# Patient Record
Sex: MALE | Race: WHITE | Employment: OTHER | URBAN - NONMETROPOLITAN AREA
[De-identification: names, ages, dates, MRNs, and addresses within clinical notes are randomized per-mention and may not be internally consistent; named-entity substitution may affect disease eponyms.]

---

## 2022-08-25 ENCOUNTER — APPOINTMENT (OUTPATIENT)
Dept: GENERAL RADIOLOGY | Age: 67
DRG: 521 | End: 2022-08-25
Payer: MEDICARE

## 2022-08-25 ENCOUNTER — HOSPITAL ENCOUNTER (INPATIENT)
Age: 67
LOS: 13 days | Discharge: HOME HEALTH | DRG: 521 | End: 2022-09-07
Attending: HOSPITALIST | Admitting: HOSPITALIST
Payer: MEDICARE

## 2022-08-25 ENCOUNTER — APPOINTMENT (OUTPATIENT)
Dept: ULTRASOUND IMAGING | Age: 67
DRG: 521 | End: 2022-08-25
Payer: MEDICARE

## 2022-08-25 DIAGNOSIS — I48.91 NEW ONSET A-FIB (HCC): ICD-10-CM

## 2022-08-25 DIAGNOSIS — S72.002B: ICD-10-CM

## 2022-08-25 DIAGNOSIS — K74.60 HEPATIC CIRRHOSIS, UNSPECIFIED HEPATIC CIRRHOSIS TYPE, UNSPECIFIED WHETHER ASCITES PRESENT (HCC): ICD-10-CM

## 2022-08-25 DIAGNOSIS — S72.002A CLOSED FRACTURE OF LEFT HIP, INITIAL ENCOUNTER (HCC): Primary | ICD-10-CM

## 2022-08-25 PROBLEM — E80.6 HYPERBILIRUBINEMIA: Status: ACTIVE | Noted: 2022-08-25

## 2022-08-25 PROBLEM — R79.89 ABNORMAL LFTS: Status: ACTIVE | Noted: 2022-08-25

## 2022-08-25 PROBLEM — D72.819 LEUKOPENIA: Status: ACTIVE | Noted: 2022-08-25

## 2022-08-25 PROBLEM — D69.6 THROMBOCYTOPENIA (HCC): Status: ACTIVE | Noted: 2022-08-25

## 2022-08-25 PROBLEM — K76.9 LIVER DISEASE: Status: ACTIVE | Noted: 2022-08-25

## 2022-08-25 LAB
ALBUMIN SERPL-MCNC: 4.3 G/DL (ref 3.5–5.2)
ALP BLD-CCNC: 117 U/L (ref 40–130)
ALT SERPL-CCNC: 34 U/L (ref 5–41)
ANION GAP SERPL CALCULATED.3IONS-SCNC: 12 MMOL/L (ref 7–19)
ANISOCYTOSIS: ABNORMAL
AST SERPL-CCNC: 75 U/L (ref 5–40)
BASOPHILS ABSOLUTE: 0 K/UL (ref 0–0.2)
BASOPHILS RELATIVE PERCENT: 0.5 % (ref 0–1)
BILIRUB SERPL-MCNC: 6.7 MG/DL (ref 0.2–1.2)
BILIRUB SERPL-MCNC: 8 MG/DL (ref 0.2–1.2)
BILIRUBIN DIRECT: 2.5 MG/DL (ref 0–0.3)
BILIRUBIN, INDIRECT: 4.2 MG/DL (ref 0.1–1)
BUN BLDV-MCNC: 10 MG/DL (ref 8–23)
CALCIUM SERPL-MCNC: 9.3 MG/DL (ref 8.8–10.2)
CHLORIDE BLD-SCNC: 106 MMOL/L (ref 98–111)
CO2: 24 MMOL/L (ref 22–29)
CREAT SERPL-MCNC: 0.4 MG/DL (ref 0.5–1.2)
EOSINOPHILS ABSOLUTE: 0.1 K/UL (ref 0–0.6)
EOSINOPHILS RELATIVE PERCENT: 3.4 % (ref 0–5)
GFR AFRICAN AMERICAN: >59
GFR NON-AFRICAN AMERICAN: >60
GLUCOSE BLD-MCNC: 139 MG/DL (ref 74–109)
HAPTOGLOBIN: <10 MG/DL (ref 30–200)
HAV IGM SER IA-ACNC: NORMAL
HCT VFR BLD CALC: 45 % (ref 42–52)
HEMOGLOBIN: 15.3 G/DL (ref 14–18)
HEPATITIS B CORE IGM ANTIBODY: NORMAL
HEPATITIS B SURFACE ANTIGEN INTERPRETATION: NORMAL
HEPATITIS C ANTIBODY INTERPRETATION: NORMAL
IMMATURE GRANULOCYTES #: 0 K/UL
INR BLD: 1.61 (ref 0.88–1.18)
IRON SATURATION: 97 % (ref 14–50)
IRON: 298 UG/DL (ref 59–158)
LIPASE: 41 U/L (ref 13–60)
LV EF: 74 %
LVEF MODALITY: NORMAL
LYMPHOCYTES ABSOLUTE: 0.6 K/UL (ref 1.1–4.5)
LYMPHOCYTES RELATIVE PERCENT: 16.3 % (ref 20–40)
MACROCYTES: ABNORMAL
MAGNESIUM: 1.7 MG/DL (ref 1.6–2.4)
MCH RBC QN AUTO: 39.8 PG (ref 27–31)
MCHC RBC AUTO-ENTMCNC: 34 G/DL (ref 33–37)
MCV RBC AUTO: 117.2 FL (ref 80–94)
MONOCYTES ABSOLUTE: 0.5 K/UL (ref 0–0.9)
MONOCYTES RELATIVE PERCENT: 14.2 % (ref 0–10)
NEUTROPHILS ABSOLUTE: 2.5 K/UL (ref 1.5–7.5)
NEUTROPHILS RELATIVE PERCENT: 65.3 % (ref 50–65)
PDW BLD-RTO: 16 % (ref 11.5–14.5)
PLATELET # BLD: 30 K/UL (ref 130–400)
PLATELET SLIDE REVIEW: ABNORMAL
PMV BLD AUTO: 11.7 FL (ref 9.4–12.4)
POLYCHROMASIA: ABNORMAL
POTASSIUM REFLEX MAGNESIUM: 3.9 MMOL/L (ref 3.5–5)
PROTHROMBIN TIME: 19.4 SEC (ref 12–14.6)
RBC # BLD: 3.84 M/UL (ref 4.7–6.1)
SARS-COV-2, NAAT: NOT DETECTED
SODIUM BLD-SCNC: 142 MMOL/L (ref 136–145)
TOTAL IRON BINDING CAPACITY: 307 UG/DL (ref 250–400)
TOTAL PROTEIN: 7.4 G/DL (ref 6.6–8.7)
TROPONIN: <0.01 NG/ML (ref 0–0.03)
TROPONIN: <0.01 NG/ML (ref 0–0.03)
TSH REFLEX FT4: 2.63 UIU/ML (ref 0.35–5.5)
TSH SERPL DL<=0.05 MIU/L-ACNC: 2.74 UIU/ML (ref 0.27–4.2)
VITAMIN D 25-HYDROXY: 30 NG/ML
VITAMIN D 25-HYDROXY: 32 NG/ML
WBC # BLD: 3.8 K/UL (ref 4.8–10.8)

## 2022-08-25 PROCEDURE — 80074 ACUTE HEPATITIS PANEL: CPT

## 2022-08-25 PROCEDURE — 87635 SARS-COV-2 COVID-19 AMP PRB: CPT

## 2022-08-25 PROCEDURE — 82306 VITAMIN D 25 HYDROXY: CPT

## 2022-08-25 PROCEDURE — 86256 FLUORESCENT ANTIBODY TITER: CPT

## 2022-08-25 PROCEDURE — 73502 X-RAY EXAM HIP UNI 2-3 VIEWS: CPT | Performed by: RADIOLOGY

## 2022-08-25 PROCEDURE — 71045 X-RAY EXAM CHEST 1 VIEW: CPT | Performed by: RADIOLOGY

## 2022-08-25 PROCEDURE — 83540 ASSAY OF IRON: CPT

## 2022-08-25 PROCEDURE — 99221 1ST HOSP IP/OBS SF/LOW 40: CPT | Performed by: INTERNAL MEDICINE

## 2022-08-25 PROCEDURE — 73502 X-RAY EXAM HIP UNI 2-3 VIEWS: CPT

## 2022-08-25 PROCEDURE — 1210000000 HC MED SURG R&B

## 2022-08-25 PROCEDURE — 80053 COMPREHEN METABOLIC PANEL: CPT

## 2022-08-25 PROCEDURE — 93005 ELECTROCARDIOGRAM TRACING: CPT | Performed by: INTERNAL MEDICINE

## 2022-08-25 PROCEDURE — 82390 ASSAY OF CERULOPLASMIN: CPT

## 2022-08-25 PROCEDURE — 82784 ASSAY IGA/IGD/IGG/IGM EACH: CPT

## 2022-08-25 PROCEDURE — 99222 1ST HOSP IP/OBS MODERATE 55: CPT | Performed by: SPECIALIST

## 2022-08-25 PROCEDURE — 84484 ASSAY OF TROPONIN QUANT: CPT

## 2022-08-25 PROCEDURE — 83010 ASSAY OF HAPTOGLOBIN QUANT: CPT

## 2022-08-25 PROCEDURE — 2580000003 HC RX 258: Performed by: NURSE PRACTITIONER

## 2022-08-25 PROCEDURE — 84443 ASSAY THYROID STIM HORMONE: CPT

## 2022-08-25 PROCEDURE — 82248 BILIRUBIN DIRECT: CPT

## 2022-08-25 PROCEDURE — 71045 X-RAY EXAM CHEST 1 VIEW: CPT

## 2022-08-25 PROCEDURE — 76705 ECHO EXAM OF ABDOMEN: CPT

## 2022-08-25 PROCEDURE — 83550 IRON BINDING TEST: CPT

## 2022-08-25 PROCEDURE — 6360000002 HC RX W HCPCS: Performed by: NURSE PRACTITIONER

## 2022-08-25 PROCEDURE — 99223 1ST HOSP IP/OBS HIGH 75: CPT | Performed by: INTERNAL MEDICINE

## 2022-08-25 PROCEDURE — 83735 ASSAY OF MAGNESIUM: CPT

## 2022-08-25 PROCEDURE — 99285 EMERGENCY DEPT VISIT HI MDM: CPT

## 2022-08-25 PROCEDURE — 76705 ECHO EXAM OF ABDOMEN: CPT | Performed by: RADIOLOGY

## 2022-08-25 PROCEDURE — 6370000000 HC RX 637 (ALT 250 FOR IP): Performed by: NURSE PRACTITIONER

## 2022-08-25 PROCEDURE — 36415 COLL VENOUS BLD VENIPUNCTURE: CPT

## 2022-08-25 PROCEDURE — 86038 ANTINUCLEAR ANTIBODIES: CPT

## 2022-08-25 PROCEDURE — 85610 PROTHROMBIN TIME: CPT

## 2022-08-25 PROCEDURE — 93306 TTE W/DOPPLER COMPLETE: CPT

## 2022-08-25 PROCEDURE — 83516 IMMUNOASSAY NONANTIBODY: CPT

## 2022-08-25 PROCEDURE — 82103 ALPHA-1-ANTITRYPSIN TOTAL: CPT

## 2022-08-25 PROCEDURE — 82247 BILIRUBIN TOTAL: CPT

## 2022-08-25 PROCEDURE — 83690 ASSAY OF LIPASE: CPT

## 2022-08-25 PROCEDURE — 6370000000 HC RX 637 (ALT 250 FOR IP): Performed by: INTERNAL MEDICINE

## 2022-08-25 PROCEDURE — 85025 COMPLETE CBC W/AUTO DIFF WBC: CPT

## 2022-08-25 RX ORDER — MORPHINE SULFATE 2 MG/ML
2 INJECTION, SOLUTION INTRAMUSCULAR; INTRAVENOUS EVERY 4 HOURS PRN
Status: DISCONTINUED | OUTPATIENT
Start: 2022-08-25 | End: 2022-08-26

## 2022-08-25 RX ORDER — ACETAMINOPHEN 650 MG/1
650 SUPPOSITORY RECTAL EVERY 6 HOURS PRN
Status: DISCONTINUED | OUTPATIENT
Start: 2022-08-25 | End: 2022-08-25

## 2022-08-25 RX ORDER — METOPROLOL SUCCINATE 50 MG/1
50 TABLET, EXTENDED RELEASE ORAL DAILY
Status: DISCONTINUED | OUTPATIENT
Start: 2022-08-25 | End: 2022-08-26

## 2022-08-25 RX ORDER — SODIUM CHLORIDE 0.9 % (FLUSH) 0.9 %
5-40 SYRINGE (ML) INJECTION EVERY 12 HOURS SCHEDULED
Status: DISCONTINUED | OUTPATIENT
Start: 2022-08-25 | End: 2022-09-02 | Stop reason: SDUPTHER

## 2022-08-25 RX ORDER — OXYCODONE HYDROCHLORIDE 5 MG/1
5 TABLET ORAL EVERY 6 HOURS PRN
Status: DISCONTINUED | OUTPATIENT
Start: 2022-08-25 | End: 2022-08-26

## 2022-08-25 RX ORDER — ONDANSETRON 4 MG/1
4 TABLET, ORALLY DISINTEGRATING ORAL EVERY 8 HOURS PRN
Status: DISCONTINUED | OUTPATIENT
Start: 2022-08-25 | End: 2022-08-26 | Stop reason: SDUPTHER

## 2022-08-25 RX ORDER — PHYTONADIONE 5 MG/1
10 TABLET ORAL DAILY
Status: COMPLETED | OUTPATIENT
Start: 2022-08-25 | End: 2022-08-27

## 2022-08-25 RX ORDER — SODIUM CHLORIDE 9 MG/ML
INJECTION, SOLUTION INTRAVENOUS PRN
Status: DISCONTINUED | OUTPATIENT
Start: 2022-08-25 | End: 2022-09-02 | Stop reason: SDUPTHER

## 2022-08-25 RX ORDER — HYDROMORPHONE HYDROCHLORIDE 1 MG/ML
1 INJECTION, SOLUTION INTRAMUSCULAR; INTRAVENOUS; SUBCUTANEOUS
Status: DISCONTINUED | OUTPATIENT
Start: 2022-08-25 | End: 2022-08-26

## 2022-08-25 RX ORDER — ENOXAPARIN SODIUM 100 MG/ML
40 INJECTION SUBCUTANEOUS DAILY
Status: DISCONTINUED | OUTPATIENT
Start: 2022-08-25 | End: 2022-08-25

## 2022-08-25 RX ORDER — POLYETHYLENE GLYCOL 3350 17 G/17G
17 POWDER, FOR SOLUTION ORAL DAILY PRN
Status: DISCONTINUED | OUTPATIENT
Start: 2022-08-25 | End: 2022-09-07 | Stop reason: HOSPADM

## 2022-08-25 RX ORDER — ONDANSETRON 2 MG/ML
4 INJECTION INTRAMUSCULAR; INTRAVENOUS EVERY 6 HOURS PRN
Status: DISCONTINUED | OUTPATIENT
Start: 2022-08-25 | End: 2022-08-26 | Stop reason: SDUPTHER

## 2022-08-25 RX ORDER — SODIUM CHLORIDE 0.9 % (FLUSH) 0.9 %
5-40 SYRINGE (ML) INJECTION PRN
Status: DISCONTINUED | OUTPATIENT
Start: 2022-08-25 | End: 2022-09-02 | Stop reason: SDUPTHER

## 2022-08-25 RX ORDER — ACETAMINOPHEN 325 MG/1
650 TABLET ORAL EVERY 6 HOURS PRN
Status: DISCONTINUED | OUTPATIENT
Start: 2022-08-25 | End: 2022-08-25

## 2022-08-25 RX ADMIN — MORPHINE SULFATE 2 MG: 2 INJECTION, SOLUTION INTRAMUSCULAR; INTRAVENOUS at 19:19

## 2022-08-25 RX ADMIN — MORPHINE SULFATE 2 MG: 2 INJECTION, SOLUTION INTRAMUSCULAR; INTRAVENOUS at 14:55

## 2022-08-25 RX ADMIN — PHYTONADIONE 10 MG: 5 TABLET ORAL at 23:39

## 2022-08-25 RX ADMIN — METOPROLOL SUCCINATE 50 MG: 50 TABLET, EXTENDED RELEASE ORAL at 18:45

## 2022-08-25 RX ADMIN — SODIUM CHLORIDE, PRESERVATIVE FREE 10 ML: 5 INJECTION INTRAVENOUS at 19:20

## 2022-08-25 RX ADMIN — OXYCODONE 5 MG: 5 TABLET ORAL at 18:45

## 2022-08-25 ASSESSMENT — ENCOUNTER SYMPTOMS
BLOOD IN STOOL: 0
SHORTNESS OF BREATH: 0
WHEEZING: 0
VOMITING: 0
ABDOMINAL DISTENTION: 0
COLOR CHANGE: 0
CONSTIPATION: 0
NAUSEA: 0
DIARRHEA: 0
COUGH: 0
ABDOMINAL PAIN: 0

## 2022-08-25 ASSESSMENT — PAIN SCALES - GENERAL
PAINLEVEL_OUTOF10: 8
PAINLEVEL_OUTOF10: 9
PAINLEVEL_OUTOF10: 6
PAINLEVEL_OUTOF10: 8
PAINLEVEL_OUTOF10: 7

## 2022-08-25 ASSESSMENT — PAIN DESCRIPTION - DESCRIPTORS
DESCRIPTORS: ACHING

## 2022-08-25 ASSESSMENT — PAIN DESCRIPTION - LOCATION
LOCATION: HIP

## 2022-08-25 ASSESSMENT — PAIN - FUNCTIONAL ASSESSMENT: PAIN_FUNCTIONAL_ASSESSMENT: ACTIVITIES ARE NOT PREVENTED

## 2022-08-25 ASSESSMENT — PAIN DESCRIPTION - ORIENTATION
ORIENTATION: LEFT

## 2022-08-25 NOTE — PROGRESS NOTES
4 Eyes Skin Assessment    Raymond Castillo is being assessed upon: Admission    I agree that I, Kathleen Browning RN, along with Steven Quiros RN have performed a thorough Head to Toe Skin Assessment on the patient. ALL assessment sites listed below have been assessed. Areas assessed by both nurses:     [x]   Head, Face, and Ears   [x]   Shoulders, Back, and Chest  [x]   Arms, Elbows, and Hands   [x]   Coccyx, Sacrum, and Ischium  [x]   Legs, Feet, and Heels    Does the Patient have Skin Breakdown?  No    Noah Prevention initiated: Yes  Wound Care Orders initiated: No    [facility] nurse consulted for Pressure Injury (Stage 3,4, Unstageable, DTI, NWPT, and Complex wounds) and New or Established Ostomies: No  Was unable to do a complete skin assessment due to pain with movement      Primary Nurse eSignature: Kathleen Browning RN on 8/25/2022 at 3:25 PM      Co-Signer eSignature: {Esignature:[id number]}

## 2022-08-25 NOTE — CONSULTS
[facility] ([facility]) Cardiology   Consult Note       Requesting MD:  Snow Cotton, *   Admit Status:         Patient:    Antonio Tavera  [id number]  1955         Chief Complaint: Preop cardiac clearance for frequent PACs  HPI: Patient is a 75 y.o. male, COPD, moderate use of alcohol, was run over by a big dog yesterday and fractured his left hip. EKG showed sinus rhythm with frequent PACs. Patient denies ever having a problem with dizziness, palpitation, CVA, chest pain or myocardial infarct. He smokes 1 pack of cigarette a day. He was a heavy drinker now cut down to moderate drinker. In a good day, patient can do anything he wants to do with no difficulty. He used to have labile hypertension not taking any medication now. Review of Systems:  HENNT: normal  PULMONARY: COPD  CVS:see history of present illness  ABD: denies any abdominal pain  PERIPHERL: normal  MSK: See history of present illness  CNS: Denies any dizziness, syncopal episode or history of stroke  Renal: Denies any history of dysuria or frequency    Cardiac Specific Data:  Specialty Problems          Cardiology Problems    New onset a-fib [facility])           Past Medical History:  Past Medical History:   Diagnosis Date    Liver disease         Past Surgical History:  History reviewed. No pertinent surgical history.     Past Family History:  Family History   Problem Relation Age of Onset    Diabetes Mother        Past Social History:  Social History     Socioeconomic History    Marital status:      Spouse name: Not on file    Number of children: 1    Years of education: Not on file    Highest education level: Not on file   Occupational History    Not on file   Tobacco Use    Smoking status: Every Day     Packs/day: 1.00     Years: 30.00     Pack years: 30.00     Types: Cigarettes    Smokeless tobacco: Never   Vaping Use    Vaping Use: Never used   Substance and Sexual Activity    Alcohol use: Yes    Drug use: Never    Sexual activity: Not on Patient here to follow up from a CT scan for kidney stones.     file   Other Topics Concern    Not on file   Social History Narrative    Not on file     Social Determinants of Health     Financial Resource Strain: Not on file   Food Insecurity: Not on file   Transportation Needs: Not on file   Physical Activity: Not on file   Stress: Not on file   Social Connections: Not on file   Intimate Partner Violence: Not on file   Housing Stability: Not on file       Allergies:  No Known Allergies    Prior to Admission medications    Not on File        Current Facility-Administered Medications   Medication Dose Route Frequency Provider Last Rate Last Admin    sodium chloride flush 0.9 % injection 5-40 mL  5-40 mL IntraVENous 2 times per day Arlana Nathan, APRN - CNP        sodium chloride flush 0.9 % injection 5-40 mL  5-40 mL IntraVENous PRN Arlana Vail, APRN - CNP        0.9 % sodium chloride infusion   IntraVENous PRN Arlana Vail, APRN - CNP        ondansetron (ZOFRAN-ODT) disintegrating tablet 4 mg  4 mg Oral Q8H PRN Arlana Vail, APRN - CNP        Or    ondansetron (ZOFRAN) injection 4 mg  4 mg IntraVENous Q6H PRN Arlana Nathan, APRN - CNP        polyethylene glycol (GLYCOLAX) packet 17 g  17 g Oral Daily PRN Arlana Vail, APRN - CNP        acetaminophen (TYLENOL) tablet 650 mg  650 mg Oral Q6H PRN Arlana Vail, APRN - CNP        Or    acetaminophen (TYLENOL) suppository 650 mg  650 mg Rectal Q6H PRN Arlana Nathan, APRN - CNP        oxyCODONE (ROXICODONE) immediate release tablet 5 mg  5 mg Oral Q6H PRN Arlana Nathan, APRN - CNP        morphine (PF) injection 2 mg  2 mg IntraVENous Q4H PRN Arlana Vail, APRN - CNP   2 mg at 08/25/22 1455        Current Infused Meds:   sodium chloride         Physical Exam:  Vitals:    08/25/22 1455   BP:    Pulse:    Resp: 18   Temp:    SpO2:      No intake or output data in the 24 hours ending 08/25/22 1751  Estimated body mass index is 27.85 kg/m² as calculated from the following:    Height as of this encounter: 5' 9\" (1.753 m). Weight as of this encounter: 188 lb 9 oz (85.5 kg). PHYSICAL EXAM:  HENNT: normal  PULMONARY: normal to inspection, palpation, percussion and ascultation  CVS:Normal neck vein, S1 and S2 normal, no murmur  ABD: liver and spleen not palpable, nontender, bowel sound normal  PERIPHERL: all pulses are normal with no bruit  MSK: Fracture left hip  CNS: Normal CN 2,3,4,6,5,7,9,10,11,and 12. Oriented to time, place and person    Labs:  Recent Labs     08/25/22  1209   WBC 3.8*   HGB 15.3   PLT 30*       Recent Labs     08/25/22  1209      K 3.9      CO2 24   BUN 10   CREATININE 0.4*   CALCIUM 9.3       CK, CKMB, Troponin:   Recent Labs     08/25/22  1434   TROPONINI <0.01       Last 3 BNP:  No results for input(s): PROBNP in the last 72 hours. US LIVER    Result Date: 8/25/2022  Finding suggestive for hydropic cholecystitis without a obvious stone as described. CBD mildly dilated. MRCP is recommended. Possible chronic liver parenchymal disease, cirrhosis? .  Splenomegaly. Recommendation: Follow up as clinically indicated. Electronically Signed by Bushra Patel MD at 25-Aug-2022 06:37:10 PM             XR CHEST PORTABLE    Result Date: 8/25/2022  Normal chest Recommendation: Follow up as clinically indicated. Electronically Signed by Cheryl Ocasio MD at 25-Aug-2022 01:18:21 PM             XR HIP 2-3 VW W PELVIS LEFT    Result Date: 8/25/2022  Acute, impacted transverse fracture through base of the femoral  head , continued good alignment and normal joint space. Recommendation: Followup as clinically indicated.  Electronically Signed by Cheryl Ocasio MD at 25-Aug-2022 01:04:57 PM                Assessment and Plan:  Frequent PACs, asymptomatic  Hypertension  Fracture left hip  COPD  Moderate use of alcohol    Plan: Patient is completely asymptomatic before the incident, unless the echocardiogram showed severe cardiomyopathy, he should be able to undergo general anesthetic for the hip surgery. Low-dose beta-blocker to suppress the PAC will be given tonight    I have spent 60 minutes reviewing the chart, taking history, examining the patient, discussion with the patient and the family concerning the finding, diagnoses and treatment plan. This dictation was performed using [address] [place]. Mistake and misspelling may have been created without  realizing them. Please notify me for clarification.   Thank you    Lydia Lai MD   8/25/2022, 5:51 PM

## 2022-08-25 NOTE — ED PROVIDER NOTES
[facility] EMERGENCY DEPT  eMERGENCY dEPARTMENT eNCOUnter      Pt Name: Sumaya Guillen  MRN: [id number]  [place] 1955  Date of evaluation: 8/25/2022  Provider: Bakari Rico, [address]       Chief Complaint   Patient presents with    Hip Pain     Left hip pain ground level fall. HISTORY OF PRESENT ILLNESS   (Location/Symptom, Timing/Onset,Context/Setting, Quality, Duration, Modifying Factors, Severity)  Note limiting factors. Sumaya Guillen is a 75 y.o. male who presents to the emergency department in a wheelchair from home. Patient was knocked down by a big dog last night and landed on his left hip. Since that time he has not been ambulatory. He states it hurts to move his leg and any direction. He has not had any surgeries to this leg previously. Someone gave him a Lortab and he was able to tolerate a ride in the car to get here. Pt tells me he has a little problem with his liver. Drinks \"a little bit\"  doesn't go to doctor per wife,  not on blood thinners    The history is provided by the patient. Hip Pain  This is a new problem. The current episode started yesterday. The problem occurs constantly. The problem has not changed since onset. The symptoms are relieved by narcotics. The treatment provided moderate relief. NursingNotes were reviewed. REVIEW OF SYSTEMS    (2-9 systems for level 4, 10 or more for level 5)     Review of Systems   Musculoskeletal:  Positive for arthralgias, gait problem and myalgias. Except as noted above the remainder of the review of systems was reviewed and negative. PAST MEDICAL HISTORY   No past medical history on file. SURGICALHISTORY     No past surgical history on file. CURRENT MEDICATIONS       Previous Medications    No medications on file       ALLERGIES     Patient has no known allergies. FAMILY HISTORY     No family history on file.        SOCIAL HISTORY       Social History     Socioeconomic History    Marital status:        SCREENINGS    [place] Coma Scale  Eye Opening: Spontaneous  Best Verbal Response: Oriented  Best Motor Response: Obeys commands  [place] Coma Scale Score: 15 @FLOW([id number])@      PHYSICAL EXAM    (up to 7 for level 4, 8 or more for level 5)     ED Triage Vitals [08/25/22 1101]   BP Temp Temp Source Heart Rate Resp SpO2 Height Weight   (!) 144/81 98.2 °F (36.8 °C) Oral 74 16 96 % -- --       Physical Exam  Vitals and nursing note reviewed. Constitutional:       Appearance: Normal appearance. He is well-developed. HENT:      Head: Normocephalic and atraumatic. Eyes:      General: No scleral icterus. Right eye: No discharge. Left eye: No discharge. Cardiovascular:      Rate and Rhythm: Normal rate. Pulmonary:      Effort: No respiratory distress. Musculoskeletal:      Cervical back: Normal range of motion and neck supple. Legs:       Comments: Pain with any movement  Neurovascularly intact   Neurological:      Mental Status: He is alert and oriented to person, place, and time. Psychiatric:         Behavior: Behavior normal.       DIAGNOSTIC RESULTS     EKG: All EKG's are interpreted by the Emergency Department Physician who either signs or Co-signsthis chart in the absence of a cardiologist.  105 A. fib with a right bundle branch block ST depression in the anterolateral leads read at [phone] by Dr. Nela Grover:   Tacey Bolus such as CT, Ultrasound and MRI are read by the radiologist. [place] radiographic images are visualized and preliminarily interpreted by the emergency physician with the below findings:      Interpretation per the Radiologist below, if available at the time of this note:    XR CHEST PORTABLE   Final Result   Normal chest   Recommendation: Follow up as clinically indicated.     Electronically Signed by Bob Silverman MD at 25-Aug-2022 01:18:21 PM               XR HIP 2-3 VW W PELVIS LEFT   Final Result   Acute, impacted transverse fracture through base of the femoral  head , continued good alignment and normal joint space. Recommendation: Followup as clinically indicated. Electronically Signed by Arcelia Abdul MD at 25-Aug-2022 01:04:57 PM                     ED BEDSIDEULTRASOUND:   Performed by ED Physician -none    LABS:  Labs Reviewed   COMPREHENSIVE METABOLIC PANEL W/ REFLEX TO MG FOR LOW K - Abnormal; Notable for the following components:       Result Value    Glucose 139 (*)     Creatinine 0.4 (*)     Total Bilirubin 8.0 (*)     AST 75 (*)     All other components within normal limits   PROTIME-INR - Abnormal; Notable for the following components:    Protime 19.4 (*)     INR 1.61 (*)     All other components within normal limits   CBC WITH AUTO DIFFERENTIAL       All other labs were within normal range or not returned as of this dictation. EMERGENCY DEPARTMENT COURSE and DIFFERENTIALDIAGNOSIS/MDM:   Vitals:    Vitals:    08/25/22 1101   BP: (!) 144/81   Pulse: 74   Resp: 16   Temp: 98.2 °F (36.8 °C)   TempSrc: Oral   SpO2: 96%           MDM  Dr. Carmel Geiger would like a cardiology consult prior to surgery. He states he would probably be tomorrow afternoon before he could fix the hip. CONSULTS:  IP CONSULT TO CARDIOLOGY  IP CONSULT TO ORTHOPEDIC SURGERY    PROCEDURES:  Unless otherwise noted below, none     Procedures    FINAL IMPRESSION      1. Closed fracture of left hip, initial encounter ([facility] [address]    2. New onset a-fib ([facility] [address]    3. Hepatic cirrhosis, unspecified hepatic cirrhosis type, unspecified whether ascites present [facility])        DISPOSITION/PLAN   DISPOSITION Decision To Admit 08/25/2022 12:37:49 PM      PATIENT REFERRED TO:  No follow-up provider specified.     DISCHARGE MEDICATIONS:  New Prescriptions    No medications on file          (Please note that portions of this note were completed with a voice recognitionprogram.  Efforts were made to edit the dictations but occasionally words are mis-transcribed.)    Keke MCMAHON Alvaro Galindo (electronically signed)          Steph Rubio, APRN  08/25/22 8686

## 2022-08-25 NOTE — CONSULTS
MEDICAL ONCOLOGY CONSULTATION    Pt Name: Kala Serna  MRN: [id number]  YOB: 1955  Date of evaluation: 8/25/2022    REASON FOR CONSULTATION: Thrombocytopenia. REQUESTING PHYSICIAN: Hospitalist    History Obtained From: patient, electronic medical record. HISTORY OF PRESENT ILLNESS:  Kala Serna was first seen by me on 8/25/2022. I was consulted for findings of thrombocytopenia. The patient was hospitalized at [facility] with complaints of left hip pain after a fall yesterday. The patient has not been able to ambulate. He is a chronic smoker. He was found to be on A. fib in the ER. CBC during admission here showed WBC count 3.8 with ANC of 2.5/, hemoglobin 15.3/, platelet count 03,874. Iron profile showed iron of 298, iron saturation 97%, TIBC 307, haptoglobin<10. PT 19.4/INR 1.61. The patient tells me that he has a diagnosis of alcoholic cirrhosis. He is a chronic drinker for many decades. Unfortunately, he does not have a GI doctor. He is known to be thrombocytopenic. He denies any history of hepatitis C. Ultrasound performed here showed findings suggestive of chronic liver parenchymal disease, cirrhosis and splenomegaly with the spleen measuring 2.5 x 6.6 x 15.7 cm consistent with splenomegaly. Past Medical History:    Past Medical History:   Diagnosis Date    Liver disease    Thrombocytopenia    Past Surgical History:    History reviewed. No pertinent surgical history. Social History:    The patient currently lives home  Tobacco: Currently smoker. Alcohol:   has significant history of alcohol intake.   Illicit Drugs: denies    Family History:   Family History   Problem Relation Age of Onset    Diabetes Mother        Current Hospital Medications:    Current Facility-Administered Medications   Medication Dose Route Frequency Provider Last Rate Last Admin    sodium chloride flush 0.9 % injection 5-40 mL  5-40 mL IntraVENous 2 times per day SHEILA Olvera - CNP        sodium chloride flush 0.9 % injection 5-40 mL  5-40 mL IntraVENous PRN Zaira Footman, APRN - CNP        0.9 % sodium chloride infusion   IntraVENous PRN Zaira Footman, APRN - CNP        ondansetron (ZOFRAN-ODT) disintegrating tablet 4 mg  4 mg Oral Q8H PRN Zaira Footman, APRN - CNP        Or    ondansetron (ZOFRAN) injection 4 mg  4 mg IntraVENous Q6H PRN Zaira Footman, APRN - CNP        polyethylene glycol (GLYCOLAX) packet 17 g  17 g Oral Daily PRN Zaira Footman, APRN - CNP        acetaminophen (TYLENOL) tablet 650 mg  650 mg Oral Q6H PRN Zaira Footman, APRN - CNP        Or    acetaminophen (TYLENOL) suppository 650 mg  650 mg Rectal Q6H PRN Zaira Footman, APRN - CNP        oxyCODONE (ROXICODONE) immediate release tablet 5 mg  5 mg Oral Q6H PRN Zaira Footman, APRN - CNP        morphine (PF) injection 2 mg  2 mg IntraVENous Q4H PRN Zaira Footman, APRN - CNP   2 mg at 08/25/22 1455       Allergies: No Known Allergies      Subjective   REVIEW OF SYSTEMS:   Constitutional:  Negative for chills, diaphoresis, fatigue and fever. HENT:  Negative for congestion and ear pain. Eyes:  Negative for visual disturbance. Respiratory:  Negative for cough, shortness of breath and wheezing. Cardiovascular:  Negative for chest pain, palpitations and leg swelling. Gastrointestinal:  Negative for abdominal distention, abdominal pain, blood in stool, constipation, diarrhea, nausea and vomiting. Endocrine: Negative for cold intolerance and heat intolerance. Genitourinary:  Negative for difficulty urinating, flank pain, frequency and urgency. Musculoskeletal:  Positive for arthralgias and gait problem. Negative for myalgias. Reports left hip pain   Skin:  Negative for color change and wound. Neurological:  Negative for dizziness, syncope, weakness, light-headedness, numbness and headaches. Hematological: Easy bruising /bleed easily.    Psychiatric/Behavioral:  Negative for agitation, confusion and dysphoric mood. Objective   BP (!) 144/81   Pulse 65   Temp 98.2 °F (36.8 °C) (Oral)   Resp 18   Ht 5' 9\" (1.753 m)   Wt 188 lb 9 oz (85.5 kg)   SpO2 96%   BMI 27.85 kg/m²     PHYSICAL EXAM:  CONSTITUTIONAL: Alert, appropriate, no acute distress  EYES: Non icteric, EOM intact, pupils equal round   ENT: Mucus membranes moist,external inspection of ears and nose are normal  NECK: Supple, no masses. No palpable thyroid mass  CHEST/LUNGS: CTA bilaterally, normal respiratory effort   CARDIOVASCULAR: RRR, no murmurs. No lower extremity edema  ABDOMEN: soft non-tender, active bowel sounds, no HSM. No palpable masses  EXTREMITIES: warm, decreased range of motion left hip  SKIN: warm, dry with no rashes or lesions  LYMPH: No cervical, clavicular, axillary, or inguinal lymphadenopathy  NEUROLOGIC: follows commands, non focal       LABORATORY RESULTS REVIEWED/ANALYZED BY ME:  Recent Labs     08/25/22  1209   WBC 3.8*   HGB 15.3   HCT 45.0   .2*   PLT 30*       Lab Results   Component Value Date     08/25/2022    K 3.9 08/25/2022     08/25/2022    CO2 24 08/25/2022    BUN 10 08/25/2022    CREATININE 0.4 (L) 08/25/2022    GLUCOSE 139 (H) 08/25/2022    CALCIUM 9.3 08/25/2022    PROT 7.4 08/25/2022    LABALBU 4.3 08/25/2022    BILITOT 8.0 (H) 08/25/2022    ALKPHOS 117 08/25/2022    AST 75 (H) 08/25/2022    ALT 34 08/25/2022    LABGLOM >60 08/25/2022    GFRAA >59 08/25/2022       Lab Results   Component Value Date    INR 1.61 (H) 08/25/2022    PROTIME 19.4 (H) 08/25/2022       RADIOLOGY STUDIES REPORT/REVIEWED AND INTERPRETED BY ME:  XR CHEST PORTABLE    Result Date: 8/25/2022  Normal chest Recommendation: Follow up as clinically indicated.  Electronically Signed by Vasyl Walton MD at 25-Aug-2022 01:18:21 PM             XR HIP 2-3 VW W PELVIS LEFT    Result Date: 8/25/2022  Acute, impacted transverse fracture through base of the femoral  head , continued good alignment and normal joint space. Recommendation: Followup as clinically indicated. Electronically Signed by Carlos Alberto Austin MD at 25-Aug-2022 01:04:57 PM              ASSESSMENT:  #Thrombocytopenia-likely multifactorial to include direct effect of alcohol bone marrow and also secondary to splenomegaly. Likely secondary to end-stage liver disease/alcohol abuse  -US showed signs of end-stage liver disease/splenomegaly (portal hypertension)    Left hip fracture  -Awaiting surgical evaluation  -Could transfuse 2 units of platelets, 1 before/1 during procedure  -For urgent or emergency procedures requiring an increase in platelet count, platelet transfusions are used. When platelets are administered, they should be given as closely as possible to the start of the procedure    End-stage liver disease/splenomegaly  -Evidence of portal hypertension  -Hepatitis profile  -We will check hemochromatosis panel  -Interesting, direct bilirubin<indirect bilirubin  -We will check reticulocyte count/LDH to rule out hemolysis    Coagulopathy of liver disease/thrombocytopenia  -INR of 1.61/prolonged PT  -Vitamin K 10 mg p.o. x3 doses started on 8/25/2022  -Recheck PT/INR in a.m. after first dose  -TPO-RA are FDA approved but usually not readily available and may take 2 weeks to increase platelet counts. GI is checking this with pharmacy to check on availability. Macrocytosis  -Likely secondary to end-stage liver disease  -We will check folate/B12  -Low haptoglobin, increased indirect bilirubin? ?  End-stage liver disease vs hemolytic process? ?  Plan:  -Reticulocyte count, LDH, Jannet test    Elevated iron saturation/iron levels  -We will check ferritin  -We will check hemochromatosis panel    PLAN:  If needed could transfuse 2 units platelets before/during surgical procedure.   Ferritin, LDH, d dimer, fibrinogen  B12, folate  Jannet test  Absolute reticulocyte count  Hemochromatosis panel  Discussed with GI  Avoid DVT prophylaxis due to platelet counts below 50,000  Vitamin K 10 mg p.o. x3 days. I have seen, examined and reviewed this patient medication list, appropriate labs and imaging studies. I reviewed relevant medical records and others physicians notes. I discussed the plans of care with the patient. I answered all the questions to the patients satisfaction. I have also reviewed the chief complaint (CC) and part of the history (History of Present Illness (HPI), Past Family Social History [facility]), or Review of Systems (ROS) and made changes when appropriated.        (Please note that portions of this note were completed with a voice recognition program. Efforts were made to edit the dictations but occasionally words are mis-transcribed.)        Naomi Sousa MD    08/25/22  5:21 PM

## 2022-08-25 NOTE — CONSULTS
Pt Name: Tabitha Mcdonald  MRN: [id number]  [id number]  YOB: 1955  Admit Date: 8/25/2022 11:03 AM  Date of evaluation: 8/25/2022  Primary Care Physician: No primary care provider on file. [id number]       Requesting Provider: Hospitalist service. GI Consult. Patient's wife is at bedside. Assessment: Thrombocytopenia. Platelet 74,149. WBC count 3.8 and low. Hemoglobin 15.3 and normal.  Also has leukopenia. 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 Other causes need to be ruled out. History of alcohol abuse. Still drinks alcohol. Was told long time ago that he has cirrhosis. The liver is firm and enlarged. Most likely cause of alcohol abuse. Flanks are slightly full. Ascites. Umbilical hernia, uncomplicated. Macrocytosis. No anemia. Plan:    CT scan of abdomen and pelvis. INR. Work-up abnormal LFTs. Labs ordered to exclude other causes of cirrhosis other than alcohol abuse. CT scan of abdomen and pelvis to rule out hepatoma, portal vein thrombosis. At some point in time he would need EGD to screen for varices and screening colonoscopy for colon cancer screening. Advised him to stop alcohol. Advised him to limit his Tylenol if needed to 500 mg maximum a day. Advised him not to take NSAIDs and they can cause bleeding. GI has nothing to improve his platelet count. Only definitive treatment is liver transplant. Follow hematology recommendations    Discussed plan with patient, his wife, nurse and Dr. Kristal Martinez, hematologist.                  Reason : Thrombocytopenia. History:    The patient is a 75 y.o. male. I was asked to see evaluate the patient for thrombocytopenia. Platelet count is 30,482. He has a history of heavy alcohol abuse. He says he is cut down on alcohol but has not quit. Last alcohol few days ago. He was admitted with left hip fracture.   He was found to have severe thrombocytopenia. His white cell count is also low but hemoglobin is normal.    Patient never had EGD or colonoscopy. He denies history of varices. He denies family history of cirrhosis or hemochromatosis. He is not known to have hepatitis C. He denies nausea, vomiting, hematemesis, melena or hematochezia. Denies taking NSAIDs. Denies history of ascites. He does have bilateral lower leg edema. Not known to have varices. And had EGD. Family history: He denies personal or family history of ulcerative colitis, Crohn's disease. He denies family history of cancers of esophagus, stomach, small intestine, colon, liver disease. Review of systems: Denies chest pain, cough, shortness of breath. Denies any history of coronary artery disease. Denies lung disease. Denies hematuria or pyuria. Denies history of ascites. Last EGD: Never had EGD. Last Colonoscopy: Never had screening colonoscopy. 8/25/2022: WBC 3.8, hemoglobin 13.3, platelet count hemoglobin 15.3. Electrolytes normal.  BUN 10. Creatinine 0.4. AST 75, ALT 34, total bili 8, alkaline phos is 117. Review of systems positive for arthralgia, gait problem and left hip pain. 8/25/2022: Admission history and physical: Reviewed: Chief complaint left hip pain. 68-year-old male with past medical history of liver disease presented to ER complaining of left hip pain. With knocked by a big dog last night. Landed on his left hip. He could not ambulate. He denied any medical conditions. No other symptoms. He has a history of liver disease. Does not take any prescription medications at home. Smokes 1 pack of cigarettes a day. Work-up in ER revealed new onset A. fib. Glucose 139. AST 75. Bili 8. WBC 3.8, platelet count 04,168, . Chest x-ray unremarkable. X-ray of the hip indicated acute impacted transverse fracture through the base of the femoral head.   Patient was admitted to the [facility] medicine for left hip fracture, new onset A. fib with consultation with orthopedic surgery and cardiology consultation. Initial assessment and plan was close left hip fracture. New onset A. fib. Liver disease. Plan was orthopedic surgery consultation, cardiology consultation. Echocardiogram.  Liver ultrasound. Past medical history: Liver disease. Surgical history: Nothing recorded under surgical history. Family history: Mother . Had diabetes. Father . No other family history recorded. Prior to admission medication: None recorded. Current medications: Tylenol, morphine, Zofran, oxycodone, polyethylene glycol, sodium chloride flush. Social history: Currently lives at home. Hospital problem list: Closed left hip fracture, A. fib, liver disease, hyperbilirubinemia. Diagnosis Date    Liver disease        Past Surgical History:        History reviewed. No pertinent surgical history. Allergies:  Patient has no known allergies.       Prior to Admission medications    Not on File          Current Meds:           sodium chloride flush  5-40 mL IntraVENous 2 times per day          sodium chloride           PRN Meds:      sodium chloride flush, sodium chloride, ondansetron **OR** ondansetron, polyethylene glycol, acetaminophen **OR** acetaminophen, oxyCODONE, morphine      Social History     Socioeconomic History    Marital status:      Spouse name: Not on file    Number of children: 1    Years of education: Not on file    Highest education level: Not on file   Occupational History    Not on file   Tobacco Use    Smoking status: Every Day     Packs/day: 1.00     Years: 30.00     Pack years: 30.00     Types: Cigarettes    Smokeless tobacco: Never   Vaping Use    Vaping Use: Never used   Substance and Sexual Activity    Alcohol use: Yes    Drug use: Never    Sexual activity: Not on file   Other Topics Concern    Not on file   Social History Narrative    Not on file     Social Determinants of Health     Financial Resource Strain: Not on file   Food Insecurity: Not on file   Transportation Needs: Not on file   Physical Activity: Not on file   Stress: Not on file   Social Connections: Not on file   Intimate Partner Violence: Not on file   Housing Stability: Not on file         Family History   Problem Relation Age of Onset    Diabetes Mother        ROS:    A pertinent review of all body system was done. Review of systems is as per HPI. Rest of the review of systems either negative, not pertinent or patient was unable to provide review of systems. Physical Exam:      Vitals:    08/25/22 1101 08/25/22 1429 08/25/22 1455 08/25/22 1502   BP: (!) 144/81      Pulse: 74 65     Resp: 16  18    Temp: 98.2 °F (36.8 °C)      TempSrc: Oral      SpO2: 96%      Weight:   188 lb 7.9 oz (85.5 kg) 188 lb 9 oz (85.5 kg)   Height:    5' 9\" (1.753 m)         General appearance: Moderately built and nourished. In no acute physical distress. Head: normocephalic. Atraumatic. Neck: Supple. No JVD. Abdomen: Obese. Liver is enlarged 6 fingerbreadths below the right subcostal margin in the right midclavicular line. Spleen tip not palpable. Flanks are slightly full. He has 3-4 cm reducible umbilical hernia. Extremities: Hyperpigmentation over both lower legs. Trace bilateral lower leg edema. Skin: No cyanosis. Neurologic: Alert and oriented. Labs:       Recent Labs     08/25/22  1209   WBC 3.8*   RBC 3.84*   HGB 15.3   HCT 45.0   .2*   MCH 39.8*   MCHC 34.0   PLT 30*       @LABRCNT(NA:3,K:3,ANIONGAP:3,CL:3,CO2:3,BUN:3,CREATININE:3,GLUCOSE:3,    GFR:3,CALCIUM:3)@    No results for input(s): MG, PHOS in the last 72 hours.     Recent Labs     08/25/22  1209   AST 75*   ALT 34   BILITOT 8.0*   ALKPHOS 117       HgBA1c:  No components found for: HGBA1C    FLP:  No results found for: TRIG, HDL, LDLCALC, LDLDIRECT, LABVLDL    TSH:  No results found for: TSH    Troponin T: Recent Labs     08/25/22  1434   TROPONINI <0.01       INR:   Recent Labs     08/25/22  1209   INR 1.61*       Recent Labs     08/25/22  1434   LIPASE 41         Radiology:    Reviewed available results of the pertinent imaging studies. Disclaimer speech recognition software       (Please note that portions of this note were completed with a voice recognition program. Efforts were made to edit the dictations but occasionally words are mis-transcribed.)      Thank you for the consultation and allowing me to participate in this patient's care alongside with you!     Zackary Pitt MD    8/25/2022

## 2022-08-25 NOTE — H&P
Prakash Pineda - History & Physical      PCP: No primary care provider on file. Date of Admission: 8/25/2022    Date of Service: 8/25/2022    Chief Complaint:  Left hip pain     History Of Present Illness: The patient is a 75 y.o. male with past medical history of liver disease who presented to [facility] ED complaining of left hip pain. Mr. Faviola Srinivasan reported he was knocked by a big dog last night and landed on his left hip. Since then he has not been able to ambulate. Denies any other symptoms at this time. Denies any medical conditions, other than liver disease that has not bothered him. Stated he does not take any prescription medications at home. Admits to smoking 1 ppd. Workup in ED revealed new onset rate controlled Afib, glucose 139 otherwise unremarkable chemistry, AST 75, bilirubin 8.0, WBC 3.8, platelet 30, INR 0.82, Cxray unremarkable. Xray of hip indicated acute, impacted transverse fracture through base of the femoral head. Patient was admitted to hospital medicine for left hip fracture and new onset Afib with orthopedic surgery and cardiology consultations. Past Medical History:    No past medical history on file. Past Surgical History:    No past surgical history on file. Home Medications:  Prior to Admission medications    Not on File       Allergies:    Patient has no known allergies. Social History:    The patient currently lives home  Tobacco:   has no history on file for tobacco use. Alcohol:   has no history on file for alcohol use. Illicit Drugs: denies    Family History:  No family history on file. Review of Systems   Constitutional:  Negative for chills, diaphoresis, fatigue and fever. HENT:  Negative for congestion and ear pain. Eyes:  Negative for visual disturbance. Respiratory:  Negative for cough, shortness of breath and wheezing. Cardiovascular:  Negative for chest pain, palpitations and leg swelling.    Gastrointestinal: Negative for abdominal distention, abdominal pain, blood in stool, constipation, diarrhea, nausea and vomiting. Endocrine: Negative for cold intolerance and heat intolerance. Genitourinary:  Negative for difficulty urinating, flank pain, frequency and urgency. Musculoskeletal:  Positive for arthralgias and gait problem. Negative for myalgias. Reports left hip pain   Skin:  Negative for color change and wound. Neurological:  Negative for dizziness, syncope, weakness, light-headedness, numbness and headaches. Hematological:  Does not bruise/bleed easily. Psychiatric/Behavioral:  Negative for agitation, confusion and dysphoric mood. Physical Examination:  BP (!) 144/81   Pulse 74   Temp 98.2 °F (36.8 °C) (Oral)   Resp 16   SpO2 96%     Physical Exam  Constitutional:       General: He is not in acute distress. Appearance: Normal appearance. He is not ill-appearing. HENT:      Head: Normocephalic and atraumatic. Right Ear: External ear normal.      Left Ear: External ear normal.      Nose: Nose normal.      Mouth/Throat:      Mouth: Mucous membranes are moist.   Eyes:      Extraocular Movements: Extraocular movements intact. Conjunctiva/sclera: Conjunctivae normal.      Pupils: Pupils are equal, round, and reactive to light. Cardiovascular:      Rate and Rhythm: Normal rate. Rhythm irregular. Pulses: Normal pulses. Heart sounds: Normal heart sounds. Pulmonary:      Effort: Pulmonary effort is normal. No respiratory distress. Breath sounds: Normal breath sounds. No wheezing, rhonchi or rales. Abdominal:      General: Bowel sounds are normal. There is distension. Palpations: Abdomen is soft. Tenderness: There is no abdominal tenderness. Musculoskeletal:         General: Tenderness present. No swelling or deformity. Normal range of motion. Cervical back: Normal range of motion and neck supple. No muscular tenderness.       Right lower leg: No edema. Left lower leg: No edema. Comments: Left hip tenderness    Skin:     General: Skin is warm and dry. Findings: No bruising or lesion. Neurological:      Mental Status: He is alert and oriented to person, place, and time. Psychiatric:         Mood and Affect: Mood normal.         Behavior: Behavior normal.         Thought Content: Thought content normal.        Diagnostic Data:  CBC:  Recent Labs     08/25/22  1209   WBC 3.8*   HGB 15.3   HCT 45.0   PLT 30*     BMP:  Recent Labs     08/25/22  1209      K 3.9      CO2 24   BUN 10   CREATININE 0.4*   CALCIUM 9.3     Recent Labs     08/25/22  1209   AST 75*   ALT 34   BILITOT 8.0*   ALKPHOS 117     Coag Panel:   Recent Labs     08/25/22  1209   INR 1.61*   PROTIME 19.4*         RAD:    XR CHEST PORTABLE  Result Date: 8/25/2022    Normal chest Recommendation: Follow up as clinically indicated. Electronically Signed by Elsy Nicole MD at 25-Aug-2022 01:18:21 PM               XR HIP 2-3 VW W PELVIS LEFT  Result Date: 8/25/2022    Acute, impacted transverse fracture through base of the femoral  head , continued good alignment and normal joint space. Recommendation: Followup as clinically indicated. Electronically Signed by Elsy Nicole MD at 25-Aug-2022 01:04:57 PM                 Assessment/Plan:  Principal Problem:    Closed left hip fracture, initial encounter [facility])  Active Problems:    New onset a-fib ([address]    Liver disease    Hyperbilirubinemia  Resolved Problems:    * No resolved hospital problems.  *       Principal Problem:    Closed left hip fracture, initial encounter [facility]   - Orthopedic surgery consultation and recommendations appreciated    - Bed rest until ortho evaluation    - Pain control    - PT/OT per ortho        Active Problems:    New onset a-fib [facility])-  - Cardiology consultation and recommendations appreciated  - Currently rate controlled   - Defer anticoagulation to cardiology    - ECHO   - Trend troponin   - Serial and PRN EKGs  - Monitor on telemetry       Liver disease/ Hyperbilirubinemia-   - Liver US   - Lipase    - Monitor CMP closely            DVT Prophylaxis:  Lovenox     Further Orders per Clinical course/attending. Signed:  Electronically signed by SHEILA Brannon CNP on 8/25/22 at 1:52 PM CDT       EMR Dragon/Transcription disclaimer:   Much of this encounter note is an electronic transcription/translation of spoken language to printed text. The electronic translation of spoken language may permit erroneous, or at times, nonsensical words or phrases to be inadvertently transcribed; although attempts have made to review the note for such errors, some may still exist.      Attestation Statement     I have independently seen and examined this patient                                                              Objective:   Vitals: BP (!) 144/81   Pulse 65   Temp 98.2 °F (36.8 °C) (Oral)   Resp 18   Ht 5' 9\" (1.753 m)   Wt 188 lb 9 oz (85.5 kg)   SpO2 96%   BMI 27.85 kg/m²   General appearance: alert, appears stated age and cooperative  Skin: Skin color, texture, turgor normal.   HEENT: Head: Normocephalic, no lesions, without obvious abnormality.   Neck: no adenopathy, no carotid bruit, no JVD, and supple, symmetrical, trachea midline  Lungs: clear to auscultation bilaterally  Heart: regular rate and rhythm, S1, S2 normal, no murmur, click, rub or gallop  Abdomen: soft, non-tender; bowel sounds normal; no masses,  no organomegaly  Extremities: extremities normal, atraumatic, no cyanosis or edema  Lymphatic: No significant lymph node enlargement papable  Neurologic: Mental status: Alert, oriented, thought content appropriate      Assessment & Plan:    Hip fx  Hyperbilirubenemia - liver us - GI consult  Thrombocytopenia - hematology consult- no lovenox   Right bundle branch block- ortho requested cardiology consult     Kirby Vann MD

## 2022-08-25 NOTE — CARE COORDINATION
08/25/22 1327   Service Assessment   Patient Orientation Alert and Oriented   Cognition Alert   History Provided By Patient   Primary Caregiver Self   Accompanied By/Relationship Spouse   Support Systems Spouse/Significant Other   Patient's Healthcare Decision Maker is: Legal Next of Kin   PCP Verified by CM Yes  (Does not have one - states he would like to be set up with one)   Prior Functional Level Independent in ADLs/IADLs; Bathing;Dressing; Toileting;Feeding;Cooking;Housework; Shopping;Mobility   Current Functional Level Assistance with the following:;Bathing;Dressing; Mobility; Shopping;Housework   Can patient return to prior living arrangement Unknown at present   Ability to make needs known: Good   Family able to assist with home care needs: Yes   Would you like for me to discuss the discharge plan with any other family members/significant others, and if so, who? Yes  (Spouse)   Financial Resources [employer]   (denied needs)   CM/SW Referral No PCP   Social/Functional History   Lives With Spouse   Type of Home   (currently living in [address] [facility] in [place] - have a house in [place], however, pt states they are retired and travel all the time)   Bathroom Toilet Standard   Bathroom Equipment   (hasnt been needed)   [address]   (hasn't been needed)   [address] assistance   Transfer Assistance Needs assistance   Active  Yes   Occupation Retired   Discharge Planning   Type of Residence   (unsure at this time)   Living Arrangements Spouse/Significant Other   Current Services Prior To Admission None   Potential Assistance Needed Skilled Nursing Facility;Home Care;Durable Medical Equipment   DME Elverna Corti   DME Ordered?  No   Potential Assistance Purchasing Medications No   Type of Home Care Services None   Patient expects to be discharged to: [facility]

## 2022-08-25 NOTE — PROGRESS NOTES
Spoke with Dr. Geno Dash regarding consult. He said he will see the patient regarding Cirrhosis and bili, but Hemo/Onco will have to be consulted or hospitalist will have to care for patient regarding low platelets.

## 2022-08-25 NOTE — CARE COORDINATION
Patient Contact Information:    [address]  [phone] (home)   Telephone Information:   Mobile [phone]     Above information verified? [x]   Yes  []   No      Emergency Contacts:    Extended Emergency Contact Information  Primary Emergency Contact: [facility] [facility] Phone: [phone]  Relation: Spouse  Preferred language: English   needed? No      Have you been vaccinated for COVID-19 (SARS-CoV-2)? [x]   Yes  []   No                   If so, when? Which :         []   Pfizer-BioNTech  []   Moderna  [x]   Davin Geddes  []   Other:         Pharmacy:  No Pharmacies Listed        Patient Deficits:    []   Yes   [x]   No    If yes:    []   Confusion/Memory  []   Visual  []   Motor/Sensory         []   Right arm         []   Right leg         []   Left arm         []   Left leg  []   Language/Speech         []   Aphasia         []   Dysarthria         []   Swallow         Kurt Coma Scale  Eye Opening: Spontaneous  Best Verbal Response: Oriented  Best Motor Response: Obeys commands  Decker Coma Scale Score: 15    Patient Deficit Notes:   SW met with pt and spouse at bedside. Pt states he was knocked down by his dog. Pt using wheelchair now. Plan to have surgery tomorrow. Pt is currently living in [facility] with his spouse. Pt states they live in [place], however, they are always traveling in their camper as they are retired. Would like to be set up with PCP - always traveling though. Denied any needs at this time. Will await surgery and evals.

## 2022-08-26 ENCOUNTER — APPOINTMENT (OUTPATIENT)
Dept: MRI IMAGING | Age: 67
DRG: 521 | End: 2022-08-26
Payer: MEDICARE

## 2022-08-26 ENCOUNTER — APPOINTMENT (OUTPATIENT)
Dept: CT IMAGING | Age: 67
DRG: 521 | End: 2022-08-26
Payer: MEDICARE

## 2022-08-26 ENCOUNTER — APPOINTMENT (OUTPATIENT)
Dept: GENERAL RADIOLOGY | Age: 67
DRG: 521 | End: 2022-08-26
Payer: MEDICARE

## 2022-08-26 ENCOUNTER — ANESTHESIA EVENT (OUTPATIENT)
Dept: OPERATING ROOM | Age: 67
DRG: 521 | End: 2022-08-26
Payer: MEDICARE

## 2022-08-26 ENCOUNTER — ANESTHESIA (OUTPATIENT)
Dept: OPERATING ROOM | Age: 67
DRG: 521 | End: 2022-08-26
Payer: MEDICARE

## 2022-08-26 PROBLEM — K74.60 HEPATIC CIRRHOSIS (HCC): Status: ACTIVE | Noted: 2022-08-26

## 2022-08-26 LAB
ABO/RH: NORMAL
ALBUMIN SERPL-MCNC: 3.4 G/DL (ref 3.5–5.2)
ALP BLD-CCNC: 97 U/L (ref 40–130)
ALT SERPL-CCNC: 25 U/L (ref 5–41)
ANION GAP SERPL CALCULATED.3IONS-SCNC: 9 MMOL/L (ref 7–19)
ANTIBODY SCREEN: NORMAL
AST SERPL-CCNC: 57 U/L (ref 5–40)
BASOPHILS ABSOLUTE: 0 K/UL (ref 0–0.2)
BASOPHILS RELATIVE PERCENT: 0.6 % (ref 0–1)
BILIRUB SERPL-MCNC: 5.8 MG/DL (ref 0.2–1.2)
BLOOD BANK DISPENSE STATUS: NORMAL
BLOOD BANK DISPENSE STATUS: NORMAL
BLOOD BANK PRODUCT CODE: NORMAL
BLOOD BANK PRODUCT CODE: NORMAL
BPU ID: NORMAL
BPU ID: NORMAL
BUN BLDV-MCNC: 13 MG/DL (ref 8–23)
CALCIUM SERPL-MCNC: 8.5 MG/DL (ref 8.8–10.2)
CHLORIDE BLD-SCNC: 104 MMOL/L (ref 98–111)
CO2: 25 MMOL/L (ref 22–29)
CREAT SERPL-MCNC: 0.6 MG/DL (ref 0.5–1.2)
D DIMER: 4.3 UG/ML FEU (ref 0–0.48)
DAT POLYSPECIFIC: NORMAL
DESCRIPTION BLOOD BANK: NORMAL
DESCRIPTION BLOOD BANK: NORMAL
EOSINOPHILS ABSOLUTE: 0.2 K/UL (ref 0–0.6)
EOSINOPHILS RELATIVE PERCENT: 4.4 % (ref 0–5)
FERRITIN: 220 NG/ML (ref 30–400)
FIBRINOGEN: 177 MG/DL (ref 238–505)
FOLATE: 13 NG/ML (ref 4.5–32.2)
GFR AFRICAN AMERICAN: >59
GFR NON-AFRICAN AMERICAN: >60
GLUCOSE BLD-MCNC: 128 MG/DL (ref 74–109)
HCT VFR BLD CALC: 38.4 % (ref 42–52)
HEMOGLOBIN: 12.6 G/DL (ref 14–18)
IMMATURE GRANULOCYTES #: 0 K/UL
INR BLD: 1.73 (ref 0.88–1.18)
LACTATE DEHYDROGENASE: 255 U/L (ref 91–215)
LYMPHOCYTES ABSOLUTE: 0.8 K/UL (ref 1.1–4.5)
LYMPHOCYTES RELATIVE PERCENT: 21 % (ref 20–40)
MACROCYTES: ABNORMAL
MCH RBC QN AUTO: 39.9 PG (ref 27–31)
MCHC RBC AUTO-ENTMCNC: 32.8 G/DL (ref 33–37)
MCV RBC AUTO: 121.5 FL (ref 80–94)
MONOCYTES ABSOLUTE: 0.5 K/UL (ref 0–0.9)
MONOCYTES RELATIVE PERCENT: 14.4 % (ref 0–10)
NEUTROPHILS ABSOLUTE: 2.1 K/UL (ref 1.5–7.5)
NEUTROPHILS RELATIVE PERCENT: 59 % (ref 50–65)
PDW BLD-RTO: 16 % (ref 11.5–14.5)
PLATELET # BLD: 31 K/UL (ref 130–400)
PLATELET SLIDE REVIEW: ABNORMAL
PMV BLD AUTO: 11.4 FL (ref 9.4–12.4)
POTASSIUM REFLEX MAGNESIUM: 3.9 MMOL/L (ref 3.5–5)
PROTHROMBIN TIME: 20.5 SEC (ref 12–14.6)
RBC # BLD: 3.16 M/UL (ref 4.7–6.1)
RETICULOCYTE ABSOLUTE COUNT: 0.13 M/UL (ref 0.03–0.12)
RETICULOCYTE COUNT PCT: 4.33 % (ref 0.5–1.5)
SODIUM BLD-SCNC: 138 MMOL/L (ref 136–145)
TOTAL PROTEIN: 5.9 G/DL (ref 6.6–8.7)
VITAMIN B-12: 1446 PG/ML (ref 211–946)
WBC # BLD: 3.6 K/UL (ref 4.8–10.8)

## 2022-08-26 PROCEDURE — 2709999900 HC NON-CHARGEABLE SUPPLY: Performed by: ORTHOPAEDIC SURGERY

## 2022-08-26 PROCEDURE — 3700000001 HC ADD 15 MINUTES (ANESTHESIA): Performed by: ORTHOPAEDIC SURGERY

## 2022-08-26 PROCEDURE — 36415 COLL VENOUS BLD VENIPUNCTURE: CPT

## 2022-08-26 PROCEDURE — 7100000000 HC PACU RECOVERY - FIRST 15 MIN: Performed by: ORTHOPAEDIC SURGERY

## 2022-08-26 PROCEDURE — 73502 X-RAY EXAM HIP UNI 2-3 VIEWS: CPT | Performed by: RADIOLOGY

## 2022-08-26 PROCEDURE — 0SRB04A REPLACEMENT OF LEFT HIP JOINT WITH CERAMIC ON POLYETHYLENE SYNTHETIC SUBSTITUTE, UNCEMENTED, OPEN APPROACH: ICD-10-PCS | Performed by: ORTHOPAEDIC SURGERY

## 2022-08-26 PROCEDURE — 82746 ASSAY OF FOLIC ACID SERUM: CPT

## 2022-08-26 PROCEDURE — 2000000000 HC ICU R&B

## 2022-08-26 PROCEDURE — 86900 BLOOD TYPING SEROLOGIC ABO: CPT

## 2022-08-26 PROCEDURE — 2700000000 HC OXYGEN THERAPY PER DAY

## 2022-08-26 PROCEDURE — 2580000003 HC RX 258: Performed by: ORTHOPAEDIC SURGERY

## 2022-08-26 PROCEDURE — 6360000002 HC RX W HCPCS: Performed by: NURSE ANESTHETIST, CERTIFIED REGISTERED

## 2022-08-26 PROCEDURE — 2580000003 HC RX 258: Performed by: NURSE PRACTITIONER

## 2022-08-26 PROCEDURE — 86880 COOMBS TEST DIRECT: CPT

## 2022-08-26 PROCEDURE — 2500000003 HC RX 250 WO HCPCS: Performed by: NURSE ANESTHETIST, CERTIFIED REGISTERED

## 2022-08-26 PROCEDURE — 3600000015 HC SURGERY LEVEL 5 ADDTL 15MIN: Performed by: ORTHOPAEDIC SURGERY

## 2022-08-26 PROCEDURE — 82607 VITAMIN B-12: CPT

## 2022-08-26 PROCEDURE — 2580000003 HC RX 258: Performed by: HOSPITALIST

## 2022-08-26 PROCEDURE — 3209999900 FLUORO FOR SURGICAL PROCEDURES

## 2022-08-26 PROCEDURE — 83615 LACTATE (LD) (LDH) ENZYME: CPT

## 2022-08-26 PROCEDURE — 3700000000 HC ANESTHESIA ATTENDED CARE: Performed by: ORTHOPAEDIC SURGERY

## 2022-08-26 PROCEDURE — 88305 TISSUE EXAM BY PATHOLOGIST: CPT

## 2022-08-26 PROCEDURE — P9017 PLASMA 1 DONOR FRZ W/IN 8 HR: HCPCS

## 2022-08-26 PROCEDURE — 3600000005 HC SURGERY LEVEL 5 BASE: Performed by: ORTHOPAEDIC SURGERY

## 2022-08-26 PROCEDURE — 74178 CT ABD&PLV WO CNTR FLWD CNTR: CPT

## 2022-08-26 PROCEDURE — 2500000003 HC RX 250 WO HCPCS: Performed by: ORTHOPAEDIC SURGERY

## 2022-08-26 PROCEDURE — P9073 PLATELETS PHERESIS PATH REDU: HCPCS

## 2022-08-26 PROCEDURE — 81256 HFE GENE: CPT

## 2022-08-26 PROCEDURE — 6360000002 HC RX W HCPCS: Performed by: ORTHOPAEDIC SURGERY

## 2022-08-26 PROCEDURE — 71275 CT ANGIOGRAPHY CHEST: CPT | Performed by: RADIOLOGY

## 2022-08-26 PROCEDURE — 86850 RBC ANTIBODY SCREEN: CPT

## 2022-08-26 PROCEDURE — C1713 ANCHOR/SCREW BN/BN,TIS/BN: HCPCS | Performed by: ORTHOPAEDIC SURGERY

## 2022-08-26 PROCEDURE — 85025 COMPLETE CBC W/AUTO DIFF WBC: CPT

## 2022-08-26 PROCEDURE — 88311 DECALCIFY TISSUE: CPT

## 2022-08-26 PROCEDURE — 73502 X-RAY EXAM HIP UNI 2-3 VIEWS: CPT

## 2022-08-26 PROCEDURE — C1776 JOINT DEVICE (IMPLANTABLE): HCPCS | Performed by: ORTHOPAEDIC SURGERY

## 2022-08-26 PROCEDURE — 7100000001 HC PACU RECOVERY - ADDTL 15 MIN: Performed by: ORTHOPAEDIC SURGERY

## 2022-08-26 PROCEDURE — 85045 AUTOMATED RETICULOCYTE COUNT: CPT

## 2022-08-26 PROCEDURE — 85379 FIBRIN DEGRADATION QUANT: CPT

## 2022-08-26 PROCEDURE — 74183 MRI ABD W/O CNTR FLWD CNTR: CPT | Performed by: RADIOLOGY

## 2022-08-26 PROCEDURE — 6360000004 HC RX CONTRAST MEDICATION: Performed by: SPECIALIST

## 2022-08-26 PROCEDURE — 2720000010 HC SURG SUPPLY STERILE: Performed by: ORTHOPAEDIC SURGERY

## 2022-08-26 PROCEDURE — 99232 SBSQ HOSP IP/OBS MODERATE 35: CPT | Performed by: INTERNAL MEDICINE

## 2022-08-26 PROCEDURE — 99232 SBSQ HOSP IP/OBS MODERATE 35: CPT | Performed by: SPECIALIST

## 2022-08-26 PROCEDURE — 6360000002 HC RX W HCPCS: Performed by: NURSE PRACTITIONER

## 2022-08-26 PROCEDURE — 6360000002 HC RX W HCPCS: Performed by: HOSPITALIST

## 2022-08-26 PROCEDURE — 71275 CT ANGIOGRAPHY CHEST: CPT

## 2022-08-26 PROCEDURE — 6370000000 HC RX 637 (ALT 250 FOR IP): Performed by: ORTHOPAEDIC SURGERY

## 2022-08-26 PROCEDURE — 80053 COMPREHEN METABOLIC PANEL: CPT

## 2022-08-26 PROCEDURE — 86901 BLOOD TYPING SEROLOGIC RH(D): CPT

## 2022-08-26 PROCEDURE — 74183 MRI ABD W/O CNTR FLWD CNTR: CPT

## 2022-08-26 PROCEDURE — 6360000002 HC RX W HCPCS

## 2022-08-26 PROCEDURE — 2500000003 HC RX 250 WO HCPCS

## 2022-08-26 PROCEDURE — C9290 INJ, BUPIVACAINE LIPOSOME: HCPCS | Performed by: ORTHOPAEDIC SURGERY

## 2022-08-26 PROCEDURE — 99221 1ST HOSP IP/OBS SF/LOW 40: CPT | Performed by: SURGERY

## 2022-08-26 PROCEDURE — 6370000000 HC RX 637 (ALT 250 FOR IP): Performed by: INTERNAL MEDICINE

## 2022-08-26 PROCEDURE — 2580000003 HC RX 258: Performed by: NURSE ANESTHETIST, CERTIFIED REGISTERED

## 2022-08-26 PROCEDURE — 85384 FIBRINOGEN ACTIVITY: CPT

## 2022-08-26 PROCEDURE — 74178 CT ABD&PLV WO CNTR FLWD CNTR: CPT | Performed by: RADIOLOGY

## 2022-08-26 PROCEDURE — 99231 SBSQ HOSP IP/OBS SF/LOW 25: CPT | Performed by: INTERNAL MEDICINE

## 2022-08-26 PROCEDURE — 82728 ASSAY OF FERRITIN: CPT

## 2022-08-26 PROCEDURE — 85610 PROTHROMBIN TIME: CPT

## 2022-08-26 PROCEDURE — A9577 INJ MULTIHANCE: HCPCS | Performed by: SPECIALIST

## 2022-08-26 DEVICE — HEAD FEM DIA36MM +1.5MM OFFSET 12/14 TAPR HIP CERAMIC: Type: IMPLANTABLE DEVICE | Site: HIP | Status: FUNCTIONAL

## 2022-08-26 DEVICE — CUP ACET DIA54MM 12 H HIP TI GRIPTION VIP TAPR DOME REV: Type: IMPLANTABLE DEVICE | Site: HIP | Status: FUNCTIONAL

## 2022-08-26 DEVICE — STEM FEM SZ 12 L130MM NK L40.3MM 48MM HI OFFSET 125DEG HIP: Type: IMPLANTABLE DEVICE | Site: HIP | Status: FUNCTIONAL

## 2022-08-26 DEVICE — LINER ACET OD54MM ID36MM HIP ALTRX PINN: Type: IMPLANTABLE DEVICE | Site: HIP | Status: FUNCTIONAL

## 2022-08-26 DEVICE — SCREW BNE L30MM DIA6.5MM CANC HIP S STL GRIPTION FULL THRD: Type: IMPLANTABLE DEVICE | Site: HIP | Status: FUNCTIONAL

## 2022-08-26 RX ORDER — DEXAMETHASONE SODIUM PHOSPHATE 10 MG/ML
INJECTION, SOLUTION INTRAMUSCULAR; INTRAVENOUS PRN
Status: DISCONTINUED | OUTPATIENT
Start: 2022-08-26 | End: 2022-08-26 | Stop reason: SDUPTHER

## 2022-08-26 RX ORDER — ONDANSETRON 2 MG/ML
4 INJECTION INTRAMUSCULAR; INTRAVENOUS EVERY 6 HOURS PRN
Status: DISCONTINUED | OUTPATIENT
Start: 2022-08-26 | End: 2022-09-07 | Stop reason: HOSPADM

## 2022-08-26 RX ORDER — ONDANSETRON 2 MG/ML
INJECTION INTRAMUSCULAR; INTRAVENOUS PRN
Status: DISCONTINUED | OUTPATIENT
Start: 2022-08-26 | End: 2022-08-26 | Stop reason: SDUPTHER

## 2022-08-26 RX ORDER — SODIUM CHLORIDE, SODIUM LACTATE, POTASSIUM CHLORIDE, CALCIUM CHLORIDE 600; 310; 30; 20 MG/100ML; MG/100ML; MG/100ML; MG/100ML
INJECTION, SOLUTION INTRAVENOUS CONTINUOUS PRN
Status: DISCONTINUED | OUTPATIENT
Start: 2022-08-26 | End: 2022-08-26 | Stop reason: SDUPTHER

## 2022-08-26 RX ORDER — CLINDAMYCIN PHOSPHATE 900 MG/50ML
900 INJECTION INTRAVENOUS EVERY 8 HOURS
Status: COMPLETED | OUTPATIENT
Start: 2022-08-26 | End: 2022-08-28

## 2022-08-26 RX ORDER — HYDROMORPHONE HYDROCHLORIDE 1 MG/ML
0.25 INJECTION, SOLUTION INTRAMUSCULAR; INTRAVENOUS; SUBCUTANEOUS
Status: DISCONTINUED | OUTPATIENT
Start: 2022-08-26 | End: 2022-09-01

## 2022-08-26 RX ORDER — SODIUM CHLORIDE 9 MG/ML
INJECTION, SOLUTION INTRAVENOUS PRN
Status: DISCONTINUED | OUTPATIENT
Start: 2022-08-26 | End: 2022-09-07 | Stop reason: HOSPADM

## 2022-08-26 RX ORDER — LIDOCAINE HYDROCHLORIDE 10 MG/ML
INJECTION, SOLUTION EPIDURAL; INFILTRATION; INTRACAUDAL; PERINEURAL PRN
Status: DISCONTINUED | OUTPATIENT
Start: 2022-08-26 | End: 2022-08-26 | Stop reason: SDUPTHER

## 2022-08-26 RX ORDER — LIDOCAINE HYDROCHLORIDE 10 MG/ML
1 INJECTION, SOLUTION EPIDURAL; INFILTRATION; INTRACAUDAL; PERINEURAL
Status: CANCELLED | OUTPATIENT
Start: 2022-08-26 | End: 2022-08-26

## 2022-08-26 RX ORDER — ACETAMINOPHEN 325 MG/1
650 TABLET ORAL EVERY 6 HOURS
Status: DISCONTINUED | OUTPATIENT
Start: 2022-08-26 | End: 2022-08-27

## 2022-08-26 RX ORDER — OXYCODONE HYDROCHLORIDE 5 MG/1
15 TABLET ORAL EVERY 4 HOURS PRN
Status: DISCONTINUED | OUTPATIENT
Start: 2022-08-26 | End: 2022-09-07 | Stop reason: HOSPADM

## 2022-08-26 RX ORDER — LORAZEPAM 1 MG/1
1 TABLET ORAL EVERY 4 HOURS PRN
Status: DISCONTINUED | OUTPATIENT
Start: 2022-08-26 | End: 2022-09-03

## 2022-08-26 RX ORDER — FENTANYL CITRATE 50 UG/ML
INJECTION, SOLUTION INTRAMUSCULAR; INTRAVENOUS PRN
Status: DISCONTINUED | OUTPATIENT
Start: 2022-08-26 | End: 2022-08-26 | Stop reason: SDUPTHER

## 2022-08-26 RX ORDER — HYDROMORPHONE HYDROCHLORIDE 1 MG/ML
0.5 INJECTION, SOLUTION INTRAMUSCULAR; INTRAVENOUS; SUBCUTANEOUS
Status: DISCONTINUED | OUTPATIENT
Start: 2022-08-26 | End: 2022-09-01

## 2022-08-26 RX ORDER — FAMOTIDINE 20 MG/1
20 TABLET, FILM COATED ORAL ONCE
Status: CANCELLED | OUTPATIENT
Start: 2022-08-26 | End: 2022-08-26

## 2022-08-26 RX ORDER — SODIUM CHLORIDE 0.9 % (FLUSH) 0.9 %
5-40 SYRINGE (ML) INJECTION PRN
Status: DISCONTINUED | OUTPATIENT
Start: 2022-08-26 | End: 2022-09-07 | Stop reason: HOSPADM

## 2022-08-26 RX ORDER — MIDAZOLAM HYDROCHLORIDE 2 MG/2ML
2 INJECTION, SOLUTION INTRAMUSCULAR; INTRAVENOUS
Status: CANCELLED | OUTPATIENT
Start: 2022-08-26 | End: 2022-08-26

## 2022-08-26 RX ORDER — MORPHINE SULFATE 4 MG/ML
4 INJECTION, SOLUTION INTRAMUSCULAR; INTRAVENOUS EVERY 5 MIN PRN
Status: DISCONTINUED | OUTPATIENT
Start: 2022-08-26 | End: 2022-08-26 | Stop reason: HOSPADM

## 2022-08-26 RX ORDER — DIPHENHYDRAMINE HYDROCHLORIDE 50 MG/ML
12.5 INJECTION INTRAMUSCULAR; INTRAVENOUS
Status: DISCONTINUED | OUTPATIENT
Start: 2022-08-26 | End: 2022-08-26 | Stop reason: HOSPADM

## 2022-08-26 RX ORDER — 0.9 % SODIUM CHLORIDE 0.9 %
500 INTRAVENOUS SOLUTION INTRAVENOUS PRN
Status: DISCONTINUED | OUTPATIENT
Start: 2022-08-26 | End: 2022-09-03

## 2022-08-26 RX ORDER — METOCLOPRAMIDE HYDROCHLORIDE 5 MG/ML
10 INJECTION INTRAMUSCULAR; INTRAVENOUS
Status: DISCONTINUED | OUTPATIENT
Start: 2022-08-26 | End: 2022-08-26 | Stop reason: HOSPADM

## 2022-08-26 RX ORDER — SODIUM CHLORIDE 9 MG/ML
INJECTION, SOLUTION INTRAVENOUS CONTINUOUS PRN
Status: DISCONTINUED | OUTPATIENT
Start: 2022-08-26 | End: 2022-08-26 | Stop reason: SDUPTHER

## 2022-08-26 RX ORDER — ROCURONIUM BROMIDE 10 MG/ML
INJECTION, SOLUTION INTRAVENOUS PRN
Status: DISCONTINUED | OUTPATIENT
Start: 2022-08-26 | End: 2022-08-26 | Stop reason: SDUPTHER

## 2022-08-26 RX ORDER — DIPHENHYDRAMINE HCL 25 MG
25 TABLET ORAL EVERY 6 HOURS PRN
Status: DISCONTINUED | OUTPATIENT
Start: 2022-08-26 | End: 2022-09-03

## 2022-08-26 RX ORDER — SCOLOPAMINE TRANSDERMAL SYSTEM 1 MG/1
1 PATCH, EXTENDED RELEASE TRANSDERMAL
Status: CANCELLED | OUTPATIENT
Start: 2022-08-26 | End: 2022-08-29

## 2022-08-26 RX ORDER — TRANEXAMIC ACID 100 MG/ML
INJECTION, SOLUTION INTRAVENOUS PRN
Status: DISCONTINUED | OUTPATIENT
Start: 2022-08-26 | End: 2022-08-26 | Stop reason: SDUPTHER

## 2022-08-26 RX ORDER — SODIUM CHLORIDE 9 MG/ML
INJECTION, SOLUTION INTRAVENOUS CONTINUOUS
Status: DISCONTINUED | OUTPATIENT
Start: 2022-08-26 | End: 2022-08-27 | Stop reason: SDUPTHER

## 2022-08-26 RX ORDER — METOPROLOL SUCCINATE 25 MG/1
25 TABLET, EXTENDED RELEASE ORAL DAILY
Status: DISCONTINUED | OUTPATIENT
Start: 2022-08-27 | End: 2022-09-07 | Stop reason: HOSPADM

## 2022-08-26 RX ORDER — MEPERIDINE HYDROCHLORIDE 25 MG/ML
12.5 INJECTION INTRAMUSCULAR; INTRAVENOUS; SUBCUTANEOUS EVERY 5 MIN PRN
Status: DISCONTINUED | OUTPATIENT
Start: 2022-08-26 | End: 2022-08-26 | Stop reason: HOSPADM

## 2022-08-26 RX ORDER — PROPOFOL 10 MG/ML
INJECTION, EMULSION INTRAVENOUS PRN
Status: DISCONTINUED | OUTPATIENT
Start: 2022-08-26 | End: 2022-08-26 | Stop reason: SDUPTHER

## 2022-08-26 RX ORDER — SODIUM CHLORIDE 9 MG/ML
INJECTION, SOLUTION INTRAVENOUS PRN
Status: DISCONTINUED | OUTPATIENT
Start: 2022-08-26 | End: 2022-08-31

## 2022-08-26 RX ORDER — OXYCODONE HYDROCHLORIDE 5 MG/1
5 TABLET ORAL EVERY 4 HOURS PRN
Status: DISCONTINUED | OUTPATIENT
Start: 2022-08-26 | End: 2022-09-07 | Stop reason: HOSPADM

## 2022-08-26 RX ORDER — MORPHINE SULFATE 4 MG/ML
4 INJECTION, SOLUTION INTRAMUSCULAR; INTRAVENOUS EVERY 5 MIN PRN
Status: DISCONTINUED | OUTPATIENT
Start: 2022-08-26 | End: 2022-08-26

## 2022-08-26 RX ORDER — ONDANSETRON 4 MG/1
4 TABLET, ORALLY DISINTEGRATING ORAL EVERY 8 HOURS PRN
Status: DISCONTINUED | OUTPATIENT
Start: 2022-08-26 | End: 2022-09-07 | Stop reason: HOSPADM

## 2022-08-26 RX ORDER — HYDROMORPHONE HYDROCHLORIDE 1 MG/ML
1 INJECTION, SOLUTION INTRAMUSCULAR; INTRAVENOUS; SUBCUTANEOUS
Status: DISCONTINUED | OUTPATIENT
Start: 2022-08-26 | End: 2022-09-01

## 2022-08-26 RX ORDER — SODIUM CHLORIDE 0.9 % (FLUSH) 0.9 %
5-40 SYRINGE (ML) INJECTION EVERY 12 HOURS SCHEDULED
Status: DISCONTINUED | OUTPATIENT
Start: 2022-08-26 | End: 2022-09-07 | Stop reason: HOSPADM

## 2022-08-26 RX ORDER — EPHEDRINE SULFATE 50 MG/ML
INJECTION, SOLUTION INTRAVENOUS PRN
Status: DISCONTINUED | OUTPATIENT
Start: 2022-08-26 | End: 2022-08-26 | Stop reason: SDUPTHER

## 2022-08-26 RX ORDER — MORPHINE SULFATE 2 MG/ML
2 INJECTION, SOLUTION INTRAMUSCULAR; INTRAVENOUS EVERY 5 MIN PRN
Status: DISCONTINUED | OUTPATIENT
Start: 2022-08-26 | End: 2022-08-26 | Stop reason: HOSPADM

## 2022-08-26 RX ORDER — MORPHINE SULFATE 4 MG/ML
INJECTION, SOLUTION INTRAMUSCULAR; INTRAVENOUS
Status: COMPLETED
Start: 2022-08-26 | End: 2022-08-26

## 2022-08-26 RX ORDER — MORPHINE SULFATE 2 MG/ML
2 INJECTION, SOLUTION INTRAMUSCULAR; INTRAVENOUS EVERY 5 MIN PRN
Status: DISCONTINUED | OUTPATIENT
Start: 2022-08-26 | End: 2022-08-26

## 2022-08-26 RX ORDER — OXYCODONE HYDROCHLORIDE 5 MG/1
10 TABLET ORAL EVERY 4 HOURS PRN
Status: DISCONTINUED | OUTPATIENT
Start: 2022-08-26 | End: 2022-09-07 | Stop reason: HOSPADM

## 2022-08-26 RX ADMIN — SODIUM CHLORIDE: 9 INJECTION, SOLUTION INTRAVENOUS at 17:23

## 2022-08-26 RX ADMIN — ROCURONIUM BROMIDE 10 MG: 10 INJECTION, SOLUTION INTRAVENOUS at 18:08

## 2022-08-26 RX ADMIN — DEXAMETHASONE SODIUM PHOSPHATE 10 MG: 10 INJECTION, SOLUTION INTRAMUSCULAR; INTRAVENOUS at 17:31

## 2022-08-26 RX ADMIN — TRANEXAMIC ACID 1000 MG: 1 INJECTION, SOLUTION INTRAVENOUS at 18:59

## 2022-08-26 RX ADMIN — MORPHINE SULFATE 2 MG: 2 INJECTION, SOLUTION INTRAMUSCULAR; INTRAVENOUS at 14:18

## 2022-08-26 RX ADMIN — SODIUM CHLORIDE, PRESERVATIVE FREE 10 ML: 5 INJECTION INTRAVENOUS at 21:11

## 2022-08-26 RX ADMIN — CEFAZOLIN 2000 MG: 2 INJECTION, POWDER, FOR SOLUTION INTRAMUSCULAR; INTRAVENOUS at 17:24

## 2022-08-26 RX ADMIN — MORPHINE SULFATE 4 MG: 4 INJECTION, SOLUTION INTRAMUSCULAR; INTRAVENOUS at 19:31

## 2022-08-26 RX ADMIN — FENTANYL CITRATE 50 MCG: 50 INJECTION, SOLUTION INTRAMUSCULAR; INTRAVENOUS at 18:08

## 2022-08-26 RX ADMIN — FENTANYL CITRATE 50 MCG: 50 INJECTION, SOLUTION INTRAMUSCULAR; INTRAVENOUS at 18:29

## 2022-08-26 RX ADMIN — CLINDAMYCIN PHOSPHATE 900 MG: 900 INJECTION, SOLUTION INTRAVENOUS at 21:20

## 2022-08-26 RX ADMIN — BISACODYL 5 MG: 5 TABLET, COATED ORAL at 21:04

## 2022-08-26 RX ADMIN — SODIUM CHLORIDE, PRESERVATIVE FREE 10 ML: 5 INJECTION INTRAVENOUS at 09:54

## 2022-08-26 RX ADMIN — FENTANYL CITRATE 50 MCG: 50 INJECTION, SOLUTION INTRAMUSCULAR; INTRAVENOUS at 18:13

## 2022-08-26 RX ADMIN — LIDOCAINE HYDROCHLORIDE 50 MG: 10 INJECTION, SOLUTION EPIDURAL; INFILTRATION; INTRACAUDAL; PERINEURAL at 17:31

## 2022-08-26 RX ADMIN — FENTANYL CITRATE 25 MCG: 50 INJECTION, SOLUTION INTRAMUSCULAR; INTRAVENOUS at 17:31

## 2022-08-26 RX ADMIN — FENTANYL CITRATE 25 MCG: 50 INJECTION, SOLUTION INTRAMUSCULAR; INTRAVENOUS at 17:23

## 2022-08-26 RX ADMIN — SODIUM CHLORIDE, SODIUM LACTATE, POTASSIUM CHLORIDE, AND CALCIUM CHLORIDE: 600; 310; 30; 20 INJECTION, SOLUTION INTRAVENOUS at 17:23

## 2022-08-26 RX ADMIN — MORPHINE SULFATE 2 MG: 2 INJECTION, SOLUTION INTRAMUSCULAR; INTRAVENOUS at 09:53

## 2022-08-26 RX ADMIN — FENTANYL CITRATE 50 MCG: 50 INJECTION, SOLUTION INTRAMUSCULAR; INTRAVENOUS at 17:40

## 2022-08-26 RX ADMIN — PIPERACILLIN AND TAZOBACTAM 4500 MG: 4; .5 INJECTION, POWDER, FOR SOLUTION INTRAVENOUS at 21:18

## 2022-08-26 RX ADMIN — GADOBENATE DIMEGLUMINE 19 ML: 529 INJECTION, SOLUTION INTRAVENOUS at 12:50

## 2022-08-26 RX ADMIN — PHYTONADIONE 10 MG: 5 TABLET ORAL at 09:54

## 2022-08-26 RX ADMIN — ACETAMINOPHEN 650 MG: 325 TABLET, FILM COATED ORAL at 21:03

## 2022-08-26 RX ADMIN — EPHEDRINE SULFATE 10 MG: 50 INJECTION INTRAMUSCULAR; INTRAVENOUS; SUBCUTANEOUS at 18:42

## 2022-08-26 RX ADMIN — PROPOFOL 150 MG: 10 INJECTION, EMULSION INTRAVENOUS at 17:31

## 2022-08-26 RX ADMIN — HYDROMORPHONE HYDROCHLORIDE 0.5 MG: 1 INJECTION, SOLUTION INTRAMUSCULAR; INTRAVENOUS; SUBCUTANEOUS at 21:04

## 2022-08-26 RX ADMIN — ROCURONIUM BROMIDE 50 MG: 10 INJECTION, SOLUTION INTRAVENOUS at 17:31

## 2022-08-26 RX ADMIN — IOPAMIDOL 70 ML: 755 INJECTION, SOLUTION INTRAVENOUS at 07:08

## 2022-08-26 RX ADMIN — TRANEXAMIC ACID 1000 MG: 1 INJECTION, SOLUTION INTRAVENOUS at 18:05

## 2022-08-26 RX ADMIN — SODIUM CHLORIDE, SODIUM LACTATE, POTASSIUM CHLORIDE, AND CALCIUM CHLORIDE: 600; 310; 30; 20 INJECTION, SOLUTION INTRAVENOUS at 18:25

## 2022-08-26 RX ADMIN — SUGAMMADEX 250 MG: 100 INJECTION, SOLUTION INTRAVENOUS at 18:59

## 2022-08-26 RX ADMIN — ONDANSETRON 4 MG: 2 INJECTION INTRAMUSCULAR; INTRAVENOUS at 17:31

## 2022-08-26 RX ADMIN — SODIUM CHLORIDE: 9 INJECTION, SOLUTION INTRAVENOUS at 18:19

## 2022-08-26 RX ADMIN — MORPHINE SULFATE 2 MG: 2 INJECTION, SOLUTION INTRAMUSCULAR; INTRAVENOUS at 04:12

## 2022-08-26 RX ADMIN — SODIUM CHLORIDE: 9 INJECTION, SOLUTION INTRAVENOUS at 21:10

## 2022-08-26 ASSESSMENT — ENCOUNTER SYMPTOMS
SORE THROAT: 0
EYE REDNESS: 0
BLOOD IN STOOL: 0
COLOR CHANGE: 0
CONSTIPATION: 0
COUGH: 0
DIARRHEA: 0
NAUSEA: 0
ABDOMINAL PAIN: 0
ABDOMINAL DISTENTION: 0
EYE PAIN: 0
SHORTNESS OF BREATH: 0
VOMITING: 0
SHORTNESS OF BREATH: 0
WHEEZING: 0

## 2022-08-26 ASSESSMENT — PAIN SCALES - GENERAL
PAINLEVEL_OUTOF10: 7
PAINLEVEL_OUTOF10: 6
PAINLEVEL_OUTOF10: 10
PAINLEVEL_OUTOF10: 6
PAINLEVEL_OUTOF10: 8
PAINLEVEL_OUTOF10: 8
PAINLEVEL_OUTOF10: 0
PAINLEVEL_OUTOF10: 10
PAINLEVEL_OUTOF10: 7

## 2022-08-26 ASSESSMENT — PAIN DESCRIPTION - ORIENTATION
ORIENTATION: LEFT

## 2022-08-26 ASSESSMENT — PAIN - FUNCTIONAL ASSESSMENT
PAIN_FUNCTIONAL_ASSESSMENT: PREVENTS OR INTERFERES SOME ACTIVE ACTIVITIES AND ADLS
PAIN_FUNCTIONAL_ASSESSMENT: PREVENTS OR INTERFERES SOME ACTIVE ACTIVITIES AND ADLS

## 2022-08-26 ASSESSMENT — PAIN DESCRIPTION - LOCATION
LOCATION: HIP
LOCATION: HIP;LEG
LOCATION: HIP
LOCATION: HIP;LEG

## 2022-08-26 ASSESSMENT — PAIN DESCRIPTION - FREQUENCY
FREQUENCY: CONTINUOUS

## 2022-08-26 ASSESSMENT — LIFESTYLE VARIABLES: SMOKING_STATUS: 0

## 2022-08-26 ASSESSMENT — PAIN DESCRIPTION - DESCRIPTORS
DESCRIPTORS: ACHING;THROBBING
DESCRIPTORS: ACHING;SHARP;THROBBING
DESCRIPTORS: ACHING
DESCRIPTORS: ACHING;DISCOMFORT;GNAWING
DESCRIPTORS: ACHING

## 2022-08-26 ASSESSMENT — PAIN DESCRIPTION - PAIN TYPE
TYPE: SURGICAL PAIN

## 2022-08-26 ASSESSMENT — PAIN DESCRIPTION - ONSET
ONSET: ON-GOING

## 2022-08-26 NOTE — PROGRESS NOTES
Cardiology Progress Note   Montez Lamb MD      Patient:    Tara Lopez  [id number]  1955    Patient Active Problem List    Diagnosis Date Noted    Closed fracture of left hip ([address] 08/25/2022     Priority: Medium    New onset a-fib ([address] 08/25/2022     Priority: Medium    Liver disease 08/25/2022     Priority: Medium    Hyperbilirubinemia 08/25/2022     Priority: Medium    Abnormal LFTs 08/25/2022     Priority: Medium    Thrombocytopenia ([address] 08/25/2022     Priority: Medium    Leukopenia 08/25/2022     Priority: Medium       Admit Date:  8/25/2022    Admission Problem List: Present on Admission:   Closed fracture of left hip ([address]   New onset a-fib ([address]   Liver disease   Hyperbilirubinemia   Abnormal LFTs   Thrombocytopenia (HCC)   Leukopenia       Cardiac Specific Data:  Specialty Problems          Cardiology Problems    New onset a-fib [facility])           Subjective:  Patient is most likely going for left hip surgery today. Echocardiogram was not impressive.     Objective:   /63   Pulse 61   Temp 97.7 °F (36.5 °C) (Temporal)   Resp 16   Ht 5' 9\" (1.753 m)   Wt 188 lb 9 oz (85.5 kg)   SpO2 92%   BMI 27.85 kg/m²       Intake/Output Summary (Last 24 hours) at 8/26/2022 1257  Last data filed at 8/25/2022 1927  Gross per 24 hour   Intake 240 ml   Output 200 ml   Net 40 ml       Current Facility-Administered Medications   Medication Dose Route Frequency Provider Last Rate Last Admin    [START ON 8/27/2022] metoprolol succinate (TOPROL XL) extended release tablet 25 mg  25 mg Oral Daily Bebo Howard MD        sodium chloride flush 0.9 % injection 5-40 mL  5-40 mL IntraVENous 2 times per day SHEILA Sam CNP   10 mL at 08/26/22 0954    sodium chloride flush 0.9 % injection 5-40 mL  5-40 mL IntraVENous PRN SHEILA Sam CNP        0.9 % sodium chloride infusion   IntraVENous PRN SHEILA Sam CNP        ondansetron (ZOFRAN-ODT) disintegrating tablet 4 mg  4 mg Oral Q8H PRN Princess Honobia, APRN - CNP        Or    ondansetron (ZOFRAN) injection 4 mg  4 mg IntraVENous Q6H PRN Princess Honobia, APRN - CNP        polyethylene glycol (GLYCOLAX) packet 17 g  17 g Oral Daily PRN Princess Honobia, APRN - CNP        oxyCODONE (ROXICODONE) immediate release tablet 5 mg  5 mg Oral Q6H PRN Princess Honobia, APRN - CNP   5 mg at 08/25/22 1845    morphine (PF) injection 2 mg  2 mg IntraVENous Q4H PRN Princess Honobia, APRN - CNP   2 mg at 08/26/22 8455    HYDROmorphone HCl PF (DILAUDID) injection 1 mg  1 mg IntraVENous Q3H PRN Snow Cotton MD        phytonadione (VITAMIN K) tablet 10 mg  10 mg Oral Daily Quintin Little MD   10 mg at 08/26/22 0954         [START ON 8/27/2022] metoprolol succinate  25 mg Oral Daily    sodium chloride flush  5-40 mL IntraVENous 2 times per day    phytonadione  10 mg Oral Daily         Physical Exam:  General: NAD, alert  HEENT: normal  CHEST: clear to ascultation  CVS: S1 and S2 normal, no murmur, no JVD  ABD; nontender, bowel sounds normal, liver and spleen not palpable  MSK: normal  CNS: oriented X3, normal affect, normal CN  PVD:  all peripheral pulses normal, no swelling of the ankles      RECENT LABS:    Lab Data:  CBC:   Recent Labs     08/25/22  1209 08/26/22 0317   WBC 3.8* 3.6*   HGB 15.3 12.6*   HCT 45.0 38.4*   .2* 121.5*   PLT 30* 31*     BMP:   Recent Labs     08/25/22  1209 08/26/22  0317    138   K 3.9 3.9    104   CO2 24 25   BUN 10 13   CREATININE 0.4* 0.6     LIVER PROFILE:   Recent Labs     08/25/22  1209 08/25/22  1434 08/25/22  1927 08/26/22  0317   AST 75*  --   --  57*   ALT 34  --   --  25   LIPASE  --  41  --   --    BILIDIR  --   --  2.5*  --    BILITOT 8.0*  --  6.7* 5.8*   ALKPHOS 117  --   --  97     PT/INR:   Recent Labs     08/25/22  1209 08/26/22  0317   PROTIME 19.4* 20.5*   INR 1.61* 1.73*     APTT: No results for input(s): APTT in the last 72 hours.     CK, CKMB, Troponin:   Recent Labs 08/25/22  1713 08/25/22  1927   TROPONINI <0.01 <0.01       Last 3 BNP:  No results for input(s): PROBNP in the last 72 hours. IMAGING:  US LIVER    Result Date: 8/25/2022  Finding suggestive for hydropic cholecystitis without a obvious stone as described. CBD mildly dilated. MRCP is recommended. Possible chronic liver parenchymal disease, cirrhosis? .  Splenomegaly. Recommendation: Follow up as clinically indicated. Electronically Signed by Chris Guerra MD at 25-Aug-2022 06:37:10 PM             XR CHEST PORTABLE    Result Date: 8/25/2022  Normal chest Recommendation: Follow up as clinically indicated. Electronically Signed by Jennifer Olsen MD at 25-Aug-2022 01:18:21 PM             XR HIP 2-3 VW W PELVIS LEFT    Result Date: 8/25/2022  Acute, impacted transverse fracture through base of the femoral  head , continued good alignment and normal joint space. Recommendation: Followup as clinically indicated. Electronically Signed by Jennifer Olsen MD at 25-Aug-2022 01:04:57 PM                  Assessment and Plan:    Frequent PACs, asymptomatic  Hypertension  Fracture left hip  COPD  Moderate use of alcohol    Plan: From cardiac standpoint there is no contraindication to undergo general anesthetic    I have spent 30 minutes reviewing the chart, taking history, examining the patient, discussion with the patient and the family concerning the finding, diagnoses and treatment plan. This dictation was performed using [address]. Mistake and misspelling may have been created without  realizing them. Please notify me for clarification.   Thank you    Mukesh Beltran MD  8/26/2022 12:57 PM

## 2022-08-26 NOTE — PLAN OF CARE
Problem: Discharge Planning  Goal: Discharge to home or other facility with appropriate resources  8/26/2022 1620 by Matt Bhatt RN  Outcome: Progressing  8/26/2022 1619 by Matt Bhatt RN  Outcome: Progressing  Flowsheets (Taken 8/26/2022 3350)  Discharge to home or other facility with appropriate resources: Identify barriers to discharge with patient and caregiver     Problem: ABCDS Injury Assessment  Goal: Absence of physical injury  8/26/2022 1620 by Matt Bhatt RN  Outcome: Progressing  8/26/2022 1619 by Matt Bhatt RN  Outcome: Progressing  Flowsheets (Taken 8/26/2022 0830)  Absence of Physical Injury: Implement safety measures based on patient assessment     Problem: Safety - Adult  Goal: Free from fall injury  8/26/2022 1620 by Matt Bhatt RN  Outcome: Progressing  8/26/2022 1619 by Matt Bhatt RN  Outcome: Progressing  Flowsheets (Taken 8/26/2022 0830)  Free From Fall Injury: Instruct family/caregiver on patient safety

## 2022-08-26 NOTE — PLAN OF CARE
Dr. Jada Chung, hematology [facility] name of this drug-Doptelet or avatrombopag. These are tablets to treat low blood platelet count in adults with chronic liver disease was scheduled to undergo a medical or dental procedures. I have no experience with this drug. I talked to the pharmacist here in the hospital.  They do not have the drug. The pharmacy is going to check with their supplier and will let us know tomorrow.

## 2022-08-26 NOTE — PROGRESS NOTES
Progress Note  Date:2022       Room:0531/531-01  Patient Eve Bobo     Date of Birth:[birth year]     Age:67 y.o. Subjective    Subjective seen yesterday cirrhosis, splenomegaly and thrombocytopenia. I had talked with the pharmacist about a drug Doptelet or  avatrombopag. This drug is not available in the pharmacy. We are still waiting a response from pharmacy as to when it can be obtained. However, drug may take several days raise her platelet count which may delay patient's surgery. I have no experience with a drug and have never used this drug. No drug can have complications such as portal vein thrombosis. I have talked with Dr. Liza Morse who also has nodules this drug yet. Patient's wife is at bedside. On ultrasound report of right upper quadrant patient is calling hydropic cholecystitis. Patient denies nausea, vomiting, epigastric pain, chest pain, right upper quadrant pain. No tenderness in right upper quadrant. Review of Systems  Objective         Vitals Last 24 Hours:  TEMPERATURE:  Temp  Av.6 °F (37 °C)  Min: 97.5 °F (36.4 °C)  Max: 99.5 °F (37.5 °C)  RESPIRATIONS RANGE: Resp  Av.5  Min: 16  Max: 20  PULSE OXIMETRY RANGE: SpO2  Av %  Min: 90 %  Max: 96 %  PULSE RANGE: Pulse  Av.6  Min: 58  Max: 74  BLOOD PRESSURE RANGE: Systolic (30XVO), ODZ:395 , Min:107 , XTU:965   ; Diastolic (02ZDU), MHI:40, Min:58, Max:82    I/O (24Hr): Intake/Output Summary (Last 24 hours) at 2022 0710  Last data filed at 2022 1927  Gross per 24 hour   Intake 240 ml   Output 200 ml   Net 40 ml     Objective alert and oriented. In no acute physical distress. HEENT normocephalic. Atraumatic. Neck supple no JVD. Abdomen obese. Liver enlarged. No tenderness in the epigastric area, right upper quadrant. Alveria Rodriguez sign is negative. Trace bilateral lower leg edema and hyperpigmentation of skin over both lower legs. Neuro:  No encephalopathy. Labs/Imaging/Diagnostics    Labs:  CBC:  Recent Labs     08/25/22  1209 08/26/22 0317   WBC 3.8* 3.6*   RBC 3.84* 3.16*   HGB 15.3 12.6*   HCT 45.0 38.4*   .2* 121.5*   RDW 16.0* 16.0*   PLT 30* 31*     CHEMISTRIES:  Recent Labs     08/25/22  1209 08/25/22  1713 08/26/22 0317     --  138   K 3.9  --  3.9     --  104   CO2 24  --  25   BUN 10  --  13   CREATININE 0.4*  --  0.6   GLUCOSE 139*  --  128*   MG  --  1.7  --      PT/INR:  Recent Labs     08/25/22 1209 08/26/22 0317   PROTIME 19.4* 20.5*   INR 1.61* 1.73*     APTT:No results for input(s): APTT in the last 72 hours. LIVER PROFILE:  Recent Labs     08/25/22  1209 08/25/22 1927 08/26/22 0317   AST 75*  --  57*   ALT 34  --  25   BILIDIR  --  2.5*  --    BILITOT 8.0* 6.7* 5.8*   ALKPHOS 117  --  97       Imaging Last 24 Hours:  ECHO Complete 2D W Doppler W Color    Result Date: 8/25/2022  Transthoracic Echocardiography Report (TTE)  Demographics   Patient Name   Bhumika [facility]   Date of Study            08/25/2022   MRN            [id number]        Gender                   Male   Date of Birth  1955    Room Number              [facility]-0531   Age            75 year(s)   Height:        69 inches     Referring Physician      Syed León   Weight:        188 pounds    Sonographer              Deedee Gilmore [facility]   BSA:           2.01 m^2      Interpreting Physician   María Salamanca MD   BMI:           27.76 kg/m^2  Procedure Type of Study   TTE procedure:ECHO NO CONTRAST WITH DOP/COLR. Study Location: Echo Lab Technical Quality: Adequate visualization Patient Status: Inpatient HR: 60 bpm Indications:Atrial fibrillation.   Conclusions   Summary  Left ventricle had normal chamber size and thickness  Preserved systolic function with estimated ejection fraction of 74%  No regional wall motion abnormality  Normal diastolic function  No significant valvular lesion seen  Mild mitral regurgitation  Trivial tricuspid insufficiency with estimated right ventricular systolic  pressure of 38 mm which is mildly elevated   Signature   ----------------------------------------------------------------  Electronically signed by Luis Whitmore MD(Interpreting physician)  on 08/25/2022 07:01 PM  ----------------------------------------------------------------   Findings   Mitral Valve  Mild mitral regurgitation   Aortic Valve  Aortic sclerosis   Tricuspid Valve  Trivial tricuspid insufficiency with estimated right ventricular systolic  pressure of 38 mm which is mildly elevated   Pulmonic Valve  The pulmonic valve was not well visualized . Left Atrium  Mildly dilated left atrium   Left Ventricle  See conclusion   Right Atrium  Normal right atrial dimension with no evidence of thrombus or mass noted. Right Ventricle  Normal right ventricular size with preserved RV function.    Miscellaneous  Normal IVC  2D Measurements and Calculations(cm)   LVIDd: 4.6 cm                        LVIDs: 3.43 cm  IVSd: 1.01 cm  LVPWd: 1.03 cm                       AO Root Dimension: 3.3 cm  % Ejection Fraction: 74 %            LA Dimension: 4.3 cm  LA Volume: 67.1 ml                   LA Area: 22.9 cm^2  LA Volume Index: 33 ml/m^2           LV Systolic dimension: 2.05HP  LV dimension: 4.6 cm                 LV PW diastolic: 6.53DN                                       LVOT diameter: 2.1 cm  LVEDV: 116 ml                        RA Systolic pressure: 3mmHg  LVESV:25 ml                          LVEDVI: 58 ml/m^2  Cardic Output (CO): 4.65l/min        LVESVI: 12 ml/m^2  Doppler Measurements and Calculations   AV Peak Velocity:172 cm/s              MV Peak E-Wave: 94.7 cm/s  AV Mean Velocity:121 cm/s              MV Peak A-Wave: 67.3 cm/s  AV Peak Gradient: 11.83 mmHg           MV E/A Ratio: 1.41 %  AV Mean Gradient: 6 mmHg               MV Mean velocity: NaNcm/s  AV Area (Continuity):1.99 cm^2         MV Peak Gradient: 3.59 mmHg  AV VTI:39 cm/s                         MV P1/2t: 84 msec TR Velocity:288 cm/s                   MVA by PHT2.62 cm^2  TR Gradient:33.18 mmHg  Estimated RAP:3 mmHg  RVSP:36 mmHg   MV E' septal velocity: 9.25cm/s  MV E' lateral velocity:13.8 cm/s      US LIVER    Result Date: 8/25/2022  NO PRIOR REPORT AVAILABLE Exam: ULTRASOUND OF THE ABDOMEN, LIMITED Clinical data:Hyperbilirubinemia. Technique: Real-time grayscale imaging of the abdomen was performed. Images supplemented with color Doppler technique. Prior studies: No prior studies submitted. Findings: The liver appears echogenic and heterogeneous, coarse with scalloped edges. A cyst is visualized measuring 0.9 x 1.1 x 1.1 cm. No intrahepatic biliary distention. The gallbladder appears distended measuring 13.3 cm with a wall thickness of0.45 cm. Mild sludge is noted. No gallstones. The common bile duct measures 0.73cm. Pericholecystic fluid visualized. The right kidney [id number] x 5.5 x 4.6 cm. Normal echogenicity and cortical thickness are preserved. No evidence of renal masses. No evidence of renal calculi. There is no evidence of hydronephrosis. The pancreas is unremarkable to the extent visualized. Unremarkable spleen [id number] x 6.6 x 15.7 cm. Imaged portion of the aorta demonstrates no acute pathology. Visualized portion of the inferior vena cava is unremarkable. No ascites is evident. Patient unable to lie in decubitus position due to fractured hip. Exam done in supine view only. Finding suggestive for hydropic cholecystitis without a obvious stone as described. CBD mildly dilated. MRCP is recommended. Possible chronic liver parenchymal disease, cirrhosis? .  Splenomegaly. Recommendation: Follow up as clinically indicated. Electronically Signed by Brina Meza MD at 25-Aug-2022 06:37:10 PM             XR CHEST PORTABLE    Result Date: 8/25/2022  NO PRIOR REPORT AVAILABLE Exam: X-RAY OF THECHEST Clinical data:Hip fracture. Technique:Single view of the chest. Prior studies: No prior studies submitted. Findings: The lungs are grossly clear; noevidence of acute infiltrate or pleural effusion. Cardiac silhouette is within normal limits. No acute osseous abnormality is detected. Normal chest Recommendation: Follow up as clinically indicated. Electronically Signed by Jocelyn Kwan MD at 25-Aug-2022 01:18:21 PM             XR HIP 2-3 VW W PELVIS LEFT    Result Date: 8/25/2022  NO PRIOR REPORT AVAILABLE Exam: X-RAYS OF THE LEFT HIP Clinical Indication: Fall, pain. Technique: Two views of the left hip and single view of the pelvis. Prior studies: No prior studies are submitted. Findings: Multiple views of the left hip demonstrateno evidence offracture. Bone mineral density is preserved. No evidence of superior pubic ramus fracture. Acute, impacted transverse fracture at the base of the femoral head with continued good alignment and joint spaces intact. Small lateral acetabular spur noted. Acute, impacted transverse fracture through base of the femoral  head , continued good alignment and normal joint space. Recommendation: Followup as clinically indicated. Electronically Signed by Jocelyn Kwan MD at 25-Aug-2022 01:04:57 PM             Assessment//Plan           Hospital Problems             Last Modified POA    * (Principal) Closed fracture of left hip ([address] 8/25/2022 Yes    New onset a-fib ([address] 8/25/2022 Yes    Liver disease 8/25/2022 Yes    Hyperbilirubinemia 8/25/2022 Yes    Abnormal LFTs 8/25/2022 Yes    Thrombocytopenia ([address] 8/25/2022 Yes    Leukopenia 8/25/2022 Yes     Assessment & Plan    Probable cirrhosis as indicated by ultrasound, hepatomegaly, pancytopenia, abnormal LFTs. Acute hepatitis A, B, C serology negative. Other work-up for chronic liver disease is pending. CT scan of the abdomen and pelvis pending. Ultrasound of right upper quadrant shows liver is echogenic and heterogeneous, coarse with scalloped edges. Hepatic cysts 0.9 x 1.1 x 1.1 cm ultrasound.     Distended gallbladder wall thickness millimeter. Sludge. No gallstones. CBD measures 7.3 mm. Pericholecystic fluid visualized. Radiologist calling hydropic cholecystitis. CBD mildly dilated. Radiologist complaining MRCP. No ascites on ultrasound. 8/26/2022: Meld sodium score 20. Hyperbilirubinemia. Bilirubin today 5.8. It is most likely due to it underlying cirrhosis. Cannot rule out alcoholic hepatitis as per patient has been drinking alcohol until recently. AST slightly elevated to 57. ALT 25. I do not think patient should be treated with steroids at this time for presumed alcoholic hepatitis on the top of underlying cirrhosis because infection has not been ruled out. Also steroids may interfere with bone healing.    8/26/2022: Yves's discriminant function 27.9. Hydropic cholecystitis as per radiologist.  No clinical signs of acute cholecystitis. HIDA scan will confuse the picture. The radiologist suggested MRCP. Will order. 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 Awaiting response from pharmacy as to when drug Doptelet can be arranged. However it would take too long for this drug to reach platelet count. There can be side effects such as autoimmune thrombosis. I have no experience with this drug. Dr. Gus Sanders also does not have any experience with this drug. It may be best to give platelets to the patient I would anticipate his hip surgery rather than wait for several days on the platelet count to go up. Plan:    MRCP. Await patient of work-up of chronic liver disease    CT scan abdomen and pelvis. Will request surgical consultation to address the issue of cholecystitis. I do not think patient has acutely inflamed gallbladder. I do not think surgery on gallbladder is going to help the patient. Thickening of gallbladder wall could be due to surrounding alcoholic hepatitis or patient may have ascites missed by ultrasound.     Electronically signed by Guera Davis MD on 8/26/22 at 7:10 AM CDT

## 2022-08-26 NOTE — ANESTHESIA PRE PROCEDURE
Department of Anesthesiology  Preprocedure Note       Name:  Aurora Da Silva   Age:  75 y.o.  :  1955                                          MRN:  [id number]         Date:  2022      Surgeon: Lonna Frankel):  Jaden Cadet MD    Procedure: Procedure(s):  LEFT HIP TOTAL VS. BIPOLAR ARTHROPLASTY    Medications prior to admission:   Prior to Admission medications    Not on File       Current medications:    Current Facility-Administered Medications   Medication Dose Route Frequency Provider Last Rate Last Admin    [START ON 2022] metoprolol succinate (TOPROL XL) extended release tablet 25 mg  25 mg Oral Daily Inocencia Hansen MD        LORazepam (ATIVAN) tablet 1 mg  1 mg Oral Q4H PRN Inocencia Hansen MD        sodium chloride flush 0.9 % injection 5-40 mL  5-40 mL IntraVENous 2 times per day Castro Bolus, APRN - CNP   10 mL at 22 0954    sodium chloride flush 0.9 % injection 5-40 mL  5-40 mL IntraVENous PRN Castro Bolus, APRN - CNP        0.9 % sodium chloride infusion   IntraVENous PRN Castro Bolus, APRN - CNP        ondansetron (ZOFRAN-ODT) disintegrating tablet 4 mg  4 mg Oral Q8H PRN Castro Bolus, APRN - CNP        Or    ondansetron (ZOFRAN) injection 4 mg  4 mg IntraVENous Q6H PRN Castro Bolus, APRN - CNP        polyethylene glycol (GLYCOLAX) packet 17 g  17 g Oral Daily PRN Castro Bolus, APRN - CNP        oxyCODONE (ROXICODONE) immediate release tablet 5 mg  5 mg Oral Q6H PRN Castro Bolus, APRN - CNP   5 mg at 22 1845    morphine (PF) injection 2 mg  2 mg IntraVENous Q4H PRN Castro Bolus, APRN - CNP   2 mg at 22 1418    HYDROmorphone HCl PF (DILAUDID) injection 1 mg  1 mg IntraVENous Q3H PRN Inocencia Hansen MD        phytonadione (VITAMIN K) tablet 10 mg  10 mg Oral Daily Annetta Martines MD   10 mg at 22 5753       Allergies:  No Known Allergies    Problem List:    Patient Active Problem List   Diagnosis Code  [facility] S72.002A    New onset a-fib (HCC) I48.91    Liver disease K76.9    Hyperbilirubinemia E80.6    Abnormal LFTs R94.5    Thrombocytopenia (HCC) D69.6    Leukopenia D72.819       Past Medical History:        Diagnosis Date    Liver disease        Past Surgical History:  History reviewed. No pertinent surgical history. Social History:    Social History     Tobacco Use    Smoking status: Every Day     Packs/day: 1.00     Years: 30.00     Pack years: 30.00     Types: Cigarettes    Smokeless tobacco: Never   Substance Use Topics    Alcohol use: Yes                                Ready to quit: Not Answered  Counseling given: Not Answered      Vital Signs (Current):   Vitals:    08/26/22 0548 08/26/22 0736 08/26/22 0953 08/26/22 1418   BP: (!) 107/58 121/63     Pulse: 58 61     Resp: 16 18 16 16   Temp: 97.5 °F (36.4 °C) 97.7 °F (36.5 °C)     TempSrc:  Temporal     SpO2: 91% 92%     Weight:       Height:                                                  BP Readings from Last 3 Encounters:   08/26/22 121/63       NPO Status:                                                                                 BMI:   Wt Readings from Last 3 Encounters:   08/25/22 188 lb 9 oz (85.5 kg)     Body mass index is 27.85 kg/m².     CBC:   Lab Results   Component Value Date/Time    WBC 3.6 08/26/2022 03:17 AM    RBC 3.16 08/26/2022 03:17 AM    HGB 12.6 08/26/2022 03:17 AM    HCT 38.4 08/26/2022 03:17 AM    .5 08/26/2022 03:17 AM    RDW 16.0 08/26/2022 03:17 AM    PLT 31 08/26/2022 03:17 AM       CMP:   Lab Results   Component Value Date/Time     08/26/2022 03:17 AM    K 3.9 08/26/2022 03:17 AM     08/26/2022 03:17 AM    CO2 25 08/26/2022 03:17 AM    BUN 13 08/26/2022 03:17 AM    CREATININE 0.6 08/26/2022 03:17 AM    GFRAA >59 08/26/2022 03:17 AM    LABGLOM >60 08/26/2022 03:17 AM    GLUCOSE 128 08/26/2022 03:17 AM    PROT 5.9 08/26/2022 03:17 AM    CALCIUM 8.5 08/26/2022 03:17 AM    BILITOT 5.8 08/26/2022 03:17 AM    ALKPHOS 97 08/26/2022 03:17 AM    AST 57 08/26/2022 03:17 AM    ALT 25 08/26/2022 03:17 AM       POC Tests: No results for input(s): POCGLU, POCNA, POCK, POCCL, POCBUN, POCHEMO, POCHCT in the last 72 hours. Coags:   Lab Results   Component Value Date/Time    PROTIME 20.5 08/26/2022 03:17 AM    INR 1.73 08/26/2022 03:17 AM       HCG (If Applicable): No results found for: PREGTESTUR, PREGSERUM, HCG, HCGQUANT     ABGs: No results found for: PHART, PO2ART, XJB4MSH, YCO5GDR, BEART, T2GMVKVD     Type & Screen (If Applicable):  No results found for: LABABO, LABRH    Drug/Infectious Status (If Applicable):  No results found for: HIV, HEPCAB    COVID-19 Screening (If Applicable):   Lab Results   Component Value Date/Time    COVID19 Not Detected 08/25/2022 01:33 PM           Anesthesia Evaluation  Patient summary reviewed and Nursing notes reviewed no history of anesthetic complications:   Airway: Mallampati: II  TM distance: >3 FB   Neck ROM: full  Mouth opening: > = 3 FB   Dental: normal exam         Pulmonary:Negative Pulmonary ROS and normal exam  breath sounds clear to auscultation      (-) shortness of breath and not a current smoker          Patient did not smoke on day of surgery.                  Cardiovascular:        (-) hypertension, CAD,  angina and  CHF    NYHA Classification: I  ECG reviewed  Rhythm: regular  Rate: normal           Beta Blocker:  Not on Beta Blocker      ROS comment:  Left ventricle had normal chamber size and thickness   Preserved systolic function with estimated ejection fraction of 74%   No regional wall motion abnormality   Normal diastolic function   No significant valvular lesion seen   Mild mitral regurgitation   Trivial tricuspid insufficiency with estimated right ventricular systolic   pressure of 38 mm which is mildly elevated        Neuro/Psych:   Negative Neuro/Psych ROS     (-) seizures, CVA and depression/anxiety GI/Hepatic/Renal: Neg GI/Hepatic/Renal ROS  (+) liver disease: coagulopathy from hepatic dysfunction,      (-) hiatal hernia and GERD      ROS comment: Hepatic cirhosis, spleenomegall . Endo/Other: Negative Endo/Other ROS   (+) blood dyscrasia: anemia and thrombocytopenia:., electrolyte abnormalities, . Pt had PAT visit. Abdominal:       Abdomen: soft. Vascular: Other Findings:           Anesthesia Plan      general     ASA 4 - emergent     (Iv zofran within 30 min of closing \  Will likely need plt transfusion during surgery given spleenomegaly and sequestration )  Induction: intravenous. BIS  MIPS: Postoperative opioids intended and Prophylactic antiemetics administered. Anesthetic plan and risks discussed with patient. Use of blood products discussed with patient whom. Plan discussed with CRNA.     Attending anesthesiologist reviewed and agrees with Pre Eval content                Rufus Sahu MD   8/26/2022

## 2022-08-26 NOTE — PLAN OF CARE
Problem: Discharge Planning  Goal: Discharge to home or other facility with appropriate resources  Outcome: Progressing  Flowsheets (Taken 8/26/2022 0736)  Discharge to home or other facility with appropriate resources: Identify barriers to discharge with patient and caregiver     Problem: Skin/Tissue Integrity  Goal: Absence of new skin breakdown  Description: 1. Monitor for areas of redness and/or skin breakdown  2. Assess vascular access sites hourly  3. Every 4-6 hours minimum:  Change oxygen saturation probe site  4. Every 4-6 hours:  If on nasal continuous positive airway pressure, respiratory therapy assess nares and determine need for appliance change or resting period.   Outcome: Progressing     Problem: ABCDS Injury Assessment  Goal: Absence of physical injury  Outcome: Progressing  Flowsheets (Taken 8/26/2022 0830)  Absence of Physical Injury: Implement safety measures based on patient assessment     Problem: Safety - Adult  Goal: Free from fall injury  Outcome: Progressing  Flowsheets (Taken 8/26/2022 0830)  Free From Fall Injury: Instruct family/caregiver on patient safety

## 2022-08-26 NOTE — CONSULTS
Orthopaedic Inpatient Consultation    NAME:  Mayda Lawler   : 1955  MRN: [id number]    2022 11:03 AM    Requesting Physician: Dr. Aidee Cerrato:  left hip pain      HISTORY OF PRESENT ILLNESS:   The patient is a 75 y.o. male who presented to [facility] ED with a complaint of left hip pain. He states that he was playing with a 75 lb Giovanna Redhead bulldog of his sister. It ran over and hit him, he fell to the ground onto his left hip and back. There was no LOC. But immediate pain to left hip and difficulty ambulating. X-rays completed in the ED showed impacted femoral neck fractures. Pain is located in the left hip and rated a 4/5, constant, dull, worse with movement, better with rest and medication. There are no associated symptoms. Past Medical History:        Diagnosis Date    Liver disease        Past Surgical History:    History reviewed. No pertinent surgical history. Current Medications:   Prior to Admission medications    Not on File       Allergies:  Patient has no known allergies.     Social History:   Social History     Socioeconomic History    Marital status:      Spouse name: Not on file    Number of children: 1    Years of education: Not on file    Highest education level: Not on file   Occupational History    Not on file   Tobacco Use    Smoking status: Every Day     Packs/day: 1.00     Years: 30.00     Pack years: 30.00     Types: Cigarettes    Smokeless tobacco: Never   Vaping Use    Vaping Use: Never used   Substance and Sexual Activity    Alcohol use: Yes    Drug use: Never    Sexual activity: Not on file   Other Topics Concern    Not on file   Social History Narrative    Not on file     Social Determinants of Health     Financial Resource Strain: Not on file   Food Insecurity: Not on file   Transportation Needs: Not on file   Physical Activity: Not on file   Stress: Not on file   Social Connections: Not on file   Intimate Partner Violence: Not on file Housing Stability: Not on file       Family History:   Family History   Problem Relation Age of Onset    Diabetes Mother        REVIEW OF SYSTEMS:  14 point review of systems has been reviewed from the patient's emergency room visit, reviewed with the patient on today's date with no new changes. PHYSICAL EXAM:      Physical Examination:  Vitals:   Vitals:    08/26/22 0442 08/26/22 0548 08/26/22 0736 08/26/22 0953   BP:  (!) 107/58 121/63    Pulse:  58 61    Resp: 18 16 18 16   Temp:  97.5 °F (36.4 °C) 97.7 °F (36.5 °C)    TempSrc:   Temporal    SpO2:  91% 92%    Weight:       Height:         General:  Appears stated age, no distress. Orientation:  Alert and oriented to time, place, and person. Mood and Affect:  Cooperative and pleasant. Gait:  Resting comfortably in bed. Cardiovascular:  Symmetric 1-2 plus pulses in upper and lower extremities. Lymph:  No cervical or inguinal lymphadenopathy noted. Sensation:  Grossly intact to light touch. DTR:  Normal, no pathologic reflexes. Coordination/balance:  Normal    Musculoskeletal:    Right lower extremity exam:  There is no tenderness to palpation about the hip, knee, ankle or foot. Unrestricted full motion is present. Stability is normal with provocative tests, 5/5 strength, and skin is normal.     Left lower extremity exam:  There is no tenderness to palpation about the knee, ankle or foot. He has pain with palpation about the left hip. There is ecchymosis to the left buttock. He has restricted motion to the left hip. Otherwise motion is intact. Stability is normal with provocative tests, 4/5 strength, and skin is normal.  Pedal pulses are palpable.     DATA:    CBC with Differential:    Lab Results   Component Value Date/Time    WBC 3.6 08/26/2022 03:17 AM    RBC 3.16 08/26/2022 03:17 AM    HGB 12.6 08/26/2022 03:17 AM    HCT 38.4 08/26/2022 03:17 AM    PLT 31 08/26/2022 03:17 AM    .5 08/26/2022 03:17 AM    MCH 39.9 08/26/2022 03:17 AM LVESV:25 ml                          LVEDVI: 58 ml/m^2  Cardic Output (CO): 4.65l/min        LVESVI: 12 ml/m^2  Doppler Measurements and Calculations   AV Peak Velocity:172 cm/s              MV Peak E-Wave: 94.7 cm/s  AV Mean Velocity:121 cm/s              MV Peak A-Wave: 67.3 cm/s  AV Peak Gradient: 11.83 mmHg           MV E/A Ratio: 1.41 %  AV Mean Gradient: 6 mmHg               MV Mean velocity: NaNcm/s  AV Area (Continuity):1.99 cm^2         MV Peak Gradient: 3.59 mmHg  AV VTI:39 cm/s                         MV P1/2t: 84 msec  TR Velocity:288 cm/s                   MVA by PHT2.62 cm^2  TR Gradient:33.18 mmHg  Estimated RAP:3 mmHg  RVSP:36 mmHg   MV E' septal velocity: 9.25cm/s  MV E' lateral velocity:13.8 cm/s      US LIVER    Result Date: 8/25/2022  NO PRIOR REPORT AVAILABLE Exam: ULTRASOUND OF THE ABDOMEN, LIMITED Clinical data:Hyperbilirubinemia. Technique: Real-time grayscale imaging of the abdomen was performed. Images supplemented with color Doppler technique. Prior studies: No prior studies submitted. Findings: The liver appears echogenic and heterogeneous, coarse with scalloped edges. A cyst is visualized measuring 0.9 x 1.1 x 1.1 cm. No intrahepatic biliary distention. The gallbladder appears distended measuring 13.3 cm with a wall thickness of0.45 cm. Mild sludge is noted. No gallstones. The common bile duct measures 0.73cm. Pericholecystic fluid visualized. The right kidney pdffxdvd94.7 x 5.5 x 4.6 cm. Normal echogenicity and cortical thickness are preserved. No evidence of renal masses. No evidence of renal calculi. There is no evidence of hydronephrosis. The pancreas is unremarkable to the extent visualized. Unremarkable spleen gubctwojs91.5 x 6.6 x 15.7 cm. Imaged portion of the aorta demonstrates no acute pathology. Visualized portion of the inferior vena cava is unremarkable. No ascites is evident. Patient unable to lie in decubitus position due to fractured hip.  Exam done in supine view only.    Finding suggestive for hydropic cholecystitis without a obvious stone as described. CBD mildly dilated. MRCP is recommended. Possible chronic liver parenchymal disease, cirrhosis? .  Splenomegaly. Recommendation: Follow up as clinically indicated. Electronically Signed by Mago Peres MD at 25-Aug-2022 06:37:10 PM             XR CHEST PORTABLE    Result Date: 8/25/2022  NO PRIOR REPORT AVAILABLE Exam: X-RAY OF [facility] Clinical data:Hip fracture. Technique:Single view of the chest. Prior studies: No prior studies submitted. Findings: The lungs are grossly clear; noevidence of acute infiltrate or pleural effusion. Cardiac silhouette is within normal limits. No acute osseous abnormality is detected. Normal chest Recommendation: Follow up as clinically indicated. Electronically Signed by Jennyfer Zendejas MD at 25-Aug-2022 01:18:21 PM             XR HIP 2-3 VW W PELVIS LEFT    Result Date: 8/25/2022  NO PRIOR REPORT AVAILABLE Exam: X-RAYS OF THE LEFT HIP Clinical Indication: Fall, pain. Technique: Two views of the left hip and single view of the pelvis. Prior studies: No prior studies are submitted. Findings: Multiple views of the left hip demonstrateno evidence offracture. Bone mineral density is preserved. No evidence of superior pubic ramus fracture. Acute, impacted transverse fracture at the base of the femoral head with continued good alignment and joint spaces intact. Small lateral acetabular spur noted. Acute, impacted transverse fracture through base of the femoral  head , continued good alignment and normal joint space. Recommendation: Followup as clinically indicated.  Electronically Signed by Jennyfer Zendejas MD at 25-Aug-2022 01:04:57 PM             --------------------------------------------------------------------------------------------------------------------    Assessment: Acute traumatic impacted femoral neck fracture of the left hip, initial encounter for closed fracture    Plan:  1) to OR for Total vs Bipolar hip - we discussed risks, benefits, and alternatives and patient wishes to proceed  2) Admit postop for pain control, PT/OT  3) Keep NPO  4)Pt's platelets are 38,264; he will need 1 unit of platelets prior to surgery and 1 unit during the procedure. 5) Permit for surgery:  Total left hip replacement vs. Bipolar left hip replacement     Electronically signed by Fatoumata Peres PA-C on 8/26/2022 at 11:28 AM

## 2022-08-26 NOTE — PROGRESS NOTES
PROGRESS NOTE    Patient name: Kala Serna  Patient : 1955  Room: 531      SUBJECTIVE: Resting in bed this morning. No bleeding. INTERVAL HISTORY  Kala Serna was first seen by me on 2022. I was consulted for findings of thrombocytopenia. The patient was hospitalized at [facility] with complaints of left hip pain after a fall yesterday. The patient has not been able to ambulate. He is a chronic smoker. He was found to be on A. fib in the ER. CBC during admission here showed WBC count 3.8 with ANC of 2.5/, hemoglobin 15.3/, platelet count 47,482. Iron profile showed iron of 298, iron saturation 97%, TIBC 307, haptoglobin<10. PT 19.4/INR 1.61. The patient tells me that he has a diagnosis of alcoholic cirrhosis. He is a chronic drinker for many decades. Unfortunately, he does not have a GI doctor. He is known to be thrombocytopenic. He denies any history of hepatitis C. Ultrasound performed here showed findings suggestive of chronic liver parenchymal disease, cirrhosis and splenomegaly with the spleen measuring 2.5 x 6.6 x 15.7 cm consistent with splenomegaly. Objective   BP (!) 107/58   Pulse 58   Temp 97.5 °F (36.4 °C)   Resp 16   Ht 5' 9\" (1.753 m)   Wt 188 lb 9 oz (85.5 kg)   SpO2 91%   BMI 27.85 kg/m²     PHYSICAL EXAM:  CONSTITUTIONAL: Alert, appropriate, no acute distress  EYES: Non icteric, EOM intact, pupils equal round   ENT: Mucus membranes moist,external inspection of ears and nose are normal  NECK: Supple, no masses. No palpable thyroid mass  CHEST/LUNGS: CTA bilaterally, normal respiratory effort   CARDIOVASCULAR: RRR, no murmurs. No lower extremity edema  ABDOMEN: soft non-tender, active bowel sounds, no HSM.   No palpable masses  EXTREMITIES: warm, decreased range of motion left hip  SKIN: warm, dry with no rashes or lesions  LYMPH: No cervical, clavicular, axillary, or inguinal lymphadenopathy  NEUROLOGIC: follows commands, non focal Recent Labs     08/26/22  0317 08/25/22  1209   WBC 3.6* 3.8*   HGB 12.6* 15.3   HCT 38.4* 45.0   .5* 117.2*   PLT 31* 30*       Lab Results   Component Value Date     08/26/2022    K 3.9 08/26/2022     08/26/2022    CO2 25 08/26/2022    BUN 13 08/26/2022    CREATININE 0.6 08/26/2022    GLUCOSE 128 (H) 08/26/2022    CALCIUM 8.5 (L) 08/26/2022    PROT 5.9 (L) 08/26/2022    LABALBU 3.4 (L) 08/26/2022    BILITOT 5.8 (H) 08/26/2022    ALKPHOS 97 08/26/2022    AST 57 (H) 08/26/2022    ALT 25 08/26/2022    LABGLOM >60 08/26/2022    GFRAA >59 08/26/2022       Lab Results   Component Value Date    INR 1.73 (H) 08/26/2022    INR 1.61 (H) 08/25/2022    PROTIME 20.5 (H) 08/26/2022    PROTIME 19.4 (H) 08/25/2022       30 Day lookback of cultures:    Blood Culture Recent: No results for input(s): BC in the last 720 hours. Gram Stain Recent: No results for input(s): LABGRAM in the last 720 hours. Resp Culture Recent: No results for input(s): CULTRESP in the last 720 hours. Body Fluid Recent : No results for input(s): BFCX in the last 720 hours. MRSA Recent : No results for input(s): [facility] in the last 720 hours. Urine Culture Recent : No results for input(s): LABURIN in the last 720 hours. Organism Recent : No results for input(s): ORG in the last 720 hours. Exam: ULTRASOUND OF THE ABDOMEN, LIMITED    Clinical data:Hyperbilirubinemia. Technique: Real-time grayscale imaging of the abdomen was performed. Images supplemented with color Doppler technique. Prior studies: No prior studies submitted. Findings: The liver appears echogenic and heterogeneous, coarse with scalloped edges. A cyst is visualized measuring 0.9 x 1.1 x 1.1 cm. No intrahepatic biliary distention. The gallbladder appears distended measuring 13.3 cm with a wall thickness of0.45    cm. Mild sludge is noted. No gallstones. The common bile duct measures 0.73cm. Pericholecystic fluid visualized.    The right kidney kuiikzhg62.7 x 5.5 x 4.6 cm. Normal echogenicity and cortical thickness are preserved. No evidence of renal masses. No evidence of renal calculi. There is no evidence of hydronephrosis. The pancreas is unremarkable to the extent visualized. Unremarkable spleen fonfjzmbq01.5 x 6.6 x 15.7 cm. Imaged portion of the aorta demonstrates no acute pathology. Visualized portion of the inferior vena cava is unremarkable. No ascites is evident. Patient unable to lie in decubitus position due to fractured hip. Exam done in supine view only. Impression   Finding suggestive for hydropic cholecystitis without a obvious stone as described. CBD mildly dilated. MRCP is recommended. Possible chronic liver parenchymal disease, cirrhosis? .  Splenomegaly. Recommendation:    Follow up as clinically indicated. Electronically Signed by Scarlett Lu MD at 25-Aug-2022 06:37:10 PM                  ASSESSMENT/PLAN:  Thrombocytopenia-likely multifactorial to include direct effect of alcohol bone marrow and also secondary to splenomegaly. Likely secondary to end-stage liver disease/alcohol abuse    -US showed signs of end-stage liver disease/splenomegaly (portal hypertension)        PLT 31,000 today 8/26/2022       Left hip fracture          -Awaiting surgical evaluation    -Could transfuse 2 units of platelets, 1 before/1 during procedure    -For urgent or emergency procedures requiring an increase in platelet count, platelet transfusions are used.  When platelets are administered, they should be given as closely as possible to the start of the procedure     End-stage liver disease/splenomegaly  -Evidence of portal hypertension    -Interesting, direct bilirubin<indirect bilirubin    Serology 8/26/2022  Retic - 4.33% with absolute 0.1334  LDH - 255 ()    Minimal elevation LDH     Coagulopathy of liver disease/thrombocytopenia     Latest Reference Range & Units 8/25/22 12:09 8/26/22 03:17   Prothrombin Time 12.0 - 14.6 sec 19.4 (H) 20.5 (H)   INR 0.88 - 1.18  1.61 (H) 1.73 (H)       -Vitamin K 10 mg p.o. x3 doses started on 8/25/2022    -TPO-RA are FDA approved but usually not readily available and may take 2 weeks to increase platelet counts. GI is checking this with pharmacy to check on availability. Serology 8/26/2022  Fibrinogen 177  D-dimer 4.3       Macrocytosis    Recent Labs     08/26/22  0317 08/25/22  1209   WBC 3.6* 3.8*   HGB 12.6* 15.3   HCT 38.4* 45.0   .5* 117.2*   PLT 31* 30*      -Likely secondary to end-stage liver disease    Hapto < 10    B12 = 1446  Folate - 13       Elevated iron saturation/iron levels  Serum Fe - 298  TIBC - 307  Fe sat - 97%  Ferritin - 220    Hereditary hemochromatosis gene in process      PLAN:  If needed could transfuse 2 units platelets before/during surgical procedure. Avoid DVT prophylaxis due to platelet counts below 50,000  Vitamin K 10 mg p.o. x3 days. Ana Strickland PA-C    08/26/22  7:03 AM  Physician's attestation/substantial contribution:  I, Dr Vern Sadler, independently performed an evaluation on [facility]. I have reviewed relevant medical information/data to include but not limited to medication list, relevant appropriate labs and imaging when applicable. I reviewed other physician's notes, ancillary services and nurses assessment. I have reviewed the above documentation completed by the Nurse Practitioner or Physician Assistant. Please see my additional contributions to the history of present illness, physical examination, and assessment/medical decision-making that reflect my findings and impressions. I have seen and examined the patient and the key elements of all parts of the encounter have been performed by me. I agree with the assessment and plan as outlined by the ARNP/PA. Subjective-no new complaints this morning. Denies any bleeding. Objective-as above  Assessment/plan:  Awaiting surgical consultation.   May transfuse platelets before/during procedure. Patient received vitamin K. PT/INR about the same. Continue to monitor counts. End-stage liver disease-GI is following.     Vilma Carbajal MD

## 2022-08-26 NOTE — PROGRESS NOTES
[facility]      Progress Note    Patient:  Magdalena Meyer  YOB: 1955  Date of Service: 8/26/2022  MRN: [id number]   Acct: [de-identified]   Primary Care Physician: No primary care provider on file. Advance Directive: Full Code  Admit Date: 8/25/2022       Hospital Day: 1    Portions of this note have been copied forward, however, updated to reflect the most current clinical status of this patient. CHIEF COMPLAINT Left hip pain     SUBJECTIVE:  Mr. Maximiliano Kuhn was resting in bed this morning. Reported Left hip pain. Denies SOB, chest pain, nausea or vomiting. CUMULATIVE HOSPITAL COURSE:   The patient is a 75 y.o. male with past medical history of liver disease who presented to [facility] ED complaining of left hip pain. Mr. Maximiliano Kuhn reported he was knocked down by a big dog and landed on his left hip. Since then he has not been able to ambulate. Denied any other symptoms. Denied any medical conditions, other than liver disease that has not bothered him. Stated he does not take any prescription medications at home. Admitted to smoking 1 ppd. Workup in ED revealed new onset rate controlled Afib, glucose 139 otherwise unremarkable chemistry, AST 75, bilirubin 8.0, WBC 3.8, platelet 30, INR 0.91, Cxray unremarkable. Xray of hip indicated acute, impacted transverse fracture through base of the femoral head. Patient was admitted to hospital medicine for left hip fracture and new onset Afib with orthopedic surgery and cardiology consultations. Cardiology cleared patient for surgery as long as ECHO was not abnormal, recommended low dose beta blocker to suppress the PACs. GI recommended CT of abdomen to rule out hepatoma, or portal vein thrombosis. Hematology recommended vitamin K 10 mg daily x3 days, avoid DVT prophylaxis, transfuse 2 units platelets before/during surgical procedure. Orthopedic surgery recommended surgical intervention.   Ultrasound of liver indicated hydropic cholecystitis without any obvious stone, CBD mildly dilated, MRCP is recommended, possible chronic liver parenchymal disease, cirrhosis, splenomegaly. D-dimer 4.3, CTA pulmonary pending. Echo indicated normal LV size with preserved LV systolic function with EF of 04%, normal diastolic function, mild mitral regurgitation, trivial tricuspid insufficiency with estimated RVSP of 38 mm which is mildly elevated. Review of Systems   Constitutional:  Negative for chills, diaphoresis, fatigue and fever. HENT:  Negative for congestion and ear pain. Eyes:  Negative for visual disturbance. Respiratory:  Negative for cough, shortness of breath and wheezing. Denies SOB    Cardiovascular:  Negative for chest pain, palpitations and leg swelling. Denies chest pain    Gastrointestinal:  Negative for abdominal distention, abdominal pain, blood in stool, constipation, diarrhea, nausea and vomiting. Denies nausea or vomiting    Endocrine: Negative for cold intolerance and heat intolerance. Genitourinary:  Negative for difficulty urinating, flank pain, frequency and urgency. Musculoskeletal:  Positive for arthralgias. Negative for myalgias. Left hip pain    Skin:  Negative for color change and wound. Neurological:  Negative for dizziness, syncope, weakness, light-headedness, numbness and headaches. Hematological:  Does not bruise/bleed easily. Psychiatric/Behavioral:  Negative for agitation, confusion and dysphoric mood. Objective:   VITALS:  /63   Pulse 61   Temp 97.7 °F (36.5 °C) (Temporal)   Resp 16   Ht 5' 9\" (1.753 m)   Wt 188 lb 9 oz (85.5 kg)   SpO2 92%   BMI 27.85 kg/m²   24HR INTAKE/OUTPUT:    Intake/Output Summary (Last 24 hours) at 8/26/2022 1058  Last data filed at 8/25/2022 1927  Gross per 24 hour   Intake 240 ml   Output 200 ml   Net 40 ml         Physical Exam  Constitutional:       General: He is not in acute distress. Appearance: Normal appearance. He is not ill-appearing. HENT:      Head: Normocephalic and atraumatic. Right Ear: External ear normal.      Left Ear: External ear normal.      Nose: Nose normal.      Mouth/Throat:      Mouth: Mucous membranes are moist.   Eyes:      Extraocular Movements: Extraocular movements intact. Conjunctiva/sclera: Conjunctivae normal.      Pupils: Pupils are equal, round, and reactive to light. Cardiovascular:      Rate and Rhythm: Normal rate and regular rhythm. Pulses: Normal pulses. Heart sounds: Normal heart sounds. Pulmonary:      Effort: Pulmonary effort is normal. No respiratory distress. Breath sounds: Normal breath sounds. No wheezing, rhonchi or rales. Abdominal:      General: Bowel sounds are normal. There is no distension. Palpations: Abdomen is soft. Tenderness: There is no abdominal tenderness. Musculoskeletal:         General: Tenderness present. No swelling or deformity. Normal range of motion. Cervical back: Normal range of motion and neck supple. No muscular tenderness. Right lower leg: No edema. Left lower leg: No edema. Comments: Left hip tenderness    Skin:     General: Skin is warm and dry. Findings: No bruising or lesion. Neurological:      Mental Status: He is alert and oriented to person, place, and time. Psychiatric:         Mood and Affect: Mood normal.         Behavior: Behavior normal.         Thought Content:  Thought content normal.          Medications:      sodium chloride        [START ON 8/27/2022] metoprolol succinate  25 mg Oral Daily    sodium chloride flush  5-40 mL IntraVENous 2 times per day    phytonadione  10 mg Oral Daily     sodium chloride flush, sodium chloride, ondansetron **OR** ondansetron, polyethylene glycol, oxyCODONE, morphine, HYDROmorphone  Diet NPO     Lab and other Data:     Recent Labs     08/25/22  1209 08/26/22  0317   WBC 3.8* 3.6*   HGB 15.3 12.6*   PLT 30* 31*     Recent Labs     08/25/22  1209 08/26/22  2701  138   K 3.9 3.9    104   CO2 24 25   BUN 10 13   CREATININE 0.4* 0.6   GLUCOSE 139* 128*     Recent Labs     08/25/22  1209 08/25/22 1927 08/26/22 0317   AST 75*  --  57*   ALT 34  --  25   BILITOT 8.0* 6.7* 5.8*   ALKPHOS 117  --  97     Troponin T:   Recent Labs     08/25/22  1713 08/25/22 1927   TROPONINI <0.01 <0.01       INR:   Recent Labs     08/25/22  1209 08/26/22 0317   INR 1.61* 1.73*         RAD:     ECHO Complete 2D W Doppler W Color  Result Date: 8/25/2022    Summary  Left ventricle had normal chamber size and thickness  Preserved systolic function with estimated ejection fraction of 74%  No regional wall motion abnormality  Normal diastolic function  No significant valvular lesion seen  Mild mitral regurgitation  Trivial tricuspid insufficiency with estimated right ventricular systolic  pressure of 38 mm which is mildly elevated   Signature   ----------------------------------------------------------------  Electronically signed by Canelo Shaikh MD(Interpreting physician)  on 08/25/2022 07:01 PM      US LIVER  Result Date: 8/25/2022    Finding suggestive for hydropic cholecystitis without a obvious stone as described. CBD mildly dilated. MRCP is recommended. Possible chronic liver parenchymal disease, cirrhosis? .  Splenomegaly. Recommendation: Follow up as clinically indicated. Electronically Signed by Beatriz Casanova MD at 25-Aug-2022 06:37:10 PM               XR CHEST PORTABLE  Result Date: 8/25/2022    Normal chest Recommendation: Follow up as clinically indicated. Electronically Signed by Elsy Nicole MD at 25-Aug-2022 01:18:21 PM               XR HIP 2-3 VW W PELVIS LEFT  Result Date: 8/25/2022    Acute, impacted transverse fracture through base of the femoral  head , continued good alignment and normal joint space. Recommendation: Followup as clinically indicated.  Electronically Signed by Elsy Nicole MD at 25-Aug-2022 01:04:57 PM                 Micro:    COVID-19, Rapid [[id number]] Collected: 08/25/22 1333   Order Status: Completed Specimen: Nasopharyngeal Swab Updated: 08/25/22 1406    SARS-CoV-2, NAAT Not Detected       Assessment/Plan   Principal Problem:    Closed fracture of left hip (HCC)  Active Problems:    New onset a-fib (HCC)    Liver disease    Hyperbilirubinemia    Abnormal LFTs    Thrombocytopenia (HCC)    Leukopenia  Resolved Problems:    * No resolved hospital problems. *           Principal Problem:    Closed left hip fracture, initial encounter [facility])-              - Orthopedic surgery consulted    - plans for OR today               - Bed rest until ortho evaluation               - Pain control               - PT/OT per ortho          Active Problems:   Irregular heart beat-  - Cardiology consulted    - Suggested PACs    - Recommended low dose beta blocker to suppress the PACs    - cleared patient for surgery   - Currently rate controlled   - Defer anticoagulation to cardiology    - ECHO- indicated normal LV size with preserved LV systolic function with EF of 80%, normal diastolic function, mild mitral regurgitation, trivial tricuspid insufficiency with estimated RVSP of 38 mm which is mildly elevated   - Trend troponin   - Serial and PRN EKGs  - Monitor on telemetry        Liver disease/ Hyperbilirubinemia-              - Liver US- indicated hydropic cholecystitis without any obvious stone, CBD mildly dilated, MRCP is recommended, possible chronic liver parenchymal disease, cirrhosis, splenomegaly   - GI recommended MRCP, CT abdomen, surgery consultation    - Surgery recommended no indications for surgery at this time, if surgery needed then he would have to be at a tertiary center due to severity of liver disease               - Lipase 41              - Monitor CMP closely              DVT Prophylaxis: SCDs      Discharge planning: TBD        Further Orders per Clinical course/attending.      Electronically signed by SHEILA Bryant - [place] on 8/26/2022 at 10:58 AM       EMR Dragon/Transcription disclaimer:   Much of this encounter note is an electronic transcription/translation of spoken language to printed text. The electronic translation of spoken language may permit erroneous, or at times, nonsensical words or phrases to be inadvertently transcribed; although attempts have made to review the note for such errors, some may still exist.       Attestation Statement     I have independently seen and examined this patient and agree with the asesment and plan by [facility] level [facility] MEDICINE  - PROGRESS NOTE         Objective:   Vitals: BP (!) 153/77   Pulse 84   Temp 99.2 °F (37.3 °C)   Resp 16   Ht 5' 9\" (1.753 m)   Wt 188 lb 9 oz (85.5 kg)   SpO2 99%   BMI 27.85 kg/m²   General appearance: alert, appears stated age and cooperative  Skin: Skin color, texture, turgor normal.   HEENT: Head: Normocephalic, no lesions, without obvious abnormality.   Neck: no adenopathy, no carotid bruit, no JVD, and supple, symmetrical, trachea midline  Lungs: clear to auscultation bilaterally  Heart: regular rate and rhythm, S1, S2 normal, no murmur, click, rub or gallop  Abdomen: soft, non-tender; bowel sounds normal; no masses,  no organomegaly  Extremities: extremities normal, atraumatic, no cyanosis or edema  Lymphatic: No significant lymph node enlargement papable  Neurologic: Mental status: Alert, oriented, thought content appropriate      Assessment & Plan:    Hip fx- OR today  Hyperbilirubenemia - liver us - GI consult  Thrombocytopenia - hematology consult- no lovenox - most likely due to alcohol abuse- platelets to be transfused prior to OR  End stage liver disease with splenomegaly   Elevated INR- vit K given   Frequent PACs- asymptomatic  HTN  COPD    cholecystitis - no need for surgery at present - shell Ventura MD

## 2022-08-26 NOTE — CONSULTS
Mr. Bety Caal is a 75year old male with a history of cirrhosis who was admitted through the ER with a hip fracture after getting knocked over by his dog. It appears that his bilirubin was elevated at the time of admission, so a RUQ US was done. GI was consulted for the h/o cirrhosis. It was noted that the gallbladder was distended and US read as possible hydrops, so GI consulted general surgery and ordered CT and MRCP. The patient denies any RUQ pain, nausea or vomiting. The patient reports that he has known he has cirrhosis for at least 4 years. He initially saw a hepatologist where he used to live, but he has not seen one for years. He has has a long history of alcohol abuse. The patient reports that he drinks less than he used to, but his wife has been to the nurses' station with concerns that he actually drinks quite a bit and has not had any for a couple days. At the time of admission the patient's bilirubin was 8.0, down to 5.8 this morning. Based on today's labs, MELD score 19. Past Medical History:   Diagnosis Date    Liver disease      History reviewed. No pertinent surgical history.   Current Facility-Administered Medications   Medication Dose Route Frequency Provider Last Rate Last Admin    [START ON 8/27/2022] metoprolol succinate (TOPROL XL) extended release tablet 25 mg  25 mg Oral Daily Peyton Pruett MD        LORazepam (ATIVAN) tablet 1 mg  1 mg Oral Q4H PRN Peyton Pruett MD        sodium chloride flush 0.9 % injection 5-40 mL  5-40 mL IntraVENous 2 times per day Jhony Adán, APRN - CNP   10 mL at 08/26/22 0954    sodium chloride flush 0.9 % injection 5-40 mL  5-40 mL IntraVENous PRN Jhony Adán, APRN - CNP        0.9 % sodium chloride infusion   IntraVENous PRN Jhony Adán, APRN - CNP        ondansetron (ZOFRAN-ODT) disintegrating tablet 4 mg  4 mg Oral Q8H PRN Jhony Adán, APRN - CNP        Or    ondansetron (ZOFRAN) injection 4 mg  4 mg IntraVENous Q6H PRN Gwendolynn Back, APRN - CNP        polyethylene glycol (GLYCOLAX) packet 17 g  17 g Oral Daily PRN Gwendolynn Back, APRN - CNP        oxyCODONE (ROXICODONE) immediate release tablet 5 mg  5 mg Oral Q6H PRN Gwendolynn Back, APRN - CNP   5 mg at 08/25/22 1845    morphine (PF) injection 2 mg  2 mg IntraVENous Q4H PRN Gwendolynn Back, APRN - CNP   2 mg at 08/26/22 1418    HYDROmorphone HCl PF (DILAUDID) injection 1 mg  1 mg IntraVENous Q3H PRN Andrew Alonso MD        phytonadione (VITAMIN K) tablet 10 mg  10 mg Oral Daily Admaa Bowers MD   10 mg at 08/26/22 0228     Allergies: Patient has no known allergies. Family History   Problem Relation Age of Onset    Diabetes Mother        Social History     Tobacco Use    Smoking status: Every Day     Packs/day: 1.00     Years: 30.00     Pack years: 30.00     Types: Cigarettes    Smokeless tobacco: Never   Substance Use Topics    Alcohol use: Yes       Review of Systems   Constitutional:  Negative for chills and fever. HENT:  Negative for congestion and sore throat. Eyes:  Negative for pain and redness. Respiratory:  Negative for cough and shortness of breath. Cardiovascular:  Negative for chest pain and palpitations. Gastrointestinal:  Negative for abdominal distention and abdominal pain. Genitourinary:  Negative for dysuria and hematuria. Musculoskeletal:  Positive for arthralgias and gait problem. Neurological:  Negative for dizziness and headaches. Hematological:  Bruises/bleeds easily. Psychiatric/Behavioral:  Negative for confusion and dysphoric mood. Physical Exam  Vitals reviewed. Constitutional:       General: He is not in acute distress. Appearance: He is ill-appearing (appears chroniccaly ill). HENT:      Head: Normocephalic and atraumatic. Nose: Nose normal.   Eyes:      General: Scleral icterus present. Pupils: Pupils are equal, round, and reactive to light.    Cardiovascular:      Rate and Rhythm: Normal rate and regular rhythm. Pulmonary:      Effort: Pulmonary effort is normal. No respiratory distress. Abdominal:      General: There is no distension. Palpations: Abdomen is soft. Tenderness: There is no abdominal tenderness. Comments: hepatomegaly   Musculoskeletal:         General: No swelling. Cervical back: Neck supple. No rigidity. Comments: Ecchymosis along area of left flank/hip   Skin:     General: Skin is warm and dry. Neurological:      General: No focal deficit present. Mental Status: He is alert. Mental status is at baseline. Psychiatric:         Mood and Affect: Mood normal.         Behavior: Behavior normal.     RUQ US  Impression   Finding suggestive for hydropic cholecystitis without a obvious stone as described. CBD mildly dilated. MRCP is recommended. Possible chronic liver parenchymal disease, cirrhosis? .  Splenomegaly. Recommendation:    Follow up as clinically indicated.    Electronically Signed by Beatriz Casanova MD at 25-Aug-2022 06:37:10 PM            CT and MRCP done but reads pending  CT images reviewed by me personally and consistent with hepatosplenomegally, dilated vessels consistent with portal hypertension, liver grossly abnormal parenchyma consistent with cirrhosis  CBC:   Lab Results   Component Value Date/Time    WBC 3.6 08/26/2022 03:17 AM    RBC 3.16 08/26/2022 03:17 AM    HGB 12.6 08/26/2022 03:17 AM    HCT 38.4 08/26/2022 03:17 AM    .5 08/26/2022 03:17 AM    MCH 39.9 08/26/2022 03:17 AM    MCHC 32.8 08/26/2022 03:17 AM    RDW 16.0 08/26/2022 03:17 AM    PLT 31 08/26/2022 03:17 AM    MPV 11.4 08/26/2022 03:17 AM     CMP:    Lab Results   Component Value Date/Time     08/26/2022 03:17 AM    K 3.9 08/26/2022 03:17 AM     08/26/2022 03:17 AM    CO2 25 08/26/2022 03:17 AM    BUN 13 08/26/2022 03:17 AM    CREATININE 0.6 08/26/2022 03:17 AM    GFRAA >59 08/26/2022 03:17 AM    LABGLOM >60 08/26/2022 03:17 AM    GLUCOSE 128 08/26/2022 03:17 AM    PROT 5.9 08/26/2022 03:17 AM    LABALBU 3.4 08/26/2022 03:17 AM    CALCIUM 8.5 08/26/2022 03:17 AM    BILITOT 5.8 08/26/2022 03:17 AM    ALKPHOS 97 08/26/2022 03:17 AM    AST 57 08/26/2022 03:17 AM    ALT 25 08/26/2022 03:17 AM       Assessment and plan:  75year old male with cirrhosis, thrombocytopenia, elevated INR, hip fracture  I discussed with the patient that the appearance of his gallbladder is not unexpected due to chronic changes from his cirrhosis. With no pain or inability to eat, no indication for any surgery. If the patient did need gallbladder surgery, he would have to be at a tertiary center due to severity of liver disease. Continue with treatment and supportive care for cirrhosis, thrombocytopenia, elevated INR per the medicine team, hematology, GI; and treatment of the hip fracture per ortho. Will follow peripherally.     Marguerite Ladd MD  8/26/2022  3:27 PM

## 2022-08-27 PROBLEM — S72.002A CLOSED FRACTURE OF LEFT HIP (HCC): Status: ACTIVE | Noted: 2022-08-27

## 2022-08-27 PROBLEM — K42.9 UMBILICAL HERNIA WITHOUT OBSTRUCTION AND WITHOUT GANGRENE: Status: ACTIVE | Noted: 2022-08-27

## 2022-08-27 PROBLEM — D61.818 PANCYTOPENIA (HCC): Status: ACTIVE | Noted: 2022-08-27

## 2022-08-27 LAB
ALBUMIN SERPL-MCNC: 3 G/DL (ref 3.5–5.2)
ALP BLD-CCNC: 72 U/L (ref 40–130)
ALPHA-1 ANTITRYPSIN: 129 MG/DL (ref 90–200)
ALT SERPL-CCNC: 23 U/L (ref 5–41)
ANION GAP SERPL CALCULATED.3IONS-SCNC: 11 MMOL/L (ref 7–19)
AST SERPL-CCNC: 56 U/L (ref 5–40)
BACTERIA: NEGATIVE /HPF
BASOPHILS ABSOLUTE: 0 K/UL (ref 0–0.2)
BASOPHILS RELATIVE PERCENT: 0.4 % (ref 0–1)
BILIRUB SERPL-MCNC: 5.1 MG/DL (ref 0.2–1.2)
BILIRUBIN URINE: ABNORMAL
BLOOD, URINE: NEGATIVE
BUN BLDV-MCNC: 12 MG/DL (ref 8–23)
CALCIUM SERPL-MCNC: 7.2 MG/DL (ref 8.8–10.2)
CHLORIDE BLD-SCNC: 108 MMOL/L (ref 98–111)
CLARITY: CLEAR
CO2: 20 MMOL/L (ref 22–29)
COLOR: ABNORMAL
CREAT SERPL-MCNC: 0.7 MG/DL (ref 0.5–1.2)
CRYSTALS, UA: ABNORMAL /HPF
EOSINOPHILS ABSOLUTE: 0 K/UL (ref 0–0.6)
EOSINOPHILS RELATIVE PERCENT: 0.4 % (ref 0–5)
EPITHELIAL CELLS, UA: 2 /HPF (ref 0–5)
GFR AFRICAN AMERICAN: >59
GFR NON-AFRICAN AMERICAN: >60
GLUCOSE BLD-MCNC: 278 MG/DL (ref 74–109)
GLUCOSE URINE: NEGATIVE MG/DL
HBA1C MFR BLD: 4.7 % (ref 4–6)
HCT VFR BLD CALC: 29 % (ref 42–52)
HEMOGLOBIN: 9.6 G/DL (ref 14–18)
HYALINE CASTS: 3 /HPF (ref 0–8)
IMMATURE GRANULOCYTES #: 0 K/UL
INR BLD: 1.97 (ref 0.88–1.18)
KETONES, URINE: ABNORMAL MG/DL
LACTIC ACID: 3 MMOL/L (ref 0.5–1.9)
LEUKOCYTE ESTERASE, URINE: ABNORMAL
LYMPHOCYTES ABSOLUTE: 0.3 K/UL (ref 1.1–4.5)
LYMPHOCYTES RELATIVE PERCENT: 11.4 % (ref 20–40)
MCH RBC QN AUTO: 40.5 PG (ref 27–31)
MCHC RBC AUTO-ENTMCNC: 33.1 G/DL (ref 33–37)
MCV RBC AUTO: 122.4 FL (ref 80–94)
MITOCHONDRIAL M2 AB, IGG: 3.2 UNITS (ref 0–24.9)
MONOCYTES ABSOLUTE: 0.2 K/UL (ref 0–0.9)
MONOCYTES RELATIVE PERCENT: 9.6 % (ref 0–10)
NEUTROPHILS ABSOLUTE: 1.8 K/UL (ref 1.5–7.5)
NEUTROPHILS RELATIVE PERCENT: 76.9 % (ref 50–65)
NITRITE, URINE: NEGATIVE
PDW BLD-RTO: 15.9 % (ref 11.5–14.5)
PH UA: 6 (ref 5–8)
PLATELET # BLD: 28 K/UL (ref 130–400)
PMV BLD AUTO: 11 FL (ref 9.4–12.4)
POTASSIUM REFLEX MAGNESIUM: 4 MMOL/L (ref 3.5–5)
PRO-BNP: 370 PG/ML (ref 0–900)
PROTEIN UA: ABNORMAL MG/DL
PROTHROMBIN TIME: 22.8 SEC (ref 12–14.6)
RBC # BLD: 2.37 M/UL (ref 4.7–6.1)
RBC UA: 9 /HPF (ref 0–4)
SODIUM BLD-SCNC: 139 MMOL/L (ref 136–145)
SPECIFIC GRAVITY UA: 1.04 (ref 1–1.03)
TOTAL PROTEIN: 4.8 G/DL (ref 6.6–8.7)
UROBILINOGEN, URINE: 2 E.U./DL
WBC # BLD: 2.3 K/UL (ref 4.8–10.8)
WBC UA: 3 /HPF (ref 0–5)

## 2022-08-27 PROCEDURE — 99232 SBSQ HOSP IP/OBS MODERATE 35: CPT | Performed by: INTERNAL MEDICINE

## 2022-08-27 PROCEDURE — 1210000000 HC MED SURG R&B

## 2022-08-27 PROCEDURE — 94640 AIRWAY INHALATION TREATMENT: CPT

## 2022-08-27 PROCEDURE — 2580000003 HC RX 258: Performed by: ORTHOPAEDIC SURGERY

## 2022-08-27 PROCEDURE — 85025 COMPLETE CBC W/AUTO DIFF WBC: CPT

## 2022-08-27 PROCEDURE — 80053 COMPREHEN METABOLIC PANEL: CPT

## 2022-08-27 PROCEDURE — 83036 HEMOGLOBIN GLYCOSYLATED A1C: CPT

## 2022-08-27 PROCEDURE — 6370000000 HC RX 637 (ALT 250 FOR IP): Performed by: HOSPITALIST

## 2022-08-27 PROCEDURE — 85610 PROTHROMBIN TIME: CPT

## 2022-08-27 PROCEDURE — 6370000000 HC RX 637 (ALT 250 FOR IP): Performed by: ORTHOPAEDIC SURGERY

## 2022-08-27 PROCEDURE — 2700000000 HC OXYGEN THERAPY PER DAY

## 2022-08-27 PROCEDURE — 97116 GAIT TRAINING THERAPY: CPT

## 2022-08-27 PROCEDURE — 2580000003 HC RX 258: Performed by: NURSE PRACTITIONER

## 2022-08-27 PROCEDURE — 83880 ASSAY OF NATRIURETIC PEPTIDE: CPT

## 2022-08-27 PROCEDURE — 99232 SBSQ HOSP IP/OBS MODERATE 35: CPT | Performed by: SPECIALIST

## 2022-08-27 PROCEDURE — 97162 PT EVAL MOD COMPLEX 30 MIN: CPT

## 2022-08-27 PROCEDURE — 2500000003 HC RX 250 WO HCPCS: Performed by: ORTHOPAEDIC SURGERY

## 2022-08-27 PROCEDURE — 2580000003 HC RX 258: Performed by: HOSPITALIST

## 2022-08-27 PROCEDURE — 6360000002 HC RX W HCPCS: Performed by: HOSPITALIST

## 2022-08-27 PROCEDURE — 81001 URINALYSIS AUTO W/SCOPE: CPT

## 2022-08-27 PROCEDURE — 83605 ASSAY OF LACTIC ACID: CPT

## 2022-08-27 PROCEDURE — 36415 COLL VENOUS BLD VENIPUNCTURE: CPT

## 2022-08-27 PROCEDURE — 6360000002 HC RX W HCPCS: Performed by: ORTHOPAEDIC SURGERY

## 2022-08-27 RX ORDER — SODIUM CHLORIDE 9 MG/ML
INJECTION, SOLUTION INTRAVENOUS CONTINUOUS
Status: DISCONTINUED | OUTPATIENT
Start: 2022-08-27 | End: 2022-08-30

## 2022-08-27 RX ORDER — IPRATROPIUM BROMIDE AND ALBUTEROL SULFATE 2.5; .5 MG/3ML; MG/3ML
1 SOLUTION RESPIRATORY (INHALATION)
Status: DISCONTINUED | OUTPATIENT
Start: 2022-08-27 | End: 2022-08-30

## 2022-08-27 RX ORDER — SODIUM BICARBONATE 650 MG/1
650 TABLET ORAL 4 TIMES DAILY
Status: DISCONTINUED | OUTPATIENT
Start: 2022-08-27 | End: 2022-09-05

## 2022-08-27 RX ADMIN — SODIUM BICARBONATE 650 MG: 650 TABLET ORAL at 12:50

## 2022-08-27 RX ADMIN — SODIUM BICARBONATE 650 MG: 650 TABLET ORAL at 08:11

## 2022-08-27 RX ADMIN — PIPERACILLIN AND TAZOBACTAM 3375 MG: 3; .375 INJECTION, POWDER, FOR SOLUTION INTRAVENOUS at 02:04

## 2022-08-27 RX ADMIN — BISACODYL 5 MG: 5 TABLET, COATED ORAL at 08:02

## 2022-08-27 RX ADMIN — IPRATROPIUM BROMIDE AND ALBUTEROL SULFATE 1 AMPULE: 2.5; .5 SOLUTION RESPIRATORY (INHALATION) at 15:49

## 2022-08-27 RX ADMIN — SODIUM CHLORIDE: 9 INJECTION, SOLUTION INTRAVENOUS at 13:32

## 2022-08-27 RX ADMIN — SODIUM CHLORIDE, PRESERVATIVE FREE 10 ML: 5 INJECTION INTRAVENOUS at 08:04

## 2022-08-27 RX ADMIN — PHYTONADIONE 10 MG: 5 TABLET ORAL at 08:02

## 2022-08-27 RX ADMIN — CLINDAMYCIN PHOSPHATE 900 MG: 900 INJECTION, SOLUTION INTRAVENOUS at 12:52

## 2022-08-27 RX ADMIN — SODIUM BICARBONATE 650 MG: 650 TABLET ORAL at 20:51

## 2022-08-27 RX ADMIN — IPRATROPIUM BROMIDE AND ALBUTEROL SULFATE 1 AMPULE: 2.5; .5 SOLUTION RESPIRATORY (INHALATION) at 19:04

## 2022-08-27 RX ADMIN — OXYCODONE 10 MG: 5 TABLET ORAL at 18:20

## 2022-08-27 RX ADMIN — ACETAMINOPHEN 650 MG: 325 TABLET, FILM COATED ORAL at 03:46

## 2022-08-27 RX ADMIN — OXYCODONE 10 MG: 5 TABLET ORAL at 13:43

## 2022-08-27 RX ADMIN — WATER 2000 MG: 1 INJECTION INTRAMUSCULAR; INTRAVENOUS; SUBCUTANEOUS at 01:08

## 2022-08-27 RX ADMIN — SODIUM BICARBONATE 650 MG: 650 TABLET ORAL at 18:20

## 2022-08-27 RX ADMIN — CLINDAMYCIN PHOSPHATE 900 MG: 900 INJECTION, SOLUTION INTRAVENOUS at 05:44

## 2022-08-27 RX ADMIN — IPRATROPIUM BROMIDE AND ALBUTEROL SULFATE 1 AMPULE: 2.5; .5 SOLUTION RESPIRATORY (INHALATION) at 10:21

## 2022-08-27 RX ADMIN — WATER 2000 MG: 1 INJECTION INTRAMUSCULAR; INTRAVENOUS; SUBCUTANEOUS at 09:00

## 2022-08-27 RX ADMIN — CLINDAMYCIN PHOSPHATE 900 MG: 900 INJECTION, SOLUTION INTRAVENOUS at 20:54

## 2022-08-27 RX ADMIN — METOPROLOL SUCCINATE 25 MG: 25 TABLET, EXTENDED RELEASE ORAL at 08:02

## 2022-08-27 ASSESSMENT — ENCOUNTER SYMPTOMS
DIARRHEA: 0
BLOOD IN STOOL: 0
SHORTNESS OF BREATH: 0
WHEEZING: 0
NAUSEA: 0
COUGH: 0
ABDOMINAL DISTENTION: 0
VOMITING: 0
ABDOMINAL PAIN: 0
COLOR CHANGE: 0
CONSTIPATION: 0

## 2022-08-27 ASSESSMENT — PAIN SCALES - GENERAL
PAINLEVEL_OUTOF10: 6
PAINLEVEL_OUTOF10: 0
PAINLEVEL_OUTOF10: 6
PAINLEVEL_OUTOF10: 3
PAINLEVEL_OUTOF10: 0

## 2022-08-27 ASSESSMENT — PAIN DESCRIPTION - LOCATION
LOCATION: HIP

## 2022-08-27 ASSESSMENT — PAIN DESCRIPTION - DESCRIPTORS
DESCRIPTORS: BURNING
DESCRIPTORS: ACHING;DULL;DISCOMFORT

## 2022-08-27 ASSESSMENT — PAIN DESCRIPTION - ORIENTATION
ORIENTATION: LEFT
ORIENTATION: LEFT

## 2022-08-27 ASSESSMENT — PAIN - FUNCTIONAL ASSESSMENT: PAIN_FUNCTIONAL_ASSESSMENT: PREVENTS OR INTERFERES SOME ACTIVE ACTIVITIES AND ADLS

## 2022-08-27 NOTE — OP NOTE
[facility] THANIA [address], [address] [facility]                                OPERATIVE REPORT    PATIENT NAME: Davis Owens                        :        1955  MED REC NO:   [id number]                              ROOM:       [facility]  ACCOUNT NO:   [de-identified]                           ADMIT DATE: 2022  PROVIDER:     Brent Vyas MD    DATE OF PROCEDURE:  2022    PREOPERATIVE DIAGNOSIS:  Left transcervical femoral neck fracture. POSTOPERATIVE DIAGNOSIS:  Left transcervical femoral neck fracture. PROCEDURE:  Left total hip arthroplasty. SURGEON:  Brent Vyas MD    FIRST ASSISTANT:  Cassie Merritt    ANESTHESIA:  General.    EBL:  1360 mL. FLUIDS:  2500 mL of crystalloid, a six pack of platelets and one unit of  FFP. URINE OUTPUT:  175 mL. COMPONENTS USED:  DePuy hip system, acetabular shell size 54 GRIPTION  PINNACLE shell, 30  mm screw, 36 mm liner, stem is a size 12 KLA stem  with 135 degree neck shaft angle, head is a 36 mm +1.5 ceramic head. SPECIMENS:  Left femoral head placed in formalin for Pathology. INDICATIONS:  This is a 40-year-old gentleman who suffered the  above-stated fracture and was admitted to the hospital yesterday. The  patient has end-stage liver disease with an elevated bilirubin, jaundice  and platelets of only 46,895. He was optimized from a medical  standpoint and arrives for the above procedure. OPERATIVE PROCEDURE:  After informed consent, he was given 2 gm of  Ancef, 1 gm of tranexamic acid, underwent general anesthesia. Juárez  catheter was placed. He was placed on the [place] table. He was also  given a six pack of platelets as his platelet count was only 30,000. Left hip was prepped and draped in the usual sterile fashion. A 10 cm  incision was made lateral to the ASIS extending distally after incising  through the skin. TFL fascia was incised.   TFL was taken laterally,  sartorius and rectus medially. Ascending branch of the lateral femoral  circumflex vessels were identified and cauterized. Anterior capsulotomy  was performed. Neck resection was made at the saddle of the neck and  equator of the femoral head. We also noted that once we entered the  capsule, a fracture hematoma was encountered and we then made the neck  resection and removed the femoral head and neck in formalin for  Pathology. Following this, we externally rotated and extended the  femur. Soft tissue in the trochanteric fossa was released. We started  with our broaching system with a Corail broach, broached up to a size 12  broach. We used our calcar planer. This was removed. Acetabulum was  exposed. Labrum was excised. We started reaming with a 49 mm reamer  and reamed up to a 53 mm reamer. Trial 53 shell fit nicely. We  irrigated with 1 liter of irrigation. The final size 54 GRIPTION  PINNACLE shell was press-fit in about 40 degrees of abduction, 20  degrees of anteversion, and had excellent purchase. We drilled,  measured, and placed a 30 mm screw, which also had excellent purchase. The final 36 mm liner was locked in the acetabular shell. Several trial  reductions were done. Fluoroscopic views revealed we had nice  positioning of the components. We selected the final size 12 KLA Corail  stem with 135 degree neck shaft angle, press-fit this down the canal.   The final 36 mm +1.5 ceramic head was selected and this was placed on  the Case taper and the hip was reduced. We had excellent stability to  anterior maneuvers including hyperextension with external rotation and  adduction, and no evidence of any anterior instability at all. Fluoroscopic views revealed excellent alignment of the femoral and  acetabular components. We irrigated with a total of 2 liters of  irrigation. We mixed 20 mL of Exparel with 30 mL of saline and 50 mL of  0.25% Marcaine.   We injected the TFL and rectus muscles with this  solution. We then placed a double batch of Rohit hemostatic agent into  the wound. TFL fascia was closed with 0 Vicryl suture, 2-0 Vicryl  suture for the subcutaneous tissues, and 3-0 Vicryl for subcuticular  stitch. Prineo Dermabond and a soft dressing were applied. The patient  was taken to the recovery room and then to the ICU in stable condition. POSTOPERATIVE PLANS:  1. He will be on typical total hip arthroplasty protocol. 2.  He will be 2 doses of Ancef and 6 doses of clindamycin. 3.  We will not use any anticoagulation with him as his platelets are  only 56,382 and his INR preop was 1.7. We will have the Medical Team  follow him very closely as he has multiple medical issues and I did tell  the family to be aware of the fact that his complication rate is high as  he is an alcoholic and also still using nicotine. We will follow him in  the ICU as a precaution overnight.         Mary Kamara MD    D: 08/26/2022 19:57:59      T: 08/27/2022 2:38:44     [email or web address]  Job#: [id number]     Doc#: [id number]    CC:

## 2022-08-27 NOTE — ANESTHESIA POSTPROCEDURE EVALUATION
Department of Anesthesiology  Postprocedure Note    Patient: Tabitha Mcdonald  MRN: [id number]  [place]: 1955  Date of evaluation: 8/26/2022      Procedure Summary     Date: 08/26/22 Room / Location: [address]    Anesthesia Start: 1723 Anesthesia Stop: 1918    Procedure: LEFT HIP TOTAL ARTHROPLASTY (Left: Hip) Diagnosis:       Type I or II open fracture of left hip, initial encounter ([facility] [address]      (Type I or II open fracture of left hip, initial encounter ([facility] [address] [S72.002B])    Surgeons: Dorothea Batista MD Responsible Provider: Elyse Nissen, APRN - CRNA    Anesthesia Type: general ASA Status: 4 - Emergent          Anesthesia Type: No value filed.     Rod Phase I: Rod Score: 9    Rod Phase II:        Anesthesia Post Evaluation    Patient location during evaluation: bedside  Patient participation: complete - patient participated  Level of consciousness: awake and alert  Pain score: 0  Airway patency: patent  Nausea & Vomiting: no nausea  Complications: no  Cardiovascular status: hemodynamically stable  Respiratory status: acceptable  Hydration status: euvolemic

## 2022-08-27 NOTE — PLAN OF CARE
Problem: Discharge Planning  Goal: Discharge to home or other facility with appropriate resources  8/27/2022 0126 by Elissa Vegas RN  Outcome: Progressing  Flowsheets (Taken 8/26/2022 2017)  Discharge to home or other facility with appropriate resources:   Identify barriers to discharge with patient and caregiver   Arrange for needed discharge resources and transportation as appropriate   Identify discharge learning needs (meds, wound care, etc)  8/26/2022 1620 by Ana Flynn RN  Outcome: Progressing  8/26/2022 1619 by Ana Flynn RN  Outcome: Progressing  Flowsheets (Taken 8/26/2022 1025)  Discharge to home or other facility with appropriate resources: Identify barriers to discharge with patient and caregiver     Problem: Skin/Tissue Integrity  Goal: Absence of new skin breakdown  Description: 1. Monitor for areas of redness and/or skin breakdown  2. Assess vascular access sites hourly  3. Every 4-6 hours minimum:  Change oxygen saturation probe site  4. Every 4-6 hours:  If on nasal continuous positive airway pressure, respiratory therapy assess nares and determine need for appliance change or resting period.   8/27/2022 0126 by Elissa Vegas RN  Outcome: Progressing  8/26/2022 1620 by Ana Flynn RN  Outcome: Progressing  8/26/2022 1619 by Ana Flynn RN  Outcome: Progressing     Problem: ABCDS Injury Assessment  Goal: Absence of physical injury  8/27/2022 0126 by Elissa Vegas RN  Outcome: Progressing  8/26/2022 1620 by Ana Flynn RN  Outcome: Progressing  8/26/2022 1619 by Ana Flynn RN  Outcome: Progressing  Flowsheets (Taken 8/26/2022 0830)  Absence of Physical Injury: Implement safety measures based on patient assessment     Problem: Safety - Adult  Goal: Free from fall injury  8/27/2022 0126 by Elissa Vegas RN  Outcome: Progressing  8/26/2022 1620 by Ana Flynn RN  Outcome: Progressing  8/26/2022 1619 by Ana Flynn RN  Outcome: Progressing  Flowsheets (Taken 8/26/2022 0830)  Free From Fall Injury: Instruct family/caregiver on patient safety     Problem: Pain  Goal: Verbalizes/displays adequate comfort level or baseline comfort level  Outcome: Progressing     Problem: Discharge Planning  Goal: Discharge to home or other facility with appropriate resources  8/27/2022 0126 by Essence Hammer RN  Outcome: Progressing  Flowsheets (Taken 8/26/2022 2017)  Discharge to home or other facility with appropriate resources:   Identify barriers to discharge with patient and caregiver   Arrange for needed discharge resources and transportation as appropriate   Identify discharge learning needs (meds, wound care, etc)  8/26/2022 1620 by Arnold Garza RN  Outcome: Progressing  8/26/2022 1619 by Arnold Garza RN  Outcome: Progressing  Flowsheets (Taken 8/26/2022 4743)  Discharge to home or other facility with appropriate resources: Identify barriers to discharge with patient and caregiver     Problem: Skin/Tissue Integrity  Goal: Absence of new skin breakdown  Description: 1. Monitor for areas of redness and/or skin breakdown  2. Assess vascular access sites hourly  3. Every 4-6 hours minimum:  Change oxygen saturation probe site  4. Every 4-6 hours:  If on nasal continuous positive airway pressure, respiratory therapy assess nares and determine need for appliance change or resting period.   8/27/2022 0126 by Essence Hammer RN  Outcome: Progressing  8/26/2022 1620 by Arnold Garza RN  Outcome: Progressing  8/26/2022 1619 by Arnold Garza RN  Outcome: Progressing     Problem: ABCDS Injury Assessment  Goal: Absence of physical injury  8/27/2022 0126 by Essence Hammer RN  Outcome: Progressing  8/26/2022 1620 by Arnold Garza RN  Outcome: Progressing  8/26/2022 1619 by Arnold Garza RN  Outcome: Progressing  Flowsheets (Taken 8/26/2022 0830)  Absence of Physical Injury: Implement safety measures based on patient assessment     Problem: Safety - Adult  Goal: Free from fall injury  8/27/2022 0126 by Tima Solares RN  Outcome: Progressing  8/26/2022 1620 by Capo Tripp RN  Outcome: Progressing  8/26/2022 1619 by Capo Tripp RN  Outcome: Progressing  Flowsheets (Taken 8/26/2022 0830)  Free From Fall Injury: Instruct family/caregiver on patient safety     Problem: Pain  Goal: Verbalizes/displays adequate comfort level or baseline comfort level  Outcome: Progressing

## 2022-08-27 NOTE — ADDENDUM NOTE
Addendum  created 08/26/22 1924 by Arturo Hanson, [address] [facility] Blood edited

## 2022-08-27 NOTE — PROGRESS NOTES
Progress Note  Date:2022       Room:0148/148-01  Patient Lan Fields     Date of Birth:[birth year]     Age:67 y.o. Subjective    Subjective had left hip surgery last night. Denies pain in left hip area. Denies right upper quadrant pain. Was able to walk. He is not NPO. Review of Systems no right upper quadrant pain. No bleeding at the operative site. Objective         Vitals Last 24 Hours:  TEMPERATURE:  Temp  Av.5 °F (36.9 °C)  Min: 97.7 °F (36.5 °C)  Max: 99.2 °F (37.3 °C)  RESPIRATIONS RANGE: Resp  Av.3  Min: 8  Max: 25  PULSE OXIMETRY RANGE: SpO2  Av.8 %  Min: 84 %  Max: 100 %  PULSE RANGE: Pulse  Av.2  Min: 61  Max: 84  BLOOD PRESSURE RANGE: Systolic (36ASA), RLU:262 , Min:90 , FV   ; Diastolic (14HPI), XZR:06, Min:50, Max:77    I/O (24Hr): Intake/Output Summary (Last 24 hours) at 2022 0648  Last data filed at 2022 0600  Gross per 24 hour   Intake 7303.95 ml   Output 3700 ml   Net 3603.95 ml       Objective:  Vital signs: (most recent): Blood pressure 116/62, pulse 75, temperature 98.1 °F (36.7 °C), temperature source Temporal, resp. rate 21, height 5' 9\" (1.753 m), weight 195 lb 3.2 oz (88.5 kg), SpO2 91 %. alert and oriented. In no acute physical distress. HEENT normocephalic. Atraumatic. Neck supple no JVD. Abdomen obese. Liver enlarged. No tenderness in the epigastric area, right upper quadrant. Constance Dwight sign is negative. Small reducible, nontender umbilical hernia. Examination of the scrotum indicates that testicle is palpable but left does not. Trace bilateral lower leg edema and hyperpigmentation of skin over both lower legs. Neuro: No encephalopathy.   Labs/Imaging/Diagnostics    Labs:  CBC:  Recent Labs     22  1209 22  0317 22  0108   WBC 3.8* 3.6* 2.3*   RBC 3.84* 3.16* 2.37*   HGB 15.3 12.6* 9.6*   HCT 45.0 38.4* 29.0*   .2* 121.5* 122.4*   RDW 16.0* 16.0* 15.9*   PLT 30* 31* 28* CHEMISTRIES:  Recent Labs     08/25/22  1209 08/25/22  1713 08/26/22 0317 08/27/22  0108     --  138 139   K 3.9  --  3.9 4.0     --  104 108   CO2 24  --  25 20*   BUN 10  --  13 12   CREATININE 0.4*  --  0.6 0.7   GLUCOSE 139*  --  128* 278*   MG  --  1.7  --   --        PT/INR:  Recent Labs     08/25/22  1209 08/26/22 0317 08/27/22  0108   PROTIME 19.4* 20.5* 22.8*   INR 1.61* 1.73* 1.97*       APTT:No results for input(s): APTT in the last 72 hours. LIVER PROFILE:  Recent Labs     08/25/22  1209 08/25/22  1927 08/26/22 0317 08/27/22  0108   AST 75*  --  57* 56*   ALT 34  --  25 23   BILIDIR  --  2.5*  --   --    BILITOT 8.0* 6.7* 5.8* 5.1*   ALKPHOS 117  --  97 72         Imaging Last 24 Hours:  ECHO Complete 2D W Doppler W Color    Result Date: 8/25/2022  Transthoracic Echocardiography Report (TTE)  Demographics   Patient Name   Rosario Gerard   Date of Study            08/25/2022   MRN            [id number]        Gender                   Male   Date of Birth  1955    Room Number              [facility]-0531   Age            75 year(s)   Height:        69 inches     Referring Physician      Jonnathan Soriano   Weight:        188 pounds    Sonographer              Deedee [facility]   BSA:           2.01 m^2      Interpreting Physician   Sourav Guzman MD   BMI:           27.76 kg/m^2  Procedure Type of Study   TTE procedure:ECHO NO CONTRAST WITH DOP/COLR. Study Location: Echo Lab Technical Quality: Adequate visualization Patient Status: Inpatient HR: 60 bpm Indications:Atrial fibrillation.   Conclusions   Summary  Left ventricle had normal chamber size and thickness  Preserved systolic function with estimated ejection fraction of 74%  No regional wall motion abnormality  Normal diastolic function  No significant valvular lesion seen  Mild mitral regurgitation  Trivial tricuspid insufficiency with estimated right ventricular systolic  pressure of 38 mm which is mildly elevated   Signature ----------------------------------------------------------------  Electronically signed by Juany Sanabria MD(Interpreting physician)  on 08/25/2022 07:01 PM  ----------------------------------------------------------------   Findings   Mitral Valve  Mild mitral regurgitation   Aortic Valve  Aortic sclerosis   Tricuspid Valve  Trivial tricuspid insufficiency with estimated right ventricular systolic  pressure of 38 mm which is mildly elevated   Pulmonic Valve  The pulmonic valve was not well visualized . Left Atrium  Mildly dilated left atrium   Left Ventricle  See conclusion   Right Atrium  Normal right atrial dimension with no evidence of thrombus or mass noted. Right Ventricle  Normal right ventricular size with preserved RV function.    Miscellaneous  Normal IVC  2D Measurements and Calculations(cm)   LVIDd: 4.6 cm                        LVIDs: 3.43 cm  IVSd: 1.01 cm  LVPWd: 1.03 cm                       AO Root Dimension: 3.3 cm  % Ejection Fraction: 74 %            LA Dimension: 4.3 cm  LA Volume: 67.1 ml                   LA Area: 22.9 cm^2  LA Volume Index: 33 ml/m^2           LV Systolic dimension: 4.43RN  LV dimension: 4.6 cm                 LV PW diastolic: 9.39JN                                       LVOT diameter: 2.1 cm  LVEDV: 116 ml                        RA Systolic pressure: 3mmHg  LVESV:25 ml                          LVEDVI: 58 ml/m^2  Cardic Output (CO): 4.65l/min        LVESVI: 12 ml/m^2  Doppler Measurements and Calculations   AV Peak Velocity:172 cm/s              MV Peak E-Wave: 94.7 cm/s  AV Mean Velocity:121 cm/s              MV Peak A-Wave: 67.3 cm/s  AV Peak Gradient: 11.83 mmHg           MV E/A Ratio: 1.41 %  AV Mean Gradient: 6 mmHg               MV Mean velocity: NaNcm/s  AV Area (Continuity):1.99 cm^2         MV Peak Gradient: 3.59 mmHg  AV VTI:39 cm/s                         MV P1/2t: 84 msec  TR Velocity:288 cm/s                   MVA by PHT2.62 cm^2  TR Gradient:33.18 mmHg  Estimated RAP:3 mmHg  RVSP:36 mmHg   MV E' septal velocity: 9.25cm/s  MV E' lateral velocity:13.8 cm/s      US LIVER    Result Date: 8/25/2022  NO PRIOR REPORT AVAILABLE Exam: ULTRASOUND OF THE ABDOMEN, LIMITED Clinical data:Hyperbilirubinemia. Technique: Real-time grayscale imaging of the abdomen was performed. Images supplemented with color Doppler technique. Prior studies: No prior studies submitted. Findings: The liver appears echogenic and heterogeneous, coarse with scalloped edges. A cyst is visualized measuring 0.9 x 1.1 x 1.1 cm. No intrahepatic biliary distention. The gallbladder appears distended measuring 13.3 cm with a wall thickness of0.45 cm. Mild sludge is noted. No gallstones. The common bile duct measures 0.73cm. Pericholecystic fluid visualized. The right kidney ladmjaex64.7 x 5.5 x 4.6 cm. Normal echogenicity and cortical thickness are preserved. No evidence of renal masses. No evidence of renal calculi. There is no evidence of hydronephrosis. The pancreas is unremarkable to the extent visualized. Unremarkable spleen vbrpxgffx42.5 x 6.6 x 15.7 cm. Imaged portion of the aorta demonstrates no acute pathology. Visualized portion of the inferior vena cava is unremarkable. No ascites is evident. Patient unable to lie in decubitus position due to fractured hip. Exam done in supine view only. Finding suggestive for hydropic cholecystitis without a obvious stone as described. CBD mildly dilated. MRCP is recommended. Possible chronic liver parenchymal disease, cirrhosis? .  Splenomegaly. Recommendation: Follow up as clinically indicated. Electronically Signed by Gray Watson MD at 25-Aug-2022 06:37:10 PM             XR CHEST PORTABLE    Result Date: 8/25/2022  NO PRIOR REPORT AVAILABLE Exam: X-RAY OF [facility] Clinical data:Hip fracture. Technique:Single view of the chest. Prior studies: No prior studies submitted. Findings: The lungs are grossly clear; noevidence of acute infiltrate or pleural effusion.  Cardiac silhouette is within normal limits. No acute osseous abnormality is detected. Normal chest Recommendation: Follow up as clinically indicated. Electronically Signed by Debra Barillas MD at 25-Aug-2022 01:18:21 PM             XR HIP 2-3 VW W PELVIS LEFT    Result Date: 8/25/2022  NO PRIOR REPORT AVAILABLE Exam: X-RAYS OF THE LEFT HIP Clinical Indication: Fall, pain. Technique: Two views of the left hip and single view of the pelvis. Prior studies: No prior studies are submitted. Findings: Multiple views of the left hip demonstrateno evidence offracture. Bone mineral density is preserved. No evidence of superior pubic ramus fracture. Acute, impacted transverse fracture at the base of the femoral head with continued good alignment and joint spaces intact. Small lateral acetabular spur noted. Acute, impacted transverse fracture through base of the femoral  head , continued good alignment and normal joint space. Recommendation: Followup as clinically indicated. Electronically Signed by Debra Barillas MD at 25-Aug-2022 01:04:57 PM             8/26/2022: MRI abdomen:    Cirrhotic liver. No liver mass. Splenomegaly with varices. Distended gallbladder. Tiny simple cyst right hepatic lobe. Tiny right renal simple cyst.    8/26/2022: CT abdomen and pelvis with and without contrast:    Cirrhotic liver. Diffuse low-attenuation pattern which may be in association with chronic liver disease versus diffuse metastatic disease. Splenomegaly. Mild splenic varices. Mild sigmoid diverticulosis. Gallbladder distention. Left renal calculus. Soft tissue right inguinal hernia suggestive of undescended testicle.       Assessment//Plan           Hospital Problems             Last Modified POA    * (Principal) Closed left hip fracture, initial encounter ([address] 8/26/2022 Yes    New onset a-fib ([address] 8/25/2022 Yes    Liver disease 8/25/2022 Yes    Hyperbilirubinemia 8/25/2022 Yes    Abnormal LFTs 8/25/2022 Yes

## 2022-08-27 NOTE — PROGRESS NOTES
Yanick Traylor received from OR to room # 148 . Mental Status: Patient is oriented and alert. Vitals:    08/27/22 0000   BP: (!) 90/51   Pulse: 65   Resp: 13   Temp: 97.8 °F (36.6 °C)   SpO2: 92%     Placed on cardiac monitor: Yes. Bedside monitor. Belongings:  parital plate is at bedside all others  sent home with wife. Family at bedside Yes. Oriented Patient to room. Call light within reach. Yes. Transfer was: Well tolerated by patient. .    Electronically signed by Km Bradley RN on 8/27/2022 at 12:11 AM

## 2022-08-27 NOTE — PROGRESS NOTES
without any obvious stone, CBD mildly dilated, MRCP is recommended, possible chronic liver parenchymal disease, cirrhosis, splenomegaly. D-dimer 4.3, CTA pulmonary pending. Echo indicated normal LV size with preserved LV systolic function with EF of 89%, normal diastolic function, mild mitral regurgitation, trivial tricuspid insufficiency with estimated RVSP of 38 mm which is mildly elevated. Underwent Left total hip arthroplasty on 8/26/2022. MRCP indicated mild distended gallbladder, no significant interpretability dilatation, borderline CBD. CT abdomen indicated cirrhotic liver with abnormal diffuse low attenuation pattern, splenomegaly, mild splenic varices, gallbladder distention, and soft tissue in right inguinal canal is suspicious for undescended testicle. GI suggested EGD at a later time to screen for esophageal varices. Review of Systems   Constitutional:  Negative for chills, diaphoresis, fatigue and fever. HENT:  Negative for congestion and ear pain. Eyes:  Negative for visual disturbance. Respiratory:  Negative for cough, shortness of breath and wheezing. Denies SOB    Cardiovascular:  Negative for chest pain, palpitations and leg swelling. Denies chest pain    Gastrointestinal:  Negative for abdominal distention, abdominal pain, blood in stool, constipation, diarrhea, nausea and vomiting. Denies nausea or vomiting    Endocrine: Negative for cold intolerance and heat intolerance. Genitourinary:  Negative for difficulty urinating, flank pain, frequency and urgency. Musculoskeletal:  Positive for arthralgias. Negative for myalgias. Left hip pain improving    Skin:  Negative for color change and wound. Neurological:  Negative for dizziness, syncope, weakness, light-headedness, numbness and headaches. Hematological:  Does not bruise/bleed easily. Psychiatric/Behavioral:  Negative for agitation, confusion and dysphoric mood.          Objective:   VITALS: BP (!) 107/56   Pulse 74   Temp 98.2 °F (36.8 °C) (Temporal)   Resp 20   Ht 5' 9\" (1.753 m)   Wt 195 lb 3.2 oz (88.5 kg)   SpO2 93%   BMI 28.83 kg/m²   24HR INTAKE/OUTPUT:    Intake/Output Summary (Last 24 hours) at 8/27/2022 1108  Last data filed at 8/27/2022 0900  Gross per 24 hour   Intake 7977.16 ml   Output 3950 ml   Net 4027.16 ml           Physical Exam  Constitutional:       General: He is not in acute distress. Appearance: Normal appearance. He is not ill-appearing. HENT:      Head: Normocephalic and atraumatic. Right Ear: External ear normal.      Left Ear: External ear normal.      Nose: Nose normal.      Mouth/Throat:      Mouth: Mucous membranes are moist.   Eyes:      Extraocular Movements: Extraocular movements intact. Conjunctiva/sclera: Conjunctivae normal.      Pupils: Pupils are equal, round, and reactive to light. Cardiovascular:      Rate and Rhythm: Normal rate and regular rhythm. Pulses: Normal pulses. Heart sounds: Normal heart sounds. Pulmonary:      Effort: Pulmonary effort is normal. No respiratory distress. Breath sounds: Normal breath sounds. No wheezing, rhonchi or rales. Abdominal:      General: Bowel sounds are normal. There is no distension. Palpations: Abdomen is soft. Tenderness: There is no abdominal tenderness. Musculoskeletal:         General: Tenderness present. No swelling or deformity. Normal range of motion. Cervical back: Normal range of motion and neck supple. No muscular tenderness. Right lower leg: No edema. Left lower leg: No edema. Comments: S/p Left hip total arthroplasty, dressing in place CDI    Skin:     General: Skin is warm and dry. Findings: Bruising present. No lesion. Comments: Bruising to left flank area and hip area   Neurological:      Mental Status: He is alert and oriented to person, place, and time.    Psychiatric:         Mood and Affect: Mood normal.         Behavior: Behavior normal.         Thought Content: Thought content normal.          Medications:      sodium chloride Stopped (08/27/22 0824)    sodium chloride      sodium chloride      sodium chloride      sodium chloride        sodium bicarbonate  650 mg Oral 4x Daily    ipratropium-albuterol  1 ampule Inhalation Q4H WA    [Held by provider] metoprolol succinate  25 mg Oral Daily    sodium chloride flush  5-40 mL IntraVENous 2 times per day    bisacodyl  5 mg Oral Daily    clindamycin (CLEOCIN) IV  900 mg IntraVENous Q8H    sodium chloride flush  5-40 mL IntraVENous 2 times per day     LORazepam, sodium chloride, sodium chloride flush, sodium chloride, ondansetron **OR** ondansetron, oxyCODONE **OR** oxyCODONE **OR** oxyCODONE, HYDROmorphone **OR** HYDROmorphone **OR** HYDROmorphone, diphenhydrAMINE, sodium chloride, sodium chloride, sodium chloride flush, sodium chloride, polyethylene glycol  ADULT DIET;  Regular     Lab and other Data:     Recent Labs     08/25/22  1209 08/26/22 0317 08/27/22  0108   WBC 3.8* 3.6* 2.3*   HGB 15.3 12.6* 9.6*   PLT 30* 31* 28*       Recent Labs     08/25/22  1209 08/26/22 0317 08/27/22  0108    138 139   K 3.9 3.9 4.0    104 108   CO2 24 25 20*   BUN 10 13 12   CREATININE 0.4* 0.6 0.7   GLUCOSE 139* 128* 278*       Recent Labs     08/25/22  1209 08/25/22 1927 08/26/22 0317 08/27/22  0108   AST 75*  --  57* 56*   ALT 34  --  25 23   BILITOT 8.0* 6.7* 5.8* 5.1*   ALKPHOS 117  --  97 72       Troponin T:   Recent Labs     08/25/22  1713 08/25/22  1927   Lamont Overall <0.01 <0.01         INR:   Recent Labs     08/25/22  1209 08/26/22 0317 08/27/22  0108   INR 1.61* 1.73* 1.97*           RAD:     ECHO Complete 2D W Doppler W Color  Result Date: 8/25/2022    Summary  Left ventricle had normal chamber size and thickness  Preserved systolic function with estimated ejection fraction of 74%  No regional wall motion abnormality  Normal diastolic function  No significant valvular lesion seen  Mild mitral regurgitation  Trivial tricuspid insufficiency with estimated right ventricular systolic  pressure of 38 mm which is mildly elevated   Signature   ----------------------------------------------------------------  Electronically signed by Fay Cortez MD(Interpreting physician)  on 08/25/2022 07:01 PM      US LIVER  Result Date: 8/25/2022    Finding suggestive for hydropic cholecystitis without a obvious stone as described. CBD mildly dilated. MRCP is recommended. Possible chronic liver parenchymal disease, cirrhosis? .  Splenomegaly. Recommendation: Follow up as clinically indicated. Electronically Signed by Jena Damon MD at 25-Aug-2022 06:37:10 PM               XR CHEST PORTABLE  Result Date: 8/25/2022    Normal chest Recommendation: Follow up as clinically indicated. Electronically Signed by Corbin Diez MD at 25-Aug-2022 01:18:21 PM               XR HIP 2-3 VW W PELVIS LEFT  Result Date: 8/25/2022    Acute, impacted transverse fracture through base of the femoral  head , continued good alignment and normal joint space. Recommendation: Followup as clinically indicated. Electronically Signed by Corbin Diez MD at 25-Aug-2022 01:04:57 PM                 Micro:    COVID-19, Rapid [[id number]] Collected: 08/25/22 1333   Order Status: Completed Specimen: Nasopharyngeal Swab Updated: 08/25/22 1406    SARS-CoV-2, NAAT Not Detected       Assessment/Plan   Principal Problem:    Closed left hip fracture, initial encounter ([facility] [address]  Active Problems:    New onset a-fib ([address]    Liver disease    Hyperbilirubinemia    Abnormal LFTs    Thrombocytopenia (HCC)    Leukopenia    Hepatic cirrhosis ([address]    Umbilical hernia without obstruction and without gangrene    Pancytopenia (HCC)    Closed fracture of left hip ([facility] [address]  Resolved Problems:    * No resolved hospital problems.  *           Principal Problem:    Closed left hip fracture, initial encounter [facility])-              - Orthopedic surgery following - underwent Left total hip arthroplasty on 8/26/2022               - Pain control               - PT/OT per ortho            Active Problems:    Irregular heart beat-  - Cardiology consulted    - Suggested PACs    - Recommended low dose beta blocker to suppress the PACs    - cleared patient for surgery   - Currently NSR 67   - Defer anticoagulation to cardiology    - ECHO- indicated normal LV size with preserved LV systolic function with EF of 50%, normal diastolic function, mild mitral regurgitation, trivial tricuspid insufficiency with estimated RVSP of 38 mm which is mildly elevated   - Monitor on telemetry        Liver disease/ Hyperbilirubinemia/ thrombocytopenia-             - s/p 2 units of platelets and 1 unit of fresh frozen plasma    - Liver US- indicated hydropic cholecystitis without any obvious stone, CBD mildly dilated, MRCP is recommended, possible chronic liver parenchymal disease, cirrhosis, splenomegaly   - GI recommended MRCP, CT abdomen, surgery consultation     - MRCP indicated mild distended gallbladder, no significant interpretability dilatation, borderline CBD    - CT abdomen indicated cirrhotic liver with abnormal diffuse low attenuation pattern, splenomegaly, mild splenic varices, gallbladder distention, and soft tissue in right inguinal canal is suspicious for undescended testicle. - Surgery recommended no indications for surgery at this time, if surgery needed then he would have to be at a tertiary center due to severity of liver disease               - Lipase 41              - Monitor CMP closely              DVT Prophylaxis: SCDs      Discharge planning: TBD        Further Orders per Clinical course/attending. Electronically signed by SHEILA Yin CNP on 8/27/2022 at 11:08 AM       EMR Dragon/Transcription disclaimer:   Much of this encounter note is an electronic transcription/translation of spoken language to printed text.  The electronic translation of spoken language may permit erroneous, or at times, nonsensical words or phrases to be inadvertently transcribed; although attempts have made to review the note for such errors, some may still exist.       Attestation Statement     I have independently seen and examined this patient and agree with the asesment and plan by mid level provider                                                           \[facility] [place] [facility]\"" MEDICINE  - PROGRESS NOTE         Objective:   Vitals: BP (!) 107/59   Pulse 78   Temp 99 °F (37.2 °C) (Temporal)   Resp 16   Ht 5' 9\" (1.753 m)   Wt 195 lb 3.2 oz (88.5 kg)   SpO2 92%   BMI 28.83 kg/m²         Assessment & Plan:    Hip fx- s/p Left total hip arthroplasty 8/26  Hyperbilirubenemia - improving   Thrombocytopenia - hematology consulted- no lovenox - most likely due to alcohol abuse  End stage liver disease/cirrhosis with splenomegaly and varices    Elevated INR- vit K given   Frequent PACs- asymptomatic  HTN  COPD    cholecystitis - no need for surgery at present - on clindamycin   Lactic acidosis- cont IVF at 200  UTI?       Amando Miller MD

## 2022-08-27 NOTE — RESULT ENCOUNTER NOTE
Jannet test is negative. Alpha-1 antitrypsin is normal.  Indirect bilirubin is higher than rectal bili is being. Serum haptoglobin was low.

## 2022-08-27 NOTE — PROGRESS NOTES
PROGRESS NOTE    Patient name: Aye Ashford  Patient : 1955  Room: [facility]    SUBJECTIVE: In ICU postop hip surgery. Sitting up in chair this morning. No active bleeding. INTERVAL HISTORY  Aye Ashford was first seen by me on 2022. I was consulted for findings of thrombocytopenia. The patient was hospitalized at [facility] with complaints of left hip pain after a fall yesterday. The patient has not been able to ambulate. He is a chronic smoker. He was found to be on A. fib in the ER. CBC during admission here showed WBC count 3.8 with ANC of 2.5/, hemoglobin 15.3/, platelet count 30,246. Iron profile showed iron of 298, iron saturation 97%, TIBC 307, haptoglobin<10. PT 19.4/INR 1.61. The patient tells me that he has a diagnosis of alcoholic cirrhosis. He is a chronic drinker for many decades. Unfortunately, he does not have a GI doctor. He is known to be thrombocytopenic. He denies any history of hepatitis C. Ultrasound performed here showed findings suggestive of chronic liver parenchymal disease, cirrhosis and splenomegaly with the spleen measuring 2.5 x 6.6 x 15.7 cm consistent with splenomegaly. Objective   BP (!) 105/58   Pulse 60   Temp 98.2 °F (36.8 °C) (Temporal)   Resp 15   Ht 5' 9\" (1.753 m)   Wt 195 lb 3.2 oz (88.5 kg)   SpO2 96%   BMI 28.83 kg/m²     PHYSICAL EXAM:  CONSTITUTIONAL: Alert, appropriate, no acute distress. Sitting up in chair  EYES: Non icteric, EOM intact, pupils equal round   ENT: Mucus membranes moist,external inspection of ears and nose are normal  NECK: Supple, no masses. No palpable thyroid mass  CHEST/LUNGS: CTA bilaterally, normal respiratory effort   CARDIOVASCULAR: RRR, no murmurs. No lower extremity edema  ABDOMEN: soft non-tender, active bowel sounds, no HSM.   No palpable masses  EXTREMITIES: Postop left hip surgery-ecchymosis  SKIN: warm, dry with no rashes or lesions  LYMPH: No cervical, clavicular, axillary, or inguinal lymphadenopathy  NEUROLOGIC: follows commands, non focal       Recent Labs     08/27/22  0108 08/26/22  0317 08/25/22  1209   WBC 2.3* 3.6* 3.8*   HGB 9.6* 12.6* 15.3   HCT 29.0* 38.4* 45.0   .4* 121.5* 117.2*   PLT 28* 31* 30*       Lab Results   Component Value Date     08/27/2022    K 4.0 08/27/2022     08/27/2022    CO2 20 (L) 08/27/2022    BUN 12 08/27/2022    CREATININE 0.7 08/27/2022    GLUCOSE 278 (H) 08/27/2022    CALCIUM 7.2 (L) 08/27/2022    PROT 4.8 (L) 08/27/2022    LABALBU 3.0 (L) 08/27/2022    BILITOT 5.1 (H) 08/27/2022    ALKPHOS 72 08/27/2022    AST 56 (H) 08/27/2022    ALT 23 08/27/2022    LABGLOM >60 08/27/2022    GFRAA >59 08/27/2022       Lab Results   Component Value Date    INR 1.97 (H) 08/27/2022    INR 1.73 (H) 08/26/2022    INR 1.61 (H) 08/25/2022    PROTIME 22.8 (H) 08/27/2022    PROTIME 20.5 (H) 08/26/2022    PROTIME 19.4 (H) 08/25/2022       30 Day lookback of cultures:    Blood Culture Recent: No results for input(s): BC in the last 720 hours. Gram Stain Recent: No results for input(s): LABGRAM in the last 720 hours. Resp Culture Recent: No results for input(s): CULTRESP in the last 720 hours. Body Fluid Recent : No results for input(s): BFCX in the last 720 hours. MRSA Recent : No results for input(s): [facility] in the last 720 hours. Urine Culture Recent : No results for input(s): LABURIN in the last 720 hours. Organism Recent : No results for input(s): ORG in the last 720 hours. Exam: ULTRASOUND OF THE ABDOMEN, LIMITED    Clinical data:Hyperbilirubinemia. Technique: Real-time grayscale imaging of the abdomen was performed. Images supplemented with color Doppler technique. Prior studies: No prior studies submitted. Findings: The liver appears echogenic and heterogeneous, coarse with scalloped edges. A cyst is visualized measuring 0.9 x 1.1 x 1.1 cm. No intrahepatic biliary distention. The gallbladder appears distended measuring 13.3 cm with a wall thickness of0.45    cm. Mild sludge is noted. No gallstones. The common bile duct measures 0.73cm. Pericholecystic fluid visualized. The right kidney imsqoixc49.7 x 5.5 x 4.6 cm. Normal echogenicity and cortical thickness are preserved. No evidence of renal masses. No evidence of renal calculi. There is no evidence of hydronephrosis. The pancreas is unremarkable to the extent visualized. Unremarkable spleen grlipeidi13.5 x 6.6 x 15.7 cm. Imaged portion of the aorta demonstrates no acute pathology. Visualized portion of the inferior vena cava is unremarkable. No ascites is evident. Patient unable to lie in decubitus position due to fractured hip. Exam done in supine view only. Impression   Finding suggestive for hydropic cholecystitis without a obvious stone as described. CBD mildly dilated. MRCP is recommended. Possible chronic liver parenchymal disease, cirrhosis? .  Splenomegaly. Recommendation:    Follow up as clinically indicated. Electronically Signed by Monique Burton MD at 25-Aug-2022 06:37:10 PM                  ASSESSMENT/PLAN:  Thrombocytopenia-likely multifactorial to include direct effect of alcohol bone marrow and also secondary to splenomegaly. Likely secondary to end-stage liver disease/alcohol abuse    -US showed signs of end-stage liver disease/splenomegaly (portal hypertension)        Status post 2 pheresis platelets given perioperatively    PLT 28,000 today-we will continue to monitor. No obvious bleeding at present.        Left hip fracture    POD# 1      End-stage liver disease/splenomegaly  -Evidence of portal hypertension    -Interesting, direct bilirubin<indirect bilirubin       Coagulopathy of liver disease/thrombocytopenia     Latest Reference Range & Units 8/25/22 12:09 8/26/22 03:17 8/27/22 01:08   Prothrombin Time 12.0 - 14.6 sec 19.4 (H) 20.5 (H) 22.8 (H)   INR 0.88 - 1.18  1.61 (H) 1.73 (H) 1.97 (H)       -Vitamin K 10 mg p.o. x3 doses started on 8/25 - 8/27/2022      Serology 8/26/2022  Fibrinogen 177  D-dimer 4.3       Macrocytic anemia    Recent Labs     08/27/22  0108 08/26/22  0317 08/25/22  1209   WBC 2.3* 3.6* 3.8*   HGB 9.6* 12.6* 15.3   HCT 29.0* 38.4* 45.0   .4* 121.5* 117.2*   PLT 28* 31* 30*      -Likely secondary to end-stage liver disease    Hapto < 10    Serology 8/26/2022  Retic - 4.33% with absolute 0.1334  LDH - 255 ()    Minimal elevation LDH    B12 = 1446  Folate - 13    MARICARMEN - NEGATIVE    Hgb 9.6-postoperatively. We will monitor CBC    Rechecked indirect bili, retic, LDH in a.m. 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 x3 days - last day  Monitor CBC  Transfuse for Hgb < 7  Transfuse for plt < 10,000 or bleeding      Adrian Ross PA-C    08/27/22  9:30 AM     Physician's attestation and contribution:  I, Dr Marine Gu, personally and independently performed an evaluation on Cuate Tolbert        I have reviewed relevant medical information/data to include but not limited to the medication list, relevant appropriate lab work and imaging when applicable. I reviewed other physician's notes, ancillary services and nurses assessments. I have reviewed the above documentation completed by Mckayla Esposito PA-C   Please see my additional addended and/or modified contributions to the history of present illness, physical examination, and assessment/medical decision-making and plan that reflects my findings and impressions. I discussed essential elements of the care plan with Mckayla Esposito PA-C and the patient. I have encouraged and answered all the questions raised to the patient's understanding and satisfaction. I concur with the above stated. Subjective-sitting up at bedside in ICU with no complaints.   Very complementary of the service he is receiving. Very happy with outcome of surgery without bleeding. Objective-examination is stable with protuberant abdomen, no change. Assessment/plan:  Discussed with the patient and family  Discussed with nursing staff  Discussed with Dr. Young Star  Platelets stable 33,785. .  No bleeding.   Okay with me to transfer to floor    Electronically signed by Nicole Barrios MD on 8/27/22 at 9:46 AM CDT

## 2022-08-27 NOTE — PROGRESS NOTES
Subjective:     Post-Operative Day: 1 Status Post left jacinta  Systemic or Specific Complaints:No Complaints  no nausea Pain 5    Objective:     Patient Vitals for the past 24 hrs:   BP Temp Temp src Pulse Resp SpO2 Height Weight   08/27/22 1401 (!) 119/56 97.5 °F (36.4 °C) Temporal 77 18 91 % -- --   08/27/22 1300 (!) 104/54 -- -- 68 (!) 32 94 % -- --   08/27/22 1200 124/66 98 °F (36.7 °C) -- 73 (!) 42 (!) 88 % -- --   08/27/22 1100 (!) 107/56 -- -- 74 20 93 % -- --   08/27/22 1000 (!) 94/56 -- -- 64 16 95 % -- --   08/27/22 0900 106/70 -- -- 73 (!) 38 92 % -- --   08/27/22 0800 (!) 105/58 98.2 °F (36.8 °C) Temporal 60 15 96 % -- --   08/27/22 0700 (!) 90/52 -- -- 63 14 93 % -- --   08/27/22 0630 116/62 -- -- 75 21 91 % -- --   08/27/22 0600 97/60 -- -- 65 12 96 % -- --   08/27/22 0530 (!) 101/55 -- -- 69 14 96 % -- --   08/27/22 0500 (!) 93/53 -- -- 65 17 92 % -- --   08/27/22 0430 (!) 97/52 -- -- 69 14 93 % -- --   08/27/22 0400 (!) 105/56 98.1 °F (36.7 °C) Temporal 68 22 92 % -- 195 lb 3.2 oz (88.5 kg)   08/27/22 0330 (!) 99/57 -- -- 70 14 92 % -- --   08/27/22 0300 (!) 94/55 -- -- 63 14 93 % -- --   08/27/22 0230 (!) 99/56 -- -- 63 16 94 % -- --   08/27/22 0200 107/62 -- -- 65 17 95 % -- --   08/27/22 0130 (!) 106/56 -- -- 64 16 92 % -- --   08/27/22 0100 107/60 -- -- 62 13 90 % -- --   08/27/22 0030 (!) 100/53 -- -- 63 14 90 % -- --   08/27/22 0000 (!) 90/51 97.8 °F (36.6 °C) Temporal 65 13 92 % -- --   08/26/22 2345 (!) 104/57 -- -- 67 15 90 % -- --   08/26/22 2330 (!) 98/50 -- -- 70 15 91 % -- --   08/26/22 2315 (!) 107/50 -- -- 68 14 90 % -- --   08/26/22 2300 (!) 100/55 -- -- 71 14 91 % -- --   08/26/22 2245 115/63 -- -- 71 15 94 % -- --   08/26/22 2230 (!) 101/55 -- -- 73 20 90 % -- --   08/26/22 2215 (!) 105/52 -- -- 75 15 91 % -- --   08/26/22 2200 118/60 -- -- 70 14 100 % -- --   08/26/22 2145 119/66 -- -- 76 16 98 % -- --   08/26/22 2142 -- -- -- 75 14 95 % -- --   08/26/22 2141 -- -- -- 74 (!) 8 (!) 87 % -- --   08/26/22 2134 -- -- -- -- 22 -- -- --   08/26/22 2130 117/70 -- -- 74 13 (!) 84 % -- --   08/26/22 2115 (!) 104/58 -- -- 77 25 (!) 87 % -- --   08/26/22 2103 -- -- -- -- 13 -- -- --   08/26/22 2100 (!) 114/59 -- -- 71 16 97 % -- --   08/26/22 2045 (!) 111/56 -- -- 74 19 96 % -- --   08/26/22 2030 121/65 -- -- 73 14 99 % -- --   08/26/22 2017 118/62 98.7 °F (37.1 °C) Temporal 77 19 95 % 5' 9\" (1.753 m) 195 lb 3.2 oz (88.5 kg)   08/26/22 1955 128/62 -- -- 74 19 98 % -- --   08/26/22 1952 -- -- -- 75 -- -- -- --   08/26/22 1950 (!) 121/56 99 °F (37.2 °C) -- 71 17 99 % -- --   08/26/22 1945 -- -- -- 76 18 99 % -- --   08/26/22 1940 127/64 -- -- 78 21 99 % -- --   08/26/22 1930 134/70 -- -- 77 21 98 % -- --   08/26/22 1925 134/66 -- -- 77 21 98 % -- --   08/26/22 1920 (!) 152/70 -- -- 84 18 98 % -- --   08/26/22 1918 -- 99.2 °F (37.3 °C) -- -- -- -- -- --   08/26/22 1916 (!) 153/77 99.2 °F (37.3 °C) Temporal 84 16 99 % -- --   08/26/22 1720 -- -- -- 79 -- 94 % -- --   08/26/22 1715 -- -- -- 67 -- 94 % -- --   08/26/22 1710 -- -- -- 66 -- 94 % -- --   08/26/22 1418 -- -- -- -- 16 -- -- --       General: alert, appears stated age, and cooperative   Exam: Incision clean, dry, and intact, no evidence of infection. Neurovascular: Exam normal       Data Review:  Recent Labs     08/26/22  0317 08/27/22  0108   HGB 12.6* 9.6*     Recent Labs     08/27/22 0108      K 4.0   CREATININE 0.7     Recent Labs     08/27/22 0108   LABGLOM >60           Assessment:     Status Post left jacinta for FNF. Plan:     Continues current post-op course. POSTOPERATIVE PLANS:  1. He will be on typical total hip arthroplasty protocol. 2.  He will be 2 doses of Ancef and 6 doses of clindamycin. 3.  We will not use any anticoagulation with him as his platelets are  only 96,838 and his INR preop was 1.7.   We will have the Medical Team  follow him very closely as he has multiple medical issues and I did tell  the family to be aware of the fact that his complication rate is high as  he is an alcoholic and also still using nicotine.        Electronically signed by Jules Yañez MD on 8/27/2022 at 2:17 PM

## 2022-08-27 NOTE — PROGRESS NOTES
Physical Therapy  Facility/Department: [facility] SURG SERVICES  Physical Therapy Initial Assessment    Name: Mari Pierre  : 1955  MRN: [id number]  Date of Service: 2022    Discharge Recommendations:  Continue to assess pending progress, 24 hour supervision or assist, Patient would benefit from continued therapy after discharge   PT Equipment Recommendations  Equipment Needed: Yes  Mobility Devices: Eleonora Salines: Rolling      Patient Diagnosis(es): The primary encounter diagnosis was Closed fracture of left hip, [facility] Diagnoses of New onset a-fib ([facility] Hepatic cirrhosis, unspecified hepatic cirrhosis type, unspecified whether ascites present ([facility] and Type I or II open fracture of left hip, initial encounter [facility]) were also pertinent to this visit. Past Medical History:  has a past medical history of Liver disease. Past Surgical History:  has no past surgical history on file. Assessment   Body Structures, Functions, Activity Limitations Requiring Skilled Therapeutic Intervention: Decreased functional mobility ; Decreased ROM; Decreased endurance;Decreased cognition;Decreased safe awareness;Decreased strength;Decreased balance; Increased pain  Assessment: Pt. will benefit from cont. PT to decrease impairments. Pt. a fall risk due to recent surgery and should not attempt mobility on his own at this time. Anticipate pt will benefit from 24 hr care and additional therapy once d/c from [facility]. Pt. needs to use RW, gait belt and have staff A for mobility. Will encourage progressive mobility and out of bed activity,  Treatment Diagnosis: impaired gait and mobility  Therapy Prognosis: Good  Decision Making: Medium Complexity  Barriers to Learning: safety awareness?   Requires PT Follow-Up: Yes  Activity Tolerance  Activity Tolerance: Patient tolerated treatment well     Plan   Plan  Plan: 5-7 times per week  Plan weeks: 2 wks  Current Treatment Recommendations: Strengthening, ROM, Balance training, Functional mobility training, Transfer training, Stair training, Gait training, Safety education & training, Patient/Caregiver education & training, Equipment evaluation, education, & procurement, Positioning, Therapeutic activities  Plan Comment: progressive mobility  Safety Devices  Type of Devices: Gait belt, Call light within reach, Left in bed, Patient at risk for falls, Nurse notified (wife present, already up in chair in ICU this morning and wanting to return to supine now)     Restrictions  Restrictions/Precautions  Restrictions/Precautions: Fall Risk, Weight Bearing  Lower Extremity Weight Bearing Restrictions  Left Lower Extremity Weight Bearing: Weight Bearing As Tolerated  Position Activity Restriction  Other position/activity restrictions: ant hip prec.,  No SLR     Subjective   Pain: 8/10- L hip-recently had pain meds  General  Chart Reviewed: Yes  Patient assessed for rehabilitation services?: Yes  Response To Previous Treatment: Not applicable  Family / Caregiver Present: Yes (spouse)  Referring Practitioner: Jaden Cadet MD  Referral Date : 08/26/22  Diagnosis: L transcervical neck fx, hepatic cirrhosis  Follows Commands: Within Functional Limits  General Comment  Comments: RN, Brock Umana PT. 8/26/22 L ALICIA  Subjective  Subjective: Pt. willing to work with therapy. Wants to go to the bathroom.          Social/Functional History  Social/Functional History  Lives With: Spouse  Type of Home:  (currently living in [facility] in [place] - have a house in [place], however, pt states they are retired and travel all the time)  Home Access: Stairs to enter with rails  Bathroom Toilet: Standard  Bathroom Equipment:  (hasnt been needed)  Bathroom Accessibility: Accessible  Home Equipment:  (none)  Receives Help From: Family  ADL Assistance: Independent  Ambulation Assistance: Independent  Transfer Assistance: Independent  Active : Yes  Occupation: Retired  Leisure & Hobbies: traveling year round  Vision/Hearing  Hearing  Hearing: Within functional limits    Cognition   Orientation  Overall Orientation Status: Within Functional Limits  Cognition  Overall Cognitive Status: Exceptions  Arousal/Alertness: Appropriate responses to stimuli  Following Commands: Follows one step commands with repetition  Attention Span: Attends with cues to redirect  Memory: Appears intact  Safety Judgement: Good awareness of safety precautions  Problem Solving: Assistance required to generate solutions;Assistance required to implement solutions;Decreased awareness of errors  Insights: Decreased awareness of deficits  Initiation: Requires cues for some  Sequencing: Requires cues for some  Cognition Comment: requires additional cues for safety     Objective   Heart Rate: 77  Heart Rate Source: Monitor  BP: (!) 119/56  BP Location: Right upper arm  BP Method: Automatic  MAP (Calculated): 77  Resp: 18  SpO2: 91 %  O2 Device: None (Room air)     Observation/Palpation  Posture: Good  Observation: IV        AROM RLE (degrees)  RLE AROM: WFL  AROM LLE (degrees)  LLE AROM : Exceptions  LLE General AROM: AAROM WFLs  Strength RLE  Strength RLE: WFL  Comment: 5/5  Strength LLE  Strength LLE: Exception  Comment: grossly 3-/5 hip and knee, ankle 3+/5  Strength RUE  Strength RUE: WFL  Strength LUE  Strength LUE: WFL           Bed mobility  Supine to Sit: Minimal assistance  Sit to Supine: Minimal assistance  Bed Mobility Comments: pt sat on EOB x 1 min prior to standing  Transfers  Sit to Stand: Minimal Assistance  Stand to sit: Minimal Assistance  Comment: v. cues for direction and safety (grab bar use) Stood at sink x 1 min to wash and dry hands CGA.   Ambulation  WB Status: FWBAT LLE  Ambulation  Surface: level tile  Device: Rolling Walker  Assistance: Minimal assistance  Quality of Gait: a little unsteady  Gait Deviations: Slow Traci;Decreased step height  Distance: 15' x 2  Comments: sat on toilet for BM     Balance  Posture: Good  Sitting - Static: +;Fair  Sitting - Dynamic: Fair;-  Standing - Static: Poor;+  Standing - Dynamic: Poor;+  Exercise Treatment: AP x 10 on EOB        OutComes Score                                                  AM-PAC Score             Tinneti Score       Goals  Short Term Goals  Time Frame for Short term goals: 2 wks  Short term goal 1: supine to sit indep  Short term goal 2: sit to stand indep  Short term goal 3: amb. 100' with RW SBA  Short term goal 4: bed to chair SBA  Short term goal 5: up/down 2-3 stairs SBA  Patient Goals   Patient goals : get stronger, go home       Education  Patient Education  Education Given To: Patient  Education Provided: Role of Therapy;Plan of Care;Transfer Training  Education Provided Comments: use of call light for staff A  Education Method: Demonstration;Verbal  Barriers to Learning: Cognition  Education Outcome: Verbalized understanding;Continued education needed      Therapy Time   Individual Concurrent Group Co-treatment   Time In           Time Out           Minutes                   Berenice Parsons PT   Electronically signed by Berenice Parsons PT on 8/27/2022 at 3:32 PM

## 2022-08-27 NOTE — PROGRESS NOTES
Pharmacy Adjustment per [facility] protocol    Deann Umaña is a 75 y.o. male. Pharmacy has adjusted medications per [facility] protocol. Recent Labs     08/25/22  1209 08/26/22  0317   BUN 10 13       Recent Labs     08/25/22  1209 08/26/22  0317   CREATININE 0.4* 0.6       Estimated Creatinine Clearance: 128 mL/min (based on SCr of 0.6 mg/dL). Height:   Ht Readings from Last 1 Encounters:   08/25/22 5' 9\" (1.753 m)     Weight:  Wt Readings from Last 1 Encounters:   08/25/22 188 lb 9 oz (85.5 kg)         Plan: Adjust the following medications based on [facility] protocol:           Zosyn to 4500mg IV X1 over 30 minutes loading dose, followed 6 hr later by 3375mg IV q8hr over 4 hr extended infusion.     Electronically signed by Christine Reis [facility] on 8/26/2022 at 8:29 PM

## 2022-08-28 ENCOUNTER — APPOINTMENT (OUTPATIENT)
Dept: GENERAL RADIOLOGY | Age: 67
DRG: 521 | End: 2022-08-28
Payer: MEDICARE

## 2022-08-28 LAB
ALBUMIN SERPL-MCNC: 3 G/DL (ref 3.5–5.2)
ALP BLD-CCNC: 78 U/L (ref 40–130)
ALT SERPL-CCNC: 22 U/L (ref 5–41)
ANA IGG, ELISA: NORMAL
ANION GAP SERPL CALCULATED.3IONS-SCNC: 11 MMOL/L (ref 7–19)
ANISOCYTOSIS: ABNORMAL
AST SERPL-CCNC: 64 U/L (ref 5–40)
BASOPHILS ABSOLUTE: 0 K/UL (ref 0–0.2)
BASOPHILS RELATIVE PERCENT: 0.2 % (ref 0–1)
BILIRUB SERPL-MCNC: 3.2 MG/DL (ref 0.2–1.2)
BILIRUBIN DIRECT: 1.7 MG/DL (ref 0–0.3)
BILIRUBIN, INDIRECT: 1.5 MG/DL (ref 0.1–1)
BUN BLDV-MCNC: 13 MG/DL (ref 8–23)
CALCIUM SERPL-MCNC: 7.6 MG/DL (ref 8.8–10.2)
CHLORIDE BLD-SCNC: 105 MMOL/L (ref 98–111)
CO2: 22 MMOL/L (ref 22–29)
CREAT SERPL-MCNC: 0.6 MG/DL (ref 0.5–1.2)
EKG P AXIS: -24 DEGREES
EKG P-R INTERVAL: 122 MS
EKG Q-T INTERVAL: 432 MS
EKG QRS DURATION: 142 MS
EKG QTC CALCULATION (BAZETT): 468 MS
EKG T AXIS: -7 DEGREES
EOSINOPHILS ABSOLUTE: 0.1 K/UL (ref 0–0.6)
EOSINOPHILS RELATIVE PERCENT: 1.4 % (ref 0–5)
GFR AFRICAN AMERICAN: >59
GFR NON-AFRICAN AMERICAN: >60
GLUCOSE BLD-MCNC: 138 MG/DL (ref 74–109)
HCT VFR BLD CALC: 26.6 % (ref 42–52)
HEMOGLOBIN: 8.9 G/DL (ref 14–18)
IMMATURE GRANULOCYTES #: 0 K/UL
INR BLD: 1.76 (ref 0.88–1.18)
LACTATE DEHYDROGENASE: 240 U/L (ref 91–215)
LACTIC ACID: 1.9 MMOL/L (ref 0.5–1.9)
LYMPHOCYTES ABSOLUTE: 0.7 K/UL (ref 1.1–4.5)
LYMPHOCYTES RELATIVE PERCENT: 14.2 % (ref 20–40)
MACROCYTES: ABNORMAL
MCH RBC QN AUTO: 40.1 PG (ref 27–31)
MCHC RBC AUTO-ENTMCNC: 33.5 G/DL (ref 33–37)
MCV RBC AUTO: 119.8 FL (ref 80–94)
MONOCYTES ABSOLUTE: 1.1 K/UL (ref 0–0.9)
MONOCYTES RELATIVE PERCENT: 20.6 % (ref 0–10)
NEUTROPHILS ABSOLUTE: 3.3 K/UL (ref 1.5–7.5)
NEUTROPHILS RELATIVE PERCENT: 63.2 % (ref 50–65)
PDW BLD-RTO: 16.2 % (ref 11.5–14.5)
PLATELET # BLD: 32 K/UL (ref 130–400)
PLATELET SLIDE REVIEW: ABNORMAL
PMV BLD AUTO: 11.1 FL (ref 9.4–12.4)
POTASSIUM REFLEX MAGNESIUM: 3.8 MMOL/L (ref 3.5–5)
PROCALCITONIN: 0.11 NG/ML (ref 0–0.09)
PROTHROMBIN TIME: 20.8 SEC (ref 12–14.6)
RBC # BLD: 2.22 M/UL (ref 4.7–6.1)
RETICULOCYTE ABSOLUTE COUNT: 0.12 M/UL (ref 0.03–0.12)
RETICULOCYTE COUNT PCT: 5.35 % (ref 0.5–1.5)
SODIUM BLD-SCNC: 138 MMOL/L (ref 136–145)
TEAR DROP CELLS: ABNORMAL
TOTAL PROTEIN: 5 G/DL (ref 6.6–8.7)
WBC # BLD: 5.2 K/UL (ref 4.8–10.8)

## 2022-08-28 PROCEDURE — 71045 X-RAY EXAM CHEST 1 VIEW: CPT

## 2022-08-28 PROCEDURE — 6370000000 HC RX 637 (ALT 250 FOR IP): Performed by: NURSE PRACTITIONER

## 2022-08-28 PROCEDURE — 93010 ELECTROCARDIOGRAM REPORT: CPT | Performed by: INTERNAL MEDICINE

## 2022-08-28 PROCEDURE — 2580000003 HC RX 258: Performed by: HOSPITALIST

## 2022-08-28 PROCEDURE — 83615 LACTATE (LD) (LDH) ENZYME: CPT

## 2022-08-28 PROCEDURE — 1210000000 HC MED SURG R&B

## 2022-08-28 PROCEDURE — 99232 SBSQ HOSP IP/OBS MODERATE 35: CPT | Performed by: SPECIALIST

## 2022-08-28 PROCEDURE — 82248 BILIRUBIN DIRECT: CPT

## 2022-08-28 PROCEDURE — 2500000003 HC RX 250 WO HCPCS: Performed by: ORTHOPAEDIC SURGERY

## 2022-08-28 PROCEDURE — 84145 PROCALCITONIN (PCT): CPT

## 2022-08-28 PROCEDURE — 83605 ASSAY OF LACTIC ACID: CPT

## 2022-08-28 PROCEDURE — 94640 AIRWAY INHALATION TREATMENT: CPT

## 2022-08-28 PROCEDURE — 87040 BLOOD CULTURE FOR BACTERIA: CPT

## 2022-08-28 PROCEDURE — 6370000000 HC RX 637 (ALT 250 FOR IP): Performed by: HOSPITALIST

## 2022-08-28 PROCEDURE — 85610 PROTHROMBIN TIME: CPT

## 2022-08-28 PROCEDURE — 85045 AUTOMATED RETICULOCYTE COUNT: CPT

## 2022-08-28 PROCEDURE — 80053 COMPREHEN METABOLIC PANEL: CPT

## 2022-08-28 PROCEDURE — 6370000000 HC RX 637 (ALT 250 FOR IP): Performed by: ORTHOPAEDIC SURGERY

## 2022-08-28 PROCEDURE — 93971 EXTREMITY STUDY: CPT

## 2022-08-28 PROCEDURE — 71045 X-RAY EXAM CHEST 1 VIEW: CPT | Performed by: RADIOLOGY

## 2022-08-28 PROCEDURE — 2700000000 HC OXYGEN THERAPY PER DAY

## 2022-08-28 PROCEDURE — 85025 COMPLETE CBC W/AUTO DIFF WBC: CPT

## 2022-08-28 PROCEDURE — 2580000003 HC RX 258: Performed by: ORTHOPAEDIC SURGERY

## 2022-08-28 PROCEDURE — 36415 COLL VENOUS BLD VENIPUNCTURE: CPT

## 2022-08-28 RX ORDER — ACETAMINOPHEN 325 MG/1
650 TABLET ORAL ONCE
Status: COMPLETED | OUTPATIENT
Start: 2022-08-28 | End: 2022-08-28

## 2022-08-28 RX ADMIN — SODIUM BICARBONATE 650 MG: 650 TABLET ORAL at 17:25

## 2022-08-28 RX ADMIN — OXYCODONE 10 MG: 5 TABLET ORAL at 05:35

## 2022-08-28 RX ADMIN — IPRATROPIUM BROMIDE AND ALBUTEROL SULFATE 1 AMPULE: 2.5; .5 SOLUTION RESPIRATORY (INHALATION) at 14:24

## 2022-08-28 RX ADMIN — OXYCODONE 10 MG: 5 TABLET ORAL at 00:24

## 2022-08-28 RX ADMIN — SODIUM BICARBONATE 650 MG: 650 TABLET ORAL at 12:41

## 2022-08-28 RX ADMIN — IPRATROPIUM BROMIDE AND ALBUTEROL SULFATE 1 AMPULE: 2.5; .5 SOLUTION RESPIRATORY (INHALATION) at 10:04

## 2022-08-28 RX ADMIN — ACETAMINOPHEN 650 MG: 325 TABLET, FILM COATED ORAL at 12:41

## 2022-08-28 RX ADMIN — IPRATROPIUM BROMIDE AND ALBUTEROL SULFATE 1 AMPULE: 2.5; .5 SOLUTION RESPIRATORY (INHALATION) at 06:16

## 2022-08-28 RX ADMIN — SODIUM CHLORIDE: 9 INJECTION, SOLUTION INTRAVENOUS at 20:30

## 2022-08-28 RX ADMIN — SODIUM BICARBONATE 650 MG: 650 TABLET ORAL at 20:30

## 2022-08-28 RX ADMIN — CLINDAMYCIN PHOSPHATE 900 MG: 900 INJECTION, SOLUTION INTRAVENOUS at 05:35

## 2022-08-28 RX ADMIN — SODIUM CHLORIDE, PRESERVATIVE FREE 10 ML: 5 INJECTION INTRAVENOUS at 08:42

## 2022-08-28 RX ADMIN — METOPROLOL SUCCINATE 25 MG: 25 TABLET, EXTENDED RELEASE ORAL at 08:41

## 2022-08-28 RX ADMIN — CLINDAMYCIN PHOSPHATE 900 MG: 900 INJECTION, SOLUTION INTRAVENOUS at 14:39

## 2022-08-28 RX ADMIN — IPRATROPIUM BROMIDE AND ALBUTEROL SULFATE 1 AMPULE: 2.5; .5 SOLUTION RESPIRATORY (INHALATION) at 18:14

## 2022-08-28 RX ADMIN — OXYCODONE 10 MG: 5 TABLET ORAL at 19:26

## 2022-08-28 RX ADMIN — OXYCODONE 10 MG: 5 TABLET ORAL at 12:43

## 2022-08-28 RX ADMIN — SODIUM BICARBONATE 650 MG: 650 TABLET ORAL at 08:41

## 2022-08-28 ASSESSMENT — PAIN SCALES - GENERAL
PAINLEVEL_OUTOF10: 6
PAINLEVEL_OUTOF10: 8
PAINLEVEL_OUTOF10: 6
PAINLEVEL_OUTOF10: 6
PAINLEVEL_OUTOF10: 0
PAINLEVEL_OUTOF10: 2

## 2022-08-28 ASSESSMENT — ENCOUNTER SYMPTOMS
BLOOD IN STOOL: 0
ABDOMINAL DISTENTION: 0
WHEEZING: 0
SHORTNESS OF BREATH: 1
VOMITING: 0
COUGH: 0
NAUSEA: 0
DIARRHEA: 0
CONSTIPATION: 0
ABDOMINAL PAIN: 0
COLOR CHANGE: 0

## 2022-08-28 ASSESSMENT — PAIN DESCRIPTION - LOCATION: LOCATION: HIP

## 2022-08-28 ASSESSMENT — PAIN DESCRIPTION - ORIENTATION: ORIENTATION: LEFT

## 2022-08-28 ASSESSMENT — PAIN - FUNCTIONAL ASSESSMENT: PAIN_FUNCTIONAL_ASSESSMENT: PREVENTS OR INTERFERES SOME ACTIVE ACTIVITIES AND ADLS

## 2022-08-28 ASSESSMENT — PAIN DESCRIPTION - ONSET: ONSET: ON-GOING

## 2022-08-28 ASSESSMENT — PAIN DESCRIPTION - FREQUENCY: FREQUENCY: INTERMITTENT

## 2022-08-28 ASSESSMENT — PAIN DESCRIPTION - DESCRIPTORS: DESCRIPTORS: BURNING

## 2022-08-28 ASSESSMENT — PAIN DESCRIPTION - DIRECTION: RADIATING_TOWARDS: NO

## 2022-08-28 ASSESSMENT — PAIN DESCRIPTION - PAIN TYPE: TYPE: SURGICAL PAIN

## 2022-08-28 NOTE — PROGRESS NOTES
PROGRESS NOTE    Patient name: Kathi Shaikh  Patient : 1955  Room: 536    SUBJECTIVE: Continues to recover from hip surgery. Postop day 2. Out of ICU now on fifth floor. INTERVAL HISTORY  Kathi Shaikh was first seen by me on 2022. I was consulted for findings of thrombocytopenia. The patient was hospitalized at [facility] with complaints of left hip pain after a fall yesterday. The patient has not been able to ambulate. He is a chronic smoker. He was found to be on A. fib in the ER. CBC during admission here showed WBC count 3.8 with ANC of 2.5/, hemoglobin 15.3/, platelet count 61,351. Iron profile showed iron of 298, iron saturation 97%, TIBC 307, haptoglobin<10. PT 19.4/INR 1.61. The patient tells me that he has a diagnosis of alcoholic cirrhosis. He is a chronic drinker for many decades. Unfortunately, he does not have a GI doctor. He is known to be thrombocytopenic. He denies any history of hepatitis C. Ultrasound performed here showed findings suggestive of chronic liver parenchymal disease, cirrhosis and splenomegaly with the spleen measuring 2.5 x 6.6 x 15.7 cm consistent with splenomegaly. Objective   /63   Pulse 95   Temp 99 °F (37.2 °C) (Temporal)   Resp 17   Ht 5' 9\" (1.753 m)   Wt 195 lb 3.2 oz (88.5 kg)   SpO2 92%   BMI 28.83 kg/m²     PHYSICAL EXAM:  CONSTITUTIONAL: Alert, appropriate, no acute distress. EYES: Icteric  NECK: Supple, no masses. No palpable thyroid mass  CHEST/LUNGS: CTA bilaterally, normal respiratory effort   CARDIOVASCULAR: RRR, no murmurs. No lower extremity edema  ABDOMEN: soft non-tender, active bowel sounds, no HSM.   No palpable masses  EXTREMITIES: Postop left hip surgery-ecchymosis, BLE SCDs  SKIN: warm, dry with no rashes or lesions  LYMPH: No cervical, clavicular, axillary, or inguinal lymphadenopathy  NEUROLOGIC: follows commands, non focal       Recent Labs     22  0203 22  0108 08/26/22  0317   WBC 5.2 2.3* 3.6*   HGB 8.9* 9.6* 12.6*   HCT 26.6* 29.0* 38.4*   .8* 122.4* 121.5*   PLT 32* 28* 31*       Lab Results   Component Value Date     08/28/2022    K 3.8 08/28/2022     08/28/2022    CO2 22 08/28/2022    BUN 13 08/28/2022    CREATININE 0.6 08/28/2022    GLUCOSE 138 (H) 08/28/2022    CALCIUM 7.6 (L) 08/28/2022    PROT 5.0 (L) 08/28/2022    LABALBU 3.0 (L) 08/28/2022    BILITOT 3.2 (H) 08/28/2022    ALKPHOS 78 08/28/2022    AST 64 (H) 08/28/2022    ALT 22 08/28/2022    LABGLOM >60 08/28/2022    GFRAA >59 08/28/2022       Lab Results   Component Value Date    INR 1.76 (H) 08/28/2022    INR 1.97 (H) 08/27/2022    INR 1.73 (H) 08/26/2022    PROTIME 20.8 (H) 08/28/2022    PROTIME 22.8 (H) 08/27/2022    PROTIME 20.5 (H) 08/26/2022       30 Day lookback of cultures:    Blood Culture Recent: No results for input(s): BC in the last 720 hours. Gram Stain Recent: No results for input(s): LABGRAM in the last 720 hours. Resp Culture Recent: No results for input(s): CULTRESP in the last 720 hours. Body Fluid Recent : No results for input(s): BFCX in the last 720 hours. MRSA Recent : No results for input(s): [facility] SYSTEM in the last 720 hours. Urine Culture Recent : No results for input(s): LABURIN in the last 720 hours. Organism Recent : No results for input(s): ORG in the last 720 hours. Exam: ULTRASOUND OF THE ABDOMEN, LIMITED    Clinical data:Hyperbilirubinemia. Technique: Real-time grayscale imaging of the abdomen was performed. Images supplemented with color Doppler technique. Prior studies: No prior studies submitted. Findings: The liver appears echogenic and heterogeneous, coarse with scalloped edges. A cyst is visualized measuring 0.9 x 1.1 x 1.1 cm. No intrahepatic biliary distention. The gallbladder appears distended measuring 13.3 cm with a wall thickness of0.45    cm. Mild sludge is noted. No gallstones. The common bile duct measures 0.73cm.  Pericholecystic fluid visualized. The right kidney stqfytpn52.7 x 5.5 x 4.6 cm. Normal echogenicity and cortical thickness are preserved. No evidence of renal masses. No evidence of renal calculi. There is no evidence of hydronephrosis. The pancreas is unremarkable to the extent visualized. Unremarkable spleen xnenbheuc54.5 x 6.6 x 15.7 cm. Imaged portion of the aorta demonstrates no acute pathology. Visualized portion of the inferior vena cava is unremarkable. No ascites is evident. Patient unable to lie in decubitus position due to fractured hip. Exam done in supine view only. Impression   Finding suggestive for hydropic cholecystitis without a obvious stone as described. CBD mildly dilated. MRCP is recommended. Possible chronic liver parenchymal disease, cirrhosis? .  Splenomegaly. Recommendation:    Follow up as clinically indicated. Electronically Signed by Faustina Kauffman MD at 25-Aug-2022 06:37:10 PM                  ASSESSMENT/PLAN:  Thrombocytopenia-likely multifactorial to include direct effect of alcohol bone marrow and also secondary to splenomegaly.   Likely secondary to end-stage liver disease/alcohol abuse    -US showed signs of end-stage liver disease/splenomegaly (portal hypertension)        Status post 2 pheresis platelets given perioperatively    PLT 32,000 today 8/28/2022       Left hip fracture    POD# 2      End-stage liver disease/splenomegaly  -Evidence of portal hypertension           Coagulopathy of liver disease/thrombocytopenia        -S/p Vitamin K 10 mg p.o. x3 doses  8/25 - 8/27/2022      Serology 8/26/2022  Fibrinogen 177  D-dimer 4.3       Macrocytic anemia    Recent Labs     08/28/22  0203 08/27/22  0108 08/26/22  0317   WBC 5.2 2.3* 3.6*   HGB 8.9* 9.6* 12.6*   HCT 26.6* 29.0* 38.4*   .8* 122.4* 121.5*   PLT 32* 28* 31*      -Likely secondary to end-stage liver disease    Hapto < 10    Serology 8/26/2022  Retic - 4.33% with absolute 0.1334  LDH - 255 ()    Minimal elevation LDH    B12 = 1446  Folate - 13    MARICARMEN - NEGATIVE    Serology 8/28/2022  Retic 5.35% with absolute 0.1188  LDH - 240 () - improved  Total bili 3.2-direct 1.7, indirect 1.5 -now more direct than indirect    Hgb 8.9 today 8/28/2022     Elevated iron saturation/iron levels  Serum Fe - 298  TIBC - 307  Fe sat - 97%  Ferritin - 220    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 I reviewed other physician's notes, ancillary services and nurses assessments. I have reviewed the above documentation completed by Demarco Walden PA-C   Please see my additional addended and/or modified contributions to the history of present illness, physical examination, and assessment/medical decision-making and plan that reflects my findings and impressions. I discussed essential elements of the care plan with Demarco Walden PA-C and the patient. I have encouraged and answered all the questions raised to the patient's understanding and satisfaction. I concur with the above stated. Subjective-complaining of hip pain today, \"I think I did too much yesterday the first day \"    Objective-no real clinical change otherwise other than the discomfort and pain    Assessment/plan:  Stable hematologically with a WBC of 5.2, hemoglobin of 8.9 and a platelet count of 38,639.   Continue to monitor conservatively    Electronically signed by Lyle Crow MD on 8/28/22 at 10:43 AM CDT

## 2022-08-28 NOTE — ACP (ADVANCE CARE PLANNING)
Advance Care Planning   Healthcare Decision Maker:    Primary Decision Maker: Nataliia Curry - [phone]      Patient asked that his sister, John Posadas, [phone] be secondary DM. At that point interrupted; will add when confirmed that she is NOK or LW signed. Click here to complete Healthcare Decision Makers including selection of the Healthcare Decision Maker Relationship (ie \"Primary\").

## 2022-08-28 NOTE — PROGRESS NOTES
[id number] [facility]      Progress Note    Patient:  Clarke Lanes  YOB: 1955  Date of Service: 8/28/2022  MRN: [id number]   Acct: [de-identified]   Primary Care Physician: No primary care provider on file. Advance Directive: Full Code  Admit Date: 8/25/2022       Hospital Day: 3    Portions of this note have been copied forward, however, updated to reflect the most current clinical status of this patient. CHIEF COMPLAINT Left hip pain     SUBJECTIVE:  Mr. Valorie Osullivan was resting in bed this morning. Reported increased LLE pain and swelling. Reported acute episode of SOB last night. Denies SOB at this time. CUMULATIVE HOSPITAL COURSE:   The patient is a 75 y.o. male with past medical history of liver disease who presented to [address] [place] [address] ED complaining of left hip pain. Mr. Valorie Osullivan reported he was knocked down by a big dog and landed on his left hip. Since then he has not been able to ambulate. Denied any other symptoms. Denied any medical conditions, other than liver disease that has not bothered him. Stated he does not take any prescription medications at home. Admitted to smoking 1 ppd. Workup in ED revealed new onset rate controlled Afib, glucose 139 otherwise unremarkable chemistry, AST 75, bilirubin 8.0, WBC 3.8, platelet 30, INR 2.56, Cxray unremarkable. Xray of hip indicated acute, impacted transverse fracture through base of the femoral head. Patient was admitted to hospital medicine for left hip fracture and new onset Afib with orthopedic surgery and cardiology consultations. Cardiology cleared patient for surgery as long as ECHO was not abnormal, recommended low dose beta blocker to suppress the PACs. GI recommended CT of abdomen to rule out hepatoma, or portal vein thrombosis. Hematology recommended vitamin K 10 mg daily x3 days, avoid DVT prophylaxis, transfuse 2 units platelets before/during surgical procedure. Orthopedic surgery recommended surgical intervention.  Ultrasound of liver indicated hydropic cholecystitis without any obvious stone, CBD mildly dilated, MRCP is recommended, possible chronic liver parenchymal disease, cirrhosis, splenomegaly. D-dimer 4.3, CTA pulmonary pending. Echo indicated normal LV size with preserved LV systolic function with EF of 57%, normal diastolic function, mild mitral regurgitation, trivial tricuspid insufficiency with estimated RVSP of 38 mm which is mildly elevated. Underwent Left total hip arthroplasty on 8/26/2022. S/p 2 units of Platelets and 1 unit of FFP. MRCP indicated mild distended gallbladder, no significant interpretability dilatation, borderline CBD. CT abdomen indicated cirrhotic liver with abnormal diffuse low attenuation pattern, splenomegaly, mild splenic varices, gallbladder distention, and soft tissue in right inguinal canal is suspicious for undescended testicle. GI suggested EGD at a later time to screen for esophageal varices, and US of liver every 6 months to screen for hepatoma. LLE venous study indicated no evidence of DVT, SVT or Reflux. Review of Systems   Constitutional:  Negative for chills, diaphoresis, fatigue and fever. HENT:  Negative for congestion and ear pain. Eyes:  Negative for visual disturbance. Respiratory:  Positive for shortness of breath. Negative for cough and wheezing. Reported acute episode of SOB last night   Denies SOB at this time    Cardiovascular:  Negative for chest pain, palpitations and leg swelling. Denies chest pain    Gastrointestinal:  Negative for abdominal distention, abdominal pain, blood in stool, constipation, diarrhea, nausea and vomiting. Denies nausea or vomiting    Endocrine: Negative for cold intolerance and heat intolerance. Genitourinary:  Negative for difficulty urinating, flank pain, frequency and urgency. Musculoskeletal:  Positive for arthralgias. Negative for myalgias.         Left hip pain worsening   LLE pain and swelling    Skin:  Negative for color change and wound. Neurological:  Negative for dizziness, syncope, weakness, light-headedness, numbness and headaches. Hematological:  Does not bruise/bleed easily. Psychiatric/Behavioral:  Negative for agitation, confusion and dysphoric mood. Objective:   VITALS:  /62   Pulse 73   Temp (!) 100.8 °F (38.2 °C) (Oral)   Resp 16   Ht 5' 9\" (1.753 m)   Wt 195 lb 3.2 oz (88.5 kg)   SpO2 92%   BMI 28.83 kg/m²   24HR INTAKE/OUTPUT:    Intake/Output Summary (Last 24 hours) at 8/28/2022 1418  Last data filed at 8/28/2022 1208  Gross per 24 hour   Intake 960 ml   Output 400 ml   Net 560 ml           Physical Exam  Constitutional:       General: He is not in acute distress. Appearance: Normal appearance. He is not ill-appearing. HENT:      Head: Normocephalic and atraumatic. Right Ear: External ear normal.      Left Ear: External ear normal.      Nose: Nose normal.      Mouth/Throat:      Mouth: Mucous membranes are moist.   Eyes:      Extraocular Movements: Extraocular movements intact. Conjunctiva/sclera: Conjunctivae normal.      Pupils: Pupils are equal, round, and reactive to light. Cardiovascular:      Rate and Rhythm: Normal rate and regular rhythm. Pulses: Normal pulses. Heart sounds: Normal heart sounds. Pulmonary:      Effort: Pulmonary effort is normal. No respiratory distress. Breath sounds: Normal breath sounds. No wheezing, rhonchi or rales. Abdominal:      General: Bowel sounds are normal. There is no distension. Palpations: Abdomen is soft. Tenderness: There is no abdominal tenderness. Musculoskeletal:         General: Swelling and tenderness present. No deformity. Normal range of motion. Cervical back: Normal range of motion and neck supple. No muscular tenderness. Right lower leg: No edema. Left lower leg: No edema.       Comments: LLE swelling, warmth, and tenderness noted   S/p Left hip total arthroplasty, surgical incision CDI    Skin:     General: Skin is warm and dry. Findings: Bruising present. No lesion. Comments: Bruising to left flank area and hip area   Neurological:      Mental Status: He is alert and oriented to person, place, and time. Psychiatric:         Mood and Affect: Mood normal.         Behavior: Behavior normal.         Thought Content: Thought content normal.          Medications:      sodium chloride 200 mL/hr at 08/27/22 1820    sodium chloride      sodium chloride      sodium chloride      sodium chloride        sodium bicarbonate  650 mg Oral 4x Daily    ipratropium-albuterol  1 ampule Inhalation Q4H WA    metoprolol succinate  25 mg Oral Daily    sodium chloride flush  5-40 mL IntraVENous 2 times per day    bisacodyl  5 mg Oral Daily    clindamycin (CLEOCIN) IV  900 mg IntraVENous Q8H    sodium chloride flush  5-40 mL IntraVENous 2 times per day     LORazepam, sodium chloride, sodium chloride flush, sodium chloride, ondansetron **OR** ondansetron, oxyCODONE **OR** oxyCODONE **OR** oxyCODONE, HYDROmorphone **OR** HYDROmorphone **OR** HYDROmorphone, diphenhydrAMINE, sodium chloride, sodium chloride, sodium chloride flush, sodium chloride, polyethylene glycol  ADULT DIET;  Regular     Lab and other Data:     Recent Labs     08/26/22 0317 08/27/22 0108 08/28/22  0203   WBC 3.6* 2.3* 5.2   HGB 12.6* 9.6* 8.9*   PLT 31* 28* 32*       Recent Labs     08/26/22 0317 08/27/22  0108 08/28/22  0203    139 138   K 3.9 4.0 3.8    108 105   CO2 25 20* 22   BUN 13 12 13   CREATININE 0.6 0.7 0.6   GLUCOSE 128* 278* 138*       Recent Labs     08/26/22 0317 08/27/22  0108 08/28/22  0203   AST 57* 56* 64*   ALT 25 23 22   BILITOT 5.8* 5.1* 3.2*   ALKPHOS 97 72 78       Troponin T:   Recent Labs     08/25/22  1713 08/25/22  1927   TROPONINI <0.01 <0.01         INR:   Recent Labs     08/26/22 0317 08/27/22 0108 08/28/22  0203   INR 1.73* 1.97* 1.76*           RAD:     ECHO Complete 2D W Doppler W Color  Result Date: 8/25/2022    Summary  Left ventricle had normal chamber size and thickness  Preserved systolic function with estimated ejection fraction of 74%  No regional wall motion abnormality  Normal diastolic function  No significant valvular lesion seen  Mild mitral regurgitation  Trivial tricuspid insufficiency with estimated right ventricular systolic  pressure of 38 mm which is mildly elevated   Signature   ----------------------------------------------------------------  Electronically signed by Carlene Crane MD(Interpreting physician)  on 08/25/2022 07:01 PM      US LIVER  Result Date: 8/25/2022    Finding suggestive for hydropic cholecystitis without a obvious stone as described. CBD mildly dilated. MRCP is recommended. Possible chronic liver parenchymal disease, cirrhosis? .  Splenomegaly. Recommendation: Follow up as clinically indicated. Electronically Signed by Medhat Madison MD at 25-Aug-2022 06:37:10 PM               XR CHEST PORTABLE  Result Date: 8/25/2022    Normal chest Recommendation: Follow up as clinically indicated. Electronically Signed by Jammie Meyer MD at 25-Aug-2022 01:18:21 PM               XR HIP 2-3 VW W PELVIS LEFT  Result Date: 8/25/2022    Acute, impacted transverse fracture through base of the femoral  head , continued good alignment and normal joint space. Recommendation: Followup as clinically indicated.  Electronically Signed by Jammie Meyer MD at 25-Aug-2022 01:04:57 PM                 Micro:    COVID-19, Rapid [[id number]] Collected: 08/25/22 1333   Order Status: Completed Specimen: Nasopharyngeal Swab Updated: 08/25/22 1406    SARS-CoV-2, NAAT Not Detected       Assessment/Plan   Principal Problem:    Closed left hip fracture, initial encounter [facility])  Active Problems:    New onset a-fib ([facility]    Liver disease    Hyperbilirubinemia    Abnormal LFTs    Thrombocytopenia (HCC)    Leukopenia    Hepatic cirrhosis ([facility]    Umbilical hernia without obstruction and without gangrene    Pancytopenia ([facility]    Closed fracture of left hip ([facility]  Resolved Problems:    * No resolved hospital problems.  *           Principal Problem:    Closed left hip fracture, initial encounter [facility])-              - Orthopedic surgery following     - underwent Left total hip arthroplasty on 8/26/2022     - No anticoagulation given thrombocytopenia and elevated INR               - Pain control               - PT/OT per ortho      - Case management consulted for discharge planning          Active Problems:   Febrile/ SOB-    - Tylenol once    - Follow Blood cultures    - Follow procalcitonin   - Lactic 1.9    - Follow Cxray    - Antibiotics as warranted   - Supplemental oxygen as needed, currently 1 L O2 per NC, wean as able to    - Monitor fever curve    - Monitor labs closely        Irregular heart beat-  - Cardiology consulted    - Suggested PACs    - Recommended low dose beta blocker to suppress the PACs    - cleared patient for surgery   - Currently NSR 83   - ECHO- indicated normal LV size with preserved LV systolic function with EF of 71%, normal diastolic function, mild mitral regurgitation, trivial tricuspid insufficiency with estimated RVSP of 38 mm which is mildly elevated   - Monitor on telemetry        Liver disease/ Hyperbilirubinemia/ thrombocytopenia-             - s/p 2 units of platelets and 1 unit of fresh frozen plasma    - Liver US- indicated hydropic cholecystitis without any obvious stone, CBD mildly dilated, MRCP is recommended, possible chronic liver parenchymal disease, cirrhosis, splenomegaly   - GI following     - MRCP indicated mild distended gallbladder, no significant interpretability dilatation, borderline CBD    - CT abdomen indicated cirrhotic liver with abnormal diffuse low attenuation pattern, splenomegaly, mild splenic varices, gallbladder distention, and soft tissue in right inguinal canal is suspicious for undescended testicle    - GI consulted Surgery whom recommended no indications for surgery at this time, if surgery needed then he would have to be at a tertiary center due to severity of liver disease               - Lipase 41              - Monitor CMP closely              DVT Prophylaxis: SCDs      Discharge planning: TBD        Further Orders per Clinical course/attending. Electronically signed by SHEILA Lees CNP on 8/28/2022 at 2:18 PM       EMR Dragon/Transcription disclaimer:   Much of this encounter note is an electronic transcription/translation of spoken language to printed text.  The electronic translation of spoken language may permit erroneous, or at times, nonsensical words or phrases to be inadvertently transcribed; although attempts have made to review the note for such errors, some may still exist.

## 2022-08-28 NOTE — PROGRESS NOTES
Progress Note  Date:2022       Room:36/536-01  Patient Milagros Kelly     Date of Birth:[birth year]     Age:67 y.o. Subjective      Patient sleeping. I did not wake him up. Talked with his nurse. Patient not having any GI problems per nurse. Objective         Vitals Last 24 Hours:  TEMPERATURE:  Temp  Av.5 °F (36.9 °C)  Min: 97.5 °F (36.4 °C)  Max: 99.5 °F (37.5 °C)  RESPIRATIONS RANGE: Resp  Av.6  Min: 16  Max: 42  PULSE OXIMETRY RANGE: SpO2  Av.5 %  Min: 88 %  Max: 94 %  PULSE RANGE: Pulse  Av.8  Min: 68  Max: 95  BLOOD PRESSURE RANGE: Systolic (84POQ), SCL:253 , Min:104 , MARCK:987   ; Diastolic (93GTQ), QXZ:90, Min:54, Max:71    I/O (24 Hr): Intake/Output Summary (Last 24 hours) at 2022 1011  Last data filed at 2022 0916  Gross per 24 hour   Intake 1200 ml   Output 425 ml   Net 775 ml       Objective:  Vital signs: (most recent): Blood pressure 123/63, pulse 95, temperature 99 °F (37.2 °C), temperature source Temporal, resp. rate 17, height 5' 9\" (1.753 m), weight 195 lb 3.2 oz (88.5 kg), SpO2 92 %. alert and oriented. In no acute physical distress. HEENT normocephalic. Atraumatic. Neck  no JVD. Abdomen obese. Soft. Trace bilateral lower leg edema and hyperpigmentation of skin over both lower legs. Skin no cyanosis.   Labs/Imaging/Diagnostics    Labs:  CBC:  Recent Labs     22  0317 22  0108 22  0203   WBC 3.6* 2.3* 5.2   RBC 3.16* 2.37* 2.22*   HGB 12.6* 9.6* 8.9*   HCT 38.4* 29.0* 26.6*   .5* 122.4* 119.8*   RDW 16.0* 15.9* 16.2*   PLT 31* 28* 32*       CHEMISTRIES:  Recent Labs     22  1713 227 22  0108 22  0203   NA  --  138 139 138   K  --  3.9 4.0 3.8   CL  --  104 108 105   CO2  --  25 20* 22   BUN  --  13 12 13   CREATININE  --  0.6 0.7 0.6   GLUCOSE  --  128* 278* 138*   MG 1.7  --   --   --        PT/INR:  Recent Labs     22  0108 22  0203   PROTIME 20.5* 22.8* 20.8*   INR 1.73* 1.97* 1.76*       APTT:No results for input(s): APTT in the last 72 hours. LIVER PROFILE:  Recent Labs     08/25/22 1927 08/26/22  0317 08/27/22  0108 08/28/22  0203   AST  --  57* 56* 64*   ALT  --  25 23 22   BILIDIR 2.5*  --   --  1.7*   BILITOT 6.7* 5.8* 5.1* 3.2*   ALKPHOS  --  97 72 78         Imaging Last 24 Hours:  ECHO Complete 2D W Doppler W Color    Result Date: 8/25/2022  Transthoracic Echocardiography Report (TTE)  Demographics   Patient Name   Rosario Gerard   Date of Study            08/25/2022   MRN            [id number]        Gender                   Male   Date of Birth  1955    Room Number              MHL-0531   Age            75 year(s)   Height:        69 inches     Referring Physician      Jonnathan Soriano   Weight:        188 pounds    Sonographer              Deedee Gilmore [facility]   BSA:           2.01 m^2      Interpreting Physician   Sourav Guzman MD   BMI:           27.76 kg/m^2  Procedure Type of Study   TTE procedure:ECHO NO CONTRAST WITH DOP/COLR. Study Location: Echo Lab Technical Quality: Adequate visualization Patient Status: Inpatient HR: 60 bpm Indications:Atrial fibrillation.   Conclusions   Summary  Left ventricle had normal chamber size and thickness  Preserved systolic function with estimated ejection fraction of 74%  No regional wall motion abnormality  Normal diastolic function  No significant valvular lesion seen  Mild mitral regurgitation  Trivial tricuspid insufficiency with estimated right ventricular systolic  pressure of 38 mm which is mildly elevated   Signature   ----------------------------------------------------------------  Electronically signed by Sourav Guzman MD(Interpreting physician)  on 08/25/2022 07:01 PM  ----------------------------------------------------------------   Findings   Mitral Valve  Mild mitral regurgitation   Aortic Valve  Aortic sclerosis   Tricuspid Valve  Trivial tricuspid insufficiency with estimated right ventricular systolic  pressure of 38 mm which is mildly elevated   Pulmonic Valve  The pulmonic valve was not well visualized . Left Atrium  Mildly dilated left atrium   Left Ventricle  See conclusion   Right Atrium  Normal right atrial dimension with no evidence of thrombus or mass noted. Right Ventricle  Normal right ventricular size with preserved RV function. Miscellaneous  Normal IVC  2D Measurements and Calculations(cm)   LVIDd: 4.6 cm                        LVIDs: 3.43 cm  IVSd: 1.01 cm  LVPWd: 1.03 cm                       AO Root Dimension: 3.3 cm  % Ejection Fraction: 74 %            LA Dimension: 4.3 cm  LA Volume: 67.1 ml                   LA Area: 22.9 cm^2  LA Volume Index: 33 ml/m^2           LV Systolic dimension: 9.32ZB  LV dimension: 4.6 cm                 LV PW diastolic: 5.74LF                                       LVOT diameter: 2.1 cm  LVEDV: 116 ml                        RA Systolic pressure: 3mmHg  LVESV:25 ml                          LVEDVI: 58 ml/m^2  Cardic Output (CO): 4.65l/min        LVESVI: 12 ml/m^2  Doppler Measurements and Calculations   AV Peak Velocity:172 cm/s              MV Peak E-Wave: 94.7 cm/s  AV Mean Velocity:121 cm/s              MV Peak A-Wave: 67.3 cm/s  AV Peak Gradient: 11.83 mmHg           MV E/A Ratio: 1.41 %  AV Mean Gradient: 6 mmHg               MV Mean velocity: NaNcm/s  AV Area (Continuity):1.99 cm^2         MV Peak Gradient: 3.59 mmHg  AV VTI:39 cm/s                         MV P1/2t: 84 msec  TR Velocity:288 cm/s                   MVA by PHT2.62 cm^2  TR Gradient:33.18 mmHg  Estimated RAP:3 mmHg  RVSP:36 mmHg   MV E' septal velocity: 9.25cm/s  MV E' lateral velocity:13.8 cm/s      US LIVER    Result Date: 8/25/2022  NO PRIOR REPORT AVAILABLE Exam: ULTRASOUND OF THE ABDOMEN, LIMITED Clinical data:Hyperbilirubinemia. Technique: Real-time grayscale imaging of the abdomen was performed. Images supplemented with color Doppler technique.  Prior studies: No prior studies submitted. Findings: The liver appears echogenic and heterogeneous, coarse with scalloped edges. A cyst is visualized measuring 0.9 x 1.1 x 1.1 cm. No intrahepatic biliary distention. The gallbladder appears distended measuring 13.3 cm with a wall thickness of0.45 cm. Mild sludge is noted. No gallstones. The common bile duct measures 0.73cm. Pericholecystic fluid visualized. The right kidney [id number] x 5.5 x 4.6 cm. Normal echogenicity and cortical thickness are preserved. No evidence of renal masses. No evidence of renal calculi. There is no evidence of hydronephrosis. The pancreas is unremarkable to the extent visualized. Unremarkable spleen [id number] x 6.6 x 15.7 cm. Imaged portion of the aorta demonstrates no acute pathology. Visualized portion of the inferior vena cava is unremarkable. No ascites is evident. Patient unable to lie in decubitus position due to fractured hip. Exam done in supine view only. Finding suggestive for hydropic cholecystitis without a obvious stone as described. CBD mildly dilated. MRCP is recommended. Possible chronic liver parenchymal disease, cirrhosis? .  Splenomegaly. Recommendation: Follow up as clinically indicated. Electronically Signed by Mago Peres MD at 25-Aug-2022 06:37:10 PM             XR CHEST PORTABLE    Result Date: 8/25/2022  NO PRIOR REPORT AVAILABLE Exam: X-RAY OF [facility] Clinical data:Hip fracture. Technique:Single view of the chest. Prior studies: No prior studies submitted. Findings: The lungs are grossly clear; noevidence of acute infiltrate or pleural effusion. Cardiac silhouette is within normal limits. No acute osseous abnormality is detected. Normal chest Recommendation: Follow up as clinically indicated. Electronically Signed by Jennyfer Zendejas MD at 25-Aug-2022 01:18:21 PM             XR HIP 2-3 VW W PELVIS LEFT    Result Date: 8/25/2022  NO PRIOR REPORT AVAILABLE Exam: X-RAYS OF THE LEFT HIP Clinical Indication: Fall, pain.  Technique: Two views of the left hip and single view of the pelvis. Prior studies: No prior studies are submitted. Findings: Multiple views of the left hip demonstrateno evidence offracture. Bone mineral density is preserved. No evidence of superior pubic ramus fracture. Acute, impacted transverse fracture at the base of the femoral head with continued good alignment and joint spaces intact. Small lateral acetabular spur noted. Acute, impacted transverse fracture through base of the femoral  head , continued good alignment and normal joint space. Recommendation: Followup as clinically indicated. Electronically Signed by Turner Galicia MD at 25-Aug-2022 01:04:57 PM             8/26/2022: MRI abdomen:    Cirrhotic liver. No liver mass. Splenomegaly with varices. Distended gallbladder. Tiny simple cyst right hepatic lobe. Tiny right renal simple cyst.    8/26/2022: CT abdomen and pelvis with and without contrast:    Cirrhotic liver. Diffuse low-attenuation pattern which may be in association with chronic liver disease versus diffuse metastatic disease. Splenomegaly. Mild splenic varices. Mild sigmoid diverticulosis. Gallbladder distention. Left renal calculus. Soft tissue right inguinal hernia suggestive of undescended testicle. Assessment//Plan           Hospital Problems             Last Modified POA    * (Principal) Closed left hip fracture, initial encounter ([address] 8/26/2022 Yes    New onset a-fib ([address] 8/25/2022 Yes    Liver disease 8/25/2022 Yes    Hyperbilirubinemia 8/25/2022 Yes    Abnormal LFTs 8/25/2022 Yes    Thrombocytopenia ([address] 8/25/2022 Yes    Leukopenia 8/25/2022 Yes    Hepatic cirrhosis ([address] 2/18/4074 Yes    Umbilical hernia without obstruction and without gangrene 8/27/2022 Yes    Pancytopenia ([address] 8/27/2022 Yes    Closed fracture of left hip ([address] 8/27/2022 Yes   Assessment & Plan      Pancytopenia. White blood cell count normal now. Still has anemia and thrombocytopenia.   Hemoglobin 8.9.  No overt GI bleeding. Ascitic anemia could be due to alcohol abuse. Need to rule out folic acid deficiency and vitamin B 12 deficiency. 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 Serum total iron is elevated. Iron saturation 97%. Cannot rule out hemochromatosis. No hepatoma by ultrasound, CT scan or MRI. No ascites by imaging. Bilirubin is coming down. SUSAN is negative. F-actin IgG is elevated to 58. Mitochondrial antibody is 3.2 and not elevated. Ectopic varices near the spleen. No ascites on ultrasound. 8/26/2022: Meld sodium score 20.    8/27/2022: Meld sodium 21.    8/26/2022: Maddrey's discriminant function 27.9. Hemolysis haptoglobin less than 10. Indirect bilirubin more than direct bilirubin. Hepatic cysts 0.9 x 1.1 x 1.1 cm ultrasound. Distended gallbladder wall thickness millimeter. Sludge. No gallstones. CBD measures 7.3 mm. Pericholecystic fluid visualized. Radiologist calling hydropic cholecystitis. CBD mildly dilated. Seen by surgery. No plan for surgery. Plan:    Await patient of work-up of chronic liver disease. Genetic testing for hemochromatosis pending. Serum iron 298 . Elevated iron saturation 97%. Ferritin is 220. EGD at a later time to screen for esophageal varices.     Ultrasound the liver every 6 months to screen for hepatoma    Electronically signed by Gio Davis MD on 8/26/22 at 7:10 AM CDT

## 2022-08-29 LAB
ALBUMIN SERPL-MCNC: 3 G/DL (ref 3.5–5.2)
ALP BLD-CCNC: 70 U/L (ref 40–130)
ALT SERPL-CCNC: 24 U/L (ref 5–41)
ANION GAP SERPL CALCULATED.3IONS-SCNC: 10 MMOL/L (ref 7–19)
AST SERPL-CCNC: 72 U/L (ref 5–40)
BASOPHILS ABSOLUTE: 0 K/UL (ref 0–0.2)
BASOPHILS RELATIVE PERCENT: 0.6 % (ref 0–1)
BILIRUB SERPL-MCNC: 3.5 MG/DL (ref 0.2–1.2)
BUN BLDV-MCNC: 13 MG/DL (ref 8–23)
CALCIUM SERPL-MCNC: 7.4 MG/DL (ref 8.8–10.2)
CHLORIDE BLD-SCNC: 104 MMOL/L (ref 98–111)
CO2: 20 MMOL/L (ref 22–29)
CREAT SERPL-MCNC: 0.5 MG/DL (ref 0.5–1.2)
EKG P AXIS: -3 DEGREES
EKG P-R INTERVAL: 182 MS
EKG Q-T INTERVAL: 394 MS
EKG QRS DURATION: 132 MS
EKG QTC CALCULATION (BAZETT): 457 MS
EKG T AXIS: -2 DEGREES
EOSINOPHILS ABSOLUTE: 0.1 K/UL (ref 0–0.6)
EOSINOPHILS RELATIVE PERCENT: 2 % (ref 0–5)
GFR AFRICAN AMERICAN: >59
GFR NON-AFRICAN AMERICAN: >60
GLUCOSE BLD-MCNC: 121 MG/DL (ref 74–109)
HCT VFR BLD CALC: 27.8 % (ref 42–52)
HEMOGLOBIN: 9 G/DL (ref 14–18)
IMMATURE GRANULOCYTES #: 0 K/UL
INR BLD: 1.74 (ref 0.88–1.18)
LYMPHOCYTES ABSOLUTE: 0.9 K/UL (ref 1.1–4.5)
LYMPHOCYTES RELATIVE PERCENT: 24.9 % (ref 20–40)
MCH RBC QN AUTO: 39.3 PG (ref 27–31)
MCHC RBC AUTO-ENTMCNC: 32.4 G/DL (ref 33–37)
MCV RBC AUTO: 121.4 FL (ref 80–94)
MONOCYTES ABSOLUTE: 0.9 K/UL (ref 0–0.9)
MONOCYTES RELATIVE PERCENT: 25.1 % (ref 0–10)
NEUTROPHILS ABSOLUTE: 1.7 K/UL (ref 1.5–7.5)
NEUTROPHILS RELATIVE PERCENT: 46.8 % (ref 50–65)
PDW BLD-RTO: 16.8 % (ref 11.5–14.5)
PLATELET # BLD: 37 K/UL (ref 130–400)
PMV BLD AUTO: 11.4 FL (ref 9.4–12.4)
POTASSIUM REFLEX MAGNESIUM: 3.9 MMOL/L (ref 3.5–5)
PROTHROMBIN TIME: 20.6 SEC (ref 12–14.6)
RBC # BLD: 2.29 M/UL (ref 4.7–6.1)
SARS-COV-2, NAAT: DETECTED
SODIUM BLD-SCNC: 134 MMOL/L (ref 136–145)
TOTAL PROTEIN: 4.8 G/DL (ref 6.6–8.7)
WBC # BLD: 3.5 K/UL (ref 4.8–10.8)

## 2022-08-29 PROCEDURE — 2580000003 HC RX 258: Performed by: ORTHOPAEDIC SURGERY

## 2022-08-29 PROCEDURE — 1210000000 HC MED SURG R&B

## 2022-08-29 PROCEDURE — 6370000000 HC RX 637 (ALT 250 FOR IP): Performed by: HOSPITALIST

## 2022-08-29 PROCEDURE — 85610 PROTHROMBIN TIME: CPT

## 2022-08-29 PROCEDURE — 94640 AIRWAY INHALATION TREATMENT: CPT

## 2022-08-29 PROCEDURE — 97535 SELF CARE MNGMENT TRAINING: CPT

## 2022-08-29 PROCEDURE — 87635 SARS-COV-2 COVID-19 AMP PRB: CPT

## 2022-08-29 PROCEDURE — 6370000000 HC RX 637 (ALT 250 FOR IP): Performed by: ORTHOPAEDIC SURGERY

## 2022-08-29 PROCEDURE — 85025 COMPLETE CBC W/AUTO DIFF WBC: CPT

## 2022-08-29 PROCEDURE — 97116 GAIT TRAINING THERAPY: CPT

## 2022-08-29 PROCEDURE — 2580000003 HC RX 258: Performed by: HOSPITALIST

## 2022-08-29 PROCEDURE — 36415 COLL VENOUS BLD VENIPUNCTURE: CPT

## 2022-08-29 PROCEDURE — 99231 SBSQ HOSP IP/OBS SF/LOW 25: CPT | Performed by: INTERNAL MEDICINE

## 2022-08-29 PROCEDURE — 97165 OT EVAL LOW COMPLEX 30 MIN: CPT

## 2022-08-29 PROCEDURE — 80053 COMPREHEN METABOLIC PANEL: CPT

## 2022-08-29 RX ADMIN — SODIUM CHLORIDE, PRESERVATIVE FREE 10 ML: 5 INJECTION INTRAVENOUS at 20:27

## 2022-08-29 RX ADMIN — SODIUM BICARBONATE 650 MG: 650 TABLET ORAL at 17:09

## 2022-08-29 RX ADMIN — OXYCODONE 10 MG: 5 TABLET ORAL at 09:13

## 2022-08-29 RX ADMIN — SODIUM CHLORIDE: 9 INJECTION, SOLUTION INTRAVENOUS at 20:33

## 2022-08-29 RX ADMIN — SODIUM BICARBONATE 650 MG: 650 TABLET ORAL at 09:05

## 2022-08-29 RX ADMIN — OXYCODONE 10 MG: 5 TABLET ORAL at 14:48

## 2022-08-29 RX ADMIN — BISACODYL 5 MG: 5 TABLET, COATED ORAL at 09:05

## 2022-08-29 RX ADMIN — SODIUM BICARBONATE 650 MG: 650 TABLET ORAL at 14:05

## 2022-08-29 RX ADMIN — OXYCODONE 10 MG: 5 TABLET ORAL at 00:26

## 2022-08-29 RX ADMIN — IPRATROPIUM BROMIDE AND ALBUTEROL SULFATE 1 AMPULE: 2.5; .5 SOLUTION RESPIRATORY (INHALATION) at 06:20

## 2022-08-29 RX ADMIN — SODIUM CHLORIDE, PRESERVATIVE FREE 10 ML: 5 INJECTION INTRAVENOUS at 20:28

## 2022-08-29 RX ADMIN — IPRATROPIUM BROMIDE AND ALBUTEROL SULFATE 1 AMPULE: 2.5; .5 SOLUTION RESPIRATORY (INHALATION) at 10:18

## 2022-08-29 RX ADMIN — SODIUM BICARBONATE 650 MG: 650 TABLET ORAL at 20:28

## 2022-08-29 RX ADMIN — METOPROLOL SUCCINATE 25 MG: 25 TABLET, EXTENDED RELEASE ORAL at 09:05

## 2022-08-29 RX ADMIN — OXYCODONE 10 MG: 5 TABLET ORAL at 20:28

## 2022-08-29 ASSESSMENT — ENCOUNTER SYMPTOMS
SHORTNESS OF BREATH: 0
BLOOD IN STOOL: 0
ABDOMINAL PAIN: 0
COLOR CHANGE: 0
ABDOMINAL DISTENTION: 0
WHEEZING: 0
DIARRHEA: 0
COUGH: 1
VOMITING: 0
NAUSEA: 0
CONSTIPATION: 0

## 2022-08-29 ASSESSMENT — PAIN DESCRIPTION - ORIENTATION
ORIENTATION: LEFT

## 2022-08-29 ASSESSMENT — PAIN DESCRIPTION - DESCRIPTORS: DESCRIPTORS: ACHING

## 2022-08-29 ASSESSMENT — PAIN SCALES - GENERAL
PAINLEVEL_OUTOF10: 3
PAINLEVEL_OUTOF10: 10
PAINLEVEL_OUTOF10: 7
PAINLEVEL_OUTOF10: 8
PAINLEVEL_OUTOF10: 9

## 2022-08-29 ASSESSMENT — PAIN DESCRIPTION - LOCATION
LOCATION: HIP

## 2022-08-29 ASSESSMENT — PAIN - FUNCTIONAL ASSESSMENT: PAIN_FUNCTIONAL_ASSESSMENT: ACTIVITIES ARE NOT PREVENTED

## 2022-08-29 NOTE — PROGRESS NOTES
Occupational Therapy Initial Assessment  Date: 2022   Patient Name: Sujey Saxena  MRN: [id number]     : 1955    Date of Service: 2022    Discharge Recommendations:  Patient would benefit from continued therapy after discharge       Assessment   Assessment: Evaluation complete. treatment initiated. Pt ADL and functional mobility impaired and pt would benefit from continued skilled OT. Treatment Diagnosis: L anterior hip replacement  REQUIRES OT FOLLOW-UP: Yes  Activity Tolerance  Activity Tolerance: Patient Tolerated treatment well           Patient Diagnosis(es): The primary encounter diagnosis was Closed fracture of left hip, initial encounter ([facility] [address] Diagnoses of New onset a-fib ([facility] [address] Hepatic cirrhosis, unspecified hepatic cirrhosis type, unspecified whether ascites present ([facility] [address] and Type I or II open fracture of left hip, initial encounter [facility]) were also pertinent to this visit. has a past medical history of Liver disease. has a past surgical history that includes Total hip arthroplasty (Left, 2022). Treatment Diagnosis: L anterior hip replacement      Restrictions  Restrictions/Precautions  Restrictions/Precautions: Fall Risk, Weight Bearing  Lower Extremity Weight Bearing Restrictions  Left Lower Extremity Weight Bearing: Weight Bearing As Tolerated  Position Activity Restriction  Other position/activity restrictions: ant hip prec.,  No SLR. COVID droplet plus    Subjective   General  Chart Reviewed: Yes  Patient assessed for rehabilitation services?: Yes  Family / Caregiver Present: No  Pre Treatment Pain Screening  Pain at present: 7  Scale Used: Numeric Score  Intervention List: Patient able to continue with treatment  Comments / Details: 8/10 L LE with activity.  pt aware of pain med schedule, pts LE elevated, ice applied    Social/Functional History  Social/Functional History  Lives With: Spouse  Type of Home:  (currently living in [facility] in [place] - have a house in [place], however, pt states they are retired and travel all the time)  Home Access: Stairs to enter with rails  Bathroom Toilet: Standard  Bathroom Equipment:  (hasnt been needed)  Bathroom Accessibility: Accessible  Home Equipment:  (none)  Receives Help From: Family  ADL Assistance: Independent  Ambulation Assistance: Independent  Transfer Assistance: Independent  Active : Yes  Occupation: Retired  Leisure & Hobbies: traveling year round       Objective   Hearing: Within functional limits          Toilet Transfers  Toilet - Technique: Stand step  Toilet Transfer: Minimal assistance  ADL  Feeding: Independent  Grooming: Independent;Setup  UE Bathing: Independent;Setup  LE Bathing: Minimal assistance  UE Dressing: Independent;Setup  LE Dressing: Minimal assistance (needs AE training)  Toileting: Contact guard assistance           Transfers  Stand Step Transfers: Contact guard assistance (using rolling walker)     Cognition  Overall Cognitive Status: WFL                 LUE AROM (degrees)  LUE AROM : WNL  RUE AROM (degrees)  RUE AROM : WNL                    Plan   Plan  Times per Week: 3-5    Goals  Short Term Goals  Time Frame for Short term goals: 1-2 weeks  Short Term Goal 1: mod I with toileting  Short Term Goal 2: Mod I with dressing with AE  Short Term Goal 3: mod I with functional transfers  Short Term Goal 4: Ind to consistently apply anterior hip precautions, verbalize/demo AE and DME options, and demo Ind with therapeutic activity recommendations. Treatment initiated.  Initial movement strategies, anterior hip precautions training. (15 min)          Kennedy Pinto OT/SALOME  Electronically signed by Kennedy Pinto OT/SALOME on 8/29/2022 at 12:30 PM.

## 2022-08-29 NOTE — CARE COORDINATION
Pt requested referrals to [facility].  SW has notified the facilities of the referral. Awaiting approval/denial.  Electronically signed by Jaxon Hermosillo on 8/29/2022 at 8:20 AM

## 2022-08-29 NOTE — PROGRESS NOTES
PROGRESS NOTE    Patient name: Elba Mcdermott  Patient : 1955  Room: 536    SUBJECTIVE: POD #3 left hip replacement, expected discomfort. Unchanged LLE edema    INTERVAL HISTORY  Elba Mcdermott was first seen by Dr. Ajay Soria on 2022. Hematology consultation was requested for findings of thrombocytopenia. The patient was hospitalized at [facility] with complaints of left hip pain after a fall yesterday. The patient has not been able to ambulate. He is a chronic smoker. He was found to be on A. fib in the ER. CBC during admission here showed WBC count 3.8 with ANC of 2.5/, hemoglobin 15.3/, platelet count 88,914. Iron profile showed iron of 298, iron saturation 97%, TIBC 307, haptoglobin<10. PT 19.4/INR 1.61. The patient tells me that he has a diagnosis of alcoholic cirrhosis. He is a chronic drinker for many decades. Unfortunately, he does not have a GI doctor. He is known to be thrombocytopenic. He denies any history of hepatitis C. Ultrasound performed here showed findings suggestive of chronic liver parenchymal disease, cirrhosis and splenomegaly with the spleen measuring 2.5 x 6.6 x 15.7 cm consistent with splenomegaly. Objective   BP (!) 112/57   Pulse 80   Temp 99.1 °F (37.3 °C) (Temporal)   Resp 20   Ht 5' 9\" (1.753 m)   Wt 195 lb 3.2 oz (88.5 kg)   SpO2 93%   BMI 28.83 kg/m²     PHYSICAL EXAM:  CONSTITUTIONAL: Alert, appropriate, no acute distress. EYES: Icteric  NECK: Supple, no masses. No palpable thyroid mass  CHEST/LUNGS: CTA bilaterally, normal respiratory effort   CARDIOVASCULAR: RRR, no murmurs. No lower extremity edema  ABDOMEN: distended, soft non-tender, active bowel sounds, No palpable masses  EXTREMITIES: Postop left hip surgery- incision well approximated, no s/s of infection.  LLE ecchymosis, LLE 2+ edema/ good pedal pulse  SKIN: warm, dry with no rashes or lesions  NEUROLOGIC: follows commands, non focal     Recent Labs 08/29/22  0311 08/28/22  0203 08/27/22  0108   WBC 3.5* 5.2 2.3*   HGB 9.0* 8.9* 9.6*   HCT 27.8* 26.6* 29.0*   .4* 119.8* 122.4*   PLT 37* 32* 28*     Lab Results   Component Value Date     (L) 08/29/2022    K 3.9 08/29/2022     08/29/2022    CO2 20 (L) 08/29/2022    BUN 13 08/29/2022    CREATININE 0.5 08/29/2022    GLUCOSE 121 (H) 08/29/2022    CALCIUM 7.4 (L) 08/29/2022    PROT 4.8 (L) 08/29/2022    LABALBU 3.0 (L) 08/29/2022    BILITOT 3.5 (H) 08/29/2022    ALKPHOS 70 08/29/2022    AST 72 (H) 08/29/2022    ALT 24 08/29/2022    LABGLOM >60 08/29/2022    GFRAA >59 08/29/2022     Lab Results   Component Value Date    INR 1.74 (H) 08/29/2022    INR 1.76 (H) 08/28/2022    INR 1.97 (H) 08/27/2022    PROTIME 20.6 (H) 08/29/2022    PROTIME 20.8 (H) 08/28/2022    PROTIME 22.8 (H) 08/27/2022     30 Day lookback of cultures:    Blood Culture Recent: No results for input(s): BC in the last 720 hours. Gram Stain Recent: No results for input(s): LABGRAM in the last 720 hours. Resp Culture Recent: No results for input(s): CULTRESP in the last 720 hours. Body Fluid Recent : No results for input(s): BFCX in the last 720 hours. MRSA Recent : No results for input(s): [address] in the last 720 hours. Urine Culture Recent : No results for input(s): LABURIN in the last 720 hours. Organism Recent : No results for input(s): ORG in the last 720 hours. Exam: ULTRASOUND OF THE ABDOMEN, LIMITED    Clinical data:Hyperbilirubinemia. Technique: Real-time grayscale imaging of the abdomen was performed. Images supplemented with color Doppler technique. Prior studies: No prior studies submitted. Findings: The liver appears echogenic and heterogeneous, coarse with scalloped edges. A cyst is visualized measuring 0.9 x 1.1 x 1.1 cm. No intrahepatic biliary distention. The gallbladder appears distended measuring 13.3 cm with a wall thickness of0.45    cm. Mild sludge is noted. No gallstones.  The common bile duct measures 0.73cm. Pericholecystic fluid visualized. The right kidney [id number] x 5.5 x 4.6 cm. Normal echogenicity and cortical thickness are preserved. No evidence of renal masses. No evidence of renal calculi. There is no evidence of hydronephrosis. The pancreas is unremarkable to the extent visualized. Unremarkable spleen [id number] x 6.6 x 15.7 cm. Imaged portion of the aorta demonstrates no acute pathology. Visualized portion of the inferior vena cava is unremarkable. No ascites is evident. Patient unable to lie in decubitus position due to fractured hip. Exam done in supine view only. Impression   Finding suggestive for hydropic cholecystitis without a obvious stone as described. CBD mildly dilated. MRCP is recommended. Possible chronic liver parenchymal disease, cirrhosis? .  Splenomegaly. Recommendation:    Follow up as clinically indicated. Electronically Signed by Sd Ramsey MD at 25-Aug-2022 06:37:10 PM                  ASSESSMENT/PLAN:  Thrombocytopenia-likely multifactorial to include direct effect of alcohol bone marrow and also secondary to splenomegaly.   Likely secondary to end-stage liver disease/alcohol abuse    -US showed signs of end-stage liver disease/splenomegaly (portal hypertension)        Status post 2 pheresis platelets given perioperatively    PLT 37,000 today 8/29/2022       Left hip fracture  POD# 3  Post-op care, as directed by ortho     End-stage liver disease/splenomegaly  -Evidence of portal hypertension           Coagulopathy of liver disease/thrombocytopenia      -S/p Vitamin K 10 mg p.o. x3 doses  8/25/2022 - 8/27/2022  -no bleeding       Serology 8/26/2022:  Fibrinogen 177  D-dimer 4.3       Macrocytic anemia    Recent Labs     08/29/22  0311 08/28/22  0203 08/27/22  0108   WBC 3.5* 5.2 2.3*   HGB 9.0* 8.9* 9.6*   HCT 27.8* 26.6* 29.0*   .4* 119.8* 122.4*   PLT 37* 32* 28*      -Likely secondary to end-stage liver disease    Hapto < 10    Serology 8/26/2022  Retic - 4.33% with absolute 0.1334  LDH - 255 ()    Minimal elevation LDH    B12 = 1446  Folate - 13    MARICARMEN - NEGATIVE    Serology 8/28/2022  Retic 5.35% with absolute 0.1188  LDH - 240 () - improved  Total bili 3.2-direct 1.7, indirect 1.5 -now more direct than indirect    Hgb 9.0 today, 8/29/2022     Elevated iron saturation/iron levels  Serum Fe - 298  TIBC - 307  Fe sat - 97%  Ferritin - 220    Hereditary hemochromatosis gene in process      PLAN:  Avoid DVT chemical prophylaxis due to platelet counts below 50,000  Monitor CBC  Transfuse for Hgb < 7  Transfuse for plt < 10,000 or bleeding      SHEILA Rivero    08/29/22  7:02 AM      Physician's attestation and contribution:  I, Dr Emerita Gray, personally and independently performed an evaluation on  Magdalena Meyer        I have reviewed relevant medical information/data to include but not limited to the medication list, relevant appropriate lab work and imaging when applicable. I reviewed other physician's notes, ancillary services and nurses assessments. I have reviewed the above documentation completed by Alonso SOSA   Please see my additional addended and/or modified contributions to the history of present illness, physical examination, and assessment/medical decision-making and plan that reflects my findings and impressions. I discussed essential elements of the care plan with Alonso SOSA and the patient. I have encouraged and answered all the questions raised to the patient's understanding and satisfaction. I concur with the above stated. Subjective-awake, alert, feeling a little bit better today than yesterday. Anxious to get back into therapy. Objective-examination is stable without acute exacerbations. Assessment/plan:  CBC with a WBC of 3.5, hemoglobin 9.0 and a platelet count of 84,711. In stable.   Outpatient follow-up to be arranged with Dr. Rola Mar when discharged.       Electronically signed by Bhavna Noble MD on 8/29/22 at 1:55 PM CDT

## 2022-08-29 NOTE — PROGRESS NOTES
Progress Note  Date:2022       Room:0536/536-01  Patient Nimco Huynh     Date of Birth:[birth year]     Age:67 y.o. Subjective      Chaperone patient's nurse, Rigo Roldan. Patient denies nausea, vomiting, hematemesis, melena hematochezia. He is COVID tested positive. He is coughing. Had low-grade fever. After discharge, he told me that he is going to rehab. He never had screening colonoscopy. He needs EGD to screen for varices and needs colonoscopy or colon cancer screening. He can have these done as an outpatient basis after he recovers from [place] and after he is able to ambulate better for him to go to bathroom again and again during colonoscopy preparation. Objective         Vitals Last 24 Hours:  TEMPERATURE:  Temp  Av.6 °F (37.6 °C)  Min: 98.8 °F (37.1 °C)  Max: 100.6 °F (38.1 °C)  RESPIRATIONS RANGE: Resp  Av  Min: 16  Max: 20  PULSE OXIMETRY RANGE: SpO2  Av.3 %  Min: 90 %  Max: 93 %  PULSE RANGE: Pulse  Av.5  Min: 77  Max: 87  BLOOD PRESSURE RANGE: Systolic (12GUQ), FYT:069 , Min:112 , QPP:247   ; Diastolic (76OZF), UYU:80, Min:50, Max:66    I/O (24 Hr): Intake/Output Summary (Last 24 hours) at 2022 1355  Last data filed at 2022 1320  Gross per 24 hour   Intake 1354 ml   Output 400 ml   Net 954 ml       Objective:  Vital signs: (most recent): Blood pressure 123/63, pulse 95, temperature 99 °F (37.2 °C), temperature source Temporal, resp. rate 17, height 5' 9\" (1.753 m), weight 195 lb 3.2 oz (88.5 kg), SpO2 92 %. alert and oriented. In no acute physical distress. HEENT normocephalic. Atraumatic. Neck supple. Skin no jaundice. Neuro alert and oriented.       Labs/Imaging/Diagnostics    Labs:  CBC:  Recent Labs     22  0108 22  0203 08/29/22  0311   WBC 2.3* 5.2 3.5*   RBC 2.37* 2.22* 2.29*   HGB 9.6* 8.9* 9.0*   HCT 29.0* 26.6* 27.8*   .4* 119.8* 121.4*   RDW 15.9* 16.2* 16.8*   PLT 28* 32* 37*       CHEMISTRIES:  Recent Labs     08/27/22 0108 08/28/22 0203 08/29/22 0311    138 134*   K 4.0 3.8 3.9    105 104   CO2 20* 22 20*   BUN 12 13 13   CREATININE 0.7 0.6 0.5   GLUCOSE 278* 138* 121*       PT/INR:  Recent Labs     08/27/22 0108 08/28/22 0203 08/29/22 0311   PROTIME 22.8* 20.8* 20.6*   INR 1.97* 1.76* 1.74*       APTT:No results for input(s): APTT in the last 72 hours. LIVER PROFILE:  Recent Labs     08/27/22 0108 08/28/22 0203 08/29/22 0311   AST 56* 64* 72*   ALT 23 22 24   BILIDIR  --  1.7*  --    BILITOT 5.1* 3.2* 3.5*   ALKPHOS 72 78 70         Imaging Last 24 Hours:  ECHO Complete 2D W Doppler W Color    Result Date: 8/25/2022  Transthoracic Echocardiography Report (TTE)  Demographics   Patient Name   Wali Sher   Date of Study            08/25/2022   MRN            [id number]        Gender                   Male   Date of Birth  1955    Room Number              [facility]-0531   Age            75 year(s)   Height:        69 inches     Referring Physician      Oscar Barbour   Weight:        188 pounds    Sonographer              Deedee Gilmore [facility]   BSA:           2.01 m^2      Interpreting Physician   Anaya Fernández MD   BMI:           27.76 kg/m^2  Procedure Type of Study   TTE procedure:ECHO NO CONTRAST WITH DOP/COLR. Study Location: Echo Lab Technical Quality: Adequate visualization Patient Status: Inpatient HR: 60 bpm Indications:Atrial fibrillation.   Conclusions   Summary  Left ventricle had normal chamber size and thickness  Preserved systolic function with estimated ejection fraction of 74%  No regional wall motion abnormality  Normal diastolic function  No significant valvular lesion seen  Mild mitral regurgitation  Trivial tricuspid insufficiency with estimated right ventricular systolic  pressure of 38 mm which is mildly elevated   Signature   ----------------------------------------------------------------  Electronically signed by Anaya Fernández MD(Interpreting physician)  on 08/25/2022 07:01 PM ----------------------------------------------------------------   Findings   Mitral Valve  Mild mitral regurgitation   Aortic Valve  Aortic sclerosis   Tricuspid Valve  Trivial tricuspid insufficiency with estimated right ventricular systolic  pressure of 38 mm which is mildly elevated   Pulmonic Valve  The pulmonic valve was not well visualized . Left Atrium  Mildly dilated left atrium   Left Ventricle  See conclusion   Right Atrium  Normal right atrial dimension with no evidence of thrombus or mass noted. Right Ventricle  Normal right ventricular size with preserved RV function.    Miscellaneous  Normal IVC  2D Measurements and Calculations(cm)   LVIDd: 4.6 cm                        LVIDs: 3.43 cm  IVSd: 1.01 cm  LVPWd: 1.03 cm                       AO Root Dimension: 3.3 cm  % Ejection Fraction: 74 %            LA Dimension: 4.3 cm  LA Volume: 67.1 ml                   LA Area: 22.9 cm^2  LA Volume Index: 33 ml/m^2           LV Systolic dimension: 6.77XV  LV dimension: 4.6 cm                 LV PW diastolic: 4.90VG                                       LVOT diameter: 2.1 cm  LVEDV: 116 ml                        RA Systolic pressure: 3mmHg  LVESV:25 ml                          LVEDVI: 58 ml/m^2  Cardic Output (CO): 4.65l/min        LVESVI: 12 ml/m^2  Doppler Measurements and Calculations   AV Peak Velocity:172 cm/s              MV Peak E-Wave: 94.7 cm/s  AV Mean Velocity:121 cm/s              MV Peak A-Wave: 67.3 cm/s  AV Peak Gradient: 11.83 mmHg           MV E/A Ratio: 1.41 %  AV Mean Gradient: 6 mmHg               MV Mean velocity: NaNcm/s  AV Area (Continuity):1.99 cm^2         MV Peak Gradient: 3.59 mmHg  AV VTI:39 cm/s                         MV P1/2t: 84 msec  TR Velocity:288 cm/s                   MVA by PHT2.62 cm^2  TR Gradient:33.18 mmHg  Estimated RAP:3 mmHg  RVSP:36 mmHg   MV E' septal velocity: 9.25cm/s  MV E' lateral velocity:13.8 cm/s       LIVER    Result Date: 8/25/2022  NO PRIOR REPORT AVAILABLE Exam: ULTRASOUND OF THE ABDOMEN, LIMITED Clinical data:Hyperbilirubinemia. Technique: Real-time grayscale imaging of the abdomen was performed. Images supplemented with color Doppler technique. Prior studies: No prior studies submitted. Findings: The liver appears echogenic and heterogeneous, coarse with scalloped edges. A cyst is visualized measuring 0.9 x 1.1 x 1.1 cm. No intrahepatic biliary distention. The gallbladder appears distended measuring 13.3 cm with a wall thickness of0.45 cm. Mild sludge is noted. No gallstones. The common bile duct measures 0.73cm. Pericholecystic fluid visualized. The right kidney najuqucz17.7 x 5.5 x 4.6 cm. Normal echogenicity and cortical thickness are preserved. No evidence of renal masses. No evidence of renal calculi. There is no evidence of hydronephrosis. The pancreas is unremarkable to the extent visualized. Unremarkable spleen udtyipjdq20.5 x 6.6 x 15.7 cm. Imaged portion of the aorta demonstrates no acute pathology. Visualized portion of the inferior vena cava is unremarkable. No ascites is evident. Patient unable to lie in decubitus position due to fractured hip. Exam done in supine view only. Finding suggestive for hydropic cholecystitis without a obvious stone as described. CBD mildly dilated. MRCP is recommended. Possible chronic liver parenchymal disease, cirrhosis? .  Splenomegaly. Recommendation: Follow up as clinically indicated. Electronically Signed by Monique Burton MD at 25-Aug-2022 06:37:10 PM             XR CHEST PORTABLE    Result Date: 8/25/2022  NO PRIOR REPORT AVAILABLE Exam: X-RAY OF [facility] Clinical data:Hip fracture. Technique:Single view of the chest. Prior studies: No prior studies submitted. Findings: The lungs are grossly clear; noevidence of acute infiltrate or pleural effusion. Cardiac silhouette is within normal limits. No acute osseous abnormality is detected. Normal chest Recommendation: Follow up as clinically indicated. Electronically Signed by Arsen Ly MD at 25-Aug-2022 01:18:21 PM             XR HIP 2-3 VW W PELVIS LEFT    Result Date: 8/25/2022  NO PRIOR REPORT AVAILABLE Exam: X-RAYS OF THE LEFT HIP Clinical Indication: Fall, pain. Technique: Two views of the left hip and single view of the pelvis. Prior studies: No prior studies are submitted. Findings: Multiple views of the left hip demonstrateno evidence offracture. Bone mineral density is preserved. No evidence of superior pubic ramus fracture. Acute, impacted transverse fracture at the base of the femoral head with continued good alignment and joint spaces intact. Small lateral acetabular spur noted. Acute, impacted transverse fracture through base of the femoral  head , continued good alignment and normal joint space. Recommendation: Followup as clinically indicated. Electronically Signed by Arsen Ly MD at 25-Aug-2022 01:04:57 PM             8/26/2022: MRI abdomen:    Cirrhotic liver. No liver mass. Splenomegaly with varices. Distended gallbladder. Tiny simple cyst right hepatic lobe. Tiny right renal simple cyst.    8/26/2022: CT abdomen and pelvis with and without contrast:    Cirrhotic liver. Diffuse low-attenuation pattern which may be in association with chronic liver disease versus diffuse metastatic disease. Splenomegaly. Mild splenic varices. Mild sigmoid diverticulosis. Gallbladder distention. Left renal calculus. Soft tissue right inguinal hernia suggestive of undescended testicle.       Assessment//Plan           Hospital Problems             Last Modified POA    * (Principal) Closed left hip fracture, initial encounter ([address] 8/26/2022 Yes    New onset a-fib ([address] 8/25/2022 Yes    Liver disease 8/25/2022 Yes    Hyperbilirubinemia 8/25/2022 Yes    Abnormal LFTs 8/25/2022 Yes    Thrombocytopenia ([address] 8/25/2022 Yes    Leukopenia 8/25/2022 Yes    Hepatic cirrhosis ([address] 2/20/9961 Yes    Umbilical hernia without obstruction and without gangrene 8/27/2022 Yes    Pancytopenia ([facility] 8/27/2022 Yes    Closed fracture of left hip ([facility] 8/27/2022 Yes   Assessment & Plan      Pancytopenia. Platelet count improved compared with yesterday. Macrocytic anemia could be due to alcohol abuse. Need to rule out folic acid deficiency and vitamin B 12 deficiency. 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 Serum total iron is elevated. Iron saturation 97%. Cannot rule out hemochromatosis. No hepatoma by ultrasound, CT scan or MRI. No ascites by imaging. Bilirubin is coming down. Hepatitis A, B, C serology negative. SUSAN is negative. F-actin IgG is elevated to 58. Mitochondrial antibody is 3.2 and not elevated. Mild splenic varices near the spleen. No ascites on ultrasound. 8/26/2022: Meld sodium score 20.    8/27/2022: Meld sodium 21.    8/26/2022: Maddrey's discriminant function 27.9. Hemolysis haptoglobin less than 10. Indirect bilirubin more than direct bilirubin. Hepatic cysts 0.9 x 1.1 x 1.1 cm ultrasound. Distended gallbladder wall thickness millimeter. Sludge. No gallstones. CBD measures 7.3 mm. Pericholecystic fluid visualized. Radiologist calling hydropic cholecystitis. CBD mildly dilated. Seen by surgery. No plan for surgery. 8/29/2022: COVID test is positive. It was negative on 8/25/2022. Plan:    Advised him to get EGD for variceal screening and colonoscopy for colon cancer screening in outpatient basis. He does not need these tests as an inpatient basis because he is not having upper GI or lower GI symptoms. Advised him to schedule follow-up with PCP and GI doctor on health plan after discharge. I have signed off. I will not be able to follow-up on this patient as an outpatient basis since I am locum tenens physician and I do not have an office practice. Thank you. Please call me if I can be of any further assistance in the management of this patient.     Ultrasound the liver every 6 months to screen for hepatoma    Electronically signed by Colonel Sindhu MD on 8/26/22 at 7:10 AM CDT    I sent the following message to patient's nurse electronically: At time of discharge, please schedule follow-up with the patient's PCP and GI doctor on his health plan to schedule outpatient EGD and colonoscopy. [employer] possibilities    To whom it may concern. 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 Therefore,  I do not have an office practice. I will not be able to follow-up on any patient as an outpatient basis. I will not be able to follow-up on the results of pending labs, pathology reports,  imaging studies. I will not be able to or do any outpatient procedures. I will not be able to provide continuity of care of any kind to any patient . I  will not be able to keep in touch with any patient by any means such as phone, text or by e-mail because of the nature of my responsibilities as a locum tenens physician. When the patient is discharged, my patient physician relationship is over. The patient will need to follow-up with other physicians in the hospital when I am not on-call. After discharge, patient will need to follow-up with their primary care doctor, gastroenterologist or liver doctor for continuity of care, check on pending labs, check on pending pathology and check on results of the pending imaging studies-such as x-rays, ultrasound, CAT scan, MRI, HIDA scan, gastric emptying scan or any other studies. For ER physicians, hospitalists and other physicians involved in patient care: If a patient needs follow-up GI care, please schedule follow-up with patient's PCP, gastroenterologist or hepatologist on their health plan. Thank you.     Colonel Sindhu MD    @[facility]@

## 2022-08-29 NOTE — PROGRESS NOTES
Name: Shakira Lee  MRN:  [id number]  Date of service:  8/29/2022 08/29/22 1622   Restrictions/Precautions   Restrictions/Precautions Fall Risk;Weight Bearing   Lower Extremity Weight Bearing Restrictions   Left Lower Extremity Weight Bearing Weight Bearing As Tolerated   Position Activity Restriction   Other position/activity restrictions ant hip prec.,  No SLR. COVID droplet plus   General   Chart Reviewed Yes   Subjective   Subjective I will walk with you but this is my 4th time today   Transfers   Sit to Stand Minimal Assistance   Stand to sit Minimal Assistance   Ambulation   WB Status FWBAT LLE   Ambulation   Surface level tile   Device Rolling Walker   Assistance Minimal assistance   Quality of Gait a little unsteady   Gait Deviations Slow Traci;Decreased step height   Distance 20' x 2   Comments went into bathroom to urinate   Short Term Goals   Time Frame for Short term goals 2 wks   Short term goal 1 supine to sit indep   Short term goal 2 sit to stand indep   Short term goal 3 amb. 100' with RW SBA   Short term goal 4 bed to chair SBA   Short term goal 5 up/down 2-3 stairs SBA   Conditions Requiring Skilled Therapeutic Intervention   Body Structures, Functions, Activity Limitations Requiring Skilled Therapeutic Intervention Decreased functional mobility ; Decreased ROM; Decreased endurance;Decreased cognition;Decreased safe awareness;Decreased strength;Decreased balance; Increased pain   Assessment Patient agreeable to walk despite having walked 4 x today. Could only find a SW for him to use. Plan   Plan 5-7 times per week   Plan weeks 2 wks   Current Treatment Recommendations Strengthening;ROM;Balance training;Functional mobility training;Transfer training;Stair training;Gait training; Safety education & training;Patient/Caregiver education & training;Equipment evaluation, education, & procurement;Positioning; Therapeutic activities   Plan Comment progressive mobility   PT Plan of Care   Monday X Safety Devices   Type of Devices Left in bed;Call light within reach; Bed alarm in place   PT Whiteboard Notes   Therapy Whiteboard RE 9/10 L CASEY VARGAS Patel       Electronically signed by Neena Milton PTA on 8/29/2022 at 4:30 PM

## 2022-08-29 NOTE — PROGRESS NOTES
Subjective:     Post-Operative Day: 3 Status Post left ALICIA. Pt is awake and alert. Ox3. Doing okay this am. Having some pain to left lower extremity. He did ambulate 30 ft with PT. Systemic or Specific Complaints: No Complaints  no nausea Pain 6    Objective:     Patient Vitals for the past 24 hrs:   BP Temp Temp src Pulse Resp SpO2   08/29/22 0724 (!) 128/50 (!) 100.6 °F (38.1 °C) Oral 80 18 91 %   08/29/22 0620 -- -- -- 80 20 93 %   08/29/22 0445 (!) 112/57 99.1 °F (37.3 °C) Temporal 77 19 92 %   08/29/22 0056 -- -- -- -- 16 --   08/29/22 0026 123/64 99.5 °F (37.5 °C) Temporal 87 16 91 %   08/28/22 1956 -- -- -- -- 16 --   08/28/22 1940 128/66 98.8 °F (37.1 °C) Temporal 81 16 90 %   08/28/22 1926 -- -- -- -- 16 --   08/28/22 1633 116/66 99.9 °F (37.7 °C) Oral 78 16 91 %   08/28/22 1500 -- 99.4 °F (37.4 °C) Oral -- -- --   08/28/22 1208 134/62 (!) 100.8 °F (38.2 °C) Oral 73 16 92 %   08/28/22 0841 -- -- -- 95 -- --       General: alert, appears stated age, and cooperative   Exam: Incision clean, dry, and intact, no evidence of infection. Swelling to left lower extremity; some bruising as well. Good motion to left lower extremity   Neurovascular: Exam normal       Data Review:  Recent Labs     08/28/22  0203 08/29/22 0311   HGB 8.9* 9.0*     Recent Labs     08/29/22 0311   *   K 3.9   CREATININE 0.5     Recent Labs     08/29/22 0311   LABGLOM >60           Assessment:     Status Post left alicia for FNF. Plan:     Continues current post-op course. POSTOPERATIVE PLANS:  1. He will be on typical total hip arthroplasty protocol. 2.  He will be 2 doses of Ancef and 6 doses of clindamycin. 3.  We will not use any anticoagulation with him as his platelets are  only 68,814 and his INR preop was 1.7.   We will have the Medical Team  follow him very closely as he has multiple medical issues and I did tell  the family to be aware of the fact that his complication rate is high as  he is an alcoholic and also still using nicotine.        Electronically signed by Lorrie Mortensen PA-C on 8/29/2022 at 7:45 AM

## 2022-08-29 NOTE — CARE COORDINATION
HH referral received. Per chart, patient will dc to skilled facility.   No further HH interventions identified    Electronically signed by Maninder Kay on 8/29/22 at 9:12 AM CDT

## 2022-08-29 NOTE — ACP (ADVANCE CARE PLANNING)
Advance Care Planning     Advance Care Planning Activator (Inpatient)  Conversation Note      Date of ACP Conversation: 8/29/2022     Conversation Conducted with: Patient with Decision Making Capacity    ACP Activator: [address] Decision Maker:     Current Designated Health Care Decision Maker:     Primary Decision Maker: Brigido Moore - [phone]    Secondary Decision Maker: Inocencia Brown - Brother/Sister - [phone]    Care Preferences:      Patient stated he wanted his wife to make his care decisions and completed a LW stating that. [x] Yes   [] No   Educated Patient / Decision Maker regarding differences between Advance Directives and DNR orders.     Length of ACP Conversation in minutes:  60    Conversation Outcomes:  [x] ACP discussion completed    [x] New Advance Directive completed

## 2022-08-30 LAB
ALBUMIN SERPL-MCNC: 2.4 G/DL (ref 3.5–5.2)
ALP BLD-CCNC: 66 U/L (ref 40–130)
ALT SERPL-CCNC: 23 U/L (ref 5–41)
ANION GAP SERPL CALCULATED.3IONS-SCNC: 8 MMOL/L (ref 7–19)
AST SERPL-CCNC: 77 U/L (ref 5–40)
BASOPHILS ABSOLUTE: 0 K/UL (ref 0–0.2)
BASOPHILS RELATIVE PERCENT: 0.4 % (ref 0–1)
BILIRUB SERPL-MCNC: 3 MG/DL (ref 0.2–1.2)
BLOOD BANK DISPENSE STATUS: NORMAL
BLOOD BANK DISPENSE STATUS: NORMAL
BLOOD BANK PRODUCT CODE: NORMAL
BLOOD BANK PRODUCT CODE: NORMAL
BPU ID: NORMAL
BPU ID: NORMAL
BUN BLDV-MCNC: 12 MG/DL (ref 8–23)
CALCIUM SERPL-MCNC: 7.4 MG/DL (ref 8.8–10.2)
CHLORIDE BLD-SCNC: 104 MMOL/L (ref 98–111)
CO2: 21 MMOL/L (ref 22–29)
CREAT SERPL-MCNC: 0.5 MG/DL (ref 0.5–1.2)
DESCRIPTION BLOOD BANK: NORMAL
DESCRIPTION BLOOD BANK: NORMAL
EOSINOPHILS ABSOLUTE: 0.1 K/UL (ref 0–0.6)
EOSINOPHILS RELATIVE PERCENT: 5.4 % (ref 0–5)
F-ACTIN AB IGG: 58 UNITS (ref 0–19)
GFR AFRICAN AMERICAN: >59
GFR NON-AFRICAN AMERICAN: >60
GLUCOSE BLD-MCNC: 115 MG/DL (ref 74–109)
HCT VFR BLD CALC: 25 % (ref 42–52)
HEMOGLOBIN: 8.3 G/DL (ref 14–18)
IMMATURE GRANULOCYTES #: 0 K/UL
LYMPHOCYTES ABSOLUTE: 0.7 K/UL (ref 1.1–4.5)
LYMPHOCYTES RELATIVE PERCENT: 29.6 % (ref 20–40)
MCH RBC QN AUTO: 40.5 PG (ref 27–31)
MCHC RBC AUTO-ENTMCNC: 33.2 G/DL (ref 33–37)
MCV RBC AUTO: 122 FL (ref 80–94)
MONOCYTES ABSOLUTE: 0.6 K/UL (ref 0–0.9)
MONOCYTES RELATIVE PERCENT: 23.3 % (ref 0–10)
NEUTROPHILS ABSOLUTE: 1 K/UL (ref 1.5–7.5)
NEUTROPHILS RELATIVE PERCENT: 40.9 % (ref 50–65)
PDW BLD-RTO: 16 % (ref 11.5–14.5)
PLATELET # BLD: 30 K/UL (ref 130–400)
PLATELET SLIDE REVIEW: ABNORMAL
PMV BLD AUTO: 10.9 FL (ref 9.4–12.4)
POTASSIUM REFLEX MAGNESIUM: 3.7 MMOL/L (ref 3.5–5)
RBC # BLD: 2.05 M/UL (ref 4.7–6.1)
SMOOTH MUSCLE AB IGG TITER: ABNORMAL
SODIUM BLD-SCNC: 133 MMOL/L (ref 136–145)
TOTAL PROTEIN: 4.6 G/DL (ref 6.6–8.7)
WBC # BLD: 2.4 K/UL (ref 4.8–10.8)

## 2022-08-30 PROCEDURE — 36415 COLL VENOUS BLD VENIPUNCTURE: CPT

## 2022-08-30 PROCEDURE — 6370000000 HC RX 637 (ALT 250 FOR IP): Performed by: NURSE PRACTITIONER

## 2022-08-30 PROCEDURE — 1210000000 HC MED SURG R&B

## 2022-08-30 PROCEDURE — 2580000003 HC RX 258: Performed by: HOSPITALIST

## 2022-08-30 PROCEDURE — 2580000003 HC RX 258: Performed by: ORTHOPAEDIC SURGERY

## 2022-08-30 PROCEDURE — 80053 COMPREHEN METABOLIC PANEL: CPT

## 2022-08-30 PROCEDURE — 97535 SELF CARE MNGMENT TRAINING: CPT

## 2022-08-30 PROCEDURE — 85025 COMPLETE CBC W/AUTO DIFF WBC: CPT

## 2022-08-30 PROCEDURE — 6370000000 HC RX 637 (ALT 250 FOR IP): Performed by: ORTHOPAEDIC SURGERY

## 2022-08-30 PROCEDURE — 6360000002 HC RX W HCPCS: Performed by: INTERNAL MEDICINE

## 2022-08-30 PROCEDURE — 97116 GAIT TRAINING THERAPY: CPT

## 2022-08-30 PROCEDURE — 6370000000 HC RX 637 (ALT 250 FOR IP): Performed by: HOSPITALIST

## 2022-08-30 PROCEDURE — 99231 SBSQ HOSP IP/OBS SF/LOW 25: CPT | Performed by: INTERNAL MEDICINE

## 2022-08-30 RX ORDER — ASCORBIC ACID 500 MG
500 TABLET ORAL 2 TIMES DAILY
Status: DISCONTINUED | OUTPATIENT
Start: 2022-08-30 | End: 2022-09-07 | Stop reason: HOSPADM

## 2022-08-30 RX ORDER — ALBUTEROL SULFATE 90 UG/1
2 AEROSOL, METERED RESPIRATORY (INHALATION) 4 TIMES DAILY
Status: DISCONTINUED | OUTPATIENT
Start: 2022-08-30 | End: 2022-09-07 | Stop reason: HOSPADM

## 2022-08-30 RX ORDER — FUROSEMIDE 10 MG/ML
40 INJECTION INTRAMUSCULAR; INTRAVENOUS ONCE
Status: COMPLETED | OUTPATIENT
Start: 2022-08-30 | End: 2022-08-30

## 2022-08-30 RX ORDER — BUDESONIDE AND FORMOTEROL FUMARATE DIHYDRATE 160; 4.5 UG/1; UG/1
2 AEROSOL RESPIRATORY (INHALATION) 2 TIMES DAILY
Status: DISCONTINUED | OUTPATIENT
Start: 2022-08-30 | End: 2022-09-07 | Stop reason: HOSPADM

## 2022-08-30 RX ORDER — VITAMIN B COMPLEX
1000 TABLET ORAL DAILY
Status: DISCONTINUED | OUTPATIENT
Start: 2022-08-30 | End: 2022-09-07 | Stop reason: HOSPADM

## 2022-08-30 RX ORDER — ZINC SULFATE 50(220)MG
50 CAPSULE ORAL DAILY
Status: DISCONTINUED | OUTPATIENT
Start: 2022-08-30 | End: 2022-09-07 | Stop reason: HOSPADM

## 2022-08-30 RX ORDER — FAMOTIDINE 20 MG/1
20 TABLET, FILM COATED ORAL 2 TIMES DAILY
Status: DISCONTINUED | OUTPATIENT
Start: 2022-08-30 | End: 2022-09-07 | Stop reason: HOSPADM

## 2022-08-30 RX ADMIN — OXYCODONE HYDROCHLORIDE AND ACETAMINOPHEN 500 MG: 500 TABLET ORAL at 11:15

## 2022-08-30 RX ADMIN — IPRATROPIUM BROMIDE 2 PUFF: 17 AEROSOL, METERED RESPIRATORY (INHALATION) at 13:27

## 2022-08-30 RX ADMIN — IPRATROPIUM BROMIDE 2 PUFF: 17 AEROSOL, METERED RESPIRATORY (INHALATION) at 20:27

## 2022-08-30 RX ADMIN — ZINC SULFATE 220 MG (50 MG) CAPSULE 50 MG: CAPSULE at 11:15

## 2022-08-30 RX ADMIN — FUROSEMIDE 40 MG: 10 INJECTION, SOLUTION INTRAMUSCULAR; INTRAVENOUS at 13:27

## 2022-08-30 RX ADMIN — SODIUM CHLORIDE, PRESERVATIVE FREE 10 ML: 5 INJECTION INTRAVENOUS at 20:27

## 2022-08-30 RX ADMIN — SODIUM BICARBONATE 650 MG: 650 TABLET ORAL at 20:24

## 2022-08-30 RX ADMIN — Medication 1000 UNITS: at 11:15

## 2022-08-30 RX ADMIN — BUDESONIDE AND FORMOTEROL FUMARATE DIHYDRATE 2 PUFF: 160; 4.5 AEROSOL RESPIRATORY (INHALATION) at 13:28

## 2022-08-30 RX ADMIN — BUDESONIDE AND FORMOTEROL FUMARATE DIHYDRATE 2 PUFF: 160; 4.5 AEROSOL RESPIRATORY (INHALATION) at 20:27

## 2022-08-30 RX ADMIN — SODIUM BICARBONATE 650 MG: 650 TABLET ORAL at 13:28

## 2022-08-30 RX ADMIN — SODIUM BICARBONATE 650 MG: 650 TABLET ORAL at 08:14

## 2022-08-30 RX ADMIN — ALBUTEROL SULFATE 2 PUFF: 90 AEROSOL, METERED RESPIRATORY (INHALATION) at 16:19

## 2022-08-30 RX ADMIN — OXYCODONE 10 MG: 5 TABLET ORAL at 16:15

## 2022-08-30 RX ADMIN — SODIUM CHLORIDE: 9 INJECTION, SOLUTION INTRAVENOUS at 06:39

## 2022-08-30 RX ADMIN — ALBUTEROL SULFATE 2 PUFF: 90 AEROSOL, METERED RESPIRATORY (INHALATION) at 13:27

## 2022-08-30 RX ADMIN — FAMOTIDINE 20 MG: 20 TABLET ORAL at 20:24

## 2022-08-30 RX ADMIN — IPRATROPIUM BROMIDE 2 PUFF: 17 AEROSOL, METERED RESPIRATORY (INHALATION) at 16:19

## 2022-08-30 RX ADMIN — SODIUM BICARBONATE 650 MG: 650 TABLET ORAL at 17:30

## 2022-08-30 RX ADMIN — FAMOTIDINE 20 MG: 20 TABLET ORAL at 11:15

## 2022-08-30 RX ADMIN — ALBUTEROL SULFATE 2 PUFF: 90 AEROSOL, METERED RESPIRATORY (INHALATION) at 20:27

## 2022-08-30 RX ADMIN — OXYCODONE HYDROCHLORIDE AND ACETAMINOPHEN 500 MG: 500 TABLET ORAL at 20:24

## 2022-08-30 RX ADMIN — METOPROLOL SUCCINATE 25 MG: 25 TABLET, EXTENDED RELEASE ORAL at 08:14

## 2022-08-30 RX ADMIN — OXYCODONE 10 MG: 5 TABLET ORAL at 08:14

## 2022-08-30 ASSESSMENT — ENCOUNTER SYMPTOMS
VOMITING: 0
COLOR CHANGE: 0
ROS SKIN COMMENTS: BRUISING
ABDOMINAL PAIN: 0
ABDOMINAL DISTENTION: 0
BLOOD IN STOOL: 0
CONSTIPATION: 0
SHORTNESS OF BREATH: 0
WHEEZING: 0
NAUSEA: 0
COUGH: 0
DIARRHEA: 1

## 2022-08-30 ASSESSMENT — PAIN DESCRIPTION - FREQUENCY
FREQUENCY: INTERMITTENT
FREQUENCY: INTERMITTENT
FREQUENCY: CONTINUOUS

## 2022-08-30 ASSESSMENT — PAIN DESCRIPTION - DESCRIPTORS
DESCRIPTORS: ACHING
DESCRIPTORS: ACHING;DISCOMFORT;SORE
DESCRIPTORS: ACHING;SORE
DESCRIPTORS: ACHING

## 2022-08-30 ASSESSMENT — PAIN DESCRIPTION - ORIENTATION
ORIENTATION: LEFT

## 2022-08-30 ASSESSMENT — PAIN DESCRIPTION - LOCATION
LOCATION: HIP
LOCATION: HIP
LOCATION: HIP;SCROTUM
LOCATION: SCROTUM

## 2022-08-30 ASSESSMENT — PAIN DESCRIPTION - PAIN TYPE
TYPE: ACUTE PAIN
TYPE: SURGICAL PAIN
TYPE: SURGICAL PAIN;ACUTE PAIN

## 2022-08-30 ASSESSMENT — PAIN SCALES - WONG BAKER
WONGBAKER_NUMERICALRESPONSE: 8
WONGBAKER_NUMERICALRESPONSE: 8

## 2022-08-30 ASSESSMENT — PAIN - FUNCTIONAL ASSESSMENT
PAIN_FUNCTIONAL_ASSESSMENT: PREVENTS OR INTERFERES WITH MANY ACTIVE NOT PASSIVE ACTIVITIES
PAIN_FUNCTIONAL_ASSESSMENT: PREVENTS OR INTERFERES SOME ACTIVE ACTIVITIES AND ADLS
PAIN_FUNCTIONAL_ASSESSMENT: PREVENTS OR INTERFERES SOME ACTIVE ACTIVITIES AND ADLS

## 2022-08-30 ASSESSMENT — PAIN SCALES - GENERAL
PAINLEVEL_OUTOF10: 8
PAINLEVEL_OUTOF10: 8

## 2022-08-30 NOTE — PROGRESS NOTES
Occupational Therapy     08/30/22 1500   Subjective   Subjective Pt in bathroom with nursing assist. Pt agreeable to participate with therapy. Pain Assessment   Pain Assessment Kapadia-Baker FACES   Kapadia-Baker Pain Rating 8   Pain Location Scrotum   Pain Descriptors Aching;Discomfort; Sore   Functional Pain Assessment Prevents or interferes some active activities and ADLs   Pain Type Acute pain   Pain Frequency Intermittent   Non-Pharmaceutical Pain Intervention(s) Ambulation/Increased Activity;Repositioned;Rest;Nurse notified (comment)   Response to Pain Intervention Patient satisfied   Orientation   Overall Orientation Status WFL   Bed Mobility Training   Bed Mobility Training Yes   Overall Level of Assistance Minimum assistance   Interventions Verbal cues; Tactile cues;Manual cues   Sit to Supine Minimum assistance   Scooting Minimum assistance   Transfer Training   Transfer Training Yes   Overall Level of Assistance Contact-guard assistance  (RW)   Interventions Verbal cues; Tactile cues;Manual cues   Sit to Stand Contact-guard assistance   Stand to Sit Contact-guard assistance   Toilet Transfer Contact-guard assistance   Balance   Sitting Intact   Standing With support   ADL   Feeding Independent   Grooming Independent   UE Bathing Supervision   LE Bathing Minimal assistance   UE Dressing Supervision   LE Dressing Minimal assistance; Adaptive equipment   Toileting Contact guard assistance   Assessment   Assessment Tx focused on completion of toileting task and hand hygiene at sink; patient able to walk to the door way at RW level and back to bed. BSC given beside bed due to urination frequency. Nursing notified. Pt having increased discomfort in scrotum area decreasing activity tolerance.    Activity Tolerance Patient limited by pain   Discharge Recommendations Continue to assess pending progress   Plan   Times per Week 3-5   Times per Day Daily   OT Plan of Care   Tuesday X   Electronically signed by Terri Mccoy Lowell Zepeda on 8/30/2022 at 3:20 PM

## 2022-08-30 NOTE — PROGRESS NOTES
PROGRESS NOTE    Patient name: Elizabeth Acosta  Patient : 1955  Room: 536    SUBJECTIVE: POD #4 left hip replacement. Moved to room 435 r/t COVID-19 positive. States he felt he was feverish. Now feeling a bit congested with cough. INTERVAL HISTORY  Elizabeth Acosta was first seen by Dr. Dai De Paz on 2022. Hematology consultation was requested for findings of thrombocytopenia. The patient was hospitalized at [facility] with complaints of left hip pain after a fall yesterday. The patient has not been able to ambulate. He is a chronic smoker. He was found to be on A. fib in the ER. CBC during admission here showed WBC count 3.8 with ANC of 2.5/, hemoglobin 15.3/, platelet count 22,593. Iron profile showed iron of 298, iron saturation 97%, TIBC 307, haptoglobin<10. PT 19.4/INR 1.61. The patient tells me that he has a diagnosis of alcoholic cirrhosis. He is a chronic drinker for many decades. Unfortunately, he does not have a GI doctor. He is known to be thrombocytopenic. He denies any history of hepatitis C. Ultrasound performed here showed findings suggestive of chronic liver parenchymal disease, cirrhosis and splenomegaly with the spleen measuring 2.5 x 6.6 x 15.7 cm consistent with splenomegaly. Objective   BP (!) 113/54   Pulse 62   Temp 98 °F (36.7 °C)   Resp 18   Ht 5' 9\" (1.753 m)   Wt 195 lb 3.2 oz (88.5 kg)   SpO2 93%   BMI 28.83 kg/m²     PHYSICAL EXAM:  CONSTITUTIONAL: Alert, appropriate, no acute distress. EYES: Icteric  NECK: Supple, no masses. No palpable thyroid mass  CHEST/LUNGS: CTA bilaterally, normal respiratory effort, dry cough. CARDIOVASCULAR: RRR, no murmurs. ABDOMEN: distended, soft non-tender, active bowel sounds  EXTREMITIES: Postop left hip surgery- incision well approximated, no s/s of infection. LLE ecchymosis, LLE 2+ edema/ good pedal pulse.   Bruising noted left lower abdomen and left buttock, no palpable hematomas  SKIN: warm, dry with no rashes or lesions. Dressing noted to coccyx, patient states uncertain why he has dressing in place  NEUROLOGIC: follows commands, non focal     Recent Labs     08/30/22  0538 08/29/22  0311 08/28/22  0203   WBC 2.4* 3.5* 5.2   HGB 8.3* 9.0* 8.9*   HCT 25.0* 27.8* 26.6*   .0* 121.4* 119.8*   PLT 30* 37* 32*     Lab Results   Component Value Date     (L) 08/30/2022    K 3.7 08/30/2022     08/30/2022    CO2 21 (L) 08/30/2022    BUN 12 08/30/2022    CREATININE 0.5 08/30/2022    GLUCOSE 115 (H) 08/30/2022    CALCIUM 7.4 (L) 08/30/2022    PROT 4.6 (L) 08/30/2022    LABALBU 2.4 (L) 08/30/2022    BILITOT 3.0 (H) 08/30/2022    ALKPHOS 66 08/30/2022    AST 77 (H) 08/30/2022    ALT 23 08/30/2022    LABGLOM >60 08/30/2022    GFRAA >59 08/30/2022     Lab Results   Component Value Date    INR 1.74 (H) 08/29/2022    INR 1.76 (H) 08/28/2022    INR 1.97 (H) 08/27/2022    PROTIME 20.6 (H) 08/29/2022    PROTIME 20.8 (H) 08/28/2022    PROTIME 22.8 (H) 08/27/2022       Exam: ULTRASOUND OF THE ABDOMEN, LIMITED    Clinical data:Hyperbilirubinemia. Technique: Real-time grayscale imaging of the abdomen was performed. Images supplemented with color Doppler technique. Prior studies: No prior studies submitted. Findings: The liver appears echogenic and heterogeneous, coarse with scalloped edges. A cyst is visualized measuring 0.9 x 1.1 x 1.1 cm. No intrahepatic biliary distention. The gallbladder appears distended measuring 13.3 cm with a wall thickness of0.45    cm. Mild sludge is noted. No gallstones. The common bile duct measures 0.73cm. Pericholecystic fluid visualized. The right kidney msbrmgsw45.7 x 5.5 x 4.6 cm. Normal echogenicity and cortical thickness are preserved. No evidence of renal masses. No evidence of renal calculi. There is no evidence of hydronephrosis. The pancreas is unremarkable to the extent visualized. Unremarkable spleen welpfomdn29.5 x 6.6 x 15.7 cm. Imaged portion of the aorta demonstrates no acute pathology. Visualized portion of the inferior vena cava is unremarkable. No ascites is evident. Patient unable to lie in decubitus position due to fractured hip. Exam done in supine view only. Impression   Finding suggestive for hydropic cholecystitis without a obvious stone as described. CBD mildly dilated. MRCP is recommended. Possible chronic liver parenchymal disease, cirrhosis? .  Splenomegaly. Recommendation:    Follow up as clinically indicated.    Electronically Signed by Scarlett Lu MD at 25-Aug-2022 06:37:10 PM                  ASSESSMENT/PLAN:  Thrombocytopenia-likely multifactorial to include direct effect of alcohol bone marrow and also secondary to splenomegaly  Likely secondary to end-stage liver disease/alcohol abuse  US showed signs of end-stage liver disease/splenomegaly (portal hypertension)        Status post 2 pheresis platelets given perioperatively    PLT 30,000 today 8/30/2022       Left hip fracture  POD #4  Post-op care, as directed by ortho     End-stage liver disease/splenomegaly  Evidence of portal hypertension        Coagulopathy of liver disease/thrombocytopenia      -S/p Vitamin K 10 mg p.o. x3 doses  8/25/2022 - 8/27/2022  -Postop bruising, no overt bleeding     Serology 8/26/2022:  Fibrinogen 177  D-dimer 4.3    Macrocytic anemia    Recent Labs     08/30/22  0538 08/29/22  0311 08/28/22  0203   WBC 2.4* 3.5* 5.2   HGB 8.3* 9.0* 8.9*   HCT 25.0* 27.8* 26.6*   .0* 121.4* 119.8*   PLT 30* 37* 32*      Likely secondary to end-stage liver disease  Hapto < 10    Serology 8/26/2022  Retic - 4.33% with absolute 0.1334  LDH - 255 ()    Minimal elevation LDH    B12 = 1446  Folate - 13    MARICARMEN - NEGATIVE    Serology 8/28/2022  Retic 5.35% with absolute 0.1188  LDH - 240 () - improved  Total bili 3.2-direct 1.7, indirect 1.5 -now more direct than indirect    Hgb 8.3 today, 8/30/2022     Elevated iron saturation/iron levels  Serum Fe - 298  TIBC - 307  Fe sat - 97%  Ferritin - 220    Hereditary hemochromatosis gene 8/26/2022 in process      PLAN:  POD #4 left ALICIA  Avoid DVT chemical prophylaxis due to platelet counts below 50,000 (currently 30,000)  Monitor CBC  Transfuse for Hgb < 7  Transfuse for plt < 10,000 or bleeding  Isolation r/t Josse 48, APRN    08/30/22  7:09 AM  Physician's attestation/substantial contribution:  I, Dr Luan Ibarra, independently performed an evaluation on Raymond Castillo. I have reviewed relevant medical information/data to include but not limited to medication list, relevant appropriate labs and imaging when applicable. I reviewed other physician's notes, ancillary services and nurses assessment. I have reviewed the above documentation completed by the Nurse Practitioner or Physician Assistant. Please see my additional contributions to the history of present illness, physical examination, and assessment/medical decision-making that reflect my findings and impressions. I have seen and examined the patient and the key elements of all parts of the encounter have been performed by me. I agree with the assessment and plan as outlined by the ARNP/PA. Subjective-no new complaints. Objective- chronically ill appearing  Assessment/plan:  No need for transfusion today. Continue to monitor.

## 2022-08-30 NOTE — PROGRESS NOTES
Physical Therapy  Name: Sujey Saxena  MRN:  [id number]  Date of service:  8/30/2022 08/30/22 1434   Restrictions/Precautions   Restrictions/Precautions Fall Risk;Weight Bearing   Lower Extremity Weight Bearing Restrictions   Left Lower Extremity Weight Bearing Weight Bearing As Tolerated   Position Activity Restriction   Other position/activity restrictions ant hip prec.,  No SLR. COVID droplet plus   Subjective   Subjective Pt coming out of bathroom with nursing, agreed to attempt further amb. Pain Assessment   Pain Assessment Kapadia-Baker FACES   Kapadia-Baker Pain Rating 8   Pain Location Hip;Scrotum   Pain Orientation Left   Pain Descriptors Aching; Sore   Functional Pain Assessment Prevents or interferes some active activities and ADLs   Pain Type Surgical pain;Acute pain   Pain Frequency Intermittent   Non-Pharmaceutical Pain Intervention(s) Ambulation/Increased Activity;Repositioned; Rest   Response to Pain Intervention Patient satisfied   Bed Mobility   Sit to Supine Contact guard assistance   Transfers   Stand to sit Contact guard assistance   Ambulation   WB Status FWBAT LLE   Ambulation   Surface level tile   Device Rolling Walker   Assistance Contact guard assistance   Quality of Gait no LOB   Gait Deviations Slow Traci;Decreased step height   Distance 30'   Patient Goals    Patient goals  get stronger, go home   Short Term Goals   Time Frame for Short term goals 2 wks   Short term goal 1 supine to sit indep   Short term goal 2 sit to stand indep   Short term goal 3 amb. 100' with RW SBA   Short term goal 4 bed to chair SBA   Short term goal 5 up/down 2-3 stairs SBA   Conditions Requiring Skilled Therapeutic Intervention   Body Structures, Functions, Activity Limitations Requiring Skilled Therapeutic Intervention Decreased functional mobility ; Decreased ROM; Decreased endurance;Decreased cognition;Decreased safe awareness;Decreased strength;Decreased balance; Increased pain   Assessment Pt able to amb short distance in room with no LOB. Frequent short stops throughout gait but fairly steady overall. Returned to bed and positioned for comfort. Activity Tolerance   Activity Tolerance Patient tolerated treatment well;Patient limited by pain   PT Plan of Care   Tuesday X   Safety Devices   Type of Devices Call light within reach; Left in bed  (BSC at bedside, MALIHA okayed pt to transfer to [facility] on his own, bed alarm left off)         Electronically signed by Lashanda Sierra PTA on 8/30/2022 at 2:42 PM

## 2022-08-30 NOTE — PROGRESS NOTES
[facility] Hospitalists      Patient:  Aurora Da Silva  YOB: 1955  Date of Service: 8/29/2022  MRN: [id number]   Acct: [de-identified]   Primary Care Physician: No primary care provider on file. Advance Directive: Full Code  Admit Date: 8/25/2022       Hospital Day: 4  Portions of this note have been copied forward, however, changed to reflect the most current clinical status of this patient. CHIEF COMPLAINT left hip pain    SUBJECTIVE:  Patient reports fever this morning. Reports fatigue today. Patient reports his wife has Covid. Continue to have LLE pain and swelling. CUMULATIVE HOSPITAL COURSE:  The patient is a 75 y.o. male with past medical history of liver disease who presented to [facility] ED complaining of left hip pain. Mr. Paulino Stein reported he was knocked down by a big dog and landed on his left hip. Since then he has not been able to ambulate. Denied any other symptoms. Denied any medical conditions, other than liver disease that has not bothered him. Stated he does not take any prescription medications at home. Admitted to smoking 1 ppd. Workup in ED revealed new onset rate controlled Afib, glucose 139 otherwise unremarkable chemistry, AST 75, bilirubin 8.0, WBC 3.8, platelet 30, INR 2.93, Cxray unremarkable. Xray of hip indicated acute, impacted transverse fracture through base of the femoral head. Patient was admitted to hospital medicine for left hip fracture and new onset Afib with orthopedic surgery and cardiology consultations. Cardiology cleared patient for surgery as long as ECHO was not abnormal, recommended low dose beta blocker to suppress the PACs. GI recommended CT of abdomen to rule out hepatoma, or portal vein thrombosis. Hematology recommended vitamin K 10 mg daily x3 days, avoid DVT prophylaxis, transfuse 2 units platelets before/during surgical procedure. Orthopedic surgery recommended surgical intervention.  Ultrasound of liver indicated hydropic cholecystitis without any obvious stone, for dizziness, syncope, weakness, light-headedness, numbness and headaches. Hematological:  Does not bruise/bleed easily. Psychiatric/Behavioral:  Negative for agitation, confusion and dysphoric mood. 14 point review of systems is negative except as specifically addressed above. Objective:   VITALS:  BP (!) 120/57   Pulse 80   Temp 98.3 °F (36.8 °C) (Oral)   Resp 20   Ht 5' 9\" (1.753 m)   Wt 195 lb 3.2 oz (88.5 kg)   SpO2 91%   BMI 28.83 kg/m²   24HR INTAKE/OUTPUT:    Intake/Output Summary (Last 24 hours) at 8/29/2022 2001  Last data filed at 8/29/2022 1810  Gross per 24 hour   Intake 1594 ml   Output 300 ml   Net 1294 ml       Physical Exam  Constitutional:       General: He is not in acute distress. Appearance: Normal appearance. He is not ill-appearing. HENT:      Head: Normocephalic and atraumatic. Right Ear: External ear normal.      Left Ear: External ear normal.      Nose: Nose normal.      Mouth/Throat:      Mouth: Mucous membranes are moist.   Eyes:      Extraocular Movements: Extraocular movements intact. Conjunctiva/sclera: Conjunctivae normal.      Pupils: Pupils are equal, round, and reactive to light. Cardiovascular:      Rate and Rhythm: Normal rate and regular rhythm. Pulses: Normal pulses. Heart sounds: Normal heart sounds. Pulmonary:      Effort: Pulmonary effort is normal. No respiratory distress. Breath sounds: Normal breath sounds. No wheezing, rhonchi or rales. Abdominal:      General: Bowel sounds are normal. There is no distension. Palpations: Abdomen is soft. Tenderness: There is no abdominal tenderness. Musculoskeletal:         General: Swelling and tenderness present. No deformity. Normal range of motion. Cervical back: Normal range of motion and neck supple. No muscular tenderness. Right lower leg: No edema. Left lower leg: No edema.       Comments: LLE swelling, warmth, and tenderness noted   S/p Left hip total arthroplasty, surgical incision CDI    Skin:     General: Skin is warm and dry. Findings: Bruising present. No lesion. Comments: Bruising to left flank area and hip area   Neurological:      Mental Status: He is alert and oriented to person, place, and time. Psychiatric:         Mood and Affect: Mood normal.         Behavior: Behavior normal.         Thought Content: Thought content normal.           Medications:      sodium chloride 200 mL/hr at 08/28/22 2030    sodium chloride      sodium chloride      sodium chloride      sodium chloride        sodium bicarbonate  650 mg Oral 4x Daily    ipratropium-albuterol  1 ampule Inhalation Q4H WA    metoprolol succinate  25 mg Oral Daily    sodium chloride flush  5-40 mL IntraVENous 2 times per day    bisacodyl  5 mg Oral Daily    sodium chloride flush  5-40 mL IntraVENous 2 times per day     LORazepam, sodium chloride, sodium chloride flush, sodium chloride, ondansetron **OR** ondansetron, oxyCODONE **OR** oxyCODONE **OR** oxyCODONE, HYDROmorphone **OR** HYDROmorphone **OR** HYDROmorphone, diphenhydrAMINE, sodium chloride, sodium chloride, sodium chloride flush, sodium chloride, polyethylene glycol  ADULT DIET; Regular     Lab and other Data:     Recent Labs     08/27/22 0108 08/28/22 0203 08/29/22  0311   WBC 2.3* 5.2 3.5*   HGB 9.6* 8.9* 9.0*   PLT 28* 32* 37*     Recent Labs     08/27/22 0108 08/28/22 0203 08/29/22  0311    138 134*   K 4.0 3.8 3.9    105 104   CO2 20* 22 20*   BUN 12 13 13   CREATININE 0.7 0.6 0.5   GLUCOSE 278* 138* 121*     Recent Labs     08/27/22 0108 08/28/22  0203 08/29/22  0311   AST 56* 64* 72*   ALT 23 22 24   BILITOT 5.1* 3.2* 3.5*   ALKPHOS 72 78 70     Troponin T: No results for input(s): TROPONINI in the last 72 hours. Pro-BNP: No results for input(s): BNP in the last 72 hours.   INR:   Recent Labs     08/27/22 0108 08/28/22  0203 08/29/22  0311   INR 1.97* 1.76* 1.74*     UA:  Recent Labs 08/27/22  0840   COLORU DARK YELLOW*   PHUR 6.0   WBCUA 3   RBCUA 9*   BACTERIA NEGATIVE*   CLARITYU Clear   SPECGRAV 1.040   LEUKOCYTESUR TRACE*   UROBILINOGEN 2.0*   BILIRUBINUR MODERATE*   BLOODU Negative   GLUCOSEU Negative     A1C:   Recent Labs     08/27/22  0814   LABA1C 4.7     ABG:No results for input(s): PHART, KSA1UEE, PO2ART, XGH5OYL, BEART, HGBAE, Q7BAWMNV, CARBOXHGBART in the last 72 hours. RAD:   CT ABDOMEN PELVIS W WO CONTRAST Additional Contrast? Radiologist Recommendation    Result Date: 8/26/2022  1. Cirrhotic liver with abnormal diffuse low attenuation pattern which may have association with chronic liver disease versus diffuse metastatic disease. 2. Splenomegaly. Mild splenic varices. 3. Mild sigmoid diverticulosis. 4. Gallbladder distention. 5. Punctate nonobstructive left renal calculus. 6. Soft tissue in right inguinal canal is suspicious for undescended testicle. Recommendation: SCROTAL US All CT scans at this facility utilize dose modulation, iterative reconstruction, and/or weight based dosing when appropriate to reduce radiation dose to as low as reasonably achievable. Electronically Signed by Peyman Chapman MD at 26-Aug-2022 07:49:27 PM             US LIVER    Result Date: 8/25/2022  Finding suggestive for hydropic cholecystitis without a obvious stone as described. CBD mildly dilated. MRCP is recommended. Possible chronic liver parenchymal disease, cirrhosis? .  Splenomegaly. Recommendation: Follow up as clinically indicated. Electronically Signed by Jesica Maria MD at 25-Aug-2022 06:37:10 PM             XR CHEST PORTABLE    Result Date: 8/29/2022  1. No acute cardiopulmonary process 2. No adverse interval change Recommendation: Follow up as clinically indicated. Electronically Signed by Luciano Matos DO at 29-Aug-2022 01:43:01 AM             XR CHEST PORTABLE    Result Date: 8/25/2022  Normal chest Recommendation: Follow up as clinically indicated.  Electronically Signed by Bob Silverman MD at 25-Aug-2022 01:18:21 PM             MRI ABDOMEN W WO CONTRAST MRCP    Result Date: 8/26/2022  1. Cirrhotic liver. No definite liver mass 2. Spenomegaly with varices. 3. Distended gallbladder. 4. Tiny simple cyst in right hepatic lobe. 5. Tiny right renal simple cysts. Recommendation: Follow up as clinically indicated. EXAM: MRCP CLINICAL DATA:Hydropic gallbladder, slightly dilated CBD. Radiologist recommending MRCP. TECHNIQUE: Heavy T2-weighted images, along with rotating thick and thin slab images of the biliary system were obtained for MRCP. Reformats were constructed. PRIOR STUDIES: CT scan of abdomen and pelvis done same day. Ultrasound of the liver dated 08/25/22. FINDINGS: MRCP: No evidence of intrahepatic biliary ductal dilation is present. The gallbladder is mildly distended. The cystic duct and right and left hepatic ducts are unremarkable. The common bile duct is upper limits of normal (6 mm). No evidence of common bile duct obstruction. Normal pancreatic duct. No biliary strictures are present. No evidence of choledocholithiasis. IMPRESSION: 1. Mild distended gallbladder. 2. No significant interpretability dilatation. 3. Borderline common bile duct. RECOMMENDATION: Follow up as clinically indicated. Electronically Signed by Fela Tan MD at 26-Aug-2022 10:02:15 PM             CTA PULMONARY W CONTRAST    Result Date: 8/26/2022  1. No evidence of pulmonary embolism. 2. Centriacinar emphysematous changes in bilateral upper lobes and superior segments of bilateral lower lobes. Paraseptal emphysematous changes in medial basal segment of right lower lobe. 3. Mild atelectatic changes with surrounding mild ground-glass opacities in dependent portions of posterobasal segments of bilateral lower lobe. 4. Atherosclerotic wall calcification involving arch of aorta and coronaries. 5. Mild degenerative changes in the spine. 6. Cirrhosis of liver with portal hypertension.  7. Small sliding type of hiatus hernia Recommendation: Follow up as clinically indicated. All CT scans at this facility utilize dose modulation, iterative reconstruction, and/or weight based dosing when appropriate to reduce radiation dose to as low as reasonably achievable. Electronically Signed by Brandt Fenton MD at 26-Aug-2022 10:05:55 PM             XR HIP 2-3 VW W PELVIS LEFT    Result Date: 8/27/2022  Impression: Fluoroscopic guidance was provided to the Department of Orthopedic Surgery by the Department of Radiology. Recommendation: Follow up as clinical indicated. Electronically Signed by Anisha Dueñas MD at 27-Aug-2022 02:13:19 AM             XR HIP 2-3 VW W PELVIS LEFT    Result Date: 8/26/2022  Intact total left hip arthroplasty. Recommendation: Followup as clinically indicated. Electronically Signed by Brandt Fenton MD at 26-Aug-2022 09:29:01 PM             XR HIP 2-3 VW W PELVIS LEFT    Result Date: 8/25/2022  Acute, impacted transverse fracture through base of the femoral  head , continued good alignment and normal joint space. Recommendation: Followup as clinically indicated.  Electronically Signed by Suzan Velazco MD at 25-Aug-2022 01:04:57 PM                Echo:   Summary   Left ventricle had normal chamber size and thickness   Preserved systolic function with estimated ejection fraction of 74%   No regional wall motion abnormality   Normal diastolic function   No significant valvular lesion seen   Mild mitral regurgitation   Trivial tricuspid insufficiency with estimated right ventricular systolic   pressure of 38 mm which is mildly elevated    Micro:   SARS-CoV-2, NAAT  COVID-19, Rapid  Collected: 08/29/22 1057   Result status: Final   Resulting lab: [facility]   Reference range: Not Detected   Value: DETECTED Panic         Assessment/Plan   Principal Problem:    Closed left hip fracture, initial encounter [facility])-              - Orthopedic surgery following - underwent Left total hip arthroplasty on 8/26/2022                           - No anticoagulation given thrombocytopenia and elevated INR               - Pain control               - PT/OT per ortho                 - Case management consulted for discharge planning      Active Problems:   Covid / Febrile/ SOB-               - Follow Blood cultures               - Follow procalcitonin              - Lactic 1.9                    - Follow Cxray               - Supplemental oxygen as needed, currently 1 L O2 per NC, wean as able to               - Monitor fever curve               - Monitor labs closely       Irregular heart beat-  - Cardiology consulted               - Suggested PACs               - Recommended low dose beta blocker to suppress the PACs               - cleared patient for surgery   - Currently NSR 83   - ECHO- indicated normal LV size with preserved LV systolic function with EF of 30%, normal diastolic function, mild mitral regurgitation, trivial tricuspid insufficiency with estimated RVSP of 38 mm which is mildly elevated   - Monitor on telemetry        Liver disease/ Hyperbilirubinemia/ thrombocytopenia-             - s/p 2 units of platelets and 1 unit of fresh frozen plasma    - Liver US- indicated hydropic cholecystitis without any obvious stone, CBD mildly dilated, MRCP is recommended, possible chronic liver parenchymal disease, cirrhosis, splenomegaly              - GI following                           - MRCP indicated mild distended gallbladder, no significant interpretability dilatation, borderline CBD                          - CT abdomen indicated cirrhotic liver with abnormal diffuse low attenuation pattern, splenomegaly, mild splenic varices, gallbladder distention, and soft tissue in right inguinal canal is suspicious for undescended testicle               - GI consulted Surgery whom recommended no indications for surgery at this time, if surgery needed then he would have to be at a tertiary center due to severity of liver disease               - Lipase 41              - Monitor CMP closely         DVT Prophylaxis: SCDs         SHEILA Martinez - CNP, 8/29/2022 8:01 PM

## 2022-08-30 NOTE — PROGRESS NOTES
[facility]      Patient:  Shakira Lee  YOB: 1955  Date of Service: 8/30/2022  MRN: [id number]   Acct: [de-identified]   Primary Care Physician: No primary care provider on file. Advance Directive: Full Code  Admit Date: 8/25/2022       Hospital Day: 5  Portions of this note have been copied forward, however, changed to reflect the most current clinical status of this patient. CHIEF COMPLAINT left hip pain    SUBJECTIVE:  No c/o fever overnight. No shortness of breath. Does have diarrhea today. C/o scrotal and penile edema today. CUMULATIVE HOSPITAL COURSE:  The patient is a 75 y.o. male with past medical history of liver disease who presented to [facility] ED complaining of left hip pain. Mr. Bradford Melton reported he was knocked down by a big dog and landed on his left hip. Since then he has not been able to ambulate. Denied any other symptoms. Denied any medical conditions, other than liver disease that has not bothered him. Stated he does not take any prescription medications at home. Admitted to smoking 1 ppd. Workup in ED revealed new onset rate controlled Afib, glucose 139 otherwise unremarkable chemistry, AST 75, bilirubin 8.0, WBC 3.8, platelet 30, INR 3.77, Cxray unremarkable. Xray of hip indicated acute, impacted transverse fracture through base of the femoral head. Patient was admitted to hospital medicine for left hip fracture and new onset Afib with orthopedic surgery and cardiology consultations. Cardiology cleared patient for surgery as long as ECHO was not abnormal, recommended low dose beta blocker to suppress the PACs. GI recommended CT of abdomen to rule out hepatoma, or portal vein thrombosis. Hematology recommended vitamin K 10 mg daily x3 days, avoid DVT prophylaxis, transfuse 2 units platelets before/during surgical procedure. Orthopedic surgery recommended surgical intervention.  Ultrasound of liver indicated hydropic cholecystitis without any obvious stone, CBD mildly dilated, MRCP is recommended, possible chronic liver parenchymal disease, cirrhosis, splenomegaly. D-dimer 4.3, CTA pulmonary pending. Echo indicated normal LV size with preserved LV systolic function with EF of 75%, normal diastolic function, mild mitral regurgitation, trivial tricuspid insufficiency with estimated RVSP of 38 mm which is mildly elevated. Underwent Left total hip arthroplasty on 8/26/2022. S/p 2 units of Platelets and 1 unit of FFP. MRCP indicated mild distended gallbladder, no significant interpretability dilatation, borderline CBD. CT abdomen indicated cirrhotic liver with abnormal diffuse low attenuation pattern, splenomegaly, mild splenic varices, gallbladder distention, and soft tissue in right inguinal canal is suspicious for undescended testicle. GI suggested EGD at a later time to screen for esophageal varices, and US of liver every 6 months to screen for hepatoma. LLE venous study indicated no evidence of DVT, SVT or Reflux. Fever again overnight. Repeat Covid test as patient's wife, covid +. Remains on room air. Stopped IVF and give one time lasix for scrotal and penile edema. Review of Systems:   Review of Systems   Constitutional:  Negative for chills, diaphoresis, fatigue and fever. HENT:  Negative for congestion and ear pain. Eyes:  Negative for visual disturbance. Respiratory:  Negative for cough, shortness of breath and wheezing. Cardiovascular:  Negative for chest pain, palpitations and leg swelling. Denies chest pain    Gastrointestinal:  Positive for diarrhea. Negative for abdominal distention, abdominal pain, blood in stool, constipation, nausea and vomiting. Endocrine: Negative for cold intolerance and heat intolerance. Genitourinary:  Positive for penile swelling and scrotal swelling. Negative for difficulty urinating, flank pain, frequency and urgency. Musculoskeletal:  Positive for arthralgias and joint swelling. Negative for myalgias.         Left hip pain worsening   LLE pain and swelling    Skin:  Negative for color change and wound. Bruising   Neurological:  Negative for dizziness, syncope, weakness, light-headedness, numbness and headaches. Hematological:  Does not bruise/bleed easily. Psychiatric/Behavioral:  Negative for agitation, confusion and dysphoric mood. 14 point review of systems is negative except as specifically addressed above. Objective:   VITALS:  BP (!) 113/54   Pulse 62   Temp 98 °F (36.7 °C)   Resp 18   Ht 5' 9\" (1.753 m)   Wt 195 lb 3.2 oz (88.5 kg)   SpO2 93%   BMI 28.83 kg/m²   24HR INTAKE/OUTPUT:    Intake/Output Summary (Last 24 hours) at 8/30/2022 1423  Last data filed at 8/30/2022 0532  Gross per 24 hour   Intake 560 ml   Output 600 ml   Net -40 ml         Physical Exam  Exam conducted with a chaperone present Natty Ruiz RN). Constitutional:       General: He is not in acute distress. Appearance: Normal appearance. He is not ill-appearing. HENT:      Head: Normocephalic and atraumatic. Right Ear: External ear normal.      Left Ear: External ear normal.      Nose: Nose normal.      Mouth/Throat:      Mouth: Mucous membranes are moist.   Eyes:      Extraocular Movements: Extraocular movements intact. Conjunctiva/sclera: Conjunctivae normal.      Pupils: Pupils are equal, round, and reactive to light. Cardiovascular:      Rate and Rhythm: Normal rate and regular rhythm. Pulses: Normal pulses. Heart sounds: Normal heart sounds. Pulmonary:      Effort: Pulmonary effort is normal. No respiratory distress. Breath sounds: Normal breath sounds. No wheezing, rhonchi or rales. Abdominal:      General: Bowel sounds are normal. There is no distension. Palpations: Abdomen is soft. Tenderness: There is no abdominal tenderness. Genitourinary:     Penis: Swelling present. Musculoskeletal:         General: Swelling and tenderness present. No deformity. Normal range of motion. Cervical back: Normal range of motion and neck supple. No muscular tenderness. Right lower leg: No edema. Left lower leg: No edema. Comments: LLE swelling, warmth, and tenderness noted   S/p Left hip total arthroplasty, surgical incision CDI    Skin:     General: Skin is warm and dry. Findings: Bruising present. No lesion. Comments: Bruising to left flank area and hip area   Neurological:      Mental Status: He is alert and oriented to person, place, and time. Psychiatric:         Mood and Affect: Mood normal.         Behavior: Behavior normal.         Thought Content: Thought content normal.           Medications:      sodium chloride      sodium chloride      sodium chloride      sodium chloride        budesonide-formoterol  2 puff Inhalation BID    ipratropium  2 puff Inhalation 4x daily    albuterol sulfate HFA  2 puff Inhalation 4x daily    Vitamin D  1,000 Units Oral Daily    ascorbic acid  500 mg Oral BID    zinc sulfate  50 mg Oral Daily    famotidine  20 mg Oral BID    sodium bicarbonate  650 mg Oral 4x Daily    metoprolol succinate  25 mg Oral Daily    sodium chloride flush  5-40 mL IntraVENous 2 times per day    bisacodyl  5 mg Oral Daily    sodium chloride flush  5-40 mL IntraVENous 2 times per day     LORazepam, sodium chloride, sodium chloride flush, sodium chloride, ondansetron **OR** ondansetron, oxyCODONE **OR** oxyCODONE **OR** oxyCODONE, HYDROmorphone **OR** HYDROmorphone **OR** HYDROmorphone, diphenhydrAMINE, sodium chloride, sodium chloride, sodium chloride flush, sodium chloride, polyethylene glycol  ADULT DIET;  Regular; Safety Tray; Safety Tray (Disposables)     Lab and other Data:     Recent Labs     08/28/22  0203 08/29/22  0311 08/30/22  0538   WBC 5.2 3.5* 2.4*   HGB 8.9* 9.0* 8.3*   PLT 32* 37* 30*       Recent Labs     08/28/22  0203 08/29/22  0311 08/30/22  0538    134* 133*   K 3.8 3.9 3.7    104 104   CO2 22 20* 21*   BUN 13 13 12   CREATININE 0.6 0.5 0.5   GLUCOSE 138* 121* 115*       Recent Labs     08/28/22  0203 08/29/22 0311 08/30/22  0538   AST 64* 72* 77*   ALT 22 24 23   BILITOT 3.2* 3.5* 3.0*   ALKPHOS 78 70 66       Troponin T: No results for input(s): TROPONINI in the last 72 hours. Pro-BNP: No results for input(s): BNP in the last 72 hours. INR:   Recent Labs     08/28/22  0203 08/29/22 0311   INR 1.76* 1.74*       UA:  No results for input(s): NITRITE, COLORU, PHUR, LABCAST, WBCUA, RBCUA, MUCUS, TRICHOMONAS, YEAST, BACTERIA, CLARITYU, SPECGRAV, LEUKOCYTESUR, UROBILINOGEN, BILIRUBINUR, BLOODU, GLUCOSEU, AMORPHOUS in the last 72 hours. Invalid input(s): Ashtyn Dirk    A1C:   No results for input(s): LABA1C in the last 72 hours. ABG:No results for input(s): PHART, KBM1DPT, PO2ART, WGC5QXZ, BEART, HGBAE, E6QSFIWS, CARBOXHGBART in the last 72 hours. RAD:   CT ABDOMEN PELVIS W WO CONTRAST Additional Contrast? Radiologist Recommendation    Result Date: 8/26/2022  1. Cirrhotic liver with abnormal diffuse low attenuation pattern which may have association with chronic liver disease versus diffuse metastatic disease. 2. Splenomegaly. Mild splenic varices. 3. Mild sigmoid diverticulosis. 4. Gallbladder distention. 5. Punctate nonobstructive left renal calculus. 6. Soft tissue in right inguinal canal is suspicious for undescended testicle. Recommendation: SCROTAL US All CT scans at this facility utilize dose modulation, iterative reconstruction, and/or weight based dosing when appropriate to reduce radiation dose to as low as reasonably achievable. Electronically Signed by Erin Pelletier MD at 26-Aug-2022 07:49:27 PM             US LIVER    Result Date: 8/25/2022  Finding suggestive for hydropic cholecystitis without a obvious stone as described. CBD mildly dilated. MRCP is recommended. Possible chronic liver parenchymal disease, cirrhosis? .  Splenomegaly. Recommendation: Follow up as clinically indicated.  Electronically Signed by Celine White MD at 25-Aug-2022 06:37:10 PM             XR CHEST PORTABLE    Result Date: 8/29/2022  1. No acute cardiopulmonary process 2. No adverse interval change Recommendation: Follow up as clinically indicated. Electronically Signed by Sushil Leiva DO at 29-Aug-2022 01:43:01 AM             XR CHEST PORTABLE    Result Date: 8/25/2022  Normal chest Recommendation: Follow up as clinically indicated. Electronically Signed by Turner Galicia MD at 25-Aug-2022 01:18:21 PM             MRI ABDOMEN W WO CONTRAST MRCP    Result Date: 8/26/2022  1. Cirrhotic liver. No definite liver mass 2. Spenomegaly with varices. 3. Distended gallbladder. 4. Tiny simple cyst in right hepatic lobe. 5. Tiny right renal simple cysts. Recommendation: Follow up as clinically indicated. EXAM: MRCP CLINICAL DATA:Hydropic gallbladder, slightly dilated CBD. Radiologist recommending MRCP. TECHNIQUE: Heavy T2-weighted images, along with rotating thick and thin slab images of the biliary system were obtained for MRCP. Reformats were constructed. PRIOR STUDIES: CT scan of abdomen and pelvis done same day. Ultrasound of the liver dated 08/25/22. FINDINGS: MRCP: No evidence of intrahepatic biliary ductal dilation is present. The gallbladder is mildly distended. The cystic duct and right and left hepatic ducts are unremarkable. The common bile duct is upper limits of normal (6 mm). No evidence of common bile duct obstruction. Normal pancreatic duct. No biliary strictures are present. No evidence of choledocholithiasis. IMPRESSION: 1. Mild distended gallbladder. 2. No significant interpretability dilatation. 3. Borderline common bile duct. RECOMMENDATION: Follow up as clinically indicated. Electronically Signed by Sloane Gutierrez MD at 26-Aug-2022 10:02:15 PM             CTA PULMONARY W CONTRAST    Result Date: 8/26/2022  1. No evidence of pulmonary embolism.  2. Centriacinar emphysematous changes in bilateral upper lobes and superior segments of bilateral lower lobes. Paraseptal emphysematous changes in medial basal segment of right lower lobe. 3. Mild atelectatic changes with surrounding mild ground-glass opacities in dependent portions of posterobasal segments of bilateral lower lobe. 4. Atherosclerotic wall calcification involving arch of aorta and coronaries. 5. Mild degenerative changes in the spine. 6. Cirrhosis of liver with portal hypertension. 7. Small sliding type of hiatus hernia Recommendation: Follow up as clinically indicated. All CT scans at this facility utilize dose modulation, iterative reconstruction, and/or weight based dosing when appropriate to reduce radiation dose to as low as reasonably achievable. Electronically Signed by Earl Higgisn MD at 26-Aug-2022 10:05:55 PM             XR HIP 2-3 VW W PELVIS LEFT    Result Date: 8/27/2022  Impression: Fluoroscopic guidance was provided to the Department of Orthopedic Surgery by the Department of Radiology. Recommendation: Follow up as clinical indicated. Electronically Signed by Maria Alejandra Brice MD at 27-Aug-2022 02:13:19 AM             XR HIP 2-3 VW W PELVIS LEFT    Result Date: 8/26/2022  Intact total left hip arthroplasty. Recommendation: Followup as clinically indicated. Electronically Signed by Earl Higgins MD at 26-Aug-2022 09:29:01 PM             XR HIP 2-3 VW W PELVIS LEFT    Result Date: 8/25/2022  Acute, impacted transverse fracture through base of the femoral  head , continued good alignment and normal joint space. Recommendation: Followup as clinically indicated.  Electronically Signed by Rick Mix MD at 25-Aug-2022 01:04:57 PM                Echo:   Summary   Left ventricle had normal chamber size and thickness   Preserved systolic function with estimated ejection fraction of 74%   No regional wall motion abnormality   Normal diastolic function   No significant valvular lesion seen   Mild mitral regurgitation   Trivial tricuspid insufficiency with estimated right ventricular systolic   pressure of 38 mm which is mildly elevated    Micro:   SARS-CoV-2, NAAT  COVID-19, Rapid  Collected: 08/29/22 1057   Result status: Final   Resulting lab: [facility]   Reference range: Not Detected   Value: DETECTED Panic         Assessment/Plan   Principal Problem:    Closed left hip fracture, initial encounter [facility])-              - Orthopedic surgery following                           - underwent Left total hip arthroplasty on 8/26/2022                           - No anticoagulation given thrombocytopenia and elevated INR               - Pain control               - PT/OT per ortho                 - Case management consulted for discharge planning      Active Problems:   Covid / Febrile/ SOB-               - Follow Blood cultures               - Follow procalcitonin              - Lactic 1.9                    - Follow Cxray               - Supplemental oxygen as needed, on room air currently              - Monitor fever curve               - Monitor labs closely    -Supportive care      Irregular heart beat-  - Cardiology consulted               - Suggested PACs               - Recommended low dose beta blocker to suppress the PACs               - cleared patient for surgery   - Currently NSR 83   - ECHO- indicated normal LV size with preserved LV systolic function with EF of 72%, normal diastolic function, mild mitral regurgitation, trivial tricuspid insufficiency with estimated RVSP of 38 mm which is mildly elevated   - Monitor on telemetry        Liver disease/ Hyperbilirubinemia/ thrombocytopenia-             - s/p 2 units of platelets and 1 unit of fresh frozen plasma    - Liver US- indicated hydropic cholecystitis without any obvious stone, CBD mildly dilated, MRCP is recommended, possible chronic liver parenchymal disease, cirrhosis, splenomegaly              - GI following                           - MRCP indicated mild distended gallbladder, no significant interpretability dilatation, borderline CBD                          - CT abdomen indicated cirrhotic liver with abnormal diffuse low attenuation pattern, splenomegaly, mild splenic varices, gallbladder distention, and soft tissue in right inguinal canal is suspicious for undescended testicle               - GI consulted Surgery whom recommended no indications for surgery at this time, if surgery needed then he would have to be at a tertiary center due to severity of liver disease               - Lipase 41              - Monitor CMP closely         DVT Prophylaxis: Gloria Cornejo Pi, APRN - CNP, 8/30/2022 2:23 PM No

## 2022-08-30 NOTE — PROGRESS NOTES
Physical Therapy  Name: Kathy Hinson  MRN:  [id number]  Date of service:  8/30/2022 08/30/22 1388   Subjective   Subjective Attempt: Pt states he had a shower this morning and wants to rest at this time. Will check back later.          Electronically signed by Brendan Boucher PTA on 8/30/2022 at 9:38 AM

## 2022-08-31 ENCOUNTER — APPOINTMENT (OUTPATIENT)
Dept: CT IMAGING | Age: 67
DRG: 521 | End: 2022-08-31
Payer: MEDICARE

## 2022-08-31 LAB
ALBUMIN SERPL-MCNC: 2.6 G/DL (ref 3.5–5.2)
ALP BLD-CCNC: 68 U/L (ref 40–130)
ALT SERPL-CCNC: 27 U/L (ref 5–41)
ANION GAP SERPL CALCULATED.3IONS-SCNC: 8 MMOL/L (ref 7–19)
ANISOCYTOSIS: ABNORMAL
AST SERPL-CCNC: 96 U/L (ref 5–40)
BASOPHILS ABSOLUTE: 0 K/UL (ref 0–0.2)
BASOPHILS RELATIVE PERCENT: 0.4 % (ref 0–1)
BILIRUB SERPL-MCNC: 3.5 MG/DL (ref 0.2–1.2)
BUN BLDV-MCNC: 12 MG/DL (ref 8–23)
C282Y HEMOCHROMATOSIS MUT: NEGATIVE
CALCIUM SERPL-MCNC: 7.4 MG/DL (ref 8.8–10.2)
CERULOPLASMIN: 14 MG/DL (ref 15–30)
CHLORIDE BLD-SCNC: 102 MMOL/L (ref 98–111)
CO2: 23 MMOL/L (ref 22–29)
CREAT SERPL-MCNC: 0.5 MG/DL (ref 0.5–1.2)
EOSINOPHILS ABSOLUTE: 0.1 K/UL (ref 0–0.6)
EOSINOPHILS RELATIVE PERCENT: 5.7 % (ref 0–5)
GFR AFRICAN AMERICAN: >59
GFR NON-AFRICAN AMERICAN: >60
GLUCOSE BLD-MCNC: 116 MG/DL (ref 74–109)
H63D HEMOCHROMATOSIS MUT: NEGATIVE
HCT VFR BLD CALC: 25.1 % (ref 42–52)
HEMOCHROMATOSIS GENE ANALYSIS: NORMAL
HEMOGLOBIN: 8.3 G/DL (ref 14–18)
HFE PCR SPECIMEN: NORMAL
HYPOCHROMIA: ABNORMAL
IMMATURE GRANULOCYTES #: 0 K/UL
LYMPHOCYTES ABSOLUTE: 0.7 K/UL (ref 1.1–4.5)
LYMPHOCYTES RELATIVE PERCENT: 29.6 % (ref 20–40)
MACROCYTES: ABNORMAL
MCH RBC QN AUTO: 39.5 PG (ref 27–31)
MCHC RBC AUTO-ENTMCNC: 33.1 G/DL (ref 33–37)
MCV RBC AUTO: 119.5 FL (ref 80–94)
MONOCYTES ABSOLUTE: 0.5 K/UL (ref 0–0.9)
MONOCYTES RELATIVE PERCENT: 22.2 % (ref 0–10)
NEUTROPHILS ABSOLUTE: 1 K/UL (ref 1.5–7.5)
NEUTROPHILS RELATIVE PERCENT: 41.2 % (ref 50–65)
OVALOCYTES: ABNORMAL
PDW BLD-RTO: 15.9 % (ref 11.5–14.5)
PLATELET # BLD: 33 K/UL (ref 130–400)
PLATELET SLIDE REVIEW: ABNORMAL
PMV BLD AUTO: 11.3 FL (ref 9.4–12.4)
POTASSIUM REFLEX MAGNESIUM: 3.6 MMOL/L (ref 3.5–5)
RBC # BLD: 2.1 M/UL (ref 4.7–6.1)
S65C HEMOCHROMATOSIS MUT: NEGATIVE
SODIUM BLD-SCNC: 133 MMOL/L (ref 136–145)
TOTAL PROTEIN: 4.6 G/DL (ref 6.6–8.7)
WBC # BLD: 2.3 K/UL (ref 4.8–10.8)

## 2022-08-31 PROCEDURE — 74178 CT ABD&PLV WO CNTR FLWD CNTR: CPT

## 2022-08-31 PROCEDURE — 74178 CT ABD&PLV WO CNTR FLWD CNTR: CPT | Performed by: RADIOLOGY

## 2022-08-31 PROCEDURE — 6360000004 HC RX CONTRAST MEDICATION: Performed by: SPECIALIST

## 2022-08-31 PROCEDURE — 6360000002 HC RX W HCPCS: Performed by: NURSE PRACTITIONER

## 2022-08-31 PROCEDURE — 36415 COLL VENOUS BLD VENIPUNCTURE: CPT

## 2022-08-31 PROCEDURE — 97116 GAIT TRAINING THERAPY: CPT

## 2022-08-31 PROCEDURE — 6370000000 HC RX 637 (ALT 250 FOR IP): Performed by: NURSE PRACTITIONER

## 2022-08-31 PROCEDURE — 99232 SBSQ HOSP IP/OBS MODERATE 35: CPT | Performed by: SPECIALIST

## 2022-08-31 PROCEDURE — 80053 COMPREHEN METABOLIC PANEL: CPT

## 2022-08-31 PROCEDURE — 2580000003 HC RX 258: Performed by: ORTHOPAEDIC SURGERY

## 2022-08-31 PROCEDURE — 6370000000 HC RX 637 (ALT 250 FOR IP): Performed by: HOSPITALIST

## 2022-08-31 PROCEDURE — 1210000000 HC MED SURG R&B

## 2022-08-31 PROCEDURE — 6370000000 HC RX 637 (ALT 250 FOR IP): Performed by: ORTHOPAEDIC SURGERY

## 2022-08-31 PROCEDURE — 85025 COMPLETE CBC W/AUTO DIFF WBC: CPT

## 2022-08-31 RX ORDER — FUROSEMIDE 10 MG/ML
40 INJECTION INTRAMUSCULAR; INTRAVENOUS 2 TIMES DAILY
Status: COMPLETED | OUTPATIENT
Start: 2022-08-31 | End: 2022-09-01

## 2022-08-31 RX ADMIN — ALBUTEROL SULFATE 2 PUFF: 90 AEROSOL, METERED RESPIRATORY (INHALATION) at 12:49

## 2022-08-31 RX ADMIN — FAMOTIDINE 20 MG: 20 TABLET ORAL at 20:08

## 2022-08-31 RX ADMIN — Medication 1000 UNITS: at 08:04

## 2022-08-31 RX ADMIN — OXYCODONE 10 MG: 5 TABLET ORAL at 02:29

## 2022-08-31 RX ADMIN — METOPROLOL SUCCINATE 25 MG: 25 TABLET, EXTENDED RELEASE ORAL at 08:05

## 2022-08-31 RX ADMIN — BUDESONIDE AND FORMOTEROL FUMARATE DIHYDRATE 2 PUFF: 160; 4.5 AEROSOL RESPIRATORY (INHALATION) at 08:03

## 2022-08-31 RX ADMIN — FUROSEMIDE 40 MG: 10 INJECTION, SOLUTION INTRAMUSCULAR; INTRAVENOUS at 15:32

## 2022-08-31 RX ADMIN — SODIUM BICARBONATE 650 MG: 650 TABLET ORAL at 12:49

## 2022-08-31 RX ADMIN — SODIUM BICARBONATE 650 MG: 650 TABLET ORAL at 08:04

## 2022-08-31 RX ADMIN — IPRATROPIUM BROMIDE 2 PUFF: 17 AEROSOL, METERED RESPIRATORY (INHALATION) at 20:09

## 2022-08-31 RX ADMIN — IOPAMIDOL 90 ML: 755 INJECTION, SOLUTION INTRAVENOUS at 19:28

## 2022-08-31 RX ADMIN — FAMOTIDINE 20 MG: 20 TABLET ORAL at 08:09

## 2022-08-31 RX ADMIN — OXYCODONE 10 MG: 5 TABLET ORAL at 20:08

## 2022-08-31 RX ADMIN — ALBUTEROL SULFATE 2 PUFF: 90 AEROSOL, METERED RESPIRATORY (INHALATION) at 16:36

## 2022-08-31 RX ADMIN — BUDESONIDE AND FORMOTEROL FUMARATE DIHYDRATE 2 PUFF: 160; 4.5 AEROSOL RESPIRATORY (INHALATION) at 20:09

## 2022-08-31 RX ADMIN — IPRATROPIUM BROMIDE 2 PUFF: 17 AEROSOL, METERED RESPIRATORY (INHALATION) at 08:03

## 2022-08-31 RX ADMIN — OXYCODONE HYDROCHLORIDE AND ACETAMINOPHEN 500 MG: 500 TABLET ORAL at 20:08

## 2022-08-31 RX ADMIN — SODIUM CHLORIDE, PRESERVATIVE FREE 10 ML: 5 INJECTION INTRAVENOUS at 08:05

## 2022-08-31 RX ADMIN — SODIUM CHLORIDE, PRESERVATIVE FREE 10 ML: 5 INJECTION INTRAVENOUS at 20:09

## 2022-08-31 RX ADMIN — IPRATROPIUM BROMIDE 2 PUFF: 17 AEROSOL, METERED RESPIRATORY (INHALATION) at 16:36

## 2022-08-31 RX ADMIN — OXYCODONE 10 MG: 5 TABLET ORAL at 08:04

## 2022-08-31 RX ADMIN — IPRATROPIUM BROMIDE 2 PUFF: 17 AEROSOL, METERED RESPIRATORY (INHALATION) at 12:49

## 2022-08-31 RX ADMIN — SODIUM BICARBONATE 650 MG: 650 TABLET ORAL at 20:08

## 2022-08-31 RX ADMIN — OXYCODONE HYDROCHLORIDE AND ACETAMINOPHEN 500 MG: 500 TABLET ORAL at 08:05

## 2022-08-31 RX ADMIN — OXYCODONE 10 MG: 5 TABLET ORAL at 15:47

## 2022-08-31 RX ADMIN — ALBUTEROL SULFATE 2 PUFF: 90 AEROSOL, METERED RESPIRATORY (INHALATION) at 20:09

## 2022-08-31 RX ADMIN — ZINC SULFATE 220 MG (50 MG) CAPSULE 50 MG: CAPSULE at 08:04

## 2022-08-31 RX ADMIN — ALBUTEROL SULFATE 2 PUFF: 90 AEROSOL, METERED RESPIRATORY (INHALATION) at 08:03

## 2022-08-31 RX ADMIN — SODIUM BICARBONATE 650 MG: 650 TABLET ORAL at 15:32

## 2022-08-31 ASSESSMENT — PAIN DESCRIPTION - LOCATION
LOCATION: HIP
LOCATION: HIP;SCROTUM
LOCATION: SCROTUM;HIP
LOCATION: HIP
LOCATION: HIP
LOCATION: HIP;SCROTUM

## 2022-08-31 ASSESSMENT — ENCOUNTER SYMPTOMS
BLOOD IN STOOL: 0
WHEEZING: 0
ABDOMINAL PAIN: 0
SHORTNESS OF BREATH: 0
COLOR CHANGE: 0
NAUSEA: 0
DIARRHEA: 1
COUGH: 0
ABDOMINAL DISTENTION: 0
CONSTIPATION: 0
ROS SKIN COMMENTS: BRUISING
VOMITING: 0

## 2022-08-31 ASSESSMENT — PAIN SCALES - GENERAL
PAINLEVEL_OUTOF10: 8
PAINLEVEL_OUTOF10: 5
PAINLEVEL_OUTOF10: 8
PAINLEVEL_OUTOF10: 9

## 2022-08-31 ASSESSMENT — PAIN DESCRIPTION - DESCRIPTORS
DESCRIPTORS: ACHING
DESCRIPTORS: ACHING;SHARP
DESCRIPTORS: ACHING
DESCRIPTORS: ACHING

## 2022-08-31 ASSESSMENT — PAIN DESCRIPTION - ORIENTATION
ORIENTATION: LEFT

## 2022-08-31 ASSESSMENT — PAIN SCALES - WONG BAKER: WONGBAKER_NUMERICALRESPONSE: 6

## 2022-08-31 ASSESSMENT — PAIN DESCRIPTION - PAIN TYPE
TYPE: ACUTE PAIN
TYPE: ACUTE PAIN

## 2022-08-31 ASSESSMENT — PAIN DESCRIPTION - FREQUENCY
FREQUENCY: INTERMITTENT
FREQUENCY: INTERMITTENT

## 2022-08-31 NOTE — PROGRESS NOTES
Progress Note  Date:2022       Room:[facility]5435-02  Patient Isis Wells     Date of Birth:[birth year]     Age:67 y.o. Subjective      Complains of swollen and painful scrotum. His abdomen is more swollen. He has been started on Lasix. Talked with patient's nurse. Patient's smooth muscle antibody came back elevated. Smooth muscle antibody was 1: 640 which is elevated. F-actin IgG 58 and elevated. Acute hepatitis A, B, C serology negative. SUSAN negative. 2022: WBC 2.3, hemoglobin 8.3 and stable,  and elevated. Platelet count 47,104.    2022: Bili 3.5 elevated. Alkaline phosphatase and ALT normal.  AST 96.  Objective         Vitals Last 24 Hours:  TEMPERATURE:  Temp  Av.6 °F (37 °C)  Min: 98.3 °F (36.8 °C)  Max: 99 °F (37.2 °C)  RESPIRATIONS RANGE: Resp  Av.6  Min: 16  Max: 20  PULSE OXIMETRY RANGE: SpO2  Av %  Min: 90 %  Max: 92 %  PULSE RANGE: Pulse  Av.6  Min: 65  Max: 69  BLOOD PRESSURE RANGE: Systolic (47FRU), LYP:303 , Min:112 , KZI:327   ; Diastolic (22FJQ), GUL:48, Min:47, Max:79    I/O (24 Hr): No intake or output data in the 24 hours ending 22    Objective:  Vital signs: (most recent): Blood pressure (!) 113/54, pulse 65, temperature 98.3 °F (36.8 °C), resp. rate 18, height 5' 9\" (1.753 m), weight 195 lb 3.2 oz (88.5 kg), SpO2 90 %. alert and oriented. In no acute physical distress. HEENT normocephalic. Atraumatic. Neck supple    Abdomen is distended, soft, nontender. He may have right inguinal hernia. Scrotum is swollen and there may be fluid in the scrotum. He has 1+ bilateral lower leg edema. Skin no jaundice. Neuro alert and oriented.       Labs/Imaging/Diagnostics    Labs:  CBC:  Recent Labs     22  0311 22  0538 22  0415   WBC 3.5* 2.4* 2.3*   RBC 2.29* 2.05* 2.10*   HGB 9.0* 8.3* 8.3*   HCT 27.8* 25.0* 25.1*   .4* 122.0* 119.5*   RDW 16.8* 16.0* 15.9*   PLT 37* 30* 33* CHEMISTRIES:  Recent Labs     08/29/22 0311 08/30/22  0538 08/31/22  0415   * 133* 133*   K 3.9 3.7 3.6    104 102   CO2 20* 21* 23   BUN 13 12 12   CREATININE 0.5 0.5 0.5   GLUCOSE 121* 115* 116*       PT/INR:  Recent Labs     08/29/22 0311   PROTIME 20.6*   INR 1.74*       APTT:No results for input(s): APTT in the last 72 hours. LIVER PROFILE:  Recent Labs     08/29/22 0311 08/30/22  0538 08/31/22  0415   AST 72* 77* 96*   ALT 24 23 27   BILITOT 3.5* 3.0* 3.5*   ALKPHOS 70 66 68         Imaging Last 24 Hours:  ECHO Complete 2D W Doppler W Color    Result Date: 8/25/2022  Transthoracic Echocardiography Report (TTE)  Demographics   Patient Name   Sherwin Willis   Date of Study            08/25/2022   MRN            [id number]        Gender                   Male   Date of Birth  1955    Room Number              [facility]0531   Age            75 year(s)   Height:        69 inches     Referring Physician      Dennys Knight   Weight:        188 pounds    Sonographer              Deedee Gilmore [facility]   BSA:           2.01 m^2      Interpreting Physician   Radha Ellison MD   BMI:           27.76 kg/m^2  Procedure Type of Study   TTE procedure:ECHO NO CONTRAST WITH DOP/COLR. Study Location: Echo Lab Technical Quality: Adequate visualization Patient Status: Inpatient HR: 60 bpm Indications:Atrial fibrillation.   Conclusions   Summary  Left ventricle had normal chamber size and thickness  Preserved systolic function with estimated ejection fraction of 74%  No regional wall motion abnormality  Normal diastolic function  No significant valvular lesion seen  Mild mitral regurgitation  Trivial tricuspid insufficiency with estimated right ventricular systolic  pressure of 38 mm which is mildly elevated   Signature   ----------------------------------------------------------------  Electronically signed by Radha Ellison MD(Interpreting physician)  on 08/25/2022 07:01 PM ----------------------------------------------------------------   Findings   Mitral Valve  Mild mitral regurgitation   Aortic Valve  Aortic sclerosis   Tricuspid Valve  Trivial tricuspid insufficiency with estimated right ventricular systolic  pressure of 38 mm which is mildly elevated   Pulmonic Valve  The pulmonic valve was not well visualized . Left Atrium  Mildly dilated left atrium   Left Ventricle  See conclusion   Right Atrium  Normal right atrial dimension with no evidence of thrombus or mass noted. Right Ventricle  Normal right ventricular size with preserved RV function.    Miscellaneous  Normal IVC  2D Measurements and Calculations(cm)   LVIDd: 4.6 cm                        LVIDs: 3.43 cm  IVSd: 1.01 cm  LVPWd: 1.03 cm                       AO Root Dimension: 3.3 cm  % Ejection Fraction: 74 %            LA Dimension: 4.3 cm  LA Volume: 67.1 ml                   LA Area: 22.9 cm^2  LA Volume Index: 33 ml/m^2           LV Systolic dimension: 1.73FC  LV dimension: 4.6 cm                 LV PW diastolic: 3.05AZ                                       LVOT diameter: 2.1 cm  LVEDV: 116 ml                        RA Systolic pressure: 3mmHg  LVESV:25 ml                          LVEDVI: 58 ml/m^2  Cardic Output (CO): 4.65l/min        LVESVI: 12 ml/m^2  Doppler Measurements and Calculations   AV Peak Velocity:172 cm/s              MV Peak E-Wave: 94.7 cm/s  AV Mean Velocity:121 cm/s              MV Peak A-Wave: 67.3 cm/s  AV Peak Gradient: 11.83 mmHg           MV E/A Ratio: 1.41 %  AV Mean Gradient: 6 mmHg               MV Mean velocity: NaNcm/s  AV Area (Continuity):1.99 cm^2         MV Peak Gradient: 3.59 mmHg  AV VTI:39 cm/s                         MV P1/2t: 84 msec  TR Velocity:288 cm/s                   MVA by PHT2.62 cm^2  TR Gradient:33.18 mmHg  Estimated RAP:3 mmHg  RVSP:36 mmHg   MV E' septal velocity: 9.25cm/s  MV E' lateral velocity:13.8 cm/s       LIVER    Result Date: 8/25/2022  NO PRIOR REPORT AVAILABLE Exam: ULTRASOUND OF THE ABDOMEN, LIMITED Clinical data:Hyperbilirubinemia. Technique: Real-time grayscale imaging of the abdomen was performed. Images supplemented with color Doppler technique. Prior studies: No prior studies submitted. Findings: The liver appears echogenic and heterogeneous, coarse with scalloped edges. A cyst is visualized measuring 0.9 x 1.1 x 1.1 cm. No intrahepatic biliary distention. The gallbladder appears distended measuring 13.3 cm with a wall thickness of0.45 cm. Mild sludge is noted. No gallstones. The common bile duct measures 0.73cm. Pericholecystic fluid visualized. The right kidney hunlrtyh15.7 x 5.5 x 4.6 cm. Normal echogenicity and cortical thickness are preserved. No evidence of renal masses. No evidence of renal calculi. There is no evidence of hydronephrosis. The pancreas is unremarkable to the extent visualized. Unremarkable spleen fiqibomxi75.5 x 6.6 x 15.7 cm. Imaged portion of the aorta demonstrates no acute pathology. Visualized portion of the inferior vena cava is unremarkable. No ascites is evident. Patient unable to lie in decubitus position due to fractured hip. Exam done in supine view only. Finding suggestive for hydropic cholecystitis without a obvious stone as described. CBD mildly dilated. MRCP is recommended. Possible chronic liver parenchymal disease, cirrhosis? .  Splenomegaly. Recommendation: Follow up as clinically indicated. Electronically Signed by Jude Jain MD at 25-Aug-2022 06:37:10 PM             XR CHEST PORTABLE    Result Date: 8/25/2022  NO PRIOR REPORT AVAILABLE Exam: X-RAY OF [facility] Clinical data:Hip fracture. Technique:Single view of the chest. Prior studies: No prior studies submitted. Findings: The lungs are grossly clear; noevidence of acute infiltrate or pleural effusion. Cardiac silhouette is within normal limits. No acute osseous abnormality is detected. Normal chest Recommendation: Follow up as clinically indicated. without gangrene 8/27/2022 Yes    Pancytopenia ([address] 8/27/2022 Yes    Closed fracture of left hip ([facility] [address] 8/27/2022 Yes   Assessment & Plan    May have ascites. May have right inguinal hernia. May have fluid in the scrotum    Pancytopenia could be due to hypersplenism. Macrocytic anemia could be due to alcohol abuse. Need to rule out folic acid deficiency and vitamin B 12 deficiency. 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 Serum total iron is elevated. Iron saturation 97%. Cannot rule out hemochromatosis. SUSAN is negative. Smooth muscle antibody is elevated. Patient does not have acute autoimmune hepatitis at this time. Patient is not a candidate for liver transplant at this time because of active drinking. Patient not candidate for liver biopsy because of severe thrombocytopenia. Patient does not need to be seen by gastroenterologist/hepatologist after discharge. 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 I have explained this with patient. No hepatoma by ultrasound, CT scan or MRI. No ascites by imaging. Hepatitis A, B, C serology negative. SUSAN is negative. F-actin IgG is elevated to 58. Mitochondrial antibody is 3.2 and not elevated. Mild splenic varices near the spleen. No ascites on ultrasound. 8/26/2022: Meld sodium score 20.    8/27/2022: Meld sodium 21.    8/31/2022: Meld sodium score 21.    8/26/2022: Maddrey's discriminant function 27.9. Hemolysis: Haptoglobin less than 10. Indirect bilirubin more than direct bilirubin. Hepatic cysts 0.9 x 1.1 x 1.1 cm ultrasound. Distended gallbladder wall thickness millimeter. Sludge. No gallstones. CBD measures 7.3 mm. Pericholecystic fluid visualized. Radiologist calling hydropic cholecystitis. CBD mildly dilated. Seen by surgery. No plan for surgery. 8/29/2022: COVID test is positive. It was negative on 8/25/2022.     Plan:    CT scan of abdomen, pelvis and upper thighs. Advised him to get EGD for variceal screening and colonoscopy for colon cancer screening as outpatient basis. He does not need these tests as an inpatient basis because he is not having upper GI or lower GI symptoms. Advised him to schedule follow-up with PCP and GI doctor on health plan after discharge. Ultrasound the liver every 6 months to screen for hepatoma    Electronically signed by Tere Frye MD on 8/31/22     [employer] possibilities    To whom it may concern. 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 Therefore,  I do not have an office practice. I will not be able to follow-up on any patient as an outpatient basis. I will not be able to follow-up on the results of pending labs, pathology reports,  imaging studies. I will not be able to or do any outpatient procedures. I will not be able to provide continuity of care of any kind to any patient . I  will not be able to keep in touch with any patient by any means such as phone, text or by e-mail because of the nature of my responsibilities as a loc[employer] physician. When the patient is discharged, my patient physician relationship is over. The patient will need to follow-up with other physicians in the hospital when I am not on-call. After discharge, patient will need to follow-up with their primary care doctor, gastroenterologist or liver doctor for continuity of care, check on pending labs, check on pending pathology and check on results of the pending imaging studies-such as x-rays, ultrasound, CAT scan, MRI, HIDA scan, gastric emptying scan or any other studies. For ER physicians, hospitalists and other physicians involved in patient care: If a patient needs follow-up GI care, please schedule follow-up with patient's PCP, gastroenterologist or hepatologist on their health plan. Thank you.     Tere Frye MD

## 2022-08-31 NOTE — DISCHARGE INSTR - DIET

## 2022-08-31 NOTE — PLAN OF CARE
Problem: Discharge Planning  Goal: Discharge to home or other facility with appropriate resources  8/31/2022 0002 by Ardie Runner, RN  Outcome: Progressing  8/30/2022 2346 by Ardie Runner, RN  Outcome: Progressing     Problem: Skin/Tissue Integrity  Goal: Absence of new skin breakdown  Description: 1. Monitor for areas of redness and/or skin breakdown  2. Assess vascular access sites hourly  3. Every 4-6 hours minimum:  Change oxygen saturation probe site  4. Every 4-6 hours:  If on nasal continuous positive airway pressure, respiratory therapy assess nares and determine need for appliance change or resting period.   8/31/2022 0002 by Ardie Runner, RN  Outcome: Progressing  8/30/2022 2346 by Ardie Runner, RN  Outcome: Progressing     Problem: ABCDS Injury Assessment  Goal: Absence of physical injury  8/31/2022 0002 by Ardie Runner, RN  Outcome: Progressing  8/30/2022 2346 by Ardie Runner, RN  Outcome: Progressing     Problem: Safety - Adult  Goal: Free from fall injury  8/31/2022 0002 by Ardie Runner, RN  Outcome: Progressing  8/30/2022 2346 by Ardie Runner, RN  Outcome: Progressing     Problem: Pain  Goal: Verbalizes/displays adequate comfort level or baseline comfort level  8/31/2022 0002 by Ardie Runner, RN  Outcome: Progressing  8/30/2022 2346 by Ardie Runner, RN  Outcome: Progressing

## 2022-08-31 NOTE — DISCHARGE INSTRUCTIONS
Dr Babs Hernandez Total Hip Replacement  Home Instructions     * Elevate and ice affected extremity 20-30 minutes every 2 hours as needed for swelling and pain. Use a barrier ( cloth) between ice pack and skin to prevent frostbite. *Surgical Site Care:         Cleanse crease once daily with soap and water. Clean crease three times daily with rubbing alcohol avoiding incision line. Please use a clean dry rolled washcloth applied into hip crease . Change twice a day. The adhesive dressing (glue strip) can be removed in 2 weeks. May shower and clean wound on Sunday (8/28) but do not scrub incision. Pat dry. NO tub bathing or swimming. Wash hands frequently during the day.            *Activity:         SAFETY FIRST walk with a walker         Home Health therapy will come to the house two times a week to make sure you can do things around the house:          Get in and out of your bed          Getting up and down out of the chair          Bathroom  / bathing activities          Do NOT walk for exercise ( it will increase pain and swelling )          Walk several times a day but only for short distances                                                                                                           FEVER of 101.5 or less  *Pain Medicines:                                                                            Take Tylenol x 2          Take prescribed medicine as needed for pain                         Deep breath x 10          Take Tylenol as your pain decreases                                      Cough, Cough, Cough          Take a stool softener of your choice while on pain meds        Recheck in 1-11/2 hours     *To prevent blood clots:          Do ankle pump exercises when sitting in the chair             *To Prevent Pneumonia:           Sit up and take deep breaths several times a day and use the incentive spirometer     *Call the office if: Increased redness, drainage or an opening at your incision, severe pain, new onset fever or chills. Also notify of any calf pain, tenderness, swelling or redness. Notify of any rash, nausea and vomiting             **If you have any questions or problems please call our office at 1 [phone]**   or contact [facility] at 1 [phone] [place]).         Thank you

## 2022-08-31 NOTE — PROGRESS NOTES
Subjective:     Post-Operative Day: 5 Status Post left ALICIA. Pt is awake and alert. Ox3. Doing okay this am. He was able to ambulate about 30 ft with PT yesterday. Systemic or Specific Complaints: No Complaints  no nausea Pain 5. Objective:     Patient Vitals for the past 24 hrs:   BP Temp Temp src Pulse Resp SpO2   08/31/22 0834 -- -- -- -- 18 --   08/31/22 0804 -- -- -- -- 18 --   08/31/22 0742 (!) 112/50 98.5 °F (36.9 °C) -- 65 20 --   08/31/22 0425 (!) 116/56 98.4 °F (36.9 °C) -- 66 -- 90 %   08/30/22 1938 115/79 99 °F (37.2 °C) -- 68 20 92 %   08/30/22 1731 (!) 115/47 98.9 °F (37.2 °C) Temporal 69 16 91 %   08/30/22 1615 -- -- -- -- 18 --       General: alert, appears stated age, and cooperative   Exam: Incision clean, dry, and intact, no evidence of infection. Swelling to left lower extremity; some bruising as well. Good motion to left lower extremity   Neurovascular: Exam normal       Data Review:  Recent Labs     08/30/22  0538 08/31/22  0415   HGB 8.3* 8.3*     Recent Labs     08/31/22  0415   *   K 3.6   CREATININE 0.5     Recent Labs     08/31/22  0415   LABGLOM >60           Assessment:     Status Post left alicia for FNF. Plan:     Continues current post-op course. POSTOPERATIVE PLANS:  1. He will be on typical total hip arthroplasty protocol. 2.  He will be 2 doses of Ancef and 6 doses of clindamycin. 3.  We will not use any anticoagulation with him as his platelets are  only 71,150 and his INR preop was 1.7. We will have the Medical Team  follow him very closely as he has multiple medical issues and I did tell  the family to be aware of the fact that his complication rate is high as  he is an alcoholic and also still using nicotine.        Electronically signed by Sherif Esposito PA-C on 8/31/2022 at 9:15 AM

## 2022-08-31 NOTE — PROGRESS NOTES
dilated, MRCP is recommended, possible chronic liver parenchymal disease, cirrhosis, splenomegaly. D-dimer 4.3, CTA pulmonary pending. Echo indicated normal LV size with preserved LV systolic function with EF of 88%, normal diastolic function, mild mitral regurgitation, trivial tricuspid insufficiency with estimated RVSP of 38 mm which is mildly elevated. Underwent Left total hip arthroplasty on 8/26/2022. S/p 2 units of Platelets and 1 unit of FFP. MRCP indicated mild distended gallbladder, no significant interpretability dilatation, borderline CBD. CT abdomen indicated cirrhotic liver with abnormal diffuse low attenuation pattern, splenomegaly, mild splenic varices, gallbladder distention, and soft tissue in right inguinal canal is suspicious for undescended testicle. GI suggested EGD at a later time to screen for esophageal varices, and US of liver every 6 months to screen for hepatoma. LLE venous study indicated no evidence of DVT, SVT or Reflux. Fever again overnight. Repeat Covid test as patient's wife, covid +. Remains on room air. Repeat IV lasix for edema. Review of Systems:   Review of Systems   Constitutional:  Negative for chills, diaphoresis, fatigue and fever. HENT:  Negative for congestion and ear pain. Eyes:  Negative for visual disturbance. Respiratory:  Negative for cough, shortness of breath and wheezing. Cardiovascular:  Negative for chest pain, palpitations and leg swelling. Denies chest pain    Gastrointestinal:  Positive for diarrhea. Negative for abdominal distention, abdominal pain, blood in stool, constipation, nausea and vomiting. Endocrine: Negative for cold intolerance and heat intolerance. Genitourinary:  Positive for penile swelling and scrotal swelling. Negative for difficulty urinating, flank pain, frequency and urgency. Musculoskeletal:  Positive for arthralgias and joint swelling. Negative for myalgias.         Left hip pain worsening   LLE pain and swelling    Skin:  Negative for color change and wound. Bruising   Neurological:  Negative for dizziness, syncope, weakness, light-headedness, numbness and headaches. Hematological:  Does not bruise/bleed easily. Psychiatric/Behavioral:  Negative for agitation, confusion and dysphoric mood. 14 point review of systems is negative except as specifically addressed above. Objective:   VITALS:  BP (!) 113/54   Pulse 65   Temp 98.3 °F (36.8 °C)   Resp 20 Comment: 96%  Ht 5' 9\" (1.753 m)   Wt 195 lb 3.2 oz (88.5 kg)   SpO2 90%   BMI 28.83 kg/m²   24HR INTAKE/OUTPUT:  No intake or output data in the 24 hours ending 08/31/22 1508      Physical Exam  Exam conducted with a chaperone present Kris White RN). Constitutional:       General: He is not in acute distress. Appearance: Normal appearance. He is not ill-appearing. HENT:      Head: Normocephalic and atraumatic. Right Ear: External ear normal.      Left Ear: External ear normal.      Nose: Nose normal.      Mouth/Throat:      Mouth: Mucous membranes are moist.   Eyes:      Extraocular Movements: Extraocular movements intact. Conjunctiva/sclera: Conjunctivae normal.      Pupils: Pupils are equal, round, and reactive to light. Cardiovascular:      Rate and Rhythm: Normal rate and regular rhythm. Pulses: Normal pulses. Heart sounds: Normal heart sounds. Pulmonary:      Effort: Pulmonary effort is normal. No respiratory distress. Breath sounds: Normal breath sounds. No wheezing, rhonchi or rales. Abdominal:      General: Bowel sounds are normal. There is no distension. Palpations: Abdomen is soft. Tenderness: There is no abdominal tenderness. Genitourinary:     Penis: Swelling present. Musculoskeletal:         General: Swelling and tenderness present. No deformity. Normal range of motion. Cervical back: Normal range of motion and neck supple. No muscular tenderness.       Right lower leg: No edema. Left lower leg: No edema. Comments: LLE swelling, warmth, and tenderness noted   S/p Left hip total arthroplasty, surgical incision CDI    Skin:     General: Skin is warm and dry. Findings: Bruising present. No lesion. Comments: Bruising to left flank area and hip area   Neurological:      Mental Status: He is alert and oriented to person, place, and time. Psychiatric:         Mood and Affect: Mood normal.         Behavior: Behavior normal.         Thought Content: Thought content normal.           Medications:      sodium chloride      sodium chloride      sodium chloride        furosemide  40 mg IntraVENous BID    budesonide-formoterol  2 puff Inhalation BID    ipratropium  2 puff Inhalation 4x daily    albuterol sulfate HFA  2 puff Inhalation 4x daily    Vitamin D  1,000 Units Oral Daily    ascorbic acid  500 mg Oral BID    zinc sulfate  50 mg Oral Daily    famotidine  20 mg Oral BID    sodium bicarbonate  650 mg Oral 4x Daily    metoprolol succinate  25 mg Oral Daily    sodium chloride flush  5-40 mL IntraVENous 2 times per day    bisacodyl  5 mg Oral Daily    sodium chloride flush  5-40 mL IntraVENous 2 times per day     LORazepam, sodium chloride, sodium chloride flush, sodium chloride, ondansetron **OR** ondansetron, oxyCODONE **OR** oxyCODONE **OR** oxyCODONE, HYDROmorphone **OR** HYDROmorphone **OR** HYDROmorphone, diphenhydrAMINE, sodium chloride, sodium chloride flush, sodium chloride, polyethylene glycol  ADULT DIET;  Regular; Safety Tray; Safety Tray (Disposables)     Lab and other Data:     Recent Labs     08/29/22 0311 08/30/22 0538 08/31/22  0415   WBC 3.5* 2.4* 2.3*   HGB 9.0* 8.3* 8.3*   PLT 37* 30* 33*       Recent Labs     08/29/22 0311 08/30/22  0538 08/31/22  0415   * 133* 133*   K 3.9 3.7 3.6    104 102   CO2 20* 21* 23   BUN 13 12 12   CREATININE 0.5 0.5 0.5   GLUCOSE 121* 115* 116*       Recent Labs     08/29/22 0311 08/30/22  0538 08/31/22  0415 AST 72* 77* 96*   ALT 24 23 27   BILITOT 3.5* 3.0* 3.5*   ALKPHOS 70 66 68       Troponin T: No results for input(s): TROPONINI in the last 72 hours. Pro-BNP: No results for input(s): BNP in the last 72 hours. INR:   Recent Labs     08/29/22  0311   INR 1.74*       UA:  No results for input(s): NITRITE, COLORU, PHUR, LABCAST, WBCUA, RBCUA, MUCUS, TRICHOMONAS, YEAST, BACTERIA, CLARITYU, SPECGRAV, LEUKOCYTESUR, UROBILINOGEN, BILIRUBINUR, BLOODU, GLUCOSEU, AMORPHOUS in the last 72 hours. Invalid input(s): [place]    A1C:   No results for input(s): LABA1C in the last 72 hours. ABG:No results for input(s): PHART, QWI6NRV, PO2ART, MZK3AAR, BEART, HGBAE, M3MVUSRM, CARBOXHGBART in the last 72 hours. RAD:   CT ABDOMEN PELVIS W WO CONTRAST Additional Contrast? Radiologist Recommendation    Result Date: 8/26/2022  1. Cirrhotic liver with abnormal diffuse low attenuation pattern which may have association with chronic liver disease versus diffuse metastatic disease. 2. Splenomegaly. Mild splenic varices. 3. Mild sigmoid diverticulosis. 4. Gallbladder distention. 5. Punctate nonobstructive left renal calculus. 6. Soft tissue in right inguinal canal is suspicious for undescended testicle. Recommendation: SCROTAL US All CT scans at this facility utilize dose modulation, iterative reconstruction, and/or weight based dosing when appropriate to reduce radiation dose to as low as reasonably achievable. Electronically Signed by Jose Guardado MD at 26-Aug-2022 07:49:27 PM             US LIVER    Result Date: 8/25/2022  Finding suggestive for hydropic cholecystitis without a obvious stone as described. CBD mildly dilated. MRCP is recommended. Possible chronic liver parenchymal disease, cirrhosis? .  Splenomegaly. Recommendation: Follow up as clinically indicated. Electronically Signed by Faustina Kauffman MD at 25-Aug-2022 06:37:10 PM             XR CHEST PORTABLE    Result Date: 8/29/2022  1.  No acute cardiopulmonary process 2. No adverse interval change Recommendation: Follow up as clinically indicated. Electronically Signed by Cassia Irizarry DO at 29-Aug-2022 01:43:01 AM             XR CHEST PORTABLE    Result Date: 8/25/2022  Normal chest Recommendation: Follow up as clinically indicated. Electronically Signed by Adelso Tran MD at 25-Aug-2022 01:18:21 PM             MRI ABDOMEN W WO CONTRAST MRCP    Result Date: 8/26/2022  1. Cirrhotic liver. No definite liver mass 2. Spenomegaly with varices. 3. Distended gallbladder. 4. Tiny simple cyst in right hepatic lobe. 5. Tiny right renal simple cysts. Recommendation: Follow up as clinically indicated. EXAM: MRCP CLINICAL DATA:Hydropic gallbladder, slightly dilated CBD. Radiologist recommending MRCP. TECHNIQUE: Heavy T2-weighted images, along with rotating thick and thin slab images of the biliary system were obtained for MRCP. Reformats were constructed. PRIOR STUDIES: CT scan of abdomen and pelvis done same day. Ultrasound of the liver dated 08/25/22. FINDINGS: MRCP: No evidence of intrahepatic biliary ductal dilation is present. The gallbladder is mildly distended. The cystic duct and right and left hepatic ducts are unremarkable. The common bile duct is upper limits of normal (6 mm). No evidence of common bile duct obstruction. Normal pancreatic duct. No biliary strictures are present. No evidence of choledocholithiasis. IMPRESSION: 1. Mild distended gallbladder. 2. No significant interpretability dilatation. 3. Borderline common bile duct. RECOMMENDATION: Follow up as clinically indicated. Electronically Signed by Paula Arreola MD at 26-Aug-2022 10:02:15 PM             CTA PULMONARY W CONTRAST    Result Date: 8/26/2022  1. No evidence of pulmonary embolism. 2. Centriacinar emphysematous changes in bilateral upper lobes and superior segments of bilateral lower lobes. Paraseptal emphysematous changes in medial basal segment of right lower lobe.  3. Mild atelectatic changes with surrounding mild ground-glass opacities in dependent portions of posterobasal segments of bilateral lower lobe. 4. Atherosclerotic wall calcification involving arch of aorta and coronaries. 5. Mild degenerative changes in the spine. 6. Cirrhosis of liver with portal hypertension. 7. Small sliding type of hiatus hernia Recommendation: Follow up as clinically indicated. All CT scans at this facility utilize dose modulation, iterative reconstruction, and/or weight based dosing when appropriate to reduce radiation dose to as low as reasonably achievable. Electronically Signed by Natalya Wilcox MD at 26-Aug-2022 10:05:55 PM             XR HIP 2-3 VW W PELVIS LEFT    Result Date: 8/27/2022  Impression: Fluoroscopic guidance was provided to the Department of Orthopedic Surgery by the Department of Radiology. Recommendation: Follow up as clinical indicated. Electronically Signed by Arnold Muhammad MD at 27-Aug-2022 02:13:19 AM             XR HIP 2-3 VW W PELVIS LEFT    Result Date: 8/26/2022  Intact total left hip arthroplasty. Recommendation: Followup as clinically indicated. Electronically Signed by Natalya Wilcox MD at 26-Aug-2022 09:29:01 PM             XR HIP 2-3 VW W PELVIS LEFT    Result Date: 8/25/2022  Acute, impacted transverse fracture through base of the femoral  head , continued good alignment and normal joint space. Recommendation: Followup as clinically indicated.  Electronically Signed by Shanna Monroy MD at 25-Aug-2022 01:04:57 PM                Echo:   Summary   Left ventricle had normal chamber size and thickness   Preserved systolic function with estimated ejection fraction of 74%   No regional wall motion abnormality   Normal diastolic function   No significant valvular lesion seen   Mild mitral regurgitation   Trivial tricuspid insufficiency with estimated right ventricular systolic   pressure of 38 mm which is mildly elevated    Micro:   SARS-CoV-2, NAAT  COVID-19, Rapid  Collected: 08/29/22 1057   Result status: Final   Resulting lab: [facility]   Reference range: Not Detected   Value: DETECTED Panic         Assessment/Plan   Principal Problem:    Closed left hip fracture, initial encounter [facility]              - Orthopedic surgery following                           - underwent Left total hip arthroplasty on 8/26/2022                           - No anticoagulation given thrombocytopenia and elevated INR               - Pain control               - PT/OT per ortho                 - Case management consulted for discharge planning      Active Problems:   Covid / Febrile/ SOB-               - Follow Blood cultures               - Follow procalcitonin              - Lactic 1.9                    - Follow Cxray               - Supplemental oxygen as needed, on room air currently              - Monitor fever curve               - Monitor labs closely    -Supportive care      Irregular heart beat-  - Cardiology consulted               - Suggested PACs               - Recommended low dose beta blocker to suppress the PACs               - cleared patient for surgery   - Currently NSR 83   - ECHO- indicated normal LV size with preserved LV systolic function with EF of 56%, normal diastolic function, mild mitral regurgitation, trivial tricuspid insufficiency with estimated RVSP of 38 mm which is mildly elevated   - Monitor on telemetry        Liver disease/ Hyperbilirubinemia/ thrombocytopenia-             - s/p 2 units of platelets and 1 unit of fresh frozen plasma    - Liver US- indicated hydropic cholecystitis without any obvious stone, CBD mildly dilated, MRCP is recommended, possible chronic liver parenchymal disease, cirrhosis, splenomegaly              - GI following                           - MRCP indicated mild distended gallbladder, no significant interpretability dilatation, borderline CBD                          - CT abdomen indicated cirrhotic liver with abnormal diffuse low attenuation pattern, splenomegaly, mild splenic varices, gallbladder distention, and soft tissue in right inguinal canal is suspicious for undescended testicle               - GI consulted Surgery whom recommended no indications for surgery at this time, if surgery needed then he would have to be at a tertiary center due to severity of liver disease               - Lipase 41              - Monitor CMP closely         DVT Prophylaxis: SCDs         Serena Gallagher, SHEILA - CNP, 8/31/2022 3:08 PM

## 2022-08-31 NOTE — PROGRESS NOTES
Physical Therapy  Name: Fantasma Richmond  MRN:  [id number]  Date of service:  8/31/2022 08/31/22 1128   Restrictions/Precautions   Restrictions/Precautions Fall Risk;Weight Bearing   Lower Extremity Weight Bearing Restrictions   Left Lower Extremity Weight Bearing Weight Bearing As Tolerated   Position Activity Restriction   Other position/activity restrictions ant hip prec.,  No SLR. COVID droplet plus   Subjective   Subjective Pt agreed to therapy. Pain Assessment   Pain Assessment 0-10   Pain Level 8   Pain Location Hip   Pain Orientation Left   Pain Descriptors Aching   Functional Pain Assessment Prevents or interferes some active activities and ADLs   Pain Type Acute pain   Pain Frequency Intermittent   Non-Pharmaceutical Pain Intervention(s) Ambulation/Increased Activity;Repositioned; Rest   Response to Pain Intervention Patient satisfied   Bed Mobility   Supine to Sit Stand by assistance   Sit to Supine Stand by assistance   Transfers   Sit to Stand Contact guard assistance   Stand to sit Contact guard assistance   Ambulation   WB Status FWBAT LLE   Ambulation   Surface level tile   Device Rolling Walker   Assistance Contact guard assistance   Quality of Gait no LOB   Gait Deviations Slow Traci;Decreased step height   Distance 30'   Patient Goals    Patient goals  get stronger, go home   Short Term Goals   Time Frame for Short term goals 2 wks   Short term goal 1 supine to sit indep   Short term goal 2 sit to stand indep   Short term goal 3 amb. 100' with RW SBA   Short term goal 4 bed to chair SBA   Short term goal 5 up/down 2-3 stairs SBA   Conditions Requiring Skilled Therapeutic Intervention   Body Structures, Functions, Activity Limitations Requiring Skilled Therapeutic Intervention Decreased functional mobility ; Decreased ROM; Decreased endurance;Decreased cognition;Decreased safe awareness;Decreased strength;Decreased balance; Increased pain   Assessment Pt amb with no LOB, fairly steady overall.  Pt used gait belt to assist LE back into bed. All needs in reach. Activity Tolerance   Activity Tolerance Patient tolerated treatment well   PT Plan of Care   Wednesday X   Safety Devices   Type of Devices Call light within reach; Bed alarm in place; Left in bed       Electronically signed by Farooq Dunbar PTA on 8/31/2022 at 1:11 PM

## 2022-08-31 NOTE — PROGRESS NOTES
Physical Therapy  Name: Pedro Cee  MRN:  [id number]  Date of service:  8/31/2022 08/31/22 1531   Restrictions/Precautions   Restrictions/Precautions Fall Risk;Weight Bearing   Lower Extremity Weight Bearing Restrictions   Left Lower Extremity Weight Bearing Weight Bearing As Tolerated   Position Activity Restriction   Other position/activity restrictions ant hip prec.,  No SLR. COVID droplet plus   Subjective   Subjective Pt asleep upon arrival, agreed to therapy. Pain Assessment   Pain Assessment Kapadia-Baker FACES   Kapadia-Baker Pain Rating 6   Pain Location Hip;Scrotum   Pain Orientation Left   Pain Descriptors Aching; Sharp   Functional Pain Assessment Prevents or interferes some active activities and ADLs   Pain Type Acute pain   Pain Frequency Intermittent   Non-Pharmaceutical Pain Intervention(s) Ambulation/Increased Activity;Repositioned; Rest   Response to Pain Intervention Patient satisfied   Bed Mobility   Supine to Sit Stand by assistance   Sit to Supine Stand by assistance   Transfers   Sit to Stand Contact guard assistance   Stand to sit Contact guard assistance   Bed to Chair Contact guard assistance  (bed to bsc to bed)   Ambulation   WB Status FWBAT LLE   Ambulation   Surface level tile   Device Rolling Walker   Assistance Contact guard assistance   Quality of Gait no LOB   Gait Deviations Slow Traci;Decreased step height   Distance 30'   Patient Goals    Patient goals  get stronger, go home   Short Term Goals   Time Frame for Short term goals 2 wks   Short term goal 1 supine to sit indep   Short term goal 2 sit to stand indep   Short term goal 3 amb. 100' with RW SBA   Short term goal 4 bed to chair SBA   Short term goal 5 up/down 2-3 stairs SBA   Conditions Requiring Skilled Therapeutic Intervention   Body Structures, Functions, Activity Limitations Requiring Skilled Therapeutic Intervention Decreased functional mobility ; Decreased ROM; Decreased endurance;Decreased cognition;Decreased safe awareness;Decreased strength;Decreased balance; Increased pain   Assessment Pt has increased pain with bed mobility but reports it improves once up amb. Amb around room with no LOB then sat EOB for a few minutes. Transferred to [facility], independent with hygiene, then back to bed. Activity Tolerance   Activity Tolerance Patient tolerated treatment well   PT Plan of Care   Wednesday X   Safety Devices   Type of Devices Call light within reach; Bed alarm in place; Left in bed         Electronically signed by Natalie Conde PTA on 8/31/2022 at 3:35 PM

## 2022-08-31 NOTE — PLAN OF CARE
I had signed off this patient. I received lab results and that smooth muscle antibody was elevated. AST 77, ALT 23, bili 3. Alkaline phosphatase 66 . This do not picture of acute autoimmune hepatitis. Smooth muscle antibody may be false positive or true positive. Both muscle antibody titer is 1 in 640 . SUSAN is negative. 200 antibody is negative. Patient will need to be evaluated by a rheumatologist.  I wonder if COVID infection can cause elevated smooth muscle antibody. Patient is not a candidate for liver biopsy at this time because of severe thrombocytopenia. Patient needs to follow-up with the hepatologist after discharge. Then liver biopsy can be considered. Liver disease most likely due to alcohol abuse. Hepatitis C antibody negative. Other etiologies would need to be ruled out. .  Work-up for other causes of liver cirrhosis can be done in outpatient basis. Patient needs to stop drinking alcohol. MRI of the liver does not show any iron overload.

## 2022-09-01 LAB
ALBUMIN SERPL-MCNC: 2.9 G/DL (ref 3.5–5.2)
ALP BLD-CCNC: 78 U/L (ref 40–130)
ALT SERPL-CCNC: 36 U/L (ref 5–41)
ANION GAP SERPL CALCULATED.3IONS-SCNC: 10 MMOL/L (ref 7–19)
AST SERPL-CCNC: 126 U/L (ref 5–40)
BASOPHILS ABSOLUTE: 0 K/UL (ref 0–0.2)
BASOPHILS RELATIVE PERCENT: 0.4 % (ref 0–1)
BILIRUB SERPL-MCNC: 4.3 MG/DL (ref 0.2–1.2)
BUN BLDV-MCNC: 10 MG/DL (ref 8–23)
CALCIUM SERPL-MCNC: 8.3 MG/DL (ref 8.8–10.2)
CHLORIDE BLD-SCNC: 101 MMOL/L (ref 98–111)
CO2: 25 MMOL/L (ref 22–29)
CREAT SERPL-MCNC: 0.5 MG/DL (ref 0.5–1.2)
EOSINOPHILS ABSOLUTE: 0.1 K/UL (ref 0–0.6)
EOSINOPHILS RELATIVE PERCENT: 5 % (ref 0–5)
GFR AFRICAN AMERICAN: >59
GFR NON-AFRICAN AMERICAN: >60
GLUCOSE BLD-MCNC: 118 MG/DL (ref 74–109)
HCT VFR BLD CALC: 26.1 % (ref 42–52)
HEMOGLOBIN: 8.6 G/DL (ref 14–18)
IMMATURE GRANULOCYTES #: 0 K/UL
LYMPHOCYTES ABSOLUTE: 0.6 K/UL (ref 1.1–4.5)
LYMPHOCYTES RELATIVE PERCENT: 24.9 % (ref 20–40)
MAGNESIUM: 1.6 MG/DL (ref 1.6–2.4)
MCH RBC QN AUTO: 38.6 PG (ref 27–31)
MCHC RBC AUTO-ENTMCNC: 33 G/DL (ref 33–37)
MCV RBC AUTO: 117 FL (ref 80–94)
MONOCYTES ABSOLUTE: 0.5 K/UL (ref 0–0.9)
MONOCYTES RELATIVE PERCENT: 22 % (ref 0–10)
NEUTROPHILS ABSOLUTE: 1.1 K/UL (ref 1.5–7.5)
NEUTROPHILS RELATIVE PERCENT: 46.9 % (ref 50–65)
PDW BLD-RTO: 16.2 % (ref 11.5–14.5)
PLATELET # BLD: 34 K/UL (ref 130–400)
PMV BLD AUTO: 10.5 FL (ref 9.4–12.4)
POTASSIUM REFLEX MAGNESIUM: 3.2 MMOL/L (ref 3.5–5)
RBC # BLD: 2.23 M/UL (ref 4.7–6.1)
SODIUM BLD-SCNC: 136 MMOL/L (ref 136–145)
TOTAL PROTEIN: 5.3 G/DL (ref 6.6–8.7)
WBC # BLD: 2.4 K/UL (ref 4.8–10.8)

## 2022-09-01 PROCEDURE — 6360000002 HC RX W HCPCS: Performed by: NURSE PRACTITIONER

## 2022-09-01 PROCEDURE — 2580000003 HC RX 258: Performed by: ORTHOPAEDIC SURGERY

## 2022-09-01 PROCEDURE — 6370000000 HC RX 637 (ALT 250 FOR IP): Performed by: NURSE PRACTITIONER

## 2022-09-01 PROCEDURE — 80053 COMPREHEN METABOLIC PANEL: CPT

## 2022-09-01 PROCEDURE — 85025 COMPLETE CBC W/AUTO DIFF WBC: CPT

## 2022-09-01 PROCEDURE — 6370000000 HC RX 637 (ALT 250 FOR IP): Performed by: ORTHOPAEDIC SURGERY

## 2022-09-01 PROCEDURE — 97116 GAIT TRAINING THERAPY: CPT

## 2022-09-01 PROCEDURE — 36415 COLL VENOUS BLD VENIPUNCTURE: CPT

## 2022-09-01 PROCEDURE — 6370000000 HC RX 637 (ALT 250 FOR IP): Performed by: HOSPITALIST

## 2022-09-01 PROCEDURE — 99232 SBSQ HOSP IP/OBS MODERATE 35: CPT | Performed by: SPECIALIST

## 2022-09-01 PROCEDURE — 6360000002 HC RX W HCPCS: Performed by: ORTHOPAEDIC SURGERY

## 2022-09-01 PROCEDURE — 83735 ASSAY OF MAGNESIUM: CPT

## 2022-09-01 PROCEDURE — 1210000000 HC MED SURG R&B

## 2022-09-01 PROCEDURE — 99231 SBSQ HOSP IP/OBS SF/LOW 25: CPT | Performed by: INTERNAL MEDICINE

## 2022-09-01 RX ORDER — POTASSIUM CHLORIDE 20 MEQ/1
40 TABLET, EXTENDED RELEASE ORAL 2 TIMES DAILY
Status: COMPLETED | OUTPATIENT
Start: 2022-09-01 | End: 2022-09-01

## 2022-09-01 RX ORDER — FUROSEMIDE 20 MG/1
20 TABLET ORAL DAILY
Status: DISCONTINUED | OUTPATIENT
Start: 2022-09-02 | End: 2022-09-03

## 2022-09-01 RX ORDER — SPIRONOLACTONE 25 MG/1
50 TABLET ORAL DAILY
Status: DISCONTINUED | OUTPATIENT
Start: 2022-09-02 | End: 2022-09-06

## 2022-09-01 RX ORDER — FUROSEMIDE 10 MG/ML
40 INJECTION INTRAMUSCULAR; INTRAVENOUS ONCE
Status: COMPLETED | OUTPATIENT
Start: 2022-09-01 | End: 2022-09-01

## 2022-09-01 RX ORDER — MAGNESIUM SULFATE IN WATER 40 MG/ML
2000 INJECTION, SOLUTION INTRAVENOUS ONCE
Status: COMPLETED | OUTPATIENT
Start: 2022-09-01 | End: 2022-09-01

## 2022-09-01 RX ADMIN — OXYCODONE 15 MG: 5 TABLET ORAL at 19:43

## 2022-09-01 RX ADMIN — SODIUM BICARBONATE 650 MG: 650 TABLET ORAL at 19:44

## 2022-09-01 RX ADMIN — ALBUTEROL SULFATE 2 PUFF: 90 AEROSOL, METERED RESPIRATORY (INHALATION) at 19:46

## 2022-09-01 RX ADMIN — METOPROLOL SUCCINATE 25 MG: 25 TABLET, EXTENDED RELEASE ORAL at 08:49

## 2022-09-01 RX ADMIN — ZINC SULFATE 220 MG (50 MG) CAPSULE 50 MG: CAPSULE at 08:49

## 2022-09-01 RX ADMIN — MAGNESIUM SULFATE HEPTAHYDRATE 2000 MG: 40 INJECTION, SOLUTION INTRAVENOUS at 11:25

## 2022-09-01 RX ADMIN — BISACODYL 5 MG: 5 TABLET, COATED ORAL at 08:49

## 2022-09-01 RX ADMIN — ALBUTEROL SULFATE 2 PUFF: 90 AEROSOL, METERED RESPIRATORY (INHALATION) at 08:49

## 2022-09-01 RX ADMIN — IPRATROPIUM BROMIDE 2 PUFF: 17 AEROSOL, METERED RESPIRATORY (INHALATION) at 11:26

## 2022-09-01 RX ADMIN — SODIUM BICARBONATE 650 MG: 650 TABLET ORAL at 13:47

## 2022-09-01 RX ADMIN — POTASSIUM CHLORIDE 40 MEQ: 1500 TABLET, EXTENDED RELEASE ORAL at 19:42

## 2022-09-01 RX ADMIN — SODIUM BICARBONATE 650 MG: 650 TABLET ORAL at 17:42

## 2022-09-01 RX ADMIN — IPRATROPIUM BROMIDE 2 PUFF: 17 AEROSOL, METERED RESPIRATORY (INHALATION) at 08:49

## 2022-09-01 RX ADMIN — FAMOTIDINE 20 MG: 20 TABLET ORAL at 08:48

## 2022-09-01 RX ADMIN — HYDROMORPHONE HYDROCHLORIDE 0.5 MG: 1 INJECTION, SOLUTION INTRAMUSCULAR; INTRAVENOUS; SUBCUTANEOUS at 10:46

## 2022-09-01 RX ADMIN — SODIUM CHLORIDE, PRESERVATIVE FREE 10 ML: 5 INJECTION INTRAVENOUS at 09:00

## 2022-09-01 RX ADMIN — IPRATROPIUM BROMIDE 2 PUFF: 17 AEROSOL, METERED RESPIRATORY (INHALATION) at 19:46

## 2022-09-01 RX ADMIN — ALBUTEROL SULFATE 2 PUFF: 90 AEROSOL, METERED RESPIRATORY (INHALATION) at 11:26

## 2022-09-01 RX ADMIN — SODIUM CHLORIDE, PRESERVATIVE FREE 5 ML: 5 INJECTION INTRAVENOUS at 20:24

## 2022-09-01 RX ADMIN — ALBUTEROL SULFATE 2 PUFF: 90 AEROSOL, METERED RESPIRATORY (INHALATION) at 17:42

## 2022-09-01 RX ADMIN — OXYCODONE HYDROCHLORIDE AND ACETAMINOPHEN 500 MG: 500 TABLET ORAL at 08:49

## 2022-09-01 RX ADMIN — Medication 1000 UNITS: at 08:49

## 2022-09-01 RX ADMIN — FAMOTIDINE 20 MG: 20 TABLET ORAL at 19:44

## 2022-09-01 RX ADMIN — FUROSEMIDE 40 MG: 10 INJECTION, SOLUTION INTRAMUSCULAR; INTRAVENOUS at 08:49

## 2022-09-01 RX ADMIN — BUDESONIDE AND FORMOTEROL FUMARATE DIHYDRATE 2 PUFF: 160; 4.5 AEROSOL RESPIRATORY (INHALATION) at 08:49

## 2022-09-01 RX ADMIN — SODIUM CHLORIDE, PRESERVATIVE FREE 10 ML: 5 INJECTION INTRAVENOUS at 19:45

## 2022-09-01 RX ADMIN — SODIUM BICARBONATE 650 MG: 650 TABLET ORAL at 08:49

## 2022-09-01 RX ADMIN — POTASSIUM CHLORIDE 40 MEQ: 1500 TABLET, EXTENDED RELEASE ORAL at 11:26

## 2022-09-01 RX ADMIN — IPRATROPIUM BROMIDE 2 PUFF: 17 AEROSOL, METERED RESPIRATORY (INHALATION) at 17:42

## 2022-09-01 RX ADMIN — BUDESONIDE AND FORMOTEROL FUMARATE DIHYDRATE 2 PUFF: 160; 4.5 AEROSOL RESPIRATORY (INHALATION) at 19:46

## 2022-09-01 RX ADMIN — OXYCODONE HYDROCHLORIDE AND ACETAMINOPHEN 500 MG: 500 TABLET ORAL at 19:44

## 2022-09-01 RX ADMIN — OXYCODONE 10 MG: 5 TABLET ORAL at 08:49

## 2022-09-01 RX ADMIN — FUROSEMIDE 40 MG: 10 INJECTION, SOLUTION INTRAMUSCULAR; INTRAVENOUS at 17:42

## 2022-09-01 ASSESSMENT — PAIN DESCRIPTION - ONSET
ONSET: ON-GOING

## 2022-09-01 ASSESSMENT — PAIN SCALES - GENERAL
PAINLEVEL_OUTOF10: 7
PAINLEVEL_OUTOF10: 9
PAINLEVEL_OUTOF10: 8
PAINLEVEL_OUTOF10: 9

## 2022-09-01 ASSESSMENT — PAIN DESCRIPTION - LOCATION: LOCATION: GROIN;LEG;HIP

## 2022-09-01 ASSESSMENT — PAIN DESCRIPTION - FREQUENCY
FREQUENCY: CONTINUOUS

## 2022-09-01 ASSESSMENT — ENCOUNTER SYMPTOMS
CONSTIPATION: 0
VOMITING: 0
BLOOD IN STOOL: 0
WHEEZING: 0
ABDOMINAL PAIN: 0
DIARRHEA: 1
ROS SKIN COMMENTS: BRUISING
SHORTNESS OF BREATH: 0
COUGH: 0
NAUSEA: 0
ABDOMINAL DISTENTION: 0
COLOR CHANGE: 0

## 2022-09-01 ASSESSMENT — PAIN DESCRIPTION - ORIENTATION
ORIENTATION: LOWER
ORIENTATION: LOWER
ORIENTATION: LOWER;LEFT;RIGHT

## 2022-09-01 ASSESSMENT — PAIN - FUNCTIONAL ASSESSMENT
PAIN_FUNCTIONAL_ASSESSMENT: PREVENTS OR INTERFERES SOME ACTIVE ACTIVITIES AND ADLS

## 2022-09-01 ASSESSMENT — PAIN SCALES - WONG BAKER
WONGBAKER_NUMERICALRESPONSE: 8
WONGBAKER_NUMERICALRESPONSE: 0;8

## 2022-09-01 NOTE — PROGRESS NOTES
[facility]      Patient:  Mari Pierre  YOB: 1955  Date of Service: 9/1/2022  MRN: [id number]   Acct: [de-identified]   Primary Care Physician: No primary care provider on file. Advance Directive: Full Code  Admit Date: 8/25/2022       Hospital Day: 7  Portions of this note have been copied forward, however, changed to reflect the most current clinical status of this patient. CHIEF COMPLAINT left hip pain    SUBJECTIVE: Patient reports only 2 stools in the past 24 hours. Ports improvement in scrotal and penile edema today. Patient has continued left hip pain that has greatly improved and is controlled well with oral medications. CUMULATIVE HOSPITAL COURSE:  The patient is a 75 y.o. male with past medical history of liver disease who presented to [facility] ED complaining of left hip pain. Mr. Georgia Ba reported he was knocked down by a big dog and landed on his left hip. Since then he has not been able to ambulate. Denied any other symptoms. Denied any medical conditions, other than liver disease that has not bothered him. Stated he does not take any prescription medications at home. Admitted to smoking 1 ppd. Workup in ED revealed new onset rate controlled Afib, glucose 139 otherwise unremarkable chemistry, AST 75, bilirubin 8.0, WBC 3.8, platelet 30, INR 2.69, Cxray unremarkable. Xray of hip indicated acute, impacted transverse fracture through base of the femoral head. Patient was admitted to hospital medicine for left hip fracture and new onset Afib with orthopedic surgery and cardiology consultations. Cardiology cleared patient for surgery as long as ECHO was not abnormal, recommended low dose beta blocker to suppress the PACs. GI recommended CT of abdomen to rule out hepatoma, or portal vein thrombosis. Hematology recommended vitamin K 10 mg daily x3 days, avoid DVT prophylaxis, transfuse 2 units platelets before/during surgical procedure. Orthopedic surgery recommended surgical intervention. Ultrasound of liver indicated hydropic cholecystitis without any obvious stone, CBD mildly dilated, MRCP is recommended, possible chronic liver parenchymal disease, cirrhosis, splenomegaly. D-dimer 4.3, CTA pulmonary pending. Echo indicated normal LV size with preserved LV systolic function with EF of 98%, normal diastolic function, mild mitral regurgitation, trivial tricuspid insufficiency with estimated RVSP of 38 mm which is mildly elevated. Underwent Left total hip arthroplasty on 8/26/2022. S/p 2 units of Platelets and 1 unit of FFP. MRCP indicated mild distended gallbladder, no significant interpretability dilatation, borderline CBD. CT abdomen indicated cirrhotic liver with abnormal diffuse low attenuation pattern, splenomegaly, mild splenic varices, gallbladder distention, and soft tissue in right inguinal canal is suspicious for undescended testicle. GI suggested EGD at a later time to screen for esophageal varices, and US of liver every 6 months to screen for hepatoma. LLE venous study indicated no evidence of DVT, SVT or Reflux. Fever again overnight. Repeat Covid test as patient's wife, covid +. Remains on room air. We will continue diuresis IV today and likely transition to oral tomorrow patient will likely need daily diuresis. Review of Systems:   Review of Systems   Constitutional:  Negative for chills, diaphoresis, fatigue and fever. HENT:  Negative for congestion and ear pain. Eyes:  Negative for visual disturbance. Respiratory:  Negative for cough, shortness of breath and wheezing. Cardiovascular:  Negative for chest pain, palpitations and leg swelling. Denies chest pain    Gastrointestinal:  Positive for diarrhea. Negative for abdominal distention, abdominal pain, blood in stool, constipation, nausea and vomiting. Endocrine: Negative for cold intolerance and heat intolerance. Genitourinary:  Positive for penile swelling and scrotal swelling.  Negative for difficulty urinating, flank pain, frequency and urgency. Musculoskeletal:  Positive for arthralgias and joint swelling. Negative for myalgias. Left hip pain worsening   LLE pain and swelling    Skin:  Negative for color change and wound. Bruising   Neurological:  Negative for dizziness, syncope, weakness, light-headedness, numbness and headaches. Hematological:  Does not bruise/bleed easily. Psychiatric/Behavioral:  Negative for agitation, confusion and dysphoric mood. 14 point review of systems is negative except as specifically addressed above. Objective:   VITALS:  /68   Pulse 86   Temp 100.2 °F (37.9 °C) (Temporal)   Resp 20   Ht 5' 9\" (1.753 m)   Wt 195 lb 3.2 oz (88.5 kg)   SpO2 91%   BMI 28.83 kg/m²   24HR INTAKE/OUTPUT:  No intake or output data in the 24 hours ending 09/01/22 1057      Physical Exam  Exam conducted with a chaperone present (Alyssia Castellanos). Constitutional:       General: He is not in acute distress. Appearance: Normal appearance. He is not ill-appearing. HENT:      Head: Normocephalic and atraumatic. Right Ear: External ear normal.      Left Ear: External ear normal.      Nose: Nose normal.      Mouth/Throat:      Mouth: Mucous membranes are moist.   Eyes:      Extraocular Movements: Extraocular movements intact. Conjunctiva/sclera: Conjunctivae normal.      Pupils: Pupils are equal, round, and reactive to light. Cardiovascular:      Rate and Rhythm: Normal rate and regular rhythm. Pulses: Normal pulses. Heart sounds: Normal heart sounds. Pulmonary:      Effort: Pulmonary effort is normal. No respiratory distress. Breath sounds: Normal breath sounds. No wheezing, rhonchi or rales. Abdominal:      General: Bowel sounds are normal. There is no distension. Palpations: Abdomen is soft. Tenderness: There is no abdominal tenderness. Genitourinary:     Penis: Swelling present.     Musculoskeletal:         General: Swelling and tenderness present. No deformity. Normal range of motion. Cervical back: Normal range of motion and neck supple. No muscular tenderness. Right lower leg: No edema. Left lower leg: No edema. Comments: LLE swelling, warmth, and tenderness noted   S/p Left hip total arthroplasty, surgical incision CDI    Skin:     General: Skin is warm and dry. Findings: Bruising present. No lesion. Comments: Bruising to left flank area and hip area   Neurological:      Mental Status: He is alert and oriented to person, place, and time. Psychiatric:         Mood and Affect: Mood normal.         Behavior: Behavior normal.         Thought Content: Thought content normal.           Medications:      sodium chloride      sodium chloride      sodium chloride        magnesium sulfate  2,000 mg IntraVENous Once    potassium chloride  40 mEq Oral BID    budesonide-formoterol  2 puff Inhalation BID    ipratropium  2 puff Inhalation 4x daily    albuterol sulfate HFA  2 puff Inhalation 4x daily    Vitamin D  1,000 Units Oral Daily    ascorbic acid  500 mg Oral BID    zinc sulfate  50 mg Oral Daily    famotidine  20 mg Oral BID    sodium bicarbonate  650 mg Oral 4x Daily    metoprolol succinate  25 mg Oral Daily    sodium chloride flush  5-40 mL IntraVENous 2 times per day    bisacodyl  5 mg Oral Daily    sodium chloride flush  5-40 mL IntraVENous 2 times per day     LORazepam, sodium chloride, sodium chloride flush, sodium chloride, ondansetron **OR** ondansetron, oxyCODONE **OR** oxyCODONE **OR** oxyCODONE, diphenhydrAMINE, sodium chloride, sodium chloride flush, sodium chloride, polyethylene glycol  ADULT DIET;  Regular; Safety Tray; Safety Tray (Disposables)     Lab and other Data:     Recent Labs     08/30/22 0538 08/31/22 0415 09/01/22  0844   WBC 2.4* 2.3* 2.4*   HGB 8.3* 8.3* 8.6*   PLT 30* 33* 34*       Recent Labs     08/30/22 0538 08/31/22 0415 09/01/22  0844   * 133* 136   K 3.7 3.6 3.2*    102 101   CO2 21* 23 25   BUN 12 12 10   CREATININE 0.5 0.5 0.5   GLUCOSE 115* 116* 118*       Recent Labs     08/30/22  0538 08/31/22  0415 09/01/22  0844   AST 77* 96* 126*   ALT 23 27 36   BILITOT 3.0* 3.5* 4.3*   ALKPHOS 66 68 78       Troponin T: No results for input(s): TROPONINI in the last 72 hours. Pro-BNP: No results for input(s): BNP in the last 72 hours. INR:   No results for input(s): INR in the last 72 hours. UA:  No results for input(s): NITRITE, COLORU, PHUR, LABCAST, WBCUA, RBCUA, MUCUS, TRICHOMONAS, YEAST, BACTERIA, CLARITYU, SPECGRAV, LEUKOCYTESUR, UROBILINOGEN, BILIRUBINUR, BLOODU, GLUCOSEU, AMORPHOUS in the last 72 hours. Invalid input(s): Olu Jenna    A1C:   No results for input(s): LABA1C in the last 72 hours. ABG:No results for input(s): PHART, RIY0NTJ, PO2ART, AMU6QEX, BEART, HGBAE, A4OCYVWH, CARBOXHGBART in the last 72 hours. RAD:   CT ABDOMEN PELVIS W WO CONTRAST Additional Contrast? Radiologist Recommendation    Result Date: 8/26/2022  1. Cirrhotic liver with abnormal diffuse low attenuation pattern which may have association with chronic liver disease versus diffuse metastatic disease. 2. Splenomegaly. Mild splenic varices. 3. Mild sigmoid diverticulosis. 4. Gallbladder distention. 5. Punctate nonobstructive left renal calculus. 6. Soft tissue in right inguinal canal is suspicious for undescended testicle. Recommendation: SCROTAL US All CT scans at this facility utilize dose modulation, iterative reconstruction, and/or weight based dosing when appropriate to reduce radiation dose to as low as reasonably achievable. Electronically Signed by Juan Pinedo MD at 26-Aug-2022 07:49:27 PM             US LIVER    Result Date: 8/25/2022  Finding suggestive for hydropic cholecystitis without a obvious stone as described. CBD mildly dilated. MRCP is recommended. Possible chronic liver parenchymal disease, cirrhosis? .  Splenomegaly.  Recommendation: Follow up as clinically indicated. Electronically Signed by Juana Avila MD at 25-Aug-2022 06:37:10 PM             XR CHEST PORTABLE    Result Date: 8/29/2022  1. No acute cardiopulmonary process 2. No adverse interval change Recommendation: Follow up as clinically indicated. Electronically Signed by Josey Bah DO at 29-Aug-2022 01:43:01 AM             XR CHEST PORTABLE    Result Date: 8/25/2022  Normal chest Recommendation: Follow up as clinically indicated. Electronically Signed by Christiana Garcia MD at 25-Aug-2022 01:18:21 PM             MRI ABDOMEN W WO CONTRAST MRCP    Result Date: 8/26/2022  1. Cirrhotic liver. No definite liver mass 2. Spenomegaly with varices. 3. Distended gallbladder. 4. Tiny simple cyst in right hepatic lobe. 5. Tiny right renal simple cysts. Recommendation: Follow up as clinically indicated. EXAM: MRCP CLINICAL DATA:Hydropic gallbladder, slightly dilated CBD. Radiologist recommending MRCP. TECHNIQUE: Heavy T2-weighted images, along with rotating thick and thin slab images of the biliary system were obtained for MRCP. Reformats were constructed. PRIOR STUDIES: CT scan of abdomen and pelvis done same day. Ultrasound of the liver dated 08/25/22. FINDINGS: MRCP: No evidence of intrahepatic biliary ductal dilation is present. The gallbladder is mildly distended. The cystic duct and right and left hepatic ducts are unremarkable. The common bile duct is upper limits of normal (6 mm). No evidence of common bile duct obstruction. Normal pancreatic duct. No biliary strictures are present. No evidence of choledocholithiasis. IMPRESSION: 1. Mild distended gallbladder. 2. No significant interpretability dilatation. 3. Borderline common bile duct. RECOMMENDATION: Follow up as clinically indicated. Electronically Signed by Aliza Mcguire MD at 26-Aug-2022 10:02:15 PM             CTA PULMONARY W CONTRAST    Result Date: 8/26/2022  1. No evidence of pulmonary embolism.  2. Centriacinar emphysematous changes in bilateral upper lobes and superior segments of bilateral lower lobes. Paraseptal emphysematous changes in medial basal segment of right lower lobe. 3. Mild atelectatic changes with surrounding mild ground-glass opacities in dependent portions of posterobasal segments of bilateral lower lobe. 4. Atherosclerotic wall calcification involving arch of aorta and coronaries. 5. Mild degenerative changes in the spine. 6. Cirrhosis of liver with portal hypertension. 7. Small sliding type of hiatus hernia Recommendation: Follow up as clinically indicated. All CT scans at this facility utilize dose modulation, iterative reconstruction, and/or weight based dosing when appropriate to reduce radiation dose to as low as reasonably achievable. Electronically Signed by Paula Arreola MD at 26-Aug-2022 10:05:55 PM             XR HIP 2-3 VW W PELVIS LEFT    Result Date: 8/27/2022  Impression: Fluoroscopic guidance was provided to the Department of Orthopedic Surgery by the Department of Radiology. Recommendation: Follow up as clinical indicated. Electronically Signed by Supriya Alejandra MD at 27-Aug-2022 02:13:19 AM             XR HIP 2-3 VW W PELVIS LEFT    Result Date: 8/26/2022  Intact total left hip arthroplasty. Recommendation: Followup as clinically indicated. Electronically Signed by Paula Arreola MD at 26-Aug-2022 09:29:01 PM             XR HIP 2-3 VW W PELVIS LEFT    Result Date: 8/25/2022  Acute, impacted transverse fracture through base of the femoral  head , continued good alignment and normal joint space. Recommendation: Followup as clinically indicated.  Electronically Signed by Adelso Tran MD at 25-Aug-2022 01:04:57 PM                Echo:   Summary   Left ventricle had normal chamber size and thickness   Preserved systolic function with estimated ejection fraction of 74%   No regional wall motion abnormality   Normal diastolic function   No significant valvular lesion seen   Mild mitral regurgitation   Trivial tricuspid insufficiency with estimated right ventricular systolic   pressure of 38 mm which is mildly elevated    Micro:   SARS-CoV-2, NAAT  COVID-19, Rapid  Collected: 08/29/22 1057   Result status: Final   Resulting lab: [facility]   Reference range: Not Detected   Value: DETECTED Panic         Assessment/Plan   Principal Problem:    Closed left hip fracture, initial encounter [facility]              - Orthopedic surgery following                           - underwent Left total hip arthroplasty on 8/26/2022                           - No anticoagulation given thrombocytopenia and elevated INR               - Pain control               - PT/OT per ortho                 - Case management consulted for discharge planning      Active Problems:   Covid / Febrile/ SOB-               - Follow Blood cultures               - Follow procalcitonin              - Lactic 1.9                    - Follow Cxray               - Supplemental oxygen as needed, on room air currently              - Monitor fever curve               - Monitor labs closely    -Supportive care      Irregular heart beat-  - Cardiology consulted               - Suggested PACs               - Recommended low dose beta blocker to suppress the PACs               - cleared patient for surgery   - Currently NSR 83   - ECHO- indicated normal LV size with preserved LV systolic function with EF of 18%, normal diastolic function, mild mitral regurgitation, trivial tricuspid insufficiency with estimated RVSP of 38 mm which is mildly elevated   - Monitor on telemetry        Liver disease/ Hyperbilirubinemia/ thrombocytopenia-             - s/p 2 units of platelets and 1 unit of fresh frozen plasma    - Liver US- indicated hydropic cholecystitis without any obvious stone, CBD mildly dilated, MRCP is recommended, possible chronic liver parenchymal disease, cirrhosis, splenomegaly              - GI following - MRCP indicated mild distended gallbladder, no significant interpretability dilatation, borderline CBD                          - CT abdomen indicated cirrhotic liver with abnormal diffuse low attenuation pattern, splenomegaly, mild splenic varices, gallbladder distention, and soft tissue in right inguinal canal is suspicious for undescended testicle               - GI consulted Surgery whom recommended no indications for surgery at this time, if surgery needed then he would have to be at a tertiary center due to severity of liver disease               - Lipase 41              - Monitor CMP closely    -IV lasix today, likely transition to oral tomorrow    -monitor and replace electrolytes as needed        DVT Prophylaxis: SCDs         SHEILA Branham - CNP, 9/1/2022 10:57 AM

## 2022-09-01 NOTE — PROGRESS NOTES
PROGRESS NOTE    Patient name: Ana Geller  Patient : 1955  Room: 536    SUBJECTIVE: POD #6 left hip replacement. In isolation due to COVID-19. Complaint of perineal/scrotal edema. Status post abdominal/pelvic CT last p.m. for evaluation. Results pending. States increased urination with diuretic. Still mildly congested. Denies shortness of breath. Postoperative discomfort left hip. INTERVAL HISTORY  Ana Geller was first seen by Dr. Flor Orellana on 2022. Hematology consultation was requested for findings of thrombocytopenia. The patient was hospitalized at [facility] with complaints of left hip pain after a fall yesterday. The patient has not been able to ambulate. He is a chronic smoker. He was found to be on A. fib in the ER. CBC during admission here showed WBC count 3.8 with ANC of 2.5/, hemoglobin 15.3/, platelet count 64,872. Iron profile showed iron of 298, iron saturation 97%, TIBC 307, haptoglobin<10. PT 19.4/INR 1.61. The patient tells me that he has a diagnosis of alcoholic cirrhosis. He is a chronic drinker for many decades. Unfortunately, he does not have a GI doctor. He is known to be thrombocytopenic. He denies any history of hepatitis C. Ultrasound performed here showed findings suggestive of chronic liver parenchymal disease, cirrhosis and splenomegaly with the spleen measuring 2.5 x 6.6 x 15.7 cm consistent with splenomegaly. Objective   /72   Pulse 78   Temp 99.1 °F (37.3 °C)   Resp 18   Ht 5' 9\" (1.753 m)   Wt 195 lb 3.2 oz (88.5 kg)   SpO2 93%   BMI 28.83 kg/m²     PHYSICAL EXAM:  CONSTITUTIONAL: Alert, appropriate, no acute distress. EYES: Equal, round, reactive  NECK: Supple, no masses. No JVD  CHEST/LUNGS: CTA bilaterally, normal respiratory effort, dry cough. CARDIOVASCULAR: RRR, no murmurs.     ABDOMEN: distended, soft non-tender, active bowel sounds  EXTREMITIES: Postop left hip surgery- incision well approximated, no s/s of infection. LLE ecchymosis improved. Abdominal/hip/bilateral lower extremity edema 2+. SKIN: warm, dry with no rashes or lesions. Scattered bruising  NEUROLOGIC: follows commands, non focal     Recent Labs     08/31/22  0415 08/30/22  0538 08/29/22  0311   WBC 2.3* 2.4* 3.5*   HGB 8.3* 8.3* 9.0*   HCT 25.1* 25.0* 27.8*   .5* 122.0* 121.4*   PLT 33* 30* 37*     Lab Results   Component Value Date     (L) 08/31/2022    K 3.6 08/31/2022     08/31/2022    CO2 23 08/31/2022    BUN 12 08/31/2022    CREATININE 0.5 08/31/2022    GLUCOSE 116 (H) 08/31/2022    CALCIUM 7.4 (L) 08/31/2022    PROT 4.6 (L) 08/31/2022    LABALBU 2.6 (L) 08/31/2022    BILITOT 3.5 (H) 08/31/2022    ALKPHOS 68 08/31/2022    AST 96 (H) 08/31/2022    ALT 27 08/31/2022    LABGLOM >60 08/31/2022    GFRAA >59 08/31/2022     Lab Results   Component Value Date    INR 1.74 (H) 08/29/2022    INR 1.76 (H) 08/28/2022    INR 1.97 (H) 08/27/2022    PROTIME 20.6 (H) 08/29/2022    PROTIME 20.8 (H) 08/28/2022    PROTIME 22.8 (H) 08/27/2022       Exam: ULTRASOUND OF THE ABDOMEN, LIMITED    Clinical data:Hyperbilirubinemia. Technique: Real-time grayscale imaging of the abdomen was performed. Images supplemented with color Doppler technique. Prior studies: No prior studies submitted. Findings: The liver appears echogenic and heterogeneous, coarse with scalloped edges. A cyst is visualized measuring 0.9 x 1.1 x 1.1 cm. No intrahepatic biliary distention. The gallbladder appears distended measuring 13.3 cm with a wall thickness of0.45    cm. Mild sludge is noted. No gallstones. The common bile duct measures 0.73cm. Pericholecystic fluid visualized. The right kidney [id number] x 5.5 x 4.6 cm. Normal echogenicity and cortical thickness are preserved. No evidence of renal masses. No evidence of renal calculi. There is no evidence of hydronephrosis. The pancreas is unremarkable to the extent visualized. Unremarkable spleen [id number] x 6.6 x 15.7 cm. Imaged portion of the aorta demonstrates no acute pathology. Visualized portion of the inferior vena cava is unremarkable. No ascites is evident. Patient unable to lie in decubitus position due to fractured hip. Exam done in supine view only. Impression   Finding suggestive for hydropic cholecystitis without a obvious stone as described. CBD mildly dilated. MRCP is recommended. Possible chronic liver parenchymal disease, cirrhosis? .  Splenomegaly. Recommendation:    Follow up as clinically indicated.    Electronically Signed by Jocleyne Vazquez MD at 25-Aug-2022 06:37:10 PM                  ASSESSMENT/PLAN:  Thrombocytopenia-likely multifactorial to include direct effect of alcohol bone marrow and also secondary to splenomegaly  Likely secondary to end-stage liver disease/alcohol abuse  US showed signs of end-stage liver disease/splenomegaly (portal hypertension)        Status post 2 pheresis platelets given perioperatively    PLT 33,000 on 8/31/2022       Left hip fracture  POD #6  Post-op care, as directed by ortho     End-stage liver disease/splenomegaly  Evidence of portal hypertension        Followed by GI  Receiving Lasix    Coagulopathy of liver disease/thrombocytopenia      -S/p Vitamin K 10 mg p.o. x3 doses  8/25/2022 - 8/27/2022  -Postop bruising, no overt bleeding     Serology 8/26/2022:  Fibrinogen 177  D-dimer 4.3    Macrocytic anemia    Recent Labs     08/31/22  0415 08/30/22  0538 08/29/22  0311   WBC 2.3* 2.4* 3.5*   HGB 8.3* 8.3* 9.0*   HCT 25.1* 25.0* 27.8*   .5* 122.0* 121.4*   PLT 33* 30* 37*      Likely secondary to end-stage liver disease  Hapto < 10    Serology 8/26/2022  Retic - 4.33% with absolute 0.1334  LDH - 255 ()    Minimal elevation LDH    B12 = 1446  Folate - 13    MARICARMEN - NEGATIVE    Serology 8/28/2022  Retic 5.35% with absolute 0.1188  LDH - 240 () - improved  Total bili 3.2-direct 1.7, indirect 1.5 -now more direct

## 2022-09-01 NOTE — PROGRESS NOTES
Patient back from CT. Call light within reach.    Electronically signed by Nicole Sanford RN on 8/31/2022 at 7:37 PM

## 2022-09-01 NOTE — CARE COORDINATION
Spoke with patient's spouse, [facility], to report progress with PT/OT. [facility] also has Covid and reports that her symptoms are improving. This CM recommended that given patient's progress with PT/OT that he may be able to discharge home with Home Health instead of waiting to discharge to a SNF 9/8/2023, when out of quarantine. She believes that if patient's mobility and edema continues to improve, she will be able to care for patient at home with Home Health assisting. She would like to target for patient to discharge home Saturday. Naman Kramer, informed of plan.   Electronically signed by Peg Espinosa RN on 9/1/2022 at 11:41 AM

## 2022-09-01 NOTE — PROGRESS NOTES
Comprehensive Nutrition Assessment    Type and Reason for Visit:  Initial, RD Nutrition Re-Screen/LOS    Nutrition Recommendations/Plan:   Continue current POC     Malnutrition Assessment:  Malnutrition Status:  No malnutrition (09/01/22 1035)    Context:  Acute Illness     Findings of the 6 clinical characteristics of malnutrition:  Energy Intake:  Mild decrease in energy intake (Comment)  Weight Loss:  No significant weight loss     Body Fat Loss:  Unable to assess     Muscle Mass Loss:  Unable to assess    Fluid Accumulation:  Moderate to Severe Extremities, Ascites   Strength:  Not Performed    Nutrition Assessment:    Following patient for LOS x 7 days. Pt positive for COVID--did not visit pt. PO intake has been slowly improving with intake now ranging %. Aware diarrhea is better. Aware of weight gain, but pt also has +4 edema    Nutrition Related Findings:    +4 edema, Jaundice Wound Type: None       Current Nutrition Intake & Therapies:    Average Meal Intake: 26-50%, 51-75%, %  Average Supplements Intake: None Ordered  ADULT DIET; Regular; Safety Tray; Safety Tray (Disposables)    Anthropometric Measures:  Height: 5' 9\" (175.3 cm)  Ideal Body Weight (IBW): 160 lbs (73 kg)    Admission Body Weight: 188 lb 9 oz (85.5 kg)  Current Body Weight: 195 lb 3.2 oz (88.5 kg), 122 % IBW. Current BMI (kg/m2): 28.8    BMI Categories: Overweight (BMI 25.0-29. 9)    Estimated Daily Nutrient Needs:        Energy (kcal/day):       Protein (g/day):       Fluid (ml/day):      Nutrition Diagnosis:   Inadequate oral intake related to acute injury/trauma, impaired nutrient utilization as evidenced by intake 26-50%, intake 51-75%, localized or generalized fluid accumulation    Nutrition Interventions:   Food and/or Nutrient Delivery: Continue Current Diet  Nutrition Education/Counseling: No recommendation at this time  Coordination of Nutrition Care: Continue to monitor while inpatient       Goals:     Goals: Meet at least 75% of estimated needs, PO intake 50% or greater       Nutrition Monitoring and Evaluation:   Behavioral-Environmental Outcomes: None Identified  Food/Nutrient Intake Outcomes: Food and Nutrient Intake  Physical Signs/Symptoms Outcomes: Biochemical Data, Fluid Status or Edema, Weight, Skin    Discharge Planning:    Continue current diet     Renetta Reyes MS, RD, LD  Contact: [phone]

## 2022-09-01 NOTE — PROGRESS NOTES
09/01/22 1600   Subjective   Subjective Patient up in room standing at [facility] urinating. Agrees to walk in room. Vitals   O2 Device Nasal cannula  (Gait on RA)   Transfer Training   Stand to Sit Stand-by assistance   Gait Training   Gait Training Yes   Gait   Overall Level of Assistance Stand-by assistance   Speed/Traci Slow   Distance (ft) 50 Feet   Assistive Device Walker, rolling   Other Specialty Interventions   Other Treatments/Modalities Patient sitting on BSC; patient stated NS had been allowing patient to ambulate in room unassisted.    Assessment   Activity Tolerance Patient tolerated treatment well   PT Plan of Care   Thursday X           Electronically signed by Dewayne Ureña PTA on 9/1/2022 at 4:46 PM

## 2022-09-02 LAB
ALBUMIN SERPL-MCNC: 2.7 G/DL (ref 3.5–5.2)
ALP BLD-CCNC: 79 U/L (ref 40–130)
ALT SERPL-CCNC: 41 U/L (ref 5–41)
ANION GAP SERPL CALCULATED.3IONS-SCNC: 9 MMOL/L (ref 7–19)
ANISOCYTOSIS: ABNORMAL
AST SERPL-CCNC: 137 U/L (ref 5–40)
BANDED NEUTROPHILS RELATIVE PERCENT: 3 % (ref 0–5)
BASOPHILS ABSOLUTE: 0 K/UL (ref 0–0.2)
BASOPHILS RELATIVE PERCENT: 1 % (ref 0–1)
BILIRUB SERPL-MCNC: 4.2 MG/DL (ref 0.2–1.2)
BLOOD CULTURE, ROUTINE: NORMAL
BUN BLDV-MCNC: 11 MG/DL (ref 8–23)
CALCIUM SERPL-MCNC: 7.8 MG/DL (ref 8.8–10.2)
CHLORIDE BLD-SCNC: 102 MMOL/L (ref 98–111)
CO2: 25 MMOL/L (ref 22–29)
CREAT SERPL-MCNC: 0.5 MG/DL (ref 0.5–1.2)
CULTURE, BLOOD 2: NORMAL
EOSINOPHILS ABSOLUTE: 0.04 K/UL (ref 0–0.6)
EOSINOPHILS RELATIVE PERCENT: 2 % (ref 0–5)
GFR AFRICAN AMERICAN: >59
GFR NON-AFRICAN AMERICAN: >60
GLIADIN ANTIBODIES IGA: 4.8 U/ML
GLIADIN ANTIBODIES IGG: 0.7 U/ML
GLUCOSE BLD-MCNC: 131 MG/DL (ref 74–109)
HCT VFR BLD CALC: 24.2 % (ref 42–52)
HEMOGLOBIN: 7.8 G/DL (ref 14–18)
HYPOCHROMIA: ABNORMAL
IGA: 275 MG/DL (ref 70–400)
IMMATURE GRANULOCYTES #: 0 K/UL
LYMPHOCYTES ABSOLUTE: 0.4 K/UL (ref 1.1–4.5)
LYMPHOCYTES RELATIVE PERCENT: 17 % (ref 20–40)
MACROCYTES: ABNORMAL
MCH RBC QN AUTO: 39 PG (ref 27–31)
MCHC RBC AUTO-ENTMCNC: 32.2 G/DL (ref 33–37)
MCV RBC AUTO: 121 FL (ref 80–94)
MONOCYTES ABSOLUTE: 0.5 K/UL (ref 0–0.9)
MONOCYTES RELATIVE PERCENT: 23 % (ref 0–10)
MYELOCYTE PERCENT: 1 %
NEUTROPHILS ABSOLUTE: 1.3 K/UL (ref 1.5–7.5)
NEUTROPHILS RELATIVE PERCENT: 53 % (ref 50–65)
PDW BLD-RTO: 16.2 % (ref 11.5–14.5)
PLATELET # BLD: 33 K/UL (ref 130–400)
PLATELET SLIDE REVIEW: ABNORMAL
PMV BLD AUTO: 10.8 FL (ref 9.4–12.4)
POTASSIUM REFLEX MAGNESIUM: 3.8 MMOL/L (ref 3.5–5)
RBC # BLD: 2 M/UL (ref 4.7–6.1)
SODIUM BLD-SCNC: 136 MMOL/L (ref 136–145)
TISSUE TRANSGLUTAMINASE IGA: 1.1 U/ML
TOTAL PROTEIN: 4.6 G/DL (ref 6.6–8.7)
WBC # BLD: 2.2 K/UL (ref 4.8–10.8)

## 2022-09-02 PROCEDURE — 6370000000 HC RX 637 (ALT 250 FOR IP): Performed by: SPECIALIST

## 2022-09-02 PROCEDURE — 6370000000 HC RX 637 (ALT 250 FOR IP): Performed by: ORTHOPAEDIC SURGERY

## 2022-09-02 PROCEDURE — 2580000003 HC RX 258: Performed by: ORTHOPAEDIC SURGERY

## 2022-09-02 PROCEDURE — 6370000000 HC RX 637 (ALT 250 FOR IP): Performed by: HOSPITALIST

## 2022-09-02 PROCEDURE — 80053 COMPREHEN METABOLIC PANEL: CPT

## 2022-09-02 PROCEDURE — 99231 SBSQ HOSP IP/OBS SF/LOW 25: CPT | Performed by: INTERNAL MEDICINE

## 2022-09-02 PROCEDURE — 6370000000 HC RX 637 (ALT 250 FOR IP): Performed by: NURSE PRACTITIONER

## 2022-09-02 PROCEDURE — 36415 COLL VENOUS BLD VENIPUNCTURE: CPT

## 2022-09-02 PROCEDURE — 97530 THERAPEUTIC ACTIVITIES: CPT

## 2022-09-02 PROCEDURE — 85025 COMPLETE CBC W/AUTO DIFF WBC: CPT

## 2022-09-02 PROCEDURE — 97535 SELF CARE MNGMENT TRAINING: CPT

## 2022-09-02 PROCEDURE — 1210000000 HC MED SURG R&B

## 2022-09-02 RX ADMIN — BISACODYL 5 MG: 5 TABLET, COATED ORAL at 09:45

## 2022-09-02 RX ADMIN — IPRATROPIUM BROMIDE 2 PUFF: 17 AEROSOL, METERED RESPIRATORY (INHALATION) at 09:44

## 2022-09-02 RX ADMIN — FAMOTIDINE 20 MG: 20 TABLET ORAL at 20:19

## 2022-09-02 RX ADMIN — METOPROLOL SUCCINATE 25 MG: 25 TABLET, EXTENDED RELEASE ORAL at 09:45

## 2022-09-02 RX ADMIN — ZINC SULFATE 220 MG (50 MG) CAPSULE 50 MG: CAPSULE at 09:45

## 2022-09-02 RX ADMIN — OXYCODONE HYDROCHLORIDE AND ACETAMINOPHEN 500 MG: 500 TABLET ORAL at 20:19

## 2022-09-02 RX ADMIN — FUROSEMIDE 20 MG: 20 TABLET ORAL at 09:45

## 2022-09-02 RX ADMIN — SODIUM CHLORIDE, PRESERVATIVE FREE 10 ML: 5 INJECTION INTRAVENOUS at 09:53

## 2022-09-02 RX ADMIN — IPRATROPIUM BROMIDE 2 PUFF: 17 AEROSOL, METERED RESPIRATORY (INHALATION) at 12:55

## 2022-09-02 RX ADMIN — IPRATROPIUM BROMIDE 2 PUFF: 17 AEROSOL, METERED RESPIRATORY (INHALATION) at 20:20

## 2022-09-02 RX ADMIN — SODIUM BICARBONATE 650 MG: 650 TABLET ORAL at 17:10

## 2022-09-02 RX ADMIN — SODIUM CHLORIDE, PRESERVATIVE FREE 10 ML: 5 INJECTION INTRAVENOUS at 20:19

## 2022-09-02 RX ADMIN — SODIUM BICARBONATE 650 MG: 650 TABLET ORAL at 20:19

## 2022-09-02 RX ADMIN — OXYCODONE 10 MG: 5 TABLET ORAL at 20:19

## 2022-09-02 RX ADMIN — SODIUM BICARBONATE 650 MG: 650 TABLET ORAL at 12:55

## 2022-09-02 RX ADMIN — BUDESONIDE AND FORMOTEROL FUMARATE DIHYDRATE 2 PUFF: 160; 4.5 AEROSOL RESPIRATORY (INHALATION) at 09:44

## 2022-09-02 RX ADMIN — OXYCODONE 10 MG: 5 TABLET ORAL at 09:44

## 2022-09-02 RX ADMIN — SPIRONOLACTONE 50 MG: 25 TABLET ORAL at 09:45

## 2022-09-02 RX ADMIN — FAMOTIDINE 20 MG: 20 TABLET ORAL at 09:45

## 2022-09-02 RX ADMIN — SODIUM BICARBONATE 650 MG: 650 TABLET ORAL at 09:45

## 2022-09-02 RX ADMIN — ALBUTEROL SULFATE 2 PUFF: 90 AEROSOL, METERED RESPIRATORY (INHALATION) at 12:55

## 2022-09-02 RX ADMIN — IPRATROPIUM BROMIDE 2 PUFF: 17 AEROSOL, METERED RESPIRATORY (INHALATION) at 17:10

## 2022-09-02 RX ADMIN — OXYCODONE HYDROCHLORIDE AND ACETAMINOPHEN 500 MG: 500 TABLET ORAL at 09:45

## 2022-09-02 RX ADMIN — ALBUTEROL SULFATE 2 PUFF: 90 AEROSOL, METERED RESPIRATORY (INHALATION) at 20:20

## 2022-09-02 RX ADMIN — Medication 1000 UNITS: at 09:45

## 2022-09-02 RX ADMIN — ALBUTEROL SULFATE 2 PUFF: 90 AEROSOL, METERED RESPIRATORY (INHALATION) at 09:44

## 2022-09-02 RX ADMIN — BUDESONIDE AND FORMOTEROL FUMARATE DIHYDRATE 2 PUFF: 160; 4.5 AEROSOL RESPIRATORY (INHALATION) at 20:20

## 2022-09-02 RX ADMIN — ALBUTEROL SULFATE 2 PUFF: 90 AEROSOL, METERED RESPIRATORY (INHALATION) at 17:10

## 2022-09-02 ASSESSMENT — PAIN SCALES - WONG BAKER: WONGBAKER_NUMERICALRESPONSE: 4

## 2022-09-02 ASSESSMENT — ENCOUNTER SYMPTOMS
COLOR CHANGE: 0
NAUSEA: 0
WHEEZING: 0
DIARRHEA: 1
COUGH: 0
CONSTIPATION: 0
ROS SKIN COMMENTS: BRUISING
VOMITING: 0
BLOOD IN STOOL: 0
SHORTNESS OF BREATH: 0
ABDOMINAL DISTENTION: 0
ABDOMINAL PAIN: 0

## 2022-09-02 ASSESSMENT — PAIN DESCRIPTION - ORIENTATION
ORIENTATION: RIGHT;LEFT
ORIENTATION: RIGHT;LEFT

## 2022-09-02 ASSESSMENT — PAIN DESCRIPTION - LOCATION: LOCATION: HIP;SACRUM

## 2022-09-02 ASSESSMENT — PAIN SCALES - GENERAL
PAINLEVEL_OUTOF10: 8
PAINLEVEL_OUTOF10: 7
PAINLEVEL_OUTOF10: 5

## 2022-09-02 ASSESSMENT — PAIN DESCRIPTION - DESCRIPTORS
DESCRIPTORS: DISCOMFORT;ACHING;SORE
DESCRIPTORS: SHARP;ACHING;DULL

## 2022-09-02 NOTE — PROGRESS NOTES
Progress Note  Date:9/1/2022       Room:0435/435-02  Patient Jose Lemus     Date of Birth:[birth year]     Age:67 y.o. Subjective      Scrotum is still swollen but swelling is decreased. Still has bilateral leg edema. He received Lasix but has been stopped. Denies abdominal pain. CT scan has not shown any ascites but there is fluid in the right inguinal canal and in the scrotum. 9/1/2022: Potassium 3.2, sodium 136. GFR normal.  Glucose 118. Total protein 5.3. Albumin 2.9. Alkaline phos is 78 normal, ALT 36 normal,  and elevated, bili 4.3 and has gone up. 9/1/2022: WBC 2.9, hemoglobin 8.6 and stable, , platelet count 38,149. Patient's smooth muscle antibody came back elevated. Smooth muscle antibody was 1: 640 which is elevated. F-actin IgG 58 and elevated. Acute hepatitis A, B, C serology negative. SUSAN negative. 8/31/2022: CT abdomen and pelvis with and without IV contrast: Prior study 8/26/2022. Liver cirrhotic.  8 mm simple hepatic cyst.  No mass. No dilated ducts. Mild distention of the gallbladder with mild diffuse thickening of the gallbladder walls with moderate pericholecystic fat stranding. No evidence of stones by CAT scan. Stable splenomegaly with perisplenic collaterals. Pancreas unremarkable. Appendix unremarkable. Impression by radiologist  With cirrhotic appearance. 8 mm right hepatic simple cyst.  Mild distention of gallbladder with mild diffuse thickening of the gallbladder walls with moderate pericholecystic fat stranding. Finding could be secondary to hyperlipidemia or a calculus cholecystitis. Stable splenomegaly. No ascites. Mild mesenteric and right colic fat stranding. Fluid collection in the right inguinal canal: 4.5 x 5.5 x 7.4 cm in. Partial visualization of fluid in the dependent portion of the scrotum.   Diffuse bilateral subcutaneous fat stranding/anasarca of the abdominal wall and pelvic was including upper thighs. Objective         Vitals Last 24 Hours:  TEMPERATURE:  Temp  Av.9 °F (37.7 °C)  Min: 99.1 °F (37.3 °C)  Max: 100.4 °F (38 °C)  RESPIRATIONS RANGE: Resp  Av.2  Min: 18  Max: 20  PULSE OXIMETRY RANGE: SpO2  Av.3 %  Min: 89 %  Max: 93 %  PULSE RANGE: Pulse  Av.6  Min: 65  Max: 86  BLOOD PRESSURE RANGE: Systolic (86QWJ), NSK:025 , Min:108 , ZJD:292   ; Diastolic (03XXJ), FPA:84, Min:54, Max:76    I/O (24 Hr): No intake or output data in the 24 hours ending 22    Objective:  Vital signs: (most recent): Blood pressure 125/76, pulse 69, temperature 100.2 °F (37.9 °C), resp. rate 20, height 5' 9\" (1.753 m), weight 195 lb 3.2 oz (88.5 kg), SpO2 92 %. alert and oriented. In no acute physical distress. HEENT normocephalic. Atraumatic. Neck supple    Abdomen is distended, soft, nontender. Scrotum is swollen and there may be fluid in the scrotum. He has 1+ bilateral lower leg edema. Skin no jaundice. Neuro alert and oriented. Labs/Imaging/Diagnostics    Labs:  CBC:  Recent Labs     22  0538 22  0844   WBC 2.4* 2.3* 2.4*   RBC 2.05* 2.10* 2.23*   HGB 8.3* 8.3* 8.6*   HCT 25.0* 25.1* 26.1*   .0* 119.5* 117.0*   RDW 16.0* 15.9* 16.2*   PLT 30* 33* 34*       CHEMISTRIES:  Recent Labs     22  0538 22  0415 22  0844   * 133* 136   K 3.7 3.6 3.2*    102 101   CO2 21* 23 25   BUN 12 12 10   CREATININE 0.5 0.5 0.5   GLUCOSE 115* 116* 118*   MG  --   --  1.6       PT/INR:  No results for input(s): PROTIME, INR in the last 72 hours. APTT:No results for input(s): APTT in the last 72 hours.   LIVER PROFILE:  Recent Labs     22  0538 22  0415 22  0844   AST 77* 96* 126*   ALT 23 27 36   BILITOT 3.0* 3.5* 4.3*   ALKPHOS 66 68 78         Imaging Last 24 Hours:  ECHO Complete 2D W Doppler W Color    Result Date: 2022  Transthoracic Echocardiography Report (TTE)  Demographics   Patient Name   Thiago Andre   Date of Study            08/25/2022   MRN            [id number]        Gender                   Male   Date of Birth  1955    Room Number              [facility]-0531   Age            75 year(s)   Height:        69 inches     Referring Physician      Gerson Alfredo   Weight:        188 pounds    Sonographer              Deedee Gilmore [facility]   BSA:           2.01 m^2      Interpreting Physician   Kallie Sher MD   BMI:           27.76 kg/m^2  Procedure Type of Study   TTE procedure:ECHO NO CONTRAST WITH DOP/COLR. Study Location: Echo Lab Technical Quality: Adequate visualization Patient Status: Inpatient HR: 60 bpm Indications:Atrial fibrillation. Conclusions   Summary  Left ventricle had normal chamber size and thickness  Preserved systolic function with estimated ejection fraction of 74%  No regional wall motion abnormality  Normal diastolic function  No significant valvular lesion seen  Mild mitral regurgitation  Trivial tricuspid insufficiency with estimated right ventricular systolic  pressure of 38 mm which is mildly elevated   Signature   ----------------------------------------------------------------  Electronically signed by Kallie Sher MD(Interpreting physician)  on 08/25/2022 07:01 PM  ----------------------------------------------------------------   Findings   Mitral Valve  Mild mitral regurgitation   Aortic Valve  Aortic sclerosis   Tricuspid Valve  Trivial tricuspid insufficiency with estimated right ventricular systolic  pressure of 38 mm which is mildly elevated   Pulmonic Valve  The pulmonic valve was not well visualized . Left Atrium  Mildly dilated left atrium   Left Ventricle  See conclusion   Right Atrium  Normal right atrial dimension with no evidence of thrombus or mass noted. Right Ventricle  Normal right ventricular size with preserved RV function.    Miscellaneous  Normal IVC  2D Measurements and Calculations(cm)   LVIDd: 4.6 cm                        LVIDs: 3.43 cm  IVSd: 1.01 cm  LVPWd: 1.03 cm                       AO Root Dimension: 3.3 cm  % Ejection Fraction: 74 %            LA Dimension: 4.3 cm  LA Volume: 67.1 ml                   LA Area: 22.9 cm^2  LA Volume Index: 33 ml/m^2           LV Systolic dimension: 4.11IJ  LV dimension: 4.6 cm                 LV PW diastolic: 9.75IQ                                       LVOT diameter: 2.1 cm  LVEDV: 116 ml                        RA Systolic pressure: 3mmHg  LVESV:25 ml                          LVEDVI: 58 ml/m^2  Cardic Output (CO): 4.65l/min        LVESVI: 12 ml/m^2  Doppler Measurements and Calculations   AV Peak Velocity:172 cm/s              MV Peak E-Wave: 94.7 cm/s  AV Mean Velocity:121 cm/s              MV Peak A-Wave: 67.3 cm/s  AV Peak Gradient: 11.83 mmHg           MV E/A Ratio: 1.41 %  AV Mean Gradient: 6 mmHg               MV Mean velocity: NaNcm/s  AV Area (Continuity):1.99 cm^2         MV Peak Gradient: 3.59 mmHg  AV VTI:39 cm/s                         MV P1/2t: 84 msec  TR Velocity:288 cm/s                   MVA by PHT2.62 cm^2  TR Gradient:33.18 mmHg  Estimated RAP:3 mmHg  RVSP:36 mmHg   MV E' septal velocity: 9.25cm/s  MV E' lateral velocity:13.8 cm/s      US LIVER    Result Date: 8/25/2022  NO PRIOR REPORT AVAILABLE Exam: ULTRASOUND OF THE ABDOMEN, LIMITED Clinical data:Hyperbilirubinemia. Technique: Real-time grayscale imaging of the abdomen was performed. Images supplemented with color Doppler technique. Prior studies: No prior studies submitted. Findings: The liver appears echogenic and heterogeneous, coarse with scalloped edges. A cyst is visualized measuring 0.9 x 1.1 x 1.1 cm. No intrahepatic biliary distention. The gallbladder appears distended measuring 13.3 cm with a wall thickness of0.45 cm. Mild sludge is noted. No gallstones. The common bile duct measures 0.73cm. Pericholecystic fluid visualized. The right kidney ntrcsvrw93.7 x 5.5 x 4.6 cm. Normal echogenicity and cortical thickness are preserved.  No evidence of renal masses. No evidence of renal calculi. There is no evidence of hydronephrosis. The pancreas is unremarkable to the extent visualized. Unremarkable spleen vxnbndcqv54.5 x 6.6 x 15.7 cm. Imaged portion of the aorta demonstrates no acute pathology. Visualized portion of the inferior vena cava is unremarkable. No ascites is evident. Patient unable to lie in decubitus position due to fractured hip. Exam done in supine view only. Finding suggestive for hydropic cholecystitis without a obvious stone as described. CBD mildly dilated. MRCP is recommended. Possible chronic liver parenchymal disease, cirrhosis? .  Splenomegaly. Recommendation: Follow up as clinically indicated. Electronically Signed by Viridiana Griffin MD at 25-Aug-2022 06:37:10 PM             XR CHEST PORTABLE    Result Date: 8/25/2022  NO PRIOR REPORT AVAILABLE [facility] Clinical data:Hip fracture. Technique:Single view of the chest. Prior studies: No prior studies submitted. Findings: The lungs are grossly clear; noevidence of acute infiltrate or pleural effusion. Cardiac silhouette is within normal limits. No acute osseous abnormality is detected. Normal chest Recommendation: Follow up as clinically indicated. Electronically Signed by Merlinda Sabal MD at 25-Aug-2022 01:18:21 PM             XR HIP 2-3 VW W PELVIS LEFT    Result Date: 8/25/2022  NO PRIOR REPORT AVAILABLE Exam: X-RAYS OF THE LEFT HIP Clinical Indication: Fall, pain. Technique: Two views of the left hip and single view of the pelvis. Prior studies: No prior studies are submitted. Findings: Multiple views of the left hip demonstrateno evidence offracture. Bone mineral density is preserved. No evidence of superior pubic ramus fracture. Acute, impacted transverse fracture at the base of the femoral head with continued good alignment and joint spaces intact. Small lateral acetabular spur noted.     Acute, impacted transverse fracture through base of the femoral head , continued good alignment and normal joint space. Recommendation: Followup as clinically indicated. Electronically Signed by Linda Littlejohn MD at 25-Aug-2022 01:04:57 PM             8/26/2022: MRI abdomen:    Cirrhotic liver. No liver mass. Splenomegaly with varices. Distended gallbladder. Tiny simple cyst right hepatic lobe. Tiny right renal simple cyst.    8/26/2022: CT abdomen and pelvis with and without contrast:    Cirrhotic liver. Diffuse low-attenuation pattern which may be in association with chronic liver disease versus diffuse metastatic disease. Splenomegaly. Mild splenic varices. Mild sigmoid diverticulosis. Gallbladder distention. Left renal calculus. Soft tissue right inguinal hernia suggestive of undescended testicle. Assessment//Plan           Hospital Problems             Last Modified POA    * (Principal) Closed left hip fracture, initial encounter ([address] 8/26/2022 Yes    New onset a-fib ([address] 8/25/2022 Yes    Liver disease 8/25/2022 Yes    Hyperbilirubinemia 8/25/2022 Yes    Abnormal LFTs 8/25/2022 Yes    Thrombocytopenia ([address] 8/25/2022 Yes    Leukopenia 8/25/2022 Yes    Hepatic cirrhosis ([address] 6/64/9535 Yes    Umbilical hernia without obstruction and without gangrene 8/27/2022 Yes    Pancytopenia ([address] 8/27/2022 Yes    Closed fracture of left hip ([address] 8/27/2022 Yes   Assessment & Plan    No ascites by CAT scan. The bilateral lower leg edema. Fluid collection in the right inguinal canal: 4.5 x 5.5 x 7.4 cm in AP. Partial visualization of fluid in the scrotum    Pancytopenia could be due to hypersplenism. Macrocytic anemia could be due to alcohol abuse. Need to rule out folic acid deficiency and vitamin B 12 deficiency. 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 Serum total iron is elevated. Iron saturation 97%. Cannot rule out hemochromatosis. SUSAN is negative. Smooth muscle antibody is elevated.   Patient does not have acute autoimmune hepatitis at this time.  Patient is not a candidate for liver transplant at this time because of active drinking. Patient not candidate for liver biopsy because of severe thrombocytopenia. Patient does  need to be seen by gastroenterologist/hepatologist after discharge. 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 I have explained this with patient. No hepatoma by ultrasound, CT scan or MRI. No ascites by imaging. Hepatitis A, B, C serology negative. SUSAN is negative. F-actin IgG is elevated to 58. Mitochondrial antibody is 3.2 and not elevated. Mild splenic varices near the spleen. No ascites on ultrasound. 8/26/2022: Meld sodium score 20.    8/27/2022: Meld sodium 21.    8/31/2022: Meld sodium score 21.    8/26/2022: Maddrey's discriminant function 27.9. Hemolysis: Haptoglobin less than 10. Indirect bilirubin more than direct bilirubin. Hepatic cysts 0.9 x 1.1 x 1.1 cm ultrasound. Distended gallbladder wall thickness millimeter. Sludge. No gallstones. CBD measures 7.3 mm. Pericholecystic fluid visualized. Radiologist calling hydropic cholecystitis. CBD mildly dilated. Seen by surgery. No plan for surgery. 8/29/2022: COVID test is positive. It was negative on 8/25/2022. Plan:    Lasix 20 mg daily and spironolactone 50 mg daily to start with. Urology consultation. My GI call ends at 7 AM tomorrow. I have signed off. Dr. Gely Villeda is on-call starting tomorrow. I will sign of the patient to her. Surgery to address fluid in the right inguinal call. Advised him to get EGD for variceal screening and colonoscopy for colon cancer screening as outpatient basis. He does not need these tests as an inpatient basis because he is not having upper GI or lower GI symptoms. Advised him to schedule follow-up with PCP and GI doctor on health plan after discharge.     Ultrasound the liver every 6 months to screen for hepatoma    Electronically signed by Cornelius Green MD on 8/31/22     [employer] possibilities    To whom it may concern. 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 Therefore,  I do not have an office practice. I will not be able to follow-up on any patient as an outpatient basis. I will not be able to follow-up on the results of pending labs, pathology reports,  imaging studies. I will not be able to or do any outpatient procedures. I will not be able to provide continuity of care of any kind to any patient . I  will not be able to keep in touch with any patient by any means such as phone, text or by e-mail because of the nature of my responsibilities as a locum [employer] physician. When the patient is discharged, my patient physician relationship is over. The patient will need to follow-up with other physicians in the hospital when I am not on-call. After discharge, patient will need to follow-up with their primary care doctor, gastroenterologist or liver doctor for continuity of care, check on pending labs, check on pending pathology and check on results of the pending imaging studies-such as x-rays, ultrasound, CAT scan, MRI, HIDA scan, gastric emptying scan or any other studies. For ER physicians, hospitalists and other physicians involved in patient care: If a patient needs follow-up GI care, please schedule follow-up with patient's PCP, gastroenterologist or hepatologist on their health plan. Thank you.     Cornelius Green MD

## 2022-09-02 NOTE — PROGRESS NOTES
Verbal order given from Dr Fabien Huffman to remove pt from Urology list. Dr Sharath Orantes and Shauna Crabtree notified.

## 2022-09-02 NOTE — PLAN OF CARE
Problem: Skin/Tissue Integrity  Goal: Absence of new skin breakdown  Description: 1. Monitor for areas of redness and/or skin breakdown  2. Assess vascular access sites hourly  3. Every 4-6 hours minimum:  Change oxygen saturation probe site  4. Every 4-6 hours:  If on nasal continuous positive airway pressure, respiratory therapy assess nares and determine need for appliance change or resting period.   Outcome: Progressing     Problem: ABCDS Injury Assessment  Goal: Absence of physical injury  Outcome: Progressing     Problem: Safety - Adult  Goal: Free from fall injury  Outcome: Progressing     Problem: Pain  Goal: Verbalizes/displays adequate comfort level or baseline comfort level  Outcome: Progressing

## 2022-09-02 NOTE — PROGRESS NOTES
PROGRESS NOTE    Patient name: Tootie Wiseman  Patient : 1955  Room: 536    SUBJECTIVE: POD #7 left hip replacement. Remains in isolation due to COVID-19. \"scrotal edema decreased by about half\", per patient. Reports voiding.  to see. S/p IV Lasix, changed to oral. Also on aldactone. GI following for cirrhosis. Denies shortness of breath. INTERVAL HISTORY  Tootie Wiseman was first seen by Dr. Denia Valenzuela on 2022. Hematology consultation was requested for findings of thrombocytopenia. The patient was hospitalized at [facility] with complaints of left hip pain after a fall yesterday. The patient has not been able to ambulate. He is a chronic smoker. He was found to be on A. fib in the ER. CBC during admission here showed WBC count 3.8 with ANC of 2.5/, hemoglobin 15.3/, platelet count 45,869. Iron profile showed iron of 298, iron saturation 97%, TIBC 307, haptoglobin<10. PT 19.4/INR 1.61. The patient tells me that he has a diagnosis of alcoholic cirrhosis. He is a chronic drinker for many decades. Unfortunately, he does not have a GI doctor. He is known to be thrombocytopenic. He denies any history of hepatitis C. Ultrasound performed here showed findings suggestive of chronic liver parenchymal disease, cirrhosis and splenomegaly with the spleen measuring 2.5 x 6.6 x 15.7 cm consistent with splenomegaly. Objective   /72   Pulse 72   Temp 98.4 °F (36.9 °C)   Resp 20   Ht 5' 9\" (1.753 m)   Wt 195 lb 3.2 oz (88.5 kg)   SpO2 95%   BMI 28.83 kg/m²     PHYSICAL EXAM:  CONSTITUTIONAL: Alert, appropriate, no acute distress. EYES: Equal, round, reactive  NECK: Supple, no masses. No JVD  CHEST/LUNGS: CTA bilaterally, normal respiratory effort, dry congested cough. O2 2L NC in use  CARDIOVASCULAR: RRR, no murmurs. ABDOMEN: distended, soft non-tender, active bowel sounds  EXTREMITIES: Postop left hip surgery- incision well approximated, no s/s of infection. LLE ecchymosis improved. Abdominal/hip/bilateral lower extremity edema 2+. SKIN: warm, dry with no rashes or lesions. Scattered bruising. Left hip incision well approximated. No drainage  NEUROLOGIC: follows commands, non focal     Recent Labs     09/02/22  0238 09/01/22  0844 08/31/22  0415   WBC 2.2* 2.4* 2.3*   HGB 7.8* 8.6* 8.3*   HCT 24.2* 26.1* 25.1*   .0* 117.0* 119.5*   PLT 33* 34* 33*     Lab Results   Component Value Date     09/02/2022    K 3.8 09/02/2022     09/02/2022    CO2 25 09/02/2022    BUN 11 09/02/2022    CREATININE 0.5 09/02/2022    GLUCOSE 131 (H) 09/02/2022    CALCIUM 7.8 (L) 09/02/2022    PROT 4.6 (L) 09/02/2022    LABALBU 2.7 (L) 09/02/2022    BILITOT 4.2 (H) 09/02/2022    ALKPHOS 79 09/02/2022     (H) 09/02/2022    ALT 41 09/02/2022    LABGLOM >60 09/02/2022    GFRAA >59 09/02/2022     Lab Results   Component Value Date    INR 1.74 (H) 08/29/2022    INR 1.76 (H) 08/28/2022    INR 1.97 (H) 08/27/2022    PROTIME 20.6 (H) 08/29/2022    PROTIME 20.8 (H) 08/28/2022    PROTIME 22.8 (H) 08/27/2022     CT ABDOMEN PELVIS W WO CONTRAST Additional Contrast? Radiologist Recommendation    Result Date: 9/1/2022  NO PRIOR REPORT AVAILABLE Exam: CT OF THE ABDOMEN/PELVIS WITHOUT AND WITH IV CONTRAST Clinical data: Cirrhosis. May have ascites. May have right inguinal hernia and fluid in his scrotum. Technique: Direct contiguous axial CT images were acquired through the abdomen and pelvis without and with intravenous contrastusing soft tissue and bone algorithms. Oral contrast was administered. Reformatted/MPR images were performed. Radiation dose: CTDIvol =53.89 mGy, DLP = 3603 mGy x cm. Limitations: None. Prior studies: CT and MRI of abdomen dated 08/26/2022. Findings: Lung bases: Clear Liver:Liver with cirrhotic appearance and a 0.8 cm right hepatic simple cyst again noted. No evidence of mass. No evidence of dilated ducts.  Mild distention of the gallbladder with mild diffuse thickening of the gallbladder walls with moderate pericholecystic fat stranding. No evidence of stones by CT. Stable splenomegaly with perisplenic collaterals. Pancreas:  Grossly unremarkable. Adrenal glands: Grossly unremarkable size, contour and density. Kidneys: In anatomic position. Grossly unremarkablerenal size, contour and density. No renal or ureteral calculi. No evidence of a renal mass or cyst. Nonspecific bilateral perinephric fat stranding. Retroperitoneum: No enlarged retroperitoneal lymphadenopathy. Moderate atherosclerotic calcific plaques in the walls of the aorta and branches. The IVC appears unremarkable. Peritoneal cavity: No evidence of free air or ascites. Mild diffuse mesenteric and right colic fat stranding. Gastrointestinal tract: No obstruction. Appendix is unremarkable. Pelvis:Prostate gland is not enlarged. Urinary bladder is minimally distended. Streaky artifacts from left hip arthroplasty obscuring the surrounding structures. Fluid collection in the right inguinal canal: 4.5 x 5.5 x 7.4 cm in AP by TV by CC diameters. Partial visualization of fluid in the dependent portion of the scrotum. Osseous structures: No acute or destructive bony process identified. Degenerative changes of the spine. Diffuse bilateral subcutaneous fat stranding/anasarca in the abdominal and pelvic walls also including the upper thighs. 1. Liver with cirrhotic appearance and a 0.8 cm right hepatic simple cyst again noted 2. Mild distention of the gallbladder with mild diffuse thickening of the gallbladder walls with moderate pericholecystic fat stranding. Findings could be secondary to hypoalbuminemia or acalculous cholecystitis. 3. Stable splenomegaly (16.3 cm in CC diameter) with perisplenic collaterals. 4. Moderate atherosclerotic calcific plaques in the walls of the aorta and branches. 5.  No ascites. Mild diffuse mesenteric and right colic fat stranding.  6. Fluid collection in the right inguinal canal: 4.5 x 5.5 x 7.4 cm in AP by TV by CC diameters. Partial visualization of fluid in the dependent portion of the scrotum. 7. Diffuse bilateral subcutaneous fat stranding/anasarca in the abdominal and pelvic walls also including the upper thighs. Recommendation: Follow up as clinically indicated. All CT scans at this facility utilize dose modulation, iterative reconstruction, and/or weight based dosing when appropriate to reduce radiation dose to as low as reasonably achievable.   Electronically Signed by Avril Staples MD at 01-Sep-2022 07:03:37 PM                ASSESSMENT/PLAN:    Thrombocytopenia-likely multifactorial to include direct effect of alcohol bone marrow and also secondary to splenomegaly  Likely secondary to end-stage liver disease/alcohol abuse  US showed signs of end-stage liver disease/splenomegaly (portal hypertension)        Status post 2 pheresis platelets given perioperatively  PLT 33,000 today, 9/2/2022       Left hip fracture  POD #7  Post-op care, as directed by ortho     End-stage liver disease/splenomegaly  Evidence of portal hypertension        Followed by GI  S/p Lasix    Coagulopathy of liver disease/thrombocytopenia      -S/p Vitamin K 10 mg p.o. x3 doses  8/25/2022 - 8/27/2022  -Postop bruising, no overt bleeding     Serology 8/26/2022:  Fibrinogen 177  D-dimer 4.3    Macrocytic anemia    Recent Labs     09/02/22  0238 09/01/22  0844 08/31/22  0415   WBC 2.2* 2.4* 2.3*   HGB 7.8* 8.6* 8.3*   HCT 24.2* 26.1* 25.1*   .0* 117.0* 119.5*   PLT 33* 34* 33*      Likely secondary to end-stage liver disease  Hapto < 10    Serology 8/26/2022  Retic - 4.33% with absolute 0.1334  LDH - 255 ()    Minimal elevation LDH    B12 = 1446  Folate - 13    MARICARMEN - NEGATIVE    Serology 8/28/2022  Retic 5.35% with absolute 0.1188  LDH - 240 () - improved  Total bili 3.2-direct 1.7, indirect 1.5 -now more direct than indirect    Hgb 7.8 today, 9/2/2022     Elevated iron saturation/iron levels  Serum Fe - 298  TIBC - 307  Fe sat - 97%  Ferritin - 220    Hereditary hemochromatosis gene 8/26/2022 NEGATIVE    Scrotal/lower extremity edema  S/p Lasix IV, changed to oral  Receiving Aldactone    CT abdomen/pelvis with/without contrast 9/1/2022:  1. Liver with cirrhotic appearance and a 0.8 cm right hepatic simple cyst again noted  2. Mild distention of the gallbladder with mild diffuse thickening of the gallbladder walls with moderate pericholecystic fat stranding. Findings could be secondary to hypoalbuminemia or acalculous cholecystitis. 3. Stable splenomegaly (16.3 cm in CC diameter) with perisplenic collaterals. 4. Moderate atherosclerotic calcific plaques in the walls of the aorta and branches. 5.  No ascites. Mild diffuse mesenteric and right colic fat stranding. 6. Fluid collection in the right inguinal canal: 4.5 x 5.5 x 7.4 cm in AP by TV by CC diameters. Partial visualization of fluid in the dependent portion of the scrotum. 7. Diffuse bilateral subcutaneous fat stranding/anasarca in the abdominal and pelvic walls also including the upper thighs. Previously evaluated by Dr. Uri Willis 8/26/2022, no surgical intervention at this time regarding gallbladder. If surgery required for GB, he would need tertiary care center due to severity of liver disease    Urology has been consulted as well    COVID 19  As per attending  Supplemental O2 continues 2L NC  BC x2 on 8/28/2022 - NGTD  Temp max 100.4      PLAN:  POD #7 left ALICIA  Avoid DVT chemical prophylaxis due to platelet counts below 50,000 (platelets 99,922)  Monitor CBC  Transfuse for Hgb < 7  Transfuse for plt < 10,000 or bleeding  Isolation r/t COVID-19  Liver cirrhosis, followed by GI  Urology to see    Temp max 100.4 on 9/1/2022 at 5:40 pm      SHEILA Townsend    09/02/22  6:36 AM  Physician's attestation/substantial contribution:  I, Dr Zaida Hackett, independently performed an evaluation on Tootie Wiseman.  I have reviewed relevant medical information/data to include but not limited to medication list, relevant appropriate labs and imaging when applicable. I reviewed other physician's notes, ancillary services and nurses assessment. I have reviewed the above documentation completed by the Nurse Practitioner or Physician Assistant. Please see my additional contributions to the history of present illness, physical examination, and assessment/medical decision-making that reflect my findings and impressions. I have seen and examined the patient and the key elements of all parts of the encounter have been performed by me. I agree with the assessment and plan as outlined by the ARNP/PA. Subjective-no new complaints  Objective-as above  Assessment/plan:  Cytopenias-stable. Continue to monitor. No hematologic intervention today.

## 2022-09-02 NOTE — PROGRESS NOTES
Physical Therapy  Name: Candis Matthews  MRN:  [id number]  Date of service:  9/2/2022 09/02/22 1358   Restrictions/Precautions   Restrictions/Precautions Fall Risk;Weight Bearing   Subjective   Subjective Attempt: Pt states he has recently been up for lunch and just got back to bed, declined further mobility at this time. Will continue to follow.          Electronically signed by Callum Blanca PTA on 9/2/2022 at 1:59 PM

## 2022-09-02 NOTE — PROGRESS NOTES
Occupational Therapy     09/02/22 1300   Subjective   Subjective Pt in bed and agreeable to participate. Pain Assessment   Pain Assessment 0-10   Pain Level 5   Pain Location Hip; Sacrum   Pain Orientation Right;Left   Pain Descriptors Discomfort;Aching; Sore   Non-Pharmaceutical Pain Intervention(s) Ambulation/Increased Activity;Repositioned;Rest;None - Patient Satisfied   Response to Pain Intervention Patient satisfied   Cognition   Overall Cognitive Status WFL   Orientation   Overall Orientation Status WFL   Bed Mobility Training   Bed Mobility Training Yes   Overall Level of Assistance Stand-by assistance   Interventions Verbal cues   Supine to Sit Stand-by assistance   Scooting Stand-by assistance   Transfer Training   Transfer Training Yes   Overall Level of Assistance Contact-guard assistance   Interventions Verbal cues; Tactile cues;Manual cues   Sit to Stand Contact-guard assistance   Stand to Sit Contact-guard assistance   Bed to Chair Contact-guard assistance   Balance   Sitting Intact   Standing With support   ADL   Feeding Independent   Grooming Independent   UE Bathing Supervision   LE Bathing Minimal assistance; Adaptive equipment   UE Dressing Supervision   LE Dressing Minimal assistance; Adaptive equipment   Toileting Contact guard assistance   Additional Comments limited with AD due to swelling. Wife to assist at home according to patient. Assessment   Assessment Tx focused on sitting EOB to perform dressing techniques; BSC use; functional mobility at RW in room and to recliner to eat lunch.    Activity Tolerance Patient tolerated treatment well   Discharge Recommendations Patient would benefit from continued therapy after discharge   Plan   Times per Week 3-5   Times per Day Daily   OT Plan of Care   Friday X   Electronically signed by KATHY Duncan on 9/2/2022 at 1:42 PM

## 2022-09-02 NOTE — PROGRESS NOTES
recommended surgical intervention. Ultrasound of liver indicated hydropic cholecystitis without any obvious stone, CBD mildly dilated, MRCP is recommended, possible chronic liver parenchymal disease, cirrhosis, splenomegaly. Patient was evaluated by general surgery with no plans for intervention of gallbladder. D-dimer 4.3, CTA pulmonary pending. Echo indicated normal LV size with preserved LV systolic function with EF of 89%, normal diastolic function, mild mitral regurgitation, trivial tricuspid insufficiency with estimated RVSP of 38 mm which is mildly elevated. Underwent Left total hip arthroplasty on 8/26/2022. S/p 2 units of Platelets and 1 unit of FFP. MRCP indicated mild distended gallbladder, no significant interpretability dilatation, borderline CBD. CT abdomen indicated cirrhotic liver with abnormal diffuse low attenuation pattern, splenomegaly, mild splenic varices, gallbladder distention, and soft tissue in right inguinal canal is suspicious for undescended testicle. GI suggested EGD at a later time to screen for esophageal varices, and US of liver every 6 months to screen for hepatoma. GI obtained repeat CT abdomen and pelvis which indicated diffuse bilateral anasarca in the abdominal wall and pelvic walls, and fluid collection in the right inguinal cavity and into the scrotum. Patient scrotal edema has greatly improved with diuresis and patient is voiding without difficulty. GI had recommended urology consult however not available at this time and patient is voiding without difficulty. We will continue with diuresis. LLE venous study indicated no evidence of DVT, SVT or Reflux. Fever again overnight, temp max 100.4 on 9/1. Repeat Covid test as patient's wife, covid +. Remains on room air. Social work has been assisting in discharge planning. Patient is from [place] and does not have a PCP. Patient will require skilled nursing facility at discharge.     Review of Systems:   Review of Systems Constitutional:  Negative for chills, diaphoresis, fatigue and fever. HENT:  Negative for congestion and ear pain. Eyes:  Negative for visual disturbance. Respiratory:  Negative for cough, shortness of breath and wheezing. Cardiovascular:  Negative for chest pain, palpitations and leg swelling. Denies chest pain    Gastrointestinal:  Positive for diarrhea. Negative for abdominal distention, abdominal pain, blood in stool, constipation, nausea and vomiting. Endocrine: Negative for cold intolerance and heat intolerance. Genitourinary:  Positive for penile swelling and scrotal swelling. Negative for difficulty urinating, flank pain, frequency and urgency. Musculoskeletal:  Positive for arthralgias and joint swelling. Negative for myalgias. Left hip pain worsening   LLE pain and swelling    Skin:  Negative for color change and wound. Bruising   Neurological:  Negative for dizziness, syncope, weakness, light-headedness, numbness and headaches. Hematological:  Does not bruise/bleed easily. Psychiatric/Behavioral:  Negative for agitation, confusion and dysphoric mood. 14 point review of systems is negative except as specifically addressed above. Objective:   VITALS:  /64   Pulse 69   Temp 98.5 °F (36.9 °C) (Temporal)   Resp 18   Ht 5' 9\" (1.753 m)   Wt 195 lb 3.2 oz (88.5 kg)   SpO2 95%   BMI 28.83 kg/m²   24HR INTAKE/OUTPUT:  No intake or output data in the 24 hours ending 09/02/22 1249    Physical Exam  Exam conducted with a chaperone present (Alexandr Hastings). Constitutional:       General: He is not in acute distress. Appearance: Normal appearance. He is not ill-appearing. HENT:      Head: Normocephalic and atraumatic. Right Ear: External ear normal.      Left Ear: External ear normal.      Nose: Nose normal.      Mouth/Throat:      Mouth: Mucous membranes are moist.   Eyes:      Extraocular Movements: Extraocular movements intact. Conjunctiva/sclera: Conjunctivae normal.      Pupils: Pupils are equal, round, and reactive to light. Cardiovascular:      Rate and Rhythm: Normal rate and regular rhythm. Pulses: Normal pulses. Heart sounds: Normal heart sounds. Pulmonary:      Effort: Pulmonary effort is normal. No respiratory distress. Breath sounds: Normal breath sounds. No wheezing, rhonchi or rales. Abdominal:      General: Bowel sounds are normal. There is no distension. Palpations: Abdomen is soft. Tenderness: There is no abdominal tenderness. Genitourinary:     Penis: Swelling present. Comments: Scrotal and penile edema greatly improved since prior exam  Musculoskeletal:         General: Swelling and tenderness present. No deformity. Normal range of motion. Cervical back: Normal range of motion and neck supple. No muscular tenderness. Right lower leg: No edema. Left lower leg: No edema. Comments: LLE swelling, warmth, and tenderness noted   S/p Left hip total arthroplasty, surgical incision CDI    Skin:     General: Skin is warm and dry. Findings: Bruising (improving) present. No lesion. Comments: Bruising to left flank area and hip area   Neurological:      Mental Status: He is alert and oriented to person, place, and time. Psychiatric:         Mood and Affect: Mood normal.         Behavior: Behavior normal.         Thought Content:  Thought content normal.           Medications:      sodium chloride      sodium chloride      sodium chloride        furosemide  20 mg Oral Daily    spironolactone  50 mg Oral Daily    budesonide-formoterol  2 puff Inhalation BID    ipratropium  2 puff Inhalation 4x daily    albuterol sulfate HFA  2 puff Inhalation 4x daily    Vitamin D  1,000 Units Oral Daily    ascorbic acid  500 mg Oral BID    zinc sulfate  50 mg Oral Daily    famotidine  20 mg Oral BID    sodium bicarbonate  650 mg Oral 4x Daily    metoprolol succinate  25 mg Oral Daily sodium chloride flush  5-40 mL IntraVENous 2 times per day    bisacodyl  5 mg Oral Daily    sodium chloride flush  5-40 mL IntraVENous 2 times per day     LORazepam, sodium chloride, sodium chloride flush, sodium chloride, ondansetron **OR** ondansetron, oxyCODONE **OR** oxyCODONE **OR** oxyCODONE, diphenhydrAMINE, sodium chloride, sodium chloride flush, sodium chloride, polyethylene glycol  ADULT DIET; Regular; Safety Tray; Safety Tray (Disposables)     Lab and other Data:     Recent Labs     08/31/22 0415 09/01/22  0844 09/02/22  0238   WBC 2.3* 2.4* 2.2*   HGB 8.3* 8.6* 7.8*   PLT 33* 34* 33*     Recent Labs     08/31/22 0415 09/01/22 0844 09/02/22  0238   * 136 136   K 3.6 3.2* 3.8    101 102   CO2 23 25 25   BUN 12 10 11   CREATININE 0.5 0.5 0.5   GLUCOSE 116* 118* 131*     Recent Labs     08/31/22 0415 09/01/22  0844 09/02/22  0238   AST 96* 126* 137*   ALT 27 36 41   BILITOT 3.5* 4.3* 4.2*   ALKPHOS 68 78 79     Troponin T: No results for input(s): TROPONINI in the last 72 hours. Pro-BNP: No results for input(s): BNP in the last 72 hours. INR: No results for input(s): INR in the last 72 hours. UA:No results for input(s): NITRITE, COLORU, PHUR, LABCAST, WBCUA, RBCUA, MUCUS, TRICHOMONAS, YEAST, BACTERIA, CLARITYU, SPECGRAV, LEUKOCYTESUR, UROBILINOGEN, BILIRUBINUR, BLOODU, GLUCOSEU, AMORPHOUS in the last 72 hours. Invalid input(s): Pinkie Mcburney  A1C: No results for input(s): LABA1C in the last 72 hours. ABG:No results for input(s): PHART, MNJ1HXY, PO2ART, IWK4GNZ, BEART, HGBAE, B3BZKXZA, CARBOXHGBART in the last 72 hours. RAD:   CT ABDOMEN PELVIS W WO CONTRAST Additional Contrast? Radiologist Recommendation    Result Date: 9/1/2022  1. Liver with cirrhotic appearance and a 0.8 cm right hepatic simple cyst again noted 2. Mild distention of the gallbladder with mild diffuse thickening of the gallbladder walls with moderate pericholecystic fat stranding.   Findings could be secondary to hypoalbuminemia or acalculous cholecystitis. 3. Stable splenomegaly (16.3 cm in CC diameter) with perisplenic collaterals. 4. Moderate atherosclerotic calcific plaques in the walls of the aorta and branches. 5.  No ascites. Mild diffuse mesenteric and right colic fat stranding. 6. Fluid collection in the right inguinal canal: 4.5 x 5.5 x 7.4 cm in AP by TV by CC diameters. Partial visualization of fluid in the dependent portion of the scrotum. 7. Diffuse bilateral subcutaneous fat stranding/anasarca in the abdominal and pelvic walls also including the upper thighs. Recommendation: Follow up as clinically indicated. All CT scans at this facility utilize dose modulation, iterative reconstruction, and/or weight based dosing when appropriate to reduce radiation dose to as low as reasonably achievable. Electronically Signed by Dorian Dance MD at 01-Sep-2022 07:03:37 PM             XR CHEST PORTABLE    Result Date: 8/29/2022  1. No acute cardiopulmonary process 2. No adverse interval change Recommendation: Follow up as clinically indicated. Electronically Signed by Dalton Fairbanks DO at 29-Aug-2022 01:43:01 AM             MRI ABDOMEN W WO CONTRAST MRCP    Result Date: 8/26/2022  1. Cirrhotic liver. No definite liver mass 2. Spenomegaly with varices. 3. Distended gallbladder. 4. Tiny simple cyst in right hepatic lobe. 5. Tiny right renal simple cysts. Recommendation: Follow up as clinically indicated. EXAM: MRCP CLINICAL DATA:Hydropic gallbladder, slightly dilated CBD. Radiologist recommending MRCP. TECHNIQUE: Heavy T2-weighted images, along with rotating thick and thin slab images of the biliary system were obtained for MRCP. Reformats were constructed. PRIOR STUDIES: CT scan of abdomen and pelvis done same day. Ultrasound of the liver dated 08/25/22. FINDINGS: MRCP: No evidence of intrahepatic biliary ductal dilation is present. The gallbladder is mildly distended.  The cystic duct and right and left hepatic ducts are unremarkable. The common bile duct is upper limits of normal (6 mm). No evidence of common bile duct obstruction. Normal pancreatic duct. No biliary strictures are present. No evidence of choledocholithiasis. IMPRESSION: 1. Mild distended gallbladder. 2. No significant interpretability dilatation. 3. Borderline common bile duct. RECOMMENDATION: Follow up as clinically indicated. Electronically Signed by Lana Krause MD at 26-Aug-2022 10:02:15 PM             XR HIP 2-3 VW W PELVIS LEFT    Result Date: 8/27/2022  Impression: Fluoroscopic guidance was provided to the Department of Orthopedic Surgery by the Department of Radiology. Recommendation: Follow up as clinical indicated. Electronically Signed by Smith Porras MD at 27-Aug-2022 02:13:19 AM             XR HIP 2-3 VW W PELVIS LEFT    Result Date: 8/26/2022  Intact total left hip arthroplasty. Recommendation: Followup as clinically indicated.  Electronically Signed by Lana Krause MD at 26-Aug-2022 09:29:01 PM              Echo:   Summary   Left ventricle had normal chamber size and thickness   Preserved systolic function with estimated ejection fraction of 74%   No regional wall motion abnormality   Normal diastolic function   No significant valvular lesion seen   Mild mitral regurgitation   Trivial tricuspid insufficiency with estimated right ventricular systolic   pressure of 38 mm which is mildly elevated     Micro:   SARS-CoV-2, NAAT  COVID-19, Rapid  Collected: 08/29/22 1057   Result status: Final   Resulting lab: [facility]   Reference range: Not Detected   Value: DETECTED Panic         Assessment/Plan   Principal Problem:    Closed left hip fracture, initial encounter [facility])-              - Orthopedic surgery following                           - underwent Left total hip arthroplasty on 8/26/2022                           - No anticoagulation given thrombocytopenia and elevated INR               - Pain control - PT/OT per ortho                 - Case management consulted for discharge planning, referrals to SNF placed      Active Problems:   Covid / Febrile/ SOB-               - Follow Blood cultures, no growth to date               - Follow procalcitonin              - Lactic 1.9                    - Cxray unremarkable              - Supplemental oxygen as needed, on room air currently              - Monitor fever curve Tmax 100.4 on 9/1              - Monitor labs closely               -Supportive care      Irregular heart beat-  - Cardiology consulted               - Suggested PACs               - Recommended low dose beta blocker to suppress the PACs               - cleared patient for surgery   - ECHO- indicated normal LV size with preserved LV systolic function with EF of 78%, normal diastolic function, mild mitral regurgitation, trivial tricuspid insufficiency with estimated RVSP of 38 mm which is mildly elevated   - Monitor on telemetry        Liver disease/ Hyperbilirubinemia/ thrombocytopenia-             - s/p 2 units of platelets and 1 unit of fresh frozen plasma    - Liver US- indicated hydropic cholecystitis without any obvious stone, CBD mildly dilated, MRCP is recommended, possible chronic liver parenchymal disease, cirrhosis, splenomegaly              - GI following                           - MRCP indicated mild distended gallbladder, no significant interpretability dilatation, borderline CBD                          - CT abdomen indicated cirrhotic liver with abnormal diffuse low attenuation pattern, splenomegaly, mild splenic varices, gallbladder distention, and soft tissue in right inguinal canal is suspicious for undescended testicle               - GI consulted Surgery whom recommended no indications for surgery at this time, if surgery needed then he would have to be at a tertiary center due to severity of liver disease               - Lipase 41              - Monitor CMP closely -Continue diuresis                          -monitor and replace electrolytes as needed        DVT Prophylaxis: SCDs      Kary Alonso, SHEILA - CNP, 9/2/2022 12:49 PM

## 2022-09-03 ENCOUNTER — APPOINTMENT (OUTPATIENT)
Dept: GENERAL RADIOLOGY | Age: 67
DRG: 521 | End: 2022-09-03
Payer: MEDICARE

## 2022-09-03 LAB
ALBUMIN SERPL-MCNC: 2.4 G/DL (ref 3.5–5.2)
ALP BLD-CCNC: 86 U/L (ref 40–130)
ALT SERPL-CCNC: 40 U/L (ref 5–41)
ANION GAP SERPL CALCULATED.3IONS-SCNC: 8 MMOL/L (ref 7–19)
ANISOCYTOSIS: ABNORMAL
AST SERPL-CCNC: 125 U/L (ref 5–40)
BASOPHILS ABSOLUTE: 0 K/UL (ref 0–0.2)
BASOPHILS RELATIVE PERCENT: 0 % (ref 0–1)
BILIRUB SERPL-MCNC: 4.1 MG/DL (ref 0.2–1.2)
BUN BLDV-MCNC: 11 MG/DL (ref 8–23)
CALCIUM SERPL-MCNC: 7.6 MG/DL (ref 8.8–10.2)
CHLORIDE BLD-SCNC: 102 MMOL/L (ref 98–111)
CO2: 25 MMOL/L (ref 22–29)
CREAT SERPL-MCNC: 0.5 MG/DL (ref 0.5–1.2)
EOSINOPHILS ABSOLUTE: 0.1 K/UL (ref 0–0.6)
EOSINOPHILS RELATIVE PERCENT: 4.8 % (ref 0–5)
GFR AFRICAN AMERICAN: >59
GFR NON-AFRICAN AMERICAN: >60
GLUCOSE BLD-MCNC: 145 MG/DL (ref 74–109)
HCT VFR BLD CALC: 23.6 % (ref 42–52)
HEMOGLOBIN: 7.6 G/DL (ref 14–18)
HYPOCHROMIA: ABNORMAL
IMMATURE GRANULOCYTES #: 0 K/UL
LYMPHOCYTES ABSOLUTE: 0.7 K/UL (ref 1.1–4.5)
LYMPHOCYTES RELATIVE PERCENT: 26.2 % (ref 20–40)
MACROCYTES: ABNORMAL
MCH RBC QN AUTO: 37.8 PG (ref 27–31)
MCHC RBC AUTO-ENTMCNC: 32.2 G/DL (ref 33–37)
MCV RBC AUTO: 117.4 FL (ref 80–94)
MONOCYTES ABSOLUTE: 0.4 K/UL (ref 0–0.9)
MONOCYTES RELATIVE PERCENT: 16.5 % (ref 0–10)
NEUTROPHILS ABSOLUTE: 1.3 K/UL (ref 1.5–7.5)
NEUTROPHILS RELATIVE PERCENT: 51.7 % (ref 50–65)
OVALOCYTES: ABNORMAL
PDW BLD-RTO: 16.2 % (ref 11.5–14.5)
PLATELET # BLD: 30 K/UL (ref 130–400)
PLATELET SLIDE REVIEW: ABNORMAL
PMV BLD AUTO: 11.6 FL (ref 9.4–12.4)
POLYCHROMASIA: ABNORMAL
POTASSIUM REFLEX MAGNESIUM: 4 MMOL/L (ref 3.5–5)
RBC # BLD: 2.01 M/UL (ref 4.7–6.1)
SODIUM BLD-SCNC: 135 MMOL/L (ref 136–145)
TEAR DROP CELLS: ABNORMAL
TOTAL PROTEIN: 4.8 G/DL (ref 6.6–8.7)
WBC # BLD: 2.5 K/UL (ref 4.8–10.8)

## 2022-09-03 PROCEDURE — 6370000000 HC RX 637 (ALT 250 FOR IP): Performed by: NURSE PRACTITIONER

## 2022-09-03 PROCEDURE — 85025 COMPLETE CBC W/AUTO DIFF WBC: CPT

## 2022-09-03 PROCEDURE — 1210000000 HC MED SURG R&B

## 2022-09-03 PROCEDURE — 97116 GAIT TRAINING THERAPY: CPT

## 2022-09-03 PROCEDURE — 6370000000 HC RX 637 (ALT 250 FOR IP): Performed by: ORTHOPAEDIC SURGERY

## 2022-09-03 PROCEDURE — 99231 SBSQ HOSP IP/OBS SF/LOW 25: CPT | Performed by: INTERNAL MEDICINE

## 2022-09-03 PROCEDURE — 80053 COMPREHEN METABOLIC PANEL: CPT

## 2022-09-03 PROCEDURE — 2700000000 HC OXYGEN THERAPY PER DAY

## 2022-09-03 PROCEDURE — 6370000000 HC RX 637 (ALT 250 FOR IP): Performed by: HOSPITALIST

## 2022-09-03 PROCEDURE — 71045 X-RAY EXAM CHEST 1 VIEW: CPT

## 2022-09-03 PROCEDURE — 36415 COLL VENOUS BLD VENIPUNCTURE: CPT

## 2022-09-03 PROCEDURE — 2580000003 HC RX 258: Performed by: ORTHOPAEDIC SURGERY

## 2022-09-03 PROCEDURE — 6370000000 HC RX 637 (ALT 250 FOR IP): Performed by: SPECIALIST

## 2022-09-03 RX ORDER — HYDROXYZINE HYDROCHLORIDE 25 MG/1
25 TABLET, FILM COATED ORAL 3 TIMES DAILY PRN
Status: DISCONTINUED | OUTPATIENT
Start: 2022-09-03 | End: 2022-09-07 | Stop reason: HOSPADM

## 2022-09-03 RX ORDER — FUROSEMIDE 20 MG/1
20 TABLET ORAL 2 TIMES DAILY
Status: DISCONTINUED | OUTPATIENT
Start: 2022-09-03 | End: 2022-09-05

## 2022-09-03 RX ADMIN — IPRATROPIUM BROMIDE 2 PUFF: 17 AEROSOL, METERED RESPIRATORY (INHALATION) at 09:21

## 2022-09-03 RX ADMIN — OXYCODONE 10 MG: 5 TABLET ORAL at 13:11

## 2022-09-03 RX ADMIN — ALBUTEROL SULFATE 2 PUFF: 90 AEROSOL, METERED RESPIRATORY (INHALATION) at 16:32

## 2022-09-03 RX ADMIN — SODIUM CHLORIDE, PRESERVATIVE FREE 10 ML: 5 INJECTION INTRAVENOUS at 20:09

## 2022-09-03 RX ADMIN — SODIUM BICARBONATE 650 MG: 650 TABLET ORAL at 09:21

## 2022-09-03 RX ADMIN — BUDESONIDE AND FORMOTEROL FUMARATE DIHYDRATE 2 PUFF: 160; 4.5 AEROSOL RESPIRATORY (INHALATION) at 20:09

## 2022-09-03 RX ADMIN — IPRATROPIUM BROMIDE 2 PUFF: 17 AEROSOL, METERED RESPIRATORY (INHALATION) at 20:09

## 2022-09-03 RX ADMIN — IPRATROPIUM BROMIDE 2 PUFF: 17 AEROSOL, METERED RESPIRATORY (INHALATION) at 16:32

## 2022-09-03 RX ADMIN — SPIRONOLACTONE 50 MG: 25 TABLET ORAL at 09:22

## 2022-09-03 RX ADMIN — BISACODYL 5 MG: 5 TABLET, COATED ORAL at 09:22

## 2022-09-03 RX ADMIN — ALBUTEROL SULFATE 2 PUFF: 90 AEROSOL, METERED RESPIRATORY (INHALATION) at 20:09

## 2022-09-03 RX ADMIN — Medication 1000 UNITS: at 09:21

## 2022-09-03 RX ADMIN — OXYCODONE 10 MG: 5 TABLET ORAL at 20:11

## 2022-09-03 RX ADMIN — METOPROLOL SUCCINATE 25 MG: 25 TABLET, EXTENDED RELEASE ORAL at 09:21

## 2022-09-03 RX ADMIN — OXYCODONE HYDROCHLORIDE AND ACETAMINOPHEN 500 MG: 500 TABLET ORAL at 20:09

## 2022-09-03 RX ADMIN — FAMOTIDINE 20 MG: 20 TABLET ORAL at 20:09

## 2022-09-03 RX ADMIN — ALBUTEROL SULFATE 2 PUFF: 90 AEROSOL, METERED RESPIRATORY (INHALATION) at 09:21

## 2022-09-03 RX ADMIN — BUDESONIDE AND FORMOTEROL FUMARATE DIHYDRATE 2 PUFF: 160; 4.5 AEROSOL RESPIRATORY (INHALATION) at 09:21

## 2022-09-03 RX ADMIN — SODIUM CHLORIDE, PRESERVATIVE FREE 10 ML: 5 INJECTION INTRAVENOUS at 09:22

## 2022-09-03 RX ADMIN — SODIUM BICARBONATE 650 MG: 650 TABLET ORAL at 12:16

## 2022-09-03 RX ADMIN — SODIUM BICARBONATE 650 MG: 650 TABLET ORAL at 16:32

## 2022-09-03 RX ADMIN — IPRATROPIUM BROMIDE 2 PUFF: 17 AEROSOL, METERED RESPIRATORY (INHALATION) at 12:16

## 2022-09-03 RX ADMIN — ZINC SULFATE 220 MG (50 MG) CAPSULE 50 MG: CAPSULE at 09:21

## 2022-09-03 RX ADMIN — FUROSEMIDE 20 MG: 20 TABLET ORAL at 09:21

## 2022-09-03 RX ADMIN — SODIUM BICARBONATE 650 MG: 650 TABLET ORAL at 20:09

## 2022-09-03 RX ADMIN — ALBUTEROL SULFATE 2 PUFF: 90 AEROSOL, METERED RESPIRATORY (INHALATION) at 12:16

## 2022-09-03 RX ADMIN — FUROSEMIDE 20 MG: 20 TABLET ORAL at 16:32

## 2022-09-03 RX ADMIN — OXYCODONE HYDROCHLORIDE AND ACETAMINOPHEN 500 MG: 500 TABLET ORAL at 09:21

## 2022-09-03 RX ADMIN — FAMOTIDINE 20 MG: 20 TABLET ORAL at 09:21

## 2022-09-03 ASSESSMENT — PAIN DESCRIPTION - ORIENTATION
ORIENTATION: LEFT
ORIENTATION: RIGHT;LEFT;LOWER

## 2022-09-03 ASSESSMENT — ENCOUNTER SYMPTOMS
DIARRHEA: 1
NAUSEA: 0
ABDOMINAL PAIN: 0
ABDOMINAL DISTENTION: 0
ROS SKIN COMMENTS: BRUISING
COUGH: 0
BLOOD IN STOOL: 0
WHEEZING: 0
CONSTIPATION: 0
COLOR CHANGE: 0
SHORTNESS OF BREATH: 0
VOMITING: 0

## 2022-09-03 ASSESSMENT — PAIN SCALES - WONG BAKER: WONGBAKER_NUMERICALRESPONSE: 6

## 2022-09-03 ASSESSMENT — PAIN DESCRIPTION - DESCRIPTORS
DESCRIPTORS: ACHING;SHARP;SORE
DESCRIPTORS: ACHING;DISCOMFORT

## 2022-09-03 ASSESSMENT — PAIN SCALES - GENERAL: PAINLEVEL_OUTOF10: 8

## 2022-09-03 ASSESSMENT — PAIN DESCRIPTION - LOCATION
LOCATION: SCROTUM;LEG
LOCATION: HIP

## 2022-09-03 ASSESSMENT — PAIN DESCRIPTION - FREQUENCY: FREQUENCY: CONTINUOUS

## 2022-09-03 ASSESSMENT — PAIN DESCRIPTION - PAIN TYPE: TYPE: ACUTE PAIN

## 2022-09-03 ASSESSMENT — PAIN - FUNCTIONAL ASSESSMENT: PAIN_FUNCTIONAL_ASSESSMENT: PREVENTS OR INTERFERES SOME ACTIVE ACTIVITIES AND ADLS

## 2022-09-03 NOTE — PROGRESS NOTES
hydropic cholecystitis without any obvious stone, CBD mildly dilated, MRCP is recommended, possible chronic liver parenchymal disease, cirrhosis, splenomegaly. Patient was evaluated by general surgery with no plans for intervention of gallbladder. D-dimer 4.3, CTA pulmonary pending. Echo indicated normal LV size with preserved LV systolic function with EF of 86%, normal diastolic function, mild mitral regurgitation, trivial tricuspid insufficiency with estimated RVSP of 38 mm which is mildly elevated. Underwent Left total hip arthroplasty on 8/26/2022. S/p 2 units of Platelets and 1 unit of FFP. MRCP indicated mild distended gallbladder, no significant interpretability dilatation, borderline CBD. CT abdomen indicated cirrhotic liver with abnormal diffuse low attenuation pattern, splenomegaly, mild splenic varices, gallbladder distention, and soft tissue in right inguinal canal is suspicious for undescended testicle. GI suggested EGD at a later time to screen for esophageal varices, and US of liver every 6 months to screen for hepatoma. GI obtained repeat CT abdomen and pelvis which indicated diffuse bilateral anasarca in the abdominal wall and pelvic walls, and fluid collection in the right inguinal cavity and into the scrotum. Patient scrotal edema has greatly improved with diuresis and patient is voiding without difficulty. GI had recommended urology consult however not available at this time and patient is voiding without difficulty. We will continue with diuresis. LLE venous study indicated no evidence of DVT, SVT or Reflux. Fever again overnight, temp max 100.4 on 9/1. Repeat Covid test as patient's wife, covid +. Now requiring 1.5 L of oxygen via nasal cannula for mild hypoxia, will repeat chest x-ray this a.m. Social work has been assisting in discharge planning. Review of Systems:   Review of Systems   Constitutional:  Negative for chills, diaphoresis, fatigue and fever.    HENT:  Negative for congestion and ear pain. Eyes:  Negative for visual disturbance. Respiratory:  Negative for cough, shortness of breath and wheezing. Cardiovascular:  Negative for chest pain, palpitations and leg swelling. Denies chest pain    Gastrointestinal:  Positive for diarrhea. Negative for abdominal distention, abdominal pain, blood in stool, constipation, nausea and vomiting. Endocrine: Negative for cold intolerance and heat intolerance. Genitourinary:  Positive for penile swelling and scrotal swelling. Negative for difficulty urinating, flank pain, frequency and urgency. Musculoskeletal:  Positive for arthralgias and joint swelling. Negative for myalgias. Left hip pain worsening   LLE pain and swelling    Skin:  Negative for color change and wound. Bruising   Neurological:  Negative for dizziness, syncope, weakness, light-headedness, numbness and headaches. Hematological:  Does not bruise/bleed easily. Psychiatric/Behavioral:  Negative for agitation, confusion and dysphoric mood. 14 point review of systems is negative except as specifically addressed above. Objective:   VITALS:  BP (!) 107/54   Pulse 64   Temp 98.2 °F (36.8 °C) (Oral)   Resp 18   Ht 5' 9\" (1.753 m)   Wt 203 lb (92.1 kg)   SpO2 95%   BMI 29.98 kg/m²   24HR INTAKE/OUTPUT:  No intake or output data in the 24 hours ending 09/03/22 1154    Physical Exam  Exam conducted with a chaperone present Mark Valle RN). Constitutional:       General: He is not in acute distress. Appearance: Normal appearance. He is not ill-appearing. HENT:      Head: Normocephalic and atraumatic. Right Ear: External ear normal.      Left Ear: External ear normal.      Nose: Nose normal.      Mouth/Throat:      Mouth: Mucous membranes are moist.   Eyes:      Extraocular Movements: Extraocular movements intact. Conjunctiva/sclera: Conjunctivae normal.      Pupils: Pupils are equal, round, and reactive to light. Cardiovascular:      Rate and Rhythm: Normal rate and regular rhythm. Pulses: Normal pulses. Heart sounds: Normal heart sounds. Pulmonary:      Effort: Pulmonary effort is normal. No respiratory distress. Breath sounds: Normal breath sounds. No wheezing, rhonchi or rales. Abdominal:      General: Bowel sounds are normal. There is no distension. Palpations: Abdomen is soft. Tenderness: There is no abdominal tenderness. Genitourinary:     Penis: Swelling present. Comments: Scrotal and penile edema greatly improved since prior exam  Musculoskeletal:         General: Swelling and tenderness present. No deformity. Normal range of motion. Cervical back: Normal range of motion and neck supple. No muscular tenderness. Right lower leg: No edema. Left lower leg: No edema. Comments: LLE swelling, warmth, and tenderness noted   S/p Left hip total arthroplasty, surgical incision CDI    Skin:     General: Skin is warm and dry. Findings: Bruising (improving) present. No lesion. Comments: Bruising to left flank area and hip area   Neurological:      Mental Status: He is alert and oriented to person, place, and time. Psychiatric:         Mood and Affect: Mood normal.         Behavior: Behavior normal.         Thought Content:  Thought content normal.           Medications:      sodium chloride      sodium chloride        furosemide  20 mg Oral BID    spironolactone  50 mg Oral Daily    budesonide-formoterol  2 puff Inhalation BID    ipratropium  2 puff Inhalation 4x daily    albuterol sulfate HFA  2 puff Inhalation 4x daily    Vitamin D  1,000 Units Oral Daily    ascorbic acid  500 mg Oral BID    zinc sulfate  50 mg Oral Daily    famotidine  20 mg Oral BID    sodium bicarbonate  650 mg Oral 4x Daily    metoprolol succinate  25 mg Oral Daily    sodium chloride flush  5-40 mL IntraVENous 2 times per day     hydrOXYzine HCl, sodium chloride flush, sodium chloride, ondansetron **OR** ondansetron, oxyCODONE **OR** oxyCODONE **OR** oxyCODONE, sodium chloride, polyethylene glycol  ADULT DIET; Regular; Low Sodium (2 gm); Safety Tray; Safety Tray (Disposables)     Lab and other Data:     Recent Labs     09/01/22  0844 09/02/22 0238 09/03/22  0315   WBC 2.4* 2.2* 2.5*   HGB 8.6* 7.8* 7.6*   PLT 34* 33* 30*       Recent Labs     09/01/22  0844 09/02/22  0238 09/03/22  0315    136 135*   K 3.2* 3.8 4.0    102 102   CO2 25 25 25   BUN 10 11 11   CREATININE 0.5 0.5 0.5   GLUCOSE 118* 131* 145*       Recent Labs     09/01/22  0844 09/02/22 0238 09/03/22  0315   * 137* 125*   ALT 36 41 40   BILITOT 4.3* 4.2* 4.1*   ALKPHOS 78 79 86       Troponin T: No results for input(s): TROPONINI in the last 72 hours. Pro-BNP: No results for input(s): BNP in the last 72 hours. INR: No results for input(s): INR in the last 72 hours. UA:No results for input(s): NITRITE, COLORU, PHUR, LABCAST, WBCUA, RBCUA, MUCUS, TRICHOMONAS, YEAST, BACTERIA, CLARITYU, SPECGRAV, LEUKOCYTESUR, UROBILINOGEN, BILIRUBINUR, BLOODU, GLUCOSEU, AMORPHOUS in the last 72 hours. Invalid input(s): [place]  A1C: No results for input(s): LABA1C in the last 72 hours. ABG:No results for input(s): PHART, GVW2UYI, PO2ART, QXT3KPZ, BEART, HGBAE, D5VNFHTQ, CARBOXHGBART in the last 72 hours. RAD:   CT ABDOMEN PELVIS W WO CONTRAST Additional Contrast? Radiologist Recommendation    Result Date: 9/1/2022  1. Liver with cirrhotic appearance and a 0.8 cm right hepatic simple cyst again noted 2. Mild distention of the gallbladder with mild diffuse thickening of the gallbladder walls with moderate pericholecystic fat stranding. Findings could be secondary to hypoalbuminemia or acalculous cholecystitis. 3. Stable splenomegaly (16.3 cm in CC diameter) with perisplenic collaterals. 4. Moderate atherosclerotic calcific plaques in the walls of the aorta and branches. 5.  No ascites.   Mild diffuse mesenteric and right colic fat stranding. 6. Fluid collection in the right inguinal canal: 4.5 x 5.5 x 7.4 cm in AP by TV by CC diameters. Partial visualization of fluid in the dependent portion of the scrotum. 7. Diffuse bilateral subcutaneous fat stranding/anasarca in the abdominal and pelvic walls also including the upper thighs. Recommendation: Follow up as clinically indicated. All CT scans at this facility utilize dose modulation, iterative reconstruction, and/or weight based dosing when appropriate to reduce radiation dose to as low as reasonably achievable. Electronically Signed by Huber Up MD at 01-Sep-2022 07:03:37 PM             XR CHEST PORTABLE    Result Date: 8/29/2022  1. No acute cardiopulmonary process 2. No adverse interval change Recommendation: Follow up as clinically indicated. Electronically Signed by Rafael Castorena DO at 29-Aug-2022 01:43:01 AM             MRI ABDOMEN W WO CONTRAST MRCP    Result Date: 8/26/2022  1. Cirrhotic liver. No definite liver mass 2. Spenomegaly with varices. 3. Distended gallbladder. 4. Tiny simple cyst in right hepatic lobe. 5. Tiny right renal simple cysts. Recommendation: Follow up as clinically indicated. EXAM: MRCP CLINICAL DATA:Hydropic gallbladder, slightly dilated CBD. Radiologist recommending MRCP. TECHNIQUE: Heavy T2-weighted images, along with rotating thick and thin slab images of the biliary system were obtained for MRCP. Reformats were constructed. PRIOR STUDIES: CT scan of abdomen and pelvis done same day. Ultrasound of the liver dated 08/25/22. FINDINGS: MRCP: No evidence of intrahepatic biliary ductal dilation is present. The gallbladder is mildly distended. The cystic duct and right and left hepatic ducts are unremarkable. The common bile duct is upper limits of normal (6 mm). No evidence of common bile duct obstruction. Normal pancreatic duct. No biliary strictures are present. No evidence of choledocholithiasis. IMPRESSION: 1. Mild distended gallbladder.   2. No significant interpretability dilatation. 3. Borderline common bile duct. RECOMMENDATION: Follow up as clinically indicated. Electronically Signed by Geraldine Givens MD at 26-Aug-2022 10:02:15 PM             XR HIP 2-3 VW W PELVIS LEFT    Result Date: 8/27/2022  Impression: Fluoroscopic guidance was provided to the Department of Orthopedic Surgery by the Department of Radiology. Recommendation: Follow up as clinical indicated. Electronically Signed by Ruth Sutton MD at 27-Aug-2022 02:13:19 AM             XR HIP 2-3 VW W PELVIS LEFT    Result Date: 8/26/2022  Intact total left hip arthroplasty. Recommendation: Followup as clinically indicated.  Electronically Signed by Geraldine Givens MD at 26-Aug-2022 09:29:01 PM              Echo:   Summary   Left ventricle had normal chamber size and thickness   Preserved systolic function with estimated ejection fraction of 74%   No regional wall motion abnormality   Normal diastolic function   No significant valvular lesion seen   Mild mitral regurgitation   Trivial tricuspid insufficiency with estimated right ventricular systolic   pressure of 38 mm which is mildly elevated     Micro:   SARS-CoV-2, NAAT  COVID-19, Rapid  Collected: 08/29/22 1057   Result status: Final   Resulting lab: [facility]   Reference range: Not Detected   Value: DETECTED Panic         Assessment/Plan   Principal Problem:    Closed left hip fracture, initial encounter [facility])-              - Orthopedic surgery following                           - underwent Left total hip arthroplasty on 8/26/2022                           - No anticoagulation given thrombocytopenia and elevated INR               - Pain control               - PT/OT per ortho                 - Case management consulted for discharge planning, referrals to SNF placed      Active Problems:   Covid / Febrile/ SOB-               - Follow Blood cultures, no growth to date               - Follow procalcitonin - Lactic 1.9                    - Cxray unremarkable, repeat on 9/3              - Supplemental oxygen as needed, now requiring 1.5 L for mild hypoxia              - Monitor fever curve Tmax 100.4 on 9/1              - Monitor labs closely               -Supportive care      Irregular heart beat-  - Cardiology consulted               - Suggested PACs               - Recommended low dose beta blocker to suppress the PACs               - cleared patient for surgery   - ECHO- indicated normal LV size with preserved LV systolic function with EF of 98%, normal diastolic function, mild mitral regurgitation, trivial tricuspid insufficiency with estimated RVSP of 38 mm which is mildly elevated   - Monitor on telemetry        Liver disease/ Hyperbilirubinemia/ thrombocytopenia-             - s/p 2 units of platelets and 1 unit of fresh frozen plasma    - Liver US- indicated hydropic cholecystitis without any obvious stone, CBD mildly dilated, MRCP is recommended, possible chronic liver parenchymal disease, cirrhosis, splenomegaly              - GI following                           - MRCP indicated mild distended gallbladder, no significant interpretability dilatation, borderline CBD                          - CT abdomen indicated cirrhotic liver with abnormal diffuse low attenuation pattern, splenomegaly, mild splenic varices, gallbladder distention, and soft tissue in right inguinal canal is suspicious for undescended testicle               - GI consulted Surgery whom recommended no indications for surgery at this time, if surgery needed then he would have to be at a tertiary center due to severity of liver disease               - Lipase 41              - Monitor CMP closely               -Continue diuresis                          -monitor and replace electrolytes as needed        DVT Prophylaxis: SCDs      SHEILA Pickens - CNP, 9/3/2022 11:54 AM

## 2022-09-03 NOTE — PROGRESS NOTES
Physical Therapy  Name: Anais Washington  MRN:  [id number]  Date of service:  9/3/2022       09/03/22 1206   Restrictions/Precautions   Restrictions/Precautions Fall Risk;Weight Bearing   Lower Extremity Weight Bearing Restrictions   Left Lower Extremity Weight Bearing Weight Bearing As Tolerated   Position Activity Restriction   Other position/activity restrictions ant hip prec.,  No SLR. COVID droplet plus   Subjective   Subjective Pt in bed and agreed to therapy. Pain Assessment   Pain Assessment Kapadia-Baker FACES   Kapadia-Baker Pain Rating 6   Pain Location Hip   Pain Orientation Left   Pain Descriptors Aching;Discomfort   Functional Pain Assessment Prevents or interferes some active activities and ADLs   Pain Type Acute pain   Pain Frequency Continuous   Non-Pharmaceutical Pain Intervention(s) Ambulation/Increased Activity;Repositioned; Rest   Response to Pain Intervention Patient satisfied   Bed Mobility   Supine to Sit Stand by assistance   Sit to Supine Stand by assistance   Transfers   Sit to Stand Contact guard assistance   Stand to sit Contact guard assistance   Ambulation   WB Status FWBAT LLE   Ambulation   Surface level tile   Device Rolling Walker   Assistance Contact guard assistance   Quality of Gait no LOB   Gait Deviations Slow Traci;Decreased step height   Distance 30'   Comments sat in chair briefly but did not want to remain in chair   Patient Goals    Patient goals  get stronger, go home   Short Term Goals   Time Frame for Short term goals 2 wks   Short term goal 1 supine to sit indep   Short term goal 2 sit to stand indep   Short term goal 3 amb. 100' with RW SBA   Short term goal 4 bed to chair SBA   Short term goal 5 up/down 2-3 stairs SBA   Conditions Requiring Skilled Therapeutic Intervention   Body Structures, Functions, Activity Limitations Requiring Skilled Therapeutic Intervention Decreased functional mobility ; Decreased ROM; Decreased endurance;Decreased cognition;Decreased safe awareness;Decreased strength;Decreased balance; Increased pain   Assessment Pt amb with no LOB around room. Continues to be able to complete bed mobility without assist. Returned to bed and positioned with all needs in reach. Activity Tolerance   Activity Tolerance Patient tolerated treatment well   PT Plan of Care   Saturday X   Safety Devices   Type of Devices Bed alarm in place;Call light within reach; Left in bed       Electronically signed by Juice Day PTA on 9/3/2022 at 12:13 PM

## 2022-09-03 NOTE — PROGRESS NOTES
PROGRESS NOTE    Patient name: Naomi Castrejon  Patient : 1955  Room: 536    SUBJECTIVE: POD #8 left hip replacement. Ambulatory in room for self. Appropriate postoperative discomfort. remains in isolation due to COVID-19. Scrotal edema improving. Reports voiding. Receiving oral lasix and aldactone. GI following for cirrhosis. Denies shortness of breath. INTERVAL HISTORY  Naomi Castrejon was first seen by Dr. Liza Morse on 2022. Hematology consultation was requested for findings of thrombocytopenia. The patient was hospitalized at [facility] with complaints of left hip pain after a fall yesterday. The patient has not been able to ambulate. He is a chronic smoker. He was found to be on A. fib in the ER. CBC during admission here showed WBC count 3.8 with ANC of 2.5/, hemoglobin 15.3/, platelet count 81,108. Iron profile showed iron of 298, iron saturation 97%, TIBC 307, haptoglobin<10. PT 19.4/INR 1.61. The patient tells me that he has a diagnosis of alcoholic cirrhosis. He is a chronic drinker for many decades. Unfortunately, he does not have a GI doctor. He is known to be thrombocytopenic. He denies any history of hepatitis C. Ultrasound performed here showed findings suggestive of chronic liver parenchymal disease, cirrhosis and splenomegaly with the spleen measuring 2.5 x 6.6 x 15.7 cm consistent with splenomegaly. Objective   BP (!) 107/54   Pulse 64   Temp 98.2 °F (36.8 °C) (Oral)   Resp 18   Ht 5' 9\" (1.753 m)   Wt 203 lb (92.1 kg)   SpO2 95%   BMI 29.98 kg/m²     PHYSICAL EXAM:  CONSTITUTIONAL: Alert, appropriate, no acute distress. EYES: Equal, round, reactive  NECK: Supple, no masses. No JVD  CHEST/LUNGS: CTA bilaterally, normal respiratory effort, dry congested cough. Not requiring O2 at this time  CARDIOVASCULAR: RRR, no murmurs.     ABDOMEN: distended, soft non-tender, active bowel sounds  EXTREMITIES: Postop left hip surgery- incision well approximated, no s/s of infection. LLE ecchymosis improved. Abdominal/hip/bilateral lower extremity edema 2+. SKIN: warm, dry with no rashes or lesions. Scattered bruising. Left hip incision well approximated. No drainage  NEUROLOGIC: follows commands, non focal     Recent Labs     09/03/22  0315 09/02/22  0238 09/01/22  0844   WBC 2.5* 2.2* 2.4*   HGB 7.6* 7.8* 8.6*   HCT 23.6* 24.2* 26.1*   .4* 121.0* 117.0*   PLT 30* 33* 34*     Lab Results   Component Value Date     (L) 09/03/2022    K 4.0 09/03/2022     09/03/2022    CO2 25 09/03/2022    BUN 11 09/03/2022    CREATININE 0.5 09/03/2022    GLUCOSE 145 (H) 09/03/2022    CALCIUM 7.6 (L) 09/03/2022    PROT 4.8 (L) 09/03/2022    LABALBU 2.4 (L) 09/03/2022    BILITOT 4.1 (H) 09/03/2022    ALKPHOS 86 09/03/2022     (H) 09/03/2022    ALT 40 09/03/2022    LABGLOM >60 09/03/2022    GFRAA >59 09/03/2022     Lab Results   Component Value Date    INR 1.74 (H) 08/29/2022    INR 1.76 (H) 08/28/2022    INR 1.97 (H) 08/27/2022    PROTIME 20.6 (H) 08/29/2022    PROTIME 20.8 (H) 08/28/2022    PROTIME 22.8 (H) 08/27/2022     CT ABDOMEN PELVIS W WO CONTRAST Additional Contrast? Radiologist Recommendation    Result Date: 9/1/2022  NO PRIOR REPORT AVAILABLE Exam: CT OF THE ABDOMEN/PELVIS WITHOUT AND WITH IV CONTRAST Clinical data: Cirrhosis. May have ascites. May have right inguinal hernia and fluid in his scrotum. Technique: Direct contiguous axial CT images were acquired through the abdomen and pelvis without and with intravenous contrastusing soft tissue and bone algorithms. Oral contrast was administered. Reformatted/MPR images were performed. Radiation dose: CTDIvol =53.89 mGy, DLP = 3603 mGy x cm. Limitations: None. Prior studies: CT and MRI of abdomen dated 08/26/2022. Findings: Lung bases: Clear Liver:Liver with cirrhotic appearance and a 0.8 cm right hepatic simple cyst again noted. No evidence of mass. No evidence of dilated ducts.  Mild distention of the gallbladder with mild diffuse thickening of the gallbladder walls with moderate pericholecystic fat stranding. No evidence of stones by CT. Stable splenomegaly with perisplenic collaterals. Pancreas:  Grossly unremarkable. Adrenal glands: Grossly unremarkable size, contour and density. Kidneys: In anatomic position. Grossly unremarkablerenal size, contour and density. No renal or ureteral calculi. No evidence of a renal mass or cyst. Nonspecific bilateral perinephric fat stranding. Retroperitoneum: No enlarged retroperitoneal lymphadenopathy. Moderate atherosclerotic calcific plaques in the walls of the aorta and branches. The IVC appears unremarkable. Peritoneal cavity: No evidence of free air or ascites. Mild diffuse mesenteric and right colic fat stranding. Gastrointestinal tract: No obstruction. Appendix is unremarkable. Pelvis:Prostate gland is not enlarged. Urinary bladder is minimally distended. Streaky artifacts from left hip arthroplasty obscuring the surrounding structures. Fluid collection in the right inguinal canal: 4.5 x 5.5 x 7.4 cm in AP by TV by CC diameters. Partial visualization of fluid in the dependent portion of the scrotum. Osseous structures: No acute or destructive bony process identified. Degenerative changes of the spine. Diffuse bilateral subcutaneous fat stranding/anasarca in the abdominal and pelvic walls also including the upper thighs. 1. Liver with cirrhotic appearance and a 0.8 cm right hepatic simple cyst again noted 2. Mild distention of the gallbladder with mild diffuse thickening of the gallbladder walls with moderate pericholecystic fat stranding. Findings could be secondary to hypoalbuminemia or acalculous cholecystitis. 3. Stable splenomegaly (16.3 cm in CC diameter) with perisplenic collaterals. 4. Moderate atherosclerotic calcific plaques in the walls of the aorta and branches. 5.  No ascites.   Mild diffuse mesenteric and right colic fat stranding. 6. Fluid collection in the right inguinal canal: 4.5 x 5.5 x 7.4 cm in AP by TV by CC diameters. Partial visualization of fluid in the dependent portion of the scrotum. 7. Diffuse bilateral subcutaneous fat stranding/anasarca in the abdominal and pelvic walls also including the upper thighs. Recommendation: Follow up as clinically indicated. All CT scans at this facility utilize dose modulation, iterative reconstruction, and/or weight based dosing when appropriate to reduce radiation dose to as low as reasonably achievable.   Electronically Signed by Aneta Barnett MD at 01-Sep-2022 07:03:37 PM                ASSESSMENT/PLAN:    Thrombocytopenia-likely multifactorial to include direct effect of alcohol bone marrow and also secondary to splenomegaly  Likely secondary to end-stage liver disease/alcohol abuse  US showed signs of end-stage liver disease/splenomegaly (portal hypertension)        Status post 2 pheresis platelets given perioperatively  PLT 30,000 today, 9/3/2022 - stable  No bleeding       Left hip fracture  POD #8  Post-op care, as directed by ortho     End-stage liver disease/splenomegaly  Evidence of portal hypertension        Followed by GI  S/p Lasix    Coagulopathy of liver disease/thrombocytopenia      -S/p Vitamin K 10 mg p.o. x3 doses  8/25/2022 - 8/27/2022  -Postop bruising, no overt bleeding     Serology 8/26/2022:  Fibrinogen 177  D-dimer 4.3    Macrocytic anemia    Recent Labs     09/03/22  0315 09/02/22  0238 09/01/22  0844   WBC 2.5* 2.2* 2.4*   HGB 7.6* 7.8* 8.6*   HCT 23.6* 24.2* 26.1*   .4* 121.0* 117.0*   PLT 30* 33* 34*      Likely secondary to end-stage liver disease  Hapto < 10    Serology 8/26/2022  Retic - 4.33% with absolute 0.1334  LDH - 255 ()    Minimal elevation LDH    B12 = 1446  Folate - 13    MARICARMEN - NEGATIVE    Serology 8/28/2022  Retic 5.35% with absolute 0.1188  LDH - 240 () - improved  Total bili 3.2-direct 1.7, indirect 1.5 -now more direct than indirect    Hgb 7.6 today, 9/3/2022     Elevated iron saturation/iron levels  Serum Fe - 298  TIBC - 307  Fe sat - 97%  Ferritin - 220    Hereditary hemochromatosis gene 8/26/2022 NEGATIVE    Scrotal/lower extremity edema  S/p Lasix IV, changed to oral  Receiving Aldactone    CT abdomen/pelvis with/without contrast 9/1/2022:  1. Liver with cirrhotic appearance and a 0.8 cm right hepatic simple cyst again noted  2. Mild distention of the gallbladder with mild diffuse thickening of the gallbladder walls with moderate pericholecystic fat stranding. Findings could be secondary to hypoalbuminemia or acalculous cholecystitis. 3. Stable splenomegaly (16.3 cm in CC diameter) with perisplenic collaterals. 4. Moderate atherosclerotic calcific plaques in the walls of the aorta and branches. 5.  No ascites. Mild diffuse mesenteric and right colic fat stranding. 6. Fluid collection in the right inguinal canal: 4.5 x 5.5 x 7.4 cm in AP by TV by CC diameters. Partial visualization of fluid in the dependent portion of the scrotum. 7. Diffuse bilateral subcutaneous fat stranding/anasarca in the abdominal and pelvic walls also including the upper thighs. Previously evaluated by Dr. Bobo Mtz 8/26/2022, no surgical intervention at this time regarding gallbladder.  If surgery required for GB, he would need tertiary care center due to severity of liver disease    COVID 19  As per attending  Supplemental O2 continues 2L NC  BC x2 on 8/28/2022 - NGTD  Temp max 100.4      PLAN:  POD #8 left ALICIA  Avoid DVT chemical prophylaxis due to platelet counts below 50,000 (platelets 96,359)  Monitor CBC  Transfuse for Hgb < 7  Transfuse for plt < 10,000 or bleeding  Isolation r/t COVID-19  Liver cirrhosis, followed by GI    Temp max 100.4 on 9/1/2022 at 5:40 pm      SHEILA Hooks    09/03/22  8:41 AM    Physician's attestation and contribution:  I, Dr Marcos Jackson, personally and independently performed an evaluation on Aurora Da Silva        I have reviewed relevant medical information/data to include but not limited to the medication list, relevant appropriate lab work and imaging when applicable. I reviewed other physician's notes, ancillary services and nurses assessments. I have reviewed the above documentation completed by Paulo SOSA   Please see my additional addended and/or modified contributions to the history of present illness, physical examination, and assessment/medical decision-making and plan that reflects my findings and impressions. I discussed essential elements of the care plan with Paulo SOSA and the patient. I have encouraged and answered all the questions raised to the patient's understanding and satisfaction. I concur with the above stated. Subjective-awake, alert, walking around in the room with a bit of a limp after hip surgery. Objective-no new changes on examination without exacerbations at this juncture. Assessment/plan:  POD#8 left total hip arthroplasty  Platelets 20,299. Hematologically stable  Anticipated discharge today, okay with me if discharged.   I will make him a follow-up appointment with Dr. Eda Richmond in about 2 weeks        Electronically signed by Ambrosio Garrison MD on 9/3/22 at 8:57 AM CDT

## 2022-09-04 LAB
ALBUMIN SERPL-MCNC: 2.6 G/DL (ref 3.5–5.2)
ALP BLD-CCNC: 84 U/L (ref 40–130)
ALT SERPL-CCNC: 40 U/L (ref 5–41)
ANION GAP SERPL CALCULATED.3IONS-SCNC: 7 MMOL/L (ref 7–19)
AST SERPL-CCNC: 116 U/L (ref 5–40)
BASOPHILS ABSOLUTE: 0 K/UL (ref 0–0.2)
BASOPHILS RELATIVE PERCENT: 0.3 % (ref 0–1)
BILIRUB SERPL-MCNC: 4.4 MG/DL (ref 0.2–1.2)
BUN BLDV-MCNC: 11 MG/DL (ref 8–23)
CALCIUM SERPL-MCNC: 7.8 MG/DL (ref 8.8–10.2)
CHLORIDE BLD-SCNC: 103 MMOL/L (ref 98–111)
CO2: 25 MMOL/L (ref 22–29)
CREAT SERPL-MCNC: 0.5 MG/DL (ref 0.5–1.2)
EOSINOPHILS ABSOLUTE: 0.2 K/UL (ref 0–0.6)
EOSINOPHILS RELATIVE PERCENT: 4.4 % (ref 0–5)
GFR AFRICAN AMERICAN: >59
GFR NON-AFRICAN AMERICAN: >60
GLUCOSE BLD-MCNC: 129 MG/DL (ref 74–109)
HCT VFR BLD CALC: 24.1 % (ref 42–52)
HEMOGLOBIN: 7.7 G/DL (ref 14–18)
IMMATURE GRANULOCYTES #: 0 K/UL
LYMPHOCYTES ABSOLUTE: 0.8 K/UL (ref 1.1–4.5)
LYMPHOCYTES RELATIVE PERCENT: 21.9 % (ref 20–40)
MCH RBC QN AUTO: 38.3 PG (ref 27–31)
MCHC RBC AUTO-ENTMCNC: 32 G/DL (ref 33–37)
MCV RBC AUTO: 119.9 FL (ref 80–94)
MONOCYTES ABSOLUTE: 0.5 K/UL (ref 0–0.9)
MONOCYTES RELATIVE PERCENT: 15.8 % (ref 0–10)
NEUTROPHILS ABSOLUTE: 1.9 K/UL (ref 1.5–7.5)
NEUTROPHILS RELATIVE PERCENT: 56.7 % (ref 50–65)
PDW BLD-RTO: 16.4 % (ref 11.5–14.5)
PLATELET # BLD: 31 K/UL (ref 130–400)
PMV BLD AUTO: 12.3 FL (ref 9.4–12.4)
POTASSIUM REFLEX MAGNESIUM: 3.9 MMOL/L (ref 3.5–5)
PROCALCITONIN: 0.07 NG/ML (ref 0–0.09)
RBC # BLD: 2.01 M/UL (ref 4.7–6.1)
SODIUM BLD-SCNC: 135 MMOL/L (ref 136–145)
TOTAL PROTEIN: 5 G/DL (ref 6.6–8.7)
WBC # BLD: 3.4 K/UL (ref 4.8–10.8)

## 2022-09-04 PROCEDURE — 36415 COLL VENOUS BLD VENIPUNCTURE: CPT

## 2022-09-04 PROCEDURE — 6370000000 HC RX 637 (ALT 250 FOR IP): Performed by: ORTHOPAEDIC SURGERY

## 2022-09-04 PROCEDURE — 2580000003 HC RX 258: Performed by: ORTHOPAEDIC SURGERY

## 2022-09-04 PROCEDURE — 80053 COMPREHEN METABOLIC PANEL: CPT

## 2022-09-04 PROCEDURE — 6370000000 HC RX 637 (ALT 250 FOR IP): Performed by: NURSE PRACTITIONER

## 2022-09-04 PROCEDURE — 6370000000 HC RX 637 (ALT 250 FOR IP): Performed by: SPECIALIST

## 2022-09-04 PROCEDURE — 6370000000 HC RX 637 (ALT 250 FOR IP): Performed by: HOSPITALIST

## 2022-09-04 PROCEDURE — 1210000000 HC MED SURG R&B

## 2022-09-04 PROCEDURE — 97116 GAIT TRAINING THERAPY: CPT

## 2022-09-04 PROCEDURE — 99231 SBSQ HOSP IP/OBS SF/LOW 25: CPT | Performed by: INTERNAL MEDICINE

## 2022-09-04 PROCEDURE — 84145 PROCALCITONIN (PCT): CPT

## 2022-09-04 PROCEDURE — 85025 COMPLETE CBC W/AUTO DIFF WBC: CPT

## 2022-09-04 PROCEDURE — 2700000000 HC OXYGEN THERAPY PER DAY

## 2022-09-04 RX ADMIN — SODIUM CHLORIDE, PRESERVATIVE FREE 10 ML: 5 INJECTION INTRAVENOUS at 10:21

## 2022-09-04 RX ADMIN — SODIUM CHLORIDE, PRESERVATIVE FREE 10 ML: 5 INJECTION INTRAVENOUS at 21:22

## 2022-09-04 RX ADMIN — OXYCODONE HYDROCHLORIDE AND ACETAMINOPHEN 500 MG: 500 TABLET ORAL at 21:22

## 2022-09-04 RX ADMIN — ALBUTEROL SULFATE 2 PUFF: 90 AEROSOL, METERED RESPIRATORY (INHALATION) at 10:21

## 2022-09-04 RX ADMIN — FUROSEMIDE 20 MG: 20 TABLET ORAL at 10:20

## 2022-09-04 RX ADMIN — SODIUM BICARBONATE 650 MG: 650 TABLET ORAL at 17:43

## 2022-09-04 RX ADMIN — METOPROLOL SUCCINATE 25 MG: 25 TABLET, EXTENDED RELEASE ORAL at 10:21

## 2022-09-04 RX ADMIN — SODIUM BICARBONATE 650 MG: 650 TABLET ORAL at 10:20

## 2022-09-04 RX ADMIN — BUDESONIDE AND FORMOTEROL FUMARATE DIHYDRATE 2 PUFF: 160; 4.5 AEROSOL RESPIRATORY (INHALATION) at 10:21

## 2022-09-04 RX ADMIN — SODIUM BICARBONATE 650 MG: 650 TABLET ORAL at 21:22

## 2022-09-04 RX ADMIN — ZINC SULFATE 220 MG (50 MG) CAPSULE 50 MG: CAPSULE at 10:21

## 2022-09-04 RX ADMIN — IPRATROPIUM BROMIDE 2 PUFF: 17 AEROSOL, METERED RESPIRATORY (INHALATION) at 13:27

## 2022-09-04 RX ADMIN — OXYCODONE 10 MG: 5 TABLET ORAL at 17:49

## 2022-09-04 RX ADMIN — IPRATROPIUM BROMIDE 2 PUFF: 17 AEROSOL, METERED RESPIRATORY (INHALATION) at 17:43

## 2022-09-04 RX ADMIN — ALBUTEROL SULFATE 2 PUFF: 90 AEROSOL, METERED RESPIRATORY (INHALATION) at 13:27

## 2022-09-04 RX ADMIN — IPRATROPIUM BROMIDE 2 PUFF: 17 AEROSOL, METERED RESPIRATORY (INHALATION) at 10:21

## 2022-09-04 RX ADMIN — BUDESONIDE AND FORMOTEROL FUMARATE DIHYDRATE 2 PUFF: 160; 4.5 AEROSOL RESPIRATORY (INHALATION) at 21:22

## 2022-09-04 RX ADMIN — IPRATROPIUM BROMIDE 2 PUFF: 17 AEROSOL, METERED RESPIRATORY (INHALATION) at 21:22

## 2022-09-04 RX ADMIN — SPIRONOLACTONE 50 MG: 25 TABLET ORAL at 10:20

## 2022-09-04 RX ADMIN — OXYCODONE HYDROCHLORIDE AND ACETAMINOPHEN 500 MG: 500 TABLET ORAL at 10:20

## 2022-09-04 RX ADMIN — FAMOTIDINE 20 MG: 20 TABLET ORAL at 21:22

## 2022-09-04 RX ADMIN — ALBUTEROL SULFATE 2 PUFF: 90 AEROSOL, METERED RESPIRATORY (INHALATION) at 21:22

## 2022-09-04 RX ADMIN — FUROSEMIDE 20 MG: 20 TABLET ORAL at 17:43

## 2022-09-04 RX ADMIN — OXYCODONE 10 MG: 5 TABLET ORAL at 10:30

## 2022-09-04 RX ADMIN — ALBUTEROL SULFATE 2 PUFF: 90 AEROSOL, METERED RESPIRATORY (INHALATION) at 17:43

## 2022-09-04 RX ADMIN — SODIUM BICARBONATE 650 MG: 650 TABLET ORAL at 13:27

## 2022-09-04 RX ADMIN — Medication 1000 UNITS: at 10:20

## 2022-09-04 RX ADMIN — FAMOTIDINE 20 MG: 20 TABLET ORAL at 10:21

## 2022-09-04 ASSESSMENT — PAIN SCALES - WONG BAKER: WONGBAKER_NUMERICALRESPONSE: 4

## 2022-09-04 ASSESSMENT — ENCOUNTER SYMPTOMS
DIARRHEA: 1
COUGH: 0
ABDOMINAL PAIN: 0
VOMITING: 0
NAUSEA: 0
WHEEZING: 0
BLOOD IN STOOL: 0
ABDOMINAL DISTENTION: 0
CONSTIPATION: 0
SHORTNESS OF BREATH: 0
ROS SKIN COMMENTS: BRUISING
COLOR CHANGE: 0

## 2022-09-04 ASSESSMENT — PAIN DESCRIPTION - LOCATION
LOCATION: SCROTUM;LEG
LOCATION: HIP
LOCATION: SCROTUM;LEG

## 2022-09-04 ASSESSMENT — PAIN DESCRIPTION - DESCRIPTORS
DESCRIPTORS: SHARP
DESCRIPTORS: SHARP;ACHING;SORE
DESCRIPTORS: SHARP

## 2022-09-04 ASSESSMENT — PAIN DESCRIPTION - PAIN TYPE: TYPE: ACUTE PAIN

## 2022-09-04 ASSESSMENT — PAIN DESCRIPTION - ORIENTATION
ORIENTATION: LEFT
ORIENTATION: RIGHT;LEFT;LOWER
ORIENTATION: RIGHT;LEFT;LOWER

## 2022-09-04 ASSESSMENT — PAIN SCALES - GENERAL
PAINLEVEL_OUTOF10: 8
PAINLEVEL_OUTOF10: 8

## 2022-09-04 ASSESSMENT — PAIN DESCRIPTION - FREQUENCY: FREQUENCY: CONTINUOUS

## 2022-09-04 ASSESSMENT — PAIN - FUNCTIONAL ASSESSMENT: PAIN_FUNCTIONAL_ASSESSMENT: PREVENTS OR INTERFERES SOME ACTIVE ACTIVITIES AND ADLS

## 2022-09-04 NOTE — PROGRESS NOTES
[facility]      Patient:  Candis Matthews  YOB: 1955  Date of Service: 9/4/2022  MRN: [id number]   Acct: [de-identified]   Primary Care Physician: Mckenzie Day MD  Advance Directive: Full Code  Admit Date: 8/25/2022       Hospital Day: 10  Portions of this note have been copied forward, however, changed to reflect the most current clinical status of this patient. CHIEF COMPLAINT left hip pain    SUBJECTIVE:  Reports right groin pain today. Reports improvement in Edema. States \"I feel better today. \" Denies shortness of breath. Denies cough. CUMULATIVE HOSPITAL COURSE:  The patient is a 75 y.o. male with past medical history of liver disease who presented to [facility] ED complaining of left hip pain. Mr. Jorge Florentino reported he was knocked down by a big dog and landed on his left hip. Since then he has not been able to ambulate. Denied any other symptoms. Denied any medical conditions, other than liver disease that has not bothered him. Stated he does not take any prescription medications at home. Admitted to smoking 1 ppd. Workup in ED revealed new onset rate controlled Afib, glucose 139 otherwise unremarkable chemistry, AST 75, bilirubin 8.0, WBC 3.8, platelet 30, INR 7.58, Cxray unremarkable. Xray of hip indicated acute, impacted transverse fracture through base of the femoral head. Patient was admitted to hospital medicine for left hip fracture and new onset Afib with orthopedic surgery and cardiology consultations. Cardiology cleared patient for surgery as long as ECHO was not abnormal, recommended low dose beta blocker to suppress the PACs. GI recommended CT of abdomen to rule out hepatoma, or portal vein thrombosis. Hematology recommended vitamin K 10 mg daily x3 days, avoid DVT prophylaxis, transfuse 2 units platelets before/during surgical procedure. Orthopedic surgery recommended surgical intervention.  Ultrasound of liver indicated hydropic cholecystitis without any obvious stone, CBD mildly dilated, MRCP is recommended, possible chronic liver parenchymal disease, cirrhosis, splenomegaly. Patient was evaluated by general surgery with no plans for intervention of gallbladder. D-dimer 4.3, CTA pulmonary pending. Echo indicated normal LV size with preserved LV systolic function with EF of 99%, normal diastolic function, mild mitral regurgitation, trivial tricuspid insufficiency with estimated RVSP of 38 mm which is mildly elevated. Underwent Left total hip arthroplasty on 8/26/2022. S/p 2 units of Platelets and 1 unit of FFP. MRCP indicated mild distended gallbladder, no significant interpretability dilatation, borderline CBD. CT abdomen indicated cirrhotic liver with abnormal diffuse low attenuation pattern, splenomegaly, mild splenic varices, gallbladder distention, and soft tissue in right inguinal canal is suspicious for undescended testicle. GI suggested EGD at a later time to screen for esophageal varices, and US of liver every 6 months to screen for hepatoma. GI obtained repeat CT abdomen and pelvis which indicated diffuse bilateral anasarca in the abdominal wall and pelvic walls, and fluid collection in the right inguinal cavity and into the scrotum. Patient scrotal edema has greatly improved with diuresis and patient is voiding without difficulty. GI had recommended urology consult however not available at this time and patient is voiding without difficulty. We will continue with diuresis. LLE venous study indicated no evidence of DVT, SVT or Reflux. Fever again overnight, temp max 100.4 on 9/1. Repeat Covid test as patient's wife, covid +. Now requiring 2 L of oxygen via nasal cannula for hypoxia. Repeat chest x-ray indicated a left infiltrate. Procalcitonin 0.07. Patient remains afebrile. Social work has been assisting in discharge planning. Review of Systems:   Review of Systems   Constitutional:  Negative for chills, diaphoresis, fatigue and fever.    HENT:  Negative for Cardiovascular:      Rate and Rhythm: Normal rate and regular rhythm. Pulses: Normal pulses. Heart sounds: Normal heart sounds. Pulmonary:      Effort: Pulmonary effort is normal. No respiratory distress. Breath sounds: Normal breath sounds. No wheezing, rhonchi or rales. Abdominal:      General: Bowel sounds are normal. There is no distension. Palpations: Abdomen is soft. Tenderness: There is no abdominal tenderness. Genitourinary:     Penis: Swelling present. Comments: Scrotal and penile edema greatly improved since prior exam  Musculoskeletal:         General: Swelling and tenderness present. No deformity. Normal range of motion. Cervical back: Normal range of motion and neck supple. No muscular tenderness. Right lower leg: No edema. Left lower leg: No edema. Comments: LLE swelling, warmth, and tenderness noted   S/p Left hip total arthroplasty, surgical incision CDI    Skin:     General: Skin is warm and dry. Findings: Bruising (improving) present. No lesion. Comments: Bruising to left flank area and hip area   Neurological:      Mental Status: He is alert and oriented to person, place, and time. Psychiatric:         Mood and Affect: Mood normal.         Behavior: Behavior normal.         Thought Content:  Thought content normal.           Medications:      sodium chloride      sodium chloride        furosemide  20 mg Oral BID    spironolactone  50 mg Oral Daily    budesonide-formoterol  2 puff Inhalation BID    ipratropium  2 puff Inhalation 4x daily    albuterol sulfate HFA  2 puff Inhalation 4x daily    Vitamin D  1,000 Units Oral Daily    ascorbic acid  500 mg Oral BID    zinc sulfate  50 mg Oral Daily    famotidine  20 mg Oral BID    sodium bicarbonate  650 mg Oral 4x Daily    metoprolol succinate  25 mg Oral Daily    sodium chloride flush  5-40 mL IntraVENous 2 times per day     hydrOXYzine HCl, sodium chloride flush, sodium chloride, ondansetron **OR** ondansetron, oxyCODONE **OR** oxyCODONE **OR** oxyCODONE, sodium chloride, polyethylene glycol  ADULT DIET; Regular; Low Sodium (2 gm); Safety Tray; Safety Tray (Disposables)     Lab and other Data:     Recent Labs     09/02/22 0238 09/03/22 0315 09/04/22 0310   WBC 2.2* 2.5* 3.4*   HGB 7.8* 7.6* 7.7*   PLT 33* 30* 31*     Recent Labs     09/02/22 0238 09/03/22 0315 09/04/22  0310    135* 135*   K 3.8 4.0 3.9    102 103   CO2 25 25 25   BUN 11 11 11   CREATININE 0.5 0.5 0.5   GLUCOSE 131* 145* 129*     Recent Labs     09/02/22 0238 09/03/22 0315 09/04/22 0310   * 125* 116*   ALT 41 40 40   BILITOT 4.2* 4.1* 4.4*   ALKPHOS 79 86 84     Troponin T: No results for input(s): TROPONINI in the last 72 hours. Pro-BNP: No results for input(s): BNP in the last 72 hours. INR: No results for input(s): INR in the last 72 hours. UA:No results for input(s): NITRITE, COLORU, PHUR, LABCAST, WBCUA, RBCUA, MUCUS, TRICHOMONAS, YEAST, BACTERIA, CLARITYU, SPECGRAV, LEUKOCYTESUR, UROBILINOGEN, BILIRUBINUR, BLOODU, GLUCOSEU, AMORPHOUS in the last 72 hours. Invalid input(s): Montserrat Reamer  A1C: No results for input(s): LABA1C in the last 72 hours. ABG:No results for input(s): PHART, ENK4JVV, PO2ART, DDV5NQM, BEART, HGBAE, V4FKTXAG, CARBOXHGBART in the last 72 hours. RAD:   CT ABDOMEN PELVIS W WO CONTRAST Additional Contrast? Radiologist Recommendation    Result Date: 9/1/2022  1. Liver with cirrhotic appearance and a 0.8 cm right hepatic simple cyst again noted 2. Mild distention of the gallbladder with mild diffuse thickening of the gallbladder walls with moderate pericholecystic fat stranding. Findings could be secondary to hypoalbuminemia or acalculous cholecystitis. 3. Stable splenomegaly (16.3 cm in CC diameter) with perisplenic collaterals. 4. Moderate atherosclerotic calcific plaques in the walls of the aorta and branches. 5.  No ascites.   Mild diffuse mesenteric and right colic management consulted for discharge planning, referrals to SNF placed      Active Problems:   Covid / Febrile/ SOB-               - Follow Blood cultures, no growth to date               - Follow procalcitonin, 0.07 on 9/4              - Lactic 1.9                    - Cxray unremarkable, repeat on 9/3 with left infiltrate              - Supplemental oxygen as needed, now requiring 2 L for hypoxia              - Monitor fever curve Tmax 100.4 on 9/1              - Monitor labs closely               -Supportive care      Irregular heart beat-  - Cardiology consulted               - Suggested PACs               - Recommended low dose beta blocker to suppress the PACs               - cleared patient for surgery   - ECHO- indicated normal LV size with preserved LV systolic function with EF of 36%, normal diastolic function, mild mitral regurgitation, trivial tricuspid insufficiency with estimated RVSP of 38 mm which is mildly elevated   - Monitor on telemetry        Liver disease/ Hyperbilirubinemia/ thrombocytopenia-             - s/p 2 units of platelets and 1 unit of fresh frozen plasma    - Liver US- indicated hydropic cholecystitis without any obvious stone, CBD mildly dilated, MRCP is recommended, possible chronic liver parenchymal disease, cirrhosis, splenomegaly              - GI following                           - MRCP indicated mild distended gallbladder, no significant interpretability dilatation, borderline CBD                          - CT abdomen indicated cirrhotic liver with abnormal diffuse low attenuation pattern, splenomegaly, mild splenic varices, gallbladder distention, and soft tissue in right inguinal canal is suspicious for undescended testicle               - GI consulted Surgery whom recommended no indications for surgery at this time, if surgery needed then he would have to be at a tertiary center due to severity of liver disease               - Lipase 41              - Monitor CMP closely -Continue diuresis                          -monitor and replace electrolytes as needed        DVT Prophylaxis: SCDs      Chantel Shannon, SHEILA - CNP, 9/4/2022 11:27 AM

## 2022-09-04 NOTE — PROGRESS NOTES
Physical Therapy  Name: Sujey Saxena  MRN:  [id number]  Date of service:  9/4/2022 09/04/22 1228   Restrictions/Precautions   Restrictions/Precautions Fall Risk;Weight Bearing   Lower Extremity Weight Bearing Restrictions   Left Lower Extremity Weight Bearing Weight Bearing As Tolerated   Position Activity Restriction   Other position/activity restrictions ant hip prec.,  No SLR. COVID droplet plus   Subjective   Subjective Pt in bed and agreed to therapy. Pain Assessment   Pain Assessment Kapadia-Baker FACES   Kapadia-Baker Pain Rating 4   Pain Location Hip   Pain Orientation Left   Pain Descriptors Sharp   Functional Pain Assessment Prevents or interferes some active activities and ADLs   Pain Type Acute pain   Pain Frequency Continuous   Non-Pharmaceutical Pain Intervention(s) Ambulation/Increased Activity;Repositioned; Rest   Response to Pain Intervention Patient satisfied   Bed Mobility   Supine to Sit Stand by assistance   Sit to Supine Stand by assistance   Transfers   Sit to Stand Contact guard assistance   Stand to sit Contact guard assistance   Ambulation   WB Status FWBAT LLE   Ambulation   Surface level tile   Device Rolling Walker   Assistance Contact guard assistance   Quality of Gait no LOB   Gait Deviations Slow Traci;Decreased step height   Distance 39'   Comments pt states he has been getting up without O2, checked SPO2 mid gait and down to 82%, back to 90's after rest period and O2 back on.  Brought extension tubing to allow pt to get to [facility] with O2 on, notified nurse   Patient Goals    Patient goals  get stronger, go home   Short Term Goals   Time Frame for Short term goals 2 wks   Short term goal 1 supine to sit indep   Short term goal 2 sit to stand indep   Short term goal 3 amb. 100' with RW SBA   Short term goal 4 bed to chair SBA   Short term goal 5 up/down 2-3 stairs SBA   Conditions Requiring Skilled Therapeutic Intervention   Body Structures, Functions, Activity Limitations Requiring Skilled Therapeutic Intervention Decreased functional mobility ; Decreased ROM; Decreased endurance;Decreased cognition;Decreased safe awareness;Decreased strength;Decreased balance; Increased pain   Assessment Pt able to tolerate amb around room, reports slight SOA but no distress. Continues to be steady with gait and reports less pain with mobility today. Pt back to bed with all needs in reach. Activity Tolerance   Activity Tolerance Patient tolerated treatment well   PT Plan of Care   Sunday X   Safety Devices   Type of Devices Call light within reach; Left in bed   PT Whiteboard Notes   Therapy Whiteboard RE 9/10 L ALICIA, FWBAT,needs O2, [place]         Electronically signed by Chris Lennon PTA on 9/4/2022 at 12:35 PM

## 2022-09-04 NOTE — PROGRESS NOTES
So Willis Ochoa is a that is why still here    PROGRESS NOTE    Patient name: Willis Ochoa  Patient : 1955  Room: 536    SUBJECTIVE: POD #9 left hip replacement. Ambulatory in room per self. Requiring O2 to L via NC. O2 saturation 93% using incentive spirometry and flutter valve. Appropriate postoperative discomfort. remains in isolation due to COVID-19. Scrotal edema improving. C/o right scrotal pain. Reports voiding. Receiving oral lasix and aldactone. GI following for cirrhosis. Denies bleeding issues. INTERVAL HISTORY  Willis Ochoa was first seen by Dr. Salvatore Gamez on 2022. Hematology consultation was requested for findings of thrombocytopenia. The patient was hospitalized at [facility] with complaints of left hip pain after a fall yesterday. The patient has not been able to ambulate. He is a chronic smoker. He was found to be on A. fib in the ER. CBC during admission here showed WBC count 3.8 with ANC of 2.5/, hemoglobin 15.3/, platelet count 50,824. Iron profile showed iron of 298, iron saturation 97%, TIBC 307, haptoglobin<10. PT 19.4/INR 1.61. The patient tells me that he has a diagnosis of alcoholic cirrhosis. He is a chronic drinker for many decades. Unfortunately, he does not have a GI doctor. He is known to be thrombocytopenic. He denies any history of hepatitis C. Ultrasound performed here showed findings suggestive of chronic liver parenchymal disease, cirrhosis and splenomegaly with the spleen measuring 2.5 x 6.6 x 15.7 cm consistent with splenomegaly. Objective   BP (!) 101/59   Pulse 66   Temp 98.1 °F (36.7 °C) (Temporal)   Resp 18   Ht 5' 9\" (1.753 m)   Wt 206 lb (93.4 kg)   SpO2 93%   BMI 30.42 kg/m²     PHYSICAL EXAM:  CONSTITUTIONAL: Alert, appropriate, no acute distress. EYES: Equal, round, reactive  NECK: Supple, no masses. No JVD  CHEST/LUNGS: CTA bilaterally, normal respiratory effort, dry congested cough.   Wearing O2 2L via NC at this time  CARDIOVASCULAR: RRR, no murmurs. ABDOMEN: distended, soft non-tender, active bowel sounds  EXTREMITIES: Postop left hip surgery- incision well approximated, no s/s of infection. LLE ecchymosis improved. Abdominal/hip/bilateral lower extremity edema 1-2+. SKIN: warm, dry with no rashes or lesions. Scattered bruising. Left hip incision well approximated. No drainage  NEUROLOGIC: follows commands, non focal     Recent Labs     09/04/22  0310 09/03/22  0315 09/02/22  0238   WBC 3.4* 2.5* 2.2*   HGB 7.7* 7.6* 7.8*   HCT 24.1* 23.6* 24.2*   .9* 117.4* 121.0*   PLT 31* 30* 33*     Lab Results   Component Value Date     (L) 09/04/2022    K 3.9 09/04/2022     09/04/2022    CO2 25 09/04/2022    BUN 11 09/04/2022    CREATININE 0.5 09/04/2022    GLUCOSE 129 (H) 09/04/2022    CALCIUM 7.8 (L) 09/04/2022    PROT 5.0 (L) 09/04/2022    LABALBU 2.6 (L) 09/04/2022    BILITOT 4.4 (H) 09/04/2022    ALKPHOS 84 09/04/2022     (H) 09/04/2022    ALT 40 09/04/2022    LABGLOM >60 09/04/2022    GFRAA >59 09/04/2022     Lab Results   Component Value Date    INR 1.74 (H) 08/29/2022    INR 1.76 (H) 08/28/2022    INR 1.97 (H) 08/27/2022    PROTIME 20.6 (H) 08/29/2022    PROTIME 20.8 (H) 08/28/2022    PROTIME 22.8 (H) 08/27/2022     CT ABDOMEN PELVIS W WO CONTRAST Additional Contrast? Radiologist Recommendation    Result Date: 9/1/2022  NO PRIOR REPORT AVAILABLE Exam: CT OF THE ABDOMEN/PELVIS WITHOUT AND WITH IV CONTRAST Clinical data: Cirrhosis. May have ascites. May have right inguinal hernia and fluid in his scrotum. Technique: Direct contiguous axial CT images were acquired through the abdomen and pelvis without and with intravenous contrastusing soft tissue and bone algorithms. Oral contrast was administered. Reformatted/MPR images were performed. Radiation dose: CTDIvol =53.89 mGy, DLP = 3603 mGy x cm. Limitations: None. Prior studies: CT and MRI of abdomen dated 08/26/2022.  Findings: Lung bases: Clear Liver:Liver with cirrhotic appearance and a 0.8 cm right hepatic simple cyst again noted. No evidence of mass. No evidence of dilated ducts. Mild distention of the gallbladder with mild diffuse thickening of the gallbladder walls with moderate pericholecystic fat stranding. No evidence of stones by CT. Stable splenomegaly with perisplenic collaterals. Pancreas:  Grossly unremarkable. Adrenal glands: Grossly unremarkable size, contour and density. Kidneys: In anatomic position. Grossly unremarkablerenal size, contour and density. No renal or ureteral calculi. No evidence of a renal mass or cyst. Nonspecific bilateral perinephric fat stranding. Retroperitoneum: No enlarged retroperitoneal lymphadenopathy. Moderate atherosclerotic calcific plaques in the walls of the aorta and branches. The IVC appears unremarkable. Peritoneal cavity: No evidence of free air or ascites. Mild diffuse mesenteric and right colic fat stranding. Gastrointestinal tract: No obstruction. Appendix is unremarkable. Pelvis:Prostate gland is not enlarged. Urinary bladder is minimally distended. Streaky artifacts from left hip arthroplasty obscuring the surrounding structures. Fluid collection in the right inguinal canal: 4.5 x 5.5 x 7.4 cm in AP by TV by CC diameters. Partial visualization of fluid in the dependent portion of the scrotum. Osseous structures: No acute or destructive bony process identified. Degenerative changes of the spine. Diffuse bilateral subcutaneous fat stranding/anasarca in the abdominal and pelvic walls also including the upper thighs. 1. Liver with cirrhotic appearance and a 0.8 cm right hepatic simple cyst again noted 2. Mild distention of the gallbladder with mild diffuse thickening of the gallbladder walls with moderate pericholecystic fat stranding. Findings could be secondary to hypoalbuminemia or acalculous cholecystitis.  3. Stable splenomegaly (16.3 cm in CC diameter) with perisplenic collaterals. 4. Moderate atherosclerotic calcific plaques in the walls of the aorta and branches. 5.  No ascites. Mild diffuse mesenteric and right colic fat stranding. 6. Fluid collection in the right inguinal canal: 4.5 x 5.5 x 7.4 cm in AP by TV by CC diameters. Partial visualization of fluid in the dependent portion of the scrotum. 7. Diffuse bilateral subcutaneous fat stranding/anasarca in the abdominal and pelvic walls also including the upper thighs. Recommendation: Follow up as clinically indicated. All CT scans at this facility utilize dose modulation, iterative reconstruction, and/or weight based dosing when appropriate to reduce radiation dose to as low as reasonably achievable.   Electronically Signed by Soila Watson MD at 01-Sep-2022 07:03:37 PM                ASSESSMENT/PLAN:    Thrombocytopenia-likely multifactorial to include direct effect of alcohol bone marrow and also secondary to splenomegaly  Likely secondary to end-stage liver disease/alcohol abuse  US showed signs of end-stage liver disease/splenomegaly (portal hypertension)      Status post 2 pheresis platelets given perioperatively  PLT 31,000 today, 9/4/2022 - stable  No bleeding    Left hip fracture  POD #9  Post-op care, as directed by ortho     End-stage liver disease/splenomegaly  Evidence of portal hypertension        Followed by GI  S/p Lasix    Coagulopathy of liver disease/thrombocytopenia      -S/p Vitamin K 10 mg p.o. x3 doses  8/25/2022 - 8/27/2022  -Postop bruising, no overt bleeding     Serology 8/26/2022:  Fibrinogen 177  D-dimer 4.3    Macrocytic anemia    Recent Labs     09/04/22  0310 09/03/22  0315 09/02/22  0238   WBC 3.4* 2.5* 2.2*   HGB 7.7* 7.6* 7.8*   HCT 24.1* 23.6* 24.2*   .9* 117.4* 121.0*   PLT 31* 30* 33*      Likely secondary to end-stage liver disease  Hapto < 10    Serology 8/26/2022  Retic - 4.33% with absolute 0.1334  LDH - 255 ()    Minimal elevation LDH    B12 = 1446  Folate - 13    MARICARMEN - NEGATIVE    Serology 8/28/2022  Retic 5.35% with absolute 0.1188  LDH - 240 () - improved  Total bili 3.2-direct 1.7, indirect 1.5 -now more direct than indirect    Hgb 7.7 today, 9/4/2022     Elevated iron saturation/iron levels  Serum Fe - 298  TIBC - 307  Fe sat - 97%  Ferritin - 220    Hereditary hemochromatosis gene 8/26/2022 NEGATIVE    Scrotal/lower extremity edema  S/p Lasix IV, changed to oral  Receiving Aldactone    CT abdomen/pelvis with/without contrast 9/1/2022:  1. Liver with cirrhotic appearance and a 0.8 cm right hepatic simple cyst again noted  2. Mild distention of the gallbladder with mild diffuse thickening of the gallbladder walls with moderate pericholecystic fat stranding. Findings could be secondary to hypoalbuminemia or acalculous cholecystitis. 3. Stable splenomegaly (16.3 cm in CC diameter) with perisplenic collaterals. 4. Moderate atherosclerotic calcific plaques in the walls of the aorta and branches. 5.  No ascites. Mild diffuse mesenteric and right colic fat stranding. 6. Fluid collection in the right inguinal canal: 4.5 x 5.5 x 7.4 cm in AP by TV by CC diameters. Partial visualization of fluid in the dependent portion of the scrotum. 7. Diffuse bilateral subcutaneous fat stranding/anasarca in the abdominal and pelvic walls also including the upper thighs. Previously evaluated by Dr. Johnny Luna 8/26/2022, no surgical intervention at this time regarding gallbladder.  If surgery required for GB, he would need tertiary care center due to severity of liver disease    COVID 19  As per attending  Supplemental O2 continues 2L NC, O2 sat 93%  BC x2 on 8/28/2022 - NGTD  Temp max 100.4 on 9/1/2022, afebrile since    PLAN:  POD #9 left ALICIA  Avoid DVT chemical prophylaxis due to platelet counts below 50,000 (platelets 28,544)  Monitor CBC  Transfuse for Hgb < 7  Transfuse for plt < 10,000 or bleeding  Isolation r/t COVID-19  Liver cirrhosis, followed by GI    Temp max 100.4 on 9/1/2022 at 5:40 pm  Requiring O2 2L via NC    Discussed with patient and patient's nurse. [facility], [place]    09/04/22  10:36 AM    Physician's attestation and contribution:  I, Dr Marissa Fields, personally and independently performed an evaluation on  Sumaya Bladen        I have reviewed relevant medical information/data to include but not limited to the medication list, relevant appropriate lab work and imaging when applicable. I reviewed other physician's notes, ancillary services and nurses assessments. I have reviewed the above documentation completed by Shilpa SOSA   Please see my additional addended and/or modified contributions to the history of present illness, physical examination, and assessment/medical decision-making and plan that reflects my findings and impressions. I discussed essential elements of the care plan with Shilpa SOSA and the patient. I have encouraged and answered all the questions raised to the patient's understanding and satisfaction. I concur with the above stated. Subjective-awake expected postop pain POD# 9, continues to desaturate when O2 removed    Objective-in no acute distress but continues to desaturate when supplemental O2 is removed    Assessment/plan:  O2 desaturation down into the 80s when oxygen removed. This is his limiting issue and reason for continued hospitalization for COVID  CBC today with a WBC of 3.4, hemoglobin of 7.7 and a platelet count of 29,332, relatively stable.   Continue monitoring, no active bleeding    Electronically signed by Marissa Fields MD on 9/4/22 at 11:17 AM CDT

## 2022-09-05 ENCOUNTER — APPOINTMENT (OUTPATIENT)
Dept: ULTRASOUND IMAGING | Age: 67
DRG: 521 | End: 2022-09-05
Payer: MEDICARE

## 2022-09-05 LAB
ALBUMIN SERPL-MCNC: 2.7 G/DL (ref 3.5–5.2)
ALP BLD-CCNC: 86 U/L (ref 40–130)
ALT SERPL-CCNC: 39 U/L (ref 5–41)
ANION GAP SERPL CALCULATED.3IONS-SCNC: 10 MMOL/L (ref 7–19)
AST SERPL-CCNC: 106 U/L (ref 5–40)
BASOPHILS ABSOLUTE: 0 K/UL (ref 0–0.2)
BASOPHILS RELATIVE PERCENT: 0.3 % (ref 0–1)
BILIRUB SERPL-MCNC: 4.9 MG/DL (ref 0.2–1.2)
BUN BLDV-MCNC: 10 MG/DL (ref 8–23)
CALCIUM SERPL-MCNC: 8.1 MG/DL (ref 8.8–10.2)
CHLORIDE BLD-SCNC: 99 MMOL/L (ref 98–111)
CO2: 23 MMOL/L (ref 22–29)
CREAT SERPL-MCNC: 0.5 MG/DL (ref 0.5–1.2)
EOSINOPHILS ABSOLUTE: 0.1 K/UL (ref 0–0.6)
EOSINOPHILS RELATIVE PERCENT: 3.4 % (ref 0–5)
GFR AFRICAN AMERICAN: >59
GFR NON-AFRICAN AMERICAN: >60
GLUCOSE BLD-MCNC: 110 MG/DL (ref 74–109)
HCT VFR BLD CALC: 26.5 % (ref 42–52)
HEMOGLOBIN: 8.2 G/DL (ref 14–18)
IMMATURE GRANULOCYTES #: 0.1 K/UL
IRON SATURATION: 21 % (ref 14–50)
IRON: 50 UG/DL (ref 59–158)
LACTIC ACID: 2.6 MMOL/L (ref 0.5–1.9)
LIPASE: 62 U/L (ref 13–60)
LYMPHOCYTES ABSOLUTE: 0.9 K/UL (ref 1.1–4.5)
LYMPHOCYTES RELATIVE PERCENT: 22.9 % (ref 20–40)
MAGNESIUM: 1.6 MG/DL (ref 1.6–2.4)
MCH RBC QN AUTO: 37.3 PG (ref 27–31)
MCHC RBC AUTO-ENTMCNC: 30.9 G/DL (ref 33–37)
MCV RBC AUTO: 120.5 FL (ref 80–94)
MONOCYTES ABSOLUTE: 0.6 K/UL (ref 0–0.9)
MONOCYTES RELATIVE PERCENT: 14.9 % (ref 0–10)
NEUTROPHILS ABSOLUTE: 2.2 K/UL (ref 1.5–7.5)
NEUTROPHILS RELATIVE PERCENT: 57.2 % (ref 50–65)
PDW BLD-RTO: 16.6 % (ref 11.5–14.5)
PLATELET # BLD: 40 K/UL (ref 130–400)
PMV BLD AUTO: 11.5 FL (ref 9.4–12.4)
POTASSIUM REFLEX MAGNESIUM: 4 MMOL/L (ref 3.5–5)
PRO-BNP: 320 PG/ML (ref 0–900)
RBC # BLD: 2.2 M/UL (ref 4.7–6.1)
SODIUM BLD-SCNC: 132 MMOL/L (ref 136–145)
TOTAL IRON BINDING CAPACITY: 238 UG/DL (ref 250–400)
TOTAL PROTEIN: 5.3 G/DL (ref 6.6–8.7)
TSH SERPL DL<=0.05 MIU/L-ACNC: 6.8 UIU/ML (ref 0.27–4.2)
VITAMIN D 25-HYDROXY: 21.7 NG/ML
WBC # BLD: 3.9 K/UL (ref 4.8–10.8)

## 2022-09-05 PROCEDURE — 6370000000 HC RX 637 (ALT 250 FOR IP): Performed by: HOSPITALIST

## 2022-09-05 PROCEDURE — 83540 ASSAY OF IRON: CPT

## 2022-09-05 PROCEDURE — 2580000003 HC RX 258: Performed by: ORTHOPAEDIC SURGERY

## 2022-09-05 PROCEDURE — 6370000000 HC RX 637 (ALT 250 FOR IP): Performed by: SPECIALIST

## 2022-09-05 PROCEDURE — 6370000000 HC RX 637 (ALT 250 FOR IP): Performed by: ORTHOPAEDIC SURGERY

## 2022-09-05 PROCEDURE — 2700000000 HC OXYGEN THERAPY PER DAY

## 2022-09-05 PROCEDURE — 6370000000 HC RX 637 (ALT 250 FOR IP): Performed by: NURSE PRACTITIONER

## 2022-09-05 PROCEDURE — 84443 ASSAY THYROID STIM HORMONE: CPT

## 2022-09-05 PROCEDURE — 36415 COLL VENOUS BLD VENIPUNCTURE: CPT

## 2022-09-05 PROCEDURE — 80053 COMPREHEN METABOLIC PANEL: CPT

## 2022-09-05 PROCEDURE — 85025 COMPLETE CBC W/AUTO DIFF WBC: CPT

## 2022-09-05 PROCEDURE — 1210000000 HC MED SURG R&B

## 2022-09-05 PROCEDURE — 94761 N-INVAS EAR/PLS OXIMETRY MLT: CPT

## 2022-09-05 PROCEDURE — 83605 ASSAY OF LACTIC ACID: CPT

## 2022-09-05 PROCEDURE — 83550 IRON BINDING TEST: CPT

## 2022-09-05 PROCEDURE — 2580000003 HC RX 258: Performed by: HOSPITALIST

## 2022-09-05 PROCEDURE — 76705 ECHO EXAM OF ABDOMEN: CPT

## 2022-09-05 PROCEDURE — 83880 ASSAY OF NATRIURETIC PEPTIDE: CPT

## 2022-09-05 PROCEDURE — 83690 ASSAY OF LIPASE: CPT

## 2022-09-05 PROCEDURE — 94669 MECHANICAL CHEST WALL OSCILL: CPT

## 2022-09-05 PROCEDURE — 99231 SBSQ HOSP IP/OBS SF/LOW 25: CPT | Performed by: INTERNAL MEDICINE

## 2022-09-05 PROCEDURE — 83735 ASSAY OF MAGNESIUM: CPT

## 2022-09-05 PROCEDURE — 82306 VITAMIN D 25 HYDROXY: CPT

## 2022-09-05 PROCEDURE — 97530 THERAPEUTIC ACTIVITIES: CPT

## 2022-09-05 RX ORDER — AMOXICILLIN AND CLAVULANATE POTASSIUM 875; 125 MG/1; MG/1
1 TABLET, FILM COATED ORAL EVERY 12 HOURS SCHEDULED
Status: DISCONTINUED | OUTPATIENT
Start: 2022-09-05 | End: 2022-09-06

## 2022-09-05 RX ORDER — GUAIFENESIN 600 MG/1
600 TABLET, EXTENDED RELEASE ORAL 2 TIMES DAILY
Status: DISCONTINUED | OUTPATIENT
Start: 2022-09-05 | End: 2022-09-07 | Stop reason: HOSPADM

## 2022-09-05 RX ORDER — ERGOCALCIFEROL 1.25 MG/1
50000 CAPSULE ORAL WEEKLY
Status: DISCONTINUED | OUTPATIENT
Start: 2022-09-05 | End: 2022-09-07 | Stop reason: HOSPADM

## 2022-09-05 RX ORDER — FUROSEMIDE 20 MG/1
20 TABLET ORAL 2 TIMES DAILY
Status: DISCONTINUED | OUTPATIENT
Start: 2022-09-06 | End: 2022-09-07 | Stop reason: HOSPADM

## 2022-09-05 RX ORDER — DOXYCYCLINE HYCLATE 100 MG/1
100 CAPSULE ORAL EVERY 12 HOURS SCHEDULED
Status: DISCONTINUED | OUTPATIENT
Start: 2022-09-05 | End: 2022-09-07 | Stop reason: HOSPADM

## 2022-09-05 RX ORDER — SODIUM CHLORIDE 9 MG/ML
INJECTION, SOLUTION INTRAVENOUS CONTINUOUS
Status: DISCONTINUED | OUTPATIENT
Start: 2022-09-05 | End: 2022-09-06

## 2022-09-05 RX ADMIN — IPRATROPIUM BROMIDE 2 PUFF: 17 AEROSOL, METERED RESPIRATORY (INHALATION) at 08:33

## 2022-09-05 RX ADMIN — SPIRONOLACTONE 50 MG: 25 TABLET ORAL at 08:32

## 2022-09-05 RX ADMIN — SODIUM BICARBONATE 650 MG: 650 TABLET ORAL at 08:32

## 2022-09-05 RX ADMIN — OXYCODONE 10 MG: 5 TABLET ORAL at 09:33

## 2022-09-05 RX ADMIN — FUROSEMIDE 20 MG: 20 TABLET ORAL at 08:32

## 2022-09-05 RX ADMIN — GUAIFENESIN 600 MG: 600 TABLET ORAL at 20:25

## 2022-09-05 RX ADMIN — SODIUM CHLORIDE: 9 INJECTION, SOLUTION INTRAVENOUS at 20:22

## 2022-09-05 RX ADMIN — AMOXICILLIN AND CLAVULANATE POTASSIUM 1 TABLET: 875; 125 TABLET, FILM COATED ORAL at 20:25

## 2022-09-05 RX ADMIN — Medication 1000 UNITS: at 08:32

## 2022-09-05 RX ADMIN — ERGOCALCIFEROL 50000 UNITS: 1.25 CAPSULE ORAL at 16:35

## 2022-09-05 RX ADMIN — BUDESONIDE AND FORMOTEROL FUMARATE DIHYDRATE 2 PUFF: 160; 4.5 AEROSOL RESPIRATORY (INHALATION) at 20:23

## 2022-09-05 RX ADMIN — SODIUM CHLORIDE: 9 INJECTION, SOLUTION INTRAVENOUS at 13:47

## 2022-09-05 RX ADMIN — SODIUM BICARBONATE 650 MG: 650 TABLET ORAL at 13:45

## 2022-09-05 RX ADMIN — IPRATROPIUM BROMIDE 2 PUFF: 17 AEROSOL, METERED RESPIRATORY (INHALATION) at 16:35

## 2022-09-05 RX ADMIN — IPRATROPIUM BROMIDE 2 PUFF: 17 AEROSOL, METERED RESPIRATORY (INHALATION) at 20:23

## 2022-09-05 RX ADMIN — OXYCODONE 10 MG: 5 TABLET ORAL at 16:39

## 2022-09-05 RX ADMIN — OXYCODONE HYDROCHLORIDE AND ACETAMINOPHEN 500 MG: 500 TABLET ORAL at 20:25

## 2022-09-05 RX ADMIN — ALBUTEROL SULFATE 2 PUFF: 90 AEROSOL, METERED RESPIRATORY (INHALATION) at 16:35

## 2022-09-05 RX ADMIN — FAMOTIDINE 20 MG: 20 TABLET ORAL at 08:32

## 2022-09-05 RX ADMIN — GUAIFENESIN 600 MG: 600 TABLET ORAL at 09:33

## 2022-09-05 RX ADMIN — BUDESONIDE AND FORMOTEROL FUMARATE DIHYDRATE 2 PUFF: 160; 4.5 AEROSOL RESPIRATORY (INHALATION) at 08:33

## 2022-09-05 RX ADMIN — DOXYCYCLINE HYCLATE 100 MG: 100 CAPSULE ORAL at 09:33

## 2022-09-05 RX ADMIN — FAMOTIDINE 20 MG: 20 TABLET ORAL at 20:25

## 2022-09-05 RX ADMIN — DOXYCYCLINE HYCLATE 100 MG: 100 CAPSULE ORAL at 20:25

## 2022-09-05 RX ADMIN — OXYCODONE HYDROCHLORIDE AND ACETAMINOPHEN 500 MG: 500 TABLET ORAL at 08:32

## 2022-09-05 RX ADMIN — AMOXICILLIN AND CLAVULANATE POTASSIUM 1 TABLET: 875; 125 TABLET, FILM COATED ORAL at 09:33

## 2022-09-05 RX ADMIN — ALBUTEROL SULFATE 2 PUFF: 90 AEROSOL, METERED RESPIRATORY (INHALATION) at 08:33

## 2022-09-05 RX ADMIN — METOPROLOL SUCCINATE 25 MG: 25 TABLET, EXTENDED RELEASE ORAL at 08:32

## 2022-09-05 RX ADMIN — ZINC SULFATE 220 MG (50 MG) CAPSULE 50 MG: CAPSULE at 08:32

## 2022-09-05 RX ADMIN — IPRATROPIUM BROMIDE 2 PUFF: 17 AEROSOL, METERED RESPIRATORY (INHALATION) at 13:45

## 2022-09-05 RX ADMIN — SODIUM CHLORIDE, PRESERVATIVE FREE 10 ML: 5 INJECTION INTRAVENOUS at 08:31

## 2022-09-05 RX ADMIN — ALBUTEROL SULFATE 2 PUFF: 90 AEROSOL, METERED RESPIRATORY (INHALATION) at 20:23

## 2022-09-05 RX ADMIN — ALBUTEROL SULFATE 2 PUFF: 90 AEROSOL, METERED RESPIRATORY (INHALATION) at 13:45

## 2022-09-05 ASSESSMENT — PAIN DESCRIPTION - ORIENTATION
ORIENTATION: RIGHT;LEFT;LOWER
ORIENTATION: RIGHT;LEFT

## 2022-09-05 ASSESSMENT — ENCOUNTER SYMPTOMS
ROS SKIN COMMENTS: BRUISING
NAUSEA: 0
ABDOMINAL DISTENTION: 0
CONSTIPATION: 0
COLOR CHANGE: 0
WHEEZING: 0
VOMITING: 0
BLOOD IN STOOL: 0
DIARRHEA: 1
COUGH: 0
ABDOMINAL PAIN: 0
SHORTNESS OF BREATH: 0

## 2022-09-05 ASSESSMENT — PAIN SCALES - GENERAL
PAINLEVEL_OUTOF10: 9
PAINLEVEL_OUTOF10: 8

## 2022-09-05 ASSESSMENT — PAIN DESCRIPTION - DESCRIPTORS: DESCRIPTORS: ACHING;SHARP;SORE

## 2022-09-05 ASSESSMENT — PAIN DESCRIPTION - LOCATION
LOCATION: LEG;SCROTUM
LOCATION: LEG

## 2022-09-05 NOTE — PROGRESS NOTES
09/05/22 1500   Subjective   Subjective Attempt US present to do US of gall bladder.    PT Plan of Care   Monday A  (ATT.)         Electronically signed by Promise Rodriguez PTA on 9/5/2022 at 3:58 PM

## 2022-09-05 NOTE — PROGRESS NOTES
Home O2 Evaluation    Room air saturation at rest 92%    Room air saturation while ambulating in room 87%.     Patient maintained a saturation of 93% while ambulating in the room with a nasal cannula @ 3lpm without problems

## 2022-09-05 NOTE — PROGRESS NOTES
[id number] [facility]      Patient:  Magdalena Meyer  YOB: 1955  Date of Service: 9/5/2022  MRN: [id number]   Acct: [de-identified]   Primary Care Physician: Merline Barrow, MD  Advance Directive: Full Code  Admit Date: 8/25/2022       Hospital Day: 6  Portions of this note have been copied forward, however, changed to reflect the most current clinical status of this patient. CHIEF COMPLAINT left hip pain    SUBJECTIVE:  Mr. Maximiliano Kuhn was resting in bed this morning. Reported being bored. Denies shortness of breath, chest pain, fever, chills, nausea, vomiting, or diarrhea. CUMULATIVE HOSPITAL COURSE:  The patient is a 75 y.o. male with past medical history of liver disease who presented to [facility] ED complaining of left hip pain. Mr. Maximiliano Kuhn reported he was knocked down by a big dog and landed on his left hip. Since then he has not been able to ambulate. Denied any other symptoms. Denied any medical conditions, other than liver disease that has not bothered him. Stated he does not take any prescription medications at home. Admitted to smoking 1 ppd. Workup in ED revealed new onset rate controlled Afib, glucose 139 otherwise unremarkable chemistry, AST 75, bilirubin 8.0, WBC 3.8, platelet 30, INR 7.62, Cxray unremarkable. Xray of hip indicated acute, impacted transverse fracture through base of the femoral head. Patient was admitted to hospital medicine for left hip fracture and new onset Afib with orthopedic surgery and cardiology consultations. Cardiology cleared patient for surgery as long as ECHO was not abnormal, recommended low dose beta blocker to suppress the PACs. GI recommended CT of abdomen to rule out hepatoma, or portal vein thrombosis. Hematology recommended vitamin K 10 mg daily x3 days, avoid DVT prophylaxis, transfuse 2 units platelets before/during surgical procedure. Orthopedic surgery recommended surgical intervention.  Ultrasound of liver indicated hydropic cholecystitis without any obvious stone, CBD mildly dilated, MRCP is recommended, possible chronic liver parenchymal disease, cirrhosis, splenomegaly. Patient was evaluated by general surgery with no plans for intervention of gallbladder. D-dimer 4.3, CTA pulmonary pending. Echo indicated normal LV size with preserved LV systolic function with EF of 50%, normal diastolic function, mild mitral regurgitation, trivial tricuspid insufficiency with estimated RVSP of 38 mm which is mildly elevated. Underwent Left total hip arthroplasty on 8/26/2022. S/p 2 units of Platelets and 1 unit of FFP. MRCP indicated mild distended gallbladder, no significant interpretability dilatation, borderline CBD. CT abdomen indicated cirrhotic liver with abnormal diffuse low attenuation pattern, splenomegaly, mild splenic varices, gallbladder distention, and soft tissue in right inguinal canal is suspicious for undescended testicle. GI suggested EGD at a later time to screen for esophageal varices, and US of liver every 6 months to screen for hepatoma. GI obtained repeat CT abdomen and pelvis which indicated diffuse bilateral anasarca in the abdominal wall and pelvic walls, and fluid collection in the right inguinal cavity and into the scrotum. Patient scrotal edema has greatly improved with diuresis and patient is voiding without difficulty. GI had recommended urology consult however not available at this time and patient is voiding without difficulty. We will continue with diuresis. LLE venous study indicated no evidence of DVT, SVT or Reflux. Fever again overnight, temp max 100.4 on 9/1. Repeat Covid test as patient's wife, covid +. Now requiring 2 L of oxygen via nasal cannula for hypoxia. Repeat chest x-ray indicated a left infiltrate. Procalcitonin 0.07. Patient remains afebrile. Social work has been assisting in discharge planning. Review of Systems:   Review of Systems   Constitutional:  Negative for chills, diaphoresis, fatigue and fever.    HENT: Negative for congestion and ear pain. Eyes:  Negative for visual disturbance. Respiratory:  Negative for cough, shortness of breath and wheezing. Cardiovascular:  Negative for chest pain, palpitations and leg swelling. Denies chest pain    Gastrointestinal:  Positive for diarrhea. Negative for abdominal distention, abdominal pain, blood in stool, constipation, nausea and vomiting. Endocrine: Negative for cold intolerance and heat intolerance. Genitourinary:  Positive for penile swelling and scrotal swelling. Negative for difficulty urinating, flank pain, frequency and urgency. Musculoskeletal:  Positive for arthralgias and joint swelling. Negative for myalgias. Left hip pain worsening   LLE pain and swelling    Skin:  Negative for color change and wound. Bruising   Neurological:  Negative for dizziness, syncope, weakness, light-headedness, numbness and headaches. Hematological:  Does not bruise/bleed easily. Psychiatric/Behavioral:  Negative for agitation, confusion and dysphoric mood. Objective:   VITALS:  /64   Pulse 68   Temp 97.7 °F (36.5 °C) (Temporal)   Resp 20   Ht 5' 9\" (1.753 m)   Wt 207 lb 3 oz (94 kg)   SpO2 93%   BMI 30.60 kg/m²   24HR INTAKE/OUTPUT:    Intake/Output Summary (Last 24 hours) at 9/5/2022 1719  Last data filed at 9/4/2022 1943  Gross per 24 hour   Intake 120 ml   Output --   Net 120 ml       Physical Exam  Constitutional:       General: He is not in acute distress. Appearance: Normal appearance. He is not ill-appearing. HENT:      Head: Normocephalic and atraumatic. Right Ear: External ear normal.      Left Ear: External ear normal.      Nose: Nose normal.      Mouth/Throat:      Mouth: Mucous membranes are moist.   Eyes:      Extraocular Movements: Extraocular movements intact. Conjunctiva/sclera: Conjunctivae normal.      Pupils: Pupils are equal, round, and reactive to light.    Cardiovascular:      Rate and Rhythm: Normal rate and regular rhythm. Pulses: Normal pulses. Heart sounds: Normal heart sounds. Pulmonary:      Effort: Pulmonary effort is normal. No respiratory distress. Breath sounds: Normal breath sounds. No wheezing, rhonchi or rales. Abdominal:      General: Bowel sounds are normal. There is no distension. Palpations: Abdomen is soft. Tenderness: There is no abdominal tenderness. Musculoskeletal:         General: Swelling and tenderness present. No deformity. Normal range of motion. Cervical back: Normal range of motion and neck supple. No muscular tenderness. Right lower leg: No edema. Left lower leg: No edema. Comments: S/p Left hip total arthroplasty, surgical incision CDI    Skin:     General: Skin is warm and dry. Findings: Bruising (improving) present. No lesion. Comments: Bruising to left flank area and hip area   Neurological:      Mental Status: He is alert and oriented to person, place, and time. Psychiatric:         Mood and Affect: Mood normal.         Behavior: Behavior normal.         Thought Content:  Thought content normal.           Medications:      sodium chloride 150 mL/hr at 09/05/22 1600    sodium chloride      sodium chloride        [START ON 9/6/2022] furosemide  20 mg Oral BID    guaiFENesin  600 mg Oral BID    amoxicillin-clavulanate  1 tablet Oral 2 times per day    doxycycline hyclate  100 mg Oral 2 times per day    vitamin D  50,000 Units Oral Weekly    spironolactone  50 mg Oral Daily    budesonide-formoterol  2 puff Inhalation BID    ipratropium  2 puff Inhalation 4x daily    albuterol sulfate HFA  2 puff Inhalation 4x daily    Vitamin D  1,000 Units Oral Daily    ascorbic acid  500 mg Oral BID    zinc sulfate  50 mg Oral Daily    famotidine  20 mg Oral BID    metoprolol succinate  25 mg Oral Daily    sodium chloride flush  5-40 mL IntraVENous 2 times per day     hydrOXYzine HCl, sodium chloride flush, sodium chloride, ondansetron **OR** ondansetron, oxyCODONE **OR** oxyCODONE **OR** oxyCODONE, sodium chloride, polyethylene glycol  ADULT DIET; Regular; Safety Tray; Safety Tray (Disposables)     Lab and other Data:     Recent Labs     09/03/22 0315 09/04/22 0310 09/05/22  0532   WBC 2.5* 3.4* 3.9*   HGB 7.6* 7.7* 8.2*   PLT 30* 31* 40*       Recent Labs     09/03/22 0315 09/04/22 0310 09/05/22  0532   * 135* 132*   K 4.0 3.9 4.0    103 99   CO2 25 25 23   BUN 11 11 10   CREATININE 0.5 0.5 0.5   GLUCOSE 145* 129* 110*       Recent Labs     09/03/22 0315 09/04/22 0310 09/05/22  0532   * 116* 106*   ALT 40 40 39   BILITOT 4.1* 4.4* 4.9*   ALKPHOS 86 84 86           RAD:   CT ABDOMEN PELVIS W WO CONTRAST Additional Contrast? Radiologist Recommendation    Result Date: 9/1/2022  1. Liver with cirrhotic appearance and a 0.8 cm right hepatic simple cyst again noted 2. Mild distention of the gallbladder with mild diffuse thickening of the gallbladder walls with moderate pericholecystic fat stranding. Findings could be secondary to hypoalbuminemia or acalculous cholecystitis. 3. Stable splenomegaly (16.3 cm in CC diameter) with perisplenic collaterals. 4. Moderate atherosclerotic calcific plaques in the walls of the aorta and branches. 5.  No ascites. Mild diffuse mesenteric and right colic fat stranding. 6. Fluid collection in the right inguinal canal: 4.5 x 5.5 x 7.4 cm in AP by TV by CC diameters. Partial visualization of fluid in the dependent portion of the scrotum. 7. Diffuse bilateral subcutaneous fat stranding/anasarca in the abdominal and pelvic walls also including the upper thighs. Recommendation: Follow up as clinically indicated. All CT scans at this facility utilize dose modulation, iterative reconstruction, and/or weight based dosing when appropriate to reduce radiation dose to as low as reasonably achievable.   Electronically Signed by Soila Watson MD at 01-Sep-2022 07:03:37 PM XR CHEST PORTABLE    Result Date: 9/3/2022  1. Left mid-lung infiltrates. XR CHEST PORTABLE    Result Date: 8/29/2022  1. No acute cardiopulmonary process 2. No adverse interval change Recommendation: Follow up as clinically indicated. Electronically Signed by Elvin Silver DO at 29-Aug-2022 01:43:01 AM                Echo:   Summary   Left ventricle had normal chamber size and thickness   Preserved systolic function with estimated ejection fraction of 74%   No regional wall motion abnormality   Normal diastolic function   No significant valvular lesion seen   Mild mitral regurgitation   Trivial tricuspid insufficiency with estimated right ventricular systolic   pressure of 38 mm which is mildly elevated     Micro:   SARS-CoV-2, NAAT  COVID-19, Rapid  Collected: 08/29/22 1057   Result status: Final   Resulting lab: [facility]   Reference range: Not Detected   Value: DETECTED Panic          Assessment/Plan     Principal Problem:    Closed left hip fracture, initial encounter ([facility] [address]  Active Problems:    New onset a-fib (HCC)    Liver disease    Hyperbilirubinemia    Abnormal LFTs    Thrombocytopenia (HCC)    Leukopenia    Hepatic cirrhosis (HCC)    Umbilical hernia without obstruction and without gangrene    Pancytopenia (HCC)    Closed fracture of left hip (HCC)  Resolved Problems:    * No resolved hospital problems.  *       Principal Problem:    Closed left hip fracture, initial encounter ([facility] [address]-    - s/p Left total hip arthroplasty on 8/26/2022   - No anticoagulation given thrombocytopenia and elevated INR    - Pain control   - Case management consulted for DC planning, referrals to SNF placed        Active Problems:  COVID/ febrile/ shortness of breath-    - Blood cultures no growth to date   - Procalcitonin 0.07 on 9/4/22   - Lactic 2.6 today   - Chest x-ray unremarkable, repeat chest x-ray 9/3/22 with left infiltrate   - Supplemental oxygen as needed   - Supportive care   - Monitor labs

## 2022-09-05 NOTE — PROGRESS NOTES
So Aurora Da Silva is a that is why still here    PROGRESS NOTE    Patient name: Aurora Da Silva  Patient : 1955  Room: 536    SUBJECTIVE: POD #10 left hip replacement. Requesting analgesia for post-operative left hip discomfort. Ambulatory in room per self. Continues to require supplemental O2, 2L via NC with O2 saturation of 93% this morning. Reports shortness of breath with exertion. Continues to use incentive spirometry and flutter valve. States scrotal edema improved though has come to a standstill. Reports voiding without difficulty. Continues to receive oral Lasix and Aldactone. Hematologically stable, Hgb 8.2, platelets 42,805. No bleeding issues. INTERVAL HISTORY  Aurora Da Silva was first seen by Dr. Eda Richmond on 2022. Hematology consultation was requested for findings of thrombocytopenia. The patient was hospitalized at [facility] with complaints of left hip pain after a fall yesterday. The patient has not been able to ambulate. He is a chronic smoker. He was found to be on A. fib in the ER. CBC during admission here showed WBC count 3.8 with ANC of 2.5/, hemoglobin 15.3/, platelet count 60,495. Iron profile showed iron of 298, iron saturation 97%, TIBC 307, haptoglobin<10. PT 19.4/INR 1.61. The patient tells me that he has a diagnosis of alcoholic cirrhosis. He is a chronic drinker for many decades. Unfortunately, he does not have a GI doctor. He is known to be thrombocytopenic. He denies any history of hepatitis C. Ultrasound performed here showed findings suggestive of chronic liver parenchymal disease, cirrhosis and splenomegaly with the spleen measuring 2.5 x 6.6 x 15.7 cm consistent with splenomegaly. Objective   BP (!) 98/54   Pulse 88   Temp 97.8 °F (36.6 °C) (Oral)   Resp 14   Ht 5' 9\" (1.753 m)   Wt 207 lb 3 oz (94 kg)   SpO2 93%   BMI 30.60 kg/m²     PHYSICAL EXAM:  CONSTITUTIONAL: Alert, appropriate, no acute distress.     EYES: Equal, round, reactive  NECK: Supple, no masses. No JVD  CHEST/LUNGS: normal respiratory effort. Wearing O2 2L via NC at this time  CARDIOVASCULAR:  BLE edema, improved  ABDOMEN: distended, soft non-tender, active bowel sounds  EXTREMITIES: Postop left hip surgery- incision well approximated, no s/s of infection. LLE ecchymosis improved. Abdominal/hip/bilateral lower extremity edema 1+. SKIN: warm, dry with no rashes or lesions. Scattered bruising. Left hip incision well approximated. No drainage  NEUROLOGIC: follows commands, non focal     Recent Labs     09/05/22  0532 09/04/22  0310 09/03/22  0315   WBC 3.9* 3.4* 2.5*   HGB 8.2* 7.7* 7.6*   HCT 26.5* 24.1* 23.6*   .5* 119.9* 117.4*   PLT 40* 31* 30*     Lab Results   Component Value Date     (L) 09/05/2022    K 4.0 09/05/2022    CL 99 09/05/2022    CO2 23 09/05/2022    BUN 10 09/05/2022    CREATININE 0.5 09/05/2022    GLUCOSE 110 (H) 09/05/2022    CALCIUM 8.1 (L) 09/05/2022    PROT 5.3 (L) 09/05/2022    LABALBU 2.7 (L) 09/05/2022    BILITOT 4.9 (H) 09/05/2022    ALKPHOS 86 09/05/2022     (H) 09/05/2022    ALT 39 09/05/2022    LABGLOM >60 09/05/2022    GFRAA >59 09/05/2022     Lab Results   Component Value Date    INR 1.74 (H) 08/29/2022    INR 1.76 (H) 08/28/2022    INR 1.97 (H) 08/27/2022    PROTIME 20.6 (H) 08/29/2022    PROTIME 20.8 (H) 08/28/2022    PROTIME 22.8 (H) 08/27/2022     CT ABDOMEN PELVIS W WO CONTRAST Additional Contrast? Radiologist Recommendation    Result Date: 9/1/2022  NO PRIOR REPORT AVAILABLE Exam: CT OF THE ABDOMEN/PELVIS WITHOUT AND WITH IV CONTRAST Clinical data: Cirrhosis. May have ascites. May have right inguinal hernia and fluid in his scrotum. Technique: Direct contiguous axial CT images were acquired through the abdomen and pelvis without and with intravenous contrastusing soft tissue and bone algorithms. Oral contrast was administered. Reformatted/MPR images were performed.  Radiation dose: CTDIvol =53.89 mGy, DLP = 4163 mGy x cm. Limitations: None. Prior studies: CT and MRI of abdomen dated 08/26/2022. Findings: Lung bases: Clear Liver:Liver with cirrhotic appearance and a 0.8 cm right hepatic simple cyst again noted. No evidence of mass. No evidence of dilated ducts. Mild distention of the gallbladder with mild diffuse thickening of the gallbladder walls with moderate pericholecystic fat stranding. No evidence of stones by CT. Stable splenomegaly with perisplenic collaterals. Pancreas:  Grossly unremarkable. Adrenal glands: Grossly unremarkable size, contour and density. Kidneys: In anatomic position. Grossly unremarkablerenal size, contour and density. No renal or ureteral calculi. No evidence of a renal mass or cyst. Nonspecific bilateral perinephric fat stranding. Retroperitoneum: No enlarged retroperitoneal lymphadenopathy. Moderate atherosclerotic calcific plaques in the walls of the aorta and branches. The IVC appears unremarkable. Peritoneal cavity: No evidence of free air or ascites. Mild diffuse mesenteric and right colic fat stranding. Gastrointestinal tract: No obstruction. Appendix is unremarkable. Pelvis:Prostate gland is not enlarged. Urinary bladder is minimally distended. Streaky artifacts from left hip arthroplasty obscuring the surrounding structures. Fluid collection in the right inguinal canal: 4.5 x 5.5 x 7.4 cm in AP by TV by CC diameters. Partial visualization of fluid in the dependent portion of the scrotum. Osseous structures: No acute or destructive bony process identified. Degenerative changes of the spine. Diffuse bilateral subcutaneous fat stranding/anasarca in the abdominal and pelvic walls also including the upper thighs. 1. Liver with cirrhotic appearance and a 0.8 cm right hepatic simple cyst again noted 2. Mild distention of the gallbladder with mild diffuse thickening of the gallbladder walls with moderate pericholecystic fat stranding.   Findings could be secondary to hypoalbuminemia or acalculous cholecystitis. 3. Stable splenomegaly (16.3 cm in CC diameter) with perisplenic collaterals. 4. Moderate atherosclerotic calcific plaques in the walls of the aorta and branches. 5.  No ascites. Mild diffuse mesenteric and right colic fat stranding. 6. Fluid collection in the right inguinal canal: 4.5 x 5.5 x 7.4 cm in AP by TV by CC diameters. Partial visualization of fluid in the dependent portion of the scrotum. 7. Diffuse bilateral subcutaneous fat stranding/anasarca in the abdominal and pelvic walls also including the upper thighs. Recommendation: Follow up as clinically indicated. All CT scans at this facility utilize dose modulation, iterative reconstruction, and/or weight based dosing when appropriate to reduce radiation dose to as low as reasonably achievable.   Electronically Signed by Jama Vazquez MD at 01-Sep-2022 07:03:37 PM                ASSESSMENT/PLAN:    Thrombocytopenia-likely multifactorial to include direct effect of alcohol bone marrow and also secondary to splenomegaly  Likely secondary to end-stage liver disease/alcohol abuse  US showed signs of end-stage liver disease/splenomegaly (portal hypertension)      Status post 2 pheresis platelets given perioperatively  PLT 40,000 today, 9/5/2022 - improved  No bleeding    Left hip fracture  POD #10  Post-op care, as directed by ortho     End-stage liver disease/splenomegaly  Evidence of portal hypertension        Followed by GI  Receiving oral lasix/aldactone    Coagulopathy of liver disease/thrombocytopenia      -S/p Vitamin K 10 mg p.o. x3 doses  8/25/2022 - 8/27/2022  -Postop bruising, no overt bleeding     Serology 8/26/2022:  Fibrinogen 177  D-dimer 4.3    Macrocytic anemia    Recent Labs     09/05/22  0532 09/04/22  0310 09/03/22  0315   WBC 3.9* 3.4* 2.5*   HGB 8.2* 7.7* 7.6*   HCT 26.5* 24.1* 23.6*   .5* 119.9* 117.4*   PLT 40* 31* 30*      Likely secondary to end-stage liver disease  Hapto < 10    Serology 8/26/2022  Retic - 4.33% with absolute 0.1334  LDH - 255 ()    Minimal elevation LDH    B12 = 1446  Folate - 13    MARICARMEN - NEGATIVE    Serology 8/28/2022  Retic 5.35% with absolute 0.1188  LDH - 240 () - improved  Total bili 3.2-direct 1.7, indirect 1.5 -now more direct than indirect    Hgb 8.2 today, 9/5/2022     Elevated iron saturation/iron levels  Serum Fe - 298  TIBC - 307  Fe sat - 97%  Ferritin - 220    Hereditary hemochromatosis gene 8/26/2022 NEGATIVE    Scrotal/lower extremity edema  S/p Lasix IV, changed to oral  Receiving Aldactone    CT abdomen/pelvis with/without contrast 9/1/2022:  1. Liver with cirrhotic appearance and a 0.8 cm right hepatic simple cyst again noted  2. Mild distention of the gallbladder with mild diffuse thickening of the gallbladder walls with moderate pericholecystic fat stranding. Findings could be secondary to hypoalbuminemia or acalculous cholecystitis. 3. Stable splenomegaly (16.3 cm in CC diameter) with perisplenic collaterals. 4. Moderate atherosclerotic calcific plaques in the walls of the aorta and branches. 5.  No ascites. Mild diffuse mesenteric and right colic fat stranding. 6. Fluid collection in the right inguinal canal: 4.5 x 5.5 x 7.4 cm in AP by TV by CC diameters. Partial visualization of fluid in the dependent portion of the scrotum. 7. Diffuse bilateral subcutaneous fat stranding/anasarca in the abdominal and pelvic walls also including the upper thighs. Previously evaluated by Dr. Luis Wen 8/26/2022, no surgical intervention at this time regarding gallbladder.  If surgery required for GB, he would need tertiary care center due to severity of liver disease    COVID 19  As per attending  Supplemental O2 continues 2L NC, O2 sat 93%  BC x2 on 8/28/2022 - NGTD  Temp max 100.4 on 9/1/2022, afebrile since    PLAN:  POD #10 left ALICIA  Avoid DVT chemical prophylaxis due to platelet counts below 50,000 (platelets 39,864)  Monitor CBC  Transfuse for Hgb < 7  Transfuse for plt < 10,000 or bleeding  Isolation r/t COVID-19  Liver cirrhosis, followed by GI    Temp max 100.4 on 9/1/2022 at 5:40 pm  Requiring O2 2L via NC - O2 sat 93% on O2    Discussed with patient. SHEILA Baldwin    09/05/22  8:12 AM    Physician's attestation and contribution:  I, Dr Peter Tyler, personally and independently performed an evaluation on  Fantasma Richmond        I have reviewed relevant medical information/data to include but not limited to the medication list, relevant appropriate lab work and imaging when applicable. I reviewed other physician's notes, ancillary services and nurses assessments. I have reviewed the above documentation completed by Eladio SOSA   Please see my additional addended and/or modified contributions to the history of present illness, physical examination, and assessment/medical decision-making and plan that reflects my findings and impressions. I discussed essential elements of the care plan with Eladio SOSA and the patient. I have encouraged and answered all the questions raised to the patient's understanding and satisfaction. I concur with the above stated. Subjective-not feeling well today, pain better since she got an analgesic however still complaining of pain, scrotal edema and overall not feeling well    Objective-he is in no acute distress, I spoke to him and other than just not feeling well he states he is okay. Anxious to get home. Assessment/plan:  CBC stable with a WBC of 3.9, hemoglobin 8.2 and a platelet count of 78,143. No intervention warranted from the hematological standpoint. Dr. Lu Flores to return in the morning.     Electronically signed by Peter Tyler MD on 9/5/22 at 10:22 AM CDT

## 2022-09-05 NOTE — PROGRESS NOTES
Occupational Therapy  Facility/Department: [facility] 4 ONCOLOGY UNIT  Daily Treatment Note  NAME: Anais Washington  : 1955  MRN: [id number]    Date of Service: 2022    Discharge Recommendations:  Patient would benefit from continued therapy after discharge       Assessment   Assessment: Pt in bed and ready to get up to use BSC to urinate. Pt unable to urinate mostly in commode due to scrotal edema. Socks changed and cleaned up. Pt tolerated tx well and continues to benefit from skilled OT services. Treatment Diagnosis: L anterior hip replacement  Prognosis: Good  Activity Tolerance  Activity Tolerance: Patient Tolerated treatment well         Patient Diagnosis(es): The primary encounter diagnosis was Closed fracture of left hip, initial encounter ([facility] [address] Diagnoses of New onset a-fib ([facility] [address] Hepatic cirrhosis, unspecified hepatic cirrhosis type, unspecified whether [facility]ites [facility] and Type I or II open fracture of left hip, initial encounter [facility]) were also pertinent to this visit. has a past medical history of Liver disease. has a past surgical history that includes Total hip arthroplasty (Left, 2022). Restrictions  Restrictions/Precautions  Restrictions/Precautions: Fall Risk, Weight Bearing  Lower Extremity Weight Bearing Restrictions  Left Lower Extremity Weight Bearing: Weight Bearing As Tolerated  Position Activity Restriction  Other position/activity restrictions: ant hip prec.,  No SLR. COVID droplet plus  Subjective   General  Chart Reviewed: Yes  Patient assessed for rehabilitation services?: Yes  Response to previous treatment: Patient with no complaints from previous session  Family / Caregiver Present: No  Pre Treatment Pain Screening  Pain at present: 7  Scale Used: Numeric Score  Intervention List: Patient able to continue with treatment   Orientation     Objective    ADL  Toileting: Stand by assistance  Additional Comments: limited with AD due to swelling.  Wife to assist at home according to patient. Bed mobility  Supine to Sit: Stand by assistance  Sit to Supine: Stand by assistance  Scooting: Independent  Transfers  Stand Step Transfers: Stand by assistance;Contact guard assistance  Sit to stand: Stand by assistance  Stand to sit: Stand by assistance                                                           Plan   Plan  Times per Week: 3-5  Times per Day: Daily  Goals  Short Term Goals  Time Frame for Short term goals: 1-2 weeks  Short Term Goal 1: mod I with toileting  Short Term Goal 2: Mod I with dressing with AE  Short Term Goal 3: mod I with functional transfers  Short Term Goal 4: Ind to consistently apply anterior hip precautions, verbalize/demo AE and DME options, and demo Ind with therapeutic activity recommendations.        Therapy Time   Individual Concurrent Group Co-treatment   Time In           Time Out           Minutes              KATHY Zhong  Electronically signed by KATHY Zhong on 9/5/2022 at 4:04 PM

## 2022-09-06 ENCOUNTER — APPOINTMENT (OUTPATIENT)
Dept: ULTRASOUND IMAGING | Age: 67
DRG: 521 | End: 2022-09-06
Payer: MEDICARE

## 2022-09-06 LAB
ALBUMIN SERPL-MCNC: 2.6 G/DL (ref 3.5–5.2)
ALP BLD-CCNC: 83 U/L (ref 40–130)
ALT SERPL-CCNC: 35 U/L (ref 5–41)
ANION GAP SERPL CALCULATED.3IONS-SCNC: 8 MMOL/L (ref 7–19)
ANISOCYTOSIS: ABNORMAL
AST SERPL-CCNC: 85 U/L (ref 5–40)
BASOPHILS ABSOLUTE: 0 K/UL (ref 0–0.2)
BASOPHILS RELATIVE PERCENT: 0.3 % (ref 0–1)
BILIRUB SERPL-MCNC: 5.3 MG/DL (ref 0.2–1.2)
BUN BLDV-MCNC: 7 MG/DL (ref 8–23)
CALCIUM SERPL-MCNC: 7.9 MG/DL (ref 8.8–10.2)
CHLORIDE BLD-SCNC: 104 MMOL/L (ref 98–111)
CO2: 22 MMOL/L (ref 22–29)
CREAT SERPL-MCNC: 0.4 MG/DL (ref 0.5–1.2)
EOSINOPHILS ABSOLUTE: 0.1 K/UL (ref 0–0.6)
EOSINOPHILS RELATIVE PERCENT: 3.3 % (ref 0–5)
GFR AFRICAN AMERICAN: >59
GFR NON-AFRICAN AMERICAN: >60
GLUCOSE BLD-MCNC: 115 MG/DL (ref 74–109)
HCT VFR BLD CALC: 24 % (ref 42–52)
HEMOGLOBIN: 7.6 G/DL (ref 14–18)
HYPOCHROMIA: ABNORMAL
IMMATURE GRANULOCYTES #: 0 K/UL
LACTIC ACID: 2 MMOL/L (ref 0.5–1.9)
LACTIC ACID: 2.5 MMOL/L (ref 0.5–1.9)
LYMPHOCYTES ABSOLUTE: 0.7 K/UL (ref 1.1–4.5)
LYMPHOCYTES RELATIVE PERCENT: 19.8 % (ref 20–40)
MCH RBC QN AUTO: 37.3 PG (ref 27–31)
MCHC RBC AUTO-ENTMCNC: 31.7 G/DL (ref 33–37)
MCV RBC AUTO: 117.6 FL (ref 80–94)
MICROCYTES: ABNORMAL
MONOCYTES ABSOLUTE: 0.6 K/UL (ref 0–0.9)
MONOCYTES RELATIVE PERCENT: 14.9 % (ref 0–10)
NEUTROPHILS ABSOLUTE: 2.2 K/UL (ref 1.5–7.5)
NEUTROPHILS RELATIVE PERCENT: 60.6 % (ref 50–65)
PDW BLD-RTO: 16.4 % (ref 11.5–14.5)
PLATELET # BLD: 35 K/UL (ref 130–400)
PLATELET SLIDE REVIEW: ABNORMAL
PMV BLD AUTO: 11.8 FL (ref 9.4–12.4)
POLYCHROMASIA: ABNORMAL
POTASSIUM REFLEX MAGNESIUM: 4.1 MMOL/L (ref 3.5–5)
RBC # BLD: 2.04 M/UL (ref 4.7–6.1)
SODIUM BLD-SCNC: 134 MMOL/L (ref 136–145)
T4 FREE: 0.92 NG/DL (ref 0.93–1.7)
TOTAL PROTEIN: 5.1 G/DL (ref 6.6–8.7)
WBC # BLD: 3.7 K/UL (ref 4.8–10.8)

## 2022-09-06 PROCEDURE — 76870 US EXAM SCROTUM: CPT

## 2022-09-06 PROCEDURE — 6370000000 HC RX 637 (ALT 250 FOR IP): Performed by: NURSE PRACTITIONER

## 2022-09-06 PROCEDURE — 6370000000 HC RX 637 (ALT 250 FOR IP): Performed by: HOSPITALIST

## 2022-09-06 PROCEDURE — 85025 COMPLETE CBC W/AUTO DIFF WBC: CPT

## 2022-09-06 PROCEDURE — 2580000003 HC RX 258: Performed by: ORTHOPAEDIC SURGERY

## 2022-09-06 PROCEDURE — 6360000002 HC RX W HCPCS: Performed by: HOSPITALIST

## 2022-09-06 PROCEDURE — 80053 COMPREHEN METABOLIC PANEL: CPT

## 2022-09-06 PROCEDURE — 36415 COLL VENOUS BLD VENIPUNCTURE: CPT

## 2022-09-06 PROCEDURE — 83605 ASSAY OF LACTIC ACID: CPT

## 2022-09-06 PROCEDURE — 1210000000 HC MED SURG R&B

## 2022-09-06 PROCEDURE — 99231 SBSQ HOSP IP/OBS SF/LOW 25: CPT | Performed by: INTERNAL MEDICINE

## 2022-09-06 PROCEDURE — 2580000003 HC RX 258: Performed by: HOSPITALIST

## 2022-09-06 PROCEDURE — 2700000000 HC OXYGEN THERAPY PER DAY

## 2022-09-06 PROCEDURE — 6370000000 HC RX 637 (ALT 250 FOR IP): Performed by: ORTHOPAEDIC SURGERY

## 2022-09-06 PROCEDURE — 84439 ASSAY OF FREE THYROXINE: CPT

## 2022-09-06 RX ORDER — BUMETANIDE 0.25 MG/ML
1 INJECTION, SOLUTION INTRAMUSCULAR; INTRAVENOUS 2 TIMES DAILY
Status: DISCONTINUED | OUTPATIENT
Start: 2022-09-06 | End: 2022-09-06

## 2022-09-06 RX ORDER — DEXAMETHASONE SODIUM PHOSPHATE 10 MG/ML
4 INJECTION, SOLUTION INTRAMUSCULAR; INTRAVENOUS EVERY 6 HOURS
Status: DISCONTINUED | OUTPATIENT
Start: 2022-09-06 | End: 2022-09-07 | Stop reason: HOSPADM

## 2022-09-06 RX ORDER — CIPROFLOXACIN 500 MG/1
500 TABLET, FILM COATED ORAL EVERY 12 HOURS SCHEDULED
Status: DISCONTINUED | OUTPATIENT
Start: 2022-09-06 | End: 2022-09-07 | Stop reason: HOSPADM

## 2022-09-06 RX ORDER — LEVOTHYROXINE SODIUM 0.05 MG/1
50 TABLET ORAL DAILY
Status: DISCONTINUED | OUTPATIENT
Start: 2022-09-06 | End: 2022-09-07 | Stop reason: HOSPADM

## 2022-09-06 RX ORDER — SPIRONOLACTONE 25 MG/1
25 TABLET ORAL DAILY
Status: DISCONTINUED | OUTPATIENT
Start: 2022-09-07 | End: 2022-09-07 | Stop reason: HOSPADM

## 2022-09-06 RX ADMIN — OXYCODONE HYDROCHLORIDE AND ACETAMINOPHEN 500 MG: 500 TABLET ORAL at 20:42

## 2022-09-06 RX ADMIN — BUDESONIDE AND FORMOTEROL FUMARATE DIHYDRATE 2 PUFF: 160; 4.5 AEROSOL RESPIRATORY (INHALATION) at 20:39

## 2022-09-06 RX ADMIN — PIPERACILLIN AND TAZOBACTAM 3375 MG: 3; .375 INJECTION, POWDER, FOR SOLUTION INTRAVENOUS at 22:27

## 2022-09-06 RX ADMIN — GUAIFENESIN 600 MG: 600 TABLET ORAL at 20:42

## 2022-09-06 RX ADMIN — CIPROFLOXACIN HYDROCHLORIDE 500 MG: 500 TABLET, FILM COATED ORAL at 20:43

## 2022-09-06 RX ADMIN — DEXAMETHASONE SODIUM PHOSPHATE 4 MG: 10 INJECTION, SOLUTION INTRAMUSCULAR; INTRAVENOUS at 09:52

## 2022-09-06 RX ADMIN — PIPERACILLIN AND TAZOBACTAM 4500 MG: 4; .5 INJECTION, POWDER, FOR SOLUTION INTRAVENOUS at 09:51

## 2022-09-06 RX ADMIN — DOXYCYCLINE HYCLATE 100 MG: 100 CAPSULE ORAL at 20:42

## 2022-09-06 RX ADMIN — ALBUTEROL SULFATE 2 PUFF: 90 AEROSOL, METERED RESPIRATORY (INHALATION) at 17:52

## 2022-09-06 RX ADMIN — IPRATROPIUM BROMIDE 2 PUFF: 17 AEROSOL, METERED RESPIRATORY (INHALATION) at 09:52

## 2022-09-06 RX ADMIN — ALBUTEROL SULFATE 2 PUFF: 90 AEROSOL, METERED RESPIRATORY (INHALATION) at 09:52

## 2022-09-06 RX ADMIN — DEXAMETHASONE SODIUM PHOSPHATE 4 MG: 10 INJECTION, SOLUTION INTRAMUSCULAR; INTRAVENOUS at 20:41

## 2022-09-06 RX ADMIN — BUDESONIDE AND FORMOTEROL FUMARATE DIHYDRATE 2 PUFF: 160; 4.5 AEROSOL RESPIRATORY (INHALATION) at 09:52

## 2022-09-06 RX ADMIN — SODIUM CHLORIDE, PRESERVATIVE FREE 10 ML: 5 INJECTION INTRAVENOUS at 09:52

## 2022-09-06 RX ADMIN — PIPERACILLIN AND TAZOBACTAM 3375 MG: 3; .375 INJECTION, POWDER, FOR SOLUTION INTRAVENOUS at 17:23

## 2022-09-06 RX ADMIN — FUROSEMIDE 20 MG: 20 TABLET ORAL at 17:24

## 2022-09-06 RX ADMIN — OXYCODONE 10 MG: 5 TABLET ORAL at 17:38

## 2022-09-06 RX ADMIN — IPRATROPIUM BROMIDE 2 PUFF: 17 AEROSOL, METERED RESPIRATORY (INHALATION) at 17:52

## 2022-09-06 RX ADMIN — IPRATROPIUM BROMIDE 2 PUFF: 17 AEROSOL, METERED RESPIRATORY (INHALATION) at 14:22

## 2022-09-06 RX ADMIN — ALBUTEROL SULFATE 2 PUFF: 90 AEROSOL, METERED RESPIRATORY (INHALATION) at 20:39

## 2022-09-06 RX ADMIN — FAMOTIDINE 20 MG: 20 TABLET ORAL at 20:43

## 2022-09-06 RX ADMIN — IPRATROPIUM BROMIDE 2 PUFF: 17 AEROSOL, METERED RESPIRATORY (INHALATION) at 20:39

## 2022-09-06 RX ADMIN — ALBUTEROL SULFATE 2 PUFF: 90 AEROSOL, METERED RESPIRATORY (INHALATION) at 14:22

## 2022-09-06 RX ADMIN — DEXAMETHASONE SODIUM PHOSPHATE 4 MG: 10 INJECTION, SOLUTION INTRAMUSCULAR; INTRAVENOUS at 17:24

## 2022-09-06 RX ADMIN — SODIUM CHLORIDE, PRESERVATIVE FREE 10 ML: 5 INJECTION INTRAVENOUS at 20:43

## 2022-09-06 ASSESSMENT — ENCOUNTER SYMPTOMS
COUGH: 0
ROS SKIN COMMENTS: BRUISING
ABDOMINAL PAIN: 0
WHEEZING: 0
NAUSEA: 0
ABDOMINAL DISTENTION: 0
BLOOD IN STOOL: 1
COLOR CHANGE: 0
VOMITING: 0
DIARRHEA: 1
CONSTIPATION: 0
SHORTNESS OF BREATH: 0

## 2022-09-06 ASSESSMENT — PAIN SCALES - GENERAL: PAINLEVEL_OUTOF10: 8

## 2022-09-06 ASSESSMENT — PAIN DESCRIPTION - LOCATION: LOCATION: HIP

## 2022-09-06 ASSESSMENT — PAIN DESCRIPTION - ORIENTATION: ORIENTATION: RIGHT

## 2022-09-06 NOTE — PROGRESS NOTES
[facility] Hospitalists      Patient:  Candis Matthews  YOB: 1955  Date of Service: 9/6/2022  MRN: [id number]   Acct: [de-identified]   Primary Care Physician: Mckenzie Day MD  Advance Directive: Full Code  Admit Date: 8/25/2022       Hospital Day: 15  Portions of this note have been copied forward, however, changed to reflect the most current clinical status of this patient. CHIEF COMPLAINT left hip pain    SUBJECTIVE:  Mr. Jorge Florentino was resting in bed this morning. Reported SOB at baseline. Denies nausea, vomiting, or diarrhea. Denies fever or chills. CUMULATIVE HOSPITAL COURSE:  The patient is a 75 y.o. male with past medical history of liver disease who presented to [facility] ED complaining of left hip pain. Mr. Jorge Florentino reported he was knocked down by a big dog and landed on his left hip. Since then he has not been able to ambulate. Denied any other symptoms. Denied any medical conditions, other than liver disease that has not bothered him. Stated he does not take any prescription medications at home. Admitted to smoking 1 ppd. Workup in ED revealed new onset rate controlled Afib, glucose 139 otherwise unremarkable chemistry, AST 75, bilirubin 8.0, WBC 3.8, platelet 30, INR 0.39, Cxray unremarkable. Xray of hip indicated acute, impacted transverse fracture through base of the femoral head. Patient was admitted to hospital medicine for left hip fracture and new onset Afib with orthopedic surgery and cardiology consultations. Cardiology cleared patient for surgery as long as ECHO was not abnormal, recommended low dose beta blocker to suppress the PACs. GI recommended CT of abdomen to rule out hepatoma, or portal vein thrombosis. Hematology recommended vitamin K 10 mg daily x3 days, avoid DVT prophylaxis, transfuse 2 units platelets before/during surgical procedure. Orthopedic surgery recommended surgical intervention.  Ultrasound of liver indicated hydropic cholecystitis without any obvious stone, CBD mildly dilated, MRCP is recommended, possible chronic liver parenchymal disease, cirrhosis, splenomegaly. Patient was evaluated by general surgery with no plans for intervention of gallbladder. D-dimer 4.3, CTA pulmonary pending. Echo indicated normal LV size with preserved LV systolic function with EF of 90%, normal diastolic function, mild mitral regurgitation, trivial tricuspid insufficiency with estimated RVSP of 38 mm which is mildly elevated. Underwent Left total hip arthroplasty on 8/26/2022. S/p 2 units of Platelets and 1 unit of FFP. MRCP indicated mild distended gallbladder, no significant interpretability dilatation, borderline CBD. CT abdomen indicated cirrhotic liver with abnormal diffuse low attenuation pattern, splenomegaly, mild splenic varices, gallbladder distention, and soft tissue in right inguinal canal is suspicious for undescended testicle. GI suggested EGD at a later time to screen for esophageal varices, and US of liver every 6 months to screen for hepatoma. GI obtained repeat CT abdomen and pelvis which indicated diffuse bilateral anasarca in the abdominal wall and pelvic walls, and fluid collection in the right inguinal cavity and into the scrotum. Patient scrotal edema has greatly improved with diuresis and patient is voiding without difficulty. GI had recommended urology consult however not available at this time and patient is voiding without difficulty. We will continue with diuresis. LLE venous study indicated no evidence of DVT, SVT or Reflux. Fever again overnight, temp max 100.4 on 9/1. Repeat Covid test as patient's wife, covid +. Now requiring 2 L of oxygen via nasal cannula for hypoxia. Repeat chest x-ray indicated a left infiltrate. Procalcitonin 0.07. Patient remains afebrile. Social work has been assisting in discharge planning. Nursing reported patient having blood clots in bowel movement this afternoon. GI consultation placed for evaluation.          Review of Systems: Review of Systems   Constitutional:  Negative for chills, diaphoresis, fatigue and fever. HENT:  Negative for congestion and ear pain. Eyes:  Negative for visual disturbance. Respiratory:  Negative for cough, shortness of breath and wheezing. Cardiovascular:  Negative for chest pain, palpitations and leg swelling. Denies chest pain    Gastrointestinal:  Positive for blood in stool and diarrhea. Negative for abdominal distention, abdominal pain, constipation, nausea and vomiting. Endocrine: Negative for cold intolerance and heat intolerance. Genitourinary:  Positive for penile swelling and scrotal swelling. Negative for difficulty urinating, flank pain, frequency and urgency. Musculoskeletal:  Positive for arthralgias and joint swelling. Negative for myalgias. Left hip pain worsening   LLE pain and swelling    Skin:  Negative for color change and wound. Bruising   Neurological:  Negative for dizziness, syncope, weakness, light-headedness, numbness and headaches. Hematological:  Does not bruise/bleed easily. Psychiatric/Behavioral:  Negative for agitation, confusion and dysphoric mood. Objective:   VITALS:  /66   Pulse 76   Temp 97.5 °F (36.4 °C) (Temporal)   Resp 20   Ht 5' 9\" (1.753 m)   Wt 209 lb 8 oz (95 kg)   SpO2 92%   BMI 30.94 kg/m²   24HR INTAKE/OUTPUT:    Intake/Output Summary (Last 24 hours) at 9/6/2022 1738  Last data filed at 9/6/2022 1245  Gross per 24 hour   Intake 240 ml   Output --   Net 240 ml         Physical Exam  Constitutional:       General: He is not in acute distress. Appearance: Normal appearance. He is not ill-appearing. HENT:      Head: Normocephalic and atraumatic. Right Ear: External ear normal.      Left Ear: External ear normal.      Nose: Nose normal.      Mouth/Throat:      Mouth: Mucous membranes are moist.   Eyes:      Extraocular Movements: Extraocular movements intact.       Conjunctiva/sclera: Conjunctivae normal.      Pupils: Pupils are equal, round, and reactive to light. Cardiovascular:      Rate and Rhythm: Normal rate and regular rhythm. Pulses: Normal pulses. Heart sounds: Normal heart sounds. Pulmonary:      Effort: Pulmonary effort is normal. No respiratory distress. Breath sounds: Normal breath sounds. No wheezing, rhonchi or rales. Abdominal:      General: Bowel sounds are normal. There is no distension. Palpations: Abdomen is soft. Tenderness: There is no abdominal tenderness. Musculoskeletal:         General: Swelling and tenderness present. No deformity. Normal range of motion. Cervical back: Normal range of motion and neck supple. No muscular tenderness. Right lower leg: No edema. Left lower leg: No edema. Comments: S/p Left hip total arthroplasty, surgical incision CDI    Skin:     General: Skin is warm and dry. Findings: Bruising (improving) present. No lesion. Comments: Bruising to left flank area and hip area   Neurological:      Mental Status: He is alert and oriented to person, place, and time. Psychiatric:         Mood and Affect: Mood normal.         Behavior: Behavior normal.         Thought Content:  Thought content normal.           Medications:      sodium chloride      sodium chloride        levothyroxine  50 mcg Oral Daily    dexamethasone  4 mg IntraVENous Q6H    [START ON 9/7/2022] spironolactone  25 mg Oral Daily    piperacillin-tazobactam  3,375 mg IntraVENous Q8H    ciprofloxacin  500 mg Oral 2 times per day    furosemide  20 mg Oral BID    guaiFENesin  600 mg Oral BID    doxycycline hyclate  100 mg Oral 2 times per day    vitamin D  50,000 Units Oral Weekly    budesonide-formoterol  2 puff Inhalation BID    ipratropium  2 puff Inhalation 4x daily    albuterol sulfate HFA  2 puff Inhalation 4x daily    Vitamin D  1,000 Units Oral Daily    ascorbic acid  500 mg Oral BID    zinc sulfate  50 mg Oral Daily famotidine  20 mg Oral BID    metoprolol succinate  25 mg Oral Daily    sodium chloride flush  5-40 mL IntraVENous 2 times per day     hydrOXYzine HCl, sodium chloride flush, sodium chloride, ondansetron **OR** ondansetron, oxyCODONE **OR** oxyCODONE **OR** oxyCODONE, sodium chloride, polyethylene glycol  ADULT DIET; Regular; Safety Tray; Safety Tray (Disposables)     Lab and other Data:     Recent Labs     09/04/22 0310 09/05/22 0532 09/06/22  0351   WBC 3.4* 3.9* 3.7*   HGB 7.7* 8.2* 7.6*   PLT 31* 40* 35*       Recent Labs     09/04/22 0310 09/05/22 0532 09/06/22  0351   * 132* 134*   K 3.9 4.0 4.1    99 104   CO2 25 23 22   BUN 11 10 7*   CREATININE 0.5 0.5 0.4*   GLUCOSE 129* 110* 115*       Recent Labs     09/04/22 0310 09/05/22 0532 09/06/22  0351   * 106* 85*   ALT 40 39 35   BILITOT 4.4* 4.9* 5.3*   ALKPHOS 84 86 83           RAD:   CT ABDOMEN PELVIS W WO CONTRAST Additional Contrast? Radiologist Recommendation    Result Date: 9/1/2022  1. Liver with cirrhotic appearance and a 0.8 cm right hepatic simple cyst again noted 2. Mild distention of the gallbladder with mild diffuse thickening of the gallbladder walls with moderate pericholecystic fat stranding. Findings could be secondary to hypoalbuminemia or acalculous cholecystitis. 3. Stable splenomegaly (16.3 cm in CC diameter) with perisplenic collaterals. 4. Moderate atherosclerotic calcific plaques in the walls of the aorta and branches. 5.  No ascites. Mild diffuse mesenteric and right colic fat stranding. 6. Fluid collection in the right inguinal canal: 4.5 x 5.5 x 7.4 cm in AP by TV by CC diameters. Partial visualization of fluid in the dependent portion of the scrotum. 7. Diffuse bilateral subcutaneous fat stranding/anasarca in the abdominal and pelvic walls also including the upper thighs. Recommendation: Follow up as clinically indicated.  All CT scans at this facility utilize dose modulation, iterative reconstruction, breath-    - Blood cultures no growth to date   - Procalcitonin 0.07 on 9/4/22   - Chest x-ray unremarkable, repeat chest x-ray 9/3/22 with left infiltrate   - Supplemental oxygen as needed   - Supportive care   - Monitor labs closely    - Monitor fever curve        Liver disease/ Hyperbilirubinemia/ Abnormal LFTs/ Thrombocytopenia (HCC)/ Hepatic cirrhosis (HCC)-    - S/p 2 units of platelets and 1 unit of FFP   - Liver ultrasound indicated hydropic cholecystitis without any obvious stone, CBD mildly dilated, MRCP is recommended, possible chronic liver parenchymal disease, cirrhosis, splenomegaly   - GI was consulted    - MRCP indicated mild distended gallbladder, no significant interpretability dilatation, borderline CBD    - CT abdomen indicated cirrhotic liver with abnormal diffuse low-attenuation pattern, splenomegaly, mild splenic varices, gallbladder distention, and soft tissue in right inguinal canal suspicious for undescended testicles   - GI consulted surgery who recommended no indication for surgery at this time, if surgery needed then he would have to be a tertiary center due to severity of liver disease   - Monitor labs closely   - Continue diuresis    New onset a-fib (HCC)/irregular heartbeat   - Cardiology was consulted    - Suggested PACs    - Recommended low-dose beta-blocker to suppress PACs     - Echo indicated normal LV size with preserved LV systolic function with EF of 73%, normal diastolic function, mild mitral regurgitation, trivial tricuspid insufficiency with estimated RVSP of 38mmHg which is mildly elevated  - Monitor on telemetry           DVT Prophylaxis: SCDs      Discharge planning: Social service assisting with DC planning     Further Orders per Clinical course/attending. Katerine Ga, SHEILA - CNP, 9/6/2022 5:38 PM         EMR Dragon/Transcription disclaimer:   Much of this encounter note is an electronic transcription/translation of spoken language to printed text.  The electronic translation of spoken language may permit erroneous, or at times, nonsensical words or phrases to be inadvertently transcribed; although attempts have made to review the note for such errors, some may still exist.       Attestation Statement     I have independently seen and examined this patient and agree with the asesment and plan by mid level [facility] MEDICINE  - PROGRESS NOTE       Objective:   Vitals: /61   Pulse 72   Temp 97.3 °F (36.3 °C) (Oral)   Resp 18   Ht 5' 9\" (1.753 m)   Wt 209 lb 8 oz (95 kg)   SpO2 (!) 88%   BMI 30.94 kg/m²   General appearance: alert, appears stated age and cooperative  Skin: Skin color, texture, turgor normal.   HEENT: Head: Normocephalic, no lesions, without obvious abnormality.   Neck: no adenopathy, no carotid bruit, no JVD, and supple, symmetrical, trachea midline  Lungs: clear to auscultation bilaterally  Heart: regular rate and rhythm, S1, S2 normal, no murmur, click, rub or gallop  Abdomen: soft, non-tender; bowel sounds normal; no masses,  no organomegaly  Extremities: extremities normal, atraumatic, no cyanosis or edema  Lymphatic: No significant lymph node enlargement papable  Neurologic: Mental status: Alert, oriented, thought content appropriate      Assessment & Plan:    Hip fx- s/p Left total hip arthroplasty 8/26  Hyperbilirubenemia   Thrombocytopenia - hematology on board - most likely due to alcohol abuse  End stage liver disease/cirrhosis with splenomegaly and varices    HTN  COPD      Inocencia Hansen MD

## 2022-09-06 NOTE — PROGRESS NOTES
PROGRESS NOTE    Patient name: Bautista Rico  Patient : 1955  Room: 536    SUBJECTIVE: POD #11 left hip replacement. No new issues per nursing    INTERVAL HISTORY  Bautista Rico was first seen by Dr. Debi Mar on 2022. Hematology consultation was requested for findings of thrombocytopenia. The patient was hospitalized at [facility] with complaints of left hip pain after a fall yesterday. The patient has not been able to ambulate. He is a chronic smoker. He was found to be on A. fib in the ER. CBC during admission here showed WBC count 3.8 with ANC of 2.5/, hemoglobin 15.3/, platelet count 93,908. Iron profile showed iron of 298, iron saturation 97%, TIBC 307, haptoglobin<10. PT 19.4/INR 1.61. The patient tells me that he has a diagnosis of alcoholic cirrhosis. He is a chronic drinker for many decades. Unfortunately, he does not have a GI doctor. He is known to be thrombocytopenic. He denies any history of hepatitis C. Ultrasound performed here showed findings suggestive of chronic liver parenchymal disease, cirrhosis and splenomegaly with the spleen measuring 2.5 x 6.6 x 15.7 cm consistent with splenomegaly.      Objective   /62   Pulse 70   Temp 97.6 °F (36.4 °C)   Resp 20   Ht 5' 9\" (1.753 m)   Wt 209 lb 8 oz (95 kg)   SpO2 92%   BMI 30.94 kg/m²     PHYSICAL EXAM:  Not performed    Recent Labs     22  0351 22  0532 22  0310   WBC 3.7* 3.9* 3.4*   HGB 7.6* 8.2* 7.7*   HCT 24.0* 26.5* 24.1*   .6* 120.5* 119.9*   PLT 35* 40* 31*     Lab Results   Component Value Date     (L) 2022    K 4.1 2022     2022    CO2 22 2022    BUN 7 (L) 2022    CREATININE 0.4 (L) 2022    GLUCOSE 115 (H) 2022    CALCIUM 7.9 (L) 2022    PROT 5.1 (L) 2022    LABALBU 2.6 (L) 2022    BILITOT 5.3 (H) 2022    ALKPHOS 83 2022    AST 85 (H) 2022    ALT 35 2022    LABGLOM >60 09/06/2022    GFRAA >59 09/06/2022     Lab Results   Component Value Date    INR 1.74 (H) 08/29/2022    INR 1.76 (H) 08/28/2022    INR 1.97 (H) 08/27/2022    PROTIME 20.6 (H) 08/29/2022    PROTIME 20.8 (H) 08/28/2022    PROTIME 22.8 (H) 08/27/2022     CT ABDOMEN PELVIS W WO CONTRAST Additional Contrast? Radiologist Recommendation    Result Date: 9/1/2022  NO PRIOR REPORT AVAILABLE Exam: CT OF THE ABDOMEN/PELVIS WITHOUT AND WITH IV CONTRAST Clinical data: Cirrhosis. May have ascites. May have right inguinal hernia and fluid in his scrotum. Technique: Direct contiguous axial CT images were acquired through the abdomen and pelvis without and with intravenous contrastusing soft tissue and bone algorithms. Oral contrast was administered. Reformatted/MPR images were performed. Radiation dose: CTDIvol =53.89 mGy, DLP = 3603 mGy x cm. Limitations: None. Prior studies: CT and MRI of abdomen dated 08/26/2022. Findings: Lung bases: Clear Liver:Liver with cirrhotic appearance and a 0.8 cm right hepatic simple cyst again noted. No evidence of mass. No evidence of dilated ducts. Mild distention of the gallbladder with mild diffuse thickening of the gallbladder walls with moderate pericholecystic fat stranding. No evidence of stones by CT. Stable splenomegaly with perisplenic collaterals. Pancreas:  Grossly unremarkable. Adrenal glands: Grossly unremarkable size, contour and density. Kidneys: In anatomic position. Grossly unremarkablerenal size, contour and density. No renal or ureteral calculi. No evidence of a renal mass or cyst. Nonspecific bilateral perinephric fat stranding. Retroperitoneum: No enlarged retroperitoneal lymphadenopathy. Moderate atherosclerotic calcific plaques in the walls of the aorta and branches. The IVC appears unremarkable. Peritoneal cavity: No evidence of free air or ascites. Mild diffuse mesenteric and right colic fat stranding. Gastrointestinal tract: No obstruction. Appendix is unremarkable. Pelvis:Prostate gland is not enlarged. Urinary bladder is minimally distended. Streaky artifacts from left hip arthroplasty obscuring the surrounding structures. Fluid collection in the right inguinal canal: 4.5 x 5.5 x 7.4 cm in AP by TV by CC diameters. Partial visualization of fluid in the dependent portion of the scrotum. Osseous structures: No acute or destructive bony process identified. Degenerative changes of the spine. Diffuse bilateral subcutaneous fat stranding/anasarca in the abdominal and pelvic walls also including the upper thighs. 1. Liver with cirrhotic appearance and a 0.8 cm right hepatic simple cyst again noted 2. Mild distention of the gallbladder with mild diffuse thickening of the gallbladder walls with moderate pericholecystic fat stranding. Findings could be secondary to hypoalbuminemia or acalculous cholecystitis. 3. Stable splenomegaly (16.3 cm in CC diameter) with perisplenic collaterals. 4. Moderate atherosclerotic calcific plaques in the walls of the aorta and branches. 5.  No ascites. Mild diffuse mesenteric and right colic fat stranding. 6. Fluid collection in the right inguinal canal: 4.5 x 5.5 x 7.4 cm in AP by TV by CC diameters. Partial visualization of fluid in the dependent portion of the scrotum. 7. Diffuse bilateral subcutaneous fat stranding/anasarca in the abdominal and pelvic walls also including the upper thighs. Recommendation: Follow up as clinically indicated. All CT scans at this facility utilize dose modulation, iterative reconstruction, and/or weight based dosing when appropriate to reduce radiation dose to as low as reasonably achievable.   Electronically Signed by Maldonado Veliz MD at 01-Sep-2022 07:03:37 PM                ASSESSMENT/PLAN:    Thrombocytopenia-likely multifactorial to include direct effect of alcohol bone marrow and also secondary to splenomegaly  Likely secondary to end-stage liver disease/alcohol abuse  US showed signs of end-stage liver disease/splenomegaly (portal hypertension)      Status post 2 pheresis platelets given perioperatively    No bleeding    PLT 35,000 today 9/6/2022-stable    Left hip fracture  POD #11  Post-op care, as directed by ortho     End-stage liver disease/splenomegaly  Evidence of portal hypertension        Followed by GI  Receiving oral lasix/aldactone    Coagulopathy of liver disease/thrombocytopenia      -S/p Vitamin K 10 mg p.o. x3 doses  8/25/2022 - 8/27/2022  -Postop bruising, no overt bleeding     Serology 8/26/2022:  Fibrinogen 177  D-dimer 4.3    Macrocytic anemia    Recent Labs     09/06/22  0351 09/05/22  0532 09/04/22  0310   WBC 3.7* 3.9* 3.4*   HGB 7.6* 8.2* 7.7*   HCT 24.0* 26.5* 24.1*   .6* 120.5* 119.9*   PLT 35* 40* 31*      Likely secondary to end-stage liver disease  Hapto < 10    Serology 8/26/2022  Retic - 4.33% with absolute 0.1334  LDH - 255 ()    Minimal elevation LDH    B12 = 1446  Folate - 13    MARICARMEN - NEGATIVE    Serology 8/28/2022  Retic 5.35% with absolute 0.1188  LDH - 240 () - improved  Total bili 3.2-direct 1.7, indirect 1.5 -now more direct than indirect    Hgb 7.6 today 9/6/2022     Elevated iron saturation/iron levels  Serum Fe - 298  TIBC - 307  Fe sat - 97%  Ferritin - 220    Hereditary hemochromatosis gene 8/26/2022 NEGATIVE    Scrotal/lower extremity edema  S/p Lasix IV, changed to oral  Receiving Aldactone    CT abdomen/pelvis with/without contrast 9/1/2022:  1. Liver with cirrhotic appearance and a 0.8 cm right hepatic simple cyst again noted  2. Mild distention of the gallbladder with mild diffuse thickening of the gallbladder walls with moderate pericholecystic fat stranding. Findings could be secondary to hypoalbuminemia or acalculous cholecystitis. 3. Stable splenomegaly (16.3 cm in CC diameter) with perisplenic collaterals. 4. Moderate atherosclerotic calcific plaques in the walls of the aorta and branches. 5.  No ascites.   Mild diffuse mesenteric and right colic fat stranding. 6. Fluid collection in the right inguinal canal: 4.5 x 5.5 x 7.4 cm in AP by TV by CC diameters. Partial visualization of fluid in the dependent portion of the scrotum. 7. Diffuse bilateral subcutaneous fat stranding/anasarca in the abdominal and pelvic walls also including the upper thighs. Previously evaluated by Dr. Kelley Rosenbaum 8/26/2022, no surgical intervention at this time regarding gallbladder. If surgery required for GB, he would need tertiary care center due to severity of liver disease    COVID 19  As per attending  Supplemental O2 continues 2L NC, O2 sat 93%  BC x2 on 8/28/2022 - NGTD  Temp max 100.4 on 9/1/2022, continues afebrile /62 this morning    PLAN:  POD #11 left ALICIA  Avoid DVT chemical prophylaxis due to platelet counts below 50,000 (platelets 45,195)  Monitor CBC  Transfuse for Hgb < 7  Transfuse for plt < 10,000 or bleeding  Hepatic cirrhosis, followed by GI    Disposition-to go back to live in  with home health versus SNF/rehab  Outpatient follow-up with SHEILA Fitch 3 - 4 weeks  CBC weekly as outpatient-at SNF versus home health      Monet Bojorquez PA-C    09/06/22  6:46 AM  Physician's attestation/substantial contribution:  I, Dr Carmelo Sweeney, independently performed an evaluation on Breana Branham. I have reviewed relevant medical information/data to include but not limited to medication list, relevant appropriate labs and imaging when applicable. I reviewed other physician's notes, ancillary services and nurses assessment. I have reviewed the above documentation completed by the Nurse Practitioner or Physician Assistant. Please see my additional contributions to the history of present illness, physical examination, and assessment/medical decision-making that reflect my findings and impressions. I have seen and examined the patient and the key elements of all parts of the encounter have been performed by me.  I agree with the assessment

## 2022-09-06 NOTE — PROGRESS NOTES
Pharmacy Adjustment per [address] protocol    Yanelis Valdivia is a 75 y.o. male. Pharmacy has adjusted medications per [address] protocol. Recent Labs     09/05/22  0532 09/06/22  0351   BUN 10 7*       Recent Labs     09/05/22  0532 09/06/22  0351   CREATININE 0.5 0.4*       Estimated Creatinine Clearance: 201 mL/min (A) (based on SCr of 0.4 mg/dL (L)).     Height:   Ht Readings from Last 1 Encounters:   09/01/22 5' 9\" (1.753 m)     Weight:  Wt Readings from Last 1 Encounters:   09/06/22 209 lb 8 oz (95 kg)         Plan: Adjust the following medications based on [address] protocol:           Zosyn to 4500 mg IV once over 30 minutes followed by 3375 mg IV every 8 hours extended infusion over 240 minutes      Electronically signed by LIZ Aldridge [facility] on 9/6/2022 at 8:49 AM

## 2022-09-06 NOTE — PROGRESS NOTES
09/06/22 1600   Subjective   Subjective I have been up walking in the room by myself and going to the  [facility] by myself . The problem is this swelling  in both legs. (Bar at the foot of bed  raised to elevate  BL LE's . Elvis Hemp informed she will check on patient later he may want bar lowered.)   Vitals   O2 Device Nasal cannula   Plan   Plan Comment Patient in bed all needs in reach.   Patient plans to DC home when medically  stable   PT Plan of Care   Tuesday X             Electronically signed by Beka Moy PTA on 9/6/2022 at 4:40 PM

## 2022-09-07 ENCOUNTER — APPOINTMENT (OUTPATIENT)
Dept: CT IMAGING | Age: 67
DRG: 521 | End: 2022-09-07
Payer: MEDICARE

## 2022-09-07 VITALS
RESPIRATION RATE: 18 BRPM | SYSTOLIC BLOOD PRESSURE: 114 MMHG | BODY MASS INDEX: 31.03 KG/M2 | WEIGHT: 209.5 LBS | HEART RATE: 72 BPM | OXYGEN SATURATION: 88 % | TEMPERATURE: 97.3 F | HEIGHT: 69 IN | DIASTOLIC BLOOD PRESSURE: 61 MMHG

## 2022-09-07 LAB
ALBUMIN SERPL-MCNC: 3.1 G/DL (ref 3.5–5.2)
ALP BLD-CCNC: 103 U/L (ref 40–130)
ALT SERPL-CCNC: 38 U/L (ref 5–41)
ANION GAP SERPL CALCULATED.3IONS-SCNC: 14 MMOL/L (ref 7–19)
AST SERPL-CCNC: 81 U/L (ref 5–40)
BASOPHILS ABSOLUTE: 0 K/UL (ref 0–0.2)
BASOPHILS RELATIVE PERCENT: 0.2 % (ref 0–1)
BILIRUB SERPL-MCNC: 5.6 MG/DL (ref 0.2–1.2)
BUN BLDV-MCNC: 10 MG/DL (ref 8–23)
CALCIUM SERPL-MCNC: 8.9 MG/DL (ref 8.8–10.2)
CHLORIDE BLD-SCNC: 105 MMOL/L (ref 98–111)
CO2: 19 MMOL/L (ref 22–29)
CREAT SERPL-MCNC: 0.6 MG/DL (ref 0.5–1.2)
EOSINOPHILS ABSOLUTE: 0 K/UL (ref 0–0.6)
EOSINOPHILS RELATIVE PERCENT: 0.2 % (ref 0–5)
GFR AFRICAN AMERICAN: >59
GFR NON-AFRICAN AMERICAN: >60
GLUCOSE BLD-MCNC: 188 MG/DL (ref 74–109)
HCT VFR BLD CALC: 27.6 % (ref 42–52)
HEMOGLOBIN: 8.9 G/DL (ref 14–18)
IMMATURE GRANULOCYTES #: 0 K/UL
LYMPHOCYTES ABSOLUTE: 0.5 K/UL (ref 1.1–4.5)
LYMPHOCYTES RELATIVE PERCENT: 9.9 % (ref 20–40)
MCH RBC QN AUTO: 38.2 PG (ref 27–31)
MCHC RBC AUTO-ENTMCNC: 32.2 G/DL (ref 33–37)
MCV RBC AUTO: 118.5 FL (ref 80–94)
MONOCYTES ABSOLUTE: 0.3 K/UL (ref 0–0.9)
MONOCYTES RELATIVE PERCENT: 5.3 % (ref 0–10)
NEUTROPHILS ABSOLUTE: 4.4 K/UL (ref 1.5–7.5)
NEUTROPHILS RELATIVE PERCENT: 83.6 % (ref 50–65)
PDW BLD-RTO: 16.7 % (ref 11.5–14.5)
PLATELET # BLD: 47 K/UL (ref 130–400)
PMV BLD AUTO: 12.3 FL (ref 9.4–12.4)
POTASSIUM REFLEX MAGNESIUM: 4.2 MMOL/L (ref 3.5–5)
RBC # BLD: 2.33 M/UL (ref 4.7–6.1)
SODIUM BLD-SCNC: 138 MMOL/L (ref 136–145)
TOTAL PROTEIN: 6 G/DL (ref 6.6–8.7)
WBC # BLD: 5.2 K/UL (ref 4.8–10.8)

## 2022-09-07 PROCEDURE — 97530 THERAPEUTIC ACTIVITIES: CPT

## 2022-09-07 PROCEDURE — 74174 CTA ABD&PLVS W/CONTRAST: CPT

## 2022-09-07 PROCEDURE — 6360000004 HC RX CONTRAST MEDICATION: Performed by: NURSE PRACTITIONER

## 2022-09-07 PROCEDURE — 97535 SELF CARE MNGMENT TRAINING: CPT

## 2022-09-07 PROCEDURE — 85025 COMPLETE CBC W/AUTO DIFF WBC: CPT

## 2022-09-07 PROCEDURE — 2580000003 HC RX 258: Performed by: HOSPITALIST

## 2022-09-07 PROCEDURE — 80053 COMPREHEN METABOLIC PANEL: CPT

## 2022-09-07 PROCEDURE — 97116 GAIT TRAINING THERAPY: CPT

## 2022-09-07 PROCEDURE — 6370000000 HC RX 637 (ALT 250 FOR IP): Performed by: ORTHOPAEDIC SURGERY

## 2022-09-07 PROCEDURE — 94761 N-INVAS EAR/PLS OXIMETRY MLT: CPT

## 2022-09-07 PROCEDURE — 6370000000 HC RX 637 (ALT 250 FOR IP): Performed by: HOSPITALIST

## 2022-09-07 PROCEDURE — 36415 COLL VENOUS BLD VENIPUNCTURE: CPT

## 2022-09-07 PROCEDURE — 2580000003 HC RX 258: Performed by: ORTHOPAEDIC SURGERY

## 2022-09-07 PROCEDURE — 6360000002 HC RX W HCPCS: Performed by: HOSPITALIST

## 2022-09-07 PROCEDURE — 99232 SBSQ HOSP IP/OBS MODERATE 35: CPT | Performed by: SPECIALIST

## 2022-09-07 PROCEDURE — 6370000000 HC RX 637 (ALT 250 FOR IP): Performed by: NURSE PRACTITIONER

## 2022-09-07 PROCEDURE — 74174 CTA ABD&PLVS W/CONTRAST: CPT | Performed by: RADIOLOGY

## 2022-09-07 RX ORDER — OXYCODONE HYDROCHLORIDE 5 MG/1
5 TABLET ORAL EVERY 6 HOURS PRN
Qty: 12 TABLET | Refills: 0 | Status: SHIPPED | OUTPATIENT
Start: 2022-09-07 | End: 2022-09-10

## 2022-09-07 RX ORDER — METOPROLOL SUCCINATE 25 MG/1
25 TABLET, EXTENDED RELEASE ORAL DAILY
Qty: 30 TABLET | Refills: 0 | Status: SHIPPED | OUTPATIENT
Start: 2022-09-08

## 2022-09-07 RX ORDER — GUAIFENESIN 600 MG/1
600 TABLET, EXTENDED RELEASE ORAL 2 TIMES DAILY
Qty: 30 TABLET | Refills: 0 | Status: SHIPPED | OUTPATIENT
Start: 2022-09-07 | End: 2022-09-22

## 2022-09-07 RX ORDER — LEVOTHYROXINE SODIUM 0.05 MG/1
50 TABLET ORAL DAILY
Qty: 30 TABLET | Refills: 0 | Status: SHIPPED | OUTPATIENT
Start: 2022-09-08

## 2022-09-07 RX ORDER — FUROSEMIDE 20 MG/1
20 TABLET ORAL 2 TIMES DAILY
Qty: 60 TABLET | Refills: 0 | Status: SHIPPED | OUTPATIENT
Start: 2022-09-07

## 2022-09-07 RX ORDER — SPIRONOLACTONE 25 MG/1
25 TABLET ORAL DAILY
Qty: 30 TABLET | Refills: 0 | Status: SHIPPED | OUTPATIENT
Start: 2022-09-08

## 2022-09-07 RX ORDER — DEXAMETHASONE 6 MG/1
6 TABLET ORAL
Qty: 4 TABLET | Refills: 0
Start: 2022-09-07 | End: 2022-09-11

## 2022-09-07 RX ORDER — DOXYCYCLINE HYCLATE 100 MG/1
100 CAPSULE ORAL EVERY 12 HOURS SCHEDULED
Qty: 6 CAPSULE | Refills: 0 | Status: SHIPPED | OUTPATIENT
Start: 2022-09-07 | End: 2022-09-10

## 2022-09-07 RX ORDER — ERGOCALCIFEROL 1.25 MG/1
50000 CAPSULE ORAL WEEKLY
Qty: 5 CAPSULE | Refills: 0 | Status: SHIPPED | OUTPATIENT
Start: 2022-09-12

## 2022-09-07 RX ORDER — FAMOTIDINE 20 MG/1
20 TABLET, FILM COATED ORAL 2 TIMES DAILY
Qty: 60 TABLET | Refills: 0 | Status: SHIPPED | OUTPATIENT
Start: 2022-09-07

## 2022-09-07 RX ADMIN — SODIUM CHLORIDE, PRESERVATIVE FREE 10 ML: 5 INJECTION INTRAVENOUS at 09:38

## 2022-09-07 RX ADMIN — IOPAMIDOL 70 ML: 755 INJECTION, SOLUTION INTRAVENOUS at 14:54

## 2022-09-07 RX ADMIN — ALBUTEROL SULFATE 2 PUFF: 90 AEROSOL, METERED RESPIRATORY (INHALATION) at 14:15

## 2022-09-07 RX ADMIN — GUAIFENESIN 600 MG: 600 TABLET ORAL at 09:36

## 2022-09-07 RX ADMIN — ZINC SULFATE 220 MG (50 MG) CAPSULE 50 MG: CAPSULE at 09:36

## 2022-09-07 RX ADMIN — IPRATROPIUM BROMIDE 2 PUFF: 17 AEROSOL, METERED RESPIRATORY (INHALATION) at 09:38

## 2022-09-07 RX ADMIN — FAMOTIDINE 20 MG: 20 TABLET ORAL at 09:37

## 2022-09-07 RX ADMIN — METOPROLOL SUCCINATE 25 MG: 25 TABLET, EXTENDED RELEASE ORAL at 09:36

## 2022-09-07 RX ADMIN — ALBUTEROL SULFATE 2 PUFF: 90 AEROSOL, METERED RESPIRATORY (INHALATION) at 09:37

## 2022-09-07 RX ADMIN — IPRATROPIUM BROMIDE 2 PUFF: 17 AEROSOL, METERED RESPIRATORY (INHALATION) at 14:15

## 2022-09-07 RX ADMIN — PIPERACILLIN AND TAZOBACTAM 3375 MG: 3; .375 INJECTION, POWDER, FOR SOLUTION INTRAVENOUS at 06:10

## 2022-09-07 RX ADMIN — BUDESONIDE AND FORMOTEROL FUMARATE DIHYDRATE 2 PUFF: 160; 4.5 AEROSOL RESPIRATORY (INHALATION) at 09:37

## 2022-09-07 RX ADMIN — LEVOTHYROXINE SODIUM 50 MCG: 50 TABLET ORAL at 06:10

## 2022-09-07 RX ADMIN — PIPERACILLIN AND TAZOBACTAM 3375 MG: 3; .375 INJECTION, POWDER, FOR SOLUTION INTRAVENOUS at 15:46

## 2022-09-07 RX ADMIN — DEXAMETHASONE SODIUM PHOSPHATE 4 MG: 10 INJECTION, SOLUTION INTRAMUSCULAR; INTRAVENOUS at 15:41

## 2022-09-07 RX ADMIN — Medication 1000 UNITS: at 09:36

## 2022-09-07 RX ADMIN — OXYCODONE 15 MG: 5 TABLET ORAL at 09:35

## 2022-09-07 RX ADMIN — SPIRONOLACTONE 25 MG: 25 TABLET ORAL at 09:37

## 2022-09-07 RX ADMIN — DEXAMETHASONE SODIUM PHOSPHATE 4 MG: 10 INJECTION, SOLUTION INTRAMUSCULAR; INTRAVENOUS at 09:37

## 2022-09-07 RX ADMIN — DOXYCYCLINE HYCLATE 100 MG: 100 CAPSULE ORAL at 09:36

## 2022-09-07 RX ADMIN — FUROSEMIDE 20 MG: 20 TABLET ORAL at 09:37

## 2022-09-07 RX ADMIN — CIPROFLOXACIN HYDROCHLORIDE 500 MG: 500 TABLET, FILM COATED ORAL at 09:35

## 2022-09-07 RX ADMIN — OXYCODONE HYDROCHLORIDE AND ACETAMINOPHEN 500 MG: 500 TABLET ORAL at 09:36

## 2022-09-07 RX ADMIN — DEXAMETHASONE SODIUM PHOSPHATE 4 MG: 10 INJECTION, SOLUTION INTRAMUSCULAR; INTRAVENOUS at 03:04

## 2022-09-07 ASSESSMENT — PAIN DESCRIPTION - FREQUENCY
FREQUENCY: CONTINUOUS

## 2022-09-07 ASSESSMENT — PAIN - FUNCTIONAL ASSESSMENT
PAIN_FUNCTIONAL_ASSESSMENT: PREVENTS OR INTERFERES SOME ACTIVE ACTIVITIES AND ADLS

## 2022-09-07 ASSESSMENT — PAIN DESCRIPTION - PAIN TYPE
TYPE: ACUTE PAIN

## 2022-09-07 ASSESSMENT — PAIN DESCRIPTION - LOCATION
LOCATION: HIP

## 2022-09-07 ASSESSMENT — PAIN DESCRIPTION - DESCRIPTORS
DESCRIPTORS: ACHING

## 2022-09-07 ASSESSMENT — PAIN SCALES - WONG BAKER
WONGBAKER_NUMERICALRESPONSE: 4
WONGBAKER_NUMERICALRESPONSE: 4

## 2022-09-07 ASSESSMENT — PAIN DESCRIPTION - ORIENTATION
ORIENTATION: LEFT

## 2022-09-07 ASSESSMENT — PAIN SCALES - GENERAL: PAINLEVEL_OUTOF10: 8

## 2022-09-07 ASSESSMENT — PAIN DESCRIPTION - ONSET: ONSET: ON-GOING

## 2022-09-07 NOTE — PROGRESS NOTES
Pt opted not to wait on CT results pending and was ready to get home. Spoke with NP and okay to d/c. Discussed new medications, follow up appts and total hip care with pt and wife. Both verbalized understanding. Pt discharged home via private vehicle.

## 2022-09-07 NOTE — PROGRESS NOTES
Progress Note  Date:9/7/2022       Room:0435/435-02  Patient Lan Fields     Date of Birth:[birth year]     Age:67 y.o. Subjective      I was informed that patient had rectal bleeding. Patient's nurse, patient wife and patient's sister and patient's room. Patient states that he had fresh blood from the rectum to see days ago. He still has fresh blood but is decreasing. Denies abdominal pain. No nausea, vomiting, hematemesis or melena. His hemoglobin has not fallen and has remained stable. He is awaiting CTA of the abdomen. 9/7/2022: Sodium 138, potassium 4.2. Alkaline phosphatase 103, bilirubin 5.6 and coming up. ALT 38 and normal.  AST 81 and has come down. Smooth muscle antibody was 1: 640 which is elevated. F-actin IgG 58 and elevated. Acute hepatitis A, B, C serology negative. SUSAN negative. 8/31/2022: CT abdomen and pelvis with and without IV contrast: Prior study 8/26/2022. Liver cirrhotic.  8 mm simple hepatic cyst.  No mass. No dilated ducts. Mild distention of the gallbladder with mild diffuse thickening of the gallbladder walls with moderate pericholecystic fat stranding. No evidence of stones by CAT scan. Stable splenomegaly with perisplenic collaterals. Pancreas unremarkable. Appendix unremarkable. Impression by radiologist:    With cirrhotic appearance. 8 mm right hepatic simple cyst.  Mild distention of gallbladder with mild diffuse thickening of the gallbladder walls with moderate pericholecystic fat stranding. Finding could be secondary to hyperlipidemia or a calculus cholecystitis. Stable splenomegaly. No ascites. Mild mesenteric and right colic fat stranding. Fluid collection in the right inguinal canal: 4.5 x 5.5 x 7.4 cm in. Partial visualization of fluid in the dependent portion of the scrotum. Diffuse bilateral subcutaneous fat stranding/anasarca of the abdominal wall and pelvic was including upper thighs.       Objective         Vitals Last 24 Hours:  TEMPERATURE:  Temp  Av.8 °F (36.6 °C)  Min: 97.3 °F (36.3 °C)  Max: 98.3 °F (36.8 °C)  RESPIRATIONS RANGE: Resp  Av.4  Min: 16  Max: 20  PULSE OXIMETRY RANGE: SpO2  Av.6 %  Min: 92 %  Max: 95 %  PULSE RANGE: Pulse  Av.2  Min: 1  Max: 83  BLOOD PRESSURE RANGE: Systolic (35LIG), FIC:242 , Min:109 , HIZ:872   ; Diastolic (04FIE), ZVV:97, Min:63, Max:66    I/O (24 Hr): Intake/Output Summary (Last 24 hours) at 2022 1226  Last data filed at 2022 0918  Gross per 24 hour   Intake 930 ml   Output --   Net 930 ml       Objective:  Vital signs: (most recent): Blood pressure 135/66, pulse 81, temperature 97.3 °F (36.3 °C), temperature source Temporal, resp. rate 18, height 5' 9\" (1.753 m), weight 209 lb 8 oz (95 kg), SpO2 95 %. alert and oriented. In no acute physical distress. HEENT normocephalic. Atraumatic. Neck supple    Abdomen is distended, soft, non tender. Scrotum is swollen. He has 1+ bilateral lower leg edema. Skin no jaundice. Neuro alert and oriented. Labs/Imaging/Diagnostics    Labs:  CBC:  Recent Labs     22  0532 22  03522  033   WBC 3.9* 3.7* 5.2   RBC 2.20* 2.04* 2.33*   HGB 8.2* 7.6* 8.9*   HCT 26.5* 24.0* 27.6*   .5* 117.6* 118.5*   RDW 16.6* 16.4* 16.7*   PLT 40* 35* 47*       CHEMISTRIES:  Recent Labs     22  0532 22  0351 22  0332   * 134* 138   K 4.0 4.1 4.2   CL 99 104 105   CO2 23 22 19*   BUN 10 7* 10   CREATININE 0.5 0.4* 0.6   GLUCOSE 110* 115* 188*   MG 1.6  --   --        PT/INR:  No results for input(s): PROTIME, INR in the last 72 hours. APTT:No results for input(s): APTT in the last 72 hours.   LIVER PROFILE:  Recent Labs     22  0532 22  0351 22  0332   * 85* 81*   ALT 39 35 38   BILITOT 4.9* 5.3* 5.6*   ALKPHOS 86 83 103         Imaging Last 24 Hours:  ECHO Complete 2D W Doppler W Color    Result Date: 2022  Transthoracic Echocardiography Report (TTE)  Demographics   Patient Name   Sabine Mcgrath   Date of Study            08/25/2022   MRN            [id number]        Gender                   Male   Date of Birth  1955    Room Number              [facility]   Age            75 year(s)   Height:        69 inches     Referring Physician      Haleigh Amezcua   Weight:        188 pounds    Sonographer              Deedee Gilmore [facility]   BSA:           2.01 m^2      Interpreting Physician   Irais Hadley MD   BMI:           27.76 kg/m^2  Procedure Type of Study   TTE procedure:ECHO NO CONTRAST WITH DOP/COLR. Study Location: Echo Lab Technical Quality: Adequate visualization Patient Status: Inpatient HR: 60 bpm Indications:Atrial fibrillation. Conclusions   Summary  Left ventricle had normal chamber size and thickness  Preserved systolic function with estimated ejection fraction of 74%  No regional wall motion abnormality  Normal diastolic function  No significant valvular lesion seen  Mild mitral regurgitation  Trivial tricuspid insufficiency with estimated right ventricular systolic  pressure of 38 mm which is mildly elevated   Signature   ----------------------------------------------------------------  Electronically signed by Irais Hadley MD(Interpreting physician)  on 08/25/2022 07:01 PM  ----------------------------------------------------------------   Findings   Mitral Valve  Mild mitral regurgitation   Aortic Valve  Aortic sclerosis   Tricuspid Valve  Trivial tricuspid insufficiency with estimated right ventricular systolic  pressure of 38 mm which is mildly elevated   Pulmonic Valve  The pulmonic valve was not well visualized . Left Atrium  Mildly dilated left atrium   Left Ventricle  See conclusion   Right Atrium  Normal right atrial dimension with no evidence of thrombus or mass noted. Right Ventricle  Normal right ventricular size with preserved RV function.    Miscellaneous  Normal IVC  2D Measurements and Calculations(cm)   LVIDd: 4.6 cm LVIDs: 3.43 cm  IVSd: 1.01 cm  LVPWd: 1.03 cm                       AO Root Dimension: 3.3 cm  % Ejection Fraction: 74 %            LA Dimension: 4.3 cm  LA Volume: 67.1 ml                   LA Area: 22.9 cm^2  LA Volume Index: 33 ml/m^2           LV Systolic dimension: 5.20VG  LV dimension: 4.6 cm                 LV PW diastolic: 3.16PM                                       LVOT diameter: 2.1 cm  LVEDV: 116 ml                        RA Systolic pressure: 3mmHg  LVESV:25 ml                          LVEDVI: 58 ml/m^2  Cardic Output (CO): 4.65l/min        LVESVI: 12 ml/m^2  Doppler Measurements and Calculations   AV Peak Velocity:172 cm/s              MV Peak E-Wave: 94.7 cm/s  AV Mean Velocity:121 cm/s              MV Peak A-Wave: 67.3 cm/s  AV Peak Gradient: 11.83 mmHg           MV E/A Ratio: 1.41 %  AV Mean Gradient: 6 mmHg               MV Mean velocity: NaNcm/s  AV Area (Continuity):1.99 cm^2         MV Peak Gradient: 3.59 mmHg  AV VTI:39 cm/s                         MV P1/2t: 84 msec  TR Velocity:288 cm/s                   MVA by PHT2.62 cm^2  TR Gradient:33.18 mmHg  Estimated RAP:3 mmHg  RVSP:36 mmHg   MV E' septal velocity: 9.25cm/s  MV E' lateral velocity:13.8 cm/s      US LIVER    Result Date: 8/25/2022  NO PRIOR REPORT AVAILABLE Exam: ULTRASOUND OF THE ABDOMEN, LIMITED Clinical data:Hyperbilirubinemia. Technique: Real-time grayscale imaging of the abdomen was performed. Images supplemented with color Doppler technique. Prior studies: No prior studies submitted. Findings: The liver appears echogenic and heterogeneous, coarse with scalloped edges. A cyst is visualized measuring 0.9 x 1.1 x 1.1 cm. No intrahepatic biliary distention. The gallbladder appears distended measuring 13.3 cm with a wall thickness of0.45 cm. Mild sludge is noted. No gallstones. The common bile duct measures 0.73cm. Pericholecystic fluid visualized. The right kidney [id number] x 5.5 x 4.6 cm.  Normal echogenicity and cortical thickness are preserved. No evidence of renal masses. No evidence of renal calculi. There is no evidence of hydronephrosis. The pancreas is unremarkable to the extent visualized. Unremarkable spleen ktqsrymto22.5 x 6.6 x 15.7 cm. Imaged portion of the aorta demonstrates no acute pathology. Visualized portion of the inferior vena cava is unremarkable. No ascites is evident. Patient unable to lie in decubitus position due to fractured hip. Exam done in supine view only. Finding suggestive for hydropic cholecystitis without a obvious stone as described. CBD mildly dilated. MRCP is recommended. Possible chronic liver parenchymal disease, cirrhosis? .  Splenomegaly. Recommendation: Follow up as clinically indicated. Electronically Signed by Sharlene Zamora MD at 25-Aug-2022 06:37:10 PM             XR CHEST PORTABLE    Result Date: 8/25/2022  NO PRIOR REPORT AVAILABLE Exam: X-RAY OF [facility] Clinical data:Hip fracture. Technique:Single view of the chest. Prior studies: No prior studies submitted. Findings: The lungs are grossly clear; noevidence of acute infiltrate or pleural effusion. Cardiac silhouette is within normal limits. No acute osseous abnormality is detected. Normal chest Recommendation: Follow up as clinically indicated. Electronically Signed by Debra Barillas MD at 25-Aug-2022 01:18:21 PM             XR HIP 2-3 VW W PELVIS LEFT    Result Date: 8/25/2022  NO PRIOR REPORT AVAILABLE Exam: X-RAYS OF THE LEFT HIP Clinical Indication: Fall, pain. Technique: Two views of the left hip and single view of the pelvis. Prior studies: No prior studies are submitted. Findings: Multiple views of the left hip demonstrateno evidence offracture. Bone mineral density is preserved. No evidence of superior pubic ramus fracture. Acute, impacted transverse fracture at the base of the femoral head with continued good alignment and joint spaces intact. Small lateral acetabular spur noted.     Acute, impacted transverse fracture through base of the femoral  head , continued good alignment and normal joint space. Recommendation: Followup as clinically indicated. Electronically Signed by Christiana Garcia MD at 25-Aug-2022 01:04:57 PM             8/26/2022: MRI abdomen:    Cirrhotic liver. No liver mass. Splenomegaly with varices. Distended gallbladder. Tiny simple cyst right hepatic lobe. Tiny right renal simple cyst.    8/26/2022: CT abdomen and pelvis with and without contrast:    Cirrhotic liver. Diffuse low-attenuation pattern which may be in association with chronic liver disease versus diffuse metastatic disease. Splenomegaly. Mild splenic varices. Mild sigmoid diverticulosis. Gallbladder distention. Left renal calculus. Soft tissue right inguinal hernia suggestive of undescended testicle. Assessment//Plan           Hospital Problems             Last Modified POA    * (Principal) Closed left hip fracture, initial encounter ([address] 8/26/2022 Yes    New onset a-fib ([address] 8/25/2022 Yes    Liver disease 8/25/2022 Yes    Hyperbilirubinemia 8/25/2022 Yes    Abnormal LFTs 8/25/2022 Yes    Thrombocytopenia ([address] 8/25/2022 Yes    Leukopenia 8/25/2022 Yes    Hepatic cirrhosis ([address] 7/68/4501 Yes    Umbilical hernia without obstruction and without gangrene 8/27/2022 Yes    Pancytopenia ([address] 8/27/2022 Yes    Closed fracture of left hip ([address] 8/27/2022 Yes   Assessment & Plan      Rectal bleeding. Hemoglobin 8.9. Yesterday hemoglobin was 7.6. MCV is 118. Platelet count of 50,638. Source of rectal bleeding could be hemorrhoids. Other etiologies need to be ruled out. No ascites by CAT scan. The bilateral lower leg edema. Fluid collection in the right inguinal canal: 4.5 x 5.5 x 7.4 cm in AP. Partial visualization of fluid in the scrotum    Pancytopenia could be due to hypersplenism. Macrocytic anemia could be due to alcohol abuse. Need to rule out folic acid deficiency and vitamin B 12 deficiency.     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 Serum total iron is elevated. Iron saturation 97%. Cannot rule out hemochromatosis. SUSAN is negative. Smooth muscle antibody is elevated. Patient does not have acute autoimmune hepatitis at this time. Patient is not a candidate for liver transplant at this time because of active drinking. Patient not candidate for liver biopsy because of severe thrombocytopenia. Also there is no radiologist available to do liver biopsy even if he wanted liver biopsy. There is no liver surgeon. There is no IR radiologist available in case patient bled from liver biopsy. Hepatitis A, B, C serology negative. SUSAN is negative. F-actin IgG is elevated to 58. Mitochondrial antibody is 3.2 and not elevated. Mild splenic varices near the spleen. No hepatoma by ultrasound, CT scan or MRI. No ascites by imaging. No ascites on ultrasound. 8/26/2022: Meld sodium score 20.    8/27/2022: Meld sodium 21.    8/31/2022: Meld sodium score 21.    9/7/2022: Meld sodium 20.    8/26/2022: Maddrey's discriminant function 27.9. Hepatic cysts 0.9 x 1.1 x 1.1 cm ultrasound. Distended gallbladder wall thickness millimeter. Sludge. No gallstones. CBD measures 7.3 mm. Pericholecystic fluid visualized. Radiologist calling hydropic cholecystitis. CBD mildly dilated. Seen by surgery. No plan for surgery. 8/29/2022: COVID test is positive. It was negative on 8/25/2022. Plan:    Discussed EGD to rule out esophageal varices. Discussed colonoscopy because of rectal bleeding. Patient declines. He understands the risk of missed esophageal varices, gastric ulcer, duodenal ulcer, colon polyps, colon cancer, IBD and other lesions. Advised him to schedule follow-up with PCP and GI doctor on health plan after discharge.     Ultrasound the liver every 6 months to screen for hepatoma    Electronically signed by Saul Swain MD on 8/31/22     [employer] possibilities    To whom it may concern. 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 Therefore,  I do not have an office practice. I will not be able to follow-up on any patient as an outpatient basis. I will not be able to follow-up on the results of pending labs, pathology reports,  imaging studies. I will not be able to or do any outpatient procedures. I will not be able to provide continuity of care of any kind to any patient . I  will not be able to keep in touch with any patient by any means such as phone, text or by e-mail because of the nature of my responsibilities as a locum tenens physician. When the patient is discharged, my patient physician relationship is over. The patient will need to follow-up with other physicians in the hospital when I am not on-call. After discharge, patient will need to follow-up with their primary care doctor, gastroenterologist or liver doctor for continuity of care, check on pending labs, check on pending pathology and check on results of the pending imaging studies-such as x-rays, ultrasound, CAT scan, MRI, HIDA scan, gastric emptying scan or any other studies. For ER physicians, hospitalists and other physicians involved in patient care: If a patient needs follow-up GI care, please schedule follow-up with patient's PCP, gastroenterologist or hepatologist on their health plan. Thank you.     Gabo Beckwith MD

## 2022-09-07 NOTE — PROGRESS NOTES
Occupational Therapy     09/07/22 1200   Subjective   Subjective Pt in bed with wife present. pt agreeable to participate. Pain Assessment   Pain Assessment Kapadia-Baker FACES   Kapadia-Baker Pain Rating 4   Pain Location Hip   Pain Orientation Left   Pain Descriptors Aching   Functional Pain Assessment Prevents or interferes some active activities and ADLs   Pain Type Acute pain   Pain Frequency Continuous   Non-Pharmaceutical Pain Intervention(s) Ambulation/Increased Activity;Repositioned; Rest   Response to Pain Intervention Patient satisfied   Vitals   O2 Device None (Room air)  (Home O2 eval today by Respiratory)   Orientation   Overall Orientation Status WFL   Bed Mobility Training   Bed Mobility Training Yes   Overall Level of Assistance Stand-by assistance   Transfer Training   Transfer Training Yes   Overall Level of Assistance Contact-guard assistance   Interventions Verbal cues; Tactile cues   Sit to Stand Contact-guard assistance   Stand to Sit Contact-guard assistance   Balance   Sitting Intact   Standing With support  (RW)   ADL   Feeding Independent   Grooming Independent   UE Bathing Supervision   LE Bathing Minimal assistance; Adaptive equipment   LE Bathing Skilled Clinical Factors seated   UE Dressing Supervision   LE Dressing Minimal assistance; Adaptive equipment   LE Dressing Skilled Clinical Factors Given AE for use at home. Wife to also assist at home. Toileting Stand by assistance   Assessment   Assessment Tx focused on functional mobility at RW level with CGA/ SBA. Pt instructed on LBD and given AE for assistance at home. Pt trained on use.    Activity Tolerance Patient tolerated treatment well   Discharge Recommendations Patient would benefit from continued therapy after discharge   Plan   Times per Week 3-5   Times per Day Daily   OT Plan of Care   Wednesday X   Electronically signed by KATHY Eugene on 9/7/2022 at 12:29 PM

## 2022-09-07 NOTE — PROGRESS NOTES
Physical Therapy  Name: Kathi Shaikh  MRN:  [id number]  Date of service:  9/7/2022 09/07/22 1150   Restrictions/Precautions   Restrictions/Precautions Fall Risk;Weight Bearing   Lower Extremity Weight Bearing Restrictions   Left Lower Extremity Weight Bearing Weight Bearing As Tolerated   Position Activity Restriction   Other position/activity restrictions ant hip prec.,  No SLR. COVID droplet plus   Subjective   Subjective Pt up in room agreed to amb. Pain Assessment   Pain Assessment Kapadia-Baker FACES   Kapadia-Baker Pain Rating 4   Pain Location Hip   Pain Orientation Left   Pain Descriptors Aching   Functional Pain Assessment Prevents or interferes some active activities and ADLs   Pain Type Acute pain   Pain Frequency Continuous   Non-Pharmaceutical Pain Intervention(s) Ambulation/Increased Activity;Repositioned; Rest   Response to Pain Intervention Patient satisfied   Transfers   Stand to sit Contact guard assistance   Ambulation   WB Status FWBAT LLE   Ambulation   Surface level tile   Device Rolling Walker   Assistance Contact guard assistance   Quality of Gait no LOB   Gait Deviations Slow Traci;Decreased step height   Distance 79'   Comments not on O2 anymore   Patient Goals    Patient goals  get stronger, go home   Short Term Goals   Time Frame for Short term goals 2 wks   Short term goal 1 supine to sit indep   Short term goal 2 sit to stand indep   Short term goal 3 amb. 100' with RW SBA   Short term goal 4 bed to chair SBA   Short term goal 5 up/down 2-3 stairs SBA   Conditions Requiring Skilled Therapeutic Intervention   Body Structures, Functions, Activity Limitations Requiring Skilled Therapeutic Intervention Decreased functional mobility ; Decreased ROM; Decreased endurance;Decreased cognition;Decreased safe awareness;Decreased strength;Decreased balance; Increased pain   Assessment Pt able to amb increased distance and steady overall. Reports less pain overall now.  Able to maintain standing balance without RW but used RW for gait. Sitting EOB after treatment. Activity Tolerance   Activity Tolerance Patient tolerated treatment well   PT Plan of Care   Wednesday X   Safety Devices   Type of Devices Call light within reach; Left in bed         Electronically signed by Anh Schulte PTA on 9/7/2022 at 12:01 PM

## 2022-09-07 NOTE — PLAN OF CARE
Problem: Discharge Planning  Goal: Discharge to home or other facility with appropriate resources  9/7/2022 1452 by Sharon Winslow RN  Outcome: Completed  9/7/2022 1122 by Sharon Winslow RN  Outcome: Progressing     Problem: Skin/Tissue Integrity  Goal: Absence of new skin breakdown  Description: 1. Monitor for areas of redness and/or skin breakdown  2. Assess vascular access sites hourly  3. Every 4-6 hours minimum:  Change oxygen saturation probe site  4. Every 4-6 hours:  If on nasal continuous positive airway pressure, respiratory therapy assess nares and determine need for appliance change or resting period.   9/7/2022 1452 by Sharon Winslow RN  Outcome: Completed  9/7/2022 1122 by Sharon Winslow RN  Outcome: Progressing     Problem: ABCDS Injury Assessment  Goal: Absence of physical injury  9/7/2022 1452 by Sharon Winslow RN  Outcome: Completed  9/7/2022 1122 by Sharon Winslow RN  Outcome: Progressing     Problem: Safety - Adult  Goal: Free from fall injury  9/7/2022 1452 by Sharon Winslow RN  Outcome: Completed  9/7/2022 1122 by Sharon Winslow RN  Outcome: Progressing     Problem: Pain  Goal: Verbalizes/displays adequate comfort level or baseline comfort level  9/7/2022 1452 by Sharon Winslow RN  Outcome: Completed  9/7/2022 1122 by Sharon Winslow RN  Outcome: Progressing

## 2022-09-07 NOTE — PROGRESS NOTES
Pulse ox on 1.5 lpm at rest 95%  Pulse ox on room air at rest 93%  Pulse ox on room air while walking in room 91%

## 2022-09-07 NOTE — DISCHARGE SUMMARY
[facility] Hospitalists    Discharge Summary      Deann Umaña  :  1955  MRN:  [id number]    Admit date:  2022  Discharge date:   2022    Discharging Physician:  Dr. Hans Santos Directive: Full Code    Consults: cardiology, GI, hematology/oncology, general surgery, and orthopedic surgery    Primary Care Physician:  Jalen Booker MD    Discharge Diagnoses:  Principal Problem:    Closed left hip fracture, initial encounter [facility])  Active Problems:    New onset a-fib ([facility] [address]    Liver disease    Hyperbilirubinemia    Abnormal LFTs    Thrombocytopenia (HCC)    Leukopenia    Hepatic cirrhosis ([facility] [address]    Umbilical hernia without obstruction and without gangrene    Pancytopenia ([facility] [address]    Closed fracture of left hip ([facility] [address]  Resolved Problems:    * No resolved hospital problems. *      Portions of this note have been copied forward, however, changed to reflect the most current clinical status of this patient. Hospital Course: The patient is a 75 y.o. male with past medical history of liver disease who presented to [facility] ED complaining of left hip pain. Mr. Faviola Srinivasan reported he was knocked down by a big dog and landed on his left hip. Since then he has not been able to ambulate. Denied any other symptoms. Denied any medical conditions, other than liver disease that has not bothered him. Stated he does not take any prescription medications at home. Admitted to smoking 1 ppd. Workup in ED revealed new onset rate controlled Afib, glucose 139 otherwise unremarkable chemistry, AST 75, bilirubin 8.0, WBC 3.8, platelet 30, INR 5.40, Cxray unremarkable. Xray of hip indicated acute, impacted transverse fracture through base of the femoral head. Patient was admitted to hospital medicine for left hip fracture and new onset Afib with orthopedic surgery and cardiology consultations. Cardiology cleared patient for surgery as long as ECHO was not abnormal, recommended low dose beta blocker to suppress the PACs.  GI recommended CT of abdomen to rule out hepatoma, or portal vein thrombosis. Hematology recommended vitamin K 10 mg daily x3 days, avoid DVT prophylaxis, transfuse 2 units platelets before/during surgical procedure. Orthopedic surgery recommended surgical intervention. Ultrasound of liver indicated hydropic cholecystitis without any obvious stone, CBD mildly dilated, MRCP is recommended, possible chronic liver parenchymal disease, cirrhosis, splenomegaly. Patient was evaluated by general surgery with no plans for intervention of gallbladder. D-dimer 4.3, CTA pulmonary pending. Echo indicated normal LV size with preserved LV systolic function with EF of 64%, normal diastolic function, mild mitral regurgitation, trivial tricuspid insufficiency with estimated RVSP of 38 mm which is mildly elevated. Underwent Left total hip arthroplasty on 8/26/2022. S/p 2 units of Platelets and 1 unit of FFP. MRCP indicated mild distended gallbladder, no significant interpretability dilatation, borderline CBD. CT abdomen indicated cirrhotic liver with abnormal diffuse low attenuation pattern, splenomegaly, mild splenic varices, gallbladder distention, and soft tissue in right inguinal canal is suspicious for undescended testicle. GI suggested EGD at a later time to screen for esophageal varices, and US of liver every 6 months to screen for hepatoma. GI obtained repeat CT abdomen and pelvis which indicated diffuse bilateral anasarca in the abdominal wall and pelvic walls, and fluid collection in the right inguinal cavity and into the scrotum. Patient scrotal edema has greatly improved with diuresis and patient is voiding without difficulty. GI had recommended urology consult however patient is voiding without difficulty. We will continue with diuresis. LLE venous study indicated no evidence of DVT, SVT or Reflux. Fever again overnight, temp max 100.4 on 9/1. Repeat Covid test as patient's wife, covid +.   Now requiring 2 L of oxygen via nasal cannula for hypoxia. Repeat chest x-ray indicated a left infiltrate. Procalcitonin 0.07. Patient remains afebrile. Social work has been assisting in discharge planning. Nursing reported patient having blood clots in bowel movement this afternoon. GI consultation placed for evaluation. GI recommended EGD and colonoscopy once platelets above 50. Patient declined at this time. Stated he wants to be discharged home as soon as possible. He is being discharged on doxycycline, Pepcid, Lasix, Mucinex, Synthroid, Toprol-XL, spironolactone, and vitamin D. Patient was advised to follow-up with PCP, GI, cardiology, and orthopedic surgery as recommended. Patient is currently in stable condition to be discharged home with home health. Significant Diagnostic Studies:     ECHO Complete 2D W Doppler W Color  Result Date: 8/25/2022    Summary  Left ventricle had normal chamber size and thickness  Preserved systolic function with estimated ejection fraction of 74%  No regional wall motion abnormality  Normal diastolic function  No significant valvular lesion seen  Mild mitral regurgitation  Trivial tricuspid insufficiency with estimated right ventricular systolic  pressure of 38 mm which is mildly elevated   Signature   ----------------------------------------------------------------  Electronically signed by Irais Hadley MD(Interpreting physician)  on 08/25/2022 07:01 PM        CT ABDOMEN PELVIS W WO CONTRAST Additional Contrast? Radiologist Recommendation  Result Date: 8/26/2022    1. Cirrhotic liver with abnormal diffuse low attenuation pattern which may have association with chronic liver disease versus diffuse metastatic disease. 2. Splenomegaly. Mild splenic varices. 3. Mild sigmoid diverticulosis. 4. Gallbladder distention. 5. Punctate nonobstructive left renal calculus. 6. Soft tissue in right inguinal canal is suspicious for undescended testicle.  Recommendation: SCROTAL US All CT scans at this facility utilize dose modulation, iterative reconstruction, and/or weight based dosing when appropriate to reduce radiation dose to as low as reasonably achievable. Electronically Signed by Robles Winters MD at 26-Aug-2022 07:49:27 PM               US LIVER  Result Date: 8/25/2022    Finding suggestive for hydropic cholecystitis without a obvious stone as described. CBD mildly dilated. MRCP is recommended. Possible chronic liver parenchymal disease, cirrhosis? .  Splenomegaly. Recommendation: Follow up as clinically indicated. Electronically Signed by Tariq Colorado MD at 25-Aug-2022 06:37:10 PM               XR CHEST PORTABLE  Result Date: 8/25/2022    Normal chest Recommendation: Follow up as clinically indicated. Electronically Signed by Stephanie Griffin MD at 25-Aug-2022 01:18:21 PM               MRI ABDOMEN W WO CONTRAST MRCP  Result Date: 8/26/2022    1. Cirrhotic liver. No definite liver mass 2. Spenomegaly with varices. 3. Distended gallbladder. 4. Tiny simple cyst in right hepatic lobe. 5. Tiny right renal simple cysts. Recommendation: Follow up as clinically indicated. EXAM: MRCP CLINICAL DATA:Hydropic gallbladder, slightly dilated CBD. Radiologist recommending MRCP. TECHNIQUE: Heavy T2-weighted images, along with rotating thick and thin slab images of the biliary system were obtained for MRCP. Reformats were constructed. PRIOR STUDIES: CT scan of abdomen and pelvis done same day. Ultrasound of the liver dated 08/25/22. FINDINGS: MRCP: No evidence of intrahepatic biliary ductal dilation is present. The gallbladder is mildly distended. The cystic duct and right and left hepatic ducts are unremarkable. The common bile duct is upper limits of normal (6 mm). No evidence of common bile duct obstruction. Normal pancreatic duct. No biliary strictures are present. No evidence of choledocholithiasis. IMPRESSION: 1. Mild distended gallbladder. 2. No significant interpretability dilatation.  3. Borderline common bile duct. RECOMMENDATION: Follow up as clinically indicated. Electronically Signed by Lana Krause MD at 26-Aug-2022 10:02:15 PM               CTA PULMONARY W CONTRAST  Result Date: 8/26/2022    1. No evidence of pulmonary embolism. 2. Centriacinar emphysematous changes in bilateral upper lobes and superior segments of bilateral lower lobes. Paraseptal emphysematous changes in medial basal segment of right lower lobe. 3. Mild atelectatic changes with surrounding mild ground-glass opacities in dependent portions of posterobasal segments of bilateral lower lobe. 4. Atherosclerotic wall calcification involving arch of aorta and coronaries. 5. Mild degenerative changes in the spine. 6. Cirrhosis of liver with portal hypertension. 7. Small sliding type of hiatus hernia Recommendation: Follow up as clinically indicated. All CT scans at this facility utilize dose modulation, iterative reconstruction, and/or weight based dosing when appropriate to reduce radiation dose to as low as reasonably achievable. Electronically Signed by Lana Krause MD at 26-Aug-2022 10:05:55 PM               XR HIP 2-3 VW W PELVIS LEFT  Result Date: 8/27/2022    Impression: Fluoroscopic guidance was provided to the Department of Orthopedic Surgery by the Department of Radiology. Recommendation: Follow up as clinical indicated. Electronically Signed by Smith Porras MD at 27-Aug-2022 02:13:19 AM               XR HIP 2-3 VW W PELVIS LEFT  Result Date: 8/26/2022    Intact total left hip arthroplasty. Recommendation: Followup as clinically indicated. Electronically Signed by Lana Krause MD at 26-Aug-2022 09:29:01 PM               XR HIP 2-3 VW W PELVIS LEFT  Result Date: 8/25/2022    Acute, impacted transverse fracture through base of the femoral  head , continued good alignment and normal joint space. Recommendation: Followup as clinically indicated.  Electronically Signed by Shantal Mak MD at 25-Aug-2022 mouth daily  Start taking on: September 8, 2022     Vitamin D (Ergocalciferol) 82417 units Caps  Take 50,000 Units by mouth once a week  Start taking on: September 12, 2022               Where to Get Your Medications        These medications were sent to [facility], [address] [place] [address] E  [address], [address]       Phone: [phone]   doxycycline hyclate 100 MG capsule  famotidine 20 MG tablet  furosemide 20 MG tablet  guaiFENesin 600 MG extended release tablet  levothyroxine 50 MCG tablet  metoprolol succinate 25 MG extended release tablet  spironolactone 25 MG tablet  Vitamin D (Ergocalciferol) 23578 units Caps            Discharge Instructions: Follow up with Marla Leslie MD in 7 days. Follow-up with GI, cardiology, and orthopedic surgery as recommended. Take medications as directed. Resume activity as tolerated. Diet: ADULT DIET; Regular; Safety Tray; Safety Tray (Disposables)     Disposition: Patient is medically stable and will be discharged home with home health. Time spent on discharge 38 minutes spent in assessing patient, reviewing medications, discussion with nursing, confirming safe discharge plan and preparation of discharge summary. Signed:  Electronically signed by SHEILA Santacruz CNP on 9/7/22 at 1:36 PM CDT         EMR Dragon/Transcription disclaimer:   Much of this encounter note is an electronic transcription/translation of spoken language to printed text.  The electronic translation of spoken language may permit erroneous, or at times, nonsensical words or phrases to be inadvertently transcribed; although attempts have made to review the note for such errors, some may still exist.

## 2022-09-07 NOTE — PLAN OF CARE
Problem: Discharge Planning  Goal: Discharge to home or other facility with appropriate resources  9/7/2022 1122 by Sharon Winslow RN  Outcome: Progressing  9/6/2022 2322 by Leti Romero RN  Outcome: Progressing     Problem: Skin/Tissue Integrity  Goal: Absence of new skin breakdown  Description: 1. Monitor for areas of redness and/or skin breakdown  2. Assess vascular access sites hourly  3. Every 4-6 hours minimum:  Change oxygen saturation probe site  4. Every 4-6 hours:  If on nasal continuous positive airway pressure, respiratory therapy assess nares and determine need for appliance change or resting period.   9/7/2022 1122 by Sharon Winslow RN  Outcome: Progressing  9/6/2022 2322 by Leti Romero RN  Outcome: Progressing     Problem: ABCDS Injury Assessment  Goal: Absence of physical injury  9/7/2022 1122 by Sharon Winslow RN  Outcome: Progressing  9/6/2022 2322 by Leti Romero RN  Outcome: Progressing     Problem: Safety - Adult  Goal: Free from fall injury  9/7/2022 1122 by Sharon Winslow RN  Outcome: Progressing  9/6/2022 2322 by Leti Romero RN  Outcome: Progressing     Problem: Pain  Goal: Verbalizes/displays adequate comfort level or baseline comfort level  9/7/2022 1122 by Sharon Winslow RN  Outcome: Progressing  9/6/2022 2322 by Leti Romero RN  Outcome: Progressing

## 2022-09-08 ENCOUNTER — CARE COORDINATION (OUTPATIENT)
Dept: CASE MANAGEMENT | Age: 67
End: 2022-09-08

## 2022-09-08 NOTE — CARE COORDINATION
Spoke with patient regarding MD orders for [place] services. Pt agreeable and chose [facility]. Spoke with Ann. Referral accepted and faxed. Please notify [place] when patient discharges and Fax DC Summary,  DC med list and any new [place] orders. P. [phone]  F. [phone]  The Patient and/or patient representative was provided with a choice of provider and agrees   with the discharge plan. [x] Yes [] No    Freedom of choice list was provided with basic dialogue that supports the patient's individualized plan of care/goals, treatment preferences and shares the quality data associated with the providers.  [x] Yes [] No  Electronically signed by Roula Theodore on 9/8/22 at 11:45 AM CDT

## 2022-09-08 NOTE — CARE COORDINATION
[facility] Transitions Initial Follow Up Call    Call within 2 business days of discharge: Yes    Patient: Deann Umaña Patient : 1955   MRN: [id number]  Reason for Admission: Closed Fx Left Hip  Discharge Date: 22 RARS: Readmission Risk Score: 15.4      Last Discharge [facility]       Date Complaint Diagnosis Description Type Department Provider    22 Hip Pain Closed fracture of left hip, initial encounter ([facility] [address] . .. ED to Hosp-Admission (Discharged) (ADMITTED) [facility] ONC Mukund James MD; Blanca Crump ... Spoke with: Deann Umaña      Facility:[facility]      Non-face-to-face services provided:  Reviewed encounter information for continuity of care prior to follow up Care Transitions Coordination phone call - chart notes, consults, progress notes, test results, med list, appointments, AVS, other information. Care Transitions 24 Hour Call    Do you have a copy of your discharge instructions?: Yes  Do you have all of your prescriptions and are they filled?: Yes  Have you been contacted by a [address]?: No  Have you scheduled your follow up appointment?: Yes  How are you going to get to your appointment?: Car - family or friend to transport  Do you feel like you have everything you need to keep you well at home?: Yes  Care Transitions Interventions       Placed a call to the number listed for patient for an initial follow up call for Care Transitions Coordination following discharge from the hospital.  Introduced myself and explained the purpose of my call as well as verifying name, date of birth of patient. Spoke with patient regarding hospitalization, discharge and status thus far. He reported he is doing very well. He stated that he is making it around the RV fine. He said things are close and  he is walking ok with the cane and not having any issues. He is having some pain, but is tolerating it and managing it ok. He said his incision is healing well.   He provider: No  none         Method of communication with provider : none    Advance Care Planning:   Does patient have an Advance Directive: reviewed and current. Advance Care Planning   Healthcare Decision Maker:    Primary Decision Maker: Libertad Foxer - [phone]    Secondary Decision Maker: Kev Lackey - Brother/Sister - [phone]    Care Transition Nurse contacted the patient by telephone to perform post hospital discharge assessment. Verified name and  with patient as identifiers. Provided introduction to self, and explanation of the CTN role. CTN reviewed discharge instructions, medical action plan and red flags with patient who verbalized understanding. Patient given an opportunity to ask questions and does not have any further questions or concerns at this time. Were discharge instructions available to patient? Yes. Reviewed appropriate site of care based on symptoms and resources available to patient including: PCP  Specialist  Urgent care clinics  [facility]  When to call [address] T  he patient agrees to contact the PCP office for questions related to their healthcare. Medication reconciliation was performed with patient, who verbalizes understanding of administration of home medications. CTN provided contact information. Plan for follow-up call in 3-5 days based on severity of symptoms and risk factors. Plan for next call: Plan for next call - assess changes since last call, assess overall status, pain, wound status, appetite/nutritional status, sleep patterns, abnormal signs and symptoms, availability and effectiveness of medications, activity level and tolerance, falls/other unexpected events.     Follow Up  Future Appointments   Date Time Provider Farzana Stallings   2022 10:45 AM SHEILA Khoury NP Cardio [facility]   2022 10:00 AM SCHEDULE, L MED ONC MA L MED ONC Zuly [facility]   2022 10:15 AM Gill Woodruff [address], [address] [address] [facility]       Aries Potts RN

## 2022-09-09 ENCOUNTER — TELEPHONE (OUTPATIENT)
Dept: INPATIENT UNIT | Age: 67
End: 2022-09-09

## 2022-09-12 NOTE — RESULT ENCOUNTER NOTE
Contact Information   [phone] [facility] Phone)   [phone] (Mobile)   Alternate    +1 more    Roberth Fox (Spouse)   [phone] (Home Phone)    I called [phone]: Always busy tone. No answer. I called [phone]: No answer. Always busy tone. Zheng Moctezuma,    Could you call patient and schedule follow-up in GI clinic? Patient needs follow-up with his PCP, Dr. Wisam Murcia, GI physician on results plan, urologist and orthopedic doctors. I cannot send message to Dr. Wisam Murcia because she is not on the electronic network to send her messages thank you. Could you let me know if you are able to contact the patient. If unable to contact the patient could you mail him a letter. He is mostly traveling and mostly on the road. Thank you.

## 2022-09-13 ENCOUNTER — TELEPHONE (OUTPATIENT)
Dept: GASTROENTEROLOGY | Age: 67
End: 2022-09-13

## 2022-09-13 NOTE — TELEPHONE ENCOUNTER
See message below, pt needs a hospital f/u appt per Dr Paul Plan. His chart says he has cirrhosis so it needs to be a 40 min appt with anyone in clinic, he will be new pt here. If you cant reach him please send him a certified letter you are trying to reach him, thanks!

## 2022-09-15 ENCOUNTER — CARE COORDINATION (OUTPATIENT)
Dept: CASE MANAGEMENT | Age: 67
End: 2022-09-15

## 2022-09-22 ENCOUNTER — CARE COORDINATION (OUTPATIENT)
Dept: CASE MANAGEMENT | Age: 67
End: 2022-09-22

## 2022-09-22 NOTE — CARE COORDINATION
[facility] Transitions Follow Up Call    2022    Patient: Clarke Lanes  Patient : 1955   MRN: [id number]    Discharge Date: 22 RARS: Readmission Risk Score: 15.4         Spoke with: N/A    Care Transitions Subsequent and Final Call    Subsequent and Final Calls  Care Transitions Interventions  Other Interventions:           Attempted to make contact with patient/caregiver for a routine Care Transitions Coordination follow up call without success. Left a HIPAA compliant message regarding intent of call and call back information. As this repeated attempt to make contact was unsuccessful, will disengage at this time.         Follow Up  Future Appointments   Date Time Provider Farzana Stallings   2022 10:45 AM SHEILA Cervantes NP Cardio [facility]   2022 10:00 AM SCHEDULE, L MED ONC MA L MED ONC Zuly [facility]   2022 10:15 AM SHEILA Novak PAD [facility]   10/19/2022 11:10 AM SHEILA Madsen [facility]       Sarkis Gallardo RN

## 2022-09-22 NOTE — RESULT ENCOUNTER NOTE
FYI    Patient has follow-up appointment on 10/19/2022 with Dany Vogel. He has cirrhosis and needs follow-up. I am going to drop off this communication thread. Thank you.

## 2022-09-23 ENCOUNTER — TELEPHONE (OUTPATIENT)
Dept: CARDIOLOGY CLINIC | Age: 67
End: 2022-09-23

## 2022-09-26 ENCOUNTER — OFFICE VISIT (OUTPATIENT)
Dept: CARDIOLOGY CLINIC | Age: 67
End: 2022-09-26
Payer: MEDICARE

## 2022-09-26 VITALS
HEART RATE: 66 BPM | OXYGEN SATURATION: 95 % | WEIGHT: 176 LBS | BODY MASS INDEX: 26.07 KG/M2 | HEIGHT: 69 IN | DIASTOLIC BLOOD PRESSURE: 68 MMHG | SYSTOLIC BLOOD PRESSURE: 116 MMHG

## 2022-09-26 DIAGNOSIS — I48.0 PAF (PAROXYSMAL ATRIAL FIBRILLATION) (HCC): Primary | ICD-10-CM

## 2022-09-26 PROCEDURE — 1123F ACP DISCUSS/DSCN MKR DOCD: CPT | Performed by: NURSE PRACTITIONER

## 2022-09-26 PROCEDURE — 93000 ELECTROCARDIOGRAM COMPLETE: CPT | Performed by: NURSE PRACTITIONER

## 2022-09-26 PROCEDURE — 3017F COLORECTAL CA SCREEN DOC REV: CPT | Performed by: NURSE PRACTITIONER

## 2022-09-26 PROCEDURE — 99214 OFFICE O/P EST MOD 30 MIN: CPT | Performed by: NURSE PRACTITIONER

## 2022-09-26 PROCEDURE — G8427 DOCREV CUR MEDS BY ELIG CLIN: HCPCS | Performed by: NURSE PRACTITIONER

## 2022-09-26 PROCEDURE — G8417 CALC BMI ABV UP PARAM F/U: HCPCS | Performed by: NURSE PRACTITIONER

## 2022-09-26 PROCEDURE — 1111F DSCHRG MED/CURRENT MED MERGE: CPT | Performed by: NURSE PRACTITIONER

## 2022-09-26 PROCEDURE — 4004F PT TOBACCO SCREEN RCVD TLK: CPT | Performed by: NURSE PRACTITIONER

## 2022-09-26 RX ORDER — OXYCODONE HYDROCHLORIDE 5 MG/1
5 TABLET ORAL 2 TIMES DAILY
COMMUNITY
Start: 2022-09-20 | End: 2022-10-27

## 2022-09-26 ASSESSMENT — ENCOUNTER SYMPTOMS
SHORTNESS OF BREATH: 0
CHEST TIGHTNESS: 0
SORE THROAT: 0
WHEEZING: 0
COUGH: 0

## 2022-09-26 NOTE — PROGRESS NOTES
Progress Note      Pt Name: Elizabeth Acosta  YOB: 1955  MRN: [id number]    Date of evaluation: 9/27/2022  History Obtained From:  Patient, EMR    Portions of this note have been copied forward, however, changed to reflect the most current clinical status of this patient. Chief Complaint   Patient presents with    Follow-up     Hospital FU, thrombocytopenia      HISTORY OF PRESENT ILLNESS:    Elizabeth Acosta was seen in hematology consultation by Dr. Dai De Paz 8/25/2022 as an inpatient at [facility]. Hematology consultation was requested for findings of thrombocytopenia. The patient was hospitalized at [facility] with complaints of left hip pain after a fall, unable to ambulate. He is a chronic smoker. He was found to be in A. fib in the ER. CBC showed WBC count 3.8 with ANC of 2.5/, hemoglobin 15.3/, platelet count 62,752. Iron profile showed iron of 298, iron saturation 97%, TIBC 307, haptoglobin <10. PT 19.4/INR 1.61. The patient endorses a history of alcoholic cirrhosis. He is a chronic drinker for many decades. Unfortunately, he does not have a GI doctor. He is known to be thrombocytopenic. He denies any history of hepatitis C. Ultrasound performed here showed findings suggestive of chronic liver parenchymal disease, cirrhosis and splenomegaly with the spleen measuring 2.5 x 6.6 x 15.7 cm consistent with splenomegaly. [place] required vitamin K and platelet transfusion, followed by left hip replacement 8/26/2022. Hospital course was complicated by LLE and scrotal edema requiring diuresis, atrial fibrillation, COVID-19. He was not given DVT prophylaxis due to thrombocytopenia.     Labs 8/26/2022:  Retic - 4.33% with absolute 0.1334  LDH - 255 ()  B12: 1446  Folate: 13  MARICARMEN - NEGATIVE  Hereditary hemochromatosis gene: NEGATIVE  Fibrinogen 177  D-dimer 4.3    Labs 8/28/2022:  Retic 5.35% with absolute 0.1188  LDH - 240 () - improved  Total bili 3.2-direct 1.7, gastroenterology. Anemia is improved with a hemoglobin of 12.5/.5 and platelet count 10,263. He was evaluated by cardiology 9/26/2022 for atrial fibrillation during hospitalization. Vlad Gardner denies blood thinners. He denies use of NSAIDs. Past Medical History:   Diagnosis Date    Liver disease      Past Surgical History:   Procedure Laterality Date    TOTAL HIP ARTHROPLASTY Left 8/26/2022    LEFT HIP TOTAL ARTHROPLASTY performed by Glendia Snellen, MD at [address] [place] [address] OR     Current Outpatient Medications   Medication Sig Dispense Refill    oxyCODONE (ROXICODONE) 5 MG immediate release tablet Take 5 mg by mouth in the morning and at bedtime. metoprolol succinate (TOPROL XL) 25 MG extended release tablet Take 1 tablet by mouth daily 30 tablet 0    furosemide (LASIX) 20 MG tablet Take 1 tablet by mouth in the morning and 1 tablet in the evening. 60 tablet 0    spironolactone (ALDACTONE) 25 MG tablet Take 1 tablet by mouth daily 30 tablet 0    levothyroxine (SYNTHROID) 50 MCG tablet Take 1 tablet by mouth Daily 30 tablet 0    famotidine (PEPCID) 20 MG tablet Take 1 tablet by mouth 2 times daily 60 tablet 0    Ergocalciferol (VITAMIN D) 16673 units CAPS Take 50,000 Units by mouth once a week 5 capsule 0     No current facility-administered medications for this visit. No Known Allergies  Social History     Tobacco Use    Smoking status: Every Day     Packs/day: 0.25     Years: 30.00     Pack years: 7.50     Types: Cigarettes    Smokeless tobacco: Never   Vaping Use    Vaping Use: Never used   Substance Use Topics    Alcohol use: Not Currently    Drug use: Never     Family History   Problem Relation Age of Onset    Diabetes Mother      Review of Systems   Constitutional:  Positive for fatigue. Negative for fever. HENT:  Negative for dental problem, hearing loss, mouth sores, nosebleeds, sore throat and trouble swallowing. Eyes:  Negative for discharge and itching.    Respiratory:  Positive for cough (improved) and shortness of breath (improved). Negative for wheezing. Cardiovascular:  Negative for chest pain, palpitations and leg swelling (resolved). Gastrointestinal:  Negative for abdominal pain, constipation, diarrhea, nausea and vomiting. Endocrine: Negative for cold intolerance and heat intolerance. Genitourinary:  Negative for dysuria, frequency, hematuria, scrotal swelling (resolved) and urgency. Musculoskeletal:  Positive for arthralgias (left hip, post-op). Negative for joint swelling and myalgias. Skin:  Negative for pallor and rash. Allergic/Immunologic: Negative for environmental allergies and immunocompromised state. Neurological:  Negative for seizures, syncope and numbness. Hematological:  Negative for adenopathy. Bruises/bleeds easily. Psychiatric/Behavioral:  Negative for agitation, behavioral problems and confusion. The patient is not nervous/anxious. Physical Exam  Vitals reviewed. Constitutional:       General: He is not in acute distress. Appearance: He is well-developed. He is not toxic-appearing or diaphoretic. Comments: Wearing a facial mask. HENT:      Head: Normocephalic and atraumatic. Right Ear: External ear normal.      Left Ear: External ear normal.      Nose: Nose normal.      Mouth/Throat:      Mouth: Mucous membranes are moist.   Eyes:      General: No scleral icterus. Right eye: No discharge. Left eye: No discharge. Conjunctiva/sclera: Conjunctivae normal.   Neck:      Trachea: No tracheal deviation. Cardiovascular:      Rate and Rhythm: Normal rate and regular rhythm. Pulmonary:      Effort: Pulmonary effort is normal. No respiratory distress. Breath sounds: Normal breath sounds. No wheezing or rales. Abdominal:      General: Bowel sounds are normal. There is no distension. Palpations: Abdomen is soft. Tenderness: There is no abdominal tenderness. There is no guarding.    Genitourinary: Comments: Exam deferred  Musculoskeletal:         General: No tenderness or deformity. Cervical back: Neck supple. No muscular tenderness. Comments: Using a straight cane   Lymphadenopathy:      Cervical:      Right cervical: No superficial or deep cervical adenopathy. Left cervical: No superficial or deep cervical adenopathy. Upper Body:      Right upper body: No supraclavicular adenopathy. Left upper body: No supraclavicular adenopathy. Comments:      Skin:     General: Skin is warm and dry. Findings: No rash. Neurological:      Mental Status: He is alert and oriented to person, place, and time. Comments: follows commands, non-focal   Psychiatric:         Behavior: Behavior normal. Behavior is cooperative. Thought Content: Thought content normal.         Judgment: Judgment normal.      Comments: Alert and oriented to person, place and time. Vitals:    09/27/22 1018   BP: 132/74   Pulse: 58   SpO2: 98%   Weight: 175 lb (79.4 kg)      Wt Readings from Last 3 Encounters:   09/27/22 175 lb (79.4 kg)   09/26/22 176 lb (79.8 kg)   09/06/22 209 lb 8 oz (95 kg)     CBC:   Lab Results   Component Value Date    WBC 4.52 09/27/2022    HGB 12.5 (L) 09/27/2022    HCT 41.6 09/27/2022    .5 (H) 09/27/2022    PLT 40 (LL) 09/27/2022      ASSESSMENT/PLAN:  1. Thrombocytopenia ([address]    2. End stage liver disease ([address]    3. Coagulopathy ([address]    4. Macrocytic anemia    5.  Abnormal laboratory test      Thrombocytopenia  Likely secondary to end-stage liver disease/alcohol abuse  US showed signs of end-stage liver disease/splenomegaly (portal hypertension)  Status post 2 pheresis platelets given perioperatively (left hip OR)      PLT 40,000, mildly improved  No intervention from hematology standpoint    End-stage liver disease/splenomegaly  Evidence of portal hypertension  Keep scheduled apt with GI 10/2022  Receiving oral lasix/aldactone     Coagulopathy of liver disease/thrombocytopenia  S/p Vitamin K 10 mg p.o. x3 doses 8/25/2022 - 8/27/2022  Postop bruising, no overt bleeding     Macrocytic anemia  Likely secondary to end-stage liver disease  Discussed possibility of MDS, felt less likely  Hgb is improved posthospitalization  Recommend conservative monitoring. If blood counts worsen or new symptoms develop, could consider bone marrow aspirate and biopsy    Elevated iron saturation/iron levels  Serum Fe - 298  TIBC - 307  Fe sat - 97%  Ferritin - 220     Hereditary hemochromatosis gene 8/26/2022 NEGATIVE    Labs 9/5/22 improved:  Iron: 50, TIBC: 238, Iron Saturation: 21     Orders Placed This Encounter   Procedures    CBC with Auto Differential     Recommendations/PLAN:  Recommend CBC in 1-2 months to ensure stability. Patient states he and his wife will be traveling to [place] in their RV. They do not know when or if ever they will be returning to [place]. He is agreeable to follow-up with physician upon relocation. Copy of CBC provided. Recommended patient obtain copy of medical record prior to departure. He will keep scheduled f/u apt with ortho and GI 10/2022     Return if symptoms worsen or fail to improve, for follow up with SHEILA Young. I have seen, examined and reviewed this patient medication list, appropriate labs and imaging studies. I reviewed relevant medical records and others physicians notes. I discussed the plans of care with the patient. I answered all the questions to the patients satisfaction. I have also reviewed the chief complaint (CC) and part of the history (History of Present Illness (HPI), Past Family Social History [facility]), or Review of Systems (ROS) and made changes when appropriated.         (Please note that portions of this note were completed with a voice recognition program. Efforts were made to edit the dictations but occasionally words are mis-transcribed.)    The total time (25 min) I spent to see the patient today includes at least one or more of the following: preparing to see the patient by reviewing prior tests, prior notes or other relevant information, performing appropriate independent examination and evaluation, counseling, ordering of medications, tests or procedures, referrals, communicating with other healthcare professionals when appropriated to coordinate care, documenting clinic information in the electronic medical record or other health records, independently interpreting results of tests, managing test results and communicating the results to the patient/family or caregiver.           Juana Khan, SHEILA  3:04 PM  9/27/2022

## 2022-09-26 NOTE — PROGRESS NOTES
Subjective: Patient presents to clinic today for hospital follow-up. Denies any chest pain, pressure or tightness. There is no shortness of breath, orthopnea or PND. Patient denies any lightheadedness, dizziness or syncope. No palpitations felt. Patient and wife are leaving early October for [place] for the winter. Should be back sometime in the spring. Patient to follow-up during that time away with cardiologist in [place]. Old records have been obtained from the referring providers. Those records have been reviewed and summarized. Pedro Cee is a 75 y.o. male with the following history as recorded in [facility]:  Patient Active Problem List    Diagnosis Date Noted    Umbilical hernia without obstruction and without gangrene 08/27/2022    Pancytopenia ([facility] 08/27/2022    Closed fracture of left hip ([facility] 08/27/2022    Hepatic cirrhosis ([facility] 08/26/2022    Closed left hip fracture, initial encounter ([facility] 08/25/2022    New onset a-fib ([facility] 08/25/2022    Liver disease 08/25/2022    Hyperbilirubinemia 08/25/2022    Abnormal LFTs 08/25/2022    Thrombocytopenia ([facility] 08/25/2022    Leukopenia 08/25/2022     Current Outpatient Medications   Medication Sig Dispense Refill    oxyCODONE (ROXICODONE) 5 MG immediate release tablet Take 5 mg by mouth in the morning and at bedtime. metoprolol succinate (TOPROL XL) 25 MG extended release tablet Take 1 tablet by mouth daily 30 tablet 0    furosemide (LASIX) 20 MG tablet Take 1 tablet by mouth in the morning and 1 tablet in the evening.  60 tablet 0    spironolactone (ALDACTONE) 25 MG tablet Take 1 tablet by mouth daily 30 tablet 0    levothyroxine (SYNTHROID) 50 MCG tablet Take 1 tablet by mouth Daily 30 tablet 0    famotidine (PEPCID) 20 MG tablet Take 1 tablet by mouth 2 times daily 60 tablet 0    Ergocalciferol (VITAMIN D) 86276 units CAPS Take 50,000 Units by mouth once a week 5 capsule 0     No current facility-administered medications for this visit. Allergies: Patient has no known allergies. Past Medical History:   Diagnosis Date    Liver disease      Past Surgical History:   Procedure Laterality Date    TOTAL HIP ARTHROPLASTY Left 8/26/2022    LEFT HIP TOTAL ARTHROPLASTY performed by Neto Montalvo MD at [facility] OR     Family History   Problem Relation Age of Onset    Diabetes Mother      Social History     Tobacco Use    Smoking status: Every Day     Packs/day: 0.25     Years: 30.00     Pack years: 7.50     Types: Cigarettes    Smokeless tobacco: Never   Substance Use Topics    Alcohol use: Not Currently          Review of System:    Review of Systems   Constitutional:  Negative for activity change, chills, diaphoresis, fatigue and fever. HENT:  Negative for congestion and sore throat. Respiratory:  Negative for cough, chest tightness, shortness of breath and wheezing. Cardiovascular:  Negative for chest pain, palpitations and leg swelling. Neurological:  Negative for dizziness, syncope, light-headedness and headaches. Psychiatric/Behavioral:  Negative for confusion. The patient is not nervous/anxious. Objective:    /68   Pulse 66   Ht 5' 9\" (1.753 m)   Wt 176 lb (79.8 kg)   SpO2 95%   BMI 25.99 kg/m²     GENERAL - well developed and well nourished    HEENT -  PERRLA, Hearing appears normal, conjunctive and lids are normal.  External inspection of ears and nose appear normal.  NECK - no thyromegaly, no JVD, trachea is in the midline  CARDIOVASCULAR - PMI is in the mid line clavicular position, Normal S1 and S2 with no systolic murmur. No S3 or S4    PULMONARY - no respiratory distress. No wheezes or rales. Lungs are clear to ausculation, normal respiratory effort.   ABDOMEN  - soft, non tender, no rebound  MUSCULOSKELETAL  - range of motion of the upper and lower extermites appears normal and equal and is without pain   EXTREMITIES - no significant edema   NEUROLOGIC - gait and station are normal  SKIN - turgor is normal, skin warm and dry. PSYCHIATRIC - normal mood and affect, alert and orientated x 3,      ASSESSMENT:    ALL THE CARDIOLOGY PROBLEMS ARE LISTED ABOVE; HOWEVER, THE FOLLOWING SPECIFIC CARDIAC PROBLEMS / CONDITIONS WERE ADDRESSED AND TREATED DURING THE OFFICE VISIT TODAY:       Cardiac Specific Problem  Discussion and Plan         1. PAF Initial encounter   EKG in the office today sinus rhythm with a first-degree AV block. Frequent PACs. Heart rate 66 bpm.  Patient is on Toprol-XL 25 mg daily. PLAN:    Orders Placed This Encounter   Procedures    EKG 12 lead     Order Specific Question:   Reason for Exam?     Answer:   Irregular heart rate     No orders of the defined types were placed in this encounter. Return in about 6 months (around 3/26/2023) for Dr Kirsten Ku ([facility] [place]) . Discussed with patient and spouse. I greatly appreciate the opportunity to meet Clarke Lanes and your confidence in allowing me to participate in his cardiovascular care. SHEILA Cervantes - NP 9/26/2022 10:34 AM CDT                    This dictation was generated by voice recognition computer software. Although all attempts are made to edit dictation for accuracy, there may be errors in the transcription that are not intended.

## 2022-09-27 ENCOUNTER — OFFICE VISIT (OUTPATIENT)
Dept: HEMATOLOGY | Age: 67
End: 2022-09-27
Payer: MEDICARE

## 2022-09-27 ENCOUNTER — HOSPITAL ENCOUNTER (OUTPATIENT)
Dept: INFUSION THERAPY | Age: 67
Discharge: HOME | End: 2022-09-27
Payer: MEDICARE

## 2022-09-27 VITALS
BODY MASS INDEX: 25.84 KG/M2 | DIASTOLIC BLOOD PRESSURE: 74 MMHG | OXYGEN SATURATION: 98 % | WEIGHT: 175 LBS | HEART RATE: 58 BPM | SYSTOLIC BLOOD PRESSURE: 132 MMHG

## 2022-09-27 DIAGNOSIS — D68.9 COAGULOPATHY (HCC): ICD-10-CM

## 2022-09-27 DIAGNOSIS — D69.6 THROMBOCYTOPENIA (HCC): ICD-10-CM

## 2022-09-27 DIAGNOSIS — R79.89 ABNORMAL LFTS: ICD-10-CM

## 2022-09-27 DIAGNOSIS — K72.10 END STAGE LIVER DISEASE (HCC): ICD-10-CM

## 2022-09-27 DIAGNOSIS — D69.6 THROMBOCYTOPENIA (HCC): Primary | ICD-10-CM

## 2022-09-27 DIAGNOSIS — D53.9 MACROCYTIC ANEMIA: ICD-10-CM

## 2022-09-27 DIAGNOSIS — R89.9 ABNORMAL LABORATORY TEST: ICD-10-CM

## 2022-09-27 LAB
BASOPHILS ABSOLUTE: 0.03 K/UL (ref 0.01–0.08)
BASOPHILS RELATIVE PERCENT: 0.7 % (ref 0.1–1.2)
EOSINOPHILS ABSOLUTE: 0.25 K/UL (ref 0.04–0.54)
EOSINOPHILS RELATIVE PERCENT: 5.5 % (ref 0.7–7)
HCT VFR BLD CALC: 41.6 % (ref 40.1–51)
HEMOGLOBIN: 12.5 G/DL (ref 13.7–17.5)
LYMPHOCYTES ABSOLUTE: 0.97 K/UL (ref 1.18–3.74)
LYMPHOCYTES RELATIVE PERCENT: 21.5 % (ref 19.3–53.1)
MCH RBC QN AUTO: 35.9 PG (ref 25.7–32.2)
MCHC RBC AUTO-ENTMCNC: 30 G/DL (ref 32.3–36.5)
MCV RBC AUTO: 119.5 FL (ref 79–92.2)
MONOCYTES ABSOLUTE: 0.56 K/UL (ref 0.24–0.82)
MONOCYTES RELATIVE PERCENT: 12.4 % (ref 4.7–12.5)
NEUTROPHILS ABSOLUTE: 2.7 K/UL (ref 1.56–6.13)
NEUTROPHILS RELATIVE PERCENT: 59.7 % (ref 34–71.1)
PDW BLD-RTO: 17.3 % (ref 11.6–14.4)
PLATELET # BLD: 40 K/UL (ref 163–337)
PMV BLD AUTO: 10.4 FL (ref 7.4–10.4)
RBC # BLD: 3.48 M/UL (ref 4.63–6.08)
WBC # BLD: 4.52 K/UL (ref 4.23–9.07)

## 2022-09-27 PROCEDURE — 4004F PT TOBACCO SCREEN RCVD TLK: CPT | Performed by: NURSE PRACTITIONER

## 2022-09-27 PROCEDURE — 99211 OFF/OP EST MAY X REQ PHY/QHP: CPT

## 2022-09-27 PROCEDURE — 99213 OFFICE O/P EST LOW 20 MIN: CPT | Performed by: NURSE PRACTITIONER

## 2022-09-27 PROCEDURE — 1111F DSCHRG MED/CURRENT MED MERGE: CPT | Performed by: NURSE PRACTITIONER

## 2022-09-27 PROCEDURE — 1123F ACP DISCUSS/DSCN MKR DOCD: CPT | Performed by: NURSE PRACTITIONER

## 2022-09-27 PROCEDURE — G8417 CALC BMI ABV UP PARAM F/U: HCPCS | Performed by: NURSE PRACTITIONER

## 2022-09-27 PROCEDURE — G8427 DOCREV CUR MEDS BY ELIG CLIN: HCPCS | Performed by: NURSE PRACTITIONER

## 2022-09-27 PROCEDURE — 85025 COMPLETE CBC W/AUTO DIFF WBC: CPT

## 2022-09-27 PROCEDURE — 3017F COLORECTAL CA SCREEN DOC REV: CPT | Performed by: NURSE PRACTITIONER

## 2022-09-27 ASSESSMENT — ENCOUNTER SYMPTOMS
EYE ITCHING: 0
NAUSEA: 0
ABDOMINAL PAIN: 0
SHORTNESS OF BREATH: 1
EYE DISCHARGE: 0
SORE THROAT: 0
WHEEZING: 0
DIARRHEA: 0
COUGH: 1
TROUBLE SWALLOWING: 0
VOMITING: 0
CONSTIPATION: 0

## 2022-09-29 ASSESSMENT — ENCOUNTER SYMPTOMS
COUGH: 0
ABDOMINAL DISTENTION: 0
SHORTNESS OF BREATH: 0
VOMITING: 0
COLOR CHANGE: 0
DIARRHEA: 0
BLOOD IN STOOL: 0
ABDOMINAL PAIN: 0
CONSTIPATION: 0
WHEEZING: 0
NAUSEA: 0

## 2022-10-27 ENCOUNTER — OFFICE VISIT (OUTPATIENT)
Dept: GASTROENTEROLOGY | Age: 67
End: 2022-10-27
Payer: MEDICARE

## 2022-10-27 VITALS
BODY MASS INDEX: 26.66 KG/M2 | OXYGEN SATURATION: 93 % | WEIGHT: 180 LBS | DIASTOLIC BLOOD PRESSURE: 60 MMHG | HEART RATE: 63 BPM | SYSTOLIC BLOOD PRESSURE: 120 MMHG | HEIGHT: 69 IN

## 2022-10-27 DIAGNOSIS — K74.60 CIRRHOSIS OF LIVER WITHOUT ASCITES, UNSPECIFIED HEPATIC CIRRHOSIS TYPE (HCC): Primary | ICD-10-CM

## 2022-10-27 DIAGNOSIS — D69.6 THROMBOCYTOPENIA (HCC): ICD-10-CM

## 2022-10-27 DIAGNOSIS — D64.9 ANEMIA, UNSPECIFIED TYPE: ICD-10-CM

## 2022-10-27 DIAGNOSIS — E80.6 HYPERBILIRUBINEMIA: ICD-10-CM

## 2022-10-27 DIAGNOSIS — F10.11 HISTORY OF ETOH ABUSE: ICD-10-CM

## 2022-10-27 DIAGNOSIS — D68.9 COAGULOPATHY (HCC): ICD-10-CM

## 2022-10-27 PROCEDURE — 99213 OFFICE O/P EST LOW 20 MIN: CPT | Performed by: NURSE PRACTITIONER

## 2022-10-27 PROCEDURE — G8484 FLU IMMUNIZE NO ADMIN: HCPCS | Performed by: NURSE PRACTITIONER

## 2022-10-27 PROCEDURE — 1123F ACP DISCUSS/DSCN MKR DOCD: CPT | Performed by: NURSE PRACTITIONER

## 2022-10-27 PROCEDURE — 4004F PT TOBACCO SCREEN RCVD TLK: CPT | Performed by: NURSE PRACTITIONER

## 2022-10-27 PROCEDURE — G8427 DOCREV CUR MEDS BY ELIG CLIN: HCPCS | Performed by: NURSE PRACTITIONER

## 2022-10-27 PROCEDURE — G8417 CALC BMI ABV UP PARAM F/U: HCPCS | Performed by: NURSE PRACTITIONER

## 2022-10-27 PROCEDURE — 3017F COLORECTAL CA SCREEN DOC REV: CPT | Performed by: NURSE PRACTITIONER

## 2022-10-27 ASSESSMENT — ENCOUNTER SYMPTOMS
DIARRHEA: 0
SHORTNESS OF BREATH: 0
ABDOMINAL PAIN: 0
ANAL BLEEDING: 0
TROUBLE SWALLOWING: 0
COUGH: 0
ABDOMINAL DISTENTION: 0
SORE THROAT: 0
RECTAL PAIN: 0
BACK PAIN: 0
VOMITING: 0
VOICE CHANGE: 0
BLOOD IN STOOL: 0
CONSTIPATION: 0
NAUSEA: 0

## 2022-10-27 NOTE — PROGRESS NOTES
Subjective:      Antoni Martines is a76 y.o. male  Chief Complaint   Patient presents with    Follow-up       HPI  PCP: Kate Molina MD  Referring Provider: Rik Lanes, MD  Pt is referred here as a hospital follow up  Admit date 8/25/2022  Discharge date 9/7/2022  Admitted for hip fracture and new onset afib  Dr Christine Ward, [facility], consulted for cirrhosis noted on imaging (not a new finding, per pt). GI scopes not performed   Contrasted CT abd/pelvis  in house noted cirrhosis with a 0.8cm right hepatic cyst  I have reviewed his labs in [facility]. He has chronic anemia, pancytopenia, persistently elevaed blood glucose levels, normal renal function,hypoalbuminemia, mild AST elevation of 81, chronic hyperbilirubinemia with total bili of 5.6 (direct and indirect bili not obtained), coagulopathy with INR of 1.73. AFP not drawn    In regards to his cirrhosis, he reports he was advised approx 5 years ago he had cirrhosis however he has not been seeing GI or hepatology for routine f/u appts. Reports he moves frequently and never lives anywhere longer than a few months  Reports he was advised the cirrhosis was from ETOH use but not sure if a thorough work up was performed to r/o other etiologies. He recently stopped ETOH use, his last drink was just prior to his hospital stay in August.  Reports he has been drinking ETOH for at about 50 years  Drank half a 1/5 of whiskey or vodka daily at his heaviest  Denies ever having confusion, hematemesis, melena. He has never had a paracentesis. He has no GI complaints at all. Has never had an EGD or colonoscopy      Family HX:    Pt denies family hx of colon polyps, colon CA, inflammatory bowel dx, gastric CA and esophageal CA.     Past Medical History:   Diagnosis Date    Abnormal LFTs     Atrial fibrillation (HCC)     Cirrhosis (HCC)     COVID-19     Hyperbilirubinemia     Hypothyroidism     Leukopenia     Liver disease     Splenomegaly     Umbilical hernia     Vitamin D deficiency           Past Surgical History:   Procedure Laterality Date    COLONOSCOPY  2014    polyps per pt    TOTAL HIP ARTHROPLASTY Left 08/26/2022    LEFT HIP TOTAL ARTHROPLASTY performed by Benton Dumont MD at [place] [address] History     Socioeconomic History    Marital status:     Number of children: 1   Tobacco Use    Smoking status: Every Day     Packs/day: 0.25     Years: 30.00     Pack years: 7.50     Types: Cigarettes    Smokeless tobacco: Never   Vaping Use    Vaping Use: Never used   Substance and Sexual Activity    Alcohol use: Not Currently     Comment: quit 8/2022    Drug use: Never       No Known Allergies    Current Outpatient Medications   Medication Sig Dispense Refill    metoprolol succinate (TOPROL XL) 25 MG extended release tablet Take 1 tablet by mouth daily 30 tablet 0    furosemide (LASIX) 20 MG tablet Take 1 tablet by mouth in the morning and 1 tablet in the evening. (Patient taking differently: Take 40 mg by mouth daily) 60 tablet 0    spironolactone (ALDACTONE) 25 MG tablet Take 1 tablet by mouth daily 30 tablet 0    levothyroxine (SYNTHROID) 50 MCG tablet Take 1 tablet by mouth Daily 30 tablet 0    famotidine (PEPCID) 20 MG tablet Take 1 tablet by mouth 2 times daily 60 tablet 0    Ergocalciferol (VITAMIN D) 59702 units CAPS Take 50,000 Units by mouth once a week 5 capsule 0     No current facility-administered medications for this visit. Review of Systems   Constitutional:  Negative for appetite change, fatigue, fever and unexpected weight change. HENT:  Negative for sore throat, trouble swallowing and voice change. Respiratory:  Negative for cough and shortness of breath. Cardiovascular:  Negative for chest pain, palpitations and leg swelling. Gastrointestinal:  Negative for abdominal distention, abdominal pain, anal bleeding, blood in stool, constipation, diarrhea, nausea, rectal pain and vomiting. Genitourinary:  Negative for hematuria. Musculoskeletal:  Positive for arthralgias. Negative for back pain and neck pain. Neurological:  Negative for dizziness, weakness, light-headedness and headaches. Psychiatric/Behavioral:  Negative for dysphoric mood and sleep disturbance. The patient is not nervous/anxious. All other systems reviewed and are negative. Objective:     Physical Exam  Vitals and nursing note reviewed. Constitutional:       Appearance: He is well-developed. Comments: /60 (Site: Left Upper Arm)   Pulse 63   Ht 5' 9\" (1.753 m)   Wt 180 lb (81.6 kg)   SpO2 93%   BMI 26.58 kg/m²    Eyes:      General: No scleral icterus. Conjunctiva/sclera: Conjunctivae normal.      Pupils: Pupils are equal, round, and reactive to light. Neck:      Thyroid: No thyromegaly. Cardiovascular:      Rate and Rhythm: Normal rate and regular rhythm. Heart sounds: Normal heart sounds. No murmur heard. No friction rub. No gallop. Pulmonary:      Effort: Pulmonary effort is normal. No respiratory distress. Breath sounds: Normal breath sounds. Abdominal:      General: Bowel sounds are normal. There is no distension. Palpations: Abdomen is soft. Tenderness: There is no abdominal tenderness. There is no rebound. Comments: No fluid wave shift, no TTP   Musculoskeletal:         General: No deformity. Normal range of motion. Cervical back: Normal range of motion and neck supple. Skin:     Coloration: Skin is not pale. Neurological:      Mental Status: He is alert and oriented to person, place, and time. Cranial Nerves: No cranial nerve deficit. Psychiatric:         Judgment: Judgment normal.         Assessment:       Diagnosis Orders   1. Cirrhosis of liver without ascites, unspecified hepatic cirrhosis type ([facility]        2. Thrombocytopenia ([facility]        3. Coagulopathy ([facility]        4. Hyperbilirubinemia        5. History of ETOH abuse        6.  Anemia, unspecified type              Plan: Pt reports to me he is leaving in 5 days to move to [place] for a month or so then moving to [place] after that. He needs labs (AFP, HFP, repeat labs to calculate a more current MELD, and labs for evaluation of etiology of cirrhosis). He also needs referral to hepatology, his current MELD is 19. Furthermore he needs an EGD as well as colonoscopy. He defers any testing here at all. He tells me he will get established with new GI once in [place] and request these records. I advised him today that if he continues drinking ETOH this will most likely kill him, he voices understanding.   Time spent 35 minutes

## 2023-06-28 ENCOUNTER — ANESTHESIA EVENT (OUTPATIENT)
Dept: OPERATING ROOM | Age: 68
End: 2023-06-28

## 2023-06-28 ENCOUNTER — HOSPITAL ENCOUNTER (OUTPATIENT)
Age: 68
Setting detail: OUTPATIENT SURGERY
Discharge: HOME | End: 2023-06-28
Attending: INTERNAL MEDICINE | Admitting: INTERNAL MEDICINE
Payer: MEDICARE

## 2023-06-28 ENCOUNTER — HOSPITAL ENCOUNTER (OUTPATIENT)
Age: 68
Setting detail: SPECIMEN
Discharge: HOME | End: 2023-06-28
Payer: MEDICARE

## 2023-06-28 ENCOUNTER — ANESTHESIA (OUTPATIENT)
Dept: OPERATING ROOM | Age: 68
End: 2023-06-28

## 2023-06-28 ENCOUNTER — APPOINTMENT (OUTPATIENT)
Dept: OPERATING ROOM | Age: 68
End: 2023-06-28
Attending: INTERNAL MEDICINE

## 2023-06-28 VITALS
RESPIRATION RATE: 16 BRPM | TEMPERATURE: 98.6 F | SYSTOLIC BLOOD PRESSURE: 137 MMHG | WEIGHT: 180 LBS | DIASTOLIC BLOOD PRESSURE: 77 MMHG | HEART RATE: 82 BPM | OXYGEN SATURATION: 95 % | BODY MASS INDEX: 26.66 KG/M2 | HEIGHT: 69 IN

## 2023-06-28 PROCEDURE — 45385 COLONOSCOPY W/LESION REMOVAL: CPT

## 2023-06-28 PROCEDURE — 88305 TISSUE EXAM BY PATHOLOGIST: CPT

## 2023-06-28 PROCEDURE — 45385 COLONOSCOPY W/LESION REMOVAL: CPT | Performed by: INTERNAL MEDICINE

## 2023-06-28 RX ORDER — SODIUM CHLORIDE, SODIUM LACTATE, POTASSIUM CHLORIDE, CALCIUM CHLORIDE 600; 310; 30; 20 MG/100ML; MG/100ML; MG/100ML; MG/100ML
INJECTION, SOLUTION INTRAVENOUS CONTINUOUS
Status: DISCONTINUED | OUTPATIENT
Start: 2023-06-28 | End: 2023-06-28 | Stop reason: HOSPADM

## 2023-06-28 RX ORDER — LIDOCAINE HYDROCHLORIDE 10 MG/ML
INJECTION, SOLUTION EPIDURAL; INFILTRATION; INTRACAUDAL; PERINEURAL PRN
Status: DISCONTINUED | OUTPATIENT
Start: 2023-06-28 | End: 2023-06-28 | Stop reason: SDUPTHER

## 2023-06-28 RX ORDER — PROPOFOL 10 MG/ML
INJECTION, EMULSION INTRAVENOUS PRN
Status: DISCONTINUED | OUTPATIENT
Start: 2023-06-28 | End: 2023-06-28 | Stop reason: SDUPTHER

## 2023-06-28 RX ADMIN — PROPOFOL 550 MG: 10 INJECTION, EMULSION INTRAVENOUS at 15:00

## 2023-06-28 RX ADMIN — LIDOCAINE HYDROCHLORIDE 30 MG: 10 INJECTION, SOLUTION EPIDURAL; INFILTRATION; INTRACAUDAL; PERINEURAL at 15:00

## 2023-06-28 RX ADMIN — SODIUM CHLORIDE, SODIUM LACTATE, POTASSIUM CHLORIDE, CALCIUM CHLORIDE: 600; 310; 30; 20 INJECTION, SOLUTION INTRAVENOUS at 12:51

## 2023-06-29 ENCOUNTER — TELEPHONE (OUTPATIENT)
Dept: GASTROENTEROLOGY | Age: 68
End: 2023-06-29

## 2023-06-29 RX ORDER — ONDANSETRON 4 MG/1
TABLET, FILM COATED ORAL
Qty: 6 TABLET | Refills: 0 | Status: SHIPPED | OUTPATIENT
Start: 2023-06-29

## 2023-07-04 ENCOUNTER — HOSPITAL ENCOUNTER (EMERGENCY)
Age: 68
Discharge: ANOTHER HOSPITAL | End: 2023-07-04
Attending: PEDIATRICS
Payer: MEDICARE

## 2023-07-04 VITALS
DIASTOLIC BLOOD PRESSURE: 65 MMHG | RESPIRATION RATE: 11 BRPM | HEART RATE: 78 BPM | SYSTOLIC BLOOD PRESSURE: 92 MMHG | TEMPERATURE: 97.7 F | OXYGEN SATURATION: 96 %

## 2023-07-04 DIAGNOSIS — I95.9 HYPOTENSION, UNSPECIFIED HYPOTENSION TYPE: ICD-10-CM

## 2023-07-04 DIAGNOSIS — K92.2 GASTROINTESTINAL HEMORRHAGE, UNSPECIFIED GASTROINTESTINAL HEMORRHAGE TYPE: Primary | ICD-10-CM

## 2023-07-04 LAB
ABO + RH BLD: NORMAL
ALBUMIN SERPL-MCNC: 2.5 G/DL (ref 3.5–5.2)
ALP SERPL-CCNC: 71 U/L (ref 40–130)
ALT SERPL-CCNC: 19 U/L (ref 5–41)
ANION GAP SERPL CALCULATED.3IONS-SCNC: 11 MMOL/L (ref 7–19)
APTT PPP: 34.4 SEC (ref 26–36.2)
AST SERPL-CCNC: 28 U/L (ref 5–40)
BASOPHILS # BLD: 0.1 K/UL (ref 0–0.2)
BASOPHILS NFR BLD: 0.6 % (ref 0–1)
BILIRUB SERPL-MCNC: 2.6 MG/DL (ref 0.2–1.2)
BLD GP AB SCN SERPL QL: NORMAL
BLOOD BANK DISPENSE STATUS: NORMAL
BLOOD BANK PRODUCT CODE: NORMAL
BPU ID: NORMAL
BUN SERPL-MCNC: 14 MG/DL (ref 8–23)
CALCIUM SERPL-MCNC: 8.1 MG/DL (ref 8.8–10.2)
CHLORIDE SERPL-SCNC: 108 MMOL/L (ref 98–111)
CO2 SERPL-SCNC: 18 MMOL/L (ref 22–29)
CREAT SERPL-MCNC: 0.7 MG/DL (ref 0.5–1.2)
DESCRIPTION BLOOD BANK: NORMAL
EOSINOPHIL # BLD: 0.3 K/UL (ref 0–0.6)
EOSINOPHIL NFR BLD: 2.8 % (ref 0–5)
ERYTHROCYTE [DISTWIDTH] IN BLOOD BY AUTOMATED COUNT: 16.7 % (ref 11.5–14.5)
GLUCOSE SERPL-MCNC: 208 MG/DL (ref 74–109)
HCT VFR BLD AUTO: 30.6 % (ref 42–52)
HGB BLD-MCNC: 9.8 G/DL (ref 14–18)
IMM GRANULOCYTES # BLD: 0.1 K/UL
INR PPP: 2.28 (ref 0.88–1.18)
LIPASE SERPL-CCNC: 113 U/L (ref 13–60)
LYMPHOCYTES # BLD: 2.2 K/UL (ref 1.1–4.5)
LYMPHOCYTES NFR BLD: 20.6 % (ref 20–40)
MCH RBC QN AUTO: 39.2 PG (ref 27–31)
MCHC RBC AUTO-ENTMCNC: 32 G/DL (ref 33–37)
MCV RBC AUTO: 122.4 FL (ref 80–94)
MONOCYTES # BLD: 1.4 K/UL (ref 0–0.9)
MONOCYTES NFR BLD: 12.7 % (ref 0–10)
NEUTROPHILS # BLD: 6.8 K/UL (ref 1.5–7.5)
NEUTS SEG NFR BLD: 62 % (ref 50–65)
PLATELET # BLD AUTO: 65 K/UL (ref 130–400)
PMV BLD AUTO: 10.8 FL (ref 9.4–12.4)
POTASSIUM SERPL-SCNC: 4.2 MMOL/L (ref 3.5–5)
PROT SERPL-MCNC: 4.7 G/DL (ref 6.6–8.7)
PROTHROMBIN TIME: 24.6 SEC (ref 12–14.6)
RBC # BLD AUTO: 2.5 M/UL (ref 4.7–6.1)
REASON FOR REJECTION: NORMAL
REJECTED TEST: NORMAL
SODIUM SERPL-SCNC: 137 MMOL/L (ref 136–145)
TROPONIN T SERPL-MCNC: <0.01 NG/ML (ref 0–0.03)
WBC # BLD AUTO: 10.9 K/UL (ref 4.8–10.8)

## 2023-07-04 PROCEDURE — 83690 ASSAY OF LIPASE: CPT

## 2023-07-04 PROCEDURE — 93005 ELECTROCARDIOGRAM TRACING: CPT | Performed by: PEDIATRICS

## 2023-07-04 PROCEDURE — 86900 BLOOD TYPING SEROLOGIC ABO: CPT

## 2023-07-04 PROCEDURE — 99285 EMERGENCY DEPT VISIT HI MDM: CPT

## 2023-07-04 PROCEDURE — 96365 THER/PROPH/DIAG IV INF INIT: CPT

## 2023-07-04 PROCEDURE — 85610 PROTHROMBIN TIME: CPT

## 2023-07-04 PROCEDURE — 86850 RBC ANTIBODY SCREEN: CPT

## 2023-07-04 PROCEDURE — 84484 ASSAY OF TROPONIN QUANT: CPT

## 2023-07-04 PROCEDURE — 85025 COMPLETE CBC W/AUTO DIFF WBC: CPT

## 2023-07-04 PROCEDURE — 80053 COMPREHEN METABOLIC PANEL: CPT

## 2023-07-04 PROCEDURE — 2580000003 HC RX 258: Performed by: PEDIATRICS

## 2023-07-04 PROCEDURE — 96375 TX/PRO/DX INJ NEW DRUG ADDON: CPT

## 2023-07-04 PROCEDURE — 96376 TX/PRO/DX INJ SAME DRUG ADON: CPT

## 2023-07-04 PROCEDURE — 6360000002 HC RX W HCPCS: Performed by: PEDIATRICS

## 2023-07-04 PROCEDURE — P9016 RBC LEUKOCYTES REDUCED: HCPCS

## 2023-07-04 PROCEDURE — 85730 THROMBOPLASTIN TIME PARTIAL: CPT

## 2023-07-04 PROCEDURE — 86901 BLOOD TYPING SEROLOGIC RH(D): CPT

## 2023-07-04 PROCEDURE — 36415 COLL VENOUS BLD VENIPUNCTURE: CPT

## 2023-07-04 PROCEDURE — C9113 INJ PANTOPRAZOLE SODIUM, VIA: HCPCS | Performed by: PEDIATRICS

## 2023-07-04 PROCEDURE — 86923 COMPATIBILITY TEST ELECTRIC: CPT

## 2023-07-04 RX ORDER — SODIUM CHLORIDE 9 MG/ML
INJECTION, SOLUTION INTRAVENOUS PRN
Status: DISCONTINUED | OUTPATIENT
Start: 2023-07-04 | End: 2023-07-04 | Stop reason: HOSPADM

## 2023-07-04 RX ORDER — 0.9 % SODIUM CHLORIDE 0.9 %
1000 INTRAVENOUS SOLUTION INTRAVENOUS ONCE
Status: DISCONTINUED | OUTPATIENT
Start: 2023-07-04 | End: 2023-07-04 | Stop reason: HOSPADM

## 2023-07-04 RX ORDER — OCTREOTIDE ACETATE 50 UG/ML
50 INJECTION, SOLUTION INTRAVENOUS; SUBCUTANEOUS ONCE
Status: COMPLETED | OUTPATIENT
Start: 2023-07-04 | End: 2023-07-04

## 2023-07-04 RX ORDER — PANTOPRAZOLE SODIUM 40 MG/10ML
80 INJECTION, POWDER, LYOPHILIZED, FOR SOLUTION INTRAVENOUS ONCE
Status: COMPLETED | OUTPATIENT
Start: 2023-07-04 | End: 2023-07-04

## 2023-07-04 RX ADMIN — OCTREOTIDE ACETATE 50 MCG: 50 INJECTION, SOLUTION INTRAVENOUS; SUBCUTANEOUS at 02:11

## 2023-07-04 RX ADMIN — OCTREOTIDE ACETATE 50 MCG/HR: 500 INJECTION, SOLUTION INTRAVENOUS; SUBCUTANEOUS at 02:18

## 2023-07-04 RX ADMIN — SODIUM CHLORIDE 8 MG/HR: 9 INJECTION, SOLUTION INTRAVENOUS at 02:19

## 2023-07-04 RX ADMIN — PANTOPRAZOLE SODIUM 80 MG: 40 INJECTION, POWDER, FOR SOLUTION INTRAVENOUS at 02:11

## 2023-07-04 ASSESSMENT — ENCOUNTER SYMPTOMS
EYE DISCHARGE: 0
RHINORRHEA: 0
COUGH: 0
SHORTNESS OF BREATH: 0
NAUSEA: 0
DIARRHEA: 1
ABDOMINAL PAIN: 0
COLOR CHANGE: 0
VOMITING: 0
BLOOD IN STOOL: 1
BACK PAIN: 0

## 2023-07-04 ASSESSMENT — PAIN - FUNCTIONAL ASSESSMENT: PAIN_FUNCTIONAL_ASSESSMENT: NONE - DENIES PAIN

## 2023-07-04 NOTE — ED NOTES
PT gave verbal consent for blood transfusion, PT wife signed blood consent      China Anna RN  07/04/23 7842

## 2023-07-04 NOTE — ED NOTES
[address] [facility] [address] transfer center spoke with Darian. [facility] [employer]. 0200 weather check  0205 Air evac 120 and 3 declined due to weather. No IFR available at time. Charge nurse notified.       Zoey Heller  07/04/23 0204

## 2023-07-04 NOTE — ED NOTES
26 [place] life flight called back spoke with Beena. Life flight 8 accepted with a 58 minute ETA.   0368 Chart faxed to [facility],PBX, and Clinical house made aware of helicopter coming.       Elroy Patton  07/04/23 5511

## 2023-07-04 NOTE — ED PROVIDER NOTES
[address] [facility] EMERGENCY DEPT  eMERGENCY dEPARTMENT eNCOUnter      Pt Name: Stephanie Huddleston  MRN: [id number]  [address] [facility] 1955  Date of evaluation: 7/4/2023  Provider: Divine Perkins MD    [address]       Chief Complaint   Patient presents with    Rectal Bleeding     Patient was hypotensive per EMS. He has had 1200 NS and 300 LR pta           HISTORY OF PRESENT ILLNESS   (Location/Symptom, Timing/Onset,Context/Setting, Quality, Duration, Modifying Factors, Severity)  Note limiting factors. Stephanie Huddleston is a 70 y.o. male who presents to the emergency department with lightheadedness. EMS states that they were called to patient home due to GI bleeding and lightheadedness. When they arrived, patient's blood pressures were 69/40 with a recheck of 64/29. EMS states that they saw \"a lot of blood both black and tarry and bright red. \"  Patient states that this has been going on for the last 2 days. Patient admits that he should have come in sooner but \"I do not like hospitals. \"  States that \"4 hours ago it got really bad and I had to call the ambulance. Patient last ate at 7 PM.  Patient has received IV normal saline 1200 cc and  cc. Telemetry blood pressure on arrival is 90. Patient denies nausea, vomiting, passing out, difficulty breathing, abdominal pain, chest pain, confusion, or passing out. Is not currently on a blood thinner. HPI    NursingNotes were reviewed. REVIEW OF SYSTEMS    (2-9 systems for level 4, 10 or more for level 5)     Review of Systems   Constitutional:  Negative for chills and fever. HENT:  Negative for congestion and rhinorrhea. Eyes:  Negative for discharge. Respiratory:  Negative for cough and shortness of breath. Cardiovascular:  Negative for chest pain and palpitations. Gastrointestinal:  Positive for blood in stool and diarrhea. Negative for abdominal pain, nausea and vomiting. Genitourinary:  Negative for difficulty urinating and dysuria.    Musculoskeletal:

## 2023-07-04 NOTE — ED NOTES
Miladys Limon at [facility] called back with accepting info. Patient accepted ED to ED. Dr. Rosalee Eid accepting. Fax chart to [phone]. Call report to [phone] with Colin Boston at [address] to call us back.      Guerita Cameron  07/04/23 [id number]

## 2023-07-05 LAB
EKG P AXIS: NORMAL DEGREES
EKG P-R INTERVAL: NORMAL MS
EKG Q-T INTERVAL: 378 MS
EKG QRS DURATION: 124 MS
EKG QTC CALCULATION (BAZETT): 441 MS
EKG T AXIS: 18 DEGREES

## 2023-07-05 PROCEDURE — 93010 ELECTROCARDIOGRAM REPORT: CPT | Performed by: INTERNAL MEDICINE

## 2023-07-06 ENCOUNTER — TELEPHONE (OUTPATIENT)
Dept: GASTROENTEROLOGY | Age: 68
End: 2023-07-06

## 2023-07-13 NOTE — TELEPHONE ENCOUNTER
07- Faxed Records request to [facility] [phone] for [address], Discharge, op notes/path and labs        07- Received Records form [place]   Scanned into media and forwarded to Dr Peter Villa,

## 2023-08-15 ENCOUNTER — OFFICE VISIT (OUTPATIENT)
Dept: GASTROENTEROLOGY | Age: 68
End: 2023-08-15
Payer: MEDICARE

## 2023-08-15 VITALS
SYSTOLIC BLOOD PRESSURE: 120 MMHG | OXYGEN SATURATION: 91 % | WEIGHT: 178 LBS | HEART RATE: 78 BPM | BODY MASS INDEX: 26.36 KG/M2 | HEIGHT: 69 IN | DIASTOLIC BLOOD PRESSURE: 80 MMHG

## 2023-08-15 DIAGNOSIS — D64.9 ANEMIA, UNSPECIFIED TYPE: Primary | ICD-10-CM

## 2023-08-15 DIAGNOSIS — K92.2 GASTROINTESTINAL HEMORRHAGE, UNSPECIFIED GASTROINTESTINAL HEMORRHAGE TYPE: ICD-10-CM

## 2023-08-15 DIAGNOSIS — D64.9 ANEMIA, UNSPECIFIED TYPE: ICD-10-CM

## 2023-08-15 DIAGNOSIS — K74.60 CIRRHOSIS OF LIVER WITHOUT ASCITES, UNSPECIFIED HEPATIC CIRRHOSIS TYPE (HCC): ICD-10-CM

## 2023-08-15 LAB
HCT VFR BLD AUTO: 39.7 % (ref 42–52)
HGB BLD-MCNC: 12.6 G/DL (ref 14–18)

## 2023-08-15 PROCEDURE — G8417 CALC BMI ABV UP PARAM F/U: HCPCS | Performed by: NURSE PRACTITIONER

## 2023-08-15 PROCEDURE — 1123F ACP DISCUSS/DSCN MKR DOCD: CPT | Performed by: NURSE PRACTITIONER

## 2023-08-15 PROCEDURE — 3017F COLORECTAL CA SCREEN DOC REV: CPT | Performed by: NURSE PRACTITIONER

## 2023-08-15 PROCEDURE — G8427 DOCREV CUR MEDS BY ELIG CLIN: HCPCS | Performed by: NURSE PRACTITIONER

## 2023-08-15 PROCEDURE — 4004F PT TOBACCO SCREEN RCVD TLK: CPT | Performed by: NURSE PRACTITIONER

## 2023-08-15 PROCEDURE — 99214 OFFICE O/P EST MOD 30 MIN: CPT | Performed by: NURSE PRACTITIONER

## 2023-08-15 RX ORDER — TEMAZEPAM 30 MG/1
30 CAPSULE ORAL NIGHTLY PRN
COMMUNITY

## 2023-08-15 RX ORDER — LANOLIN ALCOHOL/MO/W.PET/CERES
400 CREAM (GRAM) TOPICAL DAILY
COMMUNITY

## 2023-08-15 RX ORDER — CARVEDILOL 3.12 MG/1
3.12 TABLET ORAL 2 TIMES DAILY WITH MEALS
COMMUNITY

## 2023-08-15 ASSESSMENT — ENCOUNTER SYMPTOMS
ANAL BLEEDING: 0
DIARRHEA: 0
RECTAL PAIN: 0
NAUSEA: 0
ABDOMINAL DISTENTION: 0
COUGH: 0
CONSTIPATION: 0
CHOKING: 0
VOMITING: 0
SHORTNESS OF BREATH: 0
TROUBLE SWALLOWING: 0
ABDOMINAL PAIN: 0
BLOOD IN STOOL: 0

## 2023-08-18 ENCOUNTER — TRANSCRIBE ORDERS (OUTPATIENT)
Dept: ADMINISTRATIVE | Age: 68
End: 2023-08-18

## 2023-08-18 DIAGNOSIS — K70.30 ALCOHOLIC CIRRHOSIS OF LIVER WITHOUT ASCITES (HCC): Primary | ICD-10-CM

## 2023-08-22 ENCOUNTER — HOSPITAL ENCOUNTER (OUTPATIENT)
Dept: ULTRASOUND IMAGING | Age: 68
Discharge: HOME | End: 2023-08-22
Payer: MEDICARE

## 2023-08-22 DIAGNOSIS — K70.30 ALCOHOLIC CIRRHOSIS OF LIVER WITHOUT ASCITES (HCC): ICD-10-CM

## 2023-08-22 PROCEDURE — 76700 US EXAM ABDOM COMPLETE: CPT

## 2023-12-10 SDOH — HEALTH STABILITY: PHYSICAL HEALTH: ON AVERAGE, HOW MANY MINUTES DO YOU ENGAGE IN EXERCISE AT THIS LEVEL?: 90 MIN

## 2023-12-10 SDOH — HEALTH STABILITY: PHYSICAL HEALTH: ON AVERAGE, HOW MANY DAYS PER WEEK DO YOU ENGAGE IN MODERATE TO STRENUOUS EXERCISE (LIKE A BRISK WALK)?: 3 DAYS

## 2023-12-12 ENCOUNTER — OFFICE VISIT (OUTPATIENT)
Dept: PRIMARY CARE CLINIC | Age: 68
End: 2023-12-12

## 2023-12-12 VITALS
SYSTOLIC BLOOD PRESSURE: 130 MMHG | HEART RATE: 62 BPM | TEMPERATURE: 97.3 F | BODY MASS INDEX: 26.6 KG/M2 | OXYGEN SATURATION: 96 % | HEIGHT: 69 IN | DIASTOLIC BLOOD PRESSURE: 82 MMHG | WEIGHT: 179.6 LBS

## 2023-12-12 DIAGNOSIS — M54.50 LUMBAR PAIN: ICD-10-CM

## 2023-12-12 DIAGNOSIS — Z12.5 SCREENING FOR PROSTATE CANCER: ICD-10-CM

## 2023-12-12 DIAGNOSIS — Z00.00 ENCOUNTER FOR MEDICAL EXAMINATION TO ESTABLISH CARE: Primary | ICD-10-CM

## 2023-12-12 DIAGNOSIS — Z13.220 SCREENING FOR HYPERLIPIDEMIA: ICD-10-CM

## 2023-12-12 DIAGNOSIS — Z79.899 DRUG THERAPY: ICD-10-CM

## 2023-12-12 RX ORDER — TIZANIDINE 4 MG/1
4 TABLET ORAL EVERY 8 HOURS PRN
COMMUNITY
Start: 2023-12-08

## 2023-12-12 RX ORDER — METHYLPREDNISOLONE 4 MG/1
TABLET ORAL
Qty: 1 KIT | Refills: 0 | Status: SHIPPED | OUTPATIENT
Start: 2023-12-12 | End: 2023-12-18

## 2023-12-12 SDOH — ECONOMIC STABILITY: FOOD INSECURITY: WITHIN THE PAST 12 MONTHS, YOU WORRIED THAT YOUR FOOD WOULD RUN OUT BEFORE YOU GOT MONEY TO BUY MORE.: NEVER TRUE

## 2023-12-12 SDOH — ECONOMIC STABILITY: INCOME INSECURITY: HOW HARD IS IT FOR YOU TO PAY FOR THE VERY BASICS LIKE FOOD, HOUSING, MEDICAL CARE, AND HEATING?: NOT HARD AT ALL

## 2023-12-12 SDOH — ECONOMIC STABILITY: FOOD INSECURITY: WITHIN THE PAST 12 MONTHS, THE FOOD YOU BOUGHT JUST DIDN'T LAST AND YOU DIDN'T HAVE MONEY TO GET MORE.: NEVER TRUE

## 2023-12-12 SDOH — ECONOMIC STABILITY: HOUSING INSECURITY
IN THE LAST 12 MONTHS, WAS THERE A TIME WHEN YOU DID NOT HAVE A STEADY PLACE TO SLEEP OR SLEPT IN A SHELTER (INCLUDING NOW)?: NO

## 2023-12-12 ASSESSMENT — PATIENT HEALTH QUESTIONNAIRE - PHQ9
SUM OF ALL RESPONSES TO PHQ QUESTIONS 1-9: 0
SUM OF ALL RESPONSES TO PHQ9 QUESTIONS 1 & 2: 0
1. LITTLE INTEREST OR PLEASURE IN DOING THINGS: 0
2. FEELING DOWN, DEPRESSED OR HOPELESS: 0
SUM OF ALL RESPONSES TO PHQ QUESTIONS 1-9: 0

## 2023-12-15 PROBLEM — M54.50 LUMBAR PAIN: Status: ACTIVE | Noted: 2023-12-15

## 2023-12-15 PROBLEM — Z00.00 ENCOUNTER FOR MEDICAL EXAMINATION TO ESTABLISH CARE: Status: ACTIVE | Noted: 2023-12-15

## 2023-12-15 NOTE — ASSESSMENT & PLAN NOTE
Patient reports acute tenderness located in the lumbar spine. He denies any associated radiculopathy, and states the pain is exacerbated with changing positions from sitting to standing. Will treat today with oral steroids in attempts to reduce inflammation. Advised that he call back or return to clinic with any worsening symptoms or if not improved.

## 2023-12-15 NOTE — PROGRESS NOTES
Abdominal:      General: Bowel sounds are normal.      Palpations: Abdomen is soft. Musculoskeletal:         General: Normal range of motion. Cervical back: Normal range of motion. Skin:     General: Skin is warm and dry. Neurological:      Mental Status: He is alert and oriented to person, place, and time.    Psychiatric:         Mood and Affect: Mood normal.         Behavior: Behavior normal.         Electronically signed by SHEILA Montoya CNP on 12/12/2023 at 11:14 AM.

## 2023-12-15 NOTE — ASSESSMENT & PLAN NOTE
Patient here today for the purpose of establishing care. He has recently moved to the area, and seeking primary care. Patient reports he takes Restoril to help him sleep at night and and complains of low back pain that has been present for the past 3-4 days after doing some work around the house.

## 2023-12-18 DIAGNOSIS — Z00.00 ENCOUNTER FOR MEDICAL EXAMINATION TO ESTABLISH CARE: ICD-10-CM

## 2023-12-18 DIAGNOSIS — Z12.5 SCREENING FOR PROSTATE CANCER: ICD-10-CM

## 2023-12-18 DIAGNOSIS — Z13.220 SCREENING FOR HYPERLIPIDEMIA: ICD-10-CM

## 2023-12-18 LAB
ALBUMIN SERPL-MCNC: 3.9 G/DL (ref 3.5–5.2)
ALP SERPL-CCNC: 91 U/L (ref 40–130)
ALT SERPL-CCNC: 73 U/L (ref 5–41)
ANION GAP SERPL CALCULATED.3IONS-SCNC: 10 MMOL/L (ref 7–19)
AST SERPL-CCNC: 70 U/L (ref 5–40)
BASOPHILS # BLD: 0 K/UL (ref 0–0.2)
BASOPHILS NFR BLD: 0.1 % (ref 0–1)
BILIRUB SERPL-MCNC: 4.3 MG/DL (ref 0.2–1.2)
BUN SERPL-MCNC: 16 MG/DL (ref 8–23)
CALCIUM SERPL-MCNC: 9.2 MG/DL (ref 8.8–10.2)
CHLORIDE SERPL-SCNC: 103 MMOL/L (ref 98–111)
CHOLEST SERPL-MCNC: 219 MG/DL (ref 160–199)
CO2 SERPL-SCNC: 26 MMOL/L (ref 22–29)
CREAT SERPL-MCNC: 0.5 MG/DL (ref 0.5–1.2)
EOSINOPHIL # BLD: 0.1 K/UL (ref 0–0.6)
EOSINOPHIL NFR BLD: 0.6 % (ref 0–5)
ERYTHROCYTE [DISTWIDTH] IN BLOOD BY AUTOMATED COUNT: 18.3 % (ref 11.5–14.5)
GLUCOSE SERPL-MCNC: 133 MG/DL (ref 74–109)
HCT VFR BLD AUTO: 43.8 % (ref 42–52)
HDLC SERPL-MCNC: 112 MG/DL (ref 55–121)
HGB BLD-MCNC: 14.5 G/DL (ref 14–18)
IMM GRANULOCYTES # BLD: 0.2 K/UL
LDLC SERPL CALC-MCNC: 94 MG/DL
LYMPHOCYTES # BLD: 1.2 K/UL (ref 1.1–4.5)
LYMPHOCYTES NFR BLD: 13.8 % (ref 20–40)
MCH RBC QN AUTO: 38.1 PG (ref 27–31)
MCHC RBC AUTO-ENTMCNC: 33.1 G/DL (ref 33–37)
MCV RBC AUTO: 115 FL (ref 80–94)
MONOCYTES # BLD: 1.1 K/UL (ref 0–0.9)
MONOCYTES NFR BLD: 12.6 % (ref 0–10)
NEUTROPHILS # BLD: 6.2 K/UL (ref 1.5–7.5)
NEUTS SEG NFR BLD: 71.2 % (ref 50–65)
PLATELET # BLD AUTO: 46 K/UL (ref 130–400)
PMV BLD AUTO: 11.5 FL (ref 9.4–12.4)
POTASSIUM SERPL-SCNC: 4.1 MMOL/L (ref 3.5–5)
PROT SERPL-MCNC: 6.8 G/DL (ref 6.6–8.7)
PSA SERPL-MCNC: 0.18 NG/ML (ref 0–4)
RBC # BLD AUTO: 3.81 M/UL (ref 4.7–6.1)
SODIUM SERPL-SCNC: 139 MMOL/L (ref 136–145)
TRIGL SERPL-MCNC: 64 MG/DL (ref 0–149)
WBC # BLD AUTO: 8.7 K/UL (ref 4.8–10.8)

## 2023-12-24 ENCOUNTER — PATIENT MESSAGE (OUTPATIENT)
Dept: PRIMARY CARE CLINIC | Age: 68
End: 2023-12-24

## 2023-12-24 DIAGNOSIS — F51.01 PRIMARY INSOMNIA: Primary | ICD-10-CM

## 2023-12-26 RX ORDER — TEMAZEPAM 30 MG/1
30 CAPSULE ORAL NIGHTLY PRN
Qty: 30 CAPSULE | Refills: 0 | Status: SHIPPED | OUTPATIENT
Start: 2023-12-26 | End: 2024-01-25

## 2023-12-26 NOTE — TELEPHONE ENCOUNTER
Stephanie Huddleston called to request a refill on his medication. Last office visit : 12/12/2023   Next office visit : 3/12/2024     Last UDS:   Amphetamine Screen, Urine   Date Value Ref Range Status   12/12/2023 neg  Final     Barbiturate Screen, Urine   Date Value Ref Range Status   12/12/2023 neg  Final     Benzodiazepine Screen, Urine   Date Value Ref Range Status   12/12/2023 pos  Final     Buprenorphine Urine   Date Value Ref Range Status   12/12/2023 neg  Final     Cocaine Metabolite Screen, Urine   Date Value Ref Range Status   12/12/2023 neg  Final     Gabapentin Screen, Urine   Date Value Ref Range Status   12/12/2023 neg  Final     MDMA, Urine   Date Value Ref Range Status   12/12/2023 neg  Final     Methamphetamine, Urine   Date Value Ref Range Status   12/12/2023 neg  Final     Opiate Scrn, Ur   Date Value Ref Range Status   12/12/2023 neg  Final     Oxycodone Screen, Ur   Date Value Ref Range Status   12/12/2023 neg  Final     PCP Screen, Urine   Date Value Ref Range Status   12/12/2023 neg  Final     Propoxyphene Screen, Urine   Date Value Ref Range Status   12/12/2023 neg  Final     THC Screen, Urine   Date Value Ref Range Status   12/12/2023 neg  Final     Tricyclic Antidepressants, Urine   Date Value Ref Range Status   12/12/2023 neg  Final       Last Ginger Sciara: 12/12/2023  Medication Contract: 12/12/2023   Last Fill: 11/11/2023    Requested Prescriptions     Pending Prescriptions Disp Refills    temazepam (RESTORIL) 30 MG capsule 30 capsule 0     Sig: Take 1 capsule by mouth nightly as needed for Sleep for up to 30 days. Max Daily Amount: 30 mg         Please approve or refuse this medication.    Maricruz Marcano MA

## 2024-01-14 PROBLEM — Z00.00 ENCOUNTER FOR MEDICAL EXAMINATION TO ESTABLISH CARE: Status: RESOLVED | Noted: 2023-12-15 | Resolved: 2024-01-14

## 2024-01-16 DIAGNOSIS — F51.01 PRIMARY INSOMNIA: ICD-10-CM

## 2024-01-17 RX ORDER — SPIRONOLACTONE 25 MG/1
25 TABLET ORAL DAILY
Qty: 90 TABLET | Refills: 1 | Status: SHIPPED | OUTPATIENT
Start: 2024-01-17

## 2024-01-17 RX ORDER — CARVEDILOL 3.12 MG/1
3.12 TABLET ORAL 2 TIMES DAILY WITH MEALS
Qty: 180 TABLET | Refills: 1 | Status: SHIPPED | OUTPATIENT
Start: 2024-01-17

## 2024-01-17 RX ORDER — TEMAZEPAM 30 MG/1
30 CAPSULE ORAL NIGHTLY PRN
Qty: 30 CAPSULE | Refills: 0 | Status: SHIPPED | OUTPATIENT
Start: 2024-01-17 | End: 2024-02-16

## 2024-01-17 RX ORDER — FUROSEMIDE 20 MG/1
20 TABLET ORAL 2 TIMES DAILY
Qty: 180 TABLET | Refills: 1 | Status: SHIPPED | OUTPATIENT
Start: 2024-01-17

## 2024-02-26 ENCOUNTER — TELEPHONE (OUTPATIENT)
Dept: NEUROLOGY | Age: 69
End: 2024-02-26

## 2024-02-26 ENCOUNTER — PATIENT MESSAGE (OUTPATIENT)
Dept: PRIMARY CARE CLINIC | Age: 69
End: 2024-02-26

## 2024-02-26 DIAGNOSIS — F51.01 PRIMARY INSOMNIA: ICD-10-CM

## 2024-02-26 NOTE — TELEPHONE ENCOUNTER
Nye requests a call back please, he called this afternoon to check on status of referral.    Thank you.

## 2024-02-26 NOTE — TELEPHONE ENCOUNTER
----- Message from Ney Ann sent at 2/26/2024 11:28 AM CST -----  Regarding: Prescription Refill  Contact: [phone]  could you please submit refill for:  TEMAZEPAM, 30 MG. Can this be submitted for 90 days? Needed for sleep. thank you

## 2024-02-26 NOTE — TELEPHONE ENCOUNTER
From: Ney Ann  To: Izabela Robles  Sent: 2/26/2024 11:28 AM CST  Subject: Prescription Refill    could you please submit refill for:  TEMAZEPAM, 30 MG. Can this be submitted for 90 days? Needed for sleep. thank you

## 2024-02-26 NOTE — TELEPHONE ENCOUNTER
Ney BANG Ann called to request a refill on his medication.  Patient is requesting 90 day supply.    Last office visit : 12/12/2023   Next office visit : 3/12/2024     Last UDS:   Amphetamine Screen, Urine   Date Value Ref Range Status   12/12/2023 neg  Final     Barbiturate Screen, Urine   Date Value Ref Range Status   12/12/2023 neg  Final     Benzodiazepine Screen, Urine   Date Value Ref Range Status   12/12/2023 pos  Final     Buprenorphine Urine   Date Value Ref Range Status   12/12/2023 neg  Final     Cocaine Metabolite Screen, Urine   Date Value Ref Range Status   12/12/2023 neg  Final     Gabapentin Screen, Urine   Date Value Ref Range Status   12/12/2023 neg  Final     MDMA, Urine   Date Value Ref Range Status   12/12/2023 neg  Final     Methamphetamine, Urine   Date Value Ref Range Status   12/12/2023 neg  Final     Opiate Scrn, Ur   Date Value Ref Range Status   12/12/2023 neg  Final     Oxycodone Screen, Ur   Date Value Ref Range Status   12/12/2023 neg  Final     PCP Screen, Urine   Date Value Ref Range Status   12/12/2023 neg  Final     Propoxyphene Screen, Urine   Date Value Ref Range Status   12/12/2023 neg  Final     THC Screen, Urine   Date Value Ref Range Status   12/12/2023 neg  Final     Tricyclic Antidepressants, Urine   Date Value Ref Range Status   12/12/2023 neg  Final       Last Stoney: 12/26/23  Medication Contract: 12/12/23   Last Fill: 1/17/24    Requested Prescriptions     Pending Prescriptions Disp Refills    temazepam (RESTORIL) 30 MG capsule 30 capsule 0     Sig: Take 1 capsule by mouth nightly as needed for Sleep for up to 30 days. Max Daily Amount: 30 mg                       Please approve or refuse this medication.   Glenna Malave LPN

## 2024-02-28 RX ORDER — TEMAZEPAM 30 MG/1
30 CAPSULE ORAL NIGHTLY PRN
Qty: 30 CAPSULE | Refills: 0 | Status: SHIPPED | OUTPATIENT
Start: 2024-02-28 | End: 2024-03-29

## 2024-03-06 ENCOUNTER — OFFICE VISIT (OUTPATIENT)
Dept: NEUROLOGY | Age: 69
End: 2024-03-06

## 2024-03-06 VITALS
SYSTOLIC BLOOD PRESSURE: 152 MMHG | BODY MASS INDEX: 28.44 KG/M2 | HEART RATE: 96 BPM | WEIGHT: 192 LBS | DIASTOLIC BLOOD PRESSURE: 90 MMHG | HEIGHT: 69 IN

## 2024-03-06 DIAGNOSIS — R29.898 WEAKNESS OF BOTH LOWER EXTREMITIES: ICD-10-CM

## 2024-03-06 DIAGNOSIS — R29.898 WEAKNESS OF BOTH LOWER EXTREMITIES: Primary | ICD-10-CM

## 2024-03-06 DIAGNOSIS — M54.50 MIDLINE LOW BACK PAIN, UNSPECIFIED CHRONICITY, UNSPECIFIED WHETHER SCIATICA PRESENT: ICD-10-CM

## 2024-03-06 DIAGNOSIS — G62.9 NEUROPATHY: ICD-10-CM

## 2024-03-06 LAB
ALBUMIN SERPL-MCNC: 4.1 G/DL (ref 3.5–5.2)
ALP SERPL-CCNC: 104 U/L (ref 40–130)
ALT SERPL-CCNC: 30 U/L (ref 5–41)
ANION GAP SERPL CALCULATED.3IONS-SCNC: 17 MMOL/L (ref 7–19)
AST SERPL-CCNC: 46 U/L (ref 5–40)
BASOPHILS # BLD: 0 K/UL (ref 0–0.2)
BASOPHILS NFR BLD: 0.5 % (ref 0–1)
BILIRUB SERPL-MCNC: 3.6 MG/DL (ref 0.2–1.2)
BUN SERPL-MCNC: 8 MG/DL (ref 8–23)
CALCIUM SERPL-MCNC: 9.4 MG/DL (ref 8.8–10.2)
CHLORIDE SERPL-SCNC: 106 MMOL/L (ref 98–111)
CK SERPL-CCNC: 77 U/L (ref 39–308)
CO2 SERPL-SCNC: 20 MMOL/L (ref 22–29)
CREAT SERPL-MCNC: 0.5 MG/DL (ref 0.5–1.2)
CRP SERPL HS-MCNC: <0.3 MG/DL (ref 0–0.5)
EOSINOPHIL # BLD: 0.1 K/UL (ref 0–0.6)
EOSINOPHIL NFR BLD: 1.4 % (ref 0–5)
ERYTHROCYTE [DISTWIDTH] IN BLOOD BY AUTOMATED COUNT: 15.8 % (ref 11.5–14.5)
ERYTHROCYTE [SEDIMENTATION RATE] IN BLOOD BY WESTERGREN METHOD: 7 MM/HR (ref 0–15)
GLUCOSE SERPL-MCNC: 122 MG/DL (ref 74–109)
HCT VFR BLD AUTO: 42.5 % (ref 42–52)
HGB BLD-MCNC: 14 G/DL (ref 14–18)
IMM GRANULOCYTES # BLD: 0 K/UL
LYMPHOCYTES # BLD: 0.8 K/UL (ref 1.1–4.5)
LYMPHOCYTES NFR BLD: 18.6 % (ref 20–40)
MCH RBC QN AUTO: 37.3 PG (ref 27–31)
MCHC RBC AUTO-ENTMCNC: 32.9 G/DL (ref 33–37)
MCV RBC AUTO: 113.3 FL (ref 80–94)
MONOCYTES # BLD: 0.6 K/UL (ref 0–0.9)
MONOCYTES NFR BLD: 14.2 % (ref 0–10)
NEUTROPHILS # BLD: 2.8 K/UL (ref 1.5–7.5)
NEUTS SEG NFR BLD: 65.1 % (ref 50–65)
PLATELET # BLD AUTO: 42 K/UL (ref 130–400)
PMV BLD AUTO: 10.8 FL (ref 9.4–12.4)
POTASSIUM SERPL-SCNC: 3.2 MMOL/L (ref 3.5–5)
PROT SERPL-MCNC: 6.9 G/DL (ref 6.6–8.7)
RBC # BLD AUTO: 3.75 M/UL (ref 4.7–6.1)
SODIUM SERPL-SCNC: 143 MMOL/L (ref 136–145)
VIT B12 SERPL-MCNC: 1321 PG/ML (ref 211–946)
WBC # BLD AUTO: 4.3 K/UL (ref 4.8–10.8)

## 2024-03-06 NOTE — PROGRESS NOTES
REVIEW OF SYSTEMS    Constitutional: []Fever []Sweat []Chills [] Recent Injury [x] Denies all unless marked  HEENT:[]Headache  [] Head Injury/Hearing Loss  [] Sore Throat  [] Ear Ache/Dizziness  [x] Denies all unless marked  Spine:  [] Neck pain  [x] Back pain  [] Sciaticia  [x] Denies all unless marked  Cardiovascular:[]Heart Disease []Chest Pain [] Palpitations  [x] Denies all unless marked  Pulmonary: []Shortness of Breath []Cough   [x] Denies all unless marke  Gastrointestinal: []Nausea  []Vomiting  []Abdominal Pain  []Constipation  []Diarrhea  []Dark Bloody Stools  [x] Denies all unless marked  Psychiatric/Behavioral:[] Depression [] Anxiety [x] Denies all unless marked  Genitourinary:   [] Frequency  [] Urgency  [] Incontinence [] Pain with Urination  [x] Denies all unless marked  Extremities: [x]Pain  []Swelling  [x] Denies all unless marked  Musculoskeletal: [] Muscle Pain  [] Joint Pain  [] Arthritis [] Muscle Cramps [] Muscle Twitches  [x] Denies all unless marked  Sleep: [] Insomnia [] Snoring [] Restless Legs [] Sleep Apnea  [] Daytime Sleepiness  [x] Denies all unless marked  Skin:[] Rash [] Skin Discoloration [x] Denies all unless marked   Neurological: []Visual Disturbance/Memory Loss [] Loss of Balance [x] Slurred Speech/Weakness [] Seizures  [] Vertigo/Dizziness [x] Denies all unless marked

## 2024-03-06 NOTE — PROGRESS NOTES
Chief Complaint   Patient presents with    New Patient     Patient states about a month ago he started having back pain and muscle weakness in BLE. Patient denies any recent falls but says weakness is increasingly worse.       Ney Ann is a 68 y.o. year old male who is seen for evaluation of leg weakness.  For the past several months he has been complaining of a significant amount of low back pain and aching in his thighs.  The pain has been much better over the past couple of weeks but he feels that his legs are weak and have trouble caring him at times.  He has minimal symptoms in his arms.  He has a history of alcohol abuse but has not had any in a year.  Has a history of cirrhosis.  He denies any fevers chills sweats or weight loss.  Denies any over-the-counter supplements.  Is not taking a statin.  Denies any other focal neurological difficulties.  He is thirsty a lot..    Active Ambulatory Problems     Diagnosis Date Noted    Closed left hip fracture, initial encounter (HCC) 08/25/2022    New onset a-fib (HCC) 08/25/2022    Liver disease 08/25/2022    Hyperbilirubinemia 08/25/2022    Abnormal LFTs 08/25/2022    Thrombocytopenia (HCC) 08/25/2022    Leukopenia 08/25/2022    Hepatic cirrhosis (HCC) 08/26/2022    Umbilical hernia without obstruction and without gangrene 08/27/2022    Pancytopenia (HCC) 08/27/2022    Closed fracture of left hip (HCC) 08/27/2022    Coagulopathy (HCC) 10/27/2022    History of ETOH abuse 10/27/2022    Anemia 10/27/2022    Lumbar pain 12/15/2023     Resolved Ambulatory Problems     Diagnosis Date Noted    Encounter for medical examination to establish care 12/15/2023     Past Medical History:   Diagnosis Date    Atrial fibrillation (HCC)     Cirrhosis (HCC)     COVID-19     Hypothyroidism     Splenomegaly     Umbilical hernia     Vitamin D deficiency        Past Surgical History:   Procedure Laterality Date    COLONOSCOPY  2014    polyps per pt    COLONOSCOPY N/A 06/28/2023

## 2024-03-09 LAB
ENA JO1 AB TITR SER: 0 AU/ML (ref 0–40)
NUCLEAR IGG SER QL IA: NORMAL

## 2024-03-11 DIAGNOSIS — F51.01 PRIMARY INSOMNIA: ICD-10-CM

## 2024-03-11 NOTE — TELEPHONE ENCOUNTER
Ney BANG Ann called to request a refill on his medication.      Last office visit : 12/12/2023   Next office visit : 3/12/2024     Last UDS: 12/12/23  Amphetamine Screen, Urine   Date Value Ref Range Status   12/12/2023 neg  Final     Barbiturate Screen, Urine   Date Value Ref Range Status   12/12/2023 neg  Final     Benzodiazepine Screen, Urine   Date Value Ref Range Status   12/12/2023 pos  Final     Buprenorphine Urine   Date Value Ref Range Status   12/12/2023 neg  Final     Cocaine Metabolite Screen, Urine   Date Value Ref Range Status   12/12/2023 neg  Final     Gabapentin Screen, Urine   Date Value Ref Range Status   12/12/2023 neg  Final     MDMA, Urine   Date Value Ref Range Status   12/12/2023 neg  Final     Methamphetamine, Urine   Date Value Ref Range Status   12/12/2023 neg  Final     Opiate Scrn, Ur   Date Value Ref Range Status   12/12/2023 neg  Final     Oxycodone Screen, Ur   Date Value Ref Range Status   12/12/2023 neg  Final     PCP Screen, Urine   Date Value Ref Range Status   12/12/2023 neg  Final     Propoxyphene Screen, Urine   Date Value Ref Range Status   12/12/2023 neg  Final     THC Screen, Urine   Date Value Ref Range Status   12/12/2023 neg  Final     Tricyclic Antidepressants, Urine   Date Value Ref Range Status   12/12/2023 neg  Final       Last Stoney: 03/11/24   Medication Contract: 12/12/23   Last Fill: 01/29/24    Requested Prescriptions     Pending Prescriptions Disp Refills    temazepam (RESTORIL) 30 MG capsule 30 capsule 0     Sig: Take 1 capsule by mouth nightly as needed for Sleep for up to 30 days. Max Daily Amount: 30 mg         Please approve or refuse this medication.   Alexandra Cortez MA

## 2024-03-12 ENCOUNTER — OFFICE VISIT (OUTPATIENT)
Dept: PRIMARY CARE CLINIC | Age: 69
End: 2024-03-12
Payer: MEDICARE

## 2024-03-12 VITALS
TEMPERATURE: 97.5 F | SYSTOLIC BLOOD PRESSURE: 122 MMHG | WEIGHT: 186 LBS | BODY MASS INDEX: 27.55 KG/M2 | OXYGEN SATURATION: 93 % | HEART RATE: 63 BPM | HEIGHT: 69 IN | DIASTOLIC BLOOD PRESSURE: 90 MMHG

## 2024-03-12 DIAGNOSIS — M25.551 RIGHT HIP PAIN: ICD-10-CM

## 2024-03-12 DIAGNOSIS — R53.1 WEAKNESS: ICD-10-CM

## 2024-03-12 DIAGNOSIS — K74.60 CIRRHOSIS OF LIVER WITHOUT ASCITES, UNSPECIFIED HEPATIC CIRRHOSIS TYPE (HCC): Primary | ICD-10-CM

## 2024-03-12 PROCEDURE — 1123F ACP DISCUSS/DSCN MKR DOCD: CPT | Performed by: NURSE PRACTITIONER

## 2024-03-12 PROCEDURE — 4004F PT TOBACCO SCREEN RCVD TLK: CPT | Performed by: NURSE PRACTITIONER

## 2024-03-12 PROCEDURE — G8484 FLU IMMUNIZE NO ADMIN: HCPCS | Performed by: NURSE PRACTITIONER

## 2024-03-12 PROCEDURE — 99214 OFFICE O/P EST MOD 30 MIN: CPT | Performed by: NURSE PRACTITIONER

## 2024-03-12 PROCEDURE — G8417 CALC BMI ABV UP PARAM F/U: HCPCS | Performed by: NURSE PRACTITIONER

## 2024-03-12 PROCEDURE — G8427 DOCREV CUR MEDS BY ELIG CLIN: HCPCS | Performed by: NURSE PRACTITIONER

## 2024-03-12 PROCEDURE — 3017F COLORECTAL CA SCREEN DOC REV: CPT | Performed by: NURSE PRACTITIONER

## 2024-03-12 RX ORDER — TEMAZEPAM 30 MG/1
30 CAPSULE ORAL NIGHTLY PRN
Qty: 30 CAPSULE | Refills: 0 | OUTPATIENT
Start: 2024-03-12 | End: 2024-04-11

## 2024-03-12 RX ORDER — CARVEDILOL 3.12 MG/1
3.12 TABLET ORAL 2 TIMES DAILY WITH MEALS
Qty: 180 TABLET | Refills: 1 | Status: SHIPPED | OUTPATIENT
Start: 2024-03-12

## 2024-03-12 RX ORDER — SPIRONOLACTONE 25 MG/1
25 TABLET ORAL DAILY
Qty: 90 TABLET | Refills: 1 | Status: SHIPPED | OUTPATIENT
Start: 2024-03-12

## 2024-03-12 RX ORDER — FUROSEMIDE 20 MG/1
20 TABLET ORAL 2 TIMES DAILY
Qty: 180 TABLET | Refills: 1 | Status: SHIPPED | OUTPATIENT
Start: 2024-03-12

## 2024-03-12 ASSESSMENT — PATIENT HEALTH QUESTIONNAIRE - PHQ9
SUM OF ALL RESPONSES TO PHQ QUESTIONS 1-9: 0
SUM OF ALL RESPONSES TO PHQ QUESTIONS 1-9: 0
SUM OF ALL RESPONSES TO PHQ9 QUESTIONS 1 & 2: 0
SUM OF ALL RESPONSES TO PHQ QUESTIONS 1-9: 0
1. LITTLE INTEREST OR PLEASURE IN DOING THINGS: 0
2. FEELING DOWN, DEPRESSED OR HOPELESS: 0
SUM OF ALL RESPONSES TO PHQ QUESTIONS 1-9: 0

## 2024-03-12 NOTE — PROGRESS NOTES
nonsensical words or phrases to be inadvertently transcribed. Although I have reviewed the note for such errors, some may still exist.    Electronically signed by SHEILA Hernandez CNP on 3/13/2024 at 3:35 PM

## 2024-03-13 ASSESSMENT — ENCOUNTER SYMPTOMS
DIARRHEA: 0
CHEST TIGHTNESS: 0
COLOR CHANGE: 0
SHORTNESS OF BREATH: 0
ABDOMINAL PAIN: 0
NAUSEA: 0
SORE THROAT: 0
COUGH: 0
VOMITING: 0

## 2024-03-28 ENCOUNTER — PATIENT MESSAGE (OUTPATIENT)
Dept: PRIMARY CARE CLINIC | Age: 69
End: 2024-03-28

## 2024-03-28 DIAGNOSIS — F51.01 PRIMARY INSOMNIA: ICD-10-CM

## 2024-03-28 NOTE — TELEPHONE ENCOUNTER
Ney BANG Ann called to request a refill on his medication.      Last office visit : 3/12/2024   Next office visit : 6/12/2024     Last UDS: 12/12/23  Amphetamine Screen, Urine   Date Value Ref Range Status   12/12/2023 neg  Final     Barbiturate Screen, Urine   Date Value Ref Range Status   12/12/2023 neg  Final     Benzodiazepine Screen, Urine   Date Value Ref Range Status   12/12/2023 pos  Final     Buprenorphine Urine   Date Value Ref Range Status   12/12/2023 neg  Final     Cocaine Metabolite Screen, Urine   Date Value Ref Range Status   12/12/2023 neg  Final     Gabapentin Screen, Urine   Date Value Ref Range Status   12/12/2023 neg  Final     MDMA, Urine   Date Value Ref Range Status   12/12/2023 neg  Final     Methamphetamine, Urine   Date Value Ref Range Status   12/12/2023 neg  Final     Opiate Scrn, Ur   Date Value Ref Range Status   12/12/2023 neg  Final     Oxycodone Screen, Ur   Date Value Ref Range Status   12/12/2023 neg  Final     PCP Screen, Urine   Date Value Ref Range Status   12/12/2023 neg  Final     Propoxyphene Screen, Urine   Date Value Ref Range Status   12/12/2023 neg  Final     THC Screen, Urine   Date Value Ref Range Status   12/12/2023 neg  Final     Tricyclic Antidepressants, Urine   Date Value Ref Range Status   12/12/2023 neg  Final       Last Stoney: 03/11/24  Medication Contract: 12/12/23   Last Fill: 01/29/24    Requested Prescriptions     Pending Prescriptions Disp Refills    temazepam (RESTORIL) 30 MG capsule 90 capsule 0     Sig: Take 1 capsule by mouth nightly as needed for Sleep for up to 30 days. Max Daily Amount: 30 mg         Please approve or refuse this medication.   Alexandra Cortez MA

## 2024-03-28 NOTE — TELEPHONE ENCOUNTER
From: Ney Ann  To: Izabela Robles  Sent: 3/28/2024 12:03 PM CDT  Subject: TEMAZEPAM    I have ordered a refill thru MyChart. Per our discussion at last visit you would set up 90 days. 3 day refills. could you proceed with this? thanks

## 2024-03-31 RX ORDER — TEMAZEPAM 30 MG/1
30 CAPSULE ORAL NIGHTLY PRN
Qty: 90 CAPSULE | Refills: 0 | Status: SHIPPED | OUTPATIENT
Start: 2024-03-31 | End: 2024-06-29

## 2024-04-01 DIAGNOSIS — F51.01 PRIMARY INSOMNIA: ICD-10-CM

## 2024-04-01 RX ORDER — TEMAZEPAM 30 MG/1
30 CAPSULE ORAL NIGHTLY PRN
Qty: 90 CAPSULE | Refills: 0 | Status: SHIPPED | OUTPATIENT
Start: 2024-04-01 | End: 2024-06-30

## 2024-04-01 RX ORDER — TEMAZEPAM 30 MG/1
CAPSULE ORAL
Qty: 30 CAPSULE | Refills: 0 | OUTPATIENT
Start: 2024-04-01

## 2024-04-01 NOTE — TELEPHONE ENCOUNTER
Ney BANG Ann called to request a refill on his medication.    Medication was sent to [facility] mail order and they will not send out controlled medications. Will call and cancel prescription.  Last office visit : 3/12/2024   Next office visit : 6/12/2024     Last UDS:   Amphetamine Screen, Urine   Date Value Ref Range Status   12/12/2023 neg  Final     Barbiturate Screen, Urine   Date Value Ref Range Status   12/12/2023 neg  Final     Benzodiazepine Screen, Urine   Date Value Ref Range Status   12/12/2023 pos  Final     Buprenorphine Urine   Date Value Ref Range Status   12/12/2023 neg  Final     Cocaine Metabolite Screen, Urine   Date Value Ref Range Status   12/12/2023 neg  Final     Gabapentin Screen, Urine   Date Value Ref Range Status   12/12/2023 neg  Final     MDMA, Urine   Date Value Ref Range Status   12/12/2023 neg  Final     Methamphetamine, Urine   Date Value Ref Range Status   12/12/2023 neg  Final     Opiate Scrn, Ur   Date Value Ref Range Status   12/12/2023 neg  Final     Oxycodone Screen, Ur   Date Value Ref Range Status   12/12/2023 neg  Final     PCP Screen, Urine   Date Value Ref Range Status   12/12/2023 neg  Final     Propoxyphene Screen, Urine   Date Value Ref Range Status   12/12/2023 neg  Final     THC Screen, Urine   Date Value Ref Range Status   12/12/2023 neg  Final     Tricyclic Antidepressants, Urine   Date Value Ref Range Status   12/12/2023 neg  Final       Last Stoney: 3/11/24  Medication Contract:  12/12/23  Last Fill: 2/28/24    Requested Prescriptions     Pending Prescriptions Disp Refills    temazepam (RESTORIL) 30 MG capsule 90 capsule 0     Sig: Take 1 capsule by mouth nightly as needed for Sleep for up to 90 days. Max Daily Amount: 30 mg                       Please approve or refuse this medication.   Glenna Malave LPN

## 2024-04-02 ENCOUNTER — HOSPITAL ENCOUNTER (OUTPATIENT)
Dept: MRI IMAGING | Age: 69
Discharge: HOME | End: 2024-04-02
Attending: PSYCHIATRY & NEUROLOGY
Payer: MEDICARE

## 2024-04-02 ENCOUNTER — HOSPITAL ENCOUNTER (OUTPATIENT)
Dept: NEUROLOGY | Age: 69
Discharge: HOME | End: 2024-04-02
Attending: PSYCHIATRY & NEUROLOGY
Payer: MEDICARE

## 2024-04-02 DIAGNOSIS — M54.50 MIDLINE LOW BACK PAIN, UNSPECIFIED CHRONICITY, UNSPECIFIED WHETHER SCIATICA PRESENT: ICD-10-CM

## 2024-04-02 PROBLEM — R29.898 WEAKNESS OF BOTH LOWER EXTREMITIES: Status: ACTIVE | Noted: 2024-04-02

## 2024-04-02 PROCEDURE — 95909 NRV CNDJ TST 5-6 STUDIES: CPT

## 2024-04-02 PROCEDURE — 95886 MUSC TEST DONE W/N TEST COMP: CPT | Performed by: PSYCHIATRY & NEUROLOGY

## 2024-04-02 PROCEDURE — 95909 NRV CNDJ TST 5-6 STUDIES: CPT | Performed by: PSYCHIATRY & NEUROLOGY

## 2024-04-02 PROCEDURE — 72148 MRI LUMBAR SPINE W/O DYE: CPT

## 2024-04-02 PROCEDURE — 95886 MUSC TEST DONE W/N TEST COMP: CPT

## 2024-04-02 NOTE — PROCEDURES
[id number] [place] [address]                             ELECTROMYOGRAM      PATIENT NAME: KINA LEBLANC                : 1955  MED REC NO: [id number]                          ROOM:   ACCOUNT NO: [id number]                       ADMIT DATE: 2024  PROVIDER: Julian Monteiro MD      DATE OF EM2024    REFERRING PHYSICIAN:  JULIAN MONTEIRO    Electromyogram nerve study, bilateral lower extremities.    INDICATION FOR TEST:  Proximal leg weakness.    SUMMARY:  Nerve conduction studies of the bilateral lower extremities show low amplitude peroneal motor studies with 2 out of the 4 motor studies showing slow conduction velocity.  The right sural is low in amplitude and the left sural is delayed.    Needle exam of the bilateral lower extremities is unremarkable.  No evidence of myopathy noted.    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  Correlate clinically.          JULIAN MONTEIRO MD      D:  2024 11:01:51     T:  2024 12:39:35     JUAN/TOÑITO  Job #:  [id number]     Doc#:  [id number]

## 2024-04-11 ENCOUNTER — OFFICE VISIT (OUTPATIENT)
Dept: NEUROLOGY | Age: 69
End: 2024-04-11
Payer: MEDICARE

## 2024-04-11 VITALS
HEIGHT: 69 IN | DIASTOLIC BLOOD PRESSURE: 67 MMHG | WEIGHT: 188 LBS | HEART RATE: 64 BPM | SYSTOLIC BLOOD PRESSURE: 135 MMHG | BODY MASS INDEX: 27.85 KG/M2

## 2024-04-11 DIAGNOSIS — R29.898 WEAKNESS OF BOTH LOWER EXTREMITIES: Primary | ICD-10-CM

## 2024-04-11 DIAGNOSIS — G62.9 NEUROPATHY: ICD-10-CM

## 2024-04-11 DIAGNOSIS — M54.50 MIDLINE LOW BACK PAIN, UNSPECIFIED CHRONICITY, UNSPECIFIED WHETHER SCIATICA PRESENT: ICD-10-CM

## 2024-04-11 PROCEDURE — 1123F ACP DISCUSS/DSCN MKR DOCD: CPT | Performed by: PSYCHIATRY & NEUROLOGY

## 2024-04-11 PROCEDURE — G8427 DOCREV CUR MEDS BY ELIG CLIN: HCPCS | Performed by: PSYCHIATRY & NEUROLOGY

## 2024-04-11 PROCEDURE — G8417 CALC BMI ABV UP PARAM F/U: HCPCS | Performed by: PSYCHIATRY & NEUROLOGY

## 2024-04-11 PROCEDURE — 3017F COLORECTAL CA SCREEN DOC REV: CPT | Performed by: PSYCHIATRY & NEUROLOGY

## 2024-04-11 PROCEDURE — 99214 OFFICE O/P EST MOD 30 MIN: CPT | Performed by: PSYCHIATRY & NEUROLOGY

## 2024-04-11 PROCEDURE — 4004F PT TOBACCO SCREEN RCVD TLK: CPT | Performed by: PSYCHIATRY & NEUROLOGY

## 2024-04-11 NOTE — PROGRESS NOTES
Chief Complaint   Patient presents with    Follow-up     4 week follow up for weakness of BLE- Patient states this is unchanged  MRI lumbar, EMG       Ney Ann is a 69 y.o. year old male who is seen for evaluation of leg weakness.  Initially and lastly seen here 3/24.  For the past several months he has been complaining of a significant amount of low back pain and aching in his thighs.  The pain has been much better over the past couple of weeks but he feels that his legs are weak and have trouble caring him at times.  He has minimal symptoms in his arms.  He has a history of alcohol abuse but has not had any in a year.  Has a history of cirrhosis.  He denies any fevers chills sweats or weight loss.  Denies any over-the-counter supplements.  Is not taking a statin.  Denies any other focal neurological difficulties.  He is thirsty a lot..  Earlier this month he had an MRI of the lumbar spine showing some mild spondylosis only.  EMG of the bilateral lower extremities showed a mild sensorimotor neuropathy with no evidence of radiculopathy or myopathy.  Appears to have a mild case of diabetes based upon his lab work.  Sed rate, CRP, CPK, anti-Jo1 antibodies, SUSAN and vitamin B12 levels were normal.  He has chronically depressed platelet counts. Has yet to begin PT. overall, he feels like he is about the same and certainly no worse.  Active Ambulatory Problems     Diagnosis Date Noted    Closed left hip fracture, initial encounter (HCC) 08/25/2022    New onset a-fib ([facility]) 08/25/2022    Liver disease 08/25/2022    Hyperbilirubinemia 08/25/2022    Abnormal LFTs 08/25/2022    Thrombocytopenia ([facility]) 08/25/2022    Leukopenia 08/25/2022    Hepatic cirrhosis (HCC) 08/26/2022    Umbilical hernia without obstruction and without gangrene 08/27/2022    Pancytopenia ([facility]) 08/27/2022    Closed fracture of left hip ([facility]) 08/27/2022    Coagulopathy ([facility]) 10/27/2022    History of ETOH abuse 10/27/2022    Anemia 10/27/2022    Lumbar

## 2024-04-11 NOTE — PROGRESS NOTES
REVIEW OF SYSTEMS    Constitutional: []Fever []Sweat []Chills [] Recent Injury [x] Denies all unless marked  HEENT:[]Headache  [] Head Injury/Hearing Loss  [] Sore Throat  [] Ear Ache/Dizziness  [x] Denies all unless marked  Spine:  [] Neck pain  [] Back pain  [] Sciaticia  [x] Denies all unless marked  Cardiovascular:[]Heart Disease []Chest Pain [] Palpitations  [x] Denies all unless marked  Pulmonary: []Shortness of Breath []Cough   [x] Denies all unless marke  Gastrointestinal: []Nausea  []Vomiting  []Abdominal Pain  []Constipation  []Diarrhea  []Dark Bloody Stools  [x] Denies all unless marked  Psychiatric/Behavioral:[] Depression [] Anxiety [x] Denies all unless marked  Genitourinary:   [] Frequency  [] Urgency  [] Incontinence [] Pain with Urination  [x] Denies all unless marked  Extremities: []Pain  []Swelling  [x] Denies all unless marked  Musculoskeletal: [] Muscle Pain  [] Joint Pain  [] Arthritis [] Muscle Cramps [] Muscle Twitches  [x] Denies all unless marked  Sleep: [] Insomnia [] Snoring [] Restless Legs [] Sleep Apnea  [] Daytime Sleepiness  [x] Denies all unless marked  Skin:[] Rash [] Skin Discoloration [x] Denies all unless marked   Neurological: []Visual Disturbance/Memory Loss [] Loss of Balance [x] Slurred Speech/Weakness [] Seizures  [] Vertigo/Dizziness [x] Denies all unless marked

## 2024-06-11 NOTE — TELEPHONE ENCOUNTER
----- Message from Margarita Perez sent at 6/11/2024  2:42 PM EDT -----  Lipid panel is improved  Renal function and electrolytes are good  Continue current meds   From: Alireza Stubbs  To: Tawnya Jaramillo  Sent: 12/24/2023 11:36 AM CST  Subject: Perscription Request    Please renew my prescription for: TEMAZEPAM, 30 MG. I am nearly out. Thank you.  [address]

## 2024-06-12 ENCOUNTER — OFFICE VISIT (OUTPATIENT)
Dept: PRIMARY CARE CLINIC | Age: 69
End: 2024-06-12
Payer: MEDICARE

## 2024-06-12 VITALS
OXYGEN SATURATION: 96 % | WEIGHT: 187.8 LBS | SYSTOLIC BLOOD PRESSURE: 140 MMHG | TEMPERATURE: 96.9 F | BODY MASS INDEX: 27.81 KG/M2 | HEART RATE: 87 BPM | DIASTOLIC BLOOD PRESSURE: 78 MMHG | HEIGHT: 69 IN

## 2024-06-12 DIAGNOSIS — F51.01 PRIMARY INSOMNIA: ICD-10-CM

## 2024-06-12 DIAGNOSIS — K74.60 CIRRHOSIS OF LIVER WITHOUT ASCITES, UNSPECIFIED HEPATIC CIRRHOSIS TYPE (HCC): Primary | ICD-10-CM

## 2024-06-12 DIAGNOSIS — M25.551 RIGHT HIP PAIN: ICD-10-CM

## 2024-06-12 PROCEDURE — 3017F COLORECTAL CA SCREEN DOC REV: CPT | Performed by: NURSE PRACTITIONER

## 2024-06-12 PROCEDURE — G8417 CALC BMI ABV UP PARAM F/U: HCPCS | Performed by: NURSE PRACTITIONER

## 2024-06-12 PROCEDURE — 4004F PT TOBACCO SCREEN RCVD TLK: CPT | Performed by: NURSE PRACTITIONER

## 2024-06-12 PROCEDURE — G8427 DOCREV CUR MEDS BY ELIG CLIN: HCPCS | Performed by: NURSE PRACTITIONER

## 2024-06-12 PROCEDURE — 1123F ACP DISCUSS/DSCN MKR DOCD: CPT | Performed by: NURSE PRACTITIONER

## 2024-06-12 PROCEDURE — 99214 OFFICE O/P EST MOD 30 MIN: CPT | Performed by: NURSE PRACTITIONER

## 2024-06-12 NOTE — PROGRESS NOTES
Normocephalic.      Right Ear: Tympanic membrane normal.      Left Ear: Tympanic membrane normal.      Nose: Nose normal.      Mouth/Throat:      Mouth: Mucous membranes are moist.      Pharynx: Oropharynx is clear.   Eyes:      Extraocular Movements: Extraocular movements intact.      Pupils: Pupils are equal, round, and reactive to light.   Cardiovascular:      Rate and Rhythm: Normal rate and regular rhythm.      Pulses: Normal pulses.      Heart sounds: Normal heart sounds.   Pulmonary:      Effort: Pulmonary effort is normal.      Breath sounds: Normal breath sounds.   Abdominal:      General: Bowel sounds are normal.      Palpations: Abdomen is soft.   Musculoskeletal:         General: Normal range of motion.      Cervical back: Normal range of motion.   Skin:     General: Skin is warm and dry.   Neurological:      Mental Status: He is alert and oriented to person, place, and time.   Psychiatric:         Mood and Affect: Mood normal.         Behavior: Behavior normal.         ASSESSMENT/PLAN:  1. Cirrhosis of liver without ascites, unspecified hepatic cirrhosis type (HCC)  Patient to continue to follow with hepatology.     2. Right hip pain  Patient to continue to follow with Dr. Obrien    3. Primary insomnia  Will continue with prescribed Restoril         Return in about 3 months (around 9/12/2024) for follow up with [facility].    Patient offered educational handouts and has had all questions answered.  Patient voices understanding and agrees to plans along with risks and benefits of plan. Patient is instructed to continue prior meds, diet, and exercise plans as instructed. Patient agrees to follow up as instructed and sooner if needed.  Patient agrees to go to ER if condition becomes emergent.      EMR Dragon/transcription disclaimer: Some of this encounter note is an electronic transcription/translation of spoken language to printed text. The electronic translation of spoken language may permit erroneous, or at

## 2024-06-13 ASSESSMENT — ENCOUNTER SYMPTOMS
COUGH: 0
COLOR CHANGE: 0
SHORTNESS OF BREATH: 0
VOMITING: 0
NAUSEA: 0
SORE THROAT: 0
DIARRHEA: 0
ABDOMINAL PAIN: 0
CHEST TIGHTNESS: 0

## 2024-06-24 DIAGNOSIS — F51.01 PRIMARY INSOMNIA: ICD-10-CM

## 2024-07-01 DIAGNOSIS — F51.01 PRIMARY INSOMNIA: ICD-10-CM

## 2024-07-01 RX ORDER — TEMAZEPAM 30 MG/1
30 CAPSULE ORAL NIGHTLY PRN
Qty: 90 CAPSULE | Refills: 0 | OUTPATIENT
Start: 2024-07-01 | End: 2024-09-29

## 2024-07-01 NOTE — TELEPHONE ENCOUNTER
Ney Ann called to request a refill on his medication.      Last office visit : 6/12/2024   Next office visit : 9/12/2024     Last UDS:   Benzodiazepine Screen, Urine   Date Value Ref Range Status   12/12/2023 pos  Final     Buprenorphine Urine   Date Value Ref Range Status   12/12/2023 neg  Final     Cocaine Metabolite Screen, Urine   Date Value Ref Range Status   12/12/2023 neg  Final     Gabapentin Screen, Urine   Date Value Ref Range Status   12/12/2023 neg  Final     MDMA, Urine   Date Value Ref Range Status   12/12/2023 neg  Final     Oxycodone Screen, Ur   Date Value Ref Range Status   12/12/2023 neg  Final     Propoxyphene Screen, Urine   Date Value Ref Range Status   12/12/2023 neg  Final     THC Screen, Urine   Date Value Ref Range Status   12/12/2023 neg  Final     Tricyclic Antidepressants, Urine   Date Value Ref Range Status   12/12/2023 neg  Final       Last Stoney: 03/11/24  Medication Contract: 12/12/23   Last Fill: 04/01/24    Requested Prescriptions     Pending Prescriptions Disp Refills    temazepam (RESTORIL) 30 MG capsule 90 capsule 0     Sig: Take 1 capsule by mouth nightly as needed for Sleep for up to 90 days. Max Daily Amount: 30 mg         Please approve or refuse this medication.   Roxi Benz LPN

## 2024-07-02 RX ORDER — TEMAZEPAM 30 MG/1
30 CAPSULE ORAL NIGHTLY PRN
Qty: 90 CAPSULE | Refills: 0 | Status: SHIPPED | OUTPATIENT
Start: 2024-07-02 | End: 2024-09-30

## 2024-07-16 ENCOUNTER — OFFICE VISIT (OUTPATIENT)
Dept: GASTROENTEROLOGY | Age: 69
End: 2024-07-16
Payer: MEDICARE

## 2024-07-16 VITALS
BODY MASS INDEX: 25.62 KG/M2 | OXYGEN SATURATION: 98 % | WEIGHT: 179 LBS | SYSTOLIC BLOOD PRESSURE: 120 MMHG | DIASTOLIC BLOOD PRESSURE: 80 MMHG | HEART RATE: 80 BPM | HEIGHT: 70 IN

## 2024-07-16 DIAGNOSIS — K74.60 CIRRHOSIS OF LIVER WITHOUT ASCITES, UNSPECIFIED HEPATIC CIRRHOSIS TYPE (HCC): Primary | ICD-10-CM

## 2024-07-16 DIAGNOSIS — I85.00 ESOPHAGEAL VARICES WITHOUT BLEEDING, UNSPECIFIED ESOPHAGEAL VARICES TYPE (HCC): ICD-10-CM

## 2024-07-16 PROCEDURE — 4004F PT TOBACCO SCREEN RCVD TLK: CPT | Performed by: NURSE PRACTITIONER

## 2024-07-16 PROCEDURE — 1123F ACP DISCUSS/DSCN MKR DOCD: CPT | Performed by: NURSE PRACTITIONER

## 2024-07-16 PROCEDURE — G8417 CALC BMI ABV UP PARAM F/U: HCPCS | Performed by: NURSE PRACTITIONER

## 2024-07-16 PROCEDURE — G8427 DOCREV CUR MEDS BY ELIG CLIN: HCPCS | Performed by: NURSE PRACTITIONER

## 2024-07-16 PROCEDURE — 99214 OFFICE O/P EST MOD 30 MIN: CPT | Performed by: NURSE PRACTITIONER

## 2024-07-16 PROCEDURE — 3017F COLORECTAL CA SCREEN DOC REV: CPT | Performed by: NURSE PRACTITIONER

## 2024-07-16 NOTE — PATIENT INSTRUCTIONS
scope down your esophagus into your stomach. The doctor also may look at the duodenum.     If your doctor wants to take a sample of tissue for a biopsy, he or she may use small surgical tools, which are put into the scope, to cut off some tissue. You will not feel a biopsy, if one is taken. The doctor also can use the tools to stop bleeding or to do other treatments, if needed.     You will stay at the hospital or surgery center for 1 to 2 hours until the medicine you were given wears off.   What happens after an upper GI endoscopy?   After the test, you may belch and feel bloated for a while.     You may have a tickling, dry throat or mouth. You may feel a bit hoarse, and you may have a mild sore throat. These symptoms may last several days. Throat lozenges and warm saltwater gargles can help relieve the throat symptoms.     Ask your doctor when you can drive again.     Your doctor will tell you when you can go back to your usual diet and activities.     Don't drink alcohol for 12 to 24 hours after the test.   When should you call your doctor?   You have questions or concerns.     You don't understand how to prepare for your procedure.     You become ill before the procedure (such as fever, flu, or a cold).     You need to reschedule or have changed your mind about having the procedure.   Where can you learn more?  Go to [email or web address]://[email or web address] and enter P790 to learn more about \"Upper GI Endoscopy: Before Your Procedure.\"  Current as of: June 6, 2022               Content Version: 13.5  © 2006-2022 [email or web address].   Care instructions adapted under license by [employer]. If you have questions about a medical condition or this instruction, always ask your healthcare professional. [email or web address] disclaims any warranty or liability for your use of this information.

## 2024-07-16 NOTE — PROGRESS NOTES
Subjective:     Patient ID: Ney Ann is a 69 y.o. male  PCP: Izabela Robles, APRN - CNP  Referring Provider: No ref. provider found    HPI  Patient presents to the office today with the following complaints: Endoscopy      Patient seen in the office today to schedule his EGD   He has a history of esophageal varices due to liver disease  His last EGD was 7/2023 by Dr. Tapia:   Dr Dawit Tapia, Gr 2 medium sized varcies in Esophagus, Moderate portal hypertensive gastropathy in entire stomach,   Continues to see [facility] for his liver cirrhosis     Denies any issues with his bowels   Colonoscopy is up to date       Assessment:     1. Cirrhosis of liver without ascites, unspecified hepatic cirrhosis type (HCC)  2. Esophageal varices without bleeding, unspecified esophageal varices type (HCC)       Review of Systems   Constitutional:  Negative for activity change, appetite change, fatigue, fever and unexpected weight change.   HENT:  Negative for trouble swallowing.    Respiratory:  Negative for cough, choking and shortness of breath.    Cardiovascular:  Negative for chest pain.   Gastrointestinal:  Negative for abdominal distention, abdominal pain, anal bleeding, blood in stool, constipation, diarrhea, nausea, rectal pain and vomiting.   Allergic/Immunologic: Negative for food allergies.   All other systems reviewed and are negative.      Plan:   Schedule EGD  Nothing to eat or drink after midnight.  No driving for 24 hours after procedure. Bring a  to procedure.  No aspirin, NSAIDs, fish oil 5 days before procedure.  I have discussed the benefits, alternatives, and risks (including bleeding, perforation and death)  for pursuing Endoscopy (EGD/Colonscopy/EUS/ERCP) with the patient and they are willing to continue. We also discussed the need for anesthesia, IV access, proper dietary changes, medication changes if necessary, and need for bowel prep (if ordered) prior to their Endoscopic procedure.  They are

## 2024-07-22 PROBLEM — I85.00 ESOPHAGEAL VARICES WITHOUT BLEEDING, UNSPECIFIED ESOPHAGEAL VARICES TYPE (HCC): Status: ACTIVE | Noted: 2024-07-22

## 2024-07-22 ASSESSMENT — ENCOUNTER SYMPTOMS
BLOOD IN STOOL: 0
ABDOMINAL DISTENTION: 0
VOMITING: 0
RECTAL PAIN: 0
NAUSEA: 0
TROUBLE SWALLOWING: 0
ABDOMINAL PAIN: 0
DIARRHEA: 0
COUGH: 0
CONSTIPATION: 0
CHOKING: 0
SHORTNESS OF BREATH: 0
ANAL BLEEDING: 0

## 2024-07-31 NOTE — TELEPHONE ENCOUNTER
Ney Ann called to request a refill on his medication.      Last office visit : 6/12/2024   Next office visit : 9/12/2024     Requested Prescriptions     Pending Prescriptions Disp Refills    spironolactone (ALDACTONE) 25 MG tablet [Pharmacy Med Name: Spironolactone 25 MG Oral Tablet] 90 tablet 3     Sig: TAKE 1 TABLET BY MOUTH DAILY    furosemide (LASIX) 20 MG tablet [Pharmacy Med Name: Furosemide 20 MG Oral Tablet] 180 tablet 3     Sig: TAKE 1 TABLET BY MOUTH IN THE  MORNING AND 1 TABLET BY MOUTH IN THE EVENING    carvedilol (COREG) 3.125 MG tablet [Pharmacy Med Name: Carvedilol 3.125 MG Oral Tablet] 180 tablet 3     Sig: TAKE 1 TABLET BY MOUTH TWICE  DAILY WITH MEALS            Alexandra Cortez MA

## 2024-08-01 RX ORDER — SPIRONOLACTONE 25 MG/1
25 TABLET ORAL DAILY
Qty: 90 TABLET | Refills: 3 | Status: SHIPPED | OUTPATIENT
Start: 2024-08-01

## 2024-08-01 RX ORDER — CARVEDILOL 3.12 MG/1
3.12 TABLET ORAL 2 TIMES DAILY WITH MEALS
Qty: 180 TABLET | Refills: 3 | Status: SHIPPED | OUTPATIENT
Start: 2024-08-01

## 2024-08-01 RX ORDER — FUROSEMIDE 20 MG/1
TABLET ORAL
Qty: 180 TABLET | Refills: 3 | Status: SHIPPED | OUTPATIENT
Start: 2024-08-01

## 2024-08-08 ENCOUNTER — ANESTHESIA EVENT (OUTPATIENT)
Dept: ENDOSCOPY | Age: 69
End: 2024-08-08
Payer: MEDICARE

## 2024-08-08 ENCOUNTER — TELEPHONE (OUTPATIENT)
Dept: GASTROENTEROLOGY | Age: 69
End: 2024-08-08

## 2024-08-08 ENCOUNTER — ANESTHESIA (OUTPATIENT)
Dept: ENDOSCOPY | Age: 69
End: 2024-08-08
Payer: MEDICARE

## 2024-08-08 ENCOUNTER — HOSPITAL ENCOUNTER (OUTPATIENT)
Age: 69
Setting detail: OUTPATIENT SURGERY
Discharge: HOME | End: 2024-08-08
Attending: INTERNAL MEDICINE | Admitting: INTERNAL MEDICINE
Payer: MEDICARE

## 2024-08-08 VITALS
HEART RATE: 70 BPM | SYSTOLIC BLOOD PRESSURE: 145 MMHG | RESPIRATION RATE: 16 BRPM | DIASTOLIC BLOOD PRESSURE: 85 MMHG | TEMPERATURE: 98.4 F | WEIGHT: 185 LBS | BODY MASS INDEX: 28.04 KG/M2 | OXYGEN SATURATION: 93 % | HEIGHT: 68 IN

## 2024-08-08 DIAGNOSIS — I85.00 ESOPHAGEAL VARICES WITHOUT BLEEDING, UNSPECIFIED ESOPHAGEAL VARICES TYPE (HCC): Primary | ICD-10-CM

## 2024-08-08 DIAGNOSIS — Z87.19 HX OF ESOPHAGEAL VARICES: ICD-10-CM

## 2024-08-08 DIAGNOSIS — K21.9 GASTROESOPHAGEAL REFLUX DISEASE, UNSPECIFIED WHETHER ESOPHAGITIS PRESENT: ICD-10-CM

## 2024-08-08 PROCEDURE — 88342 IMHCHEM/IMCYTCHM 1ST ANTB: CPT

## 2024-08-08 PROCEDURE — 7100000010 HC PHASE II RECOVERY - FIRST 15 MIN: Performed by: INTERNAL MEDICINE

## 2024-08-08 PROCEDURE — 7100000011 HC PHASE II RECOVERY - ADDTL 15 MIN: Performed by: INTERNAL MEDICINE

## 2024-08-08 PROCEDURE — 3700000001 HC ADD 15 MINUTES (ANESTHESIA): Performed by: INTERNAL MEDICINE

## 2024-08-08 PROCEDURE — 6360000002 HC RX W HCPCS: Performed by: NURSE ANESTHETIST, CERTIFIED REGISTERED

## 2024-08-08 PROCEDURE — 2709999900 HC NON-CHARGEABLE SUPPLY: Performed by: INTERNAL MEDICINE

## 2024-08-08 PROCEDURE — 3609012400 HC EGD TRANSORAL BIOPSY SINGLE/MULTIPLE: Performed by: INTERNAL MEDICINE

## 2024-08-08 PROCEDURE — 2500000003 HC RX 250 WO HCPCS: Performed by: NURSE ANESTHETIST, CERTIFIED REGISTERED

## 2024-08-08 PROCEDURE — 3700000000 HC ANESTHESIA ATTENDED CARE: Performed by: INTERNAL MEDICINE

## 2024-08-08 PROCEDURE — 88305 TISSUE EXAM BY PATHOLOGIST: CPT

## 2024-08-08 PROCEDURE — 2580000003 HC RX 258: Performed by: INTERNAL MEDICINE

## 2024-08-08 PROCEDURE — 43239 EGD BIOPSY SINGLE/MULTIPLE: CPT | Performed by: INTERNAL MEDICINE

## 2024-08-08 PROCEDURE — 2580000003 HC RX 258: Performed by: NURSE ANESTHETIST, CERTIFIED REGISTERED

## 2024-08-08 RX ORDER — SODIUM CHLORIDE, SODIUM LACTATE, POTASSIUM CHLORIDE, CALCIUM CHLORIDE 600; 310; 30; 20 MG/100ML; MG/100ML; MG/100ML; MG/100ML
INJECTION, SOLUTION INTRAVENOUS CONTINUOUS
Status: DISCONTINUED | OUTPATIENT
Start: 2024-08-08 | End: 2024-08-08 | Stop reason: HOSPADM

## 2024-08-08 RX ORDER — PROPOFOL 10 MG/ML
INJECTION, EMULSION INTRAVENOUS PRN
Status: DISCONTINUED | OUTPATIENT
Start: 2024-08-08 | End: 2024-08-08 | Stop reason: SDUPTHER

## 2024-08-08 RX ORDER — NADOLOL 40 MG/1
40 TABLET ORAL DAILY
Qty: 30 TABLET | Refills: 5 | Status: SHIPPED | OUTPATIENT
Start: 2024-08-08

## 2024-08-08 RX ORDER — LANSOPRAZOLE 30 MG/1
30 CAPSULE, DELAYED RELEASE ORAL DAILY
Qty: 30 CAPSULE | Refills: 5 | Status: SHIPPED | OUTPATIENT
Start: 2024-08-08

## 2024-08-08 RX ORDER — FENTANYL CITRATE 50 UG/ML
INJECTION, SOLUTION INTRAMUSCULAR; INTRAVENOUS PRN
Status: DISCONTINUED | OUTPATIENT
Start: 2024-08-08 | End: 2024-08-08 | Stop reason: SDUPTHER

## 2024-08-08 RX ORDER — SODIUM CHLORIDE, SODIUM LACTATE, POTASSIUM CHLORIDE, CALCIUM CHLORIDE 600; 310; 30; 20 MG/100ML; MG/100ML; MG/100ML; MG/100ML
INJECTION, SOLUTION INTRAVENOUS CONTINUOUS PRN
Status: DISCONTINUED | OUTPATIENT
Start: 2024-08-08 | End: 2024-08-08 | Stop reason: SDUPTHER

## 2024-08-08 RX ORDER — LIDOCAINE HYDROCHLORIDE 10 MG/ML
INJECTION, SOLUTION EPIDURAL; INFILTRATION; INTRACAUDAL; PERINEURAL PRN
Status: DISCONTINUED | OUTPATIENT
Start: 2024-08-08 | End: 2024-08-08 | Stop reason: SDUPTHER

## 2024-08-08 RX ADMIN — PROPOFOL 150 MG: 10 INJECTION, EMULSION INTRAVENOUS at 10:33

## 2024-08-08 RX ADMIN — LIDOCAINE HYDROCHLORIDE 100 MG: 10 INJECTION, SOLUTION EPIDURAL; INFILTRATION; INTRACAUDAL; PERINEURAL at 10:34

## 2024-08-08 RX ADMIN — FENTANYL CITRATE 50 MCG: 50 INJECTION INTRAMUSCULAR; INTRAVENOUS at 10:34

## 2024-08-08 RX ADMIN — SODIUM CHLORIDE, POTASSIUM CHLORIDE, SODIUM LACTATE AND CALCIUM CHLORIDE: 600; 310; 30; 20 INJECTION, SOLUTION INTRAVENOUS at 09:32

## 2024-08-08 RX ADMIN — SODIUM CHLORIDE, SODIUM LACTATE, POTASSIUM CHLORIDE, AND CALCIUM CHLORIDE: 600; 310; 30; 20 INJECTION, SOLUTION INTRAVENOUS at 10:29

## 2024-08-08 ASSESSMENT — PAIN - FUNCTIONAL ASSESSMENT: PAIN_FUNCTIONAL_ASSESSMENT: 0-10

## 2024-08-08 ASSESSMENT — LIFESTYLE VARIABLES: SMOKING_STATUS: 1

## 2024-08-08 NOTE — DISCHARGE INSTRUCTIONS
1.  Await path results, the patient will be contacted in 7-10 days with biopsy results.   2.  Nadolol 40 mg p.o. daily as tolerated by systolic blood pressure not less than 80 mmHg and heart rate not less than 50 bpm  3.  Strict 2 g sodium diet  4.  Avoid NSAIDs strictly; may use Tylenol 500 mg p.o. every 4 hours as needed instead  5.  Maintain abstinence from alcohol; also discontinues tobacco abuse ASAP  6.  Continue current medications otherwise including diuretics and continue follow-up with [place] hepatology as scheduled  7.  Begin lansoprazole 30 mg p.o. once daily along with anti-GERD measures    - GI clinic f/u 6-8 weeks with Ms. Keane    - Keep scheduled f/u appts with other MDs     - NO ASA/NSAIDs x 2 weeks     Upper GI Endoscopy: What to Expect at Home  Your Recovery  You had an upper GI endoscopy. Your doctor used a thin, lighted tube that bends to look at the inside of your esophagus, your stomach, and the first part of the small intestine, called the duodenum.    How can you care for yourself at home?  Activity   Rest as much as you need to after you go home.  You should be able to go back to your usual activities the day after the test.  Due to anesthesia, no driving or operating equipment for 24 hours.  Diet   Follow your doctor's directions for eating after the test.  Drink plenty of fluids (unless your doctor has told you not to).  Medications   If you have a sore throat the day after the test, use an over-the-counter spray to numb your throat.  When should you call for help?   Call 911 anytime you think you may need emergency care. For example, call if:    You passed out (lost consciousness).     You have trouble breathing.     You pass maroon or bloody stools.   Call your doctor now or seek immediate medical care if:    You have pain that does not get better after your take pain medicine.     You have new or worse belly pain.     You have blood in your stools.     You are sick to your  stomach and cannot keep fluids down.     You have a fever.     You cannot pass stools or gas.   Watch closely for changes in your health, and be sure to contact your doctor if:    Your throat still hurts after a day or two.     You do not get better as expected.

## 2024-08-08 NOTE — TELEPHONE ENCOUNTER
08- Sharlene Nurse from Op care called stated that the patient is needing Lansoprazole and Nadolol sent to [facility] per Dr Hastings     Per Dr Hastings EGD op note from 08-             Routed to CT APRN

## 2024-08-08 NOTE — TELEPHONE ENCOUNTER
08- Called [facility] for patient to call the office regarding medications

## 2024-08-08 NOTE — TELEPHONE ENCOUNTER
[id number] Called and notified patient of medication and recommendations as per Dr Hastings/CT PRN       Oked per patient

## 2024-08-08 NOTE — ANESTHESIA PRE PROCEDURE
Department of Anesthesiology  Preprocedure Note       Name:  Ney Ann   Age:  69 y.o.  :  1955                                          MRN:  [id number]         Date:  2024      Surgeon: Surgeon(s):  Kamille Hastings MD    Procedure: Procedure(s):  ESOPHAGOGASTRODUODENOSCOPY BIOPSY    Medications prior to admission:   Prior to Admission medications    Medication Sig Start Date End Date Taking? Authorizing Provider   spironolactone (ALDACTONE) 25 MG tablet TAKE 1 TABLET BY MOUTH DAILY 24   Izabela Robles APRN - CNP   furosemide (LASIX) 20 MG tablet TAKE 1 TABLET BY MOUTH IN THE  MORNING AND 1 TABLET BY MOUTH IN THE EVENING 24   Izabela Robles APRN - CNP   carvedilol (COREG) 3.125 MG tablet TAKE 1 TABLET BY MOUTH TWICE  DAILY WITH MEALS 24   Izabela Robles APRN - CNP   temazepam (RESTORIL) 30 MG capsule Take 1 capsule by mouth nightly as needed for Sleep for up to 90 days. Max Daily Amount: 30 mg 24  Izabela Robles APRN - CNP   tiZANidine (ZANAFLEX) 4 MG tablet Take 1 tablet by mouth every 8 hours as needed 23   ProviderDamian MD   magnesium oxide (MAG-OX) 400 (240 Mg) MG tablet Take 1 tablet by mouth daily    ProviderDamian MD   Ergocalciferol (VITAMIN D) 59461 units CAPS Take 50,000 Units by mouth once a week 22   Sameera Obrien APRN - CNP       Current medications:    Current Facility-Administered Medications   Medication Dose Route Frequency Provider Last Rate Last Admin   • lactated ringers IV soln infusion   IntraVENous Continuous Kamille Hastings MD           Allergies:  No Known Allergies    Problem List:    Patient Active Problem List   Diagnosis Code   • Closed left hip fracture, initial encounter (HCC) S72.002A   • New onset a-fib (HCC) I48.91   • Liver disease K76.9   • Hyperbilirubinemia E80.6   • Abnormal LFTs R79.89   • Thrombocytopenia (HCC) D69.6   • Leukopenia D72.819   • Hepatic cirrhosis (HCC)

## 2024-08-08 NOTE — H&P
Patient Name: Ney Ann  : 1955  MRN: [id number]  DATE: 24    Allergies:   Allergies   Allergen Reactions    Tomato Itching    Watermelon [Citrullus Vulgaris] Itching        ENDOSCOPY  History and Physical    Procedure:    [] Diagnostic Colonoscopy       [] Screening Colonoscopy  [x] EGD      [] ERCP      [] EUS       [] Other    [x] Previous office notes/History and Physical reviewed from the patients chart. Please see EMR for further details of HPI. I have examined the patient's status immediately prior to the procedure and:      Indications/HPI:      1. Cirrhosis of liver without ascites, unspecified hepatic cirrhosis type (HCC)  2. Esophageal varices without bleeding, unspecified esophageal varices type-His last EGD was 2023 by Dr. Tapia at [facility] in [place]:   Dr Dawit Tapia, Gr 2 medium sized varcies in Esophagus, Moderate portal hypertensive gastropathy in entire stomach     Continues to see [facility] for his liver cirrhosis  []Abdominal Pain   []Cancer- GI/Lung     []Fhx of colon CA/polyps  []History of Polyps  []Barretts            []Melena  []Abnormal Imaging              []Dysphagia              []Persistent Pneumonia   []Anemia                            []Food Impaction        []History of Polyps  [] GI Bleed             []Pulmonary nodule/Mass   []Change in bowel habits []Heartburn/Reflux  []Rectal Bleed (BRBPR)  []Chest Pain - Non Cardiac []Heme (+) Stool []Ulcers  []Constipation  []Hemoptysis  []Varices  []Diarrhea  []Hypoxemia    []Nausea/Vomiting   []Screening   []Crohns/Colitis  []Other:     Anesthesia:   [x] MAC [] Moderate Sedation   [] General   [] None     ROS: 12 pt Review of Symptoms was negative unless mentioned above    Medications:   Prior to Admission medications    Medication Sig Start Date End Date Taking? Authorizing Provider   spironolactone (ALDACTONE) 25 MG tablet TAKE 1 TABLET BY MOUTH DAILY 24   Izabela Robles, APRN -

## 2024-08-08 NOTE — ANESTHESIA POSTPROCEDURE EVALUATION
Department of Anesthesiology  Postprocedure Note    Patient: Ney Ann  MRN: [id number]  YOB: 1955  Date of evaluation: 8/8/2024    Procedure Summary       Date: 08/08/24 Room / Location: [place] [address] / [facility] [place]    Anesthesia Start: 1029 Anesthesia Stop: 1045    Procedure: ESOPHAGOGASTRODUODENOSCOPY BIOPSY (Abdomen) Diagnosis:       Hx of esophageal varices      (Hx of esophageal varices [Z87.19])    Surgeons: Kamille Hastings MD Responsible Provider: Patrice Dale APRN - CRNA    Anesthesia Type: general ASA Status: 3            Anesthesia Type: No value filed.    Rod Phase I: Rod Score: 10    Rod Phase II:      Anesthesia Post Evaluation    Patient location during evaluation: PACU  Patient participation: complete - patient participated  Level of consciousness: awake and alert  Pain score: 0  Airway patency: patent  Nausea & Vomiting: no nausea and no vomiting  Cardiovascular status: hemodynamically stable  Respiratory status: spontaneous ventilation, nonlabored ventilation and room air  Hydration status: stable  Multimodal analgesia pain management approach    No notable events documented.

## 2024-08-08 NOTE — OP NOTE
Endoscopic Procedure Note    Patient: Ney Ann : 1955  Med Rec#: [id number] Acc#: [id number]     Primary Care Provider Izabela Robles, APRN - CNP    Endoscopist: Kamille Hastings MD, MD    Date of Procedure:  2024    Procedure:   EGD with cold biopsies    Indications:   1. Cirrhosis of liver without ascites, unspecified hepatic cirrhosis type (HCC)  2. Esophageal varices without bleeding, unspecified esophageal varices type-His last EGD was 2023 by Dr. Tapia at [facility] in [place]:   Dr Dawit Tapia, Gr 2 medium sized varcies in Esophagus, Moderate portal hypertensive gastropathy in entire stomach    Anesthesia:  Sedation was administered by anesthesia who monitored the patient during the procedure.    Estimated Blood Loss: minimal    Procedure:   After reviewing the patient's chart and obtaining informed consent, the patient was placed in the left lateral decubitus position.  A forward-viewing Olympus endoscope was lubricated and inserted through the mouth into the oropharynx. Under direct visualization, the upper esophagus was intubated. The scope was advanced to the level of the third portion of duodenum. Scope was slowly withdrawn with careful inspection of the mucosal surfaces. The scope was retroflexed for inspection of the gastric fundus and incisura. Findings and maneuvers are listed in impression below. The patient tolerated the procedure well. The scope was removed. There were no immediate complications.    Findings/IMPRESSION:  Esophagus: abnormal: 4 columns of grade 2 varices were noted throughout the esophagus, much more prominent in the distal two thirds than in the proximal third but without any bleeding stigmata.  2 linear erosions were noted in the distal third of the esophagus suggestive of GERD.  EG junction at 40 cm otherwise appear normal.    There is no obvious hiatal hernia present.      Stomach:  abnormal: Diffuse portal

## 2024-08-12 ENCOUNTER — TELEPHONE (OUTPATIENT)
Dept: GASTROENTEROLOGY | Age: 69
End: 2024-08-12

## 2024-08-12 NOTE — TELEPHONE ENCOUNTER
08- Patient called and lvm that he got a call from [facility] and is returning the call.     Notified that it was Lissa CEDEÑO for Dr Hastings           Explained the reason for the Nadolol and the Lansoprazole according to the Op note of Dr Hastings   4 columns of grade 2 varices were noted throughout the esophagus,   lso, patchy mucosal changes with linear erythema and few small 1 to 2 mm erosions in the antrum suggestive of chemical gastritis     He stated that he got one of the rx but not both and wanted the office to call and see what happened at the pharmacy       Advised will call the pharmacy     Called [facility]   Spoke with siva   Notified that the patient called and stated that he didn't get one of the rx. She stated that the Nadolol was not on hand and had to order but now they are not able to get it.       Called patient back notified that [facility] is not able to get the Nadolol and will be glad to transfer to another pharmacy.  Advised to call [facility] and let them now where he wants it transferred.     Oked per patient

## 2024-08-20 RX ORDER — CARVEDILOL 3.12 MG/1
3.12 TABLET ORAL 2 TIMES DAILY WITH MEALS
Qty: 180 TABLET | Refills: 3 | Status: SHIPPED | OUTPATIENT
Start: 2024-08-20

## 2024-08-20 NOTE — TELEPHONE ENCOUNTER
Ney Ann called to request a refill on his medication.      Last office visit : 6/12/2024   Next office visit : 9/12/2024     Requested Prescriptions     Pending Prescriptions Disp Refills    carvedilol (COREG) 3.125 MG tablet 180 tablet 3     Sig: Take 1 tablet by mouth 2 times daily (with meals)            Alexandra Cortez MA

## 2024-09-03 ENCOUNTER — OFFICE VISIT (OUTPATIENT)
Age: 69
End: 2024-09-03
Payer: MEDICARE

## 2024-09-03 VITALS
TEMPERATURE: 97.9 F | SYSTOLIC BLOOD PRESSURE: 136 MMHG | OXYGEN SATURATION: 92 % | HEIGHT: 69 IN | RESPIRATION RATE: 20 BRPM | HEART RATE: 81 BPM | BODY MASS INDEX: 27.25 KG/M2 | WEIGHT: 184 LBS | DIASTOLIC BLOOD PRESSURE: 74 MMHG

## 2024-09-03 DIAGNOSIS — L02.91 ABSCESS: Primary | ICD-10-CM

## 2024-09-03 PROCEDURE — G8427 DOCREV CUR MEDS BY ELIG CLIN: HCPCS

## 2024-09-03 PROCEDURE — 3017F COLORECTAL CA SCREEN DOC REV: CPT

## 2024-09-03 PROCEDURE — G8417 CALC BMI ABV UP PARAM F/U: HCPCS

## 2024-09-03 PROCEDURE — 99213 OFFICE O/P EST LOW 20 MIN: CPT

## 2024-09-03 PROCEDURE — 1123F ACP DISCUSS/DSCN MKR DOCD: CPT

## 2024-09-03 PROCEDURE — 4004F PT TOBACCO SCREEN RCVD TLK: CPT

## 2024-09-03 PROCEDURE — 96372 THER/PROPH/DIAG INJ SC/IM: CPT

## 2024-09-03 RX ORDER — CEFTRIAXONE 1 G/1
1000 INJECTION, POWDER, FOR SOLUTION INTRAMUSCULAR; INTRAVENOUS ONCE
Status: COMPLETED | OUTPATIENT
Start: 2024-09-03 | End: 2024-09-03

## 2024-09-03 RX ORDER — SULFAMETHOXAZOLE/TRIMETHOPRIM 800-160 MG
1 TABLET ORAL 2 TIMES DAILY
Qty: 20 TABLET | Refills: 0 | Status: SHIPPED | OUTPATIENT
Start: 2024-09-03 | End: 2024-09-13

## 2024-09-03 RX ORDER — MUPIROCIN 20 MG/G
OINTMENT TOPICAL
Qty: 15 G | Refills: 0 | Status: SHIPPED | OUTPATIENT
Start: 2024-09-03

## 2024-09-03 RX ADMIN — CEFTRIAXONE 1000 MG: 1 INJECTION, POWDER, FOR SOLUTION INTRAMUSCULAR; INTRAVENOUS at 09:43

## 2024-09-03 ASSESSMENT — ENCOUNTER SYMPTOMS
WHEEZING: 0
CHEST TIGHTNESS: 0
NAUSEA: 0
DIARRHEA: 0
ABDOMINAL PAIN: 0
FACIAL SWELLING: 0
VOMITING: 0
EYE DISCHARGE: 0
EYE PAIN: 0
COUGH: 0
STRIDOR: 0
ABDOMINAL DISTENTION: 0
EYE REDNESS: 0
APNEA: 0
SINUS PAIN: 0
CONSTIPATION: 0
SINUS PRESSURE: 0
SHORTNESS OF BREATH: 0
CHOKING: 0
SORE THROAT: 0
TROUBLE SWALLOWING: 0
COLOR CHANGE: 0

## 2024-09-03 NOTE — PROGRESS NOTES
Medication was administered by Radha Garcia at 9:45 AM.    Medication: ceftriaxone(rocephin)  Amount:  1000mg (2.86ml)  Route: intramuscular  Site: Dorsogluteal right  Patient was observed for 15 mins.  Patient tolerated well.  
washing with antibacterial soap.    Keep clean with antibacterial soap.    DO NOT attempt to \"pop\" the lesion; avoid touching or scratching the area.    Tylenol/ibuprofen as needed.     Follow up with pcp or return to clinic for recheck in 1 week.    Follow up sooner if sign/symptoms worsening.    Report to ER if symptoms become severe or systemic, such as fever/body aches/chills.     Any condition can change, despite proper treatment. Therefore, if symptoms still persist or worsen after treatment plan intitated today, either go to the nearest ER, or call PCP, or return to UC for further evaluation.    Urgent Care evaluation today is not a substitute for PCP visit. Follow up care is your responsibility to discuss and review this UC visit.      Elevated Blood Pressure Reading  Blood pressure was elevated in office today. Discussed this with patient in office.    1st BP reading was 136/80    2nd BP reading was 136/74    Monitor salt intake    Encouraged physical activity.    - Get at least 30 minutes of exercise on most days of the week   - Walking is a good choice   - You also may want to do other activities, such as running, swimming, cycling, or playing tennis or team sports.    Avoid or limit alcohol. Talk to your doctor about whether you can drink any alcohol.    Eat plenty of fruits, vegetables, and low-fat dairy products. Eat less saturated and total fats.    Recommended learning how to check your blood pressure at home.        Electronically signed by SHEILA Carlos CNP on 9/3/2024 at 9:46 AM      EMR Dragon/translation disclaimer: Much of this encounter note is an electronic transcription/translation of spoken language to printed text.  The electronic translation of spoken language may be erroneous, or at times, nonsensical words or phrases may be inadvertently transcribed.  Although I have reviewed the note for such errors, some may still exist.

## 2024-09-03 NOTE — PATIENT INSTRUCTIONS
Rocephin 1 gram injection given today.    Bactrim prescribed: Take full course of antibiotic as prescribed.     Mupirocin prescribed; apply twice daily after washing with antibacterial soap.    Keep clean with antibacterial soap.    DO NOT attempt to \"pop\" the lesion; avoid touching or scratching the area.    Tylenol/ibuprofen as needed.     Follow up with pcp or return to clinic for recheck in 1 week.    Follow up sooner if sign/symptoms worsening.    Report to ER if symptoms become severe or systemic, such as fever/body aches/chills.     Any condition can change, despite proper treatment. Therefore, if symptoms still persist or worsen after treatment plan intitated today, either go to the nearest ER, or call PCP, or return to UC for further evaluation.    Urgent Care evaluation today is not a substitute for PCP visit. Follow up care is your responsibility to discuss and review this UC visit.      Elevated Blood Pressure Reading  Blood pressure was elevated in office today. Discussed this with patient in office.    1st BP reading was 136/80    2nd BP reading was 136/74    Monitor salt intake    Encouraged physical activity.    - Get at least 30 minutes of exercise on most days of the week   - Walking is a good choice   - You also may want to do other activities, such as running, swimming, cycling, or playing tennis or team sports.    Avoid or limit alcohol. Talk to your doctor about whether you can drink any alcohol.    Eat plenty of fruits, vegetables, and low-fat dairy products. Eat less saturated and total fats.    Recommended learning how to check your blood pressure at home.

## 2024-09-05 ENCOUNTER — HOSPITAL ENCOUNTER (OUTPATIENT)
Dept: WOUND CARE | Age: 69
Discharge: HOME | End: 2024-09-05
Payer: MEDICARE

## 2024-09-05 VITALS
WEIGHT: 184 LBS | TEMPERATURE: 98.4 F | HEART RATE: 93 BPM | BODY MASS INDEX: 27.25 KG/M2 | RESPIRATION RATE: 20 BRPM | SYSTOLIC BLOOD PRESSURE: 134 MMHG | DIASTOLIC BLOOD PRESSURE: 68 MMHG | HEIGHT: 69 IN

## 2024-09-05 DIAGNOSIS — L02.91 ABSCESS: Primary | ICD-10-CM

## 2024-09-05 PROCEDURE — 99213 OFFICE O/P EST LOW 20 MIN: CPT | Performed by: NURSE PRACTITIONER

## 2024-09-05 PROCEDURE — 99213 OFFICE O/P EST LOW 20 MIN: CPT

## 2024-09-05 RX ORDER — GENTAMICIN SULFATE 1 MG/G
OINTMENT TOPICAL ONCE
OUTPATIENT
Start: 2024-09-05 | End: 2024-09-05

## 2024-09-05 RX ORDER — LIDOCAINE HYDROCHLORIDE 20 MG/ML
JELLY TOPICAL ONCE
OUTPATIENT
Start: 2024-09-05 | End: 2024-09-05

## 2024-09-05 RX ORDER — LIDOCAINE 50 MG/G
OINTMENT TOPICAL ONCE
OUTPATIENT
Start: 2024-09-05 | End: 2024-09-05

## 2024-09-05 RX ORDER — BETAMETHASONE DIPROPIONATE 0.5 MG/G
CREAM TOPICAL ONCE
OUTPATIENT
Start: 2024-09-05 | End: 2024-09-05

## 2024-09-05 RX ORDER — GINSENG 100 MG
CAPSULE ORAL ONCE
OUTPATIENT
Start: 2024-09-05 | End: 2024-09-05

## 2024-09-05 RX ORDER — BACITRACIN ZINC AND POLYMYXIN B SULFATE 500; 1000 [USP'U]/G; [USP'U]/G
OINTMENT TOPICAL ONCE
OUTPATIENT
Start: 2024-09-05 | End: 2024-09-05

## 2024-09-05 RX ORDER — TRIAMCINOLONE ACETONIDE 1 MG/G
OINTMENT TOPICAL ONCE
OUTPATIENT
Start: 2024-09-05 | End: 2024-09-05

## 2024-09-05 RX ORDER — NEOMYCIN/BACITRACIN/POLYMYXINB 3.5-400-5K
OINTMENT (GRAM) TOPICAL ONCE
OUTPATIENT
Start: 2024-09-05 | End: 2024-09-05

## 2024-09-05 RX ORDER — SODIUM CHLOR/HYPOCHLOROUS ACID 0.033 %
SOLUTION, IRRIGATION IRRIGATION ONCE
OUTPATIENT
Start: 2024-09-05 | End: 2024-09-05

## 2024-09-05 RX ORDER — CLOBETASOL PROPIONATE 0.5 MG/G
OINTMENT TOPICAL ONCE
OUTPATIENT
Start: 2024-09-05 | End: 2024-09-05

## 2024-09-05 RX ORDER — MUPIROCIN 20 MG/G
OINTMENT TOPICAL ONCE
OUTPATIENT
Start: 2024-09-05 | End: 2024-09-05

## 2024-09-05 RX ORDER — LIDOCAINE 40 MG/G
CREAM TOPICAL ONCE
OUTPATIENT
Start: 2024-09-05 | End: 2024-09-05

## 2024-09-05 RX ORDER — SILVER SULFADIAZINE 10 MG/G
CREAM TOPICAL ONCE
OUTPATIENT
Start: 2024-09-05 | End: 2024-09-05

## 2024-09-05 RX ORDER — LIDOCAINE HYDROCHLORIDE 40 MG/ML
SOLUTION TOPICAL ONCE
OUTPATIENT
Start: 2024-09-05 | End: 2024-09-05

## 2024-09-05 ASSESSMENT — ENCOUNTER SYMPTOMS: ROS SKIN COMMENTS: ABSCESS

## 2024-09-05 ASSESSMENT — PAIN DESCRIPTION - DESCRIPTORS: DESCRIPTORS: ACHING

## 2024-09-05 ASSESSMENT — PAIN DESCRIPTION - ORIENTATION: ORIENTATION: RIGHT

## 2024-09-05 ASSESSMENT — PAIN SCALES - GENERAL: PAINLEVEL_OUTOF10: 7

## 2024-09-05 ASSESSMENT — VISUAL ACUITY: OU: 1

## 2024-09-05 ASSESSMENT — PAIN DESCRIPTION - LOCATION: LOCATION: LEG

## 2024-09-05 NOTE — PROGRESS NOTES
Patient Care Team:  Izabela Robles APRN - CNP as PCP - General (Nurse Practitioner)  Izabela Roblse APRN - CNP as PCP - Empaneled Provider  Katherine Pool APRN - NP as Nurse Practitioner (Nurse Practitioner Adult Health)  Ken Monteiro MD as Consulting Physician (Neurology)    TODAY'S DATE:  9/5/2024     HISTORY of PRESENTILLNESS HPI   Ney Ann is a 69 y.o. male who presents today for wound evaluation.  He reports he developed an abscess on right thigh but he is now taking Bactrim for.This started 2 week(s) ago. He believes this is  healing. He has been applying antibiotic ointment which made it worse so he is leaving it open to air now. He has not had  fever orchills. He has a history of smoking.  He has been seen at Urgent care and had a follow up with orthopedic.  Wound Type: abscess  Wound Location: right thigh  Modifying factors:smoking    Patient Active Problem List   Diagnosis Code    Closed left hip fracture, initial encounter ([facility]) S72.002A    New onset a-fib ([facility]) I48.91    Liver disease K76.9    Hyperbilirubinemia E80.6    Abnormal LFTs R79.89    Thrombocytopenia ([facility]) D69.6    Leukopenia D72.819    Hepatic cirrhosis ([facility]) K74.60    Umbilical hernia without obstruction and without gangrene K42.9    Pancytopenia ([facility]) D61.818    Closed fracture of left hip ([facility]) S72.002A    Coagulopathy ([facility]) D68.9    History of ETOH abuse F10.11    Anemia D64.9    Lumbar pain M54.50    Weakness of both lower extremities R29.898    Esophageal varices without bleeding, unspecified esophageal varices type ([facility]) I85.00    Abscess L02.91       Ney Ann is a 69 y.o. male with the following history reviewed and recorded in [facility]:    Current Outpatient Medications   Medication Sig Dispense Refill    sulfamethoxazole-trimethoprim (BACTRIM DS;SEPTRA DS) 800-160 MG per tablet Take 1 tablet by mouth 2 times daily for 10 days 20 tablet 0    mupirocin (BACTROBAN) 2 % ointment Apply topically 3 times daily. 15

## 2024-09-05 NOTE — PLAN OF CARE
Problem: Pain  Goal: Verbalizes/displays adequate comfort level or baseline comfort level  Outcome: Progressing     Problem: Wound:  Goal: Will show signs of wound healing; wound closure and no evidence of infection  Description: Will show signs of wound healing; wound closure and no evidence of infection  Outcome: Progressing     Problem: Smoking cessation:  Goal: Ability to formulate a plan to maintain a tobacco-free life will be supported  Description: Ability to formulate a plan to maintain a tobacco-free life will be supported  Outcome: Progressing

## 2024-09-05 NOTE — DISCHARGE INSTRUCTIONS
[facility]ound [facility] and Hyperbaric Oxygen Therapy   Physician Orders and Discharge Instructions  [address] [place] [address]  [address]  [place] [address]  Telephone: [phone]      FAX [phone]    NAME:  Ney Ann  YOB: 1955  MEDICAL RECORD NUMBER:  [id number]  DATE:  9/5/2024    Discharge condition: Stable    Discharge to: Home    Left via:Private automobile    Accompanied by:  self    ECF/HHA:     Dressing Orders:  Right leg wound: soap and water wash, apply dry gauze and tape, change daily or more often if needed for excessive drainage    Treatment Orders:  Protein rich diet  Multivitamin  Cut back on smoking    [facility] follow up visit ___1 week with Nadia__________________________  (Please note your next appointment above and if you are unable to keep, kindly give a 24 hour notice. Thank you.)          If you experience any of the following, please call the Wound Care Center during business hours:    * Increase in Pain  * Temperature over 101  * Increase in drainage from your wound  * Drainage with a foul odor  * Bleeding  * Increase in swelling  * Need for compression bandage changes due to slippage, breakthrough drainage.    If you need medical attention outside of the business hours of the Wound Care Centers please contact your PCP or go to the nearest emergency room.

## 2024-09-10 SDOH — HEALTH STABILITY: PHYSICAL HEALTH: ON AVERAGE, HOW MANY DAYS PER WEEK DO YOU ENGAGE IN MODERATE TO STRENUOUS EXERCISE (LIKE A BRISK WALK)?: 2 DAYS

## 2024-09-10 SDOH — HEALTH STABILITY: PHYSICAL HEALTH: ON AVERAGE, HOW MANY MINUTES DO YOU ENGAGE IN EXERCISE AT THIS LEVEL?: 30 MIN

## 2024-09-10 ASSESSMENT — LIFESTYLE VARIABLES
HOW OFTEN DURING THE LAST YEAR HAVE YOU NEEDED AN ALCOHOLIC DRINK FIRST THING IN THE MORNING TO GET YOURSELF GOING AFTER A NIGHT OF HEAVY DRINKING: NEVER
HOW OFTEN DURING THE LAST YEAR HAVE YOU FAILED TO DO WHAT WAS NORMALLY EXPECTED FROM YOU BECAUSE OF DRINKING: NEVER
HOW MANY STANDARD DRINKS CONTAINING ALCOHOL DO YOU HAVE ON A TYPICAL DAY: 1 OR 2
HAS A RELATIVE, FRIEND, DOCTOR, OR ANOTHER HEALTH PROFESSIONAL EXPRESSED CONCERN ABOUT YOUR DRINKING OR SUGGESTED YOU CUT DOWN: YES, DURING THE PAST YEAR
HOW MANY STANDARD DRINKS CONTAINING ALCOHOL DO YOU HAVE ON A TYPICAL DAY: 1
HOW OFTEN DURING THE LAST YEAR HAVE YOU BEEN UNABLE TO REMEMBER WHAT HAPPENED THE NIGHT BEFORE BECAUSE YOU HAD BEEN DRINKING: NEVER
HOW OFTEN DURING THE LAST YEAR HAVE YOU NEEDED AN ALCOHOLIC DRINK FIRST THING IN THE MORNING TO GET YOURSELF GOING AFTER A NIGHT OF HEAVY DRINKING: NEVER
HOW OFTEN DURING THE LAST YEAR HAVE YOU BEEN UNABLE TO REMEMBER WHAT HAPPENED THE NIGHT BEFORE BECAUSE YOU HAD BEEN DRINKING: NEVER
HOW OFTEN DURING THE LAST YEAR HAVE YOU FOUND THAT YOU WERE NOT ABLE TO STOP DRINKING ONCE YOU HAD STARTED: NEVER
HOW OFTEN DURING THE LAST YEAR HAVE YOU FOUND THAT YOU WERE NOT ABLE TO STOP DRINKING ONCE YOU HAD STARTED: NEVER
HOW OFTEN DO YOU HAVE A DRINK CONTAINING ALCOHOL: MONTHLY OR LESS
HAVE YOU OR SOMEONE ELSE BEEN INJURED AS A RESULT OF YOUR DRINKING: NO
HOW OFTEN DO YOU HAVE SIX OR MORE DRINKS ON ONE OCCASION: 3
HOW OFTEN DO YOU HAVE A DRINK CONTAINING ALCOHOL: 2
HAS A RELATIVE, FRIEND, DOCTOR, OR ANOTHER HEALTH PROFESSIONAL EXPRESSED CONCERN ABOUT YOUR DRINKING OR SUGGESTED YOU CUT DOWN: YES, DURING THE PAST YEAR
HOW OFTEN DURING THE LAST YEAR HAVE YOU HAD A FEELING OF GUILT OR REMORSE AFTER DRINKING: MONTHLY
HAVE YOU OR SOMEONE ELSE BEEN INJURED AS A RESULT OF YOUR DRINKING: NO
HOW OFTEN DURING THE LAST YEAR HAVE YOU HAD A FEELING OF GUILT OR REMORSE AFTER DRINKING: MONTHLY
HOW OFTEN DURING THE LAST YEAR HAVE YOU FAILED TO DO WHAT WAS NORMALLY EXPECTED FROM YOU BECAUSE OF DRINKING: NEVER

## 2024-09-10 ASSESSMENT — PATIENT HEALTH QUESTIONNAIRE - PHQ9
SUM OF ALL RESPONSES TO PHQ QUESTIONS 1-9: 0
1. LITTLE INTEREST OR PLEASURE IN DOING THINGS: NOT AT ALL
SUM OF ALL RESPONSES TO PHQ QUESTIONS 1-9: 0
SUM OF ALL RESPONSES TO PHQ9 QUESTIONS 1 & 2: 0
2. FEELING DOWN, DEPRESSED OR HOPELESS: NOT AT ALL
SUM OF ALL RESPONSES TO PHQ QUESTIONS 1-9: 0
SUM OF ALL RESPONSES TO PHQ QUESTIONS 1-9: 0

## 2024-09-12 ENCOUNTER — OFFICE VISIT (OUTPATIENT)
Dept: PRIMARY CARE CLINIC | Age: 69
End: 2024-09-12
Payer: MEDICARE

## 2024-09-12 VITALS
BODY MASS INDEX: 27.08 KG/M2 | HEIGHT: 69 IN | DIASTOLIC BLOOD PRESSURE: 68 MMHG | WEIGHT: 182.8 LBS | SYSTOLIC BLOOD PRESSURE: 120 MMHG | TEMPERATURE: 97 F | OXYGEN SATURATION: 94 % | HEART RATE: 87 BPM

## 2024-09-12 DIAGNOSIS — Z00.00 INITIAL MEDICARE ANNUAL WELLNESS VISIT: Primary | ICD-10-CM

## 2024-09-12 PROCEDURE — G0438 PPPS, INITIAL VISIT: HCPCS | Performed by: NURSE PRACTITIONER

## 2024-09-12 PROCEDURE — 3017F COLORECTAL CA SCREEN DOC REV: CPT | Performed by: NURSE PRACTITIONER

## 2024-09-12 PROCEDURE — 1124F ACP DISCUSS-NO DSCNMKR DOCD: CPT | Performed by: NURSE PRACTITIONER

## 2024-09-13 ENCOUNTER — HOSPITAL ENCOUNTER (OUTPATIENT)
Dept: WOUND CARE | Age: 69
Discharge: HOME | End: 2024-09-13
Payer: MEDICARE

## 2024-09-13 VITALS
TEMPERATURE: 97 F | WEIGHT: 182 LBS | BODY MASS INDEX: 26.96 KG/M2 | HEART RATE: 87 BPM | HEIGHT: 69 IN | SYSTOLIC BLOOD PRESSURE: 120 MMHG | DIASTOLIC BLOOD PRESSURE: 68 MMHG | RESPIRATION RATE: 20 BRPM

## 2024-09-13 DIAGNOSIS — F17.200 SMOKER: ICD-10-CM

## 2024-09-13 DIAGNOSIS — L97.112 THIGH ULCER, RIGHT, WITH FAT LAYER EXPOSED (HCC): ICD-10-CM

## 2024-09-13 DIAGNOSIS — L02.91 ABSCESS: Primary | ICD-10-CM

## 2024-09-13 PROCEDURE — 99212 OFFICE O/P EST SF 10 MIN: CPT

## 2024-09-13 PROCEDURE — 99212 OFFICE O/P EST SF 10 MIN: CPT | Performed by: NURSE PRACTITIONER

## 2024-09-13 RX ORDER — GENTAMICIN SULFATE 1 MG/G
OINTMENT TOPICAL ONCE
OUTPATIENT
Start: 2024-09-13 | End: 2024-09-13

## 2024-09-13 RX ORDER — MUPIROCIN 20 MG/G
OINTMENT TOPICAL ONCE
OUTPATIENT
Start: 2024-09-13 | End: 2024-09-13

## 2024-09-13 RX ORDER — CLOBETASOL PROPIONATE 0.5 MG/G
OINTMENT TOPICAL ONCE
OUTPATIENT
Start: 2024-09-13 | End: 2024-09-13

## 2024-09-13 RX ORDER — NEOMYCIN/BACITRACIN/POLYMYXINB 3.5-400-5K
OINTMENT (GRAM) TOPICAL ONCE
OUTPATIENT
Start: 2024-09-13 | End: 2024-09-13

## 2024-09-13 RX ORDER — LIDOCAINE HYDROCHLORIDE 20 MG/ML
JELLY TOPICAL ONCE
OUTPATIENT
Start: 2024-09-13 | End: 2024-09-13

## 2024-09-13 RX ORDER — LIDOCAINE 40 MG/G
CREAM TOPICAL ONCE
OUTPATIENT
Start: 2024-09-13 | End: 2024-09-13

## 2024-09-13 RX ORDER — SODIUM CHLOR/HYPOCHLOROUS ACID 0.033 %
SOLUTION, IRRIGATION IRRIGATION ONCE
OUTPATIENT
Start: 2024-09-13 | End: 2024-09-13

## 2024-09-13 RX ORDER — BETAMETHASONE DIPROPIONATE 0.5 MG/G
CREAM TOPICAL ONCE
OUTPATIENT
Start: 2024-09-13 | End: 2024-09-13

## 2024-09-13 RX ORDER — TRIAMCINOLONE ACETONIDE 1 MG/G
OINTMENT TOPICAL ONCE
OUTPATIENT
Start: 2024-09-13 | End: 2024-09-13

## 2024-09-13 RX ORDER — LIDOCAINE HYDROCHLORIDE 40 MG/ML
SOLUTION TOPICAL ONCE
OUTPATIENT
Start: 2024-09-13 | End: 2024-09-13

## 2024-09-13 RX ORDER — GINSENG 100 MG
CAPSULE ORAL ONCE
OUTPATIENT
Start: 2024-09-13 | End: 2024-09-13

## 2024-09-13 RX ORDER — LIDOCAINE 50 MG/G
OINTMENT TOPICAL ONCE
OUTPATIENT
Start: 2024-09-13 | End: 2024-09-13

## 2024-09-13 RX ORDER — BACITRACIN ZINC AND POLYMYXIN B SULFATE 500; 1000 [USP'U]/G; [USP'U]/G
OINTMENT TOPICAL ONCE
OUTPATIENT
Start: 2024-09-13 | End: 2024-09-13

## 2024-09-13 RX ORDER — SILVER SULFADIAZINE 10 MG/G
CREAM TOPICAL ONCE
OUTPATIENT
Start: 2024-09-13 | End: 2024-09-13

## 2024-09-24 ENCOUNTER — OFFICE VISIT (OUTPATIENT)
Dept: GASTROENTEROLOGY | Age: 69
End: 2024-09-24
Payer: MEDICARE

## 2024-09-24 VITALS
HEIGHT: 69 IN | BODY MASS INDEX: 26.51 KG/M2 | WEIGHT: 179 LBS | DIASTOLIC BLOOD PRESSURE: 60 MMHG | OXYGEN SATURATION: 92 % | SYSTOLIC BLOOD PRESSURE: 115 MMHG | HEART RATE: 61 BPM

## 2024-09-24 DIAGNOSIS — K21.9 GASTROESOPHAGEAL REFLUX DISEASE, UNSPECIFIED WHETHER ESOPHAGITIS PRESENT: ICD-10-CM

## 2024-09-24 DIAGNOSIS — I85.00 ESOPHAGEAL VARICES WITHOUT BLEEDING, UNSPECIFIED ESOPHAGEAL VARICES TYPE (HCC): ICD-10-CM

## 2024-09-24 DIAGNOSIS — K74.60 CIRRHOSIS OF LIVER WITHOUT ASCITES, UNSPECIFIED HEPATIC CIRRHOSIS TYPE (HCC): Primary | ICD-10-CM

## 2024-09-24 PROCEDURE — 1124F ACP DISCUSS-NO DSCNMKR DOCD: CPT | Performed by: NURSE PRACTITIONER

## 2024-09-24 PROCEDURE — G8417 CALC BMI ABV UP PARAM F/U: HCPCS | Performed by: NURSE PRACTITIONER

## 2024-09-24 PROCEDURE — 4004F PT TOBACCO SCREEN RCVD TLK: CPT | Performed by: NURSE PRACTITIONER

## 2024-09-24 PROCEDURE — 3017F COLORECTAL CA SCREEN DOC REV: CPT | Performed by: NURSE PRACTITIONER

## 2024-09-24 PROCEDURE — G8427 DOCREV CUR MEDS BY ELIG CLIN: HCPCS | Performed by: NURSE PRACTITIONER

## 2024-09-24 PROCEDURE — 99214 OFFICE O/P EST MOD 30 MIN: CPT | Performed by: NURSE PRACTITIONER

## 2024-09-24 ASSESSMENT — ENCOUNTER SYMPTOMS
RECTAL PAIN: 0
DIARRHEA: 0
SHORTNESS OF BREATH: 0
ANAL BLEEDING: 0
ABDOMINAL PAIN: 0
CONSTIPATION: 0
ABDOMINAL DISTENTION: 0
NAUSEA: 0
TROUBLE SWALLOWING: 0
COUGH: 0
VOMITING: 0
CHOKING: 0
BLOOD IN STOOL: 0

## 2024-09-26 DIAGNOSIS — F51.01 PRIMARY INSOMNIA: ICD-10-CM

## 2024-09-26 NOTE — TELEPHONE ENCOUNTER
Ney BANG Ann called to request a refill on his medication.  I called pharmacy regarding prescription. Robby doesn't show that medication was filled on 7/2/24. Spoke with Lashell at the pharmacy and patient did fill prescription on 7/2/24. They are going to update the robby so hopefully it will update in [facility] in a few days.    Last office visit : 9/12/2024   Next office visit : 12/12/2024     Last UDS:   Benzodiazepine Screen, Urine   Date Value Ref Range Status   12/12/2023 pos  Final     Buprenorphine Urine   Date Value Ref Range Status   12/12/2023 neg  Final     Cocaine Metabolite Screen, Urine   Date Value Ref Range Status   12/12/2023 neg  Final     Gabapentin Screen, Urine   Date Value Ref Range Status   12/12/2023 neg  Final     Oxycodone Screen, Ur   Date Value Ref Range Status   12/12/2023 neg  Final     Propoxyphene Screen, Urine   Date Value Ref Range Status   12/12/2023 neg  Final     THC Screen, Urine   Date Value Ref Range Status   12/12/2023 neg  Final     Tricyclic Antidepressants, Urine   Date Value Ref Range Status   12/12/2023 neg  Final       Last [facility]: 9-26-24  Medication Contract: 12/12/23   Last Fill: 7/2/24    Requested Prescriptions     Pending Prescriptions Disp Refills    temazepam (RESTORIL) 30 MG capsule [Pharmacy Med Name: TEMAZEPAM 30MG CAPSULE] 90 capsule 0     Sig: Take 1 capsule by mouth nightly as needed for Sleep for up to 90 days. Max Daily Amount: 30 mg                   Please approve or refuse this medication.   Glenna Malave LPN

## 2024-09-30 RX ORDER — TEMAZEPAM 30 MG
30 CAPSULE ORAL NIGHTLY PRN
Qty: 90 CAPSULE | Refills: 0 | Status: SHIPPED | OUTPATIENT
Start: 2024-09-30 | End: 2024-12-29

## 2024-11-14 ENCOUNTER — PATIENT MESSAGE (OUTPATIENT)
Dept: PRIMARY CARE CLINIC | Age: 69
End: 2024-11-14

## 2024-11-15 DIAGNOSIS — Z01.818 ENCOUNTER FOR PREOPERATIVE DENTAL EXAMINATION: Primary | ICD-10-CM

## 2024-11-22 DIAGNOSIS — Z01.818 ENCOUNTER FOR PREOPERATIVE DENTAL EXAMINATION: ICD-10-CM

## 2024-11-22 LAB
BASOPHILS # BLD: 0 K/UL (ref 0–0.2)
BASOPHILS NFR BLD: 0.7 % (ref 0–1)
EOSINOPHIL # BLD: 0.2 K/UL (ref 0–0.6)
EOSINOPHIL NFR BLD: 3.5 % (ref 0–5)
ERYTHROCYTE [DISTWIDTH] IN BLOOD BY AUTOMATED COUNT: 18.4 % (ref 11.5–14.5)
HCT VFR BLD AUTO: 37 % (ref 42–52)
HGB BLD-MCNC: 12.1 G/DL (ref 14–18)
IMM GRANULOCYTES # BLD: 0 K/UL
LYMPHOCYTES # BLD: 1 K/UL (ref 1.1–4.5)
LYMPHOCYTES NFR BLD: 24.4 % (ref 20–40)
MCH RBC QN AUTO: 41.3 PG (ref 27–31)
MCHC RBC AUTO-ENTMCNC: 32.7 G/DL (ref 33–37)
MCV RBC AUTO: 126.3 FL (ref 80–94)
MONOCYTES # BLD: 0.7 K/UL (ref 0–0.9)
MONOCYTES NFR BLD: 16.4 % (ref 0–10)
NEUTROPHILS # BLD: 2.3 K/UL (ref 1.5–7.5)
NEUTS SEG NFR BLD: 54.3 % (ref 50–65)
PLATELET # BLD AUTO: 40 K/UL (ref 130–400)
PMV BLD AUTO: 11.2 FL (ref 9.4–12.4)
RBC # BLD AUTO: 2.93 M/UL (ref 4.7–6.1)
WBC # BLD AUTO: 4.3 K/UL (ref 4.8–10.8)

## 2024-12-13 ENCOUNTER — OFFICE VISIT (OUTPATIENT)
Dept: PRIMARY CARE CLINIC | Age: 69
End: 2024-12-13
Payer: MEDICARE

## 2024-12-13 VITALS
SYSTOLIC BLOOD PRESSURE: 110 MMHG | DIASTOLIC BLOOD PRESSURE: 68 MMHG | WEIGHT: 193.6 LBS | OXYGEN SATURATION: 92 % | BODY MASS INDEX: 28.68 KG/M2 | TEMPERATURE: 97.3 F | HEART RATE: 79 BPM | HEIGHT: 69 IN

## 2024-12-13 DIAGNOSIS — F51.01 PRIMARY INSOMNIA: Primary | ICD-10-CM

## 2024-12-13 DIAGNOSIS — K74.60 CIRRHOSIS OF LIVER WITHOUT ASCITES, UNSPECIFIED HEPATIC CIRRHOSIS TYPE (HCC): ICD-10-CM

## 2024-12-13 PROCEDURE — 3017F COLORECTAL CA SCREEN DOC REV: CPT | Performed by: NURSE PRACTITIONER

## 2024-12-13 PROCEDURE — 1159F MED LIST DOCD IN RCRD: CPT | Performed by: NURSE PRACTITIONER

## 2024-12-13 PROCEDURE — G8427 DOCREV CUR MEDS BY ELIG CLIN: HCPCS | Performed by: NURSE PRACTITIONER

## 2024-12-13 PROCEDURE — 1124F ACP DISCUSS-NO DSCNMKR DOCD: CPT | Performed by: NURSE PRACTITIONER

## 2024-12-13 PROCEDURE — G8484 FLU IMMUNIZE NO ADMIN: HCPCS | Performed by: NURSE PRACTITIONER

## 2024-12-13 PROCEDURE — 99214 OFFICE O/P EST MOD 30 MIN: CPT | Performed by: NURSE PRACTITIONER

## 2024-12-13 PROCEDURE — G8417 CALC BMI ABV UP PARAM F/U: HCPCS | Performed by: NURSE PRACTITIONER

## 2024-12-13 PROCEDURE — 4004F PT TOBACCO SCREEN RCVD TLK: CPT | Performed by: NURSE PRACTITIONER

## 2024-12-13 SDOH — ECONOMIC STABILITY: INCOME INSECURITY: HOW HARD IS IT FOR YOU TO PAY FOR THE VERY BASICS LIKE FOOD, HOUSING, MEDICAL CARE, AND HEATING?: NOT VERY HARD

## 2024-12-13 SDOH — ECONOMIC STABILITY: FOOD INSECURITY: WITHIN THE PAST 12 MONTHS, THE FOOD YOU BOUGHT JUST DIDN'T LAST AND YOU DIDN'T HAVE MONEY TO GET MORE.: NEVER TRUE

## 2024-12-13 SDOH — ECONOMIC STABILITY: FOOD INSECURITY: WITHIN THE PAST 12 MONTHS, YOU WORRIED THAT YOUR FOOD WOULD RUN OUT BEFORE YOU GOT MONEY TO BUY MORE.: NEVER TRUE

## 2024-12-13 NOTE — PROGRESS NOTES
Mr.Keith BANG Ann is a 69 y.o. male who presents today for  Chief Complaint   Patient presents with    3 Month Follow-Up       HPI:  Patient here today for the purpose of establishing care.  He reports he is doing well, although he is still having trouble with sleep despite the use of the prescribed temazepam.  Patient states that he awakens through the night and has to take over-the-counter melatonin.  His last 90-day supply of temazepam was filled on September 30.    Review of Systems   Constitutional:  Negative for activity change and fever.   HENT:  Negative for congestion, ear pain and sore throat.    Respiratory:  Negative for cough, chest tightness and shortness of breath.    Cardiovascular:  Negative for chest pain.   Gastrointestinal:  Negative for abdominal pain, diarrhea, nausea and vomiting.   Genitourinary:  Negative for frequency and urgency.   Musculoskeletal:  Negative for arthralgias and myalgias.   Skin:  Negative for color change.   Neurological:  Negative for dizziness, weakness and numbness.   Psychiatric/Behavioral:  Positive for sleep disturbance. Negative for agitation. The patient is not nervous/anxious.        Past Medical History:   Diagnosis Date    Abnormal LFTs     Atrial fibrillation (HCC)     Cirrhosis (HCC)     COVID-19     Hyperbilirubinemia     Hypothyroidism     Leukopenia     Liver disease     Splenomegaly     Umbilical hernia     Vitamin D deficiency        Current Outpatient Medications   Medication Sig Dispense Refill    temazepam (RESTORIL) 30 MG capsule Take 1 capsule by mouth nightly as needed for Sleep for up to 90 days. Max Daily Amount: 30 mg 90 capsule 0    carvedilol (COREG) 3.125 MG tablet Take 1 tablet by mouth 2 times daily (with meals) 180 tablet 3    lansoprazole (PREVACID) 30 MG delayed release capsule Take 1 capsule by mouth daily 30 capsule 5    spironolactone (ALDACTONE) 25 MG tablet TAKE 1 TABLET BY MOUTH DAILY 90 tablet 3    furosemide (LASIX) 20 MG tablet 
erroneous, or at times, nonsensical words or phrases to be inadvertently transcribed. Although I have reviewed the note for such errors, some may still exist.    Electronically signed by SHEILA Hernandez CNP on 12/13/2024 at 11:39 AM

## 2024-12-19 ASSESSMENT — ENCOUNTER SYMPTOMS
NAUSEA: 0
ABDOMINAL PAIN: 0
CHEST TIGHTNESS: 0
DIARRHEA: 0
VOMITING: 0
COLOR CHANGE: 0
SORE THROAT: 0
COUGH: 0
SHORTNESS OF BREATH: 0

## 2024-12-26 ENCOUNTER — PATIENT MESSAGE (OUTPATIENT)
Dept: PRIMARY CARE CLINIC | Age: 69
End: 2024-12-26

## 2024-12-26 NOTE — TELEPHONE ENCOUNTER
Ney Ann called to request a refill on his medication.      Last office visit : 12/13/2024   Next office visit : 1/10/2025     Requested Prescriptions     Pending Prescriptions Disp Refills    Vitamin D, Ergocalciferol, 39003 units CAPS 12 capsule 0     Sig: Take 50,000 Units by mouth once a week            Giovanna Sam MA

## 2024-12-30 ENCOUNTER — HOSPITAL ENCOUNTER (OUTPATIENT)
Dept: PREADMISSION TESTING | Age: 69
Discharge: HOME | End: 2025-01-03
Payer: MEDICARE

## 2024-12-30 VITALS — BODY MASS INDEX: 28.78 KG/M2 | WEIGHT: 195 LBS

## 2024-12-30 LAB
ALBUMIN SERPL-MCNC: 3.3 G/DL (ref 3.5–5.2)
ALP SERPL-CCNC: 102 U/L (ref 40–129)
ALT SERPL-CCNC: 24 U/L (ref 5–41)
ANION GAP SERPL CALCULATED.3IONS-SCNC: 15 MMOL/L (ref 7–19)
APTT PPP: 38.6 SEC (ref 26–36.2)
AST SERPL-CCNC: 59 U/L (ref 5–40)
BILIRUB SERPL-MCNC: 7.8 MG/DL (ref 0.2–1.2)
BUN SERPL-MCNC: 8 MG/DL (ref 8–23)
CALCIUM SERPL-MCNC: 8.6 MG/DL (ref 8.8–10.2)
CHLORIDE SERPL-SCNC: 103 MMOL/L (ref 98–111)
CO2 SERPL-SCNC: 21 MMOL/L (ref 22–29)
CREAT SERPL-MCNC: 0.5 MG/DL (ref 0.7–1.2)
ERYTHROCYTE [DISTWIDTH] IN BLOOD BY AUTOMATED COUNT: 15.5 % (ref 11.5–14.5)
GLUCOSE SERPL-MCNC: 169 MG/DL (ref 70–99)
HCT VFR BLD AUTO: 33.2 % (ref 42–52)
HGB BLD-MCNC: 10.6 G/DL (ref 14–18)
INR PPP: 1.94 (ref 0.88–1.18)
MCH RBC QN AUTO: 41.2 PG (ref 27–31)
MCHC RBC AUTO-ENTMCNC: 31.9 G/DL (ref 33–37)
MCV RBC AUTO: 129.2 FL (ref 80–94)
PLATELET # BLD AUTO: 35 K/UL (ref 130–400)
PLATELET SLIDE REVIEW: ABNORMAL
PMV BLD AUTO: 10.6 FL (ref 9.4–12.4)
POTASSIUM SERPL-SCNC: 3.6 MMOL/L (ref 3.5–5)
PROT SERPL-MCNC: 5.8 G/DL (ref 6.4–8.3)
PROTHROMBIN TIME: 21.7 SEC (ref 12–14.6)
RBC # BLD AUTO: 2.57 M/UL (ref 4.7–6.1)
SODIUM SERPL-SCNC: 139 MMOL/L (ref 136–145)
WBC # BLD AUTO: 3.6 K/UL (ref 4.8–10.8)

## 2024-12-30 PROCEDURE — 85610 PROTHROMBIN TIME: CPT

## 2024-12-30 PROCEDURE — 85730 THROMBOPLASTIN TIME PARTIAL: CPT

## 2024-12-30 PROCEDURE — 80053 COMPREHEN METABOLIC PANEL: CPT

## 2024-12-30 PROCEDURE — 85027 COMPLETE CBC AUTOMATED: CPT

## 2024-12-30 PROCEDURE — 93005 ELECTROCARDIOGRAM TRACING: CPT | Performed by: ANESTHESIOLOGY

## 2024-12-30 RX ORDER — TEMAZEPAM 30 MG/1
30 CAPSULE ORAL NIGHTLY
COMMUNITY

## 2024-12-31 LAB
EKG P AXIS: 42 DEGREES
EKG P-R INTERVAL: 262 MS
EKG Q-T INTERVAL: 406 MS
EKG QRS DURATION: 134 MS
EKG QTC CALCULATION (BAZETT): 435 MS
EKG T AXIS: -9 DEGREES

## 2024-12-31 PROCEDURE — 93010 ELECTROCARDIOGRAM REPORT: CPT | Performed by: INTERNAL MEDICINE

## 2025-01-02 NOTE — PROGRESS NOTES
Cardiology read EKG/old EKG and labwork reviewed per dr. Lopes.  Ok to proceed with anesthesia for surgery 1/15/25.  No further orders at present.

## 2025-01-05 RX ORDER — ERGOCALCIFEROL 1.25 MG/1
50000 CAPSULE ORAL WEEKLY
Qty: 12 CAPSULE | Refills: 0 | Status: SHIPPED | OUTPATIENT
Start: 2025-01-05

## 2025-01-09 ENCOUNTER — APPOINTMENT (OUTPATIENT)
Dept: CT IMAGING | Age: 70
DRG: 433 | End: 2025-01-09
Payer: MEDICARE

## 2025-01-09 ENCOUNTER — APPOINTMENT (OUTPATIENT)
Dept: GENERAL RADIOLOGY | Age: 70
DRG: 433 | End: 2025-01-09
Payer: MEDICARE

## 2025-01-09 ENCOUNTER — HOSPITAL ENCOUNTER (INPATIENT)
Age: 70
LOS: 3 days | Discharge: HOME | DRG: 433 | End: 2025-01-12
Attending: STUDENT IN AN ORGANIZED HEALTH CARE EDUCATION/TRAINING PROGRAM | Admitting: HOSPITALIST
Payer: MEDICARE

## 2025-01-09 DIAGNOSIS — K72.90 DECOMPENSATED HEPATIC CIRRHOSIS (HCC): Primary | ICD-10-CM

## 2025-01-09 DIAGNOSIS — E80.6 HYPERBILIRUBINEMIA: ICD-10-CM

## 2025-01-09 DIAGNOSIS — K74.60 DECOMPENSATED HEPATIC CIRRHOSIS (HCC): Primary | ICD-10-CM

## 2025-01-09 DIAGNOSIS — K92.1 MELENA: ICD-10-CM

## 2025-01-09 DIAGNOSIS — R79.1 ELEVATED INR: ICD-10-CM

## 2025-01-09 DIAGNOSIS — D61.818 PANCYTOPENIA (HCC): ICD-10-CM

## 2025-01-09 PROBLEM — F10.10 ALCOHOL ABUSE: Status: ACTIVE | Noted: 2025-01-09

## 2025-01-09 LAB
ABO/RH: NORMAL
ALBUMIN SERPL-MCNC: 3.4 G/DL (ref 3.5–5.2)
ALP SERPL-CCNC: 85 U/L (ref 40–129)
ALT SERPL-CCNC: 24 U/L (ref 5–41)
AMMONIA PLAS-SCNC: 59 UMOL/L (ref 16–60)
ANION GAP SERPL CALCULATED.3IONS-SCNC: 16 MMOL/L (ref 7–19)
ANTIBODY SCREEN: NORMAL
AST SERPL-CCNC: 45 U/L (ref 5–40)
BACTERIA URNS QL MICRO: NEGATIVE /HPF
BASOPHILS # BLD: 0 K/UL (ref 0–0.2)
BASOPHILS NFR BLD: 0.6 % (ref 0–1)
BILIRUB SERPL-MCNC: 13.2 MG/DL (ref 0.2–1.2)
BILIRUB UR QL STRIP: ABNORMAL
BLOOD BANK DISPENSE STATUS: NORMAL
BLOOD BANK DISPENSE STATUS: NORMAL
BLOOD BANK PRODUCT CODE: NORMAL
BLOOD BANK PRODUCT CODE: NORMAL
BPU ID: NORMAL
BPU ID: NORMAL
BUN SERPL-MCNC: 20 MG/DL (ref 8–23)
CALCIUM SERPL-MCNC: 9.1 MG/DL (ref 8.8–10.2)
CHLORIDE SERPL-SCNC: 101 MMOL/L (ref 98–111)
CLARITY UR: CLEAR
CO2 SERPL-SCNC: 22 MMOL/L (ref 22–29)
COLOR UR: ABNORMAL
CREAT SERPL-MCNC: 0.3 MG/DL (ref 0.7–1.2)
CRYSTALS URNS MICRO: ABNORMAL /HPF
DACRYOCYTES BLD QL SMEAR: ABNORMAL
DESCRIPTION BLOOD BANK: NORMAL
DESCRIPTION BLOOD BANK: NORMAL
EOSINOPHIL # BLD: 0.1 K/UL (ref 0–0.6)
EOSINOPHIL NFR BLD: 3.5 % (ref 0–5)
EPI CELLS #/AREA URNS AUTO: 0 /HPF (ref 0–5)
ERYTHROCYTE [DISTWIDTH] IN BLOOD BY AUTOMATED COUNT: 15.1 % (ref 11.5–14.5)
ETHANOLAMINE SERPL-MCNC: <10 MG/DL (ref 0–0.08)
FERRITIN SERPL-MCNC: 405.6 NG/ML (ref 30–400)
GLUCOSE SERPL-MCNC: 163 MG/DL (ref 70–99)
GLUCOSE UR STRIP.AUTO-MCNC: NEGATIVE MG/DL
HAV IGM SERPL QL IA: NORMAL
HBV CORE IGM SERPL QL IA: NORMAL
HBV SURFACE AG SERPL QL IA: NORMAL
HCT VFR BLD AUTO: 22.3 % (ref 42–52)
HCT VFR BLD AUTO: 26.7 % (ref 42–52)
HCV AB SERPL QL IA: NORMAL
HGB BLD-MCNC: 7.1 G/DL (ref 14–18)
HGB BLD-MCNC: 8.6 G/DL (ref 14–18)
HGB UR STRIP.AUTO-MCNC: ABNORMAL MG/L
HYALINE CASTS #/AREA URNS AUTO: 0 /HPF (ref 0–8)
IMM GRANULOCYTES # BLD: 0 K/UL
INR PPP: 2.54 (ref 0.88–1.18)
IRON SATN MFR SERPL: ABNORMAL % (ref 14–50)
IRON SERPL-MCNC: 239 UG/DL (ref 59–158)
KETONES UR STRIP.AUTO-MCNC: NEGATIVE MG/DL
LEUKOCYTE ESTERASE UR QL STRIP.AUTO: ABNORMAL
LIPASE SERPL-CCNC: 45 U/L (ref 13–60)
LYMPHOCYTES # BLD: 0.6 K/UL (ref 1.1–4.5)
LYMPHOCYTES NFR BLD: 19 % (ref 20–40)
MACROCYTES BLD QL SMEAR: ABNORMAL
MCH RBC QN AUTO: 41.5 PG (ref 27–31)
MCHC RBC AUTO-ENTMCNC: 32.2 G/DL (ref 33–37)
MCV RBC AUTO: 129 FL (ref 80–94)
MONOCYTES # BLD: 0.4 K/UL (ref 0–0.9)
MONOCYTES NFR BLD: 14.1 % (ref 0–10)
NEUTROPHILS # BLD: 1.9 K/UL (ref 1.5–7.5)
NEUTS SEG NFR BLD: 62.5 % (ref 50–65)
NITRITE UR QL STRIP.AUTO: POSITIVE
OVALOCYTES BLD QL SMEAR: ABNORMAL
PH UR STRIP.AUTO: 7 [PH] (ref 5–8)
PLATELET # BLD AUTO: 31 K/UL (ref 130–400)
PLATELET SLIDE REVIEW: ABNORMAL
PMV BLD AUTO: 10.3 FL (ref 9.4–12.4)
POTASSIUM SERPL-SCNC: 3.7 MMOL/L (ref 3.5–5)
PROT SERPL-MCNC: 5.7 G/DL (ref 6.4–8.3)
PROT UR STRIP.AUTO-MCNC: NEGATIVE MG/DL
PROTHROMBIN TIME: 26.7 SEC (ref 12–14.6)
RBC # BLD AUTO: 2.07 M/UL (ref 4.7–6.1)
RBC #/AREA URNS AUTO: 11 /HPF (ref 0–4)
SODIUM SERPL-SCNC: 139 MMOL/L (ref 136–145)
SP GR UR STRIP.AUTO: >=1.045 (ref 1–1.03)
TIBC SERPL-MCNC: ABNORMAL UG/DL (ref 250–400)
TSH SERPL DL<=0.005 MIU/L-ACNC: 3.44 UIU/ML (ref 0.27–4.2)
UROBILINOGEN UR STRIP.AUTO-MCNC: 1 E.U./DL
WBC # BLD AUTO: 3.1 K/UL (ref 4.8–10.8)
WBC #/AREA URNS AUTO: 4 /HPF (ref 0–5)

## 2025-01-09 PROCEDURE — 2500000003 HC RX 250 WO HCPCS: Performed by: NURSE PRACTITIONER

## 2025-01-09 PROCEDURE — 36430 TRANSFUSION BLD/BLD COMPNT: CPT

## 2025-01-09 PROCEDURE — 6370000000 HC RX 637 (ALT 250 FOR IP): Performed by: INTERNAL MEDICINE

## 2025-01-09 PROCEDURE — 81001 URINALYSIS AUTO W/SCOPE: CPT

## 2025-01-09 PROCEDURE — 6360000002 HC RX W HCPCS: Performed by: STUDENT IN AN ORGANIZED HEALTH CARE EDUCATION/TRAINING PROGRAM

## 2025-01-09 PROCEDURE — 96365 THER/PROPH/DIAG IV INF INIT: CPT

## 2025-01-09 PROCEDURE — 82077 ASSAY SPEC XCP UR&BREATH IA: CPT

## 2025-01-09 PROCEDURE — 80053 COMPREHEN METABOLIC PANEL: CPT

## 2025-01-09 PROCEDURE — 30233R1 TRANSFUSION OF NONAUTOLOGOUS PLATELETS INTO PERIPHERAL VEIN, PERCUTANEOUS APPROACH: ICD-10-PCS | Performed by: INTERNAL MEDICINE

## 2025-01-09 PROCEDURE — 85014 HEMATOCRIT: CPT

## 2025-01-09 PROCEDURE — P9073 PLATELETS PHERESIS PATH REDU: HCPCS

## 2025-01-09 PROCEDURE — 85610 PROTHROMBIN TIME: CPT

## 2025-01-09 PROCEDURE — 83540 ASSAY OF IRON: CPT

## 2025-01-09 PROCEDURE — 2140000000 HC CCU INTERMEDIATE R&B

## 2025-01-09 PROCEDURE — 30233N1 TRANSFUSION OF NONAUTOLOGOUS RED BLOOD CELLS INTO PERIPHERAL VEIN, PERCUTANEOUS APPROACH: ICD-10-PCS | Performed by: INTERNAL MEDICINE

## 2025-01-09 PROCEDURE — 87040 BLOOD CULTURE FOR BACTERIA: CPT

## 2025-01-09 PROCEDURE — 74174 CTA ABD&PLVS W/CONTRAST: CPT

## 2025-01-09 PROCEDURE — 83690 ASSAY OF LIPASE: CPT

## 2025-01-09 PROCEDURE — 6360000002 HC RX W HCPCS: Performed by: NURSE PRACTITIONER

## 2025-01-09 PROCEDURE — 99285 EMERGENCY DEPT VISIT HI MDM: CPT

## 2025-01-09 PROCEDURE — 86923 COMPATIBILITY TEST ELECTRIC: CPT

## 2025-01-09 PROCEDURE — 83550 IRON BINDING TEST: CPT

## 2025-01-09 PROCEDURE — 84443 ASSAY THYROID STIM HORMONE: CPT

## 2025-01-09 PROCEDURE — 86850 RBC ANTIBODY SCREEN: CPT

## 2025-01-09 PROCEDURE — 80074 ACUTE HEPATITIS PANEL: CPT

## 2025-01-09 PROCEDURE — 71045 X-RAY EXAM CHEST 1 VIEW: CPT

## 2025-01-09 PROCEDURE — P9017 PLASMA 1 DONOR FRZ W/IN 8 HR: HCPCS

## 2025-01-09 PROCEDURE — 86900 BLOOD TYPING SEROLOGIC ABO: CPT

## 2025-01-09 PROCEDURE — 6370000000 HC RX 637 (ALT 250 FOR IP): Performed by: NURSE PRACTITIONER

## 2025-01-09 PROCEDURE — 30233L1 TRANSFUSION OF NONAUTOLOGOUS FRESH PLASMA INTO PERIPHERAL VEIN, PERCUTANEOUS APPROACH: ICD-10-PCS | Performed by: INTERNAL MEDICINE

## 2025-01-09 PROCEDURE — 82140 ASSAY OF AMMONIA: CPT

## 2025-01-09 PROCEDURE — 2500000003 HC RX 250 WO HCPCS: Performed by: STUDENT IN AN ORGANIZED HEALTH CARE EDUCATION/TRAINING PROGRAM

## 2025-01-09 PROCEDURE — P9016 RBC LEUKOCYTES REDUCED: HCPCS

## 2025-01-09 PROCEDURE — 82728 ASSAY OF FERRITIN: CPT

## 2025-01-09 PROCEDURE — 86901 BLOOD TYPING SEROLOGIC RH(D): CPT

## 2025-01-09 PROCEDURE — 2580000003 HC RX 258: Performed by: NURSE PRACTITIONER

## 2025-01-09 PROCEDURE — 6370000000 HC RX 637 (ALT 250 FOR IP): Performed by: STUDENT IN AN ORGANIZED HEALTH CARE EDUCATION/TRAINING PROGRAM

## 2025-01-09 PROCEDURE — 96375 TX/PRO/DX INJ NEW DRUG ADDON: CPT

## 2025-01-09 PROCEDURE — 85018 HEMOGLOBIN: CPT

## 2025-01-09 PROCEDURE — 36415 COLL VENOUS BLD VENIPUNCTURE: CPT

## 2025-01-09 PROCEDURE — 99223 1ST HOSP IP/OBS HIGH 75: CPT | Performed by: INTERNAL MEDICINE

## 2025-01-09 PROCEDURE — 6360000004 HC RX CONTRAST MEDICATION: Performed by: STUDENT IN AN ORGANIZED HEALTH CARE EDUCATION/TRAINING PROGRAM

## 2025-01-09 PROCEDURE — P9059 PLASMA, FRZ BETWEEN 8-24HOUR: HCPCS

## 2025-01-09 PROCEDURE — 2580000003 HC RX 258: Performed by: STUDENT IN AN ORGANIZED HEALTH CARE EDUCATION/TRAINING PROGRAM

## 2025-01-09 PROCEDURE — 85025 COMPLETE CBC W/AUTO DIFF WBC: CPT

## 2025-01-09 RX ORDER — GAUZE BANDAGE 2" X 2"
100 BANDAGE TOPICAL DAILY
Status: DISCONTINUED | OUTPATIENT
Start: 2025-01-09 | End: 2025-01-12 | Stop reason: HOSPADM

## 2025-01-09 RX ORDER — POLYETHYLENE GLYCOL 3350 17 G/17G
17 POWDER, FOR SOLUTION ORAL DAILY PRN
Status: DISCONTINUED | OUTPATIENT
Start: 2025-01-09 | End: 2025-01-12 | Stop reason: HOSPADM

## 2025-01-09 RX ORDER — POTASSIUM CHLORIDE 7.45 MG/ML
10 INJECTION INTRAVENOUS PRN
Status: DISCONTINUED | OUTPATIENT
Start: 2025-01-09 | End: 2025-01-12 | Stop reason: HOSPADM

## 2025-01-09 RX ORDER — SODIUM CHLORIDE 9 MG/ML
INJECTION, SOLUTION INTRAVENOUS PRN
Status: DISCONTINUED | OUTPATIENT
Start: 2025-01-09 | End: 2025-01-10 | Stop reason: SDUPTHER

## 2025-01-09 RX ORDER — MORPHINE SULFATE 4 MG/ML
4 INJECTION, SOLUTION INTRAMUSCULAR; INTRAVENOUS ONCE
Status: COMPLETED | OUTPATIENT
Start: 2025-01-09 | End: 2025-01-09

## 2025-01-09 RX ORDER — IOPAMIDOL 755 MG/ML
75 INJECTION, SOLUTION INTRAVASCULAR
Status: COMPLETED | OUTPATIENT
Start: 2025-01-09 | End: 2025-01-09

## 2025-01-09 RX ORDER — POTASSIUM CHLORIDE 1500 MG/1
40 TABLET, EXTENDED RELEASE ORAL PRN
Status: DISCONTINUED | OUTPATIENT
Start: 2025-01-09 | End: 2025-01-12 | Stop reason: HOSPADM

## 2025-01-09 RX ORDER — SODIUM CHLORIDE 0.9 % (FLUSH) 0.9 %
5-40 SYRINGE (ML) INJECTION PRN
Status: DISCONTINUED | OUTPATIENT
Start: 2025-01-09 | End: 2025-01-12 | Stop reason: HOSPADM

## 2025-01-09 RX ORDER — ONDANSETRON 2 MG/ML
4 INJECTION INTRAMUSCULAR; INTRAVENOUS EVERY 6 HOURS PRN
Status: DISCONTINUED | OUTPATIENT
Start: 2025-01-09 | End: 2025-01-12 | Stop reason: HOSPADM

## 2025-01-09 RX ORDER — SODIUM CHLORIDE 0.9 % (FLUSH) 0.9 %
5-40 SYRINGE (ML) INJECTION EVERY 12 HOURS SCHEDULED
Status: DISCONTINUED | OUTPATIENT
Start: 2025-01-09 | End: 2025-01-12 | Stop reason: HOSPADM

## 2025-01-09 RX ORDER — ONDANSETRON 2 MG/ML
4 INJECTION INTRAMUSCULAR; INTRAVENOUS ONCE
Status: COMPLETED | OUTPATIENT
Start: 2025-01-09 | End: 2025-01-09

## 2025-01-09 RX ORDER — LACTULOSE 10 G/15ML
10 SOLUTION ORAL DAILY
Status: DISCONTINUED | OUTPATIENT
Start: 2025-01-09 | End: 2025-01-12 | Stop reason: HOSPADM

## 2025-01-09 RX ORDER — FUROSEMIDE 40 MG/1
40 TABLET ORAL DAILY
Status: DISCONTINUED | OUTPATIENT
Start: 2025-01-09 | End: 2025-01-12 | Stop reason: HOSPADM

## 2025-01-09 RX ORDER — CARVEDILOL 6.25 MG/1
3.12 TABLET ORAL 2 TIMES DAILY WITH MEALS
Status: DISCONTINUED | OUTPATIENT
Start: 2025-01-09 | End: 2025-01-11

## 2025-01-09 RX ORDER — ONDANSETRON 4 MG/1
4 TABLET, ORALLY DISINTEGRATING ORAL EVERY 8 HOURS PRN
Status: DISCONTINUED | OUTPATIENT
Start: 2025-01-09 | End: 2025-01-12 | Stop reason: HOSPADM

## 2025-01-09 RX ORDER — MAGNESIUM SULFATE IN WATER 40 MG/ML
2000 INJECTION, SOLUTION INTRAVENOUS PRN
Status: DISCONTINUED | OUTPATIENT
Start: 2025-01-09 | End: 2025-01-12 | Stop reason: HOSPADM

## 2025-01-09 RX ORDER — MECOBALAMIN 5000 MCG
5 TABLET,DISINTEGRATING ORAL NIGHTLY
Status: DISCONTINUED | OUTPATIENT
Start: 2025-01-09 | End: 2025-01-12 | Stop reason: HOSPADM

## 2025-01-09 RX ORDER — PHYTONADIONE 5 MG/1
10 TABLET ORAL DAILY
Status: COMPLETED | OUTPATIENT
Start: 2025-01-09 | End: 2025-01-11

## 2025-01-09 RX ORDER — SODIUM CHLORIDE 9 MG/ML
INJECTION, SOLUTION INTRAVENOUS PRN
Status: DISCONTINUED | OUTPATIENT
Start: 2025-01-09 | End: 2025-01-12 | Stop reason: HOSPADM

## 2025-01-09 RX ADMIN — ONDANSETRON 4 MG: 2 INJECTION INTRAMUSCULAR; INTRAVENOUS at 12:08

## 2025-01-09 RX ADMIN — MORPHINE SULFATE 4 MG: 4 INJECTION, SOLUTION INTRAMUSCULAR; INTRAVENOUS at 12:08

## 2025-01-09 RX ADMIN — LACTULOSE 10 G: 20 SOLUTION ORAL at 20:23

## 2025-01-09 RX ADMIN — Medication 5 MG: at 20:23

## 2025-01-09 RX ADMIN — PANTOPRAZOLE SODIUM 8 MG/HR: 40 INJECTION, POWDER, FOR SOLUTION INTRAVENOUS at 13:58

## 2025-01-09 RX ADMIN — Medication 100 MG: at 16:38

## 2025-01-09 RX ADMIN — PANTOPRAZOLE SODIUM 8 MG/HR: 40 INJECTION, POWDER, FOR SOLUTION INTRAVENOUS at 23:44

## 2025-01-09 RX ADMIN — IOPAMIDOL 75 ML: 755 INJECTION, SOLUTION INTRAVENOUS at 12:10

## 2025-01-09 RX ADMIN — SODIUM CHLORIDE, PRESERVATIVE FREE 80 MG: 5 INJECTION INTRAVENOUS at 13:12

## 2025-01-09 RX ADMIN — WATER 1000 MG: 1 INJECTION INTRAMUSCULAR; INTRAVENOUS; SUBCUTANEOUS at 12:09

## 2025-01-09 RX ADMIN — SODIUM CHLORIDE, PRESERVATIVE FREE 10 ML: 5 INJECTION INTRAVENOUS at 20:24

## 2025-01-09 RX ADMIN — OCTREOTIDE ACETATE 25 MCG/HR: 500 INJECTION, SOLUTION INTRAVENOUS; SUBCUTANEOUS at 12:16

## 2025-01-09 RX ADMIN — PHYTONADIONE 10 MG: 5 TABLET ORAL at 13:58

## 2025-01-09 ASSESSMENT — PAIN SCALES - GENERAL: PAINLEVEL_OUTOF10: 9

## 2025-01-09 NOTE — ED NOTES
.l  
GI at the bedside   
PT signed blood consent   
Route  IntraVENous Documented By  Chantel Rivera RN        pantoprazole (PROTONIX) 80 mg in sodium chloride 0.9 % 100 mL infusion Admin Date  01/09/2025 Action  New Bag Dose  8 mg/hr Rate  10 mL/hr Route  IntraVENous Documented By  Chantel Rivera RN        phytonadione (VITAMIN K) tablet 10 mg Admin Date  01/09/2025 Action  Given Dose  10 mg Rate   Route  Oral Documented By  Chantel Rivera RN            History:   Past Medical History:   Diagnosis Date    Abnormal LFTs     Atrial fibrillation (HCC)     per pt; no current cardiologist    Cirrhosis (HCC)     [facility] and [facility] OROSCOID-19     2 yr ago    History of blood transfusion     Hyperbilirubinemia     Hypertension     Hypothyroidism     no meds at present    Insomnia     Leukopenia     Liver disease     Platelets decreased (HCC)     chronic    Splenomegaly     Umbilical hernia     and inguinal per pt    Varices, esophageal (HCC)     hx of    Vitamin D deficiency        Assessment  Vitals: Level of Consciousness: Alert (0)   Vitals:    01/09/25 1230 01/09/25 1300 01/09/25 1400 01/09/25 1425   BP: 108/63 105/63 108/83 (!) 107/56   Pulse: (!) 102 95 92 95   Resp: 18 20 16 19   Temp:    98.3 °F (36.8 °C)   SpO2: 90% 97% 95% 94%   Weight:         Predictive Model Details          35 (Caution)  Factor Value    Calculated 1/9/2025 14:29 33% Age 69 years old    Deterioration Index Model 32% Supplemental oxygen Nasal cannula     13% Hematocrit abnormal (26.7 %)     11% Respiratory rate 19     3% Pulse 95     2% WBC count abnormal (3.1 K/uL)     2% Systolic 107     1% Platelet count abnormal (31 K/uL)     1% Sodium 139 mmol/L     1% Pulse oximetry 94 %     1% Potassium 3.7 mmol/L     0% Temperature 98.3 °F (36.8 °C)       NPO? Yes  O2 Flow Rate: O2 Device: Nasal cannula O2 Flow Rate (L/min): 2 L/min  Cardiac Rhythm: sinus   NIH Score: NIH     Active LDA's:   Peripheral IV 01/09/25 Left Antecubital (Active)   Site

## 2025-01-09 NOTE — H&P
[facility]      Hospitalist - History & Physical      PCP: Izabela Robles, APRN - CNP    Date of Admission: 1/9/2025    Date of Service: 1/9/2025    Chief Complaint:  Diarrhea and abdominal pain     History Of Present Illness:   The patient is a 69 y.o. male with a PMH of alcoholic cirrhosis, esophageal varices, pancytopenia, hypertension, esophageal varices and vitamin D deficiency who presented to [facility] ED on 1/9/2025 complaining of epistaxis, abdominal pain and melena.  He reported generalized abdominal pain approximately 4 days prior to admission.  He has had associated dark tarry stools.  He has intermittent abdominal pain secondary to a ventral hernia which is painful but reducible, however, he states the pain he has been experiencing is different.  He reports a 4-day history of nausea without vomiting, abdominal pain, and tarry diarrhea.  He states he tried Imodium, however, the diarrhea continued.  He has remained orally hydrated, however, he has been unable to eat.  He currently denies drinking alcohol, however, his wife states that he has been drinking for approximately a week.  He is followed by GI both in [place] and hepatology at [place].  He reports epistaxis just prior to diarrhea that has occurred intermittently.    Further ED workup revealed , K3.7, BUN 20 and creatinine 0.3.  Glucose 163.  Ammonia 59, alk phos 95, ALT 24, AST 45.  Total bilirubin 13.2.  Ethanol less than 10.  WBC 3.1, H&H 8.6/26.7 with a platelet count of 31 (his hgb was 10.6 on 12/30/24).  INR-2.54.  CT of the abdomen pelvis with and without contrast revealed no active gastrointestinal bleed on CT scan.  Cirrhosis of the liver and splenomegaly with signs of portal hypertension.  Small amount of ascites.  Mild dilatation of the mid distal small bowel of by fluid may represent gastroenteritis.  Right inguinal canal hernia containing ascites.  Patient will be admitted to hospital medicine for further GI

## 2025-01-09 NOTE — PLAN OF CARE
Problem: Discharge Planning  Goal: Discharge to home or other facility with appropriate resources  Outcome: Progressing  Flowsheets (Taken 1/9/2025 1816)  Discharge to home or other facility with appropriate resources: Identify barriers to discharge with patient and caregiver     Problem: Safety - Adult  Goal: Free from fall injury  Outcome: Progressing

## 2025-01-09 NOTE — ED PROVIDER NOTES
I do have concern for slow variceal bleed.  For this reason he was given Rocephin, octreotide, and Protonix.  After discussion with GI, Dr. Rhoades, also given platelets, FFP, and oral vitamin K for correction of thrombocytopenia and elevated INR.    Overall he has worsening liver dysfunction in the setting of alcoholic cirrhosis, now with a MELD-Na score of 27.     Patient to be evaluated by GI, likely to require endoscopy, will admit to hospitalist for further management.        CONSULTS:  IP CONSULT TO GI    :  Unless otherwise noted below, none     Procedures    FINAL IMPRESSION      1. Decompensated hepatic cirrhosis (HCC)    2. Melena    3. Hyperbilirubinemia    4. Elevated INR    5. Pancytopenia (HCC)          DISPOSITION/PLAN   DISPOSITION Decision To Admit 01/09/2025 01:06:45 PM   DISPOSITION CONDITION Stable                (Please note that portions of this note were completed with a voice recognition program.  Efforts were made to edit thedictations but occasionally words are mis-transcribed.)    Heather Cook MD (electronically signed)Emergency Physician          Heather Cook MD  01/09/25 4234

## 2025-01-09 NOTE — CONSENT
Informed Consent for Blood Component Transfusion Note    I have discussed with the patient the rationale for blood component transfusion; its benefits in treating or preventing fatigue, organ damage, or death; and its risk which includes mild transfusion reactions, rare risk of blood borne infection, or more serious but rare reactions. I have discussed the alternatives to transfusion, including the risk and consequences of not receiving transfusion. The patient had an opportunity to ask questions and had agreed to proceed with transfusion of blood components.    Electronically signed by Heather Cook MD on 1/9/25 at 1:05 PM CST

## 2025-01-10 ENCOUNTER — ANESTHESIA EVENT (OUTPATIENT)
Dept: ENDOSCOPY | Age: 70
End: 2025-01-10
Payer: MEDICARE

## 2025-01-10 ENCOUNTER — ANESTHESIA (OUTPATIENT)
Dept: ENDOSCOPY | Age: 70
End: 2025-01-10
Payer: MEDICARE

## 2025-01-10 ENCOUNTER — APPOINTMENT (OUTPATIENT)
Dept: ULTRASOUND IMAGING | Age: 70
DRG: 433 | End: 2025-01-10
Payer: MEDICARE

## 2025-01-10 PROBLEM — Z51.5 PALLIATIVE CARE PATIENT: Status: ACTIVE | Noted: 2025-01-10

## 2025-01-10 LAB
ALBUMIN SERPL-MCNC: 2.9 G/DL (ref 3.5–5.2)
ALP SERPL-CCNC: 70 U/L (ref 40–129)
ALT SERPL-CCNC: 19 U/L (ref 5–41)
ANION GAP SERPL CALCULATED.3IONS-SCNC: 10 MMOL/L (ref 7–19)
AST SERPL-CCNC: 45 U/L (ref 5–40)
BASOPHILS # BLD: 0 K/UL (ref 0–0.2)
BASOPHILS # BLD: 0 K/UL (ref 0–0.2)
BASOPHILS NFR BLD: 0.7 % (ref 0–1)
BASOPHILS NFR BLD: 1.3 % (ref 0–1)
BILIRUB SERPL-MCNC: 9.4 MG/DL (ref 0.2–1.2)
BLOOD BANK DISPENSE STATUS: NORMAL
BLOOD BANK PRODUCT CODE: NORMAL
BPU ID: NORMAL
BUN SERPL-MCNC: 16 MG/DL (ref 8–23)
CALCIUM SERPL-MCNC: 8.2 MG/DL (ref 8.8–10.2)
CHLORIDE SERPL-SCNC: 105 MMOL/L (ref 98–111)
CO2 SERPL-SCNC: 25 MMOL/L (ref 22–29)
CREAT SERPL-MCNC: 0.5 MG/DL (ref 0.7–1.2)
DESCRIPTION BLOOD BANK: NORMAL
EOSINOPHIL # BLD: 0.1 K/UL (ref 0–0.6)
EOSINOPHIL # BLD: 0.1 K/UL (ref 0–0.6)
EOSINOPHIL NFR BLD: 4.8 % (ref 0–5)
EOSINOPHIL NFR BLD: 5.7 % (ref 0–5)
ERYTHROCYTE [DISTWIDTH] IN BLOOD BY AUTOMATED COUNT: 15.3 % (ref 11.5–14.5)
GLUCOSE SERPL-MCNC: 128 MG/DL (ref 70–99)
HCT VFR BLD AUTO: 21.8 % (ref 42–52)
HCT VFR BLD AUTO: 30.4 % (ref 42–52)
HCT VFR BLD AUTO: 30.5 % (ref 42–52)
HGB BLD-MCNC: 6.8 G/DL (ref 14–18)
HGB BLD-MCNC: 9.6 G/DL (ref 14–18)
HGB BLD-MCNC: 9.8 G/DL (ref 14–18)
IMM GRANULOCYTES # BLD: 0 K/UL
IMM GRANULOCYTES # BLD: 0 K/UL
INR PPP: 2.32 (ref 0.88–1.18)
LYMPHOCYTES # BLD: 0.4 K/UL (ref 1.1–4.5)
LYMPHOCYTES # BLD: 0.5 K/UL (ref 1.1–4.5)
LYMPHOCYTES NFR BLD: 14.8 % (ref 20–40)
LYMPHOCYTES NFR BLD: 22.9 % (ref 20–40)
MACROCYTES BLD QL SMEAR: ABNORMAL
MCH RBC QN AUTO: 37.7 PG (ref 27–31)
MCH RBC QN AUTO: 41.7 PG (ref 27–31)
MCHC RBC AUTO-ENTMCNC: 31.2 G/DL (ref 33–37)
MCHC RBC AUTO-ENTMCNC: 32.2 G/DL (ref 33–37)
MCV RBC AUTO: 116.9 FL (ref 80–94)
MCV RBC AUTO: 133.7 FL (ref 80–94)
MONOCYTES # BLD: 0.4 K/UL (ref 0–0.9)
MONOCYTES # BLD: 0.4 K/UL (ref 0–0.9)
MONOCYTES NFR BLD: 15.2 % (ref 0–10)
MONOCYTES NFR BLD: 15.4 % (ref 0–10)
NEUTROPHILS # BLD: 1.2 K/UL (ref 1.5–7.5)
NEUTROPHILS # BLD: 1.9 K/UL (ref 1.5–7.5)
NEUTS SEG NFR BLD: 54.3 % (ref 50–65)
NEUTS SEG NFR BLD: 64.2 % (ref 50–65)
PLATELET # BLD AUTO: 20 K/UL (ref 130–400)
PLATELET # BLD AUTO: 28 K/UL (ref 130–400)
PLATELET SLIDE REVIEW: ABNORMAL
PLATELET SLIDE REVIEW: ABNORMAL
PMV BLD AUTO: 10.3 FL (ref 9.4–12.4)
PMV BLD AUTO: 9.7 FL (ref 9.4–12.4)
POTASSIUM SERPL-SCNC: 4.3 MMOL/L (ref 3.5–5)
PROT SERPL-MCNC: 4.9 G/DL (ref 6.4–8.3)
PROTHROMBIN TIME: 24.9 SEC (ref 12–14.6)
RBC # BLD AUTO: 1.63 M/UL (ref 4.7–6.1)
RBC # BLD AUTO: 2.6 M/UL (ref 4.7–6.1)
SODIUM SERPL-SCNC: 140 MMOL/L (ref 136–145)
TSH SERPL DL<=0.005 MIU/L-ACNC: 0.71 UIU/ML (ref 0.27–4.2)
WBC # BLD AUTO: 2.3 K/UL (ref 4.8–10.8)
WBC # BLD AUTO: 2.9 K/UL (ref 4.8–10.8)

## 2025-01-10 PROCEDURE — 6370000000 HC RX 637 (ALT 250 FOR IP): Performed by: INTERNAL MEDICINE

## 2025-01-10 PROCEDURE — 2709999900 HC NON-CHARGEABLE SUPPLY: Performed by: INTERNAL MEDICINE

## 2025-01-10 PROCEDURE — 7100000001 HC PACU RECOVERY - ADDTL 15 MIN: Performed by: INTERNAL MEDICINE

## 2025-01-10 PROCEDURE — 2140000000 HC CCU INTERMEDIATE R&B

## 2025-01-10 PROCEDURE — 94760 N-INVAS EAR/PLS OXIMETRY 1: CPT

## 2025-01-10 PROCEDURE — 3609017100 HC EGD: Performed by: INTERNAL MEDICINE

## 2025-01-10 PROCEDURE — 2580000003 HC RX 258: Performed by: NURSE PRACTITIONER

## 2025-01-10 PROCEDURE — 3700000001 HC ADD 15 MINUTES (ANESTHESIA): Performed by: INTERNAL MEDICINE

## 2025-01-10 PROCEDURE — 2580000003 HC RX 258

## 2025-01-10 PROCEDURE — 0W3P8ZZ CONTROL BLEEDING IN GASTROINTESTINAL TRACT, VIA NATURAL OR ARTIFICIAL OPENING ENDOSCOPIC: ICD-10-PCS | Performed by: INTERNAL MEDICINE

## 2025-01-10 PROCEDURE — 43235 EGD DIAGNOSTIC BRUSH WASH: CPT | Performed by: INTERNAL MEDICINE

## 2025-01-10 PROCEDURE — 85025 COMPLETE CBC W/AUTO DIFF WBC: CPT

## 2025-01-10 PROCEDURE — 36415 COLL VENOUS BLD VENIPUNCTURE: CPT

## 2025-01-10 PROCEDURE — 7100000000 HC PACU RECOVERY - FIRST 15 MIN: Performed by: INTERNAL MEDICINE

## 2025-01-10 PROCEDURE — 3700000000 HC ANESTHESIA ATTENDED CARE: Performed by: INTERNAL MEDICINE

## 2025-01-10 PROCEDURE — 6360000002 HC RX W HCPCS: Performed by: INTERNAL MEDICINE

## 2025-01-10 PROCEDURE — 99222 1ST HOSP IP/OBS MODERATE 55: CPT | Performed by: PHYSICIAN ASSISTANT

## 2025-01-10 PROCEDURE — 85610 PROTHROMBIN TIME: CPT

## 2025-01-10 PROCEDURE — 76705 ECHO EXAM OF ABDOMEN: CPT

## 2025-01-10 PROCEDURE — 6360000002 HC RX W HCPCS: Performed by: NURSE PRACTITIONER

## 2025-01-10 PROCEDURE — 2500000003 HC RX 250 WO HCPCS: Performed by: INTERNAL MEDICINE

## 2025-01-10 PROCEDURE — 84443 ASSAY THYROID STIM HORMONE: CPT

## 2025-01-10 PROCEDURE — 2580000003 HC RX 258: Performed by: INTERNAL MEDICINE

## 2025-01-10 PROCEDURE — 85018 HEMOGLOBIN: CPT

## 2025-01-10 PROCEDURE — 85014 HEMATOCRIT: CPT

## 2025-01-10 PROCEDURE — 6360000002 HC RX W HCPCS

## 2025-01-10 PROCEDURE — 80053 COMPREHEN METABOLIC PANEL: CPT

## 2025-01-10 PROCEDURE — 2500000003 HC RX 250 WO HCPCS

## 2025-01-10 PROCEDURE — 36430 TRANSFUSION BLD/BLD COMPNT: CPT

## 2025-01-10 RX ORDER — IPRATROPIUM BROMIDE AND ALBUTEROL SULFATE 2.5; .5 MG/3ML; MG/3ML
1 SOLUTION RESPIRATORY (INHALATION)
Status: DISCONTINUED | OUTPATIENT
Start: 2025-01-10 | End: 2025-01-10 | Stop reason: HOSPADM

## 2025-01-10 RX ORDER — MEPERIDINE HYDROCHLORIDE 25 MG/ML
12.5 INJECTION INTRAMUSCULAR; INTRAVENOUS; SUBCUTANEOUS
Status: DISCONTINUED | OUTPATIENT
Start: 2025-01-10 | End: 2025-01-10 | Stop reason: HOSPADM

## 2025-01-10 RX ORDER — HYDROMORPHONE HYDROCHLORIDE 1 MG/ML
0.25 INJECTION, SOLUTION INTRAMUSCULAR; INTRAVENOUS; SUBCUTANEOUS EVERY 5 MIN PRN
Status: DISCONTINUED | OUTPATIENT
Start: 2025-01-10 | End: 2025-01-10 | Stop reason: HOSPADM

## 2025-01-10 RX ORDER — PROPOFOL 10 MG/ML
INJECTION, EMULSION INTRAVENOUS
Status: DISCONTINUED | OUTPATIENT
Start: 2025-01-10 | End: 2025-01-10 | Stop reason: SDUPTHER

## 2025-01-10 RX ORDER — SODIUM CHLORIDE, SODIUM LACTATE, POTASSIUM CHLORIDE, CALCIUM CHLORIDE 600; 310; 30; 20 MG/100ML; MG/100ML; MG/100ML; MG/100ML
INJECTION, SOLUTION INTRAVENOUS
Status: DISCONTINUED | OUTPATIENT
Start: 2025-01-10 | End: 2025-01-10 | Stop reason: SDUPTHER

## 2025-01-10 RX ORDER — DEXMEDETOMIDINE HYDROCHLORIDE 100 UG/ML
INJECTION, SOLUTION INTRAVENOUS
Status: DISCONTINUED | OUTPATIENT
Start: 2025-01-10 | End: 2025-01-10 | Stop reason: SDUPTHER

## 2025-01-10 RX ORDER — TRAZODONE HYDROCHLORIDE 50 MG/1
50 TABLET, FILM COATED ORAL NIGHTLY PRN
Status: DISCONTINUED | OUTPATIENT
Start: 2025-01-10 | End: 2025-01-12 | Stop reason: HOSPADM

## 2025-01-10 RX ORDER — NALOXONE HYDROCHLORIDE 0.4 MG/ML
INJECTION, SOLUTION INTRAMUSCULAR; INTRAVENOUS; SUBCUTANEOUS PRN
Status: DISCONTINUED | OUTPATIENT
Start: 2025-01-10 | End: 2025-01-10 | Stop reason: HOSPADM

## 2025-01-10 RX ORDER — SODIUM CHLORIDE 9 MG/ML
INJECTION, SOLUTION INTRAVENOUS PRN
Status: DISCONTINUED | OUTPATIENT
Start: 2025-01-10 | End: 2025-01-10 | Stop reason: HOSPADM

## 2025-01-10 RX ORDER — LIDOCAINE HYDROCHLORIDE 10 MG/ML
INJECTION, SOLUTION INFILTRATION; PERINEURAL
Status: DISCONTINUED | OUTPATIENT
Start: 2025-01-10 | End: 2025-01-10 | Stop reason: SDUPTHER

## 2025-01-10 RX ORDER — DIPHENHYDRAMINE HYDROCHLORIDE 50 MG/ML
12.5 INJECTION INTRAMUSCULAR; INTRAVENOUS
Status: DISCONTINUED | OUTPATIENT
Start: 2025-01-10 | End: 2025-01-10 | Stop reason: HOSPADM

## 2025-01-10 RX ORDER — SODIUM CHLORIDE 9 MG/ML
INJECTION, SOLUTION INTRAVENOUS PRN
Status: DISCONTINUED | OUTPATIENT
Start: 2025-01-10 | End: 2025-01-11

## 2025-01-10 RX ORDER — PROCHLORPERAZINE EDISYLATE 5 MG/ML
5 INJECTION INTRAMUSCULAR; INTRAVENOUS
Status: DISCONTINUED | OUTPATIENT
Start: 2025-01-10 | End: 2025-01-10 | Stop reason: HOSPADM

## 2025-01-10 RX ORDER — SODIUM CHLORIDE 9 MG/ML
INJECTION, SOLUTION INTRAVENOUS PRN
Status: DISCONTINUED | OUTPATIENT
Start: 2025-01-10 | End: 2025-01-10 | Stop reason: SDUPTHER

## 2025-01-10 RX ORDER — SODIUM CHLORIDE 0.9 % (FLUSH) 0.9 %
5-40 SYRINGE (ML) INJECTION EVERY 12 HOURS SCHEDULED
Status: DISCONTINUED | OUTPATIENT
Start: 2025-01-10 | End: 2025-01-10 | Stop reason: HOSPADM

## 2025-01-10 RX ORDER — SODIUM CHLORIDE 0.9 % (FLUSH) 0.9 %
5-40 SYRINGE (ML) INJECTION PRN
Status: DISCONTINUED | OUTPATIENT
Start: 2025-01-10 | End: 2025-01-10 | Stop reason: HOSPADM

## 2025-01-10 RX ORDER — MIDAZOLAM HYDROCHLORIDE 1 MG/ML
INJECTION, SOLUTION INTRAMUSCULAR; INTRAVENOUS
Status: DISCONTINUED | OUTPATIENT
Start: 2025-01-10 | End: 2025-01-10 | Stop reason: SDUPTHER

## 2025-01-10 RX ORDER — SUCRALFATE 1 G/1
1 TABLET ORAL EVERY 6 HOURS SCHEDULED
Status: DISCONTINUED | OUTPATIENT
Start: 2025-01-10 | End: 2025-01-12 | Stop reason: HOSPADM

## 2025-01-10 RX ORDER — HYDRALAZINE HYDROCHLORIDE 20 MG/ML
10 INJECTION INTRAMUSCULAR; INTRAVENOUS
Status: DISCONTINUED | OUTPATIENT
Start: 2025-01-10 | End: 2025-01-10 | Stop reason: HOSPADM

## 2025-01-10 RX ORDER — HYDROMORPHONE HYDROCHLORIDE 1 MG/ML
0.5 INJECTION, SOLUTION INTRAMUSCULAR; INTRAVENOUS; SUBCUTANEOUS EVERY 5 MIN PRN
Status: DISCONTINUED | OUTPATIENT
Start: 2025-01-10 | End: 2025-01-10 | Stop reason: HOSPADM

## 2025-01-10 RX ORDER — EPHEDRINE SULFATE/0.9% NACL/PF 25 MG/5 ML
SYRINGE (ML) INTRAVENOUS
Status: DISCONTINUED | OUTPATIENT
Start: 2025-01-10 | End: 2025-01-10 | Stop reason: SDUPTHER

## 2025-01-10 RX ORDER — LABETALOL HYDROCHLORIDE 5 MG/ML
10 INJECTION, SOLUTION INTRAVENOUS
Status: DISCONTINUED | OUTPATIENT
Start: 2025-01-10 | End: 2025-01-10 | Stop reason: HOSPADM

## 2025-01-10 RX ADMIN — LACTULOSE 10 G: 20 SOLUTION ORAL at 13:21

## 2025-01-10 RX ADMIN — OCTREOTIDE ACETATE 50 MCG/HR: 500 INJECTION, SOLUTION INTRAVENOUS; SUBCUTANEOUS at 00:27

## 2025-01-10 RX ADMIN — PHENYLEPHRINE HYDROCHLORIDE 100 MCG: 10 INJECTION INTRAVENOUS at 10:38

## 2025-01-10 RX ADMIN — WATER 1000 MG: 1 INJECTION INTRAMUSCULAR; INTRAVENOUS; SUBCUTANEOUS at 13:21

## 2025-01-10 RX ADMIN — SUCRALFATE 1 G: 1 TABLET ORAL at 16:58

## 2025-01-10 RX ADMIN — LIDOCAINE HYDROCHLORIDE 5 MG: 10 INJECTION, SOLUTION INFILTRATION; PERINEURAL at 10:31

## 2025-01-10 RX ADMIN — EPHEDRINE SULFATE 5 MG: 5 INJECTION INTRAVENOUS at 10:38

## 2025-01-10 RX ADMIN — MIDAZOLAM 2 MG: 1 INJECTION INTRAMUSCULAR; INTRAVENOUS at 10:31

## 2025-01-10 RX ADMIN — PROPOFOL 20 MG: 10 INJECTION, EMULSION INTRAVENOUS at 10:34

## 2025-01-10 RX ADMIN — SODIUM CHLORIDE, PRESERVATIVE FREE 10 ML: 5 INJECTION INTRAVENOUS at 21:12

## 2025-01-10 RX ADMIN — SODIUM CHLORIDE, SODIUM LACTATE, POTASSIUM CHLORIDE, AND CALCIUM CHLORIDE: 600; 310; 30; 20 INJECTION, SOLUTION INTRAVENOUS at 10:21

## 2025-01-10 RX ADMIN — DEXMEDETOMIDINE HYDROCHLORIDE 4 MCG: 100 INJECTION, SOLUTION, CONCENTRATE INTRAVENOUS at 10:32

## 2025-01-10 RX ADMIN — PROPOFOL 50 MG: 10 INJECTION, EMULSION INTRAVENOUS at 10:31

## 2025-01-10 RX ADMIN — Medication 100 MG: at 13:21

## 2025-01-10 RX ADMIN — PHYTONADIONE 10 MG: 5 TABLET ORAL at 13:21

## 2025-01-10 RX ADMIN — Medication 5 MG: at 21:12

## 2025-01-10 RX ADMIN — SODIUM CHLORIDE, PRESERVATIVE FREE 40 MG: 5 INJECTION INTRAVENOUS at 13:20

## 2025-01-10 RX ADMIN — SUCRALFATE 1 G: 1 TABLET ORAL at 13:21

## 2025-01-10 ASSESSMENT — LIFESTYLE VARIABLES: SMOKING_STATUS: 1

## 2025-01-10 NOTE — CONSULTS
Consult performed. Review chart.   
Palliative Care Consult Note    1/10/2025 6:51 AM  Subjective:  Admit Date: 1/9/2025  PCP: Izabela Robles, SHEILA - CNP    Date of Service: 1/10/2025    Reason for Consultation:  Goals of Care, Code Status, Family Support     History Obtained From: EMR/Patient and their Family    History Of Present Illness:   The patient is a 69 y.o. male with PMH alcoholic cirrhosis, esophageal varices, pancytopenia, HTN, vitamin D deficiency, insomnia who presented to [facility] ED 01/09/2025 complaining of abdominal pain, melena and epistaxis associated with nausea.  Also noted to be jaundiced per his spouse in ED documentation.  He is also followed at [facility] by hepatology and recent imaging concerning for gastric/splenic varices as well as ascites.  CT abdomen and pelvis reported no evidence for active gastrointestinal bleed based on CT scan, cirrhosis of the liver and splenomegaly and signs of portal hypertension with small amount of ascites, mild dilatation of the mid to distal small bowel by fluid and this may represent gastroenteritis, small nonobstructing calculi seen within the calyces of the kidneys, no ureteral obstruction, right inguinal canal hernia containing ascites.  Lab work revealed total bilirubin 13.2, WBC 3.1, hemoglobin 8.6, platelets 31, INR 2.54.  UA negative for bacteria.  He was placed on octreotide and pantoprazole drips.  Gastroenterology was consulted with recommendations for diagnostic EGD, Rowson to be continued empirically and correction of INR/platelet count.  Palliative care consulted for decompensated liver disease.    Past Medical History:        Diagnosis Date    Abnormal LFTs     Atrial fibrillation (HCC)     per pt; no current cardiologist    Cirrhosis (HCC)     sees [facility]    COVID-19     2 yr ago    History of blood transfusion     Hyperbilirubinemia     Hypertension     Hypothyroidism     no meds at present    Insomnia     Leukopenia     Liver disease     Platelets decreased (HCC) 
meals) 8/20/24   Izabela Robles APRN - CNP   spironolactone (ALDACTONE) 25 MG tablet TAKE 1 TABLET BY MOUTH DAILY 8/1/24   Izabela Robles APRN - CNP   furosemide (LASIX) 20 MG tablet TAKE 1 TABLET BY MOUTH IN THE  MORNING AND 1 TABLET BY MOUTH IN THE EVENING  Patient taking differently: Take 2 tablets by mouth daily 8/1/24   Izabela Robles APRN - CNP   magnesium oxide (MAG-OX) 400 (240 Mg) MG tablet Take 1 tablet by mouth daily    Provider, MD Damian        pantoprazole (PROTONIX) 80 mg in sodium chloride 0.9 % 100 mL infusion, Continuous  octreotide (SANDOSTATIN) 500 mcg in sodium chloride 0.9 % 100 mL infusion, Continuous  0.9 % sodium chloride infusion, PRN  phytonadione (VITAMIN K) tablet 10 mg, Daily          Allergies   Tomato and Watermelon [citrullus vulgaris]    Social History     Pertinent history mentioned above.     Family History     Family History   Problem Relation Age of Onset    Diabetes Mother     Colon Polyps Neg Hx     Colon Cancer Neg Hx        Review of Systems   - CONSTITUTIONAL: Denies weight loss, fever and chills.    - HEENT: Denies changes in vision and hearing.    - RESPIRATORY: Denies SOB and cough.    - CV: Denies palpitations and CP.    - GI: Denies abdominal pain, nausea.  Positive for diarrhea.  And melena.    - : Denies dysuria and urinary frequency.    - MSK: Denies myalgia and joint pain.    - SKIN: Denies rash and pruritus.    - NEUROLOGICAL: Denies headache and syncope.    - PSYCHIATRIC: Denies recent changes in mood. Denies anxiety and depression.    - SKIN: No jaundice or skin lesions.    -RECTAL: No pain or tenderness.     Physical Exam   /83   Pulse 92   Temp 97.8 °F (36.6 °C)   Resp 16   Wt 81.6 kg (180 lb)   SpO2 95%   BMI 26.57 kg/m²      - GENERAL: Alert and oriented x 3. No acute distress. Well-nourished.    - EYES: EOMI. scleral icterus present.    - HENT: Moist mucous membranes. No cervical lymphadenopathy.    - LUNGS: Clear to

## 2025-01-10 NOTE — PLAN OF CARE
See my procedure note today for full recommendations.  At this time, we are still awaiting right upper quadrant ultrasound and TSH level for completion of workup.  Also, if diarrhea continues, collect stool panel which was ordered.

## 2025-01-10 NOTE — ANESTHESIA POSTPROCEDURE EVALUATION
Department of Anesthesiology  Postprocedure Note    Patient: Ney Ann  MRN: [id number]  YOB: 1955  Date of evaluation: 1/10/2025    Procedure Summary       Date: 01/10/25 Room / Location: [place] [address] / [facility]    Anesthesia Start: 1022 Anesthesia Stop: 1053    Procedure: ESOPHAGOGASTRODUODENOSCOPY Diagnosis:       Melena      (Melena [K92.1])    Surgeons: Debi Rhoades MD Responsible Provider: Nela Fernandes APRN - CRNA    Anesthesia Type: general, TIVA ASA Status: 4            Anesthesia Type: No value filed.    Rod Phase I: Rod Score: 9    Rod Phase II:      Anesthesia Post Evaluation    Patient location during evaluation: PACU  Patient participation: complete - patient participated  Level of consciousness: awake  Airway patency: patent  Nausea & Vomiting: no nausea  Cardiovascular status: hemodynamically stable  Respiratory status: acceptable and nasal cannula  Hydration status: stable  Comments: BP (!) 91/51   Pulse 82   Temp 97.9 °F (36.6 °C)   Resp 18   Ht 1.753 m (5' 9.02\")   Wt 81.6 kg (180 lb)   SpO2 92%   BMI 26.57 kg/m²   Patient on NC 4L, came down on 2L NC- PACU aware and will titrate down as able  Pain management: adequate    No notable events documented.

## 2025-01-10 NOTE — OP NOTE
Endoscopy Note          Operative Report    Patient: Ney Ann MRN: [id number]      YOB: 1955  Age: 69 y.o.  Sex: male            Indications: Melena and drop in hemoglobin in the setting of known alcohol cirrhosis.    Preoperative Evaluation: The patient was evaluated prior to the procedure in the GI lab admission area, the patient's ASA was recorded in the chart.  Consent was obtained from the patient with discussion to include the risk of perforation, bleeding, infection, and aspiration.    Anesthesia: TY-per anesthesia.    Complication: None; patient tolerated the procedure well.    Estimated blood loss: Insignificant.    Procedure: The patient was sedated into the left lateral decubitus position.  Scope was advanced from the mouth to the third portion of duodenum.  Scope was withdrawn to the stomach and retroflexed view was performed.     Findings:  There were small distal esophageal varices which flattened with insufflation, therefore no treatment indicated.  No stigmata of recent bleeding associated with these varices.  Regular Z-line located at 40 cm, at the top the gastric folds.  There was portal hypertensive gastropathy associated with the gastric cardia and antrum with contact friability and some oozing of blood which stopped by the end of the procedure.  Normal bulb and duodenum.    Postoperative Diagnosis:  Portal hypertensive gastropathy with contact friability and blood oozing.  Suspect cause for bleeding especially in the setting of coagulopathy and severe thrombocytopenia (decompensated liver disease).  Small distal esophageal varices which flattened with insufflation, no treatment indicated.      Recommendations:   Return patient to [facility] for [facility] care.  Okay to stop octreotide infusion.  Continue pantoprazole 40 mg IV twice daily while inpatient for gastric prophylaxis in the setting of portal hypertensive gastropathy with contact friability.  I will also start Carafate

## 2025-01-10 NOTE — ACP (ADVANCE CARE PLANNING)
Advance Care Planning      Palliative Medicine Provider   Advance Care Planning (ACP) Conversation      Date of Conversation: 01/10/25  The patient and/or authorized decision maker consented to a voluntary Advance Care Planning conversation.   Individuals present for the conversation:   Patient with decision making capacity    Legal Healthcare Agent(s):    Primary Decision Maker: JAVAD ANN - Spouse - [phone]    Secondary Decision Maker: Razia Shah - Brother/Sister - [phone]    ACP documents available in EMR prior to discussion:  -Living Will    Primary Palliative Diagnosis(es):  Cirrhosis     Conversation Summary:  Mr. Ann is a 69 year old male. He is alert, oriented to self, place, year, situation. At the time of my visit he has capacity for medical decision making. We reviewed clinical concerns, co-morbidities. We reviewed code status include chest compressions, medication for resuscitation, cardioversion/defibrillation, intubation/mechanical ventilation. He confirms Full code at this time. He would want further hospitalization if indicated and is actively followed at [facility].     Resuscitation Status:    Code Status: Full Code    I spent 15 minutes providing ACP services with the patient and/or surrogate decision maker in a voluntary, in-person conversation discussing the patient's wishes and goals as detailed in the above note.       Scarlett Juares PA-C

## 2025-01-10 NOTE — ANESTHESIA PRE PROCEDURE
Department of Anesthesiology  Preprocedure Note       Name:  Ney Ann   Age:  69 y.o.  :  1955                                          MRN:  [id number]         Date:  1/10/2025      Surgeon: Surgeon(s):  Debi Rhoades MD    Procedure: Procedure(s):  ESOPHAGOGASTRODUODENOSCOPY    Medications prior to admission:   Prior to Admission medications    Medication Sig Start Date End Date Taking? Authorizing Provider   temazepam (RESTORIL) 30 MG capsule Take 1 capsule by mouth at bedtime.   Yes Provider, MD Damian   Vitamin D, Ergocalciferol, 84455 units CAPS Take 50,000 Units by mouth once a week 25   Debi Vail APRN   carvedilol (COREG) 3.125 MG tablet Take 1 tablet by mouth 2 times daily (with meals) 24   Izabela Robles APRN - CNP   spironolactone (ALDACTONE) 25 MG tablet TAKE 1 TABLET BY MOUTH DAILY 24   Izabela Robles APRN - CNP   furosemide (LASIX) 20 MG tablet TAKE 1 TABLET BY MOUTH IN THE  MORNING AND 1 TABLET BY MOUTH IN THE EVENING  Patient taking differently: Take 2 tablets by mouth daily 24   Izabela Robles APRN - CNP   magnesium oxide (MAG-OX) 400 (240 Mg) MG tablet Take 1 tablet by mouth daily    Provider, MD Damian       Current medications:    Current Facility-Administered Medications   Medication Dose Route Frequency Provider Last Rate Last Admin   • [Transfer Hold] 0.9 % sodium chloride infusion   IntraVENous PRN Sujey Paul APRN       • [Transfer Hold] traZODone (DESYREL) tablet 50 mg  50 mg Oral Nightly PRN Scarlett Juares PA-C       • [Transfer Hold] pantoprazole (PROTONIX) 80 mg in sodium chloride 0.9 % 100 mL infusion  8 mg/hr IntraVENous Continuous Sujey Paul APRN 10 mL/hr at 25 2344 8 mg/hr at 25 2344   • [Transfer Hold] phytonadione (VITAMIN K) tablet 10 mg  10 mg Oral Daily Sujey Paul APRN   10 mg at 25 1358   • [Held by provider] carvedilol (COREG) tablet 3.125 mg  3.125 mg Oral BID WC

## 2025-01-11 LAB
ADV 40+41 DNA STL QL NAA+NON-PROBE: NOT DETECTED
ALBUMIN SERPL-MCNC: 3 G/DL (ref 3.5–5.2)
ALP SERPL-CCNC: 74 U/L (ref 40–129)
ALT SERPL-CCNC: 21 U/L (ref 5–41)
ANION GAP SERPL CALCULATED.3IONS-SCNC: 10 MMOL/L (ref 7–19)
AST SERPL-CCNC: 46 U/L (ref 5–40)
BASOPHILS # BLD: 0 K/UL (ref 0–0.2)
BASOPHILS NFR BLD: 0.4 % (ref 0–1)
BILIRUB SERPL-MCNC: 10.6 MG/DL (ref 0.2–1.2)
BUN SERPL-MCNC: 14 MG/DL (ref 8–23)
C CAYETANENSIS DNA STL QL NAA+NON-PROBE: NOT DETECTED
C COLI+JEJ+UPSA DNA STL QL NAA+NON-PROBE: NOT DETECTED
CALCIUM SERPL-MCNC: 8 MG/DL (ref 8.8–10.2)
CHLORIDE SERPL-SCNC: 104 MMOL/L (ref 98–111)
CO2 SERPL-SCNC: 25 MMOL/L (ref 22–29)
CREAT SERPL-MCNC: 0.5 MG/DL (ref 0.7–1.2)
CRYPTOSP DNA STL QL NAA+NON-PROBE: NOT DETECTED
E HISTOLYT DNA STL QL NAA+NON-PROBE: NOT DETECTED
EAEC PAA PLAS AGGR+AATA ST NAA+NON-PRB: NOT DETECTED
EC STX1+STX2 GENES STL QL NAA+NON-PROBE: NOT DETECTED
EOSINOPHIL # BLD: 0.1 K/UL (ref 0–0.6)
EOSINOPHIL NFR BLD: 4.1 % (ref 0–5)
EPEC EAE GENE STL QL NAA+NON-PROBE: NOT DETECTED
ETEC LTA+ST1A+ST1B TOX ST NAA+NON-PROBE: NOT DETECTED
G LAMBLIA DNA STL QL NAA+NON-PROBE: NOT DETECTED
GI PATH DNA+RNA PNL STL NAA+NON-PROBE: NOT DETECTED
GLUCOSE SERPL-MCNC: 137 MG/DL (ref 70–99)
HCT VFR BLD AUTO: 26.9 % (ref 42–52)
HGB BLD-MCNC: 8.6 G/DL (ref 14–18)
IMM GRANULOCYTES # BLD: 0 K/UL
INR PPP: 2.38 (ref 0.88–1.18)
LYMPHOCYTES # BLD: 0.5 K/UL (ref 1.1–4.5)
LYMPHOCYTES NFR BLD: 18.1 % (ref 20–40)
MCH RBC QN AUTO: 36.9 PG (ref 27–31)
MCHC RBC AUTO-ENTMCNC: 32 G/DL (ref 33–37)
MCV RBC AUTO: 115.5 FL (ref 80–94)
MONOCYTES # BLD: 0.5 K/UL (ref 0–0.9)
MONOCYTES NFR BLD: 17 % (ref 0–10)
NEUTROPHILS # BLD: 1.6 K/UL (ref 1.5–7.5)
NEUTS SEG NFR BLD: 60 % (ref 50–65)
NOROVIRUS GI+II RNA STL QL NAA+NON-PROBE: NOT DETECTED
P SHIGELLOIDES DNA STL QL NAA+NON-PROBE: NOT DETECTED
PLATELET # BLD AUTO: 25 K/UL (ref 130–400)
PLATELET SLIDE REVIEW: ABNORMAL
PMV BLD AUTO: 9.6 FL (ref 9.4–12.4)
POTASSIUM SERPL-SCNC: 3.7 MMOL/L (ref 3.5–5)
PROT SERPL-MCNC: 5.1 G/DL (ref 6.4–8.3)
PROTHROMBIN TIME: 25.4 SEC (ref 12–14.6)
RBC # BLD AUTO: 2.33 M/UL (ref 4.7–6.1)
RVA RNA STL QL NAA+NON-PROBE: NOT DETECTED
S ENT+BONG DNA STL QL NAA+NON-PROBE: NOT DETECTED
SAPO I+II+IV+V RNA STL QL NAA+NON-PROBE: NOT DETECTED
SHIGELLA SP+EIEC IPAH ST NAA+NON-PROBE: NOT DETECTED
SODIUM SERPL-SCNC: 139 MMOL/L (ref 136–145)
V CHOL+PARA+VUL DNA STL QL NAA+NON-PROBE: NOT DETECTED
V CHOLERAE DNA STL QL NAA+NON-PROBE: NOT DETECTED
WBC # BLD AUTO: 2.7 K/UL (ref 4.8–10.8)
Y ENTEROCOL DNA STL QL NAA+NON-PROBE: NOT DETECTED

## 2025-01-11 PROCEDURE — 6370000000 HC RX 637 (ALT 250 FOR IP): Performed by: INTERNAL MEDICINE

## 2025-01-11 PROCEDURE — 80053 COMPREHEN METABOLIC PANEL: CPT

## 2025-01-11 PROCEDURE — 85610 PROTHROMBIN TIME: CPT

## 2025-01-11 PROCEDURE — 2140000000 HC CCU INTERMEDIATE R&B

## 2025-01-11 PROCEDURE — 85025 COMPLETE CBC W/AUTO DIFF WBC: CPT

## 2025-01-11 PROCEDURE — 6360000002 HC RX W HCPCS: Performed by: INTERNAL MEDICINE

## 2025-01-11 PROCEDURE — 87507 IADNA-DNA/RNA PROBE TQ 12-25: CPT

## 2025-01-11 PROCEDURE — 6370000000 HC RX 637 (ALT 250 FOR IP): Performed by: NURSE PRACTITIONER

## 2025-01-11 PROCEDURE — 36415 COLL VENOUS BLD VENIPUNCTURE: CPT

## 2025-01-11 PROCEDURE — 2580000003 HC RX 258: Performed by: INTERNAL MEDICINE

## 2025-01-11 PROCEDURE — 2500000003 HC RX 250 WO HCPCS: Performed by: INTERNAL MEDICINE

## 2025-01-11 RX ORDER — SPIRONOLACTONE 25 MG/1
25 TABLET ORAL DAILY
Status: DISCONTINUED | OUTPATIENT
Start: 2025-01-11 | End: 2025-01-12 | Stop reason: HOSPADM

## 2025-01-11 RX ORDER — NADOLOL 20 MG/1
20 TABLET ORAL DAILY
Status: DISCONTINUED | OUTPATIENT
Start: 2025-01-11 | End: 2025-01-12 | Stop reason: HOSPADM

## 2025-01-11 RX ADMIN — SUCRALFATE 1 G: 1 TABLET ORAL at 17:05

## 2025-01-11 RX ADMIN — PHYTONADIONE 10 MG: 5 TABLET ORAL at 08:25

## 2025-01-11 RX ADMIN — Medication 100 MG: at 08:25

## 2025-01-11 RX ADMIN — NADOLOL 20 MG: 20 TABLET ORAL at 08:25

## 2025-01-11 RX ADMIN — Medication 5 MG: at 21:02

## 2025-01-11 RX ADMIN — FUROSEMIDE 40 MG: 40 TABLET ORAL at 08:25

## 2025-01-11 RX ADMIN — SUCRALFATE 1 G: 1 TABLET ORAL at 05:16

## 2025-01-11 RX ADMIN — SPIRONOLACTONE 25 MG: 25 TABLET ORAL at 08:25

## 2025-01-11 RX ADMIN — TRAZODONE HYDROCHLORIDE 50 MG: 50 TABLET ORAL at 21:02

## 2025-01-11 RX ADMIN — SUCRALFATE 1 G: 1 TABLET ORAL at 00:32

## 2025-01-11 RX ADMIN — SODIUM CHLORIDE, PRESERVATIVE FREE 10 ML: 5 INJECTION INTRAVENOUS at 08:26

## 2025-01-11 RX ADMIN — LACTULOSE 10 G: 20 SOLUTION ORAL at 08:26

## 2025-01-11 RX ADMIN — SODIUM CHLORIDE, PRESERVATIVE FREE 40 MG: 5 INJECTION INTRAVENOUS at 00:32

## 2025-01-11 RX ADMIN — SUCRALFATE 1 G: 1 TABLET ORAL at 11:28

## 2025-01-11 RX ADMIN — SODIUM CHLORIDE, PRESERVATIVE FREE 40 MG: 5 INJECTION INTRAVENOUS at 11:28

## 2025-01-11 ASSESSMENT — PAIN SCALES - GENERAL: PAINLEVEL_OUTOF10: 0

## 2025-01-11 NOTE — PLAN OF CARE
Problem: Discharge Planning  Goal: Discharge to home or other facility with appropriate resources  Outcome: Progressing  Flowsheets (Taken 1/10/2025 8145)  Discharge to home or other facility with appropriate resources: Identify barriers to discharge with patient and caregiver     Problem: Safety - Adult  Goal: Free from fall injury  Outcome: Progressing

## 2025-01-11 NOTE — PLAN OF CARE
TSH and u/s are unremarkable. Hb shows stability today. See my recommendations outline in my note yesterday. Contact me with any questions/concerns or if he shows signs for ongoing bleeding.

## 2025-01-12 VITALS
HEART RATE: 71 BPM | RESPIRATION RATE: 18 BRPM | DIASTOLIC BLOOD PRESSURE: 61 MMHG | BODY MASS INDEX: 26.42 KG/M2 | WEIGHT: 178.4 LBS | TEMPERATURE: 98.1 F | HEIGHT: 69 IN | OXYGEN SATURATION: 91 % | SYSTOLIC BLOOD PRESSURE: 106 MMHG

## 2025-01-12 LAB
ALBUMIN SERPL-MCNC: 2.8 G/DL (ref 3.5–5.2)
ALP SERPL-CCNC: 82 U/L (ref 40–129)
ALT SERPL-CCNC: 25 U/L (ref 5–41)
ANION GAP SERPL CALCULATED.3IONS-SCNC: 12 MMOL/L (ref 7–19)
AST SERPL-CCNC: 71 U/L (ref 5–40)
BASOPHILS # BLD: 0 K/UL (ref 0–0.2)
BASOPHILS NFR BLD: 0.9 % (ref 0–1)
BILIRUB SERPL-MCNC: 7.7 MG/DL (ref 0.2–1.2)
BUN SERPL-MCNC: 11 MG/DL (ref 8–23)
CALCIUM SERPL-MCNC: 7.9 MG/DL (ref 8.8–10.2)
CHLORIDE SERPL-SCNC: 104 MMOL/L (ref 98–111)
CO2 SERPL-SCNC: 22 MMOL/L (ref 22–29)
CREAT SERPL-MCNC: 0.3 MG/DL (ref 0.7–1.2)
DACRYOCYTES BLD QL SMEAR: ABNORMAL
EOSINOPHIL # BLD: 0.1 K/UL (ref 0–0.6)
EOSINOPHIL NFR BLD: 3.8 % (ref 0–5)
GLUCOSE SERPL-MCNC: 149 MG/DL (ref 70–99)
HCT VFR BLD AUTO: 28.1 % (ref 42–52)
HGB BLD-MCNC: 9.1 G/DL (ref 14–18)
IMM GRANULOCYTES # BLD: 0 K/UL
INR PPP: 2.33 (ref 0.88–1.18)
LYMPHOCYTES # BLD: 0.5 K/UL (ref 1.1–4.5)
LYMPHOCYTES NFR BLD: 15.1 % (ref 20–40)
MACROCYTES BLD QL SMEAR: ABNORMAL
MCH RBC QN AUTO: 38.4 PG (ref 27–31)
MCHC RBC AUTO-ENTMCNC: 32.4 G/DL (ref 33–37)
MCV RBC AUTO: 118.6 FL (ref 80–94)
MONOCYTES # BLD: 0.6 K/UL (ref 0–0.9)
MONOCYTES NFR BLD: 18.6 % (ref 0–10)
NEUTROPHILS # BLD: 2.1 K/UL (ref 1.5–7.5)
NEUTS SEG NFR BLD: 60.7 % (ref 50–65)
OVALOCYTES BLD QL SMEAR: ABNORMAL
PLATELET # BLD AUTO: 31 K/UL (ref 130–400)
PLATELET SLIDE REVIEW: ABNORMAL
PMV BLD AUTO: 11.5 FL (ref 9.4–12.4)
POTASSIUM SERPL-SCNC: 4.2 MMOL/L (ref 3.5–5)
PROT SERPL-MCNC: 5.4 G/DL (ref 6.4–8.3)
PROTHROMBIN TIME: 25 SEC (ref 12–14.6)
RBC # BLD AUTO: 2.37 M/UL (ref 4.7–6.1)
SODIUM SERPL-SCNC: 138 MMOL/L (ref 136–145)
WBC # BLD AUTO: 3.4 K/UL (ref 4.8–10.8)

## 2025-01-12 PROCEDURE — 2500000003 HC RX 250 WO HCPCS: Performed by: INTERNAL MEDICINE

## 2025-01-12 PROCEDURE — 94761 N-INVAS EAR/PLS OXIMETRY MLT: CPT

## 2025-01-12 PROCEDURE — 85610 PROTHROMBIN TIME: CPT

## 2025-01-12 PROCEDURE — 6360000002 HC RX W HCPCS: Performed by: INTERNAL MEDICINE

## 2025-01-12 PROCEDURE — 85025 COMPLETE CBC W/AUTO DIFF WBC: CPT

## 2025-01-12 PROCEDURE — 94618 PULMONARY STRESS TESTING: CPT

## 2025-01-12 PROCEDURE — 80053 COMPREHEN METABOLIC PANEL: CPT

## 2025-01-12 PROCEDURE — 2580000003 HC RX 258: Performed by: INTERNAL MEDICINE

## 2025-01-12 PROCEDURE — 6370000000 HC RX 637 (ALT 250 FOR IP): Performed by: NURSE PRACTITIONER

## 2025-01-12 PROCEDURE — 36415 COLL VENOUS BLD VENIPUNCTURE: CPT

## 2025-01-12 PROCEDURE — 6370000000 HC RX 637 (ALT 250 FOR IP): Performed by: INTERNAL MEDICINE

## 2025-01-12 RX ORDER — LACTULOSE 10 G/15ML
10 SOLUTION ORAL DAILY
Qty: 946 ML | Refills: 0 | Status: SHIPPED | OUTPATIENT
Start: 2025-01-13 | End: 2025-01-15 | Stop reason: SDUPTHER

## 2025-01-12 RX ORDER — PANTOPRAZOLE SODIUM 40 MG/1
40 TABLET, DELAYED RELEASE ORAL
Qty: 60 TABLET | Refills: 0 | Status: SHIPPED | OUTPATIENT
Start: 2025-01-12

## 2025-01-12 RX ORDER — THIAMINE MONONITRATE (VIT B1) 100 MG
100 TABLET ORAL DAILY
Qty: 30 TABLET | Refills: 0 | Status: SHIPPED | OUTPATIENT
Start: 2025-01-13

## 2025-01-12 RX ORDER — PANTOPRAZOLE SODIUM 40 MG/1
40 TABLET, DELAYED RELEASE ORAL
Status: DISCONTINUED | OUTPATIENT
Start: 2025-01-12 | End: 2025-01-12 | Stop reason: HOSPADM

## 2025-01-12 RX ORDER — SUCRALFATE 1 G/1
1 TABLET ORAL 4 TIMES DAILY
Qty: 120 TABLET | Refills: 3 | Status: SHIPPED | OUTPATIENT
Start: 2025-01-12

## 2025-01-12 RX ORDER — NADOLOL 20 MG/1
20 TABLET ORAL DAILY
Qty: 30 TABLET | Refills: 0 | Status: SHIPPED | OUTPATIENT
Start: 2025-01-13 | End: 2025-01-15 | Stop reason: SDUPTHER

## 2025-01-12 RX ADMIN — SODIUM CHLORIDE, PRESERVATIVE FREE 10 ML: 5 INJECTION INTRAVENOUS at 09:29

## 2025-01-12 RX ADMIN — LACTULOSE 10 G: 20 SOLUTION ORAL at 09:26

## 2025-01-12 RX ADMIN — SUCRALFATE 1 G: 1 TABLET ORAL at 05:42

## 2025-01-12 RX ADMIN — NADOLOL 20 MG: 20 TABLET ORAL at 09:26

## 2025-01-12 RX ADMIN — SUCRALFATE 1 G: 1 TABLET ORAL at 12:06

## 2025-01-12 RX ADMIN — SUCRALFATE 1 G: 1 TABLET ORAL at 00:54

## 2025-01-12 RX ADMIN — Medication 100 MG: at 09:26

## 2025-01-12 RX ADMIN — SODIUM CHLORIDE, PRESERVATIVE FREE 40 MG: 5 INJECTION INTRAVENOUS at 00:54

## 2025-01-12 RX ADMIN — SPIRONOLACTONE 25 MG: 25 TABLET ORAL at 09:26

## 2025-01-12 RX ADMIN — SODIUM CHLORIDE, PRESERVATIVE FREE 10 ML: 5 INJECTION INTRAVENOUS at 05:05

## 2025-01-12 RX ADMIN — FUROSEMIDE 40 MG: 40 TABLET ORAL at 09:26

## 2025-01-12 NOTE — PROGRESS NOTES
01/12/25 1134   Resting (Room Air)   SpO2 82   Resting (On O2)   SpO2 92   O2 Device Nasal cannula   O2 Flow Rate (l/min) 3 l/min   During Walk (On O2)   SpO2 90   O2 Device Nasal cannula   O2 Flow Rate (l/min) 4 l/min   Walk/Assistance Device Ambulation   After Walk   SpO2 90   O2 Device Nasal cannula   O2 Flow Rate (l/min) 4 l/min   Does the Patient Qualify for Home O2 Yes   Liter Flow at Rest 3   Does the Patient Need Portable Oxygen Tanks Yes       
  [facility]      Patient:  Ney Ann  YOB: 1955  Date of Service: 1/10/2025  MRN: [id number]   Acct: [id number]   Primary Care Physician: Izabela Robles, SHEILA - CNP  Advance Directive: Full Code  Admit Date: 1/9/2025       Hospital Day: 1  Portions of this note have been copied forward, however, changed to reflect the most current clinical status of this patient.    CHIEF COMPLAINT Diarrhea and abdominal pain    SUBJECTIVE:  He is seen shortly after his EGD. He states that he had 1 episode of diarrhea overnight. He is currently on 2LNC post procedure. Mild hypotension-systolic 80's-100. Afebrile.     CUMULATIVE HOSPITAL STAY:  The patient is a 69 y.o. male with a PMH of alcoholic cirrhosis, esophageal varices, pancytopenia, hypertension, esophageal varices and vitamin D deficiency who presented to [facility] ED on 1/9/2025 complaining of epistaxis, abdominal pain and melena.  He reported generalized abdominal pain approximately 4 days prior to admission.  He had associated dark tarry stools.  He had intermittent abdominal pain secondary to a ventral hernia which is painful but reducible, however, he stated the pain he had been experiencing is different.  He reported a 4-day history of nausea without vomiting, abdominal pain, and tarry diarrhea.  He stated he tried Imodium, however, the diarrhea continued.  He remained orally hydrated, however, he has been unable to eat.  He denied drinking alcohol, however, his wife stated that he has been drinking for approximately a week.  He is followed by GI both in [place] and hepatology at [place].  He reported epistaxis just prior to diarrhea that has occurred intermittently.     Further ED workup revealed , K3.7, BUN 20 and creatinine 0.3.  Glucose 163.  Ammonia 59, alk phos 95, ALT 24, AST 45.  Total bilirubin 13.2.  Ethanol less than 10.  WBC 3.1, H&H 8.6/26.7 with a platelet count of 31 (his hgb was 10.6 on 12/30/24).  INR-2.54.  CT of the 
4 Eyes Skin Assessment     NAME:  Ney Ann  YOB: 1955  MEDICAL RECORD NUMBER:  [id number]    The patient is being assessed for  Admission    I agree that at least one RN has performed a thorough Head to Toe Skin Assessment on the patient. ALL assessment sites listed below have been assessed.      Areas assessed by both nurses:    Head, Face, Ears, Shoulders, Back, Chest, Arms, Elbows, Hands, Sacrum. Buttock, Coccyx, Ischium, Legs. Feet and Heels, and Under Medical Devices         Does the Patient have a Wound? No noted wound(s)     L elbow skin tear  Noah Prevention initiated by RN: Yes  Wound Care Orders initiated by RN: No    Pressure Injury (Stage 3,4, Unstageable, DTI, NWPT, and Complex wounds) if present, place Wound referral order by RN under : No    New Ostomies, if present place, Ostomy referral order under : No     Nurse 1 eSignature: Electronically signed by Scarlett Duval RN on 1/9/25 at 3:42 PM CST    **SHARE this note so that the co-signing nurse can place an eSignature**    Nurse 2 eSignature: Electronically signed by Cleo Engel RN on 1/9/25 at 3:59 PM CST   
Ney Ann arrived to room # 715.   Presented with: Rectal Bleeding   Mental Status: Patient is oriented, alert, coherent, logical, thought processes intact, and able to concentrate and follow conversation.   Vitals:    01/09/25 1513   BP: (!) 99/57   Pulse: 95   Resp: 18   Temp: 99 °F (37.2 °C)   SpO2:      Patient safety contract and falls prevention contract reviewed with patient Yes.  Oriented Patient and Family to room.  Call light within reach. Yes.  Needs, issues or concerns expressed at this time: no.      Electronically signed by Scarlett Duval RN on 1/9/2025 at 3:43 PM   
  Complexity of Encounter Moderate   Begin Time 0300   End Time  0310   Total Time Calculated 10 min   Crisis   Type Family Care   Spiritual/Emotional needs   Type Spiritual Support   Grief, Loss, and Adjustments   Type Life Adjustments   Assessment/Intervention/Outcome   Assessment Calm;Hopeful;Loneliness;Stress overload  (The patient was waiting for his wife and however he told he not to come since it is snowing out side heavely.)   Intervention Active listening;Discussed meaning/purpose;Empowerment;Nurtured Hope  (The patient belongede to the [employer] coreen and he and his wife attend the [employer] and meet all his spiriutal and emotional needs.)   Outcome Connection/Belonging;Engaged in conversation;Receptive   Plan and Referrals   Plan/Referrals No future visits requested   Does the patient have a [facility] PCP? Yes    to follow up after discharge? No     Electronically signed by Dave Hamilton [facility]. . on 1/10/2025 at 4:47 PM   
Hyperbilirubinemia              -Bili-10.6              -Monitor daily       Decompensated cirrhosis (HCC)              -MELD-25              -Hold Nadolol, aldactone, lasix-remains hypotension, will likely resume tomorrow              -GI consulted and following                  Pancytopenia (HCC)              -Likely secondary to cirrhosis              -Daily CBC              -Transfuse for hgb less than 7              -Transfused for plt goal 50 per GI-preprocedure                  Coagulopathy (HCC)              -INR-2.38              -Oral vitamin K x 3 days       Alcohol abuse              -Cessation discussed              -Monitor for s/s of withdrawal              -Thiamine daily 100 mg      Resolved Problems:    * No resolved hospital problems. *      DVT Prophylaxis: SCD's    GI prophylaxis:  Protonix    Disposition: BOLA Paul, SHEILA, 1/11/2025 10:30 AM

## 2025-01-12 NOTE — PLAN OF CARE
Problem: Discharge Planning  Goal: Discharge to home or other facility with appropriate resources  1/12/2025 1335 by Nick Martínez, RN  Outcome: Adequate for Discharge  1/12/2025 1335 by Nick Martínez, RN  Outcome: Progressing     Problem: Safety - Adult  Goal: Free from fall injury  1/12/2025 1335 by Nick Martínez, RN  Outcome: Adequate for Discharge  1/12/2025 1335 by Nick Martínez, RN  Outcome: Progressing

## 2025-01-12 NOTE — CARE COORDINATION
01/10/25 1156   IMM Letter   IMM Letter given to Patient/Family/Significant other/Guardian/POA/by: Karen Ruff   IMM Letter date given: 01/10/25   IMM Letter time given: 0952     Patient declined waiting 4 hr period prior to discharge.  Second IMM given to patient and explained with patient verbalizing understanding.  All questions and concerns addressed     Signed letter placed in pt soft chart  Electronically signed by SUSAN SKELTON on 1/10/2025 at 11:56 AM    
SW sent DME orders to [facility]. Portable tank will need to be brought to the room before DC.    [facility]  Electronically signed by Mark Archibald on 2025 at 1:11 PM    Ney Leblanc #[id number] (CSN:[id number]) (:1955 69 y.o. M) (Adm: 25)  [facility] [id number]  PCP    KENN RUDOLPH  Demographics  Comment        Address   [address] [place] [address]    Home Phone   [phone]    Work Phone       Mobile Phone   [phone]             Social Security Number       Insurance Information   MEDICARE    Marital Status       Jew   Druze                Status   No [002]     Insurance Payors as of 2025    MEDICARE    Plan: MEDICARE PART A AND B Member: [id number] Effective from: 3/1/2019   Subscriber: NEY LEBLANC Subscriber ID: [id number] Guarantor: NEY LEBLANC     [place]    [place] Group: [id number] Member: [id number]   Effective from: 2025 Subscriber: NEY LEBLANC Subscriber ID: [id number]   Guarantor: NEY LEBLANC     Documents Filed to Patient    Power of  Living Will Clinical Unknown Study Attachment Consent Form ABN Waiver After Visit Summary Lab Result Scan Code Status MyChart Status Advance Care Planning    Not on File  Filed on 22  Not on File  Not on File  Not on File  Filed  Filed  Not on File  FULL [Updated on 25 8379]  Active Jump to the Activity      Auth/Cert Information    Open auth/cert linked to hospital account [id number]      Emergency Contacts    Name Relation Home Work Mobile   JAVAD LEBLANC Spouse [phone]   Razia Shah Brother/Sister [phone]     Other Contacts    Name Relation Home Work Mobile   Han Leblanc Brother/Sister   [phone]     Admission Information    Current Information    Attending Provider Admitting Provider Admission Type Admission Status   Melissa Orlando MD Omorodion, Nancy, MD Emergency 
quality data associated with the providers was provided to: (P) Patient   Patient Representative Name:   self    The Patient and/or Patient Representative Agree with the Discharge Plan? (P) Yes    SUSAN SKELTON  Electronically signed by SUSAN SKELTON on 1/10/2025 at 8:32 AM       01/10/25 0820   Service Assessment   Patient Orientation Alert and Oriented;Person;Place;Situation;Self   Cognition Alert   History Provided By Patient   Primary Caregiver Self   Accompanied By/Relationship n/a   Support Systems Spouse/Significant Other   Patient's Healthcare Decision Maker is: Legal Next of Kin   PCP Verified by CM Yes   Last Visit to PCP Within last 3 months  (had an apt today 1/10)   Prior Functional Level Independent in ADLs/IADLs   Current Functional Level Independent in ADLs/IADLs   Can patient return to prior living arrangement Yes   Ability to make needs known: Good   Family able to assist with home care needs: Yes   Would you like for me to discuss the discharge plan with any other family members/significant others, and if so, who? Yes   Financial Resources Medicare   Community Resources None   CM/SW Referral Emotional support   Social/Functional History   Lives With Spouse   Type of Home House   Home Layout One level   Home Access Stairs to enter with rails   Entrance Stairs - Number of Steps 3   Entrance Stairs - Rails Both   Bathroom Shower/Tub Tub/Shower unit;Walk-in shower   Bathroom Toilet Standard   Bathroom Equipment None   Bathroom Accessibility Accessible   Home Equipment None   Receives Help From Family   Prior Level of Assist for ADLs Independent   Prior Level of Assist for Homemaking Independent   Homemaking Responsibilities Yes   Ambulation Assistance Independent   Prior Level of Assist for Transfers Independent   Active  Yes   Mode of Transportation Car   Education n/a   Occupation Retired   Type of Occupation n/a   Discharge Planning   Type of Residence House   Living Arrangements

## 2025-01-12 NOTE — DISCHARGE SUMMARY
Ney Ann  :  1955  MRN:  [id number]    Admit date:  2025  Discharge date:  25    Discharging Physician:  Dr. Melissa Orlando    Advance Directive: Full Code    Consults: IP CONSULT TO GI  IP CONSULT TO GI  IP CONSULT TO PALLIATIVE CARE     Primary Care Physician:  Izabela Robles, APRN - CNP    Discharge Diagnoses:  Principal Problem:    Melena  Active Problems:    Hyperbilirubinemia    Decompensated cirrhosis (HCC)    Pancytopenia (HCC)    Coagulopathy (HCC)    Alcohol abuse    Palliative care patient  Resolved Problems:    * No resolved hospital problems. *      Portions of this note have been copied forward, however, changed to reflect the most current clinical status of this patient.    Hospital Course:   The patient is a 69 y.o. male with a PMH of alcoholic cirrhosis, esophageal varices, pancytopenia, hypertension, esophageal varices and vitamin D deficiency who presented to [facility] ED on 2025 complaining of epistaxis, abdominal pain and melena.  He reported generalized abdominal pain approximately 4 days prior to admission.  He had associated dark tarry stools.  He had intermittent abdominal pain secondary to a ventral hernia which is painful but reducible, however, he stated the pain he had been experiencing is different.  He reported a 4-day history of nausea without vomiting, abdominal pain, and tarry diarrhea.  He stated he tried Imodium, however, the diarrhea continued.  He remained orally hydrated, however, he has been unable to eat.  He denied drinking alcohol, however, his wife stated that he has been drinking for approximately a week.  He is followed by GI both in [place] and hepatology at [place].  He reported epistaxis just prior to diarrhea that has occurred intermittently.     Further ED workup revealed , K3.7, BUN 20 and creatinine 0.3.  Glucose 163.  Ammonia 59, alk phos 95, ALT 24, AST 45.  Total bilirubin 13.2.  Ethanol less than 10.  WBC 3.1, H&H 8.6/26.7 with a

## 2025-01-13 ENCOUNTER — TELEPHONE (OUTPATIENT)
Dept: PRIMARY CARE CLINIC | Age: 70
End: 2025-01-13

## 2025-01-13 NOTE — TELEPHONE ENCOUNTER
Care Transitions Initial Follow Up Call    Outreach made within 2 business days of discharge: Yes    Patient: Ney Ann   Patient : 1955   MRN: [id number]    Reason for Admission: Melena  Discharge Date: 25       Spoke with: Ney    Discharge department/facility: [facility] Interactive Patient Contact:  Was patient able to fill all prescriptions: Yes  Was patient instructed to bring all medications to the follow-up visit: Yes  Is patient taking all medications as directed in the discharge summary? Yes  Does patient understand their discharge instructions: Yes  Does patient have questions or concerns that need addressed prior to 7-14 day follow up office visit: no    Additional needs identified to be addressed with provider  Standard priority: Needs Temazepam refill             Scheduled appointment with PCP within 7-14 days    Spoke with Ney. He is doing well. He had a normal bowel movement today. His bladder function is normal. His appetite is good. He denies any fever, chills or signs of infection. He will see Izabela on 01/15/25.     Follow Up  No future appointments.    Bernice Guzmán MA

## 2025-01-15 ENCOUNTER — OFFICE VISIT (OUTPATIENT)
Dept: PRIMARY CARE CLINIC | Age: 70
End: 2025-01-15

## 2025-01-15 VITALS
DIASTOLIC BLOOD PRESSURE: 68 MMHG | TEMPERATURE: 97.5 F | HEART RATE: 83 BPM | SYSTOLIC BLOOD PRESSURE: 124 MMHG | BODY MASS INDEX: 26.72 KG/M2 | OXYGEN SATURATION: 94 % | WEIGHT: 181 LBS

## 2025-01-15 DIAGNOSIS — Z79.899 DRUG THERAPY: ICD-10-CM

## 2025-01-15 DIAGNOSIS — Z09 HOSPITAL DISCHARGE FOLLOW-UP: Primary | ICD-10-CM

## 2025-01-15 DIAGNOSIS — F51.01 PRIMARY INSOMNIA: ICD-10-CM

## 2025-01-15 LAB
ALCOHOL URINE: NORMAL
AMPHETAMINE SCREEN URINE: NORMAL
BACTERIA BLD CULT ORG #2: NORMAL
BACTERIA BLD CULT: NORMAL
BARBITURATE SCREEN URINE: NORMAL
BENZODIAZEPINE SCREEN, URINE: NORMAL
BUPRENORPHINE URINE: NORMAL
COCAINE METABOLITE SCREEN URINE: NORMAL
FENTANYL SCREEN, URINE: NORMAL
GABAPENTIN SCREEN, URINE: NORMAL
MDMA, URINE: NORMAL
METHADONE SCREEN, URINE: NORMAL
METHAMPHETAMINE, URINE: NORMAL
OPIATE SCREEN URINE: NORMAL
OXYCODONE SCREEN URINE: NORMAL
PHENCYCLIDINE SCREEN URINE: NORMAL
PROPOXYPHENE SCREEN, URINE: NORMAL
SYNTHETIC CANNABINOIDS(K2) SCREEN, URINE: NORMAL
THC SCREEN, URINE: NORMAL
TRAMADOL SCREEN URINE: NORMAL
TRICYCLIC ANTIDEPRESSANTS, UR: NORMAL

## 2025-01-15 RX ORDER — LACTULOSE 10 G/15ML
10 SOLUTION ORAL DAILY
Qty: 946 ML | Refills: 0 | Status: SHIPPED | OUTPATIENT
Start: 2025-01-15

## 2025-01-15 RX ORDER — LANOLIN ALCOHOL/MO/W.PET/CERES
400 CREAM (GRAM) TOPICAL DAILY
Qty: 30 TABLET | Refills: 0 | Status: SHIPPED | OUTPATIENT
Start: 2025-01-15

## 2025-01-15 RX ORDER — NADOLOL 20 MG/1
20 TABLET ORAL DAILY
Qty: 30 TABLET | Refills: 0 | Status: SHIPPED | OUTPATIENT
Start: 2025-01-15

## 2025-01-15 RX ORDER — ZOLPIDEM TARTRATE 5 MG/1
5 TABLET ORAL NIGHTLY PRN
Qty: 30 TABLET | Refills: 0 | Status: SHIPPED | OUTPATIENT
Start: 2025-01-15 | End: 2025-02-14

## 2025-01-15 ASSESSMENT — PATIENT HEALTH QUESTIONNAIRE - PHQ9
SUM OF ALL RESPONSES TO PHQ9 QUESTIONS 1 & 2: 1
SUM OF ALL RESPONSES TO PHQ QUESTIONS 1-9: 1
2. FEELING DOWN, DEPRESSED OR HOPELESS: NOT AT ALL
1. LITTLE INTEREST OR PLEASURE IN DOING THINGS: SEVERAL DAYS

## 2025-01-15 NOTE — PROGRESS NOTES
Post-Discharge Transitional Care  Follow Up      Ney Ann   YOB: 1955    Date of Office Visit:  1/15/2025  Date of Hospital Admission: 1/9/25  Date of Hospital Discharge: 1/12/25  Risk of hospital readmission (high >=14%. Medium >=10%) :Readmission Risk Score: 17.3      Care management risk score Rising risk (score 2-5) and Complex Care (Scores >=6): No Risk Score On File     Non face to face  following discharge, date last encounter closed (first attempt may have been earlier): 01/13/2025    Call initiated 2 business days of discharge: Yes    ASSESSMENT/PLAN:   Hospital discharge follow-up  -     SC DISCHARGE MEDS RECONCILED W/ CURRENT OUTPATIENT MED LIST    Medical Decision Making: moderate complexity  Return in 3 months (on 4/15/2025).           Subjective:   HPI:  Follow up of Hospital problems/diagnosis(es):   \"The patient is a 69 y.o. male with a PMH of alcoholic cirrhosis, esophageal varices, pancytopenia, hypertension, esophageal varices and vitamin D deficiency who presented to [facility] ED on 1/9/2025 complaining of epistaxis, abdominal pain and melena.  He reported generalized abdominal pain approximately 4 days prior to admission.  He had associated dark tarry stools.  He had intermittent abdominal pain secondary to a ventral hernia which is painful but reducible, however, he stated the pain he had been experiencing is different.  He reported a 4-day history of nausea without vomiting, abdominal pain, and tarry diarrhea.  He stated he tried Imodium, however, the diarrhea continued.  He remained orally hydrated, however, he has been unable to eat.  He denied drinking alcohol, however, his wife stated that he has been drinking for approximately a week.  He is followed by GI both in [place] and hepatology at [place].  He reported epistaxis just prior to diarrhea that has occurred intermittently.     Further ED workup revealed , K3.7, BUN 20 and creatinine 0.3.  Glucose 163.  Ammonia

## 2025-02-05 ENCOUNTER — OFFICE VISIT (OUTPATIENT)
Dept: GASTROENTEROLOGY | Age: 70
End: 2025-02-05

## 2025-02-05 VITALS
DIASTOLIC BLOOD PRESSURE: 70 MMHG | BODY MASS INDEX: 26.36 KG/M2 | HEART RATE: 100 BPM | WEIGHT: 178 LBS | HEIGHT: 69 IN | OXYGEN SATURATION: 90 % | SYSTOLIC BLOOD PRESSURE: 139 MMHG

## 2025-02-05 DIAGNOSIS — K74.60 DECOMPENSATED CIRRHOSIS (HCC): ICD-10-CM

## 2025-02-05 DIAGNOSIS — Z09 HOSPITAL DISCHARGE FOLLOW-UP: ICD-10-CM

## 2025-02-05 DIAGNOSIS — I85.00 ESOPHAGEAL VARICES WITHOUT BLEEDING, UNSPECIFIED ESOPHAGEAL VARICES TYPE (HCC): Primary | ICD-10-CM

## 2025-02-05 DIAGNOSIS — D64.9 ANEMIA, UNSPECIFIED TYPE: ICD-10-CM

## 2025-02-05 DIAGNOSIS — K21.9 GASTROESOPHAGEAL REFLUX DISEASE, UNSPECIFIED WHETHER ESOPHAGITIS PRESENT: ICD-10-CM

## 2025-02-05 DIAGNOSIS — K72.90 DECOMPENSATED CIRRHOSIS (HCC): ICD-10-CM

## 2025-02-05 NOTE — PROGRESS NOTES
scleral icterus.        Right eye: No discharge.         Left eye: No discharge.      Conjunctiva/sclera: Conjunctivae normal.      Pupils: Pupils are equal, round, and reactive to light.   Cardiovascular:      Rate and Rhythm: Normal rate and regular rhythm.      Heart sounds: Normal heart sounds. No murmur heard.  Pulmonary:      Effort: Pulmonary effort is normal. No respiratory distress.      Breath sounds: Normal breath sounds. No wheezing or rales.   Abdominal:      General: Bowel sounds are normal. There is no distension.      Palpations: Abdomen is soft. There is no mass.      Tenderness: There is no abdominal tenderness. There is no guarding or rebound.   Musculoskeletal:         General: Normal range of motion.      Cervical back: Normal range of motion and neck supple.   Skin:     General: Skin is warm and dry.      Coloration: Skin is not pale.   Neurological:      Mental Status: He is alert and oriented to person, place, and time.   Psychiatric:         Behavior: Behavior normal.

## 2025-02-12 ENCOUNTER — TELEPHONE (OUTPATIENT)
Dept: PRIMARY CARE CLINIC | Age: 70
End: 2025-02-12

## 2025-02-12 ENCOUNTER — OFFICE VISIT (OUTPATIENT)
Dept: PRIMARY CARE CLINIC | Age: 70
End: 2025-02-12
Payer: MEDICARE

## 2025-02-12 VITALS
TEMPERATURE: 97.9 F | OXYGEN SATURATION: 92 % | WEIGHT: 177 LBS | DIASTOLIC BLOOD PRESSURE: 70 MMHG | SYSTOLIC BLOOD PRESSURE: 120 MMHG | BODY MASS INDEX: 26.22 KG/M2 | HEART RATE: 71 BPM | HEIGHT: 69 IN

## 2025-02-12 DIAGNOSIS — F51.01 PRIMARY INSOMNIA: Primary | ICD-10-CM

## 2025-02-12 PROCEDURE — G8417 CALC BMI ABV UP PARAM F/U: HCPCS | Performed by: NURSE PRACTITIONER

## 2025-02-12 PROCEDURE — 1160F RVW MEDS BY RX/DR IN RCRD: CPT | Performed by: NURSE PRACTITIONER

## 2025-02-12 PROCEDURE — 3017F COLORECTAL CA SCREEN DOC REV: CPT | Performed by: NURSE PRACTITIONER

## 2025-02-12 PROCEDURE — G8427 DOCREV CUR MEDS BY ELIG CLIN: HCPCS | Performed by: NURSE PRACTITIONER

## 2025-02-12 PROCEDURE — 1159F MED LIST DOCD IN RCRD: CPT | Performed by: NURSE PRACTITIONER

## 2025-02-12 PROCEDURE — G2211 COMPLEX E/M VISIT ADD ON: HCPCS | Performed by: NURSE PRACTITIONER

## 2025-02-12 PROCEDURE — 4004F PT TOBACCO SCREEN RCVD TLK: CPT | Performed by: NURSE PRACTITIONER

## 2025-02-12 PROCEDURE — 99214 OFFICE O/P EST MOD 30 MIN: CPT | Performed by: NURSE PRACTITIONER

## 2025-02-12 PROCEDURE — 1123F ACP DISCUSS/DSCN MKR DOCD: CPT | Performed by: NURSE PRACTITIONER

## 2025-02-12 RX ORDER — ZOLPIDEM TARTRATE 10 MG/1
10 TABLET ORAL NIGHTLY PRN
Qty: 30 TABLET | Refills: 0 | Status: SHIPPED | OUTPATIENT
Start: 2025-02-12 | End: 2025-03-14

## 2025-02-12 ASSESSMENT — ENCOUNTER SYMPTOMS
BLOOD IN STOOL: 0
NAUSEA: 0
TROUBLE SWALLOWING: 0
ABDOMINAL PAIN: 0
CHOKING: 0
CONSTIPATION: 0
ANAL BLEEDING: 0
VOMITING: 0
DIARRHEA: 0
ABDOMINAL DISTENTION: 0
SHORTNESS OF BREATH: 0
COUGH: 0
RECTAL PAIN: 0

## 2025-02-12 NOTE — TELEPHONE ENCOUNTER
LVM for patient to schedule per check out notes.    Return in about 4 weeks (around 3/12/2025) for insomnia.     Extended visit.

## 2025-02-18 NOTE — PROGRESS NOTES
Mr.Keith BANG Ann is a 69 y.o. male who presents today for  Chief Complaint   Patient presents with    1 Month Follow-Up       HPI:  History of Present Illness  The patient presents for evaluation of insomnia.    He reports a general sense of well-being but continues to experience sleep disturbances. Despite the administration of Ambien, he is unable to maintain sleep beyond 4 hours. His typical sleep pattern involves retiring at 9 PM and awakening at 1 AM. He occasionally resorts to watching television as a means of inducing sleep, but this strategy has proven more effective when abstained from. He estimates his total sleep duration to be approximately 4 to 5 hours per night. He perceives a slight improvement in his condition with Ambien compared to temazepam. He does not experience any residual grogginess upon awakening. He has previously attempted to manage his sleep issues with doxepin and melatonin, taking doses of 5 or 10 mg as needed.    He has had multiple hospitalizations since his last visit. He was recently hospitalized due to nosebleeds, abdominal pain, and blood in the stool. An endoscopy performed on 01/12/2025 did not reveal any significant findings. He reports that his condition has been stable since then, with occasional episodes of loose stools. He had a follow-up appointment with his gastroenterologist last week, who confirmed that his condition has not worsened. He has been abstaining from alcohol.    SOCIAL HISTORY  He is still not drinking alcohol.    MEDICATIONS  Current: Ambien, melatonin, lactulose  Past: temazepam       Results         Review of Systems    Past Medical History:   Diagnosis Date    Abnormal LFTs     Atrial fibrillation (HCC)     per pt; no current cardiologist    Cirrhosis (HCC)     sees [facility]    COVID-Juana     2 yr ago    History of blood transfusion     Hyperbilirubinemia     Hypertension     Hypothyroidism     no meds at present    Insomnia     Leukopenia

## 2025-03-12 ENCOUNTER — RESULTS FOLLOW-UP (OUTPATIENT)
Dept: GENERAL RADIOLOGY | Age: 70
End: 2025-03-12

## 2025-03-12 ENCOUNTER — OFFICE VISIT (OUTPATIENT)
Dept: PRIMARY CARE CLINIC | Age: 70
End: 2025-03-12
Payer: MEDICARE

## 2025-03-12 ENCOUNTER — HOSPITAL ENCOUNTER (OUTPATIENT)
Dept: GENERAL RADIOLOGY | Age: 70
Discharge: HOME | End: 2025-03-12
Payer: MEDICARE

## 2025-03-12 VITALS
WEIGHT: 189.8 LBS | HEART RATE: 80 BPM | OXYGEN SATURATION: 97 % | BODY MASS INDEX: 28.11 KG/M2 | HEIGHT: 69 IN | SYSTOLIC BLOOD PRESSURE: 138 MMHG | TEMPERATURE: 97.2 F | DIASTOLIC BLOOD PRESSURE: 88 MMHG

## 2025-03-12 DIAGNOSIS — M54.50 ACUTE MIDLINE LOW BACK PAIN WITHOUT SCIATICA: Primary | ICD-10-CM

## 2025-03-12 DIAGNOSIS — R06.2 WHEEZING: ICD-10-CM

## 2025-03-12 DIAGNOSIS — M54.50 ACUTE MIDLINE LOW BACK PAIN WITHOUT SCIATICA: ICD-10-CM

## 2025-03-12 DIAGNOSIS — F51.01 PRIMARY INSOMNIA: ICD-10-CM

## 2025-03-12 DIAGNOSIS — K74.60 CIRRHOSIS OF LIVER WITHOUT ASCITES, UNSPECIFIED HEPATIC CIRRHOSIS TYPE (HCC): ICD-10-CM

## 2025-03-12 DIAGNOSIS — R07.89 CHEST TIGHTNESS: ICD-10-CM

## 2025-03-12 PROCEDURE — 71046 X-RAY EXAM CHEST 2 VIEWS: CPT

## 2025-03-12 PROCEDURE — 1160F RVW MEDS BY RX/DR IN RCRD: CPT | Performed by: NURSE PRACTITIONER

## 2025-03-12 PROCEDURE — 1159F MED LIST DOCD IN RCRD: CPT | Performed by: NURSE PRACTITIONER

## 2025-03-12 PROCEDURE — 1123F ACP DISCUSS/DSCN MKR DOCD: CPT | Performed by: NURSE PRACTITIONER

## 2025-03-12 PROCEDURE — 4004F PT TOBACCO SCREEN RCVD TLK: CPT | Performed by: NURSE PRACTITIONER

## 2025-03-12 PROCEDURE — 3017F COLORECTAL CA SCREEN DOC REV: CPT | Performed by: NURSE PRACTITIONER

## 2025-03-12 PROCEDURE — G8427 DOCREV CUR MEDS BY ELIG CLIN: HCPCS | Performed by: NURSE PRACTITIONER

## 2025-03-12 PROCEDURE — 72100 X-RAY EXAM L-S SPINE 2/3 VWS: CPT

## 2025-03-12 PROCEDURE — 99214 OFFICE O/P EST MOD 30 MIN: CPT | Performed by: NURSE PRACTITIONER

## 2025-03-12 PROCEDURE — G2211 COMPLEX E/M VISIT ADD ON: HCPCS | Performed by: NURSE PRACTITIONER

## 2025-03-12 PROCEDURE — G8417 CALC BMI ABV UP PARAM F/U: HCPCS | Performed by: NURSE PRACTITIONER

## 2025-03-12 RX ORDER — LACTULOSE 10 G/15ML
10 SOLUTION ORAL DAILY
Qty: 946 ML | Refills: 2 | Status: SHIPPED | OUTPATIENT
Start: 2025-03-12

## 2025-03-12 RX ORDER — NADOLOL 20 MG/1
20 TABLET ORAL DAILY
Qty: 30 TABLET | Refills: 0 | Status: SHIPPED | OUTPATIENT
Start: 2025-03-12

## 2025-03-12 RX ORDER — ZOLPIDEM TARTRATE 10 MG/1
10 TABLET ORAL NIGHTLY PRN
Qty: 90 TABLET | Refills: 0 | Status: SHIPPED | OUTPATIENT
Start: 2025-03-12 | End: 2025-06-10

## 2025-03-12 RX ORDER — LANOLIN ALCOHOL/MO/W.PET/CERES
400 CREAM (GRAM) TOPICAL DAILY
Qty: 30 TABLET | Refills: 0 | Status: SHIPPED | OUTPATIENT
Start: 2025-03-12

## 2025-03-18 ASSESSMENT — ENCOUNTER SYMPTOMS
COLOR CHANGE: 0
ABDOMINAL PAIN: 0
VOMITING: 0
DIARRHEA: 0
SORE THROAT: 0
CHEST TIGHTNESS: 0
SHORTNESS OF BREATH: 0
NAUSEA: 0
BACK PAIN: 1
COUGH: 1

## 2025-03-18 NOTE — PROGRESS NOTES
15 mLs by mouth daily  Dispense: 946 mL; Refill: 2         Return in about 3 months (around 6/12/2025).    Patient offered educational handouts and has had all questions answered.  Patient voices understanding and agrees to plans along with risks and benefits of plan. Patient is instructed to continue prior meds, diet, and exercise plans as instructed. Patient agrees to follow up as instructed and sooner if needed.  Patient agrees to go to ER if condition becomes emergent.      EMR Dragon/transcription disclaimer: Some of this encounter note is an electronic transcription/translation of spoken language to printed text. The electronic translation of spoken language may permit erroneous, or at times, nonsensical words or phrases to be inadvertently transcribed. Although I have reviewed the note for such errors, some may still exist.    Electronically signed by SHEILA Hernandez CNP on 3/18/2025 at 3:37 PM

## 2025-03-29 ENCOUNTER — APPOINTMENT (OUTPATIENT)
Dept: CT IMAGING | Age: 70
DRG: 812 | End: 2025-03-29
Payer: MEDICARE

## 2025-03-29 ENCOUNTER — HOSPITAL ENCOUNTER (INPATIENT)
Age: 70
LOS: 3 days | Discharge: HOME HEALTH | DRG: 812 | End: 2025-04-01
Attending: EMERGENCY MEDICINE | Admitting: INTERNAL MEDICINE
Payer: MEDICARE

## 2025-03-29 ENCOUNTER — APPOINTMENT (OUTPATIENT)
Dept: GENERAL RADIOLOGY | Age: 70
DRG: 812 | End: 2025-03-29
Payer: MEDICARE

## 2025-03-29 DIAGNOSIS — K74.60 CIRRHOSIS OF LIVER WITH ASCITES, UNSPECIFIED HEPATIC CIRRHOSIS TYPE (HCC): ICD-10-CM

## 2025-03-29 DIAGNOSIS — K92.1 MELENA: ICD-10-CM

## 2025-03-29 DIAGNOSIS — R06.02 SHORTNESS OF BREATH: Primary | ICD-10-CM

## 2025-03-29 DIAGNOSIS — K76.6 PORTAL HYPERTENSIVE GASTROPATHY (HCC): ICD-10-CM

## 2025-03-29 DIAGNOSIS — R18.8 CIRRHOSIS OF LIVER WITH ASCITES, UNSPECIFIED HEPATIC CIRRHOSIS TYPE (HCC): ICD-10-CM

## 2025-03-29 DIAGNOSIS — K31.89 PORTAL HYPERTENSIVE GASTROPATHY (HCC): ICD-10-CM

## 2025-03-29 DIAGNOSIS — D64.9 ACUTE ON CHRONIC ANEMIA: ICD-10-CM

## 2025-03-29 PROBLEM — D50.0 ANEMIA DUE TO GI BLOOD LOSS: Status: ACTIVE | Noted: 2025-03-29

## 2025-03-29 LAB
ABO/RH: NORMAL
ALBUMIN SERPL-MCNC: 3.1 G/DL (ref 3.5–5.2)
ALP SERPL-CCNC: 85 U/L (ref 40–129)
ALT SERPL-CCNC: 20 U/L (ref 10–50)
ANION GAP SERPL CALCULATED.3IONS-SCNC: 10 MMOL/L (ref 8–16)
ANTIBODY SCREEN: NORMAL
AST SERPL-CCNC: 28 U/L (ref 10–50)
B PARAP IS1001 DNA NPH QL NAA+NON-PROBE: NOT DETECTED
B PERT.PT PRMT NPH QL NAA+NON-PROBE: NOT DETECTED
BASOPHILS # BLD: 0 K/UL (ref 0–0.2)
BASOPHILS NFR BLD: 0.3 % (ref 0–1)
BILIRUB SERPL-MCNC: 3.6 MG/DL (ref 0.2–1.2)
BLOOD BANK DISPENSE STATUS: NORMAL
BLOOD BANK DISPENSE STATUS: NORMAL
BLOOD BANK PRODUCT CODE: NORMAL
BLOOD BANK PRODUCT CODE: NORMAL
BNP BLD-MCNC: 563 PG/ML (ref 0–124)
BPU ID: NORMAL
BPU ID: NORMAL
BUN SERPL-MCNC: 12 MG/DL (ref 8–23)
C PNEUM DNA NPH QL NAA+NON-PROBE: NOT DETECTED
CALCIUM SERPL-MCNC: 8.2 MG/DL (ref 8.8–10.2)
CHLORIDE SERPL-SCNC: 109 MMOL/L (ref 98–107)
CO2 SERPL-SCNC: 20 MMOL/L (ref 22–29)
CREAT SERPL-MCNC: 0.7 MG/DL (ref 0.7–1.2)
D DIMER PPP FEU-MCNC: 3.31 UG/ML FEU (ref 0–0.48)
DESCRIPTION BLOOD BANK: NORMAL
DESCRIPTION BLOOD BANK: NORMAL
EOSINOPHIL # BLD: 0.2 K/UL (ref 0–0.6)
EOSINOPHIL NFR BLD: 6.3 % (ref 0–5)
ERYTHROCYTE [DISTWIDTH] IN BLOOD BY AUTOMATED COUNT: 18.5 % (ref 11.5–14.5)
FERRITIN SERPL-MCNC: 40.7 NG/ML (ref 30–400)
FLUAV RNA NPH QL NAA+NON-PROBE: NOT DETECTED
FLUBV RNA NPH QL NAA+NON-PROBE: NOT DETECTED
FOLATE SERPL-MCNC: 13.7 NG/ML (ref 4.5–32.2)
GLUCOSE SERPL-MCNC: 163 MG/DL (ref 70–99)
HADV DNA NPH QL NAA+NON-PROBE: NOT DETECTED
HCOV 229E RNA NPH QL NAA+NON-PROBE: NOT DETECTED
HCOV HKU1 RNA NPH QL NAA+NON-PROBE: NOT DETECTED
HCOV NL63 RNA NPH QL NAA+NON-PROBE: NOT DETECTED
HCOV OC43 RNA NPH QL NAA+NON-PROBE: NOT DETECTED
HCT VFR BLD AUTO: 16 % (ref 42–52)
HCT VFR BLD AUTO: 16.5 % (ref 42–52)
HCT VFR BLD AUTO: 21.1 % (ref 42–52)
HGB BLD-MCNC: 4.7 G/DL (ref 14–18)
HGB BLD-MCNC: 6.1 G/DL (ref 14–18)
HMPV RNA NPH QL NAA+NON-PROBE: NOT DETECTED
HPIV1 RNA NPH QL NAA+NON-PROBE: NOT DETECTED
HPIV2 RNA NPH QL NAA+NON-PROBE: NOT DETECTED
HPIV3 RNA NPH QL NAA+NON-PROBE: NOT DETECTED
HPIV4 RNA NPH QL NAA+NON-PROBE: NOT DETECTED
IMM GRANULOCYTES # BLD: 0 K/UL
IRON SATN MFR SERPL: 7 % (ref 20–50)
IRON SERPL-MCNC: 23 UG/DL (ref 59–158)
LYMPHOCYTES # BLD: 0.5 K/UL (ref 1.1–4.5)
LYMPHOCYTES NFR BLD: 18.4 % (ref 20–40)
M PNEUMO DNA NPH QL NAA+NON-PROBE: NOT DETECTED
MCH RBC QN AUTO: 32.4 PG (ref 27–31)
MCHC RBC AUTO-ENTMCNC: 29.4 G/DL (ref 33–37)
MCV RBC AUTO: 110.3 FL (ref 80–94)
MONOCYTES # BLD: 0.4 K/UL (ref 0–0.9)
MONOCYTES NFR BLD: 15.3 % (ref 0–10)
NEUTROPHILS # BLD: 1.7 K/UL (ref 1.5–7.5)
NEUTS SEG NFR BLD: 59.4 % (ref 50–65)
PLATELET # BLD AUTO: 33 K/UL (ref 130–400)
PMV BLD AUTO: 11.9 FL (ref 9.4–12.4)
POTASSIUM SERPL-SCNC: 3.8 MMOL/L (ref 3.5–5.1)
PROCALCITONIN: 0.06 NG/ML (ref 0–0.09)
PROT SERPL-MCNC: 5.1 G/DL (ref 6.4–8.3)
RBC # BLD AUTO: 1.45 M/UL (ref 4.7–6.1)
RETICS # AUTO: 0.07 M/UL (ref 0.03–0.12)
RETICS/RBC NFR: 4.93 % (ref 0.5–1.5)
RSV RNA NPH QL NAA+NON-PROBE: NOT DETECTED
RV+EV RNA NPH QL NAA+NON-PROBE: NOT DETECTED
SARS-COV-2 RNA NPH QL NAA+NON-PROBE: NOT DETECTED
SODIUM SERPL-SCNC: 139 MMOL/L (ref 136–145)
TIBC SERPL-MCNC: 342 UG/DL (ref 250–400)
TROPONIN, HIGH SENSITIVITY: 17 NG/L (ref 0–22)
VIT B12 SERPL-MCNC: 1152 PG/ML (ref 232–1245)
WBC # BLD AUTO: 2.9 K/UL (ref 4.8–10.8)

## 2025-03-29 PROCEDURE — 71045 X-RAY EXAM CHEST 1 VIEW: CPT

## 2025-03-29 PROCEDURE — 36415 COLL VENOUS BLD VENIPUNCTURE: CPT

## 2025-03-29 PROCEDURE — 71275 CT ANGIOGRAPHY CHEST: CPT

## 2025-03-29 PROCEDURE — 84145 PROCALCITONIN (PCT): CPT

## 2025-03-29 PROCEDURE — 85379 FIBRIN DEGRADATION QUANT: CPT

## 2025-03-29 PROCEDURE — 6370000000 HC RX 637 (ALT 250 FOR IP): Performed by: EMERGENCY MEDICINE

## 2025-03-29 PROCEDURE — 96365 THER/PROPH/DIAG IV INF INIT: CPT

## 2025-03-29 PROCEDURE — 83540 ASSAY OF IRON: CPT

## 2025-03-29 PROCEDURE — 2580000003 HC RX 258: Performed by: EMERGENCY MEDICINE

## 2025-03-29 PROCEDURE — P9016 RBC LEUKOCYTES REDUCED: HCPCS

## 2025-03-29 PROCEDURE — 86900 BLOOD TYPING SEROLOGIC ABO: CPT

## 2025-03-29 PROCEDURE — 0202U NFCT DS 22 TRGT SARS-COV-2: CPT

## 2025-03-29 PROCEDURE — 96374 THER/PROPH/DIAG INJ IV PUSH: CPT

## 2025-03-29 PROCEDURE — 82728 ASSAY OF FERRITIN: CPT

## 2025-03-29 PROCEDURE — 84484 ASSAY OF TROPONIN QUANT: CPT

## 2025-03-29 PROCEDURE — 85025 COMPLETE CBC W/AUTO DIFF WBC: CPT

## 2025-03-29 PROCEDURE — 6360000002 HC RX W HCPCS: Performed by: INTERNAL MEDICINE

## 2025-03-29 PROCEDURE — 86850 RBC ANTIBODY SCREEN: CPT

## 2025-03-29 PROCEDURE — 2500000003 HC RX 250 WO HCPCS: Performed by: EMERGENCY MEDICINE

## 2025-03-29 PROCEDURE — 93005 ELECTROCARDIOGRAM TRACING: CPT | Performed by: EMERGENCY MEDICINE

## 2025-03-29 PROCEDURE — 96375 TX/PRO/DX INJ NEW DRUG ADDON: CPT

## 2025-03-29 PROCEDURE — 82746 ASSAY OF FOLIC ACID SERUM: CPT

## 2025-03-29 PROCEDURE — 83880 ASSAY OF NATRIURETIC PEPTIDE: CPT

## 2025-03-29 PROCEDURE — 85014 HEMATOCRIT: CPT

## 2025-03-29 PROCEDURE — 6360000004 HC RX CONTRAST MEDICATION: Performed by: EMERGENCY MEDICINE

## 2025-03-29 PROCEDURE — 82607 VITAMIN B-12: CPT

## 2025-03-29 PROCEDURE — 36430 TRANSFUSION BLD/BLD COMPNT: CPT

## 2025-03-29 PROCEDURE — 85045 AUTOMATED RETICULOCYTE COUNT: CPT

## 2025-03-29 PROCEDURE — 2580000003 HC RX 258: Performed by: INTERNAL MEDICINE

## 2025-03-29 PROCEDURE — 85018 HEMOGLOBIN: CPT

## 2025-03-29 PROCEDURE — 2700000000 HC OXYGEN THERAPY PER DAY

## 2025-03-29 PROCEDURE — 86901 BLOOD TYPING SEROLOGIC RH(D): CPT

## 2025-03-29 PROCEDURE — 83550 IRON BINDING TEST: CPT

## 2025-03-29 PROCEDURE — 2000000000 HC ICU R&B

## 2025-03-29 PROCEDURE — 80053 COMPREHEN METABOLIC PANEL: CPT

## 2025-03-29 PROCEDURE — 6370000000 HC RX 637 (ALT 250 FOR IP): Performed by: STUDENT IN AN ORGANIZED HEALTH CARE EDUCATION/TRAINING PROGRAM

## 2025-03-29 PROCEDURE — 6370000000 HC RX 637 (ALT 250 FOR IP): Performed by: INTERNAL MEDICINE

## 2025-03-29 PROCEDURE — 30233N1 TRANSFUSION OF NONAUTOLOGOUS RED BLOOD CELLS INTO PERIPHERAL VEIN, PERCUTANEOUS APPROACH: ICD-10-PCS | Performed by: EMERGENCY MEDICINE

## 2025-03-29 PROCEDURE — 99285 EMERGENCY DEPT VISIT HI MDM: CPT

## 2025-03-29 PROCEDURE — 86923 COMPATIBILITY TEST ELECTRIC: CPT

## 2025-03-29 PROCEDURE — 6360000002 HC RX W HCPCS: Performed by: EMERGENCY MEDICINE

## 2025-03-29 PROCEDURE — 94640 AIRWAY INHALATION TREATMENT: CPT

## 2025-03-29 RX ORDER — SODIUM CHLORIDE 9 MG/ML
INJECTION, SOLUTION INTRAVENOUS PRN
Status: DISCONTINUED | OUTPATIENT
Start: 2025-03-29 | End: 2025-04-01 | Stop reason: HOSPADM

## 2025-03-29 RX ORDER — POTASSIUM CHLORIDE 7.45 MG/ML
10 INJECTION INTRAVENOUS PRN
Status: DISCONTINUED | OUTPATIENT
Start: 2025-03-29 | End: 2025-04-01 | Stop reason: HOSPADM

## 2025-03-29 RX ORDER — POTASSIUM CHLORIDE 1500 MG/1
40 TABLET, EXTENDED RELEASE ORAL PRN
Status: DISCONTINUED | OUTPATIENT
Start: 2025-03-29 | End: 2025-04-01 | Stop reason: HOSPADM

## 2025-03-29 RX ORDER — LACTULOSE 10 G/15ML
10 SOLUTION ORAL DAILY
Status: DISCONTINUED | OUTPATIENT
Start: 2025-03-29 | End: 2025-03-31

## 2025-03-29 RX ORDER — ACETAMINOPHEN 325 MG/1
650 TABLET ORAL EVERY 6 HOURS PRN
Status: DISCONTINUED | OUTPATIENT
Start: 2025-03-29 | End: 2025-04-01 | Stop reason: HOSPADM

## 2025-03-29 RX ORDER — SODIUM CHLORIDE 0.9 % (FLUSH) 0.9 %
10 SYRINGE (ML) INJECTION PRN
Status: DISCONTINUED | OUTPATIENT
Start: 2025-03-29 | End: 2025-04-01 | Stop reason: HOSPADM

## 2025-03-29 RX ORDER — IOPAMIDOL 755 MG/ML
70 INJECTION, SOLUTION INTRAVASCULAR
Status: COMPLETED | OUTPATIENT
Start: 2025-03-29 | End: 2025-03-29

## 2025-03-29 RX ORDER — MAGNESIUM SULFATE IN WATER 40 MG/ML
2000 INJECTION, SOLUTION INTRAVENOUS PRN
Status: DISCONTINUED | OUTPATIENT
Start: 2025-03-29 | End: 2025-04-01 | Stop reason: HOSPADM

## 2025-03-29 RX ORDER — ZOLPIDEM TARTRATE 5 MG/1
10 TABLET ORAL NIGHTLY
Status: DISCONTINUED | OUTPATIENT
Start: 2025-03-29 | End: 2025-04-01 | Stop reason: HOSPADM

## 2025-03-29 RX ORDER — SODIUM CHLORIDE 0.9 % (FLUSH) 0.9 %
10 SYRINGE (ML) INJECTION EVERY 12 HOURS SCHEDULED
Status: DISCONTINUED | OUTPATIENT
Start: 2025-03-29 | End: 2025-04-01 | Stop reason: HOSPADM

## 2025-03-29 RX ORDER — ACETAMINOPHEN 650 MG/1
650 SUPPOSITORY RECTAL EVERY 6 HOURS PRN
Status: DISCONTINUED | OUTPATIENT
Start: 2025-03-29 | End: 2025-04-01 | Stop reason: HOSPADM

## 2025-03-29 RX ORDER — ONDANSETRON 4 MG/1
4 TABLET, ORALLY DISINTEGRATING ORAL EVERY 8 HOURS PRN
Status: DISCONTINUED | OUTPATIENT
Start: 2025-03-29 | End: 2025-04-01 | Stop reason: HOSPADM

## 2025-03-29 RX ORDER — SODIUM CHLORIDE, SODIUM LACTATE, POTASSIUM CHLORIDE, CALCIUM CHLORIDE 600; 310; 30; 20 MG/100ML; MG/100ML; MG/100ML; MG/100ML
INJECTION, SOLUTION INTRAVENOUS CONTINUOUS
Status: ACTIVE | OUTPATIENT
Start: 2025-03-29 | End: 2025-03-30

## 2025-03-29 RX ORDER — ONDANSETRON 2 MG/ML
4 INJECTION INTRAMUSCULAR; INTRAVENOUS EVERY 6 HOURS PRN
Status: DISCONTINUED | OUTPATIENT
Start: 2025-03-29 | End: 2025-04-01 | Stop reason: HOSPADM

## 2025-03-29 RX ORDER — SODIUM CHLORIDE 9 MG/ML
INJECTION, SOLUTION INTRAVENOUS PRN
Status: DISCONTINUED | OUTPATIENT
Start: 2025-03-29 | End: 2025-03-31 | Stop reason: ALTCHOICE

## 2025-03-29 RX ORDER — SUCRALFATE 1 G/1
1 TABLET ORAL 4 TIMES DAILY
Status: DISCONTINUED | OUTPATIENT
Start: 2025-03-29 | End: 2025-04-01 | Stop reason: HOSPADM

## 2025-03-29 RX ORDER — IPRATROPIUM BROMIDE AND ALBUTEROL SULFATE 2.5; .5 MG/3ML; MG/3ML
1 SOLUTION RESPIRATORY (INHALATION) ONCE
Status: COMPLETED | OUTPATIENT
Start: 2025-03-29 | End: 2025-03-29

## 2025-03-29 RX ADMIN — SODIUM CHLORIDE 8 MG/HR: 9 INJECTION, SOLUTION INTRAVENOUS at 21:07

## 2025-03-29 RX ADMIN — ZOLPIDEM TARTRATE 10 MG: 5 TABLET ORAL at 21:18

## 2025-03-29 RX ADMIN — OCTREOTIDE ACETATE 50 MCG/HR: 500 INJECTION, SOLUTION INTRAVENOUS; SUBCUTANEOUS at 17:11

## 2025-03-29 RX ADMIN — IOPAMIDOL 70 ML: 755 INJECTION, SOLUTION INTRAVENOUS at 16:39

## 2025-03-29 RX ADMIN — IPRATROPIUM BROMIDE AND ALBUTEROL SULFATE 1 DOSE: 2.5; .5 SOLUTION RESPIRATORY (INHALATION) at 15:59

## 2025-03-29 RX ADMIN — SUCRALFATE 1 G: 1 TABLET ORAL at 21:18

## 2025-03-29 RX ADMIN — WATER 1000 MG: 1 INJECTION INTRAMUSCULAR; INTRAVENOUS; SUBCUTANEOUS at 16:28

## 2025-03-29 RX ADMIN — SODIUM CHLORIDE, PRESERVATIVE FREE 80 MG: 5 INJECTION INTRAVENOUS at 16:27

## 2025-03-29 RX ADMIN — LACTULOSE 10 G: 20 SOLUTION ORAL at 21:18

## 2025-03-29 RX ADMIN — SODIUM CHLORIDE, SODIUM LACTATE, POTASSIUM CHLORIDE, AND CALCIUM CHLORIDE: .6; .31; .03; .02 INJECTION, SOLUTION INTRAVENOUS at 21:02

## 2025-03-29 NOTE — CONSENT
Informed Consent for Blood Component Transfusion Note    I have discussed with the patient the rationale for blood component transfusion; its benefits in treating or preventing fatigue, organ damage, or death; and its risk which includes mild transfusion reactions, rare risk of blood borne infection, or more serious but rare reactions. I have discussed the alternatives to transfusion, including the risk and consequences of not receiving transfusion. The patient had an opportunity to ask questions and had agreed to proceed with transfusion of blood components.    Electronically signed by Anderson Jara Jr, MD on 3/29/25 at 3:56 PM CDT

## 2025-03-29 NOTE — ED NOTES
ED TO INPATIENT SBAR HANDOFF    Patient Name: Ney Ann   : 1955  70 y.o.   Family/Caregiver Present: Yes  Code Status Order: Prior    C-SSRS: Risk of Suicide: Low Risk  Sitter No  Restraints:         Situation  Chief Complaint:   Chief Complaint   Patient presents with    Shortness of Breath     Ongoing for a while      Patient Diagnosis: Anemia due to GI blood loss [D50.0]     Brief Description of Patient's Condition: Patient arrived today with complaints of SOA. His HGB 4.7. currently has 1 unit of blood infusing. No reactions. He wears O2 at 2LPM baseline. He is calm and cooperative. Assist x1 at baseline.     Mental Status: alert, coherent, logical, thought processes intact, and able to concentrate and follow conversation  Arrived from: home    Imaging:   CTA PULMONARY W CONTRAST   Final Result   Impression:   1.  No evidence of pulmonary artery thrombus   2.  Small right and moderate left pleural effusions with adjacent compressive atelectasis   3.  Underlying emphysema   4.  Cirrhosis and ascites seen in the upper abdomen        All CT scans are performed using dose optimization techniques as appropriate to the performed exam and include    at least one of the following: Automated exposure control, adjustment of the mA and/or kV according to size, and the use of iterative reconstruction technique.        ______________________________________    Electronically signed by: NATASHA NEAL M.D.   Date:     2025   Time:    17:41       XR CHEST PORTABLE   Final Result   Interval worsening bibasilar pneumonia.       Stable small left pleural effusion.           ______________________________________    Electronically signed by: YARI SMITH M.D.   Date:     2025   Time:    16:09         COVID-19 Results:   Internal Administration   First Dose COVID-19, J&J, (age 18y+), IM, 0.5 mL  12/15/2020   Second Dose           Last COVID Lab SARS-CoV-2, PCR (no units)   Date Value   2025 Not

## 2025-03-29 NOTE — H&P
Hospital Medicine H&P    Patient:  Ney Ann  MRN: [id number]    Consulting Physician: Edwin Gamez DO  Reason for Consult: Medical Management  Primacy Care Physician: Izabela Robles, APRN - CNP    HISTORY OF PRESENT ILLNESS:   The patient is a 70 y.o. male presents to the ER with worsening shortness of breath and generalized weakness.  He has a history of alcoholic cirrhosis and has known esophageal varices.  His last documented hemoglobin in January was 9.1 gm.  His work up in the ED shows his hemoglobin was 4.7 gm.  CTA of chest shows bilateral pleural effusions and cirrhosis and ascites.  He will be admitted for transfusion and monitoring.  He has noticed that his stool has been black.    Past Medical History:        Diagnosis Date    Abnormal LFTs     Atrial fibrillation (HCC)     per pt; no current cardiologist    Cirrhosis (HCC)     [facility]    COVID-19     2 yr ago    History of blood transfusion     Hyperbilirubinemia     Hypertension     Hypothyroidism     no meds at present    Insomnia     Leukopenia     Liver disease     Palliative care patient 01/10/2025    Platelets decreased     chronic    Splenomegaly     Umbilical hernia     and inguinal per pt    Varices, esophageal (HCC)     hx of    Vitamin D deficiency        Past Surgical History:        Procedure Laterality Date    COLONOSCOPY  2014    polyps per pt    COLONOSCOPY N/A 06/28/2023    Dr Hastings, TA x 3 (-)dysplasia, HP, TVA (-)Dysplasia, Mod diverticulosis left colon, int hem Gr 2 wo bleeding, 4-6 weeks    COLONOSCOPY  07/10/2023    Dr Dawit Tapia, splenic flexor 8mm ulcer wo bleeding, 4mm ulcer sigmoid colon wo bleeding, 8mm recatal ulcer wo bleeding, scatted sm & lg mouthed diverticula in sig colon, splenic flexure and transverse colon,    HERNIA REPAIR      TOTAL HIP ARTHROPLASTY Left 08/26/2022    LEFT HIP TOTAL ARTHROPLASTY performed by Jose Obrien MD at [facility] OR    UPPER GASTROINTESTINAL ENDOSCOPY

## 2025-03-29 NOTE — ED PROVIDER NOTES
[facility]  EMERGENCY DEPARTMENT ENCOUNTER      Pt Name: Ney Ann  MRN: [id number]  Birthdate 1955  Date of evaluation: 3/29/2025  Provider: Anderson Jara Jr, MD    CHIEF COMPLAINT       Chief Complaint   Patient presents with    Shortness of Breath     Ongoing for a while          HISTORY OF PRESENT ILLNESS   (Location/Symptom, Timing/Onset,Context/Setting, Quality, Duration, Modifying Factors, Severity)  Note limiting factors.   Ney Ann is a 70 y.o. male who presents to the emergency department for evaluation after having shortness of breath has been ongoing for several weeks and gradually worsening recently.  Has had a little bit of a cough but nothing significant.  Has not had any significant increase leg swelling.  Says that he has had some occasional tightness in his chest and back.        NursingNotes were reviewed.    REVIEW OF SYSTEMS    (2-9 systems for level 4, 10 or more for level 5)     Review of Systems   Constitutional:  Positive for fatigue.   HENT: Negative.     Eyes: Negative.    Respiratory:  Positive for cough, chest tightness and shortness of breath.    Cardiovascular:  Positive for chest pain.   Gastrointestinal: Negative.    Genitourinary: Negative.    Musculoskeletal: Negative.    Skin: Negative.    Neurological: Negative.    Hematological: Negative.    Psychiatric/Behavioral: Negative.         A complete review of systems was performed and is negative except as noted above in the HPI.       PAST MEDICAL HISTORY     Past Medical History:   Diagnosis Date    Abnormal LFTs     Atrial fibrillation (HCC)     per pt; no current cardiologist    Cirrhosis (HCC)     sees [facility]    COVID-Juana     2 yr ago    History of blood transfusion     Hyperbilirubinemia     Hypertension     Hypothyroidism     no meds at present    Insomnia     Leukopenia     Liver disease     Palliative care patient 01/10/2025    Platelets decreased     chronic    Splenomegaly

## 2025-03-30 LAB
ACANTHOCYTES BLD QL SMEAR: ABNORMAL
ALBUMIN SERPL-MCNC: 3 G/DL (ref 3.5–5.2)
ALP SERPL-CCNC: 78 U/L (ref 40–129)
ALT SERPL-CCNC: 19 U/L (ref 10–50)
AMMONIA PLAS-SCNC: 86 UMOL/L (ref 16–60)
ANION GAP SERPL CALCULATED.3IONS-SCNC: 11 MMOL/L (ref 8–16)
ANISOCYTOSIS BLD QL SMEAR: ABNORMAL
AST SERPL-CCNC: 28 U/L (ref 10–50)
BASOPHILS # BLD: 0 K/UL (ref 0–0.2)
BASOPHILS NFR BLD: 0.8 % (ref 0–1)
BILIRUB SERPL-MCNC: 5.8 MG/DL (ref 0.2–1.2)
BLOOD BANK DISPENSE STATUS: NORMAL
BLOOD BANK PRODUCT CODE: NORMAL
BPU ID: NORMAL
BUN SERPL-MCNC: 12 MG/DL (ref 8–23)
BURR CELLS BLD QL SMEAR: ABNORMAL
BURR CELLS BLD QL SMEAR: ABNORMAL
CALCIUM SERPL-MCNC: 8.4 MG/DL (ref 8.8–10.2)
CHLORIDE SERPL-SCNC: 111 MMOL/L (ref 98–107)
CO2 SERPL-SCNC: 19 MMOL/L (ref 22–29)
CREAT SERPL-MCNC: 0.7 MG/DL (ref 0.7–1.2)
DESCRIPTION BLOOD BANK: NORMAL
EKG P AXIS: NORMAL DEGREES
EKG P-R INTERVAL: NORMAL MS
EKG Q-T INTERVAL: 396 MS
EKG QRS DURATION: 154 MS
EKG QTC CALCULATION (BAZETT): 459 MS
EKG T AXIS: -86 DEGREES
EOSINOPHIL # BLD: 0.2 K/UL (ref 0–0.6)
EOSINOPHIL NFR BLD: 8.2 % (ref 0–5)
ERYTHROCYTE [DISTWIDTH] IN BLOOD BY AUTOMATED COUNT: 23.6 % (ref 11.5–14.5)
GLUCOSE SERPL-MCNC: 115 MG/DL (ref 70–99)
HCT VFR BLD AUTO: 22.1 % (ref 42–52)
HCT VFR BLD AUTO: 23.8 % (ref 42–52)
HCT VFR BLD AUTO: 24 % (ref 42–52)
HGB BLD-MCNC: 6.6 G/DL (ref 14–18)
HGB BLD-MCNC: 7.2 G/DL (ref 14–18)
HGB BLD-MCNC: 7.4 G/DL (ref 14–18)
HYPOCHROMIA BLD QL SMEAR: ABNORMAL
IMM GRANULOCYTES # BLD: 0 K/UL
INR PPP: 2.18 (ref 0.88–1.18)
LYMPHOCYTES # BLD: 0.6 K/UL (ref 1.1–4.5)
LYMPHOCYTES NFR BLD: 23 % (ref 20–40)
MACROCYTES BLD QL SMEAR: ABNORMAL
MCH RBC QN AUTO: 30.6 PG (ref 27–31)
MCHC RBC AUTO-ENTMCNC: 29.9 G/DL (ref 33–37)
MCV RBC AUTO: 102.3 FL (ref 80–94)
MICROCYTES BLD QL SMEAR: ABNORMAL
MONOCYTES # BLD: 0.4 K/UL (ref 0–0.9)
MONOCYTES NFR BLD: 13.6 % (ref 0–10)
NEUTROPHILS # BLD: 1.4 K/UL (ref 1.5–7.5)
NEUTS SEG NFR BLD: 54 % (ref 50–65)
OVALOCYTES BLD QL SMEAR: ABNORMAL
PHOSPHATE SERPL-MCNC: 3.9 MG/DL (ref 2.5–4.5)
PLATELET # BLD AUTO: 27 K/UL (ref 130–400)
PLATELET SLIDE REVIEW: ABNORMAL
PMV BLD AUTO: 10.3 FL (ref 9.4–12.4)
POIKILOCYTOSIS BLD QL SMEAR: ABNORMAL
POLYCHROMASIA BLD QL SMEAR: ABNORMAL
POTASSIUM SERPL-SCNC: 4 MMOL/L (ref 3.5–5)
PROT SERPL-MCNC: 5.1 G/DL (ref 6.4–8.3)
PROTHROMBIN TIME: 23.7 SEC (ref 12–14.6)
RBC # BLD AUTO: 2.16 M/UL (ref 4.7–6.1)
SODIUM SERPL-SCNC: 141 MMOL/L (ref 136–145)
TARGETS BLD QL SMEAR: ABNORMAL
WBC # BLD AUTO: 2.6 K/UL (ref 4.8–10.8)

## 2025-03-30 PROCEDURE — 2000000000 HC ICU R&B

## 2025-03-30 PROCEDURE — 93010 ELECTROCARDIOGRAM REPORT: CPT | Performed by: INTERNAL MEDICINE

## 2025-03-30 PROCEDURE — 6370000000 HC RX 637 (ALT 250 FOR IP): Performed by: INTERNAL MEDICINE

## 2025-03-30 PROCEDURE — 2580000003 HC RX 258: Performed by: INTERNAL MEDICINE

## 2025-03-30 PROCEDURE — 36430 TRANSFUSION BLD/BLD COMPNT: CPT

## 2025-03-30 PROCEDURE — 82140 ASSAY OF AMMONIA: CPT

## 2025-03-30 PROCEDURE — 99222 1ST HOSP IP/OBS MODERATE 55: CPT | Performed by: INTERNAL MEDICINE

## 2025-03-30 PROCEDURE — 80053 COMPREHEN METABOLIC PANEL: CPT

## 2025-03-30 PROCEDURE — 6360000002 HC RX W HCPCS: Performed by: INTERNAL MEDICINE

## 2025-03-30 PROCEDURE — 94760 N-INVAS EAR/PLS OXIMETRY 1: CPT

## 2025-03-30 PROCEDURE — 36415 COLL VENOUS BLD VENIPUNCTURE: CPT

## 2025-03-30 PROCEDURE — 6370000000 HC RX 637 (ALT 250 FOR IP): Performed by: STUDENT IN AN ORGANIZED HEALTH CARE EDUCATION/TRAINING PROGRAM

## 2025-03-30 PROCEDURE — 2500000003 HC RX 250 WO HCPCS: Performed by: INTERNAL MEDICINE

## 2025-03-30 PROCEDURE — 85610 PROTHROMBIN TIME: CPT

## 2025-03-30 PROCEDURE — 84100 ASSAY OF PHOSPHORUS: CPT

## 2025-03-30 PROCEDURE — 85014 HEMATOCRIT: CPT

## 2025-03-30 PROCEDURE — 2700000000 HC OXYGEN THERAPY PER DAY

## 2025-03-30 PROCEDURE — 85018 HEMOGLOBIN: CPT

## 2025-03-30 PROCEDURE — 85025 COMPLETE CBC W/AUTO DIFF WBC: CPT

## 2025-03-30 RX ORDER — SODIUM CHLORIDE 9 MG/ML
INJECTION, SOLUTION INTRAVENOUS PRN
Status: DISCONTINUED | OUTPATIENT
Start: 2025-03-30 | End: 2025-03-31

## 2025-03-30 RX ADMIN — OCTREOTIDE ACETATE 50 MCG/HR: 500 INJECTION, SOLUTION INTRAVENOUS; SUBCUTANEOUS at 02:37

## 2025-03-30 RX ADMIN — SUCRALFATE 1 G: 1 TABLET ORAL at 20:10

## 2025-03-30 RX ADMIN — SODIUM CHLORIDE, PRESERVATIVE FREE 40 MG: 5 INJECTION INTRAVENOUS at 13:33

## 2025-03-30 RX ADMIN — SODIUM CHLORIDE 8 MG/HR: 9 INJECTION, SOLUTION INTRAVENOUS at 08:00

## 2025-03-30 RX ADMIN — SUCRALFATE 1 G: 1 TABLET ORAL at 13:33

## 2025-03-30 RX ADMIN — SUCRALFATE 1 G: 1 TABLET ORAL at 09:28

## 2025-03-30 RX ADMIN — LACTULOSE 10 G: 20 SOLUTION ORAL at 09:28

## 2025-03-30 RX ADMIN — ZOLPIDEM TARTRATE 10 MG: 5 TABLET ORAL at 20:10

## 2025-03-30 RX ADMIN — SODIUM CHLORIDE 500 MG: 9 INJECTION, SOLUTION INTRAVENOUS at 13:33

## 2025-03-30 RX ADMIN — SUCRALFATE 1 G: 1 TABLET ORAL at 18:07

## 2025-03-30 RX ADMIN — SODIUM CHLORIDE, PRESERVATIVE FREE 10 ML: 5 INJECTION INTRAVENOUS at 20:10

## 2025-03-30 NOTE — CONSULTS
Pt Name: Ney Ann  MRN: [id number]  [id number]  YOB: 1955  Admit Date: 3/29/2025  2:38 PM  Date of evaluation: 3/30/2025  Primary Care Physician: Izabela Robles, SHEILA - CNP   [id number]       Requesting Provider:  Dr. Hewitt    GI Consult    Indication: Anemia; pancytopenia decompensated liver cirrhosis    History:  The patient is a 70 y.o. male with past medical history significant for decompensated liver cirrhosis, hypertension, hypothyroidism, who presents to [facility] emergency department with complaints of increasing shortness of breath and generalized weakness.  Upon admission, the patient was found to have a hemoglobin of 4.7.  He was also found to have be thrombocytopenic and leukopenic.  Back in January, the patient had a hemoglobin of 9.1.  A CTA of the chest shows bilateral pleural effusions, cirrhosis and ascites.  Subsequently, the patient was admitted to the ICU for further monitoring and GI was consulted.    The patient underwent an upper endoscopy within the last 3 months by Dr. Debi Rhoades; the EGD was performed on January 10 and showed grade 1 esophageal varices that were nonbleeding and moderate to severe portal hypertensive gastropathy that oozed with contact.    The patient denies any complaints of any abdominal pain but does admit to having some melanotic stools prior to presentation.    Pain:       Last EGD: January 2025  Last Colonoscopy: June 2023    Past Medical History:        Diagnosis Date    Abnormal LFTs     Atrial fibrillation (HCC)     per pt; no current cardiologist    Cirrhosis (HCC)     [facility]    COVID-Ju[place]     2 yr ago    History of blood transfusion     Hyperbilirubinemia     Hypertension     Hypothyroidism     no meds at present    Insomnia     Leukopenia     Liver disease     Palliative care patient 01/10/2025    Platelets decreased     chronic    Splenomegaly     Umbilical hernia     and inguinal per pt    Varices,  potassium alternative oral replacement **OR** potassium chloride, magnesium sulfate, ondansetron **OR** ondansetron, magnesium hydroxide, acetaminophen **OR** acetaminophen    Social History:   Social History     Socioeconomic History    Marital status:      Spouse name: Not on file    Number of children: 1    Years of education: Not on file    Highest education level: Not on file   Occupational History    Not on file   Tobacco Use    Smoking status: Former     Current packs/day: 0.50     Types: Cigarettes    Smokeless tobacco: Never   Vaping Use    Vaping status: Never Used   Substance and Sexual Activity    Alcohol use: Not Currently     Comment: quit 8/2022    Drug use: Never    Sexual activity: Not on file   Other Topics Concern    Not on file   Social History Narrative    Not on file     Social Drivers of Health     Financial Resource Strain: Low Risk  (12/13/2024)    Overall Financial Resource Strain (CARDIA)     Difficulty of Paying Living Expenses: Not very hard   Food Insecurity: No Food Insecurity (1/9/2025)    Hunger Vital Sign     Worried About Running Out of Food in the Last Year: Never true     Ran Out of Food in the Last Year: Never true   Transportation Needs: No Transportation Needs (1/9/2025)    PRAPARE - Transportation     Lack of Transportation (Medical): No     Lack of Transportation (Non-Medical): No   Physical Activity: Insufficiently Active (9/10/2024)    Exercise Vital Sign     Days of Exercise per Week: 2 days     Minutes of Exercise per Session: 30 min   Stress: Not on file   Social Connections: Not on file   Intimate Partner Violence: Unknown (9/22/2024)    Received from [facility] [employer]    Intimate Partner Violence     Fear of Current or Ex-Partner: Not on file     Emotionally Abused: Not on file     Physically Abused: Not on file     Sexually Abused: Not on file     Questions Apply to Other Individual: Not on file   Housing Stability: Low Risk  (1/9/2025)    Housing

## 2025-03-30 NOTE — PROGRESS NOTES
4 Eyes Skin Assessment     NAME:  Ney Ann  YOB: 1955  MEDICAL RECORD NUMBER:  [id number]    The patient is being assessed for  Admission    I agree that at least one RN has performed a thorough Head to Toe Skin Assessment on the patient. ALL assessment sites listed below have been assessed.      Areas assessed by both nurses:    Head, Face, Ears, Shoulders, Back, Chest, Arms, Elbows, Hands, Sacrum. Buttock, Coccyx, Ischium, and Legs. Feet and Heels        Does the Patient have a Wound? No noted wound(s)       Noah Prevention initiated by RN: Yes  Wound Care Orders initiated by RN: No    Pressure Injury (Stage 3,4, Unstageable, DTI, NWPT, and Complex wounds) if present, place Wound referral order by RN under : No    New Ostomies, if present place, Ostomy referral order under : No     Nurse 1 eSignature: Electronically signed by Vibha Delgado RN on 3/29/25 at 10:26 PM CDT    **SHARE this note so that the co-signing nurse can place an eSignature**    Nurse 2 eSignature: Electronically signed by Danay Teixeira RN on 3/30/25 at 12:56 AM CDT

## 2025-03-30 NOTE — PROGRESS NOTES
Hospitalist Progress Note    Patient:  Ney Ann  YOB: 1955  Date of Service: 3/30/2025  MRN: [id number]   Acct: [id number]   Primary Care Physician: Izabela Robles APRN - CNP  Advance Directive: Full Code  Admit Date: 3/29/2025       Hospital Day: 1  Referring Provider: Edwin Gamez DO    Patient Seen, Chart, Consults, Notes, Labs, Radiology studies reviewed.    Subjective:  Ney Ann is a 70 y.o. male  whom we are following for alcoholic cirrhosis, pancytopenia, GI blood loss.  He did well overnight.  He required transfusion.  His hemoglobin is now up to 7.2 g.  I greatly appreciate GI input.  He is stable for transfer out of ICU.    Allergies:  Tomato and Watermelon [citrullus vulgaris]    Medicines:  Current Facility-Administered Medications   Medication Dose Route Frequency Provider Last Rate Last Admin    0.9 % sodium chloride infusion   IntraVENous PRN Ankit Adams MD        iron sucrose (VENOFER) 500 mg in sodium chloride 0.9 % 250 mL IVPB  500 mg IntraVENous Once Edwin Gamez DO        pantoprazole (PROTONIX) 40 mg in sodium chloride (PF) 0.9 % 10 mL injection  40 mg IntraVENous Q12H Edwin Gamez DO        0.9 % sodium chloride infusion   IntraVENous PRN Edwin Gamez DO        lactulose (CHRONULAC) 10 GM/15ML solution 10 g  10 g Oral Daily Edwin Gamez DO   10 g at 03/30/25 0928    sucralfate (CARAFATE) tablet 1 g  1 g Oral 4x Daily Edwin Gamez DO   1 g at 03/30/25 0928    sodium chloride flush 0.9 % injection 10 mL  10 mL IntraVENous 2 times per day Edwin Gamez DO        sodium chloride flush 0.9 % injection 10 mL  10 mL IntraVENous PRN Edwin Gamez DO        0.9 % sodium chloride infusion   IntraVENous PRN Edwin Gamez DO        potassium chloride (KLOR-CON M) extended release tablet 40 mEq  40 mEq Oral PRN Edwin Gamez DO        Or    potassium bicarb-citric acid (EFFER-K)  Mono, splenic flexor 8mm ulcer wo bleeding, 4mm ulcer sigmoid colon wo bleeding, 8mm recatal ulcer wo bleeding, scatted sm & lg mouthed diverticula in sig colon, splenic flexure and transverse colon,    HERNIA REPAIR      TOTAL HIP ARTHROPLASTY Left 08/26/2022    LEFT HIP TOTAL ARTHROPLASTY performed by Jose Obrien MD at [facility] OR    UPPER GASTROINTESTINAL ENDOSCOPY  07/10/2023    Dr Dawit Tapia, Gr 2 medium sized varcies in Esophagus, Moderate portal hypertensive gastropathy in entire stomach,    UPPER GASTROINTESTINAL ENDOSCOPY N/A 08/08/2024    Dr Hastings, (-) h pylor, (-) sprue, 4 columns of gr 2 varices were noted throughout esoph, diffuse portal hypertensive gastropathy was noted consistent w known hx of cirrhosis by no gastric varices, sugg chemical gastritis, multiple erosions w surrounding patchy erythema noted in duodenal bulb sugg of NSAIDs,    UPPER GASTROINTESTINAL ENDOSCOPY N/A 01/10/2025    Dr Rhoades-Portal hypertensive gastropathy w/contact friability and blood oozing, suspect cause for bleeding especially in the setting of coagulopathy and severe thrombocytopenia (decompensated liver disease), small distal esophageal varices which flattened w/insufflation, no treatment indicated       Family History  Family History   Problem Relation Age of Onset    Diabetes Mother     Colon Polyps Neg Hx     Colon Cancer Neg Hx        Social History  Social History     Socioeconomic History    Marital status:      Spouse name: Not on file    Number of children: 1    Years of education: Not on file    Highest education level: Not on file   Occupational History    Not on file   Tobacco Use    Smoking status: Former     Current packs/day: 0.50     Types: Cigarettes    Smokeless tobacco: Never   Vaping Use    Vaping status: Never Used   Substance and Sexual Activity    Alcohol use: Not Currently     Comment: quit 8/2022    Drug use: Never    Sexual activity: Not on file   Other Topics Concern    Not on

## 2025-03-31 LAB
ALBUMIN SERPL-MCNC: 2.8 G/DL (ref 3.5–5.2)
ALP SERPL-CCNC: 72 U/L (ref 40–129)
ALT SERPL-CCNC: 15 U/L (ref 10–50)
ANION GAP SERPL CALCULATED.3IONS-SCNC: 9 MMOL/L (ref 8–16)
AST SERPL-CCNC: 27 U/L (ref 10–50)
BILIRUB SERPL-MCNC: 6.2 MG/DL (ref 0.2–1.2)
BLOOD BANK DISPENSE STATUS: NORMAL
BLOOD BANK PRODUCT CODE: NORMAL
BPU ID: NORMAL
BUN SERPL-MCNC: 11 MG/DL (ref 8–23)
CALCIUM SERPL-MCNC: 7.9 MG/DL (ref 8.8–10.2)
CHLORIDE SERPL-SCNC: 109 MMOL/L (ref 98–107)
CO2 SERPL-SCNC: 20 MMOL/L (ref 22–29)
CREAT SERPL-MCNC: 0.7 MG/DL (ref 0.7–1.2)
DESCRIPTION BLOOD BANK: NORMAL
ERYTHROCYTE [DISTWIDTH] IN BLOOD BY AUTOMATED COUNT: 22.5 % (ref 11.5–14.5)
GLUCOSE SERPL-MCNC: 127 MG/DL (ref 70–99)
HCT VFR BLD AUTO: 24.7 % (ref 42–52)
HEMOCCULT STL QL: NORMAL
HGB BLD-MCNC: 7.6 G/DL (ref 14–18)
INR PPP: 2.6 (ref 0.88–1.18)
MAGNESIUM SERPL-MCNC: 1.8 MG/DL (ref 1.6–2.4)
MCH RBC QN AUTO: 30.3 PG (ref 27–31)
MCHC RBC AUTO-ENTMCNC: 30.8 G/DL (ref 33–37)
MCV RBC AUTO: 98.4 FL (ref 80–94)
PHOSPHATE SERPL-MCNC: 3.4 MG/DL (ref 2.5–4.5)
PLATELET # BLD AUTO: 27 K/UL (ref 130–400)
PMV BLD AUTO: 9.9 FL (ref 9.4–12.4)
POTASSIUM SERPL-SCNC: 4 MMOL/L (ref 3.5–5.1)
PROT SERPL-MCNC: 4.8 G/DL (ref 6.4–8.3)
PROTHROMBIN TIME: 27.2 SEC (ref 12–14.6)
RBC # BLD AUTO: 2.51 M/UL (ref 4.7–6.1)
SODIUM SERPL-SCNC: 138 MMOL/L (ref 136–145)
WBC # BLD AUTO: 3.3 K/UL (ref 4.8–10.8)

## 2025-03-31 PROCEDURE — 83735 ASSAY OF MAGNESIUM: CPT

## 2025-03-31 PROCEDURE — 2580000003 HC RX 258: Performed by: INTERNAL MEDICINE

## 2025-03-31 PROCEDURE — 94760 N-INVAS EAR/PLS OXIMETRY 1: CPT

## 2025-03-31 PROCEDURE — 6370000000 HC RX 637 (ALT 250 FOR IP): Performed by: INTERNAL MEDICINE

## 2025-03-31 PROCEDURE — 6360000002 HC RX W HCPCS: Performed by: INTERNAL MEDICINE

## 2025-03-31 PROCEDURE — 99222 1ST HOSP IP/OBS MODERATE 55: CPT | Performed by: PHYSICIAN ASSISTANT

## 2025-03-31 PROCEDURE — 36415 COLL VENOUS BLD VENIPUNCTURE: CPT

## 2025-03-31 PROCEDURE — 84100 ASSAY OF PHOSPHORUS: CPT

## 2025-03-31 PROCEDURE — 99232 SBSQ HOSP IP/OBS MODERATE 35: CPT | Performed by: INTERNAL MEDICINE

## 2025-03-31 PROCEDURE — 85610 PROTHROMBIN TIME: CPT

## 2025-03-31 PROCEDURE — 82272 OCCULT BLD FECES 1-3 TESTS: CPT

## 2025-03-31 PROCEDURE — 80053 COMPREHEN METABOLIC PANEL: CPT

## 2025-03-31 PROCEDURE — 2700000000 HC OXYGEN THERAPY PER DAY

## 2025-03-31 PROCEDURE — 6370000000 HC RX 637 (ALT 250 FOR IP): Performed by: STUDENT IN AN ORGANIZED HEALTH CARE EDUCATION/TRAINING PROGRAM

## 2025-03-31 PROCEDURE — 2000000000 HC ICU R&B

## 2025-03-31 PROCEDURE — 2500000003 HC RX 250 WO HCPCS: Performed by: INTERNAL MEDICINE

## 2025-03-31 PROCEDURE — 85027 COMPLETE CBC AUTOMATED: CPT

## 2025-03-31 RX ORDER — LACTULOSE 10 G/15ML
10 SOLUTION ORAL 2 TIMES DAILY
Status: DISCONTINUED | OUTPATIENT
Start: 2025-03-31 | End: 2025-04-01 | Stop reason: HOSPADM

## 2025-03-31 RX ADMIN — SUCRALFATE 1 G: 1 TABLET ORAL at 13:00

## 2025-03-31 RX ADMIN — SODIUM CHLORIDE, PRESERVATIVE FREE 40 MG: 5 INJECTION INTRAVENOUS at 23:34

## 2025-03-31 RX ADMIN — SUCRALFATE 1 G: 1 TABLET ORAL at 08:13

## 2025-03-31 RX ADMIN — SUCRALFATE 1 G: 1 TABLET ORAL at 20:18

## 2025-03-31 RX ADMIN — SODIUM CHLORIDE, PRESERVATIVE FREE 10 ML: 5 INJECTION INTRAVENOUS at 20:21

## 2025-03-31 RX ADMIN — SODIUM CHLORIDE, PRESERVATIVE FREE 10 ML: 5 INJECTION INTRAVENOUS at 08:13

## 2025-03-31 RX ADMIN — SODIUM CHLORIDE, PRESERVATIVE FREE 40 MG: 5 INJECTION INTRAVENOUS at 00:28

## 2025-03-31 RX ADMIN — LACTULOSE 10 G: 20 SOLUTION ORAL at 20:18

## 2025-03-31 RX ADMIN — ZOLPIDEM TARTRATE 10 MG: 5 TABLET ORAL at 20:18

## 2025-03-31 RX ADMIN — LACTULOSE 10 G: 20 SOLUTION ORAL at 08:13

## 2025-03-31 RX ADMIN — SUCRALFATE 1 G: 1 TABLET ORAL at 16:59

## 2025-03-31 RX ADMIN — SODIUM CHLORIDE, PRESERVATIVE FREE 40 MG: 5 INJECTION INTRAVENOUS at 13:00

## 2025-03-31 ASSESSMENT — ENCOUNTER SYMPTOMS
DIARRHEA: 0
CONSTIPATION: 0
VOICE CHANGE: 0
SHORTNESS OF BREATH: 0
BACK PAIN: 0
COLOR CHANGE: 0
VOMITING: 0
NAUSEA: 0

## 2025-03-31 ASSESSMENT — PAIN SCALES - GENERAL: PAINLEVEL_OUTOF10: 0

## 2025-03-31 NOTE — PROGRESS NOTES
Spiritual Health History and Assessment/Progress Note  [facility]    Rituals, Rites and Sacraments,  ,  ,      Name: Ney Ann MRN: [id number]    Age: 70 y.o.     Sex: male   Language: English   Yazidism: Adventism   Anemia due to GI blood loss     Date: 3/31/2025            Total Time Calculated: 3 min              Spiritual Assessment began in [facility] ICU        Referral/Consult From: Rounding   Encounter Overview/Reason: Rituals, Rites and Sacraments  Service Provided For: Patient    Cleo, Belief, Meaning:   Patient identifies as spiritual; Bahai cleo, prayer  Family/Friends       Importance and Influence:  Patient has spiritual/personal beliefs that influence decisions regarding their health; prayer is important  Family/Friends     Community:  Patient   Family/Friends     Assessment and Plan of Care:     Patient Interventions include: Provided sacramental/Protestant ritual  Family/Friends Interventions include:     Patient Plan of Care: Other: Open to revisit  Family/Friends Plan of Care:     Electronically signed by TRIPP Smith on 3/31/2025 at 10:51 AM        03/31/25 1043   Encounter Summary   Encounter Overview/Reason Rituals, Rites and Sacraments   Encounter Code  Counseling, Individual, by  services   Service Provided For Patient   Referral/Consult From Rounding   Complexity of Encounter Low   Begin Time 0910   End Time  0913   Total Time Calculated 3 min   Spiritual/Emotional needs   Type Spiritual Support   Rituals, Rites and Sacraments   Type Blessings   Assessment/Intervention/Outcome   Assessment Calm   Intervention Prayer (assurance of)/[place]   Outcome Peace   Plan and Referrals   Plan/Referrals Continue to visit, (comment)   Does the patient have a BS PCP? Yes    to follow up after discharge? No     Electronically signed by DAVIDSON Smith on 3/31/2025 at 10:52 AM

## 2025-03-31 NOTE — CONSULTS
Palliative Care Consult Note    3/31/2025 11:55 AM  Subjective:  Admit Date: 3/29/2025  PCP: Izabela Robles, APRN - CNP    Date of Service: 3/31/2025    Reason for Consultation:  Goals of Care, Code Status, Family Support     History Obtained From: EMR/Patient and their Family    History Of Present Illness:   The patient is a 70 y.o. male with PMH alcoholic cirrhosis followed at [facility],recent fall resulting in back pain affecting ability to stand/walk, esophageal varices, portal hypertensive gastropathy, pancytopenia, HTN, COPD, vitamin D deficiency, insomnia who presented to [facility] ED on 03/29/2025 complaining of dyspnea that has been worsening for several weeks associated with melena, weakness, increase in falls and cough.  CTA pulmonary protocol reported no evidence of pulmonary artery thrombus, small right and moderate left pleural effusions with adjacent compressive atelectasis, underlying emphysema, cirrhosis and ascites.  Chest x-ray reported interval worsening bibasilar pneumonia.  Hemoglobin 4.7. PRBC ordered and he was admitted to Hospitalist service. He has reportedly been free from Alcohol use for over one month. He has been seen by Gastroenterology w/ recommendations for PPI BID. Palliative care has been consulted for goals of care.     Past Medical History:        Diagnosis Date    Abnormal LFTs     Atrial fibrillation (HCC)     per pt; no current cardiologist    Cirrhosis (HCC)     [facility]    COVID-Juana     2 yr ago    History of blood transfusion     Hyperbilirubinemia     Hypertension     Hypothyroidism     no meds at present    Insomnia     Leukopenia     Liver disease     Palliative care patient 01/10/2025    Platelets decreased     chronic    Splenomegaly     Umbilical hernia     and inguinal per pt    Varices, esophageal (HCC)     hx of    Vitamin D deficiency      Past Surgical History:        Procedure Laterality Date    COLONOSCOPY  2014    polyps per pt    COLONOSCOPY N/A  his healthcare surrogate.  We also reviewed his CODE STATUS and meaning of full code to include chest compressions, medication for resuscitation, cardioversion/defibrillation, intubation.  He does confirm that he wants to remain full code at this time but states he would want to revisit that discussion should his clinical concerns continued to worsen despite alcohol and tobacco cessation.  Opportunity for question support provided.    Palliative team will follow as needed.      Recommendations:     Palliative Care-GOC continue current level of support, further goals based on clinical course Code status: Full code   Symptomatic, acute on chronic anemia in the setting of pancytopenia w/ coagulopathy suspected related to liver disease-continue transfusional support as needed  Alcoholic cirrhosis-followed by Hepatology at [facility] and will need follow up soon   Insomnia-Ambien per his PCP  Previous Alcohol use-reports alcohol free for over 1 month   Back pain s/p falls-PT/OT, reports pain as controlled at this time    Thank you for consulting palliative care and allowing us to participate in the care of the patient.      CounselingTopics: Goals of care, Code Status, Disease process education, pt/family support                                     Total Time Spent with patient assessment, interview of independent historian/HCS, workup/treatment review, discussion with medical team, review of current and home medications, review of care everywhere and placement of orders/preparation of this note: 67 minutes      Electronically signed by Scarlett Juares PA-C on 3/31/2025 at 11:55 AM    (Please note that portions of this note were completed with a voice recognition program.  Efforts were made to edit the dictations but occasionally words are mis-transcribed.)

## 2025-03-31 NOTE — PROGRESS NOTES
Report called to Grace RN on 4th. Family at bedside. Pt will be transferring with his phone and  and bag of clothing.

## 2025-03-31 NOTE — PROGRESS NOTES
Hospitalist Progress Note    Patient:  Ney Ann  YOB: 1955  Date of Service: 3/31/2025  MRN: [id number]   Acct: [id number]   Primary Care Physician: Izabela Robles APRN - CNP  Advance Directive: Full Code  Admit Date: 3/29/2025       Hospital Day: 2  Referring Provider: Edwin Gamez DO    Patient Seen, Chart, Consults, Notes, Labs, Radiology studies reviewed.    Subjective:  Ney Ann is a 70 y.o. male  whom we are following for alcoholic cirrhosis, pancytopenia, GI blood loss.  H&H has remained stable.  Platelet count also remained stable.  No signs of active bleeding.    Allergies:  Tomato and Watermelon [citrullus vulgaris]    Medicines:  Current Facility-Administered Medications   Medication Dose Route Frequency Provider Last Rate Last Admin    0.9 % sodium chloride infusion   IntraVENous PRN Ankit Adams MD        pantoprazole (PROTONIX) 40 mg in sodium chloride (PF) 0.9 % 10 mL injection  40 mg IntraVENous Q12H Edwin Gmaez DO   40 mg at 03/31/25 0028    0.9 % sodium chloride infusion   IntraVENous PRN Edwin Gamez DO        lactulose (CHRONULAC) 10 GM/15ML solution 10 g  10 g Oral Daily Edwin Gamez DO   10 g at 03/31/25 0813    sucralfate (CARAFATE) tablet 1 g  1 g Oral 4x Daily Edwin Gamez DO   1 g at 03/31/25 0813    sodium chloride flush 0.9 % injection 10 mL  10 mL IntraVENous 2 times per day Edwin Gamez DO   10 mL at 03/31/25 0813    sodium chloride flush 0.9 % injection 10 mL  10 mL IntraVENous PRN Edwin Gamez DO        0.9 % sodium chloride infusion   IntraVENous PRN Edwin Gamez DO        potassium chloride (KLOR-CON M) extended release tablet 40 mEq  40 mEq Oral PRN Edwin Gamez DO        Or    potassium bicarb-citric acid (EFFER-K) effervescent tablet 40 mEq  40 mEq Oral PRN Edwin Gamez DO        Or    potassium chloride 10 mEq/100 mL IVPB (Peripheral Line)  10 mEq

## 2025-03-31 NOTE — CARE COORDINATION
Case Management Assessment  Initial Evaluation    Date/Time of Evaluation: 3/31/2025 1:05 PM  Assessment Completed by: SUSAN SKELTON    If patient is discharged prior to next notation, then this note serves as note for discharge by case management.    Patient Name: Ney Leblanc                   YOB: 1955  Diagnosis: Shortness of breath [R06.02]  Melena [K92.1]  Anemia due to GI blood loss [D50.0]  Portal hypertensive gastropathy (HCC) [K76.6, K31.89]  Cirrhosis of liver with ascites, unspecified hepatic cirrhosis type (HCC) [K74.60, R18.8]  Acute on chronic anemia [D64.9]                   Date / Time: 3/29/2025  2:38 PM    Patient Admission Status: Inpatient   Readmission Risk (Low < 19, Mod (19-27), High > 27): Readmission Risk Score: 22.1    Current PCP: Izabela Robles APRN - CNP  PCP verified by CM? (P) Yes    Chart Reviewed: Yes      History Provided by: (P) Patient  Patient Orientation: (P) Alert and Oriented, Person, Place, Situation    Patient Cognition: (P) Alert    Hospitalization in the last 30 days (Readmission):  No    If yes, Readmission Assessment in CM Navigator will be completed.    Advance Directives:      Code Status: Full Code   Patient's Primary Decision Maker is: (P) Legal Next of Kin    Primary Decision Maker: JAVAD LEBLANC - Spouse - [phone]    Secondary Decision Maker: Razia Shah - Brother/Sister - [phone]    Discharge Planning:    Patient lives with: (P) Spouse/Significant Other Type of Home: (P) House  Primary Care Giver: (P) Self  Patient Support Systems include: (P) Spouse/Significant Other   Current Financial resources: (P) Medicare  Current community resources: (P) None  Current services prior to admission: (P) None            Current DME:              Type of Home Care services:  (P) PT, OT    ADLS  Prior functional level: (P) Assistance with the following:  Current functional level: (P) Assistance with the following:    PT AM-PAC:   /24  OT  providers?  Yes

## 2025-03-31 NOTE — PROGRESS NOTES
GI  - PROGRESS NOTE    Subjective:   Admit Date: 3/29/2025  PCP: Izabela Robles, SHEILA - CNP    Patient being seen for: Melena; pancytopenia; post-hemorrhagic anemia    24hr events/Interval History: No new complaints.  Per the nursing staff please take care of him today, the patient had a brown bowel movement last night and this morning; no blood, not black     ADULT DIET; Regular     Tolerated                  Pain:None  Nausea:None      Medications:  Scheduled Meds:   lactulose  10 g Oral BID    pantoprazole (PROTONIX) 40 mg in sodium chloride (PF) 0.9 % 10 mL injection  40 mg IntraVENous Q12H    sucralfate  1 g Oral 4x Daily    sodium chloride flush  10 mL IntraVENous 2 times per day    zolpidem  10 mg Oral Nightly       Continuous Infusions:   sodium chloride         PRN Meds:.sodium chloride flush, sodium chloride, potassium chloride **OR** potassium alternative oral replacement **OR** potassium chloride, magnesium sulfate, ondansetron **OR** ondansetron, magnesium hydroxide, acetaminophen **OR** acetaminophen      Labs:     Recent Labs     03/29/25  1525 03/29/25  1559 03/30/25  0134 03/30/25  0714 03/30/25  1540 03/31/25  0145   WBC 2.9*  --  2.6*  --   --  3.3*   RBC 1.45*  --  2.16*  --   --  2.51*   HGB 4.7*   < > 6.6* 7.2* 7.4* 7.6*   HCT 16.0*   < > 22.1* 23.8* 24.0* 24.7*   .3*  --  102.3*  --   --  98.4*   MCH 32.4*  --  30.6  --   --  30.3   MCHC 29.4*  --  29.9*  --   --  30.8*   PLT 33*  --  27*  --   --  27*    < > = values in this interval not displayed.     Recent Labs     03/29/25  1525 03/30/25  0134 03/31/25  0145    141 138   K 3.8 4.0 4.0   ANIONGAP 10 11 9   * 111* 109*   CO2 20* 19* 20*   BUN 12 12 11   CREATININE 0.7 0.7 0.7   GLUCOSE 163* 115* 127*   CALCIUM 8.2* 8.4* 7.9*     Recent Labs     03/30/25  0134 03/31/25  0145   MG  --  1.8   PHOS 3.9 3.4     Recent Labs     03/29/25  1525 03/30/25  0134 03/31/25  0145   AST 28 28 27   ALT 20 19 15   BILITOT

## 2025-04-01 ENCOUNTER — TELEPHONE (OUTPATIENT)
Dept: PRIMARY CARE CLINIC | Age: 70
End: 2025-04-01

## 2025-04-01 VITALS
SYSTOLIC BLOOD PRESSURE: 109 MMHG | DIASTOLIC BLOOD PRESSURE: 54 MMHG | OXYGEN SATURATION: 95 % | BODY MASS INDEX: 29.75 KG/M2 | WEIGHT: 196.3 LBS | HEART RATE: 59 BPM | TEMPERATURE: 99.3 F | HEIGHT: 68 IN | RESPIRATION RATE: 18 BRPM

## 2025-04-01 PROBLEM — D68.9 COAGULOPATHY: Status: RESOLVED | Noted: 2022-10-27 | Resolved: 2025-04-01

## 2025-04-01 PROBLEM — K92.1 MELENA: Status: RESOLVED | Noted: 2025-01-09 | Resolved: 2025-04-01

## 2025-04-01 PROBLEM — D50.0 ANEMIA DUE TO GI BLOOD LOSS: Status: RESOLVED | Noted: 2025-03-29 | Resolved: 2025-04-01

## 2025-04-01 PROBLEM — Z51.5 PALLIATIVE CARE PATIENT: Status: RESOLVED | Noted: 2025-01-10 | Resolved: 2025-04-01

## 2025-04-01 LAB
ALBUMIN SERPL-MCNC: 3 G/DL (ref 3.5–5.2)
ALP SERPL-CCNC: 84 U/L (ref 40–129)
ALT SERPL-CCNC: 20 U/L (ref 10–50)
ANION GAP SERPL CALCULATED.3IONS-SCNC: 10 MMOL/L (ref 8–16)
AST SERPL-CCNC: 35 U/L (ref 10–50)
BILIRUB SERPL-MCNC: 4.7 MG/DL (ref 0.2–1.2)
BUN SERPL-MCNC: 8 MG/DL (ref 8–23)
CALCIUM SERPL-MCNC: 8.5 MG/DL (ref 8.8–10.2)
CHLORIDE SERPL-SCNC: 111 MMOL/L (ref 98–107)
CO2 SERPL-SCNC: 21 MMOL/L (ref 22–29)
CREAT SERPL-MCNC: 0.7 MG/DL (ref 0.7–1.2)
GLUCOSE SERPL-MCNC: 143 MG/DL (ref 70–99)
HCT VFR BLD AUTO: 25.9 % (ref 42–52)
HGB BLD-MCNC: 8.1 G/DL (ref 14–18)
MAGNESIUM SERPL-MCNC: 1.8 MG/DL (ref 1.6–2.4)
PHOSPHATE SERPL-MCNC: 2.2 MG/DL (ref 2.5–4.5)
POTASSIUM SERPL-SCNC: 3.2 MMOL/L (ref 3.5–5.1)
PROT SERPL-MCNC: 5.4 G/DL (ref 6.4–8.3)
SODIUM SERPL-SCNC: 142 MMOL/L (ref 136–145)

## 2025-04-01 PROCEDURE — 6370000000 HC RX 637 (ALT 250 FOR IP): Performed by: INTERNAL MEDICINE

## 2025-04-01 PROCEDURE — 85014 HEMATOCRIT: CPT

## 2025-04-01 PROCEDURE — 36415 COLL VENOUS BLD VENIPUNCTURE: CPT

## 2025-04-01 PROCEDURE — 83735 ASSAY OF MAGNESIUM: CPT

## 2025-04-01 PROCEDURE — 85018 HEMOGLOBIN: CPT

## 2025-04-01 PROCEDURE — 2500000003 HC RX 250 WO HCPCS: Performed by: INTERNAL MEDICINE

## 2025-04-01 PROCEDURE — 94760 N-INVAS EAR/PLS OXIMETRY 1: CPT

## 2025-04-01 PROCEDURE — 6360000002 HC RX W HCPCS: Performed by: INTERNAL MEDICINE

## 2025-04-01 PROCEDURE — 84100 ASSAY OF PHOSPHORUS: CPT

## 2025-04-01 PROCEDURE — 2580000003 HC RX 258: Performed by: INTERNAL MEDICINE

## 2025-04-01 PROCEDURE — 2700000000 HC OXYGEN THERAPY PER DAY

## 2025-04-01 PROCEDURE — 80053 COMPREHEN METABOLIC PANEL: CPT

## 2025-04-01 RX ORDER — LACTULOSE 10 G/15ML
10 SOLUTION ORAL 2 TIMES DAILY
Qty: 946 ML | Refills: 1 | Status: SHIPPED | OUTPATIENT
Start: 2025-04-01

## 2025-04-01 RX ORDER — PANTOPRAZOLE SODIUM 40 MG/1
40 TABLET, DELAYED RELEASE ORAL
Qty: 180 TABLET | Refills: 1 | Status: SHIPPED | OUTPATIENT
Start: 2025-04-01

## 2025-04-01 RX ORDER — POTASSIUM PHOSPHATE, MONOBASIC 500 MG/1
500 TABLET, SOLUBLE ORAL 2 TIMES DAILY
Qty: 60 TABLET | Refills: 0 | Status: SHIPPED | OUTPATIENT
Start: 2025-04-01

## 2025-04-01 RX ADMIN — SODIUM CHLORIDE, PRESERVATIVE FREE 10 ML: 5 INJECTION INTRAVENOUS at 08:44

## 2025-04-01 RX ADMIN — SODIUM CHLORIDE, PRESERVATIVE FREE 40 MG: 5 INJECTION INTRAVENOUS at 11:26

## 2025-04-01 RX ADMIN — POTASSIUM CHLORIDE 40 MEQ: 1500 TABLET, EXTENDED RELEASE ORAL at 08:43

## 2025-04-01 RX ADMIN — Medication 250 MG: at 11:27

## 2025-04-01 RX ADMIN — SUCRALFATE 1 G: 1 TABLET ORAL at 08:43

## 2025-04-01 RX ADMIN — SUCRALFATE 1 G: 1 TABLET ORAL at 11:27

## 2025-04-01 RX ADMIN — LACTULOSE 10 G: 20 SOLUTION ORAL at 08:43

## 2025-04-01 NOTE — TELEPHONE ENCOUNTER
[facility] called to verify if SHEILA Deluca would follow HH orders. HH was ordered from patient's hospital stay on 3/29/25 and has TCM on 4/4/25.

## 2025-04-01 NOTE — PROGRESS NOTES
Discharge instructions explained to patient and wife. Questions answered. Portable O2 tank and rollator at bedside.

## 2025-04-01 NOTE — PLAN OF CARE
Problem: Discharge Planning  Goal: Discharge to home or other facility with appropriate resources  4/1/2025 1006 by Genevieve Hernández RN  Outcome: Adequate for Discharge  4/1/2025 0435 by Olga Finnegan RN  Outcome: Progressing     Problem: Safety - Adult  Goal: Free from fall injury  4/1/2025 1006 by Genevieve Hernández RN  Outcome: Adequate for Discharge  4/1/2025 0435 by Olga Finnegan RN  Outcome: Progressing     Problem: ABCDS Injury Assessment  Goal: Absence of physical injury  4/1/2025 1006 by Genevieve Hernández RN  Outcome: Adequate for Discharge  4/1/2025 0435 by Olga Finnegan RN  Outcome: Progressing

## 2025-04-01 NOTE — DISCHARGE SUMMARY
Discharge Summary    Ney Ann  :  1955  MRN:  [id number]    Admit date:  3/29/2025  Discharge date: 2025     Admitting Physician:  Edwin Gamez DO    Advance Directive: Full Code    Consults: GI and palliative care    Primary Care Physician:  Izabela Robles, APRN - CNP    Discharge Diagnoses:  Principal Problem (Resolved):    Anemia due to GI blood loss  Active Problems:    * No active hospital problems. *  Resolved Problems:    Coagulopathy    Melena    Palliative care patient      Significant Diagnostic Studies:   CTA PULMONARY W CONTRAST  Result Date: 3/29/2025  EXAM:  CT ANGIOGRAPHY OF THE CHEST  History:  Chest pain and shortness of breath  Technique:  1.25 mm postcontrast CT chest utilizing CT angiography protocol.  Multiplanar and maximum intensity projection reformations were performed.  FINDINGS:  Technically adequate for evaluation of pulmonary arteries and aorta.  There are no pulmonary artery filling defects.  Small right and moderate to large left pleural effusion. Adjacent compressive atelectasis bilaterally.  Underlying moderate emphysema.  No infiltrates of the pneumatized lung.  No mediastinal lymphadenopathy.  Atherosclerotic calcification of the aorta without aneurysm.  Coronary artery calcifications are present.  No acute chest wall abnormality.  Splenomegaly and cirrhosis and ascites seen in the upper abdomen      Impression: 1.  No evidence of pulmonary artery thrombus 2.  Small right and moderate left pleural effusions with adjacent compressive atelectasis 3.  Underlying emphysema 4.  Cirrhosis and ascites seen in the upper abdomen  All CT scans are performed using dose optimization techniques as appropriate to the performed exam and include at least one of the following: Automated exposure control, adjustment of the mA and/or kV according to size, and the use of iterative reconstruction technique.  ______________________________________ Electronically signed by:

## 2025-04-01 NOTE — CARE COORDINATION
Met with patient to discuss discharge plan.  Patient plans to discharge home with [facility].  Provided HH choices.  He chose [facility].  Referral sent to [facility].  Awaiting acceptance/denial.  [facility] by [employer]  P [phone]  F [phone]    Patient states he only has a four-prong cane.  He would like a Rollator and does not have a preferred supplier.  Rollator ordered from [employer] and it will be delivered to patient's room.  [employer]   P [phone]  F [phone]  Electronically signed by Fartun Juan RN on 4/1/2025 at 9:55 AM    Patient accepted by [facility].  Electronically signed by Fartun Juan RN on 4/1/2025 at 10:26 AM

## 2025-04-02 ENCOUNTER — TELEPHONE (OUTPATIENT)
Dept: PRIMARY CARE CLINIC | Age: 70
End: 2025-04-02

## 2025-04-02 NOTE — TELEPHONE ENCOUNTER
Care Transitions Initial Follow Up Call    Outreach made within 2 business days of discharge: Yes    Patient: Ney Ann   Patient : 1955   MRN: [id number]    Reason for Admission: Anemia due to GI blood loss   Discharge Date: 25       Spoke with: Voicemail asking for call back recived. Will try again next business day. First attempt.    Discharge department/facility: [facility]

## 2025-04-03 ENCOUNTER — OFFICE VISIT (OUTPATIENT)
Dept: PRIMARY CARE CLINIC | Age: 70
End: 2025-04-03

## 2025-04-03 ENCOUNTER — TELEPHONE (OUTPATIENT)
Dept: PRIMARY CARE CLINIC | Age: 70
End: 2025-04-03

## 2025-04-03 VITALS
WEIGHT: 194.6 LBS | HEART RATE: 60 BPM | BODY MASS INDEX: 29.49 KG/M2 | DIASTOLIC BLOOD PRESSURE: 60 MMHG | HEIGHT: 68 IN | OXYGEN SATURATION: 90 % | TEMPERATURE: 98.1 F | SYSTOLIC BLOOD PRESSURE: 118 MMHG

## 2025-04-03 DIAGNOSIS — K74.60 CIRRHOSIS OF LIVER WITHOUT ASCITES, UNSPECIFIED HEPATIC CIRRHOSIS TYPE (HCC): Primary | ICD-10-CM

## 2025-04-03 DIAGNOSIS — Z09 HOSPITAL DISCHARGE FOLLOW-UP: ICD-10-CM

## 2025-04-03 NOTE — TELEPHONE ENCOUNTER
Samuel BELL with [facility]     1X wk for 5 wks cardio pulm acess.      Needs to do a CBC next week  week of 04/07/25 and week after 04/14/25        Medication questions     OTC iron ?    Lansoprazole ?      [phone] Smauel BELL

## 2025-04-04 NOTE — PROGRESS NOTES
Post-Discharge Transitional Care  Follow Up      Ney Ann   YOB: 1955    Date of Office Visit:  4/3/2025  Date of Hospital Admission: 3/29/25  Date of Hospital Discharge: 4/1/25  Risk of hospital readmission (high >=14%. Medium >=10%) :Readmission Risk Score: 22.5      Care management risk score Rising risk (score 2-5) and Complex Care (Scores >=6): No Risk Score On File     Non face to face  following discharge, date last encounter closed (first attempt may have been earlier): 04/02/2025    Call initiated 2 business days of discharge: Yes    ASSESSMENT/PLAN:   Cirrhosis of liver without ascites, unspecified hepatic cirrhosis type (HCC)  -     CBC with Auto Differential; Future  Hospital discharge follow-up  -     CO DISCHARGE MEDS RECONCILED W/ CURRENT OUTPATIENT MED LIST      Medical Decision Making: high complexity  Return in 3 months (on 7/3/2025).           Subjective:   HPI:  Follow up of Hospital problems/diagnosis(es):   \"Mr. Ann was admitted on 3/29 with significant anemia. He has a history of underlying alcoholic cirrhosis and chronic anemia. He has a history of known esophageal varices. Consultation was obtained with GI. He had a recent endoscopy which did not show significant varices. Reendoscopy was felt not to be warranted at this time. He was transfused and stabilized and transferred to [facility]. In the morning of 4/1 his H&H had remained stable. He did have evidence of mild hypophosphatemia. He will be started on oral K-Phos in the outpatient setting. He is stable for discharge. He will continue to have outpatient follow-up.\"     Inpatient course: Discharge summary reviewed- see chart.    Interval history/Current status:   History of Present Illness  The patient presents for evaluation of anemia, suspected gastrointestinal bleeding, and emphysema.    History is reported by another person in the presence of the patient. He was recently admitted to the emergency room due to

## 2025-04-08 ENCOUNTER — TELEPHONE (OUTPATIENT)
Dept: PRIMARY CARE CLINIC | Age: 70
End: 2025-04-08

## 2025-04-08 NOTE — TELEPHONE ENCOUNTER
Cristina with [facility]   Callback phone number   [phone] for cristina    Has pitting edema on both ankles  2-3 pitting on left ankle  1 pitting on r ankle    Patient is currently taking lasix

## 2025-04-09 ENCOUNTER — TELEPHONE (OUTPATIENT)
Dept: PRIMARY CARE CLINIC | Age: 70
End: 2025-04-09

## 2025-04-09 NOTE — PROGRESS NOTES
Physician Progress Note      PATIENT:               KINA LEBLANC  [facility] #:                  [id number]  :                       1955  ADMIT DATE:       3/29/2025 2:38 PM  DISCH DATE:        2025 3:40 PM  RESPONDING  PROVIDER #:        Edwin Gamez DO          QUERY TEXT:    Anemia is documented in the medical record in H&P 3/29 by Dr. Gamez.   Please specify the type:    The clinical indicators include:  --H/H 16.0/4.7  --Per GI consult, 3/30-  Upon admission he was found to be pancytopenic with a   platelet count of 33 and a hemoglobin of 4.7; this was down from when he was   last admitted back in January when his hemoglobin was 9.1.  In the setting he   is pancytopenic so profoundly, would consider hematology oncology evaluation   to assist.  Unsure if this is all secondary to patient's decompensated liver   cirrhosis in the setting that it has occurred subacutely in the last few   months.  Will defer any endoscopic interventions at this time in the setting   of the patient's profound thrombocytopenia.  --Per Palliative consult 3/31-Symptomatic, acute on chronic anemia in the   setting of pancytopenia w/ coagulopathy suspected related to liver   disease-continue transfusional support as needed  --Blood transfusions and  Protonix 40 mg 1 a day.  Options provided:  -- Related to acute blood loss  -- Related to chronic blood loss  -- Related to acute on chronic blood loss  -- Related to chronic disease, Please specify chronic disease such as CKD,   kidney failure, neoplastic disease.  -- Other - I will add my own diagnosis  -- Disagree - Not applicable / Not valid  -- Disagree - Clinically unable to determine / Unknown  -- Refer to Clinical Documentation Reviewer    PROVIDER RESPONSE TEXT:    The patient's anemia is related to acute blood loss.    Query created by: Ghulam Monroy on 2025 10:41 AM      QUERY TEXT:    Please clarify in documentation the relationship, if any, between  Anemia and

## 2025-04-09 NOTE — TELEPHONE ENCOUNTER
Patient states he works nights and overslept, states he tried to call however he could not get through. I advised him to reach out via my chart also. Patient did reschedule til 4/15/2025    Ns x 2

## 2025-04-11 ENCOUNTER — RESULTS FOLLOW-UP (OUTPATIENT)
Dept: GASTROENTEROLOGY | Age: 70
End: 2025-04-11

## 2025-04-11 ENCOUNTER — OFFICE VISIT (OUTPATIENT)
Dept: GASTROENTEROLOGY | Age: 70
End: 2025-04-11
Payer: MEDICARE

## 2025-04-11 VITALS
HEIGHT: 69 IN | SYSTOLIC BLOOD PRESSURE: 123 MMHG | OXYGEN SATURATION: 90 % | DIASTOLIC BLOOD PRESSURE: 80 MMHG | BODY MASS INDEX: 27.4 KG/M2 | WEIGHT: 185 LBS | HEART RATE: 88 BPM

## 2025-04-11 DIAGNOSIS — K70.31 ALCOHOLIC CIRRHOSIS OF LIVER WITH ASCITES (HCC): ICD-10-CM

## 2025-04-11 DIAGNOSIS — K76.82 HEPATIC ENCEPHALOPATHY (HCC): ICD-10-CM

## 2025-04-11 DIAGNOSIS — D64.9 ANEMIA, UNSPECIFIED TYPE: ICD-10-CM

## 2025-04-11 DIAGNOSIS — K74.60 CIRRHOSIS OF LIVER WITHOUT ASCITES, UNSPECIFIED HEPATIC CIRRHOSIS TYPE (HCC): Primary | ICD-10-CM

## 2025-04-11 DIAGNOSIS — K74.60 CIRRHOSIS OF LIVER WITHOUT ASCITES, UNSPECIFIED HEPATIC CIRRHOSIS TYPE (HCC): ICD-10-CM

## 2025-04-11 LAB
ACANTHOCYTES BLD QL SMEAR: ABNORMAL
ANISOCYTOSIS BLD QL SMEAR: ABNORMAL
BASOPHILS # BLD: 0 K/UL (ref 0–0.2)
BASOPHILS NFR BLD: 0.6 % (ref 0–1)
DACRYOCYTES BLD QL SMEAR: ABNORMAL
EOSINOPHIL # BLD: 0.2 K/UL (ref 0–0.6)
EOSINOPHIL NFR BLD: 6.9 % (ref 0–5)
ERYTHROCYTE [DISTWIDTH] IN BLOOD BY AUTOMATED COUNT: 24.6 % (ref 11.5–14.5)
HCT VFR BLD AUTO: 29.4 % (ref 42–52)
HGB BLD-MCNC: 8.7 G/DL (ref 14–18)
HYPOCHROMIA BLD QL SMEAR: ABNORMAL
IMM GRANULOCYTES # BLD: 0 K/UL
LYMPHOCYTES # BLD: 0.7 K/UL (ref 1.1–4.5)
LYMPHOCYTES NFR BLD: 22.4 % (ref 20–40)
MACROCYTES BLD QL SMEAR: ABNORMAL
MCH RBC QN AUTO: 31.4 PG (ref 27–31)
MCHC RBC AUTO-ENTMCNC: 29.6 G/DL (ref 33–37)
MCV RBC AUTO: 106.1 FL (ref 80–94)
MONOCYTES # BLD: 0.4 K/UL (ref 0–0.9)
MONOCYTES NFR BLD: 12.7 % (ref 0–10)
NEUTROPHILS # BLD: 1.9 K/UL (ref 1.5–7.5)
NEUTS SEG NFR BLD: 56.5 % (ref 50–65)
PLATELET # BLD AUTO: 37 K/UL (ref 130–400)
PLATELET SLIDE REVIEW: ABNORMAL
PMV BLD AUTO: 11.1 FL (ref 9.4–12.4)
POIKILOCYTOSIS BLD QL SMEAR: ABNORMAL
RBC # BLD AUTO: 2.77 M/UL (ref 4.7–6.1)
SCHISTOCYTES BLD QL SMEAR: ABNORMAL
WBC # BLD AUTO: 3.3 K/UL (ref 4.8–10.8)

## 2025-04-11 PROCEDURE — 1159F MED LIST DOCD IN RCRD: CPT | Performed by: NURSE PRACTITIONER

## 2025-04-11 PROCEDURE — G8417 CALC BMI ABV UP PARAM F/U: HCPCS | Performed by: NURSE PRACTITIONER

## 2025-04-11 PROCEDURE — 1123F ACP DISCUSS/DSCN MKR DOCD: CPT | Performed by: NURSE PRACTITIONER

## 2025-04-11 PROCEDURE — 3017F COLORECTAL CA SCREEN DOC REV: CPT | Performed by: NURSE PRACTITIONER

## 2025-04-11 PROCEDURE — 99214 OFFICE O/P EST MOD 30 MIN: CPT | Performed by: NURSE PRACTITIONER

## 2025-04-11 PROCEDURE — 1111F DSCHRG MED/CURRENT MED MERGE: CPT | Performed by: NURSE PRACTITIONER

## 2025-04-11 PROCEDURE — G8427 DOCREV CUR MEDS BY ELIG CLIN: HCPCS | Performed by: NURSE PRACTITIONER

## 2025-04-11 PROCEDURE — 1036F TOBACCO NON-USER: CPT | Performed by: NURSE PRACTITIONER

## 2025-04-11 RX ORDER — SPIRONOLACTONE 50 MG/1
50 TABLET, FILM COATED ORAL DAILY
Qty: 30 TABLET | Refills: 11 | Status: SHIPPED | OUTPATIENT
Start: 2025-04-11

## 2025-04-11 NOTE — TELEPHONE ENCOUNTER
Called Ariela at [phone] and left a  for a return call.  did not identify Ariela so no patient information was provided.

## 2025-04-11 NOTE — PROGRESS NOTES
Subjective:     Patient ID: Ney Ann is a 70 y.o. male  PCP: Izabela Robles, APRN - CNP  Referring Provider: No ref. provider found    HPI  History of Present Illness  The patient presents for follow up on liver cirrhosis    He sees [facility] for his liver cirrhosis, however he missed his appointment in March, he will call to get this rescheduled.  States he has not been feeling so well  He was hospitalized last month due to an H&H of 4.7/16.0  He presented to the ER due to extreme fatigue and shortness of breath,he had been experiencing confusion, and weakness, he is currently on continuous oxygen therapy.   He reports experiencing pain in his groin region, which he describes as feeling like a rock, states he  has a known inguinal hernia but is not a surgical candidate. The pain is particularly severe upon waking in the morning, to the extent that it prevents him from sitting down. . He was hospitalized in 01/2025 and 03/2025. He has been abstaining from alcohol for approximately 6 weeks and has significantly reduced his cigarette consumption. He is currently under the care of home health services, including physical and occupational therapy. He is on lactulose and reports \"good\" bowel movements.    He also notes swelling in his feet and legs. He is on spironolactone 25 mg daily and Lasix 20 mg BID, we will increase these to see if this helps control his edema better.     He has been experiencing itching, I explained to them that is due to elevations in the Bilirubin level and as that comes down the itching should subside    SOCIAL HISTORY  He has not had alcohol in almost 6 weeks. He has pretty much subsided on cigarettes too.    MEDICATIONS  Current: lactulose, spironolactone, Lasix    Results  Laboratory Studies  Bilirubin level was elevated.      Assessment:     Assessment & Plan  1. Hernia.  He reports significant pain and discomfort from his hernia, which becomes rock hard at times. A

## 2025-04-11 NOTE — TELEPHONE ENCOUNTER
Patient was seen at [facility] today and leg swelling was addressed and spironolactone and furosemide.

## 2025-04-17 ENCOUNTER — TELEPHONE (OUTPATIENT)
Dept: PRIMARY CARE CLINIC | Age: 70
End: 2025-04-17

## 2025-04-17 DIAGNOSIS — M25.551 RIGHT HIP PAIN: ICD-10-CM

## 2025-04-17 DIAGNOSIS — R29.898 WEAKNESS OF BOTH LOWER EXTREMITIES: Primary | ICD-10-CM

## 2025-04-17 DIAGNOSIS — M54.50 LUMBAR PAIN: ICD-10-CM

## 2025-04-17 DIAGNOSIS — R53.1 WEAKNESS: ICD-10-CM

## 2025-04-17 NOTE — TELEPHONE ENCOUNTER
[facility] called to let us know the patient is needing a Elevated toilet  seat  order sent to [facility] or [facility]

## 2025-04-18 ENCOUNTER — HOSPITAL ENCOUNTER (OUTPATIENT)
Dept: ULTRASOUND IMAGING | Age: 70
Discharge: HOME | End: 2025-04-18
Payer: MEDICARE

## 2025-04-18 ENCOUNTER — RESULTS FOLLOW-UP (OUTPATIENT)
Dept: GASTROENTEROLOGY | Age: 70
End: 2025-04-18

## 2025-04-18 DIAGNOSIS — K70.31 ALCOHOLIC CIRRHOSIS OF LIVER WITH ASCITES (HCC): ICD-10-CM

## 2025-04-18 PROCEDURE — 76705 ECHO EXAM OF ABDOMEN: CPT

## 2025-04-21 RX ORDER — ERGOCALCIFEROL 1.25 MG/1
50000 CAPSULE, LIQUID FILLED ORAL WEEKLY
Qty: 4 CAPSULE | Refills: 0 | Status: SHIPPED | OUTPATIENT
Start: 2025-04-21

## 2025-04-21 NOTE — TELEPHONE ENCOUNTER
Ney Ann called to request a refill on his medication.      Last office visit : 4/3/2025   Next office visit : 5/1/2025     Requested Prescriptions     Pending Prescriptions Disp Refills    vitamin D (ERGOCALCIFEROL) 1.25 MG (33049 UT) CAPS capsule [Pharmacy Med Name: VITAMIN D 1.25MG CAPSULE] 4 capsule 0     Sig: TAKE 1 CAPSULE BY MOUTH ONCE A WEEK            Glenna Malave LPN

## 2025-04-21 NOTE — TELEPHONE ENCOUNTER
Ney Ann called to request a refill on his medication.      Last office visit : 4/3/2025   Next office visit : 4/21/2025     Requested Prescriptions     Pending Prescriptions Disp Refills    magnesium oxide (MAG-OX) 400 (240 Mg) MG tablet [Pharmacy Med Name: MAGNESIUM OXIDE 400MG TABLET] 30 tablet 0     Sig: TAKE 1 TABLET BY MOUTH DAILY            Roxi Benz LPN

## 2025-04-22 ENCOUNTER — TELEPHONE (OUTPATIENT)
Dept: GASTROENTEROLOGY | Age: 70
End: 2025-04-22

## 2025-04-22 RX ORDER — LANOLIN ALCOHOL/MO/W.PET/CERES
400 CREAM (GRAM) TOPICAL DAILY
Qty: 30 TABLET | Refills: 0 | Status: SHIPPED | OUTPATIENT
Start: 2025-04-22

## 2025-04-22 NOTE — TELEPHONE ENCOUNTER
04- Per [facility]         [facility] Ph [phone] Fax [phone]    Submitted PA for Xifaxan through Cover my med   [id number]          04- Called and notified patient that we have gotten him an approval til 04-

## 2025-04-23 NOTE — TELEPHONE ENCOUNTER
----- Message from SHEILA Felipe sent at 4/18/2025  1:19 PM CDT -----  No ascites fluid noted on ultrasound

## 2025-04-28 ENCOUNTER — TELEPHONE (OUTPATIENT)
Dept: GASTROENTEROLOGY | Age: 70
End: 2025-04-28

## 2025-04-28 DIAGNOSIS — K92.1 MELENA: ICD-10-CM

## 2025-04-28 DIAGNOSIS — K92.1 MELENA: Primary | ICD-10-CM

## 2025-04-28 LAB
ACANTHOCYTES BLD QL SMEAR: ABNORMAL
ANISOCYTOSIS BLD QL SMEAR: ABNORMAL
BASOPHILS # BLD: 0 K/UL (ref 0–0.2)
BASOPHILS NFR BLD: 0.1 % (ref 0–1)
EOSINOPHIL # BLD: 0.4 K/UL (ref 0–0.6)
EOSINOPHIL NFR BLD: 5.5 % (ref 0–5)
ERYTHROCYTE [DISTWIDTH] IN BLOOD BY AUTOMATED COUNT: 22.8 % (ref 11.5–14.5)
HCT VFR BLD AUTO: 16.7 % (ref 42–52)
HGB BLD-MCNC: 4.9 G/DL (ref 14–18)
HYPOCHROMIA BLD QL SMEAR: ABNORMAL
IMM GRANULOCYTES # BLD: 0.1 K/UL
LYMPHOCYTES # BLD: 1.8 K/UL (ref 1.1–4.5)
LYMPHOCYTES NFR BLD: 27.2 % (ref 20–40)
MACROCYTES BLD QL SMEAR: ABNORMAL
MCH RBC QN AUTO: 31 PG (ref 27–31)
MCHC RBC AUTO-ENTMCNC: 29.3 G/DL (ref 33–37)
MCV RBC AUTO: 105.7 FL (ref 80–94)
MONOCYTES # BLD: 1.1 K/UL (ref 0–0.9)
MONOCYTES NFR BLD: 16.3 % (ref 0–10)
NEUTROPHILS # BLD: 3.3 K/UL (ref 1.5–7.5)
NEUTS SEG NFR BLD: 50.2 % (ref 50–65)
PLATELET # BLD AUTO: 65 K/UL (ref 130–400)
PLATELET SLIDE REVIEW: ABNORMAL
PMV BLD AUTO: 11.2 FL (ref 9.4–12.4)
POIKILOCYTOSIS BLD QL SMEAR: ABNORMAL
RBC # BLD AUTO: 1.58 M/UL (ref 4.7–6.1)
SCHISTOCYTES BLD QL SMEAR: ABNORMAL
WBC # BLD AUTO: 6.7 K/UL (ref 4.8–10.8)

## 2025-04-28 NOTE — TELEPHONE ENCOUNTER
04- [place] Spouse called complains that the patient is having black tarry stools about 1-2 times weekly.  Complains that he had one on Thursday and again on Saturday.  Mentions that right afterwards he has no energy, weak, trouble focusing for the rest of the day, foggy headed and sleeping a lot.     States that he has been passing black colored stools off and on since Jan when he was in the hospital but seems to be getting more frequent.  She mentions that the stools are like a runny glob of gooey tarry.      Questions him needing labs again?     Last seen CT APRN on 04-       Advised that CT APRN is out of the office but will check with another one of the APRN's to see what they recommend.       Routed to NICOLE SOSA   (CT APRN Patient)

## 2025-04-28 NOTE — TELEPHONE ENCOUNTER
04- Called and LVM for [place] Spouse that lab order has been place.    Called [place] Spouse to see if she got my message about the Lab order.  She stated no she didn't get the message.  Advised that a lab order has been place for patient to come get his lab drawn.     Oked per [place]

## 2025-04-29 ENCOUNTER — APPOINTMENT (OUTPATIENT)
Dept: CT IMAGING | Age: 70
DRG: 378 | End: 2025-04-29
Payer: MEDICARE

## 2025-04-29 ENCOUNTER — RESULTS FOLLOW-UP (OUTPATIENT)
Dept: GASTROENTEROLOGY | Age: 70
End: 2025-04-29

## 2025-04-29 ENCOUNTER — HOSPITAL ENCOUNTER (INPATIENT)
Age: 70
LOS: 4 days | Discharge: HOME HEALTH | DRG: 378 | End: 2025-05-03
Attending: HOSPITALIST | Admitting: HOSPITALIST
Payer: MEDICARE

## 2025-04-29 DIAGNOSIS — K76.9 LIVER DISEASE: ICD-10-CM

## 2025-04-29 DIAGNOSIS — K92.2 GASTROINTESTINAL HEMORRHAGE, UNSPECIFIED GASTROINTESTINAL HEMORRHAGE TYPE: Primary | ICD-10-CM

## 2025-04-29 DIAGNOSIS — K70.30 ALCOHOLIC CIRRHOSIS OF LIVER WITHOUT ASCITES (HCC): ICD-10-CM

## 2025-04-29 DIAGNOSIS — D64.9 LOW HEMOGLOBIN: ICD-10-CM

## 2025-04-29 DIAGNOSIS — Z51.5 PALLIATIVE CARE PATIENT: ICD-10-CM

## 2025-04-29 PROBLEM — I48.91 ATRIAL FIBRILLATION (HCC): Status: ACTIVE | Noted: 2025-04-29

## 2025-04-29 LAB
ABO/RH: NORMAL
ACANTHOCYTES BLD QL SMEAR: ABNORMAL
ALBUMIN SERPL-MCNC: 2.9 G/DL (ref 3.5–5.2)
ALP SERPL-CCNC: 99 U/L (ref 40–129)
ALT SERPL-CCNC: 27 U/L (ref 10–50)
ANION GAP SERPL CALCULATED.3IONS-SCNC: 12 MMOL/L (ref 8–16)
ANISOCYTOSIS BLD QL SMEAR: ABNORMAL
ANTIBODY SCREEN: NORMAL
AST SERPL-CCNC: 45 U/L (ref 10–50)
BASOPHILS # BLD: 0 K/UL (ref 0–0.2)
BASOPHILS NFR BLD: 0 % (ref 0–1)
BILIRUB SERPL-MCNC: 2.9 MG/DL (ref 0.2–1.2)
BLOOD BANK DISPENSE STATUS: NORMAL
BLOOD BANK DISPENSE STATUS: NORMAL
BLOOD BANK PRODUCT CODE: NORMAL
BLOOD BANK PRODUCT CODE: NORMAL
BPU ID: NORMAL
BPU ID: NORMAL
BUN SERPL-MCNC: 20 MG/DL (ref 8–23)
BURR CELLS BLD QL SMEAR: ABNORMAL
CALCIUM SERPL-MCNC: 8.7 MG/DL (ref 8.8–10.2)
CHLORIDE SERPL-SCNC: 104 MMOL/L (ref 98–107)
CO2 SERPL-SCNC: 18 MMOL/L (ref 22–29)
CREAT SERPL-MCNC: 0.9 MG/DL (ref 0.7–1.2)
DESCRIPTION BLOOD BANK: NORMAL
DESCRIPTION BLOOD BANK: NORMAL
EKG P AXIS: 26 DEGREES
EKG P-R INTERVAL: 162 MS
EKG Q-T INTERVAL: 440 MS
EKG QRS DURATION: 136 MS
EKG QTC CALCULATION (BAZETT): 471 MS
EKG T AXIS: 95 DEGREES
EOSINOPHIL # BLD: 0.3 K/UL (ref 0–0.6)
EOSINOPHIL NFR BLD: 5 % (ref 0–5)
ERYTHROCYTE [DISTWIDTH] IN BLOOD BY AUTOMATED COUNT: 22.4 % (ref 11.5–14.5)
GLUCOSE SERPL-MCNC: 171 MG/DL (ref 70–99)
HCT VFR BLD AUTO: 15 % (ref 42–52)
HCT VFR BLD AUTO: 17.6 % (ref 42–52)
HCT VFR BLD AUTO: 18.3 % (ref 42–52)
HGB BLD-MCNC: 4.3 G/DL (ref 14–18)
HGB BLD-MCNC: 5.4 G/DL (ref 14–18)
HGB BLD-MCNC: 5.5 G/DL (ref 14–18)
HYPOCHROMIA BLD QL SMEAR: ABNORMAL
IMM GRANULOCYTES # BLD: 0.1 K/UL
INR PPP: 2.34 (ref 0.88–1.18)
IRON SATN MFR SERPL: 12 % (ref 20–50)
IRON SERPL-MCNC: 34 UG/DL (ref 59–158)
LYMPHOCYTES # BLD: 0.9 K/UL (ref 1.1–4.5)
LYMPHOCYTES NFR BLD: 15.7 % (ref 20–40)
MACROCYTES BLD QL SMEAR: ABNORMAL
MAGNESIUM SERPL-MCNC: 1.9 MG/DL (ref 1.6–2.4)
MCH RBC QN AUTO: 31.2 PG (ref 27–31)
MCHC RBC AUTO-ENTMCNC: 28.7 G/DL (ref 33–37)
MCV RBC AUTO: 108.7 FL (ref 80–94)
MONOCYTES # BLD: 0.8 K/UL (ref 0–0.9)
MONOCYTES NFR BLD: 13.9 % (ref 0–10)
NEUTROPHILS # BLD: 3.8 K/UL (ref 1.5–7.5)
NEUTS SEG NFR BLD: 64.5 % (ref 50–65)
PLATELET # BLD AUTO: 56 K/UL (ref 130–400)
PLATELET SLIDE REVIEW: ABNORMAL
PMV BLD AUTO: 12.4 FL (ref 9.4–12.4)
POIKILOCYTOSIS BLD QL SMEAR: ABNORMAL
POTASSIUM SERPL-SCNC: 4.3 MMOL/L (ref 3.5–5.1)
PROT SERPL-MCNC: 4.8 G/DL (ref 6.4–8.3)
PROTHROMBIN TIME: 25.1 SEC (ref 12–14.6)
RBC # BLD AUTO: 1.38 M/UL (ref 4.7–6.1)
SCHISTOCYTES BLD QL SMEAR: ABNORMAL
SODIUM SERPL-SCNC: 134 MMOL/L (ref 136–145)
TIBC SERPL-MCNC: 289 UG/DL (ref 250–400)
WBC # BLD AUTO: 5.8 K/UL (ref 4.8–10.8)

## 2025-04-29 PROCEDURE — 99285 EMERGENCY DEPT VISIT HI MDM: CPT

## 2025-04-29 PROCEDURE — P9016 RBC LEUKOCYTES REDUCED: HCPCS

## 2025-04-29 PROCEDURE — 86850 RBC ANTIBODY SCREEN: CPT

## 2025-04-29 PROCEDURE — 93005 ELECTROCARDIOGRAM TRACING: CPT

## 2025-04-29 PROCEDURE — 74177 CT ABD & PELVIS W/CONTRAST: CPT

## 2025-04-29 PROCEDURE — 83540 ASSAY OF IRON: CPT

## 2025-04-29 PROCEDURE — 85025 COMPLETE CBC W/AUTO DIFF WBC: CPT

## 2025-04-29 PROCEDURE — 2700000000 HC OXYGEN THERAPY PER DAY

## 2025-04-29 PROCEDURE — 86900 BLOOD TYPING SEROLOGIC ABO: CPT

## 2025-04-29 PROCEDURE — 86901 BLOOD TYPING SEROLOGIC RH(D): CPT

## 2025-04-29 PROCEDURE — 85014 HEMATOCRIT: CPT

## 2025-04-29 PROCEDURE — 93010 ELECTROCARDIOGRAM REPORT: CPT | Performed by: INTERNAL MEDICINE

## 2025-04-29 PROCEDURE — 6360000004 HC RX CONTRAST MEDICATION

## 2025-04-29 PROCEDURE — 83550 IRON BINDING TEST: CPT

## 2025-04-29 PROCEDURE — 80053 COMPREHEN METABOLIC PANEL: CPT

## 2025-04-29 PROCEDURE — 1200000000 HC SEMI PRIVATE

## 2025-04-29 PROCEDURE — P9073 PLATELETS PHERESIS PATH REDU: HCPCS

## 2025-04-29 PROCEDURE — 83735 ASSAY OF MAGNESIUM: CPT

## 2025-04-29 PROCEDURE — 94760 N-INVAS EAR/PLS OXIMETRY 1: CPT

## 2025-04-29 PROCEDURE — 85610 PROTHROMBIN TIME: CPT

## 2025-04-29 PROCEDURE — 2580000003 HC RX 258

## 2025-04-29 PROCEDURE — 36415 COLL VENOUS BLD VENIPUNCTURE: CPT

## 2025-04-29 PROCEDURE — 96374 THER/PROPH/DIAG INJ IV PUSH: CPT

## 2025-04-29 PROCEDURE — 86923 COMPATIBILITY TEST ELECTRIC: CPT

## 2025-04-29 PROCEDURE — 6360000002 HC RX W HCPCS

## 2025-04-29 PROCEDURE — 85018 HEMOGLOBIN: CPT

## 2025-04-29 RX ORDER — SODIUM CHLORIDE 9 MG/ML
INJECTION, SOLUTION INTRAVENOUS PRN
Status: DISCONTINUED | OUTPATIENT
Start: 2025-04-29 | End: 2025-05-03 | Stop reason: HOSPADM

## 2025-04-29 RX ORDER — SUCRALFATE 1 G/1
1 TABLET ORAL 4 TIMES DAILY
Status: DISCONTINUED | OUTPATIENT
Start: 2025-04-29 | End: 2025-05-03 | Stop reason: HOSPADM

## 2025-04-29 RX ORDER — FUROSEMIDE 20 MG/1
20 TABLET ORAL 2 TIMES DAILY
Status: DISCONTINUED | OUTPATIENT
Start: 2025-04-29 | End: 2025-05-03 | Stop reason: HOSPADM

## 2025-04-29 RX ORDER — NADOLOL 20 MG/1
20 TABLET ORAL DAILY
Status: DISCONTINUED | OUTPATIENT
Start: 2025-04-29 | End: 2025-05-03 | Stop reason: HOSPADM

## 2025-04-29 RX ORDER — LACTULOSE 10 G/15ML
10 SOLUTION ORAL 2 TIMES DAILY
Status: DISCONTINUED | OUTPATIENT
Start: 2025-04-29 | End: 2025-05-03 | Stop reason: HOSPADM

## 2025-04-29 RX ORDER — SODIUM CHLORIDE 0.9 % (FLUSH) 0.9 %
5-40 SYRINGE (ML) INJECTION PRN
Status: DISCONTINUED | OUTPATIENT
Start: 2025-04-29 | End: 2025-05-03 | Stop reason: HOSPADM

## 2025-04-29 RX ORDER — LANOLIN ALCOHOL/MO/W.PET/CERES
400 CREAM (GRAM) TOPICAL DAILY
Status: DISCONTINUED | OUTPATIENT
Start: 2025-04-29 | End: 2025-05-03 | Stop reason: HOSPADM

## 2025-04-29 RX ORDER — SODIUM CHLORIDE 0.9 % (FLUSH) 0.9 %
5-40 SYRINGE (ML) INJECTION EVERY 12 HOURS SCHEDULED
Status: DISCONTINUED | OUTPATIENT
Start: 2025-04-29 | End: 2025-05-03 | Stop reason: HOSPADM

## 2025-04-29 RX ORDER — ONDANSETRON 4 MG/1
4 TABLET, ORALLY DISINTEGRATING ORAL EVERY 8 HOURS PRN
Status: DISCONTINUED | OUTPATIENT
Start: 2025-04-29 | End: 2025-05-03 | Stop reason: HOSPADM

## 2025-04-29 RX ORDER — ONDANSETRON 2 MG/ML
4 INJECTION INTRAMUSCULAR; INTRAVENOUS EVERY 6 HOURS PRN
Status: DISCONTINUED | OUTPATIENT
Start: 2025-04-29 | End: 2025-05-03 | Stop reason: HOSPADM

## 2025-04-29 RX ORDER — GAUZE BANDAGE 2" X 2"
100 BANDAGE TOPICAL DAILY
Status: DISCONTINUED | OUTPATIENT
Start: 2025-04-29 | End: 2025-05-03 | Stop reason: HOSPADM

## 2025-04-29 RX ORDER — ZOLPIDEM TARTRATE 5 MG/1
10 TABLET ORAL NIGHTLY PRN
Status: DISCONTINUED | OUTPATIENT
Start: 2025-04-29 | End: 2025-05-03 | Stop reason: HOSPADM

## 2025-04-29 RX ORDER — IOPAMIDOL 755 MG/ML
70 INJECTION, SOLUTION INTRAVASCULAR
Status: COMPLETED | OUTPATIENT
Start: 2025-04-29 | End: 2025-04-29

## 2025-04-29 RX ORDER — SPIRONOLACTONE 50 MG/1
50 TABLET, FILM COATED ORAL DAILY
Status: DISCONTINUED | OUTPATIENT
Start: 2025-04-29 | End: 2025-05-03

## 2025-04-29 RX ADMIN — SODIUM CHLORIDE 8 MG/HR: 9 INJECTION, SOLUTION INTRAVENOUS at 11:36

## 2025-04-29 RX ADMIN — SODIUM CHLORIDE, PRESERVATIVE FREE 80 MG: 5 INJECTION INTRAVENOUS at 11:34

## 2025-04-29 RX ADMIN — IOPAMIDOL 70 ML: 755 INJECTION, SOLUTION INTRAVENOUS at 14:41

## 2025-04-29 ASSESSMENT — ENCOUNTER SYMPTOMS
SHORTNESS OF BREATH: 1
ABDOMINAL PAIN: 0
BLOOD IN STOOL: 1

## 2025-04-29 NOTE — CONSENT
Informed Consent for Blood Component Transfusion Note    I have discussed with the patient the rationale for blood component transfusion; its benefits in treating or preventing fatigue, organ damage, or death; and its risk which includes mild transfusion reactions, rare risk of blood borne infection, or more serious but rare reactions. I have discussed the alternatives to transfusion, including the risk and consequences of not receiving transfusion. The patient had an opportunity to ask questions and had agreed to proceed with transfusion of blood components.    Electronically signed by SHEILA Zee CNP on 5/3/25 at 8:10 AM TATA

## 2025-04-29 NOTE — H&P
[facility]      Hospitalist - History & Physical      PCP: Izabela Robles APRN - CNP    Date of Admission: 4/29/2025    Date of Service: 4/29/2025    Chief Complaint:  GIB, abnormal lab    History Of Present Illness:   This patient is a 70 y.o. male with past medical history of alcoholic cirrhosis, atrial fibrillation, esophageal varices who presents to [facility] ED for evaluation of an abnormal lab.  Patient has history of recurrent GI bleeding, ongoing for the last several months.  He had dark tarry stools on Thursday and Saturday along with fatigue and shortness of breath.  His GI doctor ordered a CBC yesterday and hemoglobin came back at 4.9, he was advised to come to the ER for further evaluation.  In ED, hemoglobin 4.3 and he is being transfused 2 units PRBCs.  Chemistry panel shows normal renal function, total bilirubin 2.9 which is improved from prior.  CBC shows no leukocytosis, hemoglobin 4.3, hematocrit 15.0, platelet 56, INR 2.34.  CT AP shows no acute abnormality, ongoing ascites and small bilateral effusions with new compression fracture of L1 vertebral body without retropulsion, and findings of portal hypertension.  He was initiated on Protonix and Sandostatin gtt. Patient admitted to hospitalist with consult to GI with plans for EGD tomorrow morning. Will need additional PRBCs if ongoing anemia.    Past Medical History:        Diagnosis Date    Abnormal LFTs     Atrial fibrillation (HCC)     per pt; no current cardiologist    Cirrhosis (HCC)     sees [facility]    COVID-19     2 yr ago    History of blood transfusion     Hyperbilirubinemia     Hypertension     Hypothyroidism     no meds at present    Insomnia     Leukopenia     Liver disease     Palliative care patient 01/10/2025    Platelets decreased     chronic    Splenomegaly     Umbilical hernia     and inguinal per pt    Varices, esophageal (HCC)     hx of    Vitamin D deficiency      Past Surgical History:

## 2025-04-29 NOTE — CONSENT
[facility] 4 ONCOLOGY UNIT  eMERGENCY dEPARTMENT eNCOUnter      Pt Name: Ney Ann  MRN: [id number]  Birthdate 1955  Date of evaluation: 4/29/2025  Provider: SHEILA Zee CNP    CHIEF COMPLAINT       Chief Complaint   Patient presents with    Abnormal Lab     Pt here with low HGB 4.9 on OP labs          HISTORY OF PRESENT ILLNESS   (Location/Symptom, Timing/Onset,Context/Setting, Quality, Duration, Modifying Factors, Severity)  Note limiting factors.   Ney Ann is a 70 y.o. male who presents to the emergency department due to abnormal hemoglobin level.  Patient was told that his hemoglobin was 4.9 and told to come to the ER.  Patient denies any chest pain at this time patient reports some mild dyspnea.  Patient endorses melena that has been an ongoing issue patient is currently seeing GI for this, patient has known liver disease with ascites and receives routine paracentesis for this issue.  Patient denies any nausea or vomiting.  Patient endorses generalized fatigue and weakness.  Patient does have a significant medical history of A-fib, cirrhosis, hypertension, hypothyroidism, leukopenia, liver disease, platelets decreased chronically, splenomegaly of, varices.    HPI    NursingNotes were reviewed.    REVIEW OF SYSTEMS    (2-9 systems for level 4, 10 or more for level 5)     Review of Systems   Constitutional:  Positive for fatigue.   All other systems reviewed and are negative.           PAST MEDICALHISTORY     Past Medical History:   Diagnosis Date    Abnormal LFTs     Atrial fibrillation (HCC)     per pt; no current cardiologist    Cirrhosis (HCC)     [facility]    COVID-Juana     2 yr ago    History of blood transfusion     Hyperbilirubinemia     Hypertension     Hypothyroidism     no meds at present    Insomnia     Leukopenia     Liver disease     Palliative care patient 01/10/2025    Palliative care patient 05/01/2025    Platelets decreased     chronic    Splenomegaly     Umbilical  [id number],  Hematology results called to and read back by Lauren Christianson RN ONC SJ,  05/02/2025 03:27, by GARCIA   HEMOGLOBIN AND HEMATOCRIT - Abnormal; Notable for the following components:    Hemoglobin 7.6 (*)     Hematocrit 26.6 (*)     All other components within normal limits    Narrative:     Collection has been rescheduled by ALCIDES at 05/02/2025 11:10 Reason:   Patient in restroom  Collection has been rescheduled by LOLY at 05/02/2025 11:22 Reason:   Failed attempt at venipuncture  Collection has been rescheduled by ALCIDES at 05/02/2025 11:37 Reason:   Failed attempt at venipuncture   HEMOGLOBIN AND HEMATOCRIT - Abnormal; Notable for the following components:    Hemoglobin 8.7 (*)     Hematocrit 28.0 (*)     All other components within normal limits   COMPREHENSIVE METABOLIC PANEL W/ REFLEX TO MG FOR LOW K - Abnormal; Notable for the following components:    Sodium 135 (*)     CO2 21 (*)     Glucose 107 (*)     Calcium 7.5 (*)     Total Protein 4.4 (*)     Albumin 2.5 (*)     Total Bilirubin 3.5 (*)     All other components within normal limits   CBC WITH AUTO DIFFERENTIAL - Abnormal; Notable for the following components:    WBC 2.9 (*)     RBC 2.50 (*)     Hemoglobin 7.5 (*)     Hematocrit 24.9 (*)     MCV 99.6 (*)     MCHC 30.1 (*)     RDW 20.5 (*)     Platelets 31 (*)     Monocytes % 16.4 (*)     Lymphocytes Absolute 0.7 (*)     Anisocytosis 2+ (*)     Macrocytes 1+ (*)     Hypochromia 1+ (*)     Fermín Cells Occasional (*)     Tear Drop Cells Occasional (*)     All other components within normal limits   MAGNESIUM   APTT    Narrative:     Collection has been rescheduled by JOLENE at 04/30/2025 01:42 Reason: Get   at 0600 per rn miki acharya   TYPE AND SCREEN   PREPARE RBC (CROSSMATCH)   PREPARE RBC (CROSSMATCH)   PREPARE RBC (CROSSMATCH)   PREPARE PLATELETS       All other labs were within normal range or not returned as of this dictation.    EMERGENCY DEPARTMENT COURSE and DIFFERENTIAL DIAGNOSIS/MDM:

## 2025-04-29 NOTE — ED NOTES
ED TO INPATIENT SBAR HANDOFF    Patient Name: Ney Ann   : 1955  70 y.o.   Family/Caregiver Present: Yes  Code Status Order: Prior    C-SSRS: Risk of Suicide: No Risk  Sitter No  Restraints:         Situation  Chief Complaint:   Chief Complaint   Patient presents with    Abnormal Lab     Pt here with low HGB 4.9 on OP labs      Patient Diagnosis: GIB (gastrointestinal bleeding) [K92.2]     Brief Description of Patient's Condition: Pt presented to ED stating he had labs drawn yesterday and they called him stating his hgb was low. Labs in ED today showed hgb of 4.3. He was given 2 units of PRBC. Pt stated he has been dealing with this off and on but has never gotten a clear answer on what is causing it. He had a scope done several months ago and they saw some tissue that was oozing in the esophagus. His BP has been soft but he has not been symptomatic. He has been alert and oriented while in ED. Wife has been at bedside with him. He currently has protonix infusing. He has an 18G in the left AC and a 22 in the right wrist/forearm.     Mental Status: oriented, alert, coherent, logical, thought processes intact, and able to concentrate and follow conversation  Arrived from: home    Imaging:   CT ABDOMEN PELVIS W IV CONTRAST Additional Contrast? None   Final Result   1.  No acute abnormality of the abdomen and pelvis. No indication of hemorrhage.   2. . Continued ascites which appears to extend into the small umbilical hernia and the right scrotum, similar to the previous exam.   3. . New small bilateral pleural effusions with mild associated atelectasis, left greater than right.   4. . New compression fracture of the L1 vertebral body without retropulsion.   5. . Continued cirrhosis, splenomegaly and findings consists with portal hypertension.   6. . Continued nonobstructing left renal calculi.   7. . Other chronic and non emergent findings as above.        All CT scans are performed using dose optimization

## 2025-04-29 NOTE — ED PROVIDER NOTES
Pt Name: Ney Ann  MRN: [id number]  Birthdate 1955  Date of evaluation: 4/29/2025  Provider: SHEILA Zee CNP     CHIEF COMPLAINT             Chief Complaint   Patient presents with    Abnormal Lab       Pt here with low HGB 4.9 on OP labs             HISTORY OF PRESENT ILLNESS   (Location/Symptom, Timing/Onset,Context/Setting, Quality, Duration, Modifying Factors, Severity)  Note limiting factors.   Ney Ann is a 70 y.o. male who presents to the emergency department due to abnormal hemoglobin level.  Patient was told that his hemoglobin was 4.9 and told to come to the ER.  Patient denies any chest pain at this time patient reports some mild dyspnea.  Patient endorses melena that has been an ongoing issue patient is currently seeing GI for this, patient has known liver disease with ascites and receives routine paracentesis for this issue.  Patient denies any nausea or vomiting.  Patient endorses generalized fatigue and weakness.  Patient does have a significant medical history of A-fib, cirrhosis, hypertension, hypothyroidism, leukopenia, liver disease, platelets decreased chronically, splenomegaly of, varices.     HPI     NursingNotes were reviewed.     REVIEW OF SYSTEMS    (2-9 systems for level 4, 10 or more for level 5)      Review of Systems   Constitutional:  Positive for fatigue.   All other systems reviewed and are negative.              PAST MEDICALHISTORY      Past Medical History        Past Medical History:   Diagnosis Date    Abnormal LFTs      Atrial fibrillation (HCC)       per pt; no current cardiologist    Cirrhosis (HCC)       [facility]    COVID-19       2 yr ago    History of blood transfusion      Hyperbilirubinemia      Hypertension      Hypothyroidism       no meds at present    Insomnia      Leukopenia      Liver disease      Palliative care patient 01/10/2025    Platelets decreased       chronic    Splenomegaly      Umbilical hernia       and inguinal per

## 2025-04-30 PROBLEM — R45.851 SUICIDAL IDEATIONS: Status: ACTIVE | Noted: 2025-04-30

## 2025-04-30 LAB
ALBUMIN SERPL-MCNC: 2.6 G/DL (ref 3.5–5.2)
ALP SERPL-CCNC: 69 U/L (ref 40–129)
ALT SERPL-CCNC: 23 U/L (ref 10–50)
ANION GAP SERPL CALCULATED.3IONS-SCNC: 6 MMOL/L (ref 8–16)
APTT PPP: 36.2 SEC (ref 26–36.2)
AST SERPL-CCNC: 33 U/L (ref 10–50)
BASOPHILS # BLD: 0 K/UL (ref 0–0.2)
BASOPHILS NFR BLD: 0.3 % (ref 0–1)
BILIRUB SERPL-MCNC: 5.8 MG/DL (ref 0.2–1.2)
BLOOD BANK DISPENSE STATUS: NORMAL
BLOOD BANK DISPENSE STATUS: NORMAL
BLOOD BANK PRODUCT CODE: NORMAL
BLOOD BANK PRODUCT CODE: NORMAL
BPU ID: NORMAL
BPU ID: NORMAL
BUN SERPL-MCNC: 15 MG/DL (ref 8–23)
CALCIUM SERPL-MCNC: 8.1 MG/DL (ref 8.8–10.2)
CHLORIDE SERPL-SCNC: 107 MMOL/L (ref 98–107)
CO2 SERPL-SCNC: 22 MMOL/L (ref 22–29)
CREAT SERPL-MCNC: 0.8 MG/DL (ref 0.7–1.2)
DESCRIPTION BLOOD BANK: NORMAL
DESCRIPTION BLOOD BANK: NORMAL
EOSINOPHIL # BLD: 0.2 K/UL (ref 0–0.6)
EOSINOPHIL NFR BLD: 6.1 % (ref 0–5)
ERYTHROCYTE [DISTWIDTH] IN BLOOD BY AUTOMATED COUNT: 21.6 % (ref 11.5–14.5)
GLUCOSE SERPL-MCNC: 102 MG/DL (ref 70–99)
HCT VFR BLD AUTO: 20.7 % (ref 42–52)
HCT VFR BLD AUTO: 20.9 % (ref 42–52)
HCT VFR BLD AUTO: 22.9 % (ref 42–52)
HCT VFR BLD AUTO: 24.9 % (ref 42–52)
HCT VFR BLD AUTO: 25.8 % (ref 42–52)
HGB BLD-MCNC: 6.3 G/DL (ref 14–18)
HGB BLD-MCNC: 6.6 G/DL (ref 14–18)
HGB BLD-MCNC: 7.2 G/DL (ref 14–18)
HGB BLD-MCNC: 7.6 G/DL (ref 14–18)
HGB BLD-MCNC: 7.7 G/DL (ref 14–18)
IMM GRANULOCYTES # BLD: 0 K/UL
INR PPP: 2.19 (ref 0.88–1.18)
LYMPHOCYTES # BLD: 0.8 K/UL (ref 1.1–4.5)
LYMPHOCYTES NFR BLD: 23.6 % (ref 20–40)
MCH RBC QN AUTO: 29.6 PG (ref 27–31)
MCHC RBC AUTO-ENTMCNC: 30.4 G/DL (ref 33–37)
MCV RBC AUTO: 97.2 FL (ref 80–94)
MONOCYTES # BLD: 0.5 K/UL (ref 0–0.9)
MONOCYTES NFR BLD: 13.6 % (ref 0–10)
NEUTROPHILS # BLD: 1.8 K/UL (ref 1.5–7.5)
NEUTS SEG NFR BLD: 55.8 % (ref 50–65)
PLATELET # BLD AUTO: 32 K/UL (ref 130–400)
PMV BLD AUTO: 12.1 FL (ref 9.4–12.4)
POTASSIUM SERPL-SCNC: 3.9 MMOL/L (ref 3.5–5)
PROT SERPL-MCNC: 4.2 G/DL (ref 6.4–8.3)
PROTHROMBIN TIME: 24 SEC (ref 12–14.6)
RBC # BLD AUTO: 2.13 M/UL (ref 4.7–6.1)
SODIUM SERPL-SCNC: 135 MMOL/L (ref 136–145)
WBC # BLD AUTO: 3.3 K/UL (ref 4.8–10.8)

## 2025-04-30 PROCEDURE — 85014 HEMATOCRIT: CPT

## 2025-04-30 PROCEDURE — 6370000000 HC RX 637 (ALT 250 FOR IP)

## 2025-04-30 PROCEDURE — 2580000003 HC RX 258

## 2025-04-30 PROCEDURE — 36415 COLL VENOUS BLD VENIPUNCTURE: CPT

## 2025-04-30 PROCEDURE — 2700000000 HC OXYGEN THERAPY PER DAY

## 2025-04-30 PROCEDURE — 30233N1 TRANSFUSION OF NONAUTOLOGOUS RED BLOOD CELLS INTO PERIPHERAL VEIN, PERCUTANEOUS APPROACH: ICD-10-PCS | Performed by: INTERNAL MEDICINE

## 2025-04-30 PROCEDURE — C1751 CATH, INF, PER/CENT/MIDLINE: HCPCS

## 2025-04-30 PROCEDURE — 94760 N-INVAS EAR/PLS OXIMETRY 1: CPT

## 2025-04-30 PROCEDURE — 85018 HEMOGLOBIN: CPT

## 2025-04-30 PROCEDURE — 6360000002 HC RX W HCPCS: Performed by: NURSE PRACTITIONER

## 2025-04-30 PROCEDURE — 6360000002 HC RX W HCPCS

## 2025-04-30 PROCEDURE — 99222 1ST HOSP IP/OBS MODERATE 55: CPT | Performed by: INTERNAL MEDICINE

## 2025-04-30 PROCEDURE — 85025 COMPLETE CBC W/AUTO DIFF WBC: CPT

## 2025-04-30 PROCEDURE — 85730 THROMBOPLASTIN TIME PARTIAL: CPT

## 2025-04-30 PROCEDURE — 36430 TRANSFUSION BLD/BLD COMPNT: CPT

## 2025-04-30 PROCEDURE — 2580000003 HC RX 258: Performed by: NURSE PRACTITIONER

## 2025-04-30 PROCEDURE — 1200000000 HC SEMI PRIVATE

## 2025-04-30 PROCEDURE — 2709999900 HC NON-CHARGEABLE SUPPLY

## 2025-04-30 PROCEDURE — 85610 PROTHROMBIN TIME: CPT

## 2025-04-30 PROCEDURE — 76937 US GUIDE VASCULAR ACCESS: CPT

## 2025-04-30 PROCEDURE — 80053 COMPREHEN METABOLIC PANEL: CPT

## 2025-04-30 RX ORDER — SODIUM CHLORIDE 9 MG/ML
INJECTION, SOLUTION INTRAVENOUS PRN
Status: DISCONTINUED | OUTPATIENT
Start: 2025-04-30 | End: 2025-05-03 | Stop reason: HOSPADM

## 2025-04-30 RX ADMIN — SUCRALFATE 1 G: 1 TABLET ORAL at 17:39

## 2025-04-30 RX ADMIN — Medication 500 MG: at 20:26

## 2025-04-30 RX ADMIN — OCTREOTIDE ACETATE 50 MCG/HR: 500 INJECTION, SOLUTION INTRAVENOUS; SUBCUTANEOUS at 05:28

## 2025-04-30 RX ADMIN — ZOLPIDEM TARTRATE 10 MG: 5 TABLET ORAL at 20:25

## 2025-04-30 RX ADMIN — SODIUM CHLORIDE 8 MG/HR: 9 INJECTION, SOLUTION INTRAVENOUS at 19:23

## 2025-04-30 RX ADMIN — OCTREOTIDE ACETATE 50 MCG/HR: 500 INJECTION, SOLUTION INTRAVENOUS; SUBCUTANEOUS at 19:24

## 2025-04-30 RX ADMIN — SUCRALFATE 1 G: 1 TABLET ORAL at 20:25

## 2025-04-30 RX ADMIN — RIFAXIMIN 600 MG: 200 TABLET ORAL at 20:26

## 2025-04-30 RX ADMIN — RIFAXIMIN 600 MG: 200 TABLET ORAL at 17:39

## 2025-04-30 RX ADMIN — FUROSEMIDE 20 MG: 20 TABLET ORAL at 17:39

## 2025-04-30 RX ADMIN — LACTULOSE 10 G: 20 SOLUTION ORAL at 20:25

## 2025-04-30 ASSESSMENT — ENCOUNTER SYMPTOMS
SHORTNESS OF BREATH: 0
CONSTIPATION: 0
WHEEZING: 0
COLOR CHANGE: 0
COUGH: 0
NAUSEA: 0
VOMITING: 0
ABDOMINAL DISTENTION: 0
ABDOMINAL PAIN: 0
DIARRHEA: 1
BLOOD IN STOOL: 0

## 2025-04-30 NOTE — PROGRESS NOTES
Spiritual Health History and Assessment/Progress Note  [facility]    (P) Rituals, Rites and Sacraments, Emotional distress,  ,      Name: Ney Ann MRN: [id number]    Age: 70 y.o.     Sex: male   Language: English   Baptism: Restorationism   GIB (gastrointestinal bleeding)     Date: 4/30/2025            Total Time Calculated: (P) 1 min              Spiritual Assessment began in [facility] 4 ONCOLOGY UNIT        Referral/Consult From: (P) Rounding (Restorationism)   Encounter Overview/Reason: (P) Rituals, Rites and Sacraments  Service Provided For: (P) Patient not available    Cleo, Belief, Meaning:   Patient unable to assess at this time  Family/Friends       Importance and Influence:  Patient unable to assess at this time  Family/Friends     Community:  Patient   Family/Friends     Assessment and Plan of Care:     Patient Interventions include: Provided sacramental/Mandaeism ritual  Family/Friends Interventions include:     Patient Plan of Care: Other: To follow  Family/Friends Plan of Care:     Electronically signed by TRIPP Smith on 4/30/2025 at 1:32 PM

## 2025-04-30 NOTE — CONSENT
Informed Consent for Blood Component Transfusion Note    I have discussed with the patient the rationale for blood component transfusion; its benefits in treating or preventing fatigue, organ damage, or death; and its risk which includes mild transfusion reactions, rare risk of blood borne infection, or more serious but rare reactions. I have discussed the alternatives to transfusion, including the risk and consequences of not receiving transfusion. The patient had an opportunity to ask questions and had agreed to proceed with transfusion of blood components.    Electronically signed by SHEILA Jaimes CNP on 4/30/25 at 2:31 PM SERJIOT

## 2025-04-30 NOTE — CONSULTS
[facility] Vascular Access Team:  PowerGlide Midline/Extended Dwell IV Catheter  Insertion Procedure Note      Patient: Ney Ann  YOB: 1955   MRN: [id number]  Room: [address] [place] [address]     Attending Physician - Melissa Orlando MD  Ordering Physician - Melissa Orlando MD    Diagnosis:   GIB (gastrointestinal bleeding) [K92.2]  Low hemoglobin [D64.9]  Gastrointestinal hemorrhage, unspecified gastrointestinal hemorrhage type [K92.2]       Procedure(s):   Insertion of a 20 gauge 8 cm Single Lumen PowerGlide Catheter    Indication(s):  Poor Venous Access (Multiple incompatible IV infusions)    Date of Procedure: 4/30/2025     Performed by: Jaylen Romero RN    Complications: None      Findings:   1. Successful insertion of PowerGlide Catheter.  2. PowerGlide is ready to be used. Please change tubing prior to using the new line. Make sure to label, date and use alcohol caps on new tubing and alcohol caps on unused ports.   3. Patient may be changed to a unit draw for lab work.        Detailed Description of Procedure:   The Left Brachial vein was visualized using the ultrasound and deemed a suitable vessel. The area was prepped with Chlorhexidine and draped in sterile fashion using partial barrier draping. The vein was accessed using US guidance. The 20 gauge 8 cm Single Lumen PowerGlide catheter was advanced into the vein using ANTT. Approximately 10 cm exposed. All lumens had brisk blood return and was flushed with 10 cc of NS. The catheter was secured using a securement device. A 3M CHG Tegaderm was placed over the securement device and insertion site. Dressing was dated and initialed with external measurement marked. Patient did tolerate procedure well.    Electronically signed by Jaylen Romero RN on 4/30/2025 at 11:28 AM

## 2025-04-30 NOTE — ACP (ADVANCE CARE PLANNING)
Advance Care Planning     Advance Care Planning Inpatient Note  [facility] Department    Today's Date: 4/30/2025  Unit: [facility] 4 ONCOLOGY UNIT    Received request from rounding.  Upon review of chart and communication with care team, patient's decision making abilities are not in question.. Patient was/were present in the room during visit.    Goals of ACP Conversation:  Facilitate a discussion related to patient's goals of care as they align with the patient's values and beliefs.    Health Care Decision Makers:       Primary Decision Maker: JAVAD ANN - Spouse - [phone]    Secondary Decision Maker: Razia Shah - Brother/Sister - [phone]  Summary:  Verified Healthcare Decision Maker     Advance Care Planning Documents (Patient Wishes):  None     Assessment:  Mr. Ann stated that there is no change in decision makers. Which is consistent with the LW on file.    Interventions:  Encouraged ongoing ACP conversation with future decision makers and loved ones    Care Preferences Communicated:   No    Outcomes/Plan:  ACP Discussion: Completed    Electronically signed by Chaplain Hannah Mai on 4/30/2025 at 10:07 AM

## 2025-04-30 NOTE — CARE COORDINATION
04/30/25 0643   Readmission Assessment   Number of Days since last admission? 8-30 days   Previous Disposition Home with Family   Who is being Interviewed   (from chart)   What was the patient's/caregiver's perception as to why they think they needed to return back to the hospital?   (Abnormal Hemoglobin Labs)   Did you visit your Primary Care Physician after you left the hospital, before you returned this time? Yes   Did you see a specialist, such as Cardiac, Pulmonary, Orthopedic Physician, etc. after you left the hospital? No   Who advised the patient to return to the hospital? Physician's Nurse/Office staff;Physician   Does the patient report anything that got in the way of taking their medications? No   In our efforts to provide the best possible care to you and others like you, can you think of anything that we could have done to help you after you left the hospital the first time, so that you might not have needed to return so soon? Other (Comment)  (nothing noted in chart)     Electronically signed by Rachana Brooks on 4/30/2025 at 6:54 AM

## 2025-04-30 NOTE — PROGRESS NOTES
[facility]      [facility]    Patient:  Ney Ann  YOB: 1955  Date of Service: 4/30/2025  MRN: [id number]   Acct: [id number]   Primary Care Physician: Izabela Robles, SHEILA - CNP  Advance Directive: Full Code  Admit Date: 4/29/2025       Hospital Day: 1    Portions of this note have been copied forward, however, updated to reflect the most current clinical status of this patient.     CHIEF COMPLAINT GIB, Abnormal labs    SUBJECTIVE:  Mr. Ann was resting in bed this morning. Denies shortness of breath or chest pain.  Denies lightheadedness or dizziness.  Denies nausea or vomiting.  Denies abdominal pain. Expressed intermittent thoughts of suicidal ideations, does not currently have plans. Reportedly told spiritual care earlier \"He would want to shot himself\", but his wife has locked up all the guns at home.       CUMULATIVE HOSPITAL COURSE:   The patient is a 70 y.o. male with PMH of alcoholic cirrhosis, esophageal varices, atrial fibrillation, hypertension, and hypothyroidism who presented to [facility] ED for evaluation abnormal labs. Has history of recurrent GI bleeding, ongoing for the last several months. He had dark tarry stools associated with fatigue and shortness of breath. His GI doctor had ordered labs and was found to have profound anemia with Hgb 4.9 and sent to ED for further evaluation. Workup in ED revealed  hemoglobin 4.3 and he is being transfused 2 units PRBCs.  Chemistry panel shows normal renal function, total bilirubin 2.9 which is improved from prior.  CBC shows no leukocytosis, hemoglobin 4.3, hematocrit 15.0, platelet 56, INR 2.34.  CT AP shows no acute abnormality, ongoing ascites and small bilateral effusions with new compression fracture of L1 vertebral body without retropulsion, and findings of portal hypertension.  He was initiated on Protonix and Sandostatin gtt. Patient was admitted to hospital medicine for further evaluation with GI consultation. Hgb 6.6

## 2025-04-30 NOTE — CONSULTS
[facility] Vascular Access Team:  PowerGlide Midline/Extended Dwell IV Catheter  Insertion Procedure Note      Patient: Ney Ann  YOB: 1955   MRN: [id number]  Room: [address] [place] [address]     Attending Physician - Melissa Orlando MD  Ordering Physician - Melissa Orlando MD    Diagnosis:   GIB (gastrointestinal bleeding) [K92.2]  Low hemoglobin [D64.9]  Gastrointestinal hemorrhage, unspecified gastrointestinal hemorrhage type [K92.2]       Procedure(s):   Insertion of a 20 gauge 10 cm Single Lumen PowerGlide Catheter    Indication(s):  Poor Venous Access (Multiple incompatible IV infusions)    Date of Procedure: 4/30/2025     Performed by: Jaylen Romero RN    Complications: None      Findings:   1. Successful insertion of PowerGlide Catheter.  2. PowerGlide is ready to be used. Please change tubing prior to using the new line. Make sure to label, date and use alcohol caps on new tubing and alcohol caps on unused ports.   3. Patient may be changed to a unit draw for lab work.        Detailed Description of Procedure:   The Right Basilic vein was visualized using the ultrasound and deemed a suitable vessel. The area was prepped with Chlorhexidine and draped in sterile fashion using partial barrier draping. The vein was accessed using US guidance. The 20 gauge 10 cm Single Lumen PowerGlide catheter was advanced into the vein using ANTT. Approximately 0 cm exposed. All lumens had brisk blood return and was flushed with 10 cc of NS. The catheter was secured using a securement device. A 3M CHG Tegaderm was placed over the securement device and insertion site. Dressing was dated and initialed with external measurement marked. Patient did tolerate procedure well.    Electronically signed by Jaylen Romero RN on 4/30/2025 at 11:27 AM

## 2025-04-30 NOTE — CONSULTS
Pt Name: Ney Ann  MRN: [id number]  [id number]  YOB: 1955  Admit Date: 4/29/2025 10:40 AM  Date of evaluation: 4/30/2025  Primary Care Physician: Izabela Robles, APRN - [place]   [id number]       Requesting Provider: Dr. Melissa Orlando    GI Consult    Indication: Anemia.  Cirrhosis of liver.    History:  The patient is a 70 y.o. male admitted to the hospital for low hemoglobin hematocrit.  I got a call from the lab with a critical value of the patient day before yesterday.  I called and spoke to his family member and advised him to come to the hospital.  However, patient decided to come yesterday.  He required 3 units of blood as his hemoglobin admission was 4.9 and is still is 6.6.  He is suffering from cirrhosis liver secondary to alcohol abuse which he currently not drinking.  He claims that his bowel movements are irregular in color.  He is noticed off-and-on dark stool followed by normal bowel movement.  He denies any other GI symptoms.  No heartburns or regurgitation.  No dysphagia odynophagia.  No abdominal pain or cramping.  No gas, bloating or distention.  No hematemesis.  No fever or chills.  No chest pain or palpitation.  His last endoscopy examination was in January and noted to have moderately severe portal hypertensive gastropathy and small esophageal varices which were not ligated.      Past Medical History:        Diagnosis Date    Abnormal LFTs     Atrial fibrillation (HCC)     per pt; no current cardiologist    Cirrhosis (HCC)     [facility]    COVID-19     2 yr ago    History of blood transfusion     Hyperbilirubinemia     Hypertension     Hypothyroidism     no meds at present    Insomnia     Leukopenia     Liver disease     Palliative care patient 01/10/2025    Platelets decreased     chronic    Splenomegaly     Umbilical hernia     and inguinal per pt    Varices, esophageal (HCC)     hx of    Vitamin D deficiency      Past Surgical History:       Pt provided verbal consent.

## 2025-04-30 NOTE — PROGRESS NOTES
Spiritual Health History and Assessment/Progress Note  [facility]    (P) Initial Encounter, (P) Emotional distress,  ,      Name: Ney Ann MRN: [id number]    Age: 70 y.o.     Sex: male   Language: English   Anglican: Restorationism   GIB (gastrointestinal bleeding)     Date: 4/30/2025                           Spiritual Assessment began in [facility] 4 ONCOLOGY UNIT        Referral/Consult From: (P) Rounding   Encounter Overview/Reason: (P) Initial Encounter  Service Provided For: (P) Patient    Cleo, Belief, Meaning:   Patient identifies as spiritual, said his wife is very Adventist and prayed a lot. Reported that he has been delay medical attention. Today he said sometimes \"I just want to get ride of my self\". Communicated concerns of self harm to the medical team.  Family/Friends No family/friends present      Importance and Influence:  Patient has spiritual/personal beliefs that influence decisions regarding their health  Family/Friends No family/friends present    Community:  Patient feels well-supported. Support system includes: Spouse/Partner and Extended family  Family/Friends No family/friends present    Assessment and Plan of Care:     Patient Interventions include: Facilitated expression of thoughts and feelings, Explored spiritual coping/struggle/distress, Affirmed coping skills/support systems, and Assisted in Advance Care Planning conversation  Family/Friends Interventions include: No family/friends present    Patient Plan of Care: Spiritual Care available upon further referral  Family/Friends Plan of Care: No family/friends present    Electronically signed by Chaplain Hannah Mai on 4/30/2025 at 9:58 AM

## 2025-04-30 NOTE — PROGRESS NOTES
4 Eyes Skin Assessment     NAME:  Ney Ann  YOB: 1955  MEDICAL RECORD NUMBER:  [id number]    The patient is being assessed for  Admission    I agree that at least one RN has performed a thorough Head to Toe Skin Assessment on the patient. ALL assessment sites listed below have been assessed.      Areas assessed by both nurses:    Head, Face, Ears, Shoulders, Back, Chest, Arms, Elbows, Hands, Sacrum. Buttock, Coccyx, Ischium, and Legs. Feet and Heels        Does the Patient have a Wound? No noted wound(s)       Noah Prevention initiated by RN: No  Wound Care Orders initiated by RN: No    Pressure Injury (Stage 3,4, Unstageable, DTI, NWPT, and Complex wounds) if present, place Wound referral order by RN under : No    New Ostomies, if present place, Ostomy referral order under : No     Nurse 1 eSignature: Electronically signed by Juana Estrada RN on 4/29/25 at 8:44 PM CDT    **SHARE this note so that the co-signing nurse can place an eSignature**    Nurse 2 eSignature: Electronically signed by Trina Carter RN on 4/29/25 at 9:55 PM CDT

## 2025-04-30 NOTE — PROGRESS NOTES
Ney Ann arrived to room # 424.   Presented with: GIB  Mental Status: Patient is oriented and alert.   Vitals:    04/29/25 2008   BP: (!) 94/56   Pulse: 62   Resp: 18   Temp: 98.2 °F (36.8 °C)   SpO2: 98%     Patient safety contract and falls prevention contract reviewed with patient.  Oriented Patient to room.  Call light within reach. Yes.  Needs, issues or concerns expressed at this time: no.      Electronically signed by Juana Estrada RN on 4/29/2025 at 8:29 PM

## 2025-04-30 NOTE — CARE COORDINATION
Case Management Assessment  Initial Evaluation    Date/Time of Evaluation: 4/30/2025 9:47 AM  Assessment Completed by: Chayo Brower    If patient is discharged prior to next notation, then this note serves as note for discharge by case management.    Patient Name: Ney Leblanc                   YOB: 1955  Diagnosis: GIB (gastrointestinal bleeding) [K92.2]  Low hemoglobin [D64.9]  Gastrointestinal hemorrhage, unspecified gastrointestinal hemorrhage type [K92.2]                   Date / Time: 4/29/2025 10:40 AM    Patient Admission Status: Inpatient   Readmission Risk (Low < 19, Mod (19-27), High > 27): Readmission Risk Score: 22.2    Current PCP: Izabela Robles, SHEILA - CNP  PCP verified by CM? (P) Yes    Chart Reviewed: Yes      History Provided by: (P) Patient  Patient Orientation: (P) Alert and Oriented    Patient Cognition: (P) Alert    Hospitalization in the last 30 days (Readmission):  Yes    If yes, Readmission Assessment in CM Navigator will be completed.    Advance Directives:      Code Status: Full Code   Patient's Primary Decision Maker is: (P) Legal Next of Kin    Primary Decision Maker: JAVAD LEBLANC - Spouse - [phone]    Secondary Decision Maker: Razia Shah - Brother/Sister - [phone]    Discharge Planning:    Patient lives with: (P) Spouse/Significant Other Type of Home: (P) House  Primary Care Giver: (P) Self  Patient Support Systems include: (P) Spouse/Significant Other, Family Members   Current Financial resources: (P) Medicare  Current community resources: (P) None  Current services prior to admission: (P) Home Care            Current DME:              Type of Home Care services:  (P) PT, OT, Nursing Services    ADLS  Prior functional level: (P) Independent in ADLs/IADLs  Current functional level: (P) Independent in ADLs/IADLs    PT AM-PAC:   /24  OT AM-PAC:   /24    Family can provide assistance at DC: (P) Yes  Would you like Case Management to discuss the  choice of provider and agrees with the discharge plan. Freedom of choice list with basic dialogue that supports the patient's individualized plan of care/goals and shares the quality data associated with the providers was provided to:     Patient Representative Name:       The Patient and/or Patient Representative Agree with the Discharge Plan?      Chayo Inocente  Case Management Department  Electronically signed by Chayo Brower on 4/30/2025 at 9:48 AM    04/30/25 0946   Service Assessment   Patient Orientation Alert and Oriented   Cognition Alert   History Provided By Patient   Primary Caregiver Self   Accompanied By/Relationship n/a   Support Systems Spouse/Significant Other;Family Members   Patient's Healthcare Decision Maker is: Legal Next of Kin   PCP Verified by CM Yes   Last Visit to PCP Within last 3 months   Prior Functional Level Independent in ADLs/IADLs   Current Functional Level Independent in ADLs/IADLs   Can patient return to prior living arrangement Yes   Ability to make needs known: Good   Family able to assist with home care needs: Yes   Would you like for me to discuss the discharge plan with any other family members/significant others, and if so, who? Yes   Financial Resources Medicare   Community Resources None   CM/SW Referral Other (see comment)  (home health)   Social/Functional History   Lives With Spouse   Type of Home House   Home Layout One level   Home Access Level entry   Bathroom Shower/Tub Tub/Shower unit   Bathroom Toilet Standard   Bathroom Equipment None   Bathroom Accessibility Accessible   Home Equipment Walker - Rolling   Receives Help From Family   Prior Level of Assist for ADLs Independent   Prior Level of Assist for Homemaking Independent   Homemaking Responsibilities Yes   Ambulation Assistance Independent   Prior Level of Assist for Transfers Independent   Active  Yes   Mode of Transportation Car   Occupation Retired   Discharge Planning   Type of Residence House

## 2025-05-01 ENCOUNTER — ANESTHESIA (OUTPATIENT)
Dept: ENDOSCOPY | Age: 70
End: 2025-05-01
Payer: MEDICARE

## 2025-05-01 ENCOUNTER — ANESTHESIA EVENT (OUTPATIENT)
Dept: ENDOSCOPY | Age: 70
End: 2025-05-01
Payer: MEDICARE

## 2025-05-01 LAB
ALBUMIN SERPL-MCNC: 2.4 G/DL (ref 3.5–5.2)
ALP SERPL-CCNC: 69 U/L (ref 40–129)
ALT SERPL-CCNC: 20 U/L (ref 10–50)
ANION GAP SERPL CALCULATED.3IONS-SCNC: 6 MMOL/L (ref 8–16)
AST SERPL-CCNC: 35 U/L (ref 10–50)
BASOPHILS # BLD: 0 K/UL (ref 0–0.2)
BASOPHILS NFR BLD: 0.7 % (ref 0–1)
BILIRUB SERPL-MCNC: 5.3 MG/DL (ref 0.2–1.2)
BUN SERPL-MCNC: 12 MG/DL (ref 8–23)
CALCIUM SERPL-MCNC: 7.7 MG/DL (ref 8.8–10.2)
CHLORIDE SERPL-SCNC: 108 MMOL/L (ref 98–107)
CO2 SERPL-SCNC: 21 MMOL/L (ref 22–29)
CREAT SERPL-MCNC: 0.8 MG/DL (ref 0.7–1.2)
EOSINOPHIL # BLD: 0.1 K/UL (ref 0–0.6)
EOSINOPHIL NFR BLD: 4.8 % (ref 0–5)
ERYTHROCYTE [DISTWIDTH] IN BLOOD BY AUTOMATED COUNT: 21 % (ref 11.5–14.5)
GLUCOSE SERPL-MCNC: 102 MG/DL (ref 70–99)
HCT VFR BLD AUTO: 24.4 % (ref 42–52)
HCT VFR BLD AUTO: 24.4 % (ref 42–52)
HCT VFR BLD AUTO: 25.4 % (ref 42–52)
HCT VFR BLD AUTO: 26.1 % (ref 42–52)
HGB BLD-MCNC: 7.7 G/DL (ref 14–18)
HGB BLD-MCNC: 7.7 G/DL (ref 14–18)
HGB BLD-MCNC: 8 G/DL (ref 14–18)
HGB BLD-MCNC: 8.2 G/DL (ref 14–18)
IMM GRANULOCYTES # BLD: 0 K/UL
LYMPHOCYTES # BLD: 0.6 K/UL (ref 1.1–4.5)
LYMPHOCYTES NFR BLD: 22.1 % (ref 20–40)
MCH RBC QN AUTO: 30.4 PG (ref 27–31)
MCHC RBC AUTO-ENTMCNC: 31.6 G/DL (ref 33–37)
MCV RBC AUTO: 96.4 FL (ref 80–94)
MONOCYTES # BLD: 0.5 K/UL (ref 0–0.9)
MONOCYTES NFR BLD: 18.6 % (ref 0–10)
NEUTROPHILS # BLD: 1.5 K/UL (ref 1.5–7.5)
NEUTS SEG NFR BLD: 53.1 % (ref 50–65)
PLATELET # BLD AUTO: 24 K/UL (ref 130–400)
PLATELET SLIDE REVIEW: ABNORMAL
PMV BLD AUTO: 9.9 FL (ref 9.4–12.4)
POTASSIUM SERPL-SCNC: 4.1 MMOL/L (ref 3.5–5)
PROT SERPL-MCNC: 4.1 G/DL (ref 6.4–8.3)
RBC # BLD AUTO: 2.53 M/UL (ref 4.7–6.1)
SODIUM SERPL-SCNC: 135 MMOL/L (ref 136–145)
WBC # BLD AUTO: 2.9 K/UL (ref 4.8–10.8)

## 2025-05-01 PROCEDURE — 2709999900 HC NON-CHARGEABLE SUPPLY: Performed by: INTERNAL MEDICINE

## 2025-05-01 PROCEDURE — 85018 HEMOGLOBIN: CPT

## 2025-05-01 PROCEDURE — 6360000002 HC RX W HCPCS: Performed by: NURSE PRACTITIONER

## 2025-05-01 PROCEDURE — 6360000002 HC RX W HCPCS

## 2025-05-01 PROCEDURE — 2580000003 HC RX 258

## 2025-05-01 PROCEDURE — 0DJ08ZZ INSPECTION OF UPPER INTESTINAL TRACT, VIA NATURAL OR ARTIFICIAL OPENING ENDOSCOPIC: ICD-10-PCS | Performed by: INTERNAL MEDICINE

## 2025-05-01 PROCEDURE — 7100000000 HC PACU RECOVERY - FIRST 15 MIN: Performed by: INTERNAL MEDICINE

## 2025-05-01 PROCEDURE — 6360000002 HC RX W HCPCS: Performed by: INTERNAL MEDICINE

## 2025-05-01 PROCEDURE — 3609017100 HC EGD: Performed by: INTERNAL MEDICINE

## 2025-05-01 PROCEDURE — 6370000000 HC RX 637 (ALT 250 FOR IP): Performed by: NURSE PRACTITIONER

## 2025-05-01 PROCEDURE — 2700000000 HC OXYGEN THERAPY PER DAY

## 2025-05-01 PROCEDURE — 85025 COMPLETE CBC W/AUTO DIFF WBC: CPT

## 2025-05-01 PROCEDURE — 2580000003 HC RX 258: Performed by: NURSE PRACTITIONER

## 2025-05-01 PROCEDURE — 1200000000 HC SEMI PRIVATE

## 2025-05-01 PROCEDURE — 85014 HEMATOCRIT: CPT

## 2025-05-01 PROCEDURE — 6370000000 HC RX 637 (ALT 250 FOR IP): Performed by: INTERNAL MEDICINE

## 2025-05-01 PROCEDURE — 3700000000 HC ANESTHESIA ATTENDED CARE: Performed by: INTERNAL MEDICINE

## 2025-05-01 PROCEDURE — 2580000003 HC RX 258: Performed by: INTERNAL MEDICINE

## 2025-05-01 PROCEDURE — 94760 N-INVAS EAR/PLS OXIMETRY 1: CPT

## 2025-05-01 PROCEDURE — 2500000003 HC RX 250 WO HCPCS: Performed by: INTERNAL MEDICINE

## 2025-05-01 PROCEDURE — 3700000001 HC ADD 15 MINUTES (ANESTHESIA): Performed by: INTERNAL MEDICINE

## 2025-05-01 PROCEDURE — 80053 COMPREHEN METABOLIC PANEL: CPT

## 2025-05-01 PROCEDURE — 36415 COLL VENOUS BLD VENIPUNCTURE: CPT

## 2025-05-01 PROCEDURE — 7100000001 HC PACU RECOVERY - ADDTL 15 MIN: Performed by: INTERNAL MEDICINE

## 2025-05-01 RX ORDER — SODIUM CHLORIDE, SODIUM LACTATE, POTASSIUM CHLORIDE, CALCIUM CHLORIDE 600; 310; 30; 20 MG/100ML; MG/100ML; MG/100ML; MG/100ML
INJECTION, SOLUTION INTRAVENOUS
Status: DISCONTINUED | OUTPATIENT
Start: 2025-05-01 | End: 2025-05-01 | Stop reason: SDUPTHER

## 2025-05-01 RX ORDER — LIDOCAINE HYDROCHLORIDE 10 MG/ML
INJECTION, SOLUTION INFILTRATION; PERINEURAL
Status: DISCONTINUED | OUTPATIENT
Start: 2025-05-01 | End: 2025-05-01 | Stop reason: SDUPTHER

## 2025-05-01 RX ORDER — MIDAZOLAM HYDROCHLORIDE 1 MG/ML
INJECTION, SOLUTION INTRAMUSCULAR; INTRAVENOUS
Status: DISCONTINUED | OUTPATIENT
Start: 2025-05-01 | End: 2025-05-01 | Stop reason: SDUPTHER

## 2025-05-01 RX ORDER — PROPOFOL 10 MG/ML
INJECTION, EMULSION INTRAVENOUS
Status: DISCONTINUED | OUTPATIENT
Start: 2025-05-01 | End: 2025-05-01 | Stop reason: SDUPTHER

## 2025-05-01 RX ADMIN — SUCRALFATE 1 G: 1 TABLET ORAL at 20:49

## 2025-05-01 RX ADMIN — LACTULOSE 10 G: 20 SOLUTION ORAL at 20:49

## 2025-05-01 RX ADMIN — LACTULOSE 10 G: 20 SOLUTION ORAL at 12:18

## 2025-05-01 RX ADMIN — SUCRALFATE 1 G: 1 TABLET ORAL at 17:44

## 2025-05-01 RX ADMIN — Medication 100 MG: at 12:18

## 2025-05-01 RX ADMIN — RIFAXIMIN 600 MG: 200 TABLET ORAL at 12:17

## 2025-05-01 RX ADMIN — OCTREOTIDE ACETATE 50 MCG/HR: 500 INJECTION, SOLUTION INTRAVENOUS; SUBCUTANEOUS at 15:21

## 2025-05-01 RX ADMIN — SODIUM CHLORIDE 8 MG/HR: 9 INJECTION, SOLUTION INTRAVENOUS at 15:20

## 2025-05-01 RX ADMIN — PROPOFOL 20 MG: 10 INJECTION, EMULSION INTRAVENOUS at 11:07

## 2025-05-01 RX ADMIN — SODIUM CHLORIDE, PRESERVATIVE FREE 10 ML: 5 INJECTION INTRAVENOUS at 12:20

## 2025-05-01 RX ADMIN — ZOLPIDEM TARTRATE 10 MG: 5 TABLET ORAL at 20:53

## 2025-05-01 RX ADMIN — SODIUM CHLORIDE 8 MG/HR: 9 INJECTION, SOLUTION INTRAVENOUS at 04:28

## 2025-05-01 RX ADMIN — SODIUM CHLORIDE, SODIUM LACTATE, POTASSIUM CHLORIDE, AND CALCIUM CHLORIDE: 600; 310; 30; 20 INJECTION, SOLUTION INTRAVENOUS at 10:58

## 2025-05-01 RX ADMIN — LIDOCAINE HYDROCHLORIDE 100 MG: 10 INJECTION, SOLUTION INFILTRATION; PERINEURAL at 11:01

## 2025-05-01 RX ADMIN — SODIUM CHLORIDE, PRESERVATIVE FREE 10 ML: 5 INJECTION INTRAVENOUS at 20:49

## 2025-05-01 RX ADMIN — PROPOFOL 30 MG: 10 INJECTION, EMULSION INTRAVENOUS at 11:10

## 2025-05-01 RX ADMIN — MIDAZOLAM 1 MG: 1 INJECTION INTRAMUSCULAR; INTRAVENOUS at 11:05

## 2025-05-01 RX ADMIN — RIFAXIMIN 550 MG: 550 TABLET ORAL at 20:54

## 2025-05-01 RX ADMIN — SUCRALFATE 1 G: 1 TABLET ORAL at 12:18

## 2025-05-01 RX ADMIN — Medication 500 MG: at 20:49

## 2025-05-01 RX ADMIN — Medication 400 MG: at 12:17

## 2025-05-01 RX ADMIN — Medication 500 MG: at 12:17

## 2025-05-01 RX ADMIN — SPIRONOLACTONE 50 MG: 50 TABLET ORAL at 12:18

## 2025-05-01 RX ADMIN — FUROSEMIDE 20 MG: 20 TABLET ORAL at 17:44

## 2025-05-01 RX ADMIN — NADOLOL 20 MG: 20 TABLET ORAL at 12:18

## 2025-05-01 RX ADMIN — OCTREOTIDE ACETATE 50 MCG/HR: 500 INJECTION, SOLUTION INTRAVENOUS; SUBCUTANEOUS at 04:27

## 2025-05-01 RX ADMIN — FUROSEMIDE 20 MG: 20 TABLET ORAL at 12:17

## 2025-05-01 ASSESSMENT — ENCOUNTER SYMPTOMS
ABDOMINAL PAIN: 0
COUGH: 0
CONSTIPATION: 0
COLOR CHANGE: 0
DIARRHEA: 1
BLOOD IN STOOL: 0
ABDOMINAL DISTENTION: 0
WHEEZING: 0
NAUSEA: 0
SHORTNESS OF BREATH: 0
VOMITING: 0

## 2025-05-01 ASSESSMENT — LIFESTYLE VARIABLES: SMOKING_STATUS: 1

## 2025-05-01 ASSESSMENT — PAIN - FUNCTIONAL ASSESSMENT: PAIN_FUNCTIONAL_ASSESSMENT: NONE - DENIES PAIN

## 2025-05-01 NOTE — CONSULTS
[facility]  Psychiatry Consult    Reason for Consult/Chief Complaint: Concern   Depression and intermittent suicidal ideations    The primary source(s) of information include(s):  patient    The patient is a 70 y.o. male with no reported previous psychiatric history, complicated by history of multiple medical conditions, who has been admitted to medical services secondary to GI bleeding.    Patient has been seen in his room with presence of one-to-one sitter and patient's wife.  Patient reported that he is a chronic alcoholic, and quit drinking alcohol in 2022 due to suffering from alcoholic cirrhosis.  He reported that during the last a few months he was experiencing recurrent gastrointestinal bleeding, and came to the hospital, as he started experiencing shortness of breath.  Patient underwent esophagogastroduodenoscopy yesterday.  No active bleeding was observed.  Medical staff reported that it was planned to discharge patient from the hospital today, however, he started experiencing GI bleeding today in the morning again.    During the interview patient reported that his mood is \"okay\" today, however, he endorses decreased motivation and mild feeling of depression, secondary to being in the hospital.  Patient stated that he \"would not feel depressed at all\" if he will be at home.  He endorses good appetite and good quality of sleep at home, however, stated that due to GI bleeding he does not feel hungry and his appetite is decreased.  He endorses good concentration and good energy level.  Patient denies any mood swings or racing thoughts.  He denies feeling of hopelessness, helplessness and worthlessness.  Patient denies current active suicidal and homicidal ideations, denies any plans.  Patient denies auditory visual hallucinations.  He did not endorse any delusions or paranoid thoughts.    PSYCHIATRIC HISTORY:  Diagnoses: Denies.  Review of the patient's chart indicated that patient

## 2025-05-01 NOTE — PROGRESS NOTES
[facility]      Progress Note    Patient:  Ney Ann  YOB: 1955  Date of Service: 5/1/2025  MRN: [id number]   Acct: [id number]   Primary Care Physician: Izabela Robles, SHEILA - CNP  Advance Directive: Full Code  Admit Date: 4/29/2025       Hospital Day: 2    Portions of this note have been copied forward, however, updated to reflect the most current clinical status of this patient.     CHIEF COMPLAINT GIB, Abnormal labs    SUBJECTIVE:  Mr. Ann was resting in bed this morning. Denies nausea or vomiting. Denies SOB or chest pain. Denies lightheadedness or dizziness. Denies SI or HI currently.       CUMULATIVE HOSPITAL COURSE:   The patient is a 70 y.o. male with PMH of alcoholic cirrhosis, esophageal varices, atrial fibrillation, hypertension, and hypothyroidism who presented to [facility] ED for evaluation abnormal labs. Has history of recurrent GI bleeding, ongoing for the last several months. He had dark tarry stools associated with fatigue and shortness of breath. His GI doctor had ordered labs and was found to have profound anemia with Hgb 4.9 and sent to ED for further evaluation. Workup in ED revealed  hemoglobin 4.3 and he is being transfused 2 units PRBCs.  Chemistry panel shows normal renal function, total bilirubin 2.9 which is improved from prior.  CBC shows no leukocytosis, hemoglobin 4.3, hematocrit 15.0, platelet 56, INR 2.34.  CT AP shows no acute abnormality, ongoing ascites and small bilateral effusions with new compression fracture of L1 vertebral body without retropulsion, and findings of portal hypertension.  He was initiated on Protonix and Sandostatin gtt. Patient was admitted to hospital medicine for further evaluation with GI consultation. Hgb 6.6 on 4/30/25, 1 unit of PRBC ordered. Expressed intermittent thoughts of suicidal ideations, does not currently have plans. Psychiatry consulted. GI suggested EGD. Underwent EGD this morning with findings of esophageal  Adrenals: No mass. Kidneys/Ureters: No renal mass.  A couple punctate stones, 1-2 mm, in the left kidney collecting system without hydronephrosis. These appear stable. No definitive right urinary stones or hydronephrosis. GI Tract: The colon shows no focal abnormality. The appendix appears normal. Small bowel shows no focal abnormality or obstruction. The stomach is unremarkable. Peritoneal Cavity: Mild ascites is again present. No fluid collections, free air or focal areas of inflammation. Ascites appears extending to the small umbilical hernia and the right scrotum, similar to the previous examination. Retroperitoneum: No fluid collection. Lymph Nodes: No lymphadenopathy. Vasculature: Mild-to-moderate aortic atherosclerotic calcifications. No aortic or iliac aneurysm.  Celiac, superior mesenteric, and inferior mesenteric arteries are grossly patent. Limited assessment of the portal and hepatic veins and IVC is unremarkable within limitations of the phase of IV contrast. Incidental note of 2 left renal veins, 1 anterior to the aorta and 1 posterior to the aorta. Mild varices again present. Pelvis: No mass.  Bladder is normal. Pelvic structures are grossly normal although partially obscured from metallic artifact from the left hip arthroplasty. Bones/Soft Tissues: There is a new compression of the superior endplate of the L1 of the body approximately 30% without retropulsion. Degenerative changes in the spine with prominent degenerative change is seen in the right hip. Left hip arthroplasty without sequelae.  Small stable umbilical hernia containing fat and ascites, stable.      1.  No acute abnormality of the abdomen and pelvis. No indication of hemorrhage. 2. . Continued ascites which appears to extend into the small umbilical hernia and the right scrotum, similar to the previous exam. 3. . New small bilateral pleural effusions with mild associated atelectasis, left greater than right. 4. . New compression

## 2025-05-01 NOTE — PROCEDURES
Endoscopic Procedure Note    Patient: Ney Ann : 1955  Med Rec#: [id number] Acc#: [id number]     Primary Care Provider Izabela Robles, APRN - CNP    Referring Provider: Dr. Melissa Orlando    Endoscopist: Jl eRyes MD    Date of Procedure: 2025    Pre-Op Diagnosis: Rule out esophageal varices.  Rule out gastropathy.  Rule out peptic ulcer disease.    Post-Op Diagnosis: 1.  Esophageal varices, grade 1.  2.  Moderate portal hypertensive gastropathy.  3.  No active bleeding or blood noted.       Procedure(s):  ESOPHAGOGASTRODUODENOSCOPY      Indications:   Anemia.  Cirrhosis of liver.    Anesthesia:  Sedation was administered by anesthesia who monitored the patient during the procedure.    Estimated Blood Loss: none    Procedure:   After reviewing the patient's chart and obtaining informed consent, the patient was placed in the left lateral decubitus position.  A forward-viewing Olympus endoscope was lubricated and inserted through the mouth into the oropharynx. Under direct visualization, the upper esophagus was intubated. The scope was advanced to the level of the second portion of duodenum without any difficulty. Scope was slowly withdrawn with careful inspection of the mucosal surfaces. The scope was retroflexed for inspection of the gastric fundus and incisura.  The patient tolerated the procedure well. The scope was removed.     Findings:     Esophagus: The esophageal mucosa in the lower one third noted to have mildly dilated chains of blood vessels.  This finding is consistent with esophageal varices, grade 1 without any stigmata of bleeding.  Otherwise, there was no evidence of inflammation, ulceration or any masses.  No evidence of any Menon's esophagus, hiatal hernia or stricture.  No evidence of Bridget-Coffey tear.  The scope was passed without difficulty in the stomach.    Stomach: The gastric mucosa in the lower body and the antrum appears to be hyperemic with cobblestone

## 2025-05-01 NOTE — PLAN OF CARE
Problem: Safety - Adult  Goal: Free from fall injury  Outcome: Progressing  Flowsheets (Taken 5/1/2025 1823)  Free From Fall Injury: Instruct family/caregiver on patient safety     Problem: ABCDS Injury Assessment  Goal: Absence of physical injury  Outcome: Progressing  Flowsheets (Taken 5/1/2025 1823)  Absence of Physical Injury: Implement safety measures based on patient assessment

## 2025-05-01 NOTE — BH NOTE
Patient was not in his room.  Patient's nurse reported that patient has been taken to operating room.    Psychiatry will see patient tomorrow and will try to perform requested psychiatric consult.

## 2025-05-01 NOTE — CONSULTS
Palliative Care:    Palliative Care visit for support, goals of care, and ACP review.    Pt is a pleasant 70 year old male who had just returned to his room after endoscopy procedure.  Pt is alert and oriented with his wife, and a PCA at bedside.  Pt denies having pain and denies symptoms.   Pt states he feels better and is ready to go home.  Wife states Pt may discharge home today.   Pt presented to ED 4/29 with anemia.  Wife states Pt received 4 units PRBC.  Pt states he is tired of having to come to hospital for transfusions.  Pt states he is active at home but wife states Pt is independent of his own self care but otherwise doesn't perform any household duties other than mowing with a small tractor.      Past Medical History:        Past Medical History:   Diagnosis Date    Abnormal LFTs     Atrial fibrillation (HCC)     per pt; no current cardiologist    Cirrhosis (HCC)     sees [facility]    COVID-[place]     2 yr ago    History of blood transfusion     Hyperbilirubinemia     Hypertension     Hypothyroidism     no meds at present    Insomnia     Leukopenia     Liver disease     Palliative care patient 01/10/2025    Palliative care patient 05/01/2025    Platelets decreased     chronic    Splenomegaly     Umbilical hernia     and inguinal per pt    Varices, esophageal (HCC)     hx of    Vitamin D deficiency        Advance Directives:    Pt is Full Code and has LW on file.           Pain/Other Symptoms:    Pt denied symptoms during visit stating he feels better and ready to go home           How are symptoms affecting QOL:  Pt states he feels weak when his hemoglobin becomes low and he is tired of having frequent admission for blood transfusions.      Psychological/Spiritual:   Pt states he was raised Anabaptist.  Pt's wife has good Jewish support and she attends [employer].  Pt and wife have children, and siblings that live out of state.  They have no local family support.                    Plan:  Continue

## 2025-05-01 NOTE — ANESTHESIA POSTPROCEDURE EVALUATION
Department of Anesthesiology  Postprocedure Note    Patient: Ney Ann  MRN: [id number]  YOB: 1955  Date of evaluation: 5/1/2025    Procedure Summary       Date: 05/01/25 Room / Location: [place] [address] / [facility]    Anesthesia Start: 1058 Anesthesia Stop:     Procedure: ESOPHAGOGASTRODUODENOSCOPY Diagnosis:       Anemia, unspecified type      (Anemia, unspecified type [D64.9])    Surgeons: Jl Reyes MD Responsible Provider: Jose Cooper APRN - CRNA    Anesthesia Type: General, TIVA ASA Status: 4            Anesthesia Type: General, TIVA    Rod Phase I:      Rod Phase II:      Anesthesia Post Evaluation    Patient location during evaluation: PACU  Patient participation: complete - patient participated  Level of consciousness: awake and alert  Pain score: 0  Airway patency: patent  Nausea & Vomiting: no vomiting and no nausea  Cardiovascular status: blood pressure returned to baseline and hemodynamically stable  Respiratory status: spontaneous ventilation, room air, nonlabored ventilation and acceptable  Hydration status: euvolemic  Pain management: adequate    No notable events documented.

## 2025-05-01 NOTE — ANESTHESIA PRE PROCEDURE
MOUTH IN THE  MORNING AND 1 TABLET BY MOUTH IN THE EVENING 8/1/24   Izabela Robles APRN - CNP       Current medications:    Current Facility-Administered Medications   Medication Dose Route Frequency Provider Last Rate Last Admin    0.9 % sodium chloride infusion   IntraVENous PRN Ankit Adams MD        0.9 % sodium chloride infusion   IntraVENous PRN Sameera Obrien APRN - CNP        pantoprazole (PROTONIX) 80 mg in sodium chloride 0.9 % 100 mL infusion  8 mg/hr IntraVENous Continuous Sameera Obrien APRN - CNP 10 mL/hr at 05/01/25 0428 8 mg/hr at 05/01/25 0428    0.9 % sodium chloride infusion   IntraVENous PRN Isamar Jacobson APRN - CNP        furosemide (LASIX) tablet 20 mg  20 mg Oral BID Blanca Barton APRN - CNP   20 mg at 04/30/25 1739    lactulose (CHRONULAC) 10 GM/15ML solution 10 g  10 g Oral BID Blanca Barton APRN - CNP   10 g at 04/30/25 2025    magnesium oxide (MAG-OX) tablet 400 mg  400 mg Oral Daily Blanca Barton APRN - CNP        nadolol (CORGARD) tablet 20 mg  20 mg Oral Daily Blanca Barton APRN - CNP        potassium phosphate (monobasic) (K-PHOS) tablet 500 mg  500 mg Oral BID Blanca Barton APRN - CNP   500 mg at 04/30/25 2026    rifAXIMin (XIFAXAN) tablet 600 mg  600 mg Oral TID Blanca Barton APRN - CNP   600 mg at 04/30/25 2026    spironolactone (ALDACTONE) tablet 50 mg  50 mg Oral Daily Blanca Barton APRN - CNP        sucralfate (CARAFATE) tablet 1 g  1 g Oral 4x Daily Blanca Barton APRN - CNP   1 g at 04/30/25 2025    thiamine mononitrate tablet 100 mg  100 mg Oral Daily Blanca Barton APRN - CNP        zolpidem (AMBIEN) tablet 10 mg  10 mg Oral Nightly PRN Blanca Barton APRN - CNP   10 mg at 04/30/25 2025    sodium chloride flush 0.9 % injection 5-40 mL  5-40 mL IntraVENous 2 times per day Blanca Barton, APRN - CNP        sodium chloride flush 0.9 % injection 5-40 mL  5-40 mL IntraVENous PRN Blanca Barton, APRN - CNP

## 2025-05-02 ENCOUNTER — ANESTHESIA EVENT (OUTPATIENT)
Dept: ENDOSCOPY | Age: 70
End: 2025-05-02
Payer: MEDICARE

## 2025-05-02 PROBLEM — K72.10 END STAGE LIVER DISEASE (HCC): Status: ACTIVE | Noted: 2025-05-02

## 2025-05-02 PROBLEM — K92.1 HEMATOCHEZIA: Status: ACTIVE | Noted: 2025-05-02

## 2025-05-02 PROBLEM — D62 ACUTE BLOOD LOSS ANEMIA: Status: ACTIVE | Noted: 2025-05-02

## 2025-05-02 PROBLEM — I85.01 BLEEDING ESOPHAGEAL VARICES (HCC): Status: ACTIVE | Noted: 2025-05-02

## 2025-05-02 LAB
ALBUMIN SERPL-MCNC: 2.5 G/DL (ref 3.5–5.2)
ALP SERPL-CCNC: 68 U/L (ref 40–129)
ALT SERPL-CCNC: 22 U/L (ref 10–50)
ANION GAP SERPL CALCULATED.3IONS-SCNC: 8 MMOL/L (ref 8–16)
ANISOCYTOSIS BLD QL SMEAR: ABNORMAL
AST SERPL-CCNC: 33 U/L (ref 10–50)
BASOPHILS # BLD: 0 K/UL (ref 0–0.2)
BASOPHILS NFR BLD: 0.8 % (ref 0–1)
BILIRUB SERPL-MCNC: 3.8 MG/DL (ref 0.2–1.2)
BLOOD BANK DISPENSE STATUS: NORMAL
BLOOD BANK PRODUCT CODE: NORMAL
BPU ID: NORMAL
BUN SERPL-MCNC: 10 MG/DL (ref 8–23)
CALCIUM SERPL-MCNC: 7.4 MG/DL (ref 8.8–10.2)
CHLORIDE SERPL-SCNC: 106 MMOL/L (ref 98–107)
CO2 SERPL-SCNC: 22 MMOL/L (ref 22–29)
CREAT SERPL-MCNC: 0.8 MG/DL (ref 0.7–1.2)
DACRYOCYTES BLD QL SMEAR: ABNORMAL
DESCRIPTION BLOOD BANK: NORMAL
EOSINOPHIL # BLD: 0.1 K/UL (ref 0–0.6)
EOSINOPHIL NFR BLD: 5 % (ref 0–5)
ERYTHROCYTE [DISTWIDTH] IN BLOOD BY AUTOMATED COUNT: 20.2 % (ref 11.5–14.5)
GLUCOSE SERPL-MCNC: 96 MG/DL (ref 70–99)
HCT VFR BLD AUTO: 23.7 % (ref 42–52)
HCT VFR BLD AUTO: 26.6 % (ref 42–52)
HCT VFR BLD AUTO: 28 % (ref 42–52)
HGB BLD-MCNC: 7.5 G/DL (ref 14–18)
HGB BLD-MCNC: 7.6 G/DL (ref 14–18)
HGB BLD-MCNC: 8.7 G/DL (ref 14–18)
HYPOCHROMIA BLD QL SMEAR: ABNORMAL
IMM GRANULOCYTES # BLD: 0 K/UL
LYMPHOCYTES # BLD: 0.6 K/UL (ref 1.1–4.5)
LYMPHOCYTES NFR BLD: 24 % (ref 20–40)
MACROCYTES BLD QL SMEAR: ABNORMAL
MCH RBC QN AUTO: 30.4 PG (ref 27–31)
MCHC RBC AUTO-ENTMCNC: 31.6 G/DL (ref 33–37)
MCV RBC AUTO: 96 FL (ref 80–94)
MONOCYTES # BLD: 0.5 K/UL (ref 0–0.9)
MONOCYTES NFR BLD: 21.5 % (ref 0–10)
NEUTROPHILS # BLD: 1.2 K/UL (ref 1.5–7.5)
NEUTS SEG NFR BLD: 47.5 % (ref 50–65)
OVALOCYTES BLD QL SMEAR: ABNORMAL
PLATELET # BLD AUTO: 25 K/UL (ref 130–400)
PLATELET SLIDE REVIEW: ABNORMAL
PMV BLD AUTO: 9.9 FL (ref 9.4–12.4)
POTASSIUM SERPL-SCNC: 3.9 MMOL/L (ref 3.5–5)
PROT SERPL-MCNC: 4.4 G/DL (ref 6.4–8.3)
RBC # BLD AUTO: 2.47 M/UL (ref 4.7–6.1)
SCHISTOCYTES BLD QL SMEAR: ABNORMAL
SODIUM SERPL-SCNC: 136 MMOL/L (ref 136–145)
TARGETS BLD QL SMEAR: ABNORMAL
WBC # BLD AUTO: 2.4 K/UL (ref 4.8–10.8)

## 2025-05-02 PROCEDURE — 85018 HEMOGLOBIN: CPT

## 2025-05-02 PROCEDURE — 6360000002 HC RX W HCPCS: Performed by: INTERNAL MEDICINE

## 2025-05-02 PROCEDURE — 6370000000 HC RX 637 (ALT 250 FOR IP): Performed by: NURSE PRACTITIONER

## 2025-05-02 PROCEDURE — 30233R1 TRANSFUSION OF NONAUTOLOGOUS PLATELETS INTO PERIPHERAL VEIN, PERCUTANEOUS APPROACH: ICD-10-PCS | Performed by: HOSPITALIST

## 2025-05-02 PROCEDURE — 1200000000 HC SEMI PRIVATE

## 2025-05-02 PROCEDURE — 6370000000 HC RX 637 (ALT 250 FOR IP): Performed by: INTERNAL MEDICINE

## 2025-05-02 PROCEDURE — 2580000003 HC RX 258: Performed by: INTERNAL MEDICINE

## 2025-05-02 PROCEDURE — 85014 HEMATOCRIT: CPT

## 2025-05-02 PROCEDURE — 2500000003 HC RX 250 WO HCPCS: Performed by: INTERNAL MEDICINE

## 2025-05-02 PROCEDURE — 36415 COLL VENOUS BLD VENIPUNCTURE: CPT

## 2025-05-02 PROCEDURE — 99223 1ST HOSP IP/OBS HIGH 75: CPT | Performed by: INTERNAL MEDICINE

## 2025-05-02 PROCEDURE — 99223 1ST HOSP IP/OBS HIGH 75: CPT | Performed by: PHYSICIAN ASSISTANT

## 2025-05-02 PROCEDURE — 2700000000 HC OXYGEN THERAPY PER DAY

## 2025-05-02 PROCEDURE — 36430 TRANSFUSION BLD/BLD COMPNT: CPT

## 2025-05-02 PROCEDURE — 99222 1ST HOSP IP/OBS MODERATE 55: CPT | Performed by: PSYCHIATRY & NEUROLOGY

## 2025-05-02 PROCEDURE — 85025 COMPLETE CBC W/AUTO DIFF WBC: CPT

## 2025-05-02 PROCEDURE — 80053 COMPREHEN METABOLIC PANEL: CPT

## 2025-05-02 PROCEDURE — 6370000000 HC RX 637 (ALT 250 FOR IP): Performed by: HOSPITALIST

## 2025-05-02 PROCEDURE — 94760 N-INVAS EAR/PLS OXIMETRY 1: CPT

## 2025-05-02 RX ORDER — SODIUM CHLORIDE 9 MG/ML
INJECTION, SOLUTION INTRAVENOUS PRN
Status: DISCONTINUED | OUTPATIENT
Start: 2025-05-02 | End: 2025-05-03 | Stop reason: HOSPADM

## 2025-05-02 RX ORDER — PHYTONADIONE 5 MG/1
10 TABLET ORAL DAILY
Status: DISCONTINUED | OUTPATIENT
Start: 2025-05-02 | End: 2025-05-03 | Stop reason: HOSPADM

## 2025-05-02 RX ORDER — PANTOPRAZOLE SODIUM 40 MG/1
40 TABLET, DELAYED RELEASE ORAL
Status: DISCONTINUED | OUTPATIENT
Start: 2025-05-02 | End: 2025-05-03 | Stop reason: HOSPADM

## 2025-05-02 RX ADMIN — SUCRALFATE 1 G: 1 TABLET ORAL at 14:01

## 2025-05-02 RX ADMIN — RIFAXIMIN 550 MG: 550 TABLET ORAL at 21:25

## 2025-05-02 RX ADMIN — PHYTONADIONE 10 MG: 5 TABLET ORAL at 07:22

## 2025-05-02 RX ADMIN — PANTOPRAZOLE SODIUM 40 MG: 40 TABLET, DELAYED RELEASE ORAL at 09:44

## 2025-05-02 RX ADMIN — SODIUM CHLORIDE 300 MG: 0.9 INJECTION, SOLUTION INTRAVENOUS at 10:08

## 2025-05-02 RX ADMIN — Medication 400 MG: at 07:22

## 2025-05-02 RX ADMIN — LACTULOSE 10 G: 20 SOLUTION ORAL at 07:22

## 2025-05-02 RX ADMIN — ZOLPIDEM TARTRATE 10 MG: 5 TABLET ORAL at 21:25

## 2025-05-02 RX ADMIN — FUROSEMIDE 20 MG: 20 TABLET ORAL at 17:33

## 2025-05-02 RX ADMIN — Medication 500 MG: at 07:22

## 2025-05-02 RX ADMIN — SODIUM CHLORIDE, PRESERVATIVE FREE 10 ML: 5 INJECTION INTRAVENOUS at 21:25

## 2025-05-02 RX ADMIN — SODIUM CHLORIDE 8 MG/HR: 9 INJECTION, SOLUTION INTRAVENOUS at 01:58

## 2025-05-02 RX ADMIN — SUCRALFATE 1 G: 1 TABLET ORAL at 21:25

## 2025-05-02 RX ADMIN — RIFAXIMIN 550 MG: 550 TABLET ORAL at 07:22

## 2025-05-02 RX ADMIN — SPIRONOLACTONE 50 MG: 50 TABLET ORAL at 07:22

## 2025-05-02 RX ADMIN — OCTREOTIDE ACETATE 50 MCG/HR: 500 INJECTION, SOLUTION INTRAVENOUS; SUBCUTANEOUS at 01:57

## 2025-05-02 RX ADMIN — SUCRALFATE 1 G: 1 TABLET ORAL at 07:22

## 2025-05-02 RX ADMIN — LACTULOSE 10 G: 20 SOLUTION ORAL at 21:25

## 2025-05-02 RX ADMIN — POLYETHYLENE GLYCOL-3350 AND ELECTROLYTES 4000 ML: 236; 6.74; 5.86; 2.97; 22.74 POWDER, FOR SOLUTION ORAL at 15:41

## 2025-05-02 RX ADMIN — FUROSEMIDE 20 MG: 20 TABLET ORAL at 07:22

## 2025-05-02 RX ADMIN — PANTOPRAZOLE SODIUM 40 MG: 40 TABLET, DELAYED RELEASE ORAL at 17:33

## 2025-05-02 RX ADMIN — Medication 100 MG: at 07:22

## 2025-05-02 RX ADMIN — Medication 500 MG: at 21:25

## 2025-05-02 RX ADMIN — SUCRALFATE 1 G: 1 TABLET ORAL at 17:33

## 2025-05-02 RX ADMIN — NADOLOL 20 MG: 20 TABLET ORAL at 07:22

## 2025-05-02 ASSESSMENT — ENCOUNTER SYMPTOMS
COLOR CHANGE: 0
CONSTIPATION: 0
VOMITING: 0
SHORTNESS OF BREATH: 0
ABDOMINAL DISTENTION: 0
COUGH: 0
WHEEZING: 0
NAUSEA: 0
DIARRHEA: 1
ABDOMINAL PAIN: 0
BLOOD IN STOOL: 1

## 2025-05-02 NOTE — PROGRESS NOTES
GI  - PROGRESS NOTE    Subjective:   Admit Date: 4/29/2025  PCP: Izabela Robles, SHEILA - CNP    Patient being seen for: Hematochezia.  Anemia.  Cirrhosis of liver.    HPI: Patient underwent upper GI endoscopy yesterday which revealed esophageal varices and portal hypertensive gastropathy.  He was supposed to be discharged.  However, early this morning he has a moderate quantity of fresh blood per rectum.  Patient claims that he is urged to go to the bathroom and all he noted is fresh to somewhat dark looking blood.  No melena was noted.  He denies any other GI symptoms including abdominal pain, nausea or vomiting or hematemesis.  He is suffering from cirrhosis of liver with significant thrombocytopenia.      Medications:  Scheduled Meds:   iron sucrose  300 mg IntraVENous Q24H    [START ON 5/4/2025] iron sucrose  400 mg IntraVENous Once    phytonadione  10 mg Oral Daily    pantoprazole  40 mg Oral BID AC    rifAXIMin  550 mg Oral BID    furosemide  20 mg Oral BID    lactulose  10 g Oral BID    magnesium oxide  400 mg Oral Daily    nadolol  20 mg Oral Daily    potassium phosphate (monobasic)  500 mg Oral BID    spironolactone  50 mg Oral Daily    sucralfate  1 g Oral 4x Daily    vitamin B-1  100 mg Oral Daily    sodium chloride flush  5-40 mL IntraVENous 2 times per day       Continuous Infusions:   sodium chloride      sodium chloride      sodium chloride      sodium chloride      sodium chloride         PRN Meds:.sodium chloride, sodium chloride, sodium chloride, sodium chloride, zolpidem, sodium chloride flush, sodium chloride, ondansetron **OR** ondansetron      Labs:     Recent Labs     04/30/25  0627 04/30/25  0847 05/01/25  0627 05/01/25  1218 05/01/25  1708 05/01/25  2332 05/02/25  0226   WBC 3.3*  --  2.9*  --   --   --  2.4*   RBC 2.13*  --  2.53*  --   --   --  2.47*   HGB 6.3*   < > 7.7*   < > 8.0* 7.7* 7.5*   HCT 20.7*   < > 24.4*   < > 25.4* 24.4*

## 2025-05-02 NOTE — PROGRESS NOTES
[facility]      Progress Note    Patient:  Ney Ann  YOB: 1955  Date of Service: 5/2/2025  MRN: [id number]   Acct: [id number]   Primary Care Physician: Izabela Robles, SHEILA - CNP  Advance Directive: Full Code  Admit Date: 4/29/2025       Hospital Day: 3    Portions of this note have been copied forward, however, updated to reflect the most current clinical status of this patient.     CHIEF COMPLAINT GIB, Abnormal labs    SUBJECTIVE:  Mr. Ann was resting in bed this morning. Nursing reports patient having a dark stool with bright red blood in it this morning. Receiving 1 unit of platelets currently.       CUMULATIVE HOSPITAL COURSE:   The patient is a 70 y.o. male with PMH of alcoholic cirrhosis, esophageal varices, atrial fibrillation, hypertension, and hypothyroidism who presented to [facility] ED for evaluation abnormal labs. Has history of recurrent GI bleeding, ongoing for the last several months. He had dark tarry stools associated with fatigue and shortness of breath. His GI doctor had ordered labs and was found to have profound anemia with Hgb 4.9 and sent to ED for further evaluation. Workup in ED revealed  hemoglobin 4.3 and he is being transfused 2 units PRBCs.  Chemistry panel shows normal renal function, total bilirubin 2.9 which is improved from prior.  CBC shows no leukocytosis, hemoglobin 4.3, hematocrit 15.0, platelet 56, INR 2.34.  CT AP shows no acute abnormality, ongoing ascites and small bilateral effusions with new compression fracture of L1 vertebral body without retropulsion, and findings of portal hypertension.  He was initiated on Protonix and Sandostatin gtt. Patient was admitted to hospital medicine for further evaluation with GI consultation. Hgb 6.6 on 4/30/25, 1 unit of PRBC ordered. Expressed intermittent thoughts of suicidal ideations, does not currently have plans. Psychiatry consulted. GI suggested EGD. Underwent EGD this morning with findings of  bilateral retroareolar soft tissue could be secondary to gynecomastia. Liver: Cirrhotic nodular heterogeneous appearance. Stable cyst seen in the left lobe. Stable cyst seen in the inferior right lobe. No discrete solid lesion. Biliary: The gallbladder and bile ducts are normal. Pancreas: No mass or evidence of pancreatitis. No duct dilation. Spleen: Continued enlargement measuring 17.4 cm in the AP dimension. No focal abnormality. Adrenals: No mass. Kidneys/Ureters: No renal mass.  A couple punctate stones, 1-2 mm, in the left kidney collecting system without hydronephrosis. These appear stable. No definitive right urinary stones or hydronephrosis. GI Tract: The colon shows no focal abnormality. The appendix appears normal. Small bowel shows no focal abnormality or obstruction. The stomach is unremarkable. Peritoneal Cavity: Mild ascites is again present. No fluid collections, free air or focal areas of inflammation. Ascites appears extending to the small umbilical hernia and the right scrotum, similar to the previous examination. Retroperitoneum: No fluid collection. Lymph Nodes: No lymphadenopathy. Vasculature: Mild-to-moderate aortic atherosclerotic calcifications. No aortic or iliac aneurysm.  Celiac, superior mesenteric, and inferior mesenteric arteries are grossly patent. Limited assessment of the portal and hepatic veins and IVC is unremarkable within limitations of the phase of IV contrast. Incidental note of 2 left renal veins, 1 anterior to the aorta and 1 posterior to the aorta. Mild varices again present. Pelvis: No mass.  Bladder is normal. Pelvic structures are grossly normal although partially obscured from metallic artifact from the left hip arthroplasty. Bones/Soft Tissues: There is a new compression of the superior endplate of the L1 of the body approximately 30% without retropulsion. Degenerative changes in the spine with prominent degenerative change is seen in the right hip. Left hip

## 2025-05-02 NOTE — CONSULTS
Palliative Care Consult Note    5/2/2025 10:19 AM  Subjective:  Admit Date: 4/29/2025  PCP: Izabela Robles APRN - CNP    Date of Service: 5/2/2025    Reason for Consultation:  Goals of Care, Code Status, Family Support     History Obtained From: EMR/Patient and their Family    History Of Present Illness:   The patient is a 70 y.o. male with PMH alcoholic cirrhosis previously followed at [facility], atrial fibrillation, chronic back pain, esophageal varices, portal hypertensive gastropathy, pancytopenia, HTN, COPD, vitamin D deficiency, insomnia who presented to [facility] ED on 04/29/2025 w/ concern for anemia, dyspnea, melena.  Hemoglobin 4.3, platelets 56, sodium 134, CO2 18, albumin 2.9, bilirubin 2.9.  CT abdomen and pelvis reported no acute abnormality and no indication of hemorrhage, continued ascites extending into umbilical hernia and right scrotum, new small bilateral pleural effusions, new compression fracture of L1 without retropulsion, continued cirrhosis, splenomegaly and findings consistent with hypertension, nonobstructing left renal calculi.  He was placed on Protonix and Sandostatin drips and admitted to hospitalist service with consult placed to gastroenterology.  Transfusional support was initiated as well.  Patient reportedly made some comments regarding suicidal ideation and psychiatry was consulted.  A sitter was placed at his bedside as well.  Hematology consulted 502 2025 with concern for recurrent bleeding in the setting of severe thrombocytopenia.  Vitamin K, platelets and iron ordered.  Palliative care consulted to assist with goals of care in the setting of life-threatening medical illness and poor prognosis.    Past Medical History:        Diagnosis Date    Abnormal LFTs     Atrial fibrillation (HCC)     per pt; no current cardiologist    Cirrhosis (HCC)     sees [facility]    COVID-Juana     2 yr ago    History of blood transfusion     Hyperbilirubinemia     Hypertension      resolved hospital problems. *       Visit Summary:  Chart reviewed, patient seen at bedside with his spouse and a sitter present in the room.  I introduced myself, the role of inpatient palliative care, the reason for consultation.  Mr. Ann is lethargic this morning.  He does not attempt to interact much in conversation.  His spouse and sitter tell me that he has had 2 very bloody bowel movements this morning.  His spouse has several questions regarding to plan of care and prognosis.  We reviewed the patient's hospital course, current clinical concerns workup and treatment provided thus far.  We discussed orders placed this morning for platelets, vitamin K and iron.  I also placed a repeat H&H at this time after discussing ongoing blood loss.  His spouse wanted to talk privately in the marvin and I met with her at length and she voices that she feels he is \"giving up\".  She asks for information regarding end-stage liver disease and prognosis associated with that.  She asks if patients do choose to stop treatment at certain points in their life and we discussed that it is on a personal basis based on quality of life, morals and belief system.  But I did tell her that some people choose to stop transfusional support as well as hospitalizations and focus on quality of life.  She states that she feels his quality has been very poor and there is concern for loss of dignity with him needing assistance with hygiene efforts.  She feels he is very close to wanting to stop treatment and hospitalizations as well as doctors office visits.  She allowed me to discuss availability of hospice should they make those decisions at this point in time.  She tells me that she was hoping he would be discharged home today but now she is uncertain he will survive this hospitalization due to the amount of blood loss he has had this morning and need for ongoing transfusional support.  Emotional support and comfort provided.  She did mention

## 2025-05-02 NOTE — CONSULTS
MEDICAL ONCOLOGY CONSULTATION    Pt Name: Ney Ann  MRN: [id number]  YOB: 1955  Date of evaluation: 5/2/2025    REASON FOR CONSULTATION: Thrombocytopenia  REQUESTING PHYSICIAN: Hospitalist    History Obtained From:    patient, electronic medical record    HISTORY OF PRESENT ILLNESS:  Ney Ann was first seen by me on 5/2/2025.  I was consulted during patient hospitalization [facility] for findings of thrombocytopenia.  The patient presents to the ER department for evaluation of abnormal labs.  Patient has a history of recurrent GI bleed for the last few months.  The patient reported melanotic stools.  Patient also had complaint of generalized fatigue, short of breath.  Patient was seen by outpatient GI.  Labs performed showed a CBC of hemoglobin 4.3.  Patient was sent to the emergency department.  Patient received 4 units PRBCs.    Laboratory studies hemoglobin 7.5 after 4 units PRBC, WBC 2.4/ANC 1.2 and platelet count 25,000 (previously 65,000 4/28/2025).  Recent anemia profile showed iron 34, TIBC 289, iron saturation 12%.      4/29/2025-CT abdomen pelvis with IV contrast, [facility]:   Small bilateral pleural effusions with mild associated atelectasis, left greater than right. Not present previously.   Chronic interstitial changes without consolidation.   Bilateral retroareolar soft tissue could be secondary to gynecomastia.   Cirrhotic nodular heterogeneous appearance. Stable cyst seen in the left lobe. Stable cyst seen in the inferior right lobe.   Spleen: Continued enlargement measuring 17.4 cm in the AP dimension.   A couple punctate stones, 1-2 mm, in the left kidney collecting system without hydronephrosis.   Mild ascites is again present. Ascites appears extending to the small umbilical hernia and the right scrotum, similar to the previous examination.   Incidental note of 2 left renal veins, 1 anterior to the aorta and 1 posterior to the aorta. Mild varices again present.   Metallic

## 2025-05-02 NOTE — PLAN OF CARE
Problem: Safety - Adult  Goal: Free from fall injury  5/2/2025 0525 by Eunice Ibrahim RN  Outcome: Progressing  5/1/2025 1825 by Heather Post RN  Outcome: Progressing  Flowsheets (Taken 5/1/2025 1823)  Free From Fall Injury: Instruct family/caregiver on patient safety     Problem: ABCDS Injury Assessment  Goal: Absence of physical injury  5/2/2025 0525 by Eunice Ibrahim RN  Outcome: Progressing  5/1/2025 1825 by Heather Post RN  Outcome: Progressing  Flowsheets (Taken 5/1/2025 1823)  Absence of Physical Injury: Implement safety measures based on patient assessment

## 2025-05-02 NOTE — PROGRESS NOTES
This  attempted to visit with pt but he was sleeping. Pt's wife was sitting next to the door and she says they are Christians and their coreen is important to them. She says their Uatsdin is praying for them. They attend [facility]. This  provided spiritual care with sustaining presence, support, and a prayer. Pt's wife expressed gratitude for spiritual care.       Electronically signed by Mary Behrens on 5/2/2025 at 10:54 AM

## 2025-05-03 ENCOUNTER — ANESTHESIA (OUTPATIENT)
Dept: ENDOSCOPY | Age: 70
End: 2025-05-03
Payer: MEDICARE

## 2025-05-03 VITALS
RESPIRATION RATE: 16 BRPM | SYSTOLIC BLOOD PRESSURE: 104 MMHG | BODY MASS INDEX: 27.9 KG/M2 | DIASTOLIC BLOOD PRESSURE: 69 MMHG | HEIGHT: 68 IN | WEIGHT: 184.08 LBS | OXYGEN SATURATION: 98 % | TEMPERATURE: 97 F | HEART RATE: 54 BPM

## 2025-05-03 LAB
ALBUMIN SERPL-MCNC: 2.5 G/DL (ref 3.5–5.2)
ALP SERPL-CCNC: 69 U/L (ref 40–129)
ALT SERPL-CCNC: 19 U/L (ref 10–50)
ANION GAP SERPL CALCULATED.3IONS-SCNC: 9 MMOL/L (ref 8–16)
ANISOCYTOSIS BLD QL SMEAR: ABNORMAL
AST SERPL-CCNC: 36 U/L (ref 10–50)
BASOPHILS # BLD: 0 K/UL (ref 0–0.2)
BASOPHILS NFR BLD: 0.3 % (ref 0–1)
BILIRUB SERPL-MCNC: 3.5 MG/DL (ref 0.2–1.2)
BUN SERPL-MCNC: 8 MG/DL (ref 8–23)
BURR CELLS BLD QL SMEAR: ABNORMAL
CALCIUM SERPL-MCNC: 7.5 MG/DL (ref 8.8–10.2)
CHLORIDE SERPL-SCNC: 105 MMOL/L (ref 98–107)
CO2 SERPL-SCNC: 21 MMOL/L (ref 22–29)
CREAT SERPL-MCNC: 0.7 MG/DL (ref 0.7–1.2)
DACRYOCYTES BLD QL SMEAR: ABNORMAL
EOSINOPHIL # BLD: 0.1 K/UL (ref 0–0.6)
EOSINOPHIL NFR BLD: 4.5 % (ref 0–5)
ERYTHROCYTE [DISTWIDTH] IN BLOOD BY AUTOMATED COUNT: 20.5 % (ref 11.5–14.5)
GLUCOSE SERPL-MCNC: 107 MG/DL (ref 70–99)
HCT VFR BLD AUTO: 24.9 % (ref 42–52)
HGB BLD-MCNC: 7.5 G/DL (ref 14–18)
HYPOCHROMIA BLD QL SMEAR: ABNORMAL
IMM GRANULOCYTES # BLD: 0 K/UL
LYMPHOCYTES # BLD: 0.7 K/UL (ref 1.1–4.5)
LYMPHOCYTES NFR BLD: 24.7 % (ref 20–40)
MACROCYTES BLD QL SMEAR: ABNORMAL
MCH RBC QN AUTO: 30 PG (ref 27–31)
MCHC RBC AUTO-ENTMCNC: 30.1 G/DL (ref 33–37)
MCV RBC AUTO: 99.6 FL (ref 80–94)
MONOCYTES # BLD: 0.5 K/UL (ref 0–0.9)
MONOCYTES NFR BLD: 16.4 % (ref 0–10)
NEUTROPHILS # BLD: 1.6 K/UL (ref 1.5–7.5)
NEUTS SEG NFR BLD: 53.4 % (ref 50–65)
PLATELET # BLD AUTO: 31 K/UL (ref 130–400)
PLATELET SLIDE REVIEW: ABNORMAL
PMV BLD AUTO: 11.8 FL (ref 9.4–12.4)
POTASSIUM SERPL-SCNC: 3.8 MMOL/L (ref 3.5–5)
PROT SERPL-MCNC: 4.4 G/DL (ref 6.4–8.3)
RBC # BLD AUTO: 2.5 M/UL (ref 4.7–6.1)
SODIUM SERPL-SCNC: 135 MMOL/L (ref 136–145)
WBC # BLD AUTO: 2.9 K/UL (ref 4.8–10.8)

## 2025-05-03 PROCEDURE — 6370000000 HC RX 637 (ALT 250 FOR IP): Performed by: INTERNAL MEDICINE

## 2025-05-03 PROCEDURE — 80053 COMPREHEN METABOLIC PANEL: CPT

## 2025-05-03 PROCEDURE — 85025 COMPLETE CBC W/AUTO DIFF WBC: CPT

## 2025-05-03 PROCEDURE — 0DJD8ZZ INSPECTION OF LOWER INTESTINAL TRACT, VIA NATURAL OR ARTIFICIAL OPENING ENDOSCOPIC: ICD-10-PCS | Performed by: INTERNAL MEDICINE

## 2025-05-03 PROCEDURE — 2580000003 HC RX 258: Performed by: INTERNAL MEDICINE

## 2025-05-03 PROCEDURE — 36415 COLL VENOUS BLD VENIPUNCTURE: CPT

## 2025-05-03 PROCEDURE — 94760 N-INVAS EAR/PLS OXIMETRY 1: CPT

## 2025-05-03 PROCEDURE — 6360000002 HC RX W HCPCS: Performed by: INTERNAL MEDICINE

## 2025-05-03 PROCEDURE — 2709999900 HC NON-CHARGEABLE SUPPLY: Performed by: INTERNAL MEDICINE

## 2025-05-03 PROCEDURE — 3700000000 HC ANESTHESIA ATTENDED CARE: Performed by: INTERNAL MEDICINE

## 2025-05-03 PROCEDURE — 6360000002 HC RX W HCPCS

## 2025-05-03 PROCEDURE — 99232 SBSQ HOSP IP/OBS MODERATE 35: CPT | Performed by: INTERNAL MEDICINE

## 2025-05-03 PROCEDURE — 7100000000 HC PACU RECOVERY - FIRST 15 MIN: Performed by: INTERNAL MEDICINE

## 2025-05-03 PROCEDURE — 2700000000 HC OXYGEN THERAPY PER DAY

## 2025-05-03 PROCEDURE — 2580000003 HC RX 258

## 2025-05-03 PROCEDURE — 7100000001 HC PACU RECOVERY - ADDTL 15 MIN: Performed by: INTERNAL MEDICINE

## 2025-05-03 PROCEDURE — 3609027000 HC COLONOSCOPY: Performed by: INTERNAL MEDICINE

## 2025-05-03 PROCEDURE — 3700000001 HC ADD 15 MINUTES (ANESTHESIA): Performed by: INTERNAL MEDICINE

## 2025-05-03 RX ORDER — LIDOCAINE HYDROCHLORIDE 10 MG/ML
INJECTION, SOLUTION INFILTRATION; PERINEURAL
Status: DISCONTINUED | OUTPATIENT
Start: 2025-05-03 | End: 2025-05-03 | Stop reason: SDUPTHER

## 2025-05-03 RX ORDER — SODIUM CHLORIDE, SODIUM LACTATE, POTASSIUM CHLORIDE, CALCIUM CHLORIDE 600; 310; 30; 20 MG/100ML; MG/100ML; MG/100ML; MG/100ML
INJECTION, SOLUTION INTRAVENOUS
Status: DISCONTINUED | OUTPATIENT
Start: 2025-05-03 | End: 2025-05-03 | Stop reason: SDUPTHER

## 2025-05-03 RX ORDER — SPIRONOLACTONE 25 MG/1
25 TABLET ORAL DAILY
Qty: 30 TABLET | Refills: 0 | Status: SHIPPED | OUTPATIENT
Start: 2025-05-03

## 2025-05-03 RX ORDER — PROPOFOL 10 MG/ML
INJECTION, EMULSION INTRAVENOUS
Status: DISCONTINUED | OUTPATIENT
Start: 2025-05-03 | End: 2025-05-03 | Stop reason: SDUPTHER

## 2025-05-03 RX ORDER — HYDROCORTISONE ACETATE 25 MG/1
25 SUPPOSITORY RECTAL 2 TIMES DAILY PRN
Qty: 60 SUPPOSITORY | Refills: 0 | Status: SHIPPED | OUTPATIENT
Start: 2025-05-03

## 2025-05-03 RX ORDER — SPIRONOLACTONE 25 MG/1
25 TABLET ORAL DAILY
Status: DISCONTINUED | OUTPATIENT
Start: 2025-05-03 | End: 2025-05-03 | Stop reason: HOSPADM

## 2025-05-03 RX ADMIN — Medication 100 MG: at 13:11

## 2025-05-03 RX ADMIN — SODIUM CHLORIDE 300 MG: 0.9 INJECTION, SOLUTION INTRAVENOUS at 05:52

## 2025-05-03 RX ADMIN — PROPOFOL 30 MG: 10 INJECTION, EMULSION INTRAVENOUS at 10:17

## 2025-05-03 RX ADMIN — LIDOCAINE HYDROCHLORIDE 80 MG: 10 INJECTION, SOLUTION INFILTRATION; PERINEURAL at 10:07

## 2025-05-03 RX ADMIN — SODIUM CHLORIDE, SODIUM LACTATE, POTASSIUM CHLORIDE, AND CALCIUM CHLORIDE: 600; 310; 30; 20 INJECTION, SOLUTION INTRAVENOUS at 10:07

## 2025-05-03 RX ADMIN — FUROSEMIDE 20 MG: 20 TABLET ORAL at 13:11

## 2025-05-03 RX ADMIN — LACTULOSE 10 G: 20 SOLUTION ORAL at 13:14

## 2025-05-03 RX ADMIN — RIFAXIMIN 550 MG: 550 TABLET ORAL at 13:11

## 2025-05-03 RX ADMIN — PHYTONADIONE 10 MG: 5 TABLET ORAL at 13:11

## 2025-05-03 RX ADMIN — SUCRALFATE 1 G: 1 TABLET ORAL at 13:11

## 2025-05-03 RX ADMIN — SPIRONOLACTONE 25 MG: 25 TABLET ORAL at 13:11

## 2025-05-03 RX ADMIN — Medication 500 MG: at 13:11

## 2025-05-03 RX ADMIN — PROPOFOL 50 MG: 10 INJECTION, EMULSION INTRAVENOUS at 10:09

## 2025-05-03 RX ADMIN — NADOLOL 20 MG: 20 TABLET ORAL at 13:11

## 2025-05-03 RX ADMIN — PROPOFOL 30 MG: 10 INJECTION, EMULSION INTRAVENOUS at 10:13

## 2025-05-03 RX ADMIN — Medication 400 MG: at 13:11

## 2025-05-03 RX ADMIN — PROPOFOL 30 MG: 10 INJECTION, EMULSION INTRAVENOUS at 10:22

## 2025-05-03 ASSESSMENT — LIFESTYLE VARIABLES: SMOKING_STATUS: 1

## 2025-05-03 NOTE — PROGRESS NOTES
MEDICAL ONCOLOGY PROGRESS NOTE     Pt Name: Ney Ann  MRN: [id number]  YOB: 1955  Date of evaluation: 5/3/2025    REASON FOR CONSULTATION: Thrombocytopenia    SUBJECTIVE:  Easily arousable, anticipating colonoscopy today.  Discussed with nursing staff who confirmed colonoscopy for today, 5/3/2025      Recent Labs     05/03/25  0240 05/02/25  1701 05/02/25  1150 05/02/25  0226 05/01/25  1218 05/01/25  0627   WBC 2.9*  --   --  2.4*  --  2.9*   HGB 7.5* 8.7* 7.6* 7.5*   < > 7.7*   HCT 24.9* 28.0* 26.6* 23.7*   < > 24.4*   MCV 99.6*  --   --  96.0*  --  96.4*   PLT 31*  --   --  25*  --  24*    < > = values in this interval not displayed.           HISTORY OF PRESENT ILLNESS:  Ney Ann was first seen by me on 5/2/2025.  I was consulted during patient hospitalization [facility] for findings of thrombocytopenia.  The patient presents to the ER department for evaluation of abnormal labs.  Patient has a history of recurrent GI bleed for the last few months.  The patient reported melanotic stools.  Patient also had complaint of generalized fatigue, short of breath.  Patient was seen by outpatient GI.  Labs performed showed a CBC of hemoglobin 4.3.  Patient was sent to the emergency department.  Patient received 4 units PRBCs.    Laboratory studies hemoglobin 7.5 after 4 units PRBC, WBC 2.4/ANC 1.2 and platelet count 25,000 (previously 65,000 4/28/2025).  Recent anemia profile showed iron 34, TIBC 289, iron saturation 12%.      4/29/2025-CT abdomen pelvis with IV contrast, [facility]:   Small bilateral pleural effusions with mild associated atelectasis, left greater than right. Not present previously.   Chronic interstitial changes without consolidation.   Bilateral retroareolar soft tissue could be secondary to gynecomastia.   Cirrhotic nodular heterogeneous appearance. Stable cyst seen in the left lobe. Stable cyst seen in the inferior right lobe.   Spleen: Continued enlargement measuring 17.4 cm in the AP  medical illness/prognosis-inpatient palliative care consultation    PLAN:  Ferritin  Venofer 300, 300, 400 mg, started 5/02/25  Transfuse if hemoglobin<7 and/or persistent bleeding  Transfuse if platelet counts<30,000  Vitamin K 10 mg p.o. daily x 3 days, started 5/2/2025  Continue to monitor hemoglobin platelet counts  Inpatient palliative care consultation to discuss goals  Transfuse 1 unit platelets for platelet count of 25,000 due to ongoing GI bleed   Anticipating colonoscopy today, 5/3/2025    I have seen, examined and reviewed this patient medication list, appropriate labs and imaging studies. I reviewed relevant medical records and others physician’s notes. I discussed the plans of care with the patient. I answered all the questions to the patient’s satisfaction. I have also reviewed the chief complaint (CC) and part of the history (History of Present Illness (HPI), Past Family Social History (PFSH), or Review of Systems (ROS) and made changes when appropriated.       (Please note that portions of this note were completed with a voice recognition program. Efforts were made to edit the dictations but occasionally words are mis-transcribed.)      José Miguel Fairbanks MD    05/03/25  6:45 AM

## 2025-05-03 NOTE — ANESTHESIA PRE PROCEDURE
CALCIUM 7.5 05/03/2025 02:40 AM    BILITOT 3.5 05/03/2025 02:40 AM    ALKPHOS 69 05/03/2025 02:40 AM    AST 36 05/03/2025 02:40 AM    ALT 19 05/03/2025 02:40 AM       POC Tests: No results for input(s): \"POCGLU\", \"POCNA\", \"POCK\", \"POCCL\", \"POCBUN\", \"POCHEMO\", \"POCHCT\" in the last 72 hours.    Coags:   Lab Results   Component Value Date/Time    PROTIME 24.0 04/30/2025 06:27 AM    INR 2.19 04/30/2025 06:27 AM    APTT 36.2 04/30/2025 06:27 AM       HCG (If Applicable): No results found for: \"PREGTESTUR\", \"PREGSERUM\", \"HCG\", \"HCGQUANT\"     ABGs: No results found for: \"PHART\", \"PO2ART\", \"KIV7NOJ\", \"FKL7IME\", \"BEART\", \"C7CYCHZN\"     Type & Screen (If Applicable):  Lab Results   Component Value Date    ABORH A POS 04/29/2025    LABANTI NEG 04/29/2025       Drug/Infectious Status (If Applicable):  No results found for: \"HIV\", \"HEPCAB\"    COVID-19 Screening (If Applicable):   Lab Results   Component Value Date/Time    COVID19 Not Detected 03/29/2025 03:13 PM           Anesthesia Evaluation  Patient summary reviewed and Nursing notes reviewed   no history of anesthetic complications (2L O2):   Airway: Mallampati: II  TM distance: >3 FB   Neck ROM: full  Mouth opening: > = 3 FB   Dental:    (+) lower dentures and poor dentition      Pulmonary:   (+)           current smoker    (-) COPD, asthma and sleep apnea          Patient did not smoke on day of surgery.                ROS comment: Non productive cough   Cardiovascular:  Exercise tolerance: no interval change  (+) hypertension:, dysrhythmias: atrial fibrillation    (-) pacemaker, past MI, CAD, CABG/stent and  CHF    ECG reviewed      Echocardiogram reviewed         Beta Blocker:  Dose within 24 Hrs      ROS comment: Echo 2022:  Left ventricle had normal chamber size and thickness   Preserved systolic function with estimated ejection fraction of 74%   No regional wall motion abnormality   Normal diastolic function   No significant valvular lesion seen   Mild mitral

## 2025-05-03 NOTE — PLAN OF CARE
Problem: Safety - Adult  Goal: Free from fall injury  5/3/2025 1420 by Charlotte Springer RN  Outcome: Adequate for Discharge  5/3/2025 0230 by Daya Chamorro RN  Outcome: Progressing     Problem: ABCDS Injury Assessment  Goal: Absence of physical injury  5/3/2025 1420 by Charlotte Springer RN  Outcome: Adequate for Discharge  5/3/2025 0230 by Daya Chamorro RN  Outcome: Progressing

## 2025-05-03 NOTE — PROCEDURES
Patient: Ney Ann : 1955  Med Rec#: [id number] Acc#: [id number]   Primary Care Provider Izabela Robles APRN - CNP    Endoscopist: Jl Reyes MD    Referring Provider: Izabela Robles APRN - CNP,     Dr. Melissa Orlando    Date of Procedure: 5/3/2025    Pre-Op Diagnosis: Rule out hemorrhoidal bleed.  Rule out diverticular bleed.    Post-Op Diagnosis: 1.  Mild left-sided diverticulosis.  2.  Moderate internal hemorrhoids.  3.  Diminutive descending colon polyp, not removed due to severe thrombocytopenia.  4.  No blood noted and no source of bleeding identified.       Procedure(s):  COLONOSCOPY DIAGNOSTIC    Indications: Hematochezia.  Anemia.  Cirrhosis of liver.    Anesthesia:  Sedation was administered by anesthesia who monitored the patient during the procedure.    I met with Ney Ann prior to procedure. We discussed the procedure itself, and I have discussed the risks of endoscopy (including-- but not limited to-- pain, discomfort, bleeding potentially requiring second endoscopic procedure and/or blood transfusion, organ perforation requiring operative repair, damage to organs near the colon, infection, aspiration, cardiopulmonary/allergic reaction), benefits, indications to endoscopy. Additionally, we discussed options other than colonoscopy. The patient expressed understanding. All questions answered. The patient decided to proceed with the procedure.  Signed informed consent was placed on the chart.        DESCRIPTION OF PROCEDURE:     A time out was performed. After written informed consent was obtained, the patient was placed in the left lateral position.  Digital rectal examination was done.  The videocolonoscope was then introduced in the rectum advanced under direct vision up to the cecum without any difficulty.         Findings:   Digital rectal examination revealed, normal sphincter tone, normal mucosa and there were no masses palpable.  Examination of the colon was

## 2025-05-03 NOTE — DISCHARGE SUMMARY
[facility]    Discharge Summary      Ney Ann  :  1955  MRN:  [id number]    Admit date:  2025  Discharge date:    5/3/2025    Discharging Physician:  Dr. Melissa Orlando     Advance Directive: Full Code    Consults: GI, hematology/oncology, psychiatry, and palliative care    Primary Care Physician:  Izabela Robles, APRN - CNP    Discharge Diagnoses:  Principal Problem:    GIB (gastrointestinal bleeding)  Active Problems:    Liver disease    Anemia    Esophageal varices without bleeding, unspecified esophageal varices type (HCC)    Palliative care patient    Alcoholic cirrhosis (HCC)    Atrial fibrillation (HCC)    Suicidal ideations    End stage liver disease (HCC)    Bleeding esophageal varices (HCC)    Acute blood loss anemia    Hematochezia  Resolved Problems:    * No resolved hospital problems. *      Portions of this note have been copied forward, however, changed to reflect the most current clinical status of this patient.    Hospital Course:   The patient is a 70 y.o. male with PMH of alcoholic cirrhosis, esophageal varices, atrial fibrillation, hypertension, and hypothyroidism who presented to [facility] ED for evaluation abnormal labs. Has history of recurrent GI bleeding, ongoing for the last several months. He had dark tarry stools associated with fatigue and shortness of breath. His GI doctor had ordered labs and was found to have profound anemia with Hgb 4.9 and sent to ED for further evaluation. Workup in ED revealed  hemoglobin 4.3 and he is being transfused 2 units PRBCs.  Chemistry panel shows normal renal function, total bilirubin 2.9 which is improved from prior.  CBC shows no leukocytosis, hemoglobin 4.3, hematocrit 15.0, platelet 56, INR 2.34.  CT AP shows no acute abnormality, ongoing ascites and small bilateral effusions with new compression fracture of L1 vertebral body without retropulsion, and findings of portal hypertension.  He was initiated on Protonix and  /69   Pulse 54   Temp 97 °F (36.1 °C)   Resp 16   Ht 1.727 m (5' 8\")   Wt 83.5 kg (184 lb 1.4 oz)   SpO2 98%   BMI 27.99 kg/m²   General appearance:.Alert and Cooperative   HEENT: Normocephalic.  Chest: Lung sounds clear bilaterally without wheezes or rhonchi.  Cardiac: RRR, S1, S2 normal. No murmurs, gallops, or rubs auscultated.   Abdomen: soft, non-tender; non-distended normal bowel sounds no masses, no organomegaly.  Extremities: No clubbing or cyanosis. No peripheral edema. Peripheral pulses palpable.  Skin: Jaundiced   Neurologic: Grossly intact.        Discharge Medications:          Medication List        START taking these medications      hydrocortisone 25 MG suppository  Commonly known as: ANUSOL-HC  Place 1 suppository rectally 2 times daily as needed for Hemorrhoids            CHANGE how you take these medications      pantoprazole 40 MG tablet  Commonly known as: PROTONIX  Take 1 tablet by mouth 2 times daily (before meals)  What changed: Another medication with the same name was removed. Continue taking this medication, and follow the directions you see here.     spironolactone 25 MG tablet  Commonly known as: ALDACTONE  Take 1 tablet by mouth daily  What changed:   medication strength  how much to take            CONTINUE taking these medications      furosemide 20 MG tablet  Commonly known as: LASIX  TAKE 1 TABLET BY MOUTH IN THE  MORNING AND 1 TABLET BY MOUTH IN THE EVENING     K-Phos 500 MG tablet  Generic drug: potassium phosphate (monobasic)  Take 1 tablet by mouth 2 times daily     lactulose 10 GM/15ML solution  Commonly known as: CHRONULAC  Take 15 mLs by mouth 2 times daily     magnesium oxide 400 (240 Mg) MG tablet  Commonly known as: MAG-OX  TAKE 1 TABLET BY MOUTH DAILY     nadolol 20 MG tablet  Commonly known as: CORGARD  Take 1 tablet by mouth daily     rifAXIMin 550 MG tablet  Commonly known as: XIFAXAN  Take 1 tablet by mouth 2 times daily     sucralfate 1 GM

## 2025-05-03 NOTE — ANESTHESIA POSTPROCEDURE EVALUATION
Department of Anesthesiology  Postprocedure Note    Patient: Ney Ann  MRN: [id number]  YOB: 1955  Date of evaluation: 5/3/2025    Procedure Summary       Date: 05/03/25 Room / Location: [place] [address] / [facility]    Anesthesia Start: 1007 Anesthesia Stop: 1032    Procedure: COLONOSCOPY DIAGNOSTIC Diagnosis:       Anemia, unspecified type      (Anemia, unspecified type [D64.9])    Surgeons: Jl Reyes MD Responsible Provider: Desiree Boyd APRN - CRNA    Anesthesia Type: general, TIVA ASA Status: 4            Anesthesia Type: No value filed.    Rod Phase I: Rod Score: 9    Rod Phase II:      Anesthesia Post Evaluation    Patient location during evaluation: PACU  Patient participation: complete - patient participated  Level of consciousness: awake  Pain score: 0  Airway patency: patent  Nausea & Vomiting: no nausea and no vomiting  Cardiovascular status: hemodynamically stable  Respiratory status: acceptable and spontaneous ventilation  Hydration status: stable  Comments: BP (!) 97/40   Pulse 63   Temp 97 °F (36.1 °C)   Resp 20   Ht 1.727 m (5' 8\")   Wt 83.5 kg (184 lb 1.4 oz)   SpO2 90%   BMI 27.99 kg/m²     Pain management: adequate    No notable events documented.

## 2025-05-05 ENCOUNTER — TELEPHONE (OUTPATIENT)
Dept: PRIMARY CARE CLINIC | Age: 70
End: 2025-05-05

## 2025-05-05 ENCOUNTER — APPOINTMENT (OUTPATIENT)
Dept: GENERAL RADIOLOGY | Age: 70
DRG: 813 | End: 2025-05-05
Payer: MEDICARE

## 2025-05-05 ENCOUNTER — APPOINTMENT (OUTPATIENT)
Dept: CT IMAGING | Age: 70
DRG: 813 | End: 2025-05-05
Payer: MEDICARE

## 2025-05-05 ENCOUNTER — HOSPITAL ENCOUNTER (INPATIENT)
Age: 70
LOS: 8 days | Discharge: HOME | DRG: 813 | End: 2025-05-13
Attending: PEDIATRICS | Admitting: STUDENT IN AN ORGANIZED HEALTH CARE EDUCATION/TRAINING PROGRAM
Payer: MEDICARE

## 2025-05-05 DIAGNOSIS — S30.1XXA: Primary | ICD-10-CM

## 2025-05-05 DIAGNOSIS — K72.10 CHRONIC LIVER FAILURE WITHOUT HEPATIC COMA (HCC): ICD-10-CM

## 2025-05-05 DIAGNOSIS — J18.9 PNEUMONIA OF LEFT LOWER LOBE DUE TO INFECTIOUS ORGANISM: ICD-10-CM

## 2025-05-05 DIAGNOSIS — B34.8 RHINOVIRUS INFECTION: ICD-10-CM

## 2025-05-05 LAB
ABO/RH: NORMAL
ALBUMIN SERPL-MCNC: 2.7 G/DL (ref 3.5–5.2)
ALP SERPL-CCNC: 80 U/L (ref 40–129)
ALT SERPL-CCNC: 22 U/L (ref 10–50)
ANION GAP SERPL CALCULATED.3IONS-SCNC: 9 MMOL/L (ref 8–16)
ANISOCYTOSIS BLD QL SMEAR: ABNORMAL
ANTIBODY SCREEN: NORMAL
APTT PPP: 41.1 SEC (ref 26–36.2)
AST SERPL-CCNC: 35 U/L (ref 10–50)
B PARAP IS1001 DNA NPH QL NAA+NON-PROBE: NOT DETECTED
B PERT.PT PRMT NPH QL NAA+NON-PROBE: NOT DETECTED
BASOPHILS # BLD: 0 K/UL (ref 0–0.2)
BASOPHILS NFR BLD: 0.2 % (ref 0–1)
BILIRUB SERPL-MCNC: 3.8 MG/DL (ref 0.2–1.2)
BLOOD BANK DISPENSE STATUS: NORMAL
BLOOD BANK PRODUCT CODE: NORMAL
BNP BLD-MCNC: 409 PG/ML (ref 0–124)
BPU ID: NORMAL
BUN SERPL-MCNC: 9 MG/DL (ref 8–23)
BURR CELLS BLD QL SMEAR: ABNORMAL
C PNEUM DNA NPH QL NAA+NON-PROBE: NOT DETECTED
CALCIUM SERPL-MCNC: 7.8 MG/DL (ref 8.8–10.2)
CHLORIDE SERPL-SCNC: 107 MMOL/L (ref 98–107)
CO2 SERPL-SCNC: 22 MMOL/L (ref 22–29)
CREAT SERPL-MCNC: 0.8 MG/DL (ref 0.7–1.2)
DACRYOCYTES BLD QL SMEAR: ABNORMAL
DESCRIPTION BLOOD BANK: NORMAL
EOSINOPHIL # BLD: 0.2 K/UL (ref 0–0.6)
EOSINOPHIL NFR BLD: 3.8 % (ref 0–5)
ERYTHROCYTE [DISTWIDTH] IN BLOOD BY AUTOMATED COUNT: 24.9 % (ref 11.5–14.5)
FLUAV RNA NPH QL NAA+NON-PROBE: NOT DETECTED
FLUBV RNA NPH QL NAA+NON-PROBE: NOT DETECTED
GLUCOSE SERPL-MCNC: 142 MG/DL (ref 70–99)
HADV DNA NPH QL NAA+NON-PROBE: NOT DETECTED
HCOV 229E RNA NPH QL NAA+NON-PROBE: NOT DETECTED
HCOV HKU1 RNA NPH QL NAA+NON-PROBE: NOT DETECTED
HCOV NL63 RNA NPH QL NAA+NON-PROBE: NOT DETECTED
HCOV OC43 RNA NPH QL NAA+NON-PROBE: NOT DETECTED
HCT VFR BLD AUTO: 20.2 % (ref 42–52)
HGB BLD-MCNC: 6.3 G/DL (ref 14–18)
HMPV RNA NPH QL NAA+NON-PROBE: NOT DETECTED
HPIV1 RNA NPH QL NAA+NON-PROBE: NOT DETECTED
HPIV2 RNA NPH QL NAA+NON-PROBE: NOT DETECTED
HPIV3 RNA NPH QL NAA+NON-PROBE: NOT DETECTED
HPIV4 RNA NPH QL NAA+NON-PROBE: NOT DETECTED
HYPOCHROMIA BLD QL SMEAR: ABNORMAL
IMM GRANULOCYTES # BLD: 0.1 K/UL
INR PPP: 2.22 (ref 0.88–1.18)
LYMPHOCYTES # BLD: 0.9 K/UL (ref 1.1–4.5)
LYMPHOCYTES NFR BLD: 21.2 % (ref 20–40)
M PNEUMO DNA NPH QL NAA+NON-PROBE: NOT DETECTED
MACROCYTES BLD QL SMEAR: ABNORMAL
MAGNESIUM SERPL-MCNC: 1.5 MG/DL (ref 1.6–2.4)
MCH RBC QN AUTO: 32 PG (ref 27–31)
MCHC RBC AUTO-ENTMCNC: 31.2 G/DL (ref 33–37)
MCV RBC AUTO: 102.5 FL (ref 80–94)
MONOCYTES # BLD: 0.6 K/UL (ref 0–0.9)
MONOCYTES NFR BLD: 13.4 % (ref 0–10)
NEUTROPHILS # BLD: 2.5 K/UL (ref 1.5–7.5)
NEUTS SEG NFR BLD: 59.5 % (ref 50–65)
PLATELET # BLD AUTO: 43 K/UL (ref 130–400)
PMV BLD AUTO: 10.8 FL (ref 9.4–12.4)
POLYCHROMASIA BLD QL SMEAR: ABNORMAL
POTASSIUM SERPL-SCNC: 3.4 MMOL/L (ref 3.5–5.1)
PROCALCITONIN: 0.07 NG/ML (ref 0–0.09)
PROT SERPL-MCNC: 4.5 G/DL (ref 6.4–8.3)
PROTHROMBIN TIME: 24.3 SEC (ref 12–14.6)
RBC # BLD AUTO: 1.97 M/UL (ref 4.7–6.1)
RSV RNA NPH QL NAA+NON-PROBE: NOT DETECTED
RV+EV RNA NPH QL NAA+NON-PROBE: DETECTED
SARS-COV-2 RNA NPH QL NAA+NON-PROBE: NOT DETECTED
SODIUM SERPL-SCNC: 138 MMOL/L (ref 136–145)
TROPONIN, HIGH SENSITIVITY: 15 NG/L (ref 0–22)
WBC # BLD AUTO: 4.2 K/UL (ref 4.8–10.8)

## 2025-05-05 PROCEDURE — 86850 RBC ANTIBODY SCREEN: CPT

## 2025-05-05 PROCEDURE — P9059 PLASMA, FRZ BETWEEN 8-24HOUR: HCPCS

## 2025-05-05 PROCEDURE — 86923 COMPATIBILITY TEST ELECTRIC: CPT

## 2025-05-05 PROCEDURE — 6360000004 HC RX CONTRAST MEDICATION: Performed by: PEDIATRICS

## 2025-05-05 PROCEDURE — 96365 THER/PROPH/DIAG IV INF INIT: CPT

## 2025-05-05 PROCEDURE — 71045 X-RAY EXAM CHEST 1 VIEW: CPT

## 2025-05-05 PROCEDURE — 83880 ASSAY OF NATRIURETIC PEPTIDE: CPT

## 2025-05-05 PROCEDURE — 85610 PROTHROMBIN TIME: CPT

## 2025-05-05 PROCEDURE — 87641 MR-STAPH DNA AMP PROBE: CPT

## 2025-05-05 PROCEDURE — 99285 EMERGENCY DEPT VISIT HI MDM: CPT

## 2025-05-05 PROCEDURE — 0202U NFCT DS 22 TRGT SARS-COV-2: CPT

## 2025-05-05 PROCEDURE — 30233N1 TRANSFUSION OF NONAUTOLOGOUS RED BLOOD CELLS INTO PERIPHERAL VEIN, PERCUTANEOUS APPROACH: ICD-10-PCS | Performed by: PEDIATRICS

## 2025-05-05 PROCEDURE — 86901 BLOOD TYPING SEROLOGIC RH(D): CPT

## 2025-05-05 PROCEDURE — P9016 RBC LEUKOCYTES REDUCED: HCPCS

## 2025-05-05 PROCEDURE — 85730 THROMBOPLASTIN TIME PARTIAL: CPT

## 2025-05-05 PROCEDURE — 1200000000 HC SEMI PRIVATE

## 2025-05-05 PROCEDURE — 83735 ASSAY OF MAGNESIUM: CPT

## 2025-05-05 PROCEDURE — 84145 PROCALCITONIN (PCT): CPT

## 2025-05-05 PROCEDURE — 84484 ASSAY OF TROPONIN QUANT: CPT

## 2025-05-05 PROCEDURE — 93005 ELECTROCARDIOGRAM TRACING: CPT | Performed by: PEDIATRICS

## 2025-05-05 PROCEDURE — 80053 COMPREHEN METABOLIC PANEL: CPT

## 2025-05-05 PROCEDURE — 74174 CTA ABD&PLVS W/CONTRAST: CPT

## 2025-05-05 PROCEDURE — 85025 COMPLETE CBC W/AUTO DIFF WBC: CPT

## 2025-05-05 PROCEDURE — 6360000002 HC RX W HCPCS: Performed by: PEDIATRICS

## 2025-05-05 PROCEDURE — 2580000003 HC RX 258: Performed by: PEDIATRICS

## 2025-05-05 PROCEDURE — P9073 PLATELETS PHERESIS PATH REDU: HCPCS

## 2025-05-05 PROCEDURE — 86900 BLOOD TYPING SEROLOGIC ABO: CPT

## 2025-05-05 RX ORDER — SODIUM CHLORIDE 9 MG/ML
INJECTION, SOLUTION INTRAVENOUS PRN
Status: DISCONTINUED | OUTPATIENT
Start: 2025-05-05 | End: 2025-05-06

## 2025-05-05 RX ORDER — ACETAMINOPHEN 650 MG/1
650 SUPPOSITORY RECTAL EVERY 6 HOURS PRN
Status: DISCONTINUED | OUTPATIENT
Start: 2025-05-05 | End: 2025-05-13 | Stop reason: HOSPADM

## 2025-05-05 RX ORDER — IOPAMIDOL 755 MG/ML
70 INJECTION, SOLUTION INTRAVASCULAR
Status: COMPLETED | OUTPATIENT
Start: 2025-05-05 | End: 2025-05-05

## 2025-05-05 RX ORDER — ONDANSETRON 4 MG/1
4 TABLET, ORALLY DISINTEGRATING ORAL EVERY 8 HOURS PRN
Status: DISCONTINUED | OUTPATIENT
Start: 2025-05-05 | End: 2025-05-13 | Stop reason: HOSPADM

## 2025-05-05 RX ORDER — SODIUM CHLORIDE 0.9 % (FLUSH) 0.9 %
5-40 SYRINGE (ML) INJECTION EVERY 12 HOURS SCHEDULED
Status: DISCONTINUED | OUTPATIENT
Start: 2025-05-05 | End: 2025-05-06

## 2025-05-05 RX ORDER — SODIUM CHLORIDE 9 MG/ML
INJECTION, SOLUTION INTRAVENOUS PRN
Status: DISCONTINUED | OUTPATIENT
Start: 2025-05-05 | End: 2025-05-13 | Stop reason: HOSPADM

## 2025-05-05 RX ORDER — POTASSIUM CHLORIDE 7.45 MG/ML
10 INJECTION INTRAVENOUS ONCE
Status: COMPLETED | OUTPATIENT
Start: 2025-05-05 | End: 2025-05-06

## 2025-05-05 RX ORDER — POLYETHYLENE GLYCOL 3350 17 G/17G
17 POWDER, FOR SOLUTION ORAL DAILY PRN
Status: DISCONTINUED | OUTPATIENT
Start: 2025-05-05 | End: 2025-05-13 | Stop reason: HOSPADM

## 2025-05-05 RX ORDER — ALBUTEROL SULFATE 0.83 MG/ML
2.5 SOLUTION RESPIRATORY (INHALATION) EVERY 6 HOURS PRN
Status: DISCONTINUED | OUTPATIENT
Start: 2025-05-05 | End: 2025-05-13 | Stop reason: HOSPADM

## 2025-05-05 RX ORDER — MAGNESIUM SULFATE IN WATER 40 MG/ML
2000 INJECTION, SOLUTION INTRAVENOUS ONCE
Status: COMPLETED | OUTPATIENT
Start: 2025-05-05 | End: 2025-05-06

## 2025-05-05 RX ORDER — ONDANSETRON 2 MG/ML
4 INJECTION INTRAMUSCULAR; INTRAVENOUS EVERY 6 HOURS PRN
Status: DISCONTINUED | OUTPATIENT
Start: 2025-05-05 | End: 2025-05-13 | Stop reason: HOSPADM

## 2025-05-05 RX ORDER — SODIUM CHLORIDE 0.9 % (FLUSH) 0.9 %
5-40 SYRINGE (ML) INJECTION PRN
Status: DISCONTINUED | OUTPATIENT
Start: 2025-05-05 | End: 2025-05-13 | Stop reason: HOSPADM

## 2025-05-05 RX ORDER — ACETAMINOPHEN 325 MG/1
650 TABLET ORAL EVERY 6 HOURS PRN
Status: DISCONTINUED | OUTPATIENT
Start: 2025-05-05 | End: 2025-05-13 | Stop reason: HOSPADM

## 2025-05-05 RX ADMIN — VANCOMYCIN HYDROCHLORIDE 2000 MG: 5 INJECTION, POWDER, LYOPHILIZED, FOR SOLUTION INTRAVENOUS at 23:29

## 2025-05-05 RX ADMIN — IOPAMIDOL 70 ML: 755 INJECTION, SOLUTION INTRAVENOUS at 21:13

## 2025-05-05 RX ADMIN — CEFEPIME 2000 MG: 2 INJECTION, POWDER, FOR SOLUTION INTRAVENOUS at 22:12

## 2025-05-05 NOTE — TELEPHONE ENCOUNTER
Called pt and erged to be seen in the ER. Wife kept contradicting reasoning. Pt expressed they would go be evaluated.

## 2025-05-05 NOTE — TELEPHONE ENCOUNTER
Kirsten with [facility] calling  Released Saturday from hospital, has bruise on left lower side near his abdomen,fall in hospital, now spot is getting bigger and belly is really hard. Was informed to go to er and he refused

## 2025-05-05 NOTE — TELEPHONE ENCOUNTER
Care Transitions Initial Follow Up Call    Outreach made within 2 business days of discharge: Yes    Patient: Ney Ann   Patient : 1955   MRN: [id number]    Reason for Admission: GIB (gastrointestinal bleeding)  HGB 4.9  Discharge Date: 5/3/25       Spoke with: Spouse Georgia    Discharge department/facility: [facility] Interactive Patient Contact:  Was patient able to fill all prescriptions: Yes  Was patient instructed to bring all medications to the follow-up visit: Yes  Is patient taking all medications as directed in the discharge summary? Yes  Does patient understand their discharge instructions: Yes  Does patient have questions or concerns that need addressed prior to 7-14 day follow up office visit: no    Additional needs identified to be addressed with provider  High priority: He had a fall at the hospital that he did not tell anyone about. Now has a large area of bruising that is growing. Appointment moved up to 25.             Scheduled appointment with PCP within 7-14 days    After speaking with his wife Georgia we moved his appointment up to 25.        Follow Up  Future Appointments   Date Time Provider Department Center   2025 10:00 AM Izabela Robles APRN - CNP [facility] DEP   2025 10:30 AM Izabela Robles APRN - CNP [facility] DEP   2025 10:00 AM Lavon Keane APRN N [facility]       Bernice Guzmán MA

## 2025-05-06 PROBLEM — S30.1XXA HEMATOMA OF LEFT FLANK: Status: ACTIVE | Noted: 2025-05-06

## 2025-05-06 LAB
ALBUMIN SERPL-MCNC: 2.9 G/DL (ref 3.5–5.2)
ALP SERPL-CCNC: 68 U/L (ref 40–129)
ALT SERPL-CCNC: 21 U/L (ref 10–50)
ANION GAP SERPL CALCULATED.3IONS-SCNC: 11 MMOL/L (ref 8–16)
AST SERPL-CCNC: 30 U/L (ref 10–50)
BILIRUB SERPL-MCNC: 4.7 MG/DL (ref 0.2–1.2)
BLOOD BANK DISPENSE STATUS: NORMAL
BLOOD BANK DISPENSE STATUS: NORMAL
BLOOD BANK PRODUCT CODE: NORMAL
BLOOD BANK PRODUCT CODE: NORMAL
BPU ID: NORMAL
BPU ID: NORMAL
BUN SERPL-MCNC: 8 MG/DL (ref 8–23)
CALCIUM SERPL-MCNC: 8.1 MG/DL (ref 8.8–10.2)
CHLORIDE SERPL-SCNC: 108 MMOL/L (ref 98–107)
CO2 SERPL-SCNC: 22 MMOL/L (ref 22–29)
CREAT SERPL-MCNC: 0.7 MG/DL (ref 0.7–1.2)
DESCRIPTION BLOOD BANK: NORMAL
DESCRIPTION BLOOD BANK: NORMAL
EKG P AXIS: NORMAL DEGREES
EKG P-R INTERVAL: NORMAL MS
EKG Q-T INTERVAL: 472 MS
EKG QRS DURATION: 150 MS
EKG QTC CALCULATION (BAZETT): 484 MS
EKG T AXIS: 94 DEGREES
ERYTHROCYTE [DISTWIDTH] IN BLOOD BY AUTOMATED COUNT: 25.3 % (ref 11.5–14.5)
GLUCOSE SERPL-MCNC: 111 MG/DL (ref 70–99)
HCT VFR BLD AUTO: 22.4 % (ref 42–52)
HCT VFR BLD AUTO: 24.8 % (ref 42–52)
HGB BLD-MCNC: 7 G/DL (ref 14–18)
HGB BLD-MCNC: 7.6 G/DL (ref 14–18)
INR PPP: 2.11 (ref 0.88–1.18)
MCH RBC QN AUTO: 31.3 PG (ref 27–31)
MCHC RBC AUTO-ENTMCNC: 30.6 G/DL (ref 33–37)
MCV RBC AUTO: 102.1 FL (ref 80–94)
MRSA DNA SPEC QL NAA+PROBE: NOT DETECTED
PLATELET # BLD AUTO: 55 K/UL (ref 130–400)
PMV BLD AUTO: 10.6 FL (ref 9.4–12.4)
POTASSIUM SERPL-SCNC: 3.7 MMOL/L (ref 3.5–5)
PROT SERPL-MCNC: 4.5 G/DL (ref 6.4–8.3)
PROTHROMBIN TIME: 23.3 SEC (ref 12–14.6)
RBC # BLD AUTO: 2.43 M/UL (ref 4.7–6.1)
SODIUM SERPL-SCNC: 141 MMOL/L (ref 136–145)
TROPONIN, HIGH SENSITIVITY: 22 NG/L (ref 0–22)
WBC # BLD AUTO: 6.1 K/UL (ref 4.8–10.8)

## 2025-05-06 PROCEDURE — 6360000002 HC RX W HCPCS: Performed by: INTERNAL MEDICINE

## 2025-05-06 PROCEDURE — 2000000000 HC ICU R&B

## 2025-05-06 PROCEDURE — 2700000000 HC OXYGEN THERAPY PER DAY

## 2025-05-06 PROCEDURE — 36415 COLL VENOUS BLD VENIPUNCTURE: CPT

## 2025-05-06 PROCEDURE — 2580000003 HC RX 258: Performed by: PEDIATRICS

## 2025-05-06 PROCEDURE — 85610 PROTHROMBIN TIME: CPT

## 2025-05-06 PROCEDURE — 2500000003 HC RX 250 WO HCPCS: Performed by: STUDENT IN AN ORGANIZED HEALTH CARE EDUCATION/TRAINING PROGRAM

## 2025-05-06 PROCEDURE — 94150 VITAL CAPACITY TEST: CPT

## 2025-05-06 PROCEDURE — 6370000000 HC RX 637 (ALT 250 FOR IP): Performed by: INTERNAL MEDICINE

## 2025-05-06 PROCEDURE — 94760 N-INVAS EAR/PLS OXIMETRY 1: CPT

## 2025-05-06 PROCEDURE — 87040 BLOOD CULTURE FOR BACTERIA: CPT

## 2025-05-06 PROCEDURE — 80053 COMPREHEN METABOLIC PANEL: CPT

## 2025-05-06 PROCEDURE — 85014 HEMATOCRIT: CPT

## 2025-05-06 PROCEDURE — 2580000003 HC RX 258: Performed by: STUDENT IN AN ORGANIZED HEALTH CARE EDUCATION/TRAINING PROGRAM

## 2025-05-06 PROCEDURE — 6370000000 HC RX 637 (ALT 250 FOR IP): Performed by: STUDENT IN AN ORGANIZED HEALTH CARE EDUCATION/TRAINING PROGRAM

## 2025-05-06 PROCEDURE — P9047 ALBUMIN (HUMAN), 25%, 50ML: HCPCS | Performed by: STUDENT IN AN ORGANIZED HEALTH CARE EDUCATION/TRAINING PROGRAM

## 2025-05-06 PROCEDURE — 36430 TRANSFUSION BLD/BLD COMPNT: CPT

## 2025-05-06 PROCEDURE — 6360000002 HC RX W HCPCS: Performed by: PEDIATRICS

## 2025-05-06 PROCEDURE — 99223 1ST HOSP IP/OBS HIGH 75: CPT | Performed by: PHYSICIAN ASSISTANT

## 2025-05-06 PROCEDURE — 6360000002 HC RX W HCPCS: Performed by: STUDENT IN AN ORGANIZED HEALTH CARE EDUCATION/TRAINING PROGRAM

## 2025-05-06 PROCEDURE — 85018 HEMOGLOBIN: CPT

## 2025-05-06 PROCEDURE — 85027 COMPLETE CBC AUTOMATED: CPT

## 2025-05-06 PROCEDURE — 93010 ELECTROCARDIOGRAM REPORT: CPT | Performed by: INTERNAL MEDICINE

## 2025-05-06 PROCEDURE — 84484 ASSAY OF TROPONIN QUANT: CPT

## 2025-05-06 RX ORDER — LACTULOSE 10 G/15ML
10 SOLUTION ORAL 2 TIMES DAILY
Status: DISCONTINUED | OUTPATIENT
Start: 2025-05-06 | End: 2025-05-13 | Stop reason: HOSPADM

## 2025-05-06 RX ORDER — HYDROMORPHONE HYDROCHLORIDE 1 MG/ML
0.5 INJECTION, SOLUTION INTRAMUSCULAR; INTRAVENOUS; SUBCUTANEOUS ONCE
Status: COMPLETED | OUTPATIENT
Start: 2025-05-06 | End: 2025-05-06

## 2025-05-06 RX ORDER — ZOLPIDEM TARTRATE 5 MG/1
10 TABLET ORAL NIGHTLY PRN
Status: DISCONTINUED | OUTPATIENT
Start: 2025-05-06 | End: 2025-05-13 | Stop reason: HOSPADM

## 2025-05-06 RX ORDER — GAUZE BANDAGE 2" X 2"
100 BANDAGE TOPICAL DAILY
Status: DISCONTINUED | OUTPATIENT
Start: 2025-05-06 | End: 2025-05-13 | Stop reason: HOSPADM

## 2025-05-06 RX ORDER — PHYTONADIONE 10 MG/ML
10 INJECTION, EMULSION INTRAMUSCULAR; INTRAVENOUS; SUBCUTANEOUS ONCE
Status: COMPLETED | OUTPATIENT
Start: 2025-05-06 | End: 2025-05-06

## 2025-05-06 RX ORDER — SODIUM CHLORIDE 9 MG/ML
INJECTION, SOLUTION INTRAVENOUS ONCE
Status: COMPLETED | OUTPATIENT
Start: 2025-05-06 | End: 2025-05-06

## 2025-05-06 RX ORDER — ALBUMIN (HUMAN) 12.5 G/50ML
25 SOLUTION INTRAVENOUS ONCE
Status: COMPLETED | OUTPATIENT
Start: 2025-05-06 | End: 2025-05-06

## 2025-05-06 RX ORDER — SUCRALFATE 1 G/1
1 TABLET ORAL 4 TIMES DAILY
Status: DISCONTINUED | OUTPATIENT
Start: 2025-05-06 | End: 2025-05-13 | Stop reason: HOSPADM

## 2025-05-06 RX ORDER — NOREPINEPHRINE BITARTRATE 0.06 MG/ML
1-100 INJECTION, SOLUTION INTRAVENOUS CONTINUOUS
Status: DISCONTINUED | OUTPATIENT
Start: 2025-05-06 | End: 2025-05-07

## 2025-05-06 RX ORDER — PANTOPRAZOLE SODIUM 40 MG/1
40 TABLET, DELAYED RELEASE ORAL
Status: DISCONTINUED | OUTPATIENT
Start: 2025-05-06 | End: 2025-05-06

## 2025-05-06 RX ADMIN — Medication 100 MG: at 09:13

## 2025-05-06 RX ADMIN — CEFEPIME 2000 MG: 2 INJECTION, POWDER, FOR SOLUTION INTRAVENOUS at 22:14

## 2025-05-06 RX ADMIN — LACTULOSE 10 G: 20 SOLUTION ORAL at 21:05

## 2025-05-06 RX ADMIN — SODIUM CHLORIDE, PRESERVATIVE FREE 10 ML: 5 INJECTION INTRAVENOUS at 09:13

## 2025-05-06 RX ADMIN — RIFAXIMIN 400 MG: 200 TABLET ORAL at 21:02

## 2025-05-06 RX ADMIN — SUCRALFATE 1 G: 1 TABLET ORAL at 17:11

## 2025-05-06 RX ADMIN — PHYTONADIONE 10 MG: 10 INJECTION, EMULSION INTRAMUSCULAR; INTRAVENOUS; SUBCUTANEOUS at 14:07

## 2025-05-06 RX ADMIN — SODIUM CHLORIDE, PRESERVATIVE FREE 40 MG: 5 INJECTION INTRAVENOUS at 23:34

## 2025-05-06 RX ADMIN — RIFAXIMIN 400 MG: 200 TABLET ORAL at 14:08

## 2025-05-06 RX ADMIN — CEFEPIME 2000 MG: 2 INJECTION, POWDER, FOR SOLUTION INTRAVENOUS at 06:17

## 2025-05-06 RX ADMIN — ALBUMIN (HUMAN) 25 G: 0.25 INJECTION, SOLUTION INTRAVENOUS at 01:20

## 2025-05-06 RX ADMIN — CEFEPIME 2000 MG: 2 INJECTION, POWDER, FOR SOLUTION INTRAVENOUS at 14:07

## 2025-05-06 RX ADMIN — RIFAXIMIN 400 MG: 200 TABLET ORAL at 09:13

## 2025-05-06 RX ADMIN — SUCRALFATE 1 G: 1 TABLET ORAL at 21:02

## 2025-05-06 RX ADMIN — SODIUM CHLORIDE: 0.9 INJECTION, SOLUTION INTRAVENOUS at 02:55

## 2025-05-06 RX ADMIN — SODIUM CHLORIDE, PRESERVATIVE FREE 40 MG: 5 INJECTION INTRAVENOUS at 11:47

## 2025-05-06 RX ADMIN — SUCRALFATE 1 G: 1 TABLET ORAL at 14:08

## 2025-05-06 RX ADMIN — HYDROMORPHONE HYDROCHLORIDE 0.5 MG: 1 INJECTION, SOLUTION INTRAMUSCULAR; INTRAVENOUS; SUBCUTANEOUS at 01:15

## 2025-05-06 RX ADMIN — SODIUM CHLORIDE, PRESERVATIVE FREE 40 MG: 5 INJECTION INTRAVENOUS at 01:53

## 2025-05-06 RX ADMIN — MAGNESIUM SULFATE HEPTAHYDRATE 2000 MG: 40 INJECTION, SOLUTION INTRAVENOUS at 01:49

## 2025-05-06 RX ADMIN — SODIUM CHLORIDE: 0.9 INJECTION, SOLUTION INTRAVENOUS at 22:13

## 2025-05-06 RX ADMIN — Medication 5 MCG/MIN: at 03:19

## 2025-05-06 RX ADMIN — LACTULOSE 10 G: 20 SOLUTION ORAL at 14:13

## 2025-05-06 RX ADMIN — POTASSIUM CHLORIDE 10 MEQ: 7.46 INJECTION, SOLUTION INTRAVENOUS at 01:55

## 2025-05-06 RX ADMIN — VANCOMYCIN HYDROCHLORIDE 1000 MG: 1 INJECTION, POWDER, LYOPHILIZED, FOR SOLUTION INTRAVENOUS at 11:30

## 2025-05-06 ASSESSMENT — ENCOUNTER SYMPTOMS
ABDOMINAL PAIN: 1
NAUSEA: 0
BACK PAIN: 0
COLOR CHANGE: 0
SHORTNESS OF BREATH: 1
VOMITING: 0
BLOOD IN STOOL: 1

## 2025-05-06 ASSESSMENT — PAIN DESCRIPTION - ORIENTATION: ORIENTATION: LEFT

## 2025-05-06 ASSESSMENT — PAIN DESCRIPTION - LOCATION: LOCATION: ABDOMEN;BACK;NECK

## 2025-05-06 ASSESSMENT — PAIN DESCRIPTION - DESCRIPTORS: DESCRIPTORS: ACHING

## 2025-05-06 ASSESSMENT — PAIN SCALES - GENERAL
PAINLEVEL_OUTOF10: 3
PAINLEVEL_OUTOF10: 6

## 2025-05-06 NOTE — CONSULTS
Vascular Surgery Consultation    History of Present Illness    Ney Saenz is a 70-year-old man with history of alcoholic cirrhosis, end-stage liver disease, who was admitted for pneumonia.  He has developed left flank bruising.  He and his wife state that it started a couple days ago fairly small and is gotten larger over the last day or 2.  He denies any obvious trauma to the area.  He does have thrombocytopenia, elevated PT/INR secondary to the liver disease.  He has been given vitamin K and FFP as well as platelets.    He had a CTA abdomen pelvis.  I reviewed this.  I do not see any obvious arterial bleed.  The hematomas in the left flank, mostly subcutaneous.  He denies any severe pain.    History    Ney Ann is a 70 y.o. male with the following history reviewed and recorded in [facility]:  Patient Active Problem List    Diagnosis Date Noted    History of ETOH abuse 10/27/2022     Priority: Medium    Anemia 10/27/2022     Priority: Medium    Umbilical hernia without obstruction and without gangrene 08/27/2022     Priority: Medium    Pancytopenia (HCC) 08/27/2022     Priority: Medium    Closed fracture of left hip (HCC) 08/27/2022     Priority: Medium    Decompensated cirrhosis (HCC) 08/26/2022     Priority: Medium    Closed left hip fracture, initial encounter (HCC) 08/25/2022     Priority: Medium    New onset a-fib (HCC) 08/25/2022     Priority: Medium    Liver disease 08/25/2022     Priority: Medium    Hyperbilirubinemia 08/25/2022     Priority: Medium    Abnormal LFTs 08/25/2022     Priority: Medium    Thrombocytopenia 08/25/2022     Priority: Medium    Leukopenia 08/25/2022     Priority: Medium    Pneumonia due to infectious organism 05/05/2025    End stage liver disease (HCC) 05/02/2025    Bleeding esophageal varices (HCC) 05/02/2025    Acute blood loss anemia 05/02/2025    Hematochezia 05/02/2025    Suicidal ideations 04/30/2025    GIB (gastrointestinal bleeding) 04/29/2025    Alcoholic cirrhosis

## 2025-05-06 NOTE — CONSULTS
Palliative Care Consult Note    5/6/2025 12:46 PM  Subjective:  Admit Date: 5/5/2025  PCP: Izabela Robles, SHEILA - CNP    Date of Service: 5/6/2025    Reason for Consultation:  Goals of Care, Code Status, Family Support     History Obtained From: EMR/Patient and their Family    History Of Present Illness:   The patient is a 70 y.o. male with PMH alcoholic cirrhosis previously followed at [facility], atrial fibrillation, chronic back pain, esophageal varices, portal hypertensive gastropathy, pancytopenia, HTN, COPD, vitamin D deficiency, insomnia who was recently admitted to this facility on 4/29/2025 when he presented with acute blood loss anemia with hemoglobin 4.3 and ongoing GI bleeding.  He required transfusional support throughout that admission.  He underwent EGD on 5/1/2025 that reported grade 1 esophageal varices, moderate portal hypertensive gastropathy and no active bleeding.  He did have recurrent rectal bleeding that stay and underwent colonoscopy on 5/3/2025 with findings listed as mild left sided diverticulosis, moderate internal hemorrhoids, diminutive descending colon polyp not removed due to severe thrombocytopenia.  He was discharged home later that day with Anusol suppositories and recommendation to follow-up with his PCP, gastroenterology and primary hepatologist at [facility].  He returned to [facility] ED on 5/5/2025 with concern for dyspnea, chest discomfort, left flank and abdominal pain with ecchymosis and tenderness.  He reported to the ED provider that he had a fall on 5/2/2025 and his hospital room and did not reported to anyone.  He reported that once discharged home he noticed the bruising began to enlarge in addition to worsening tenderness in that area.  CTA abdomen and pelvis reported advanced hepatic cirrhosis with 1 cm low-attenuation lesion in the right lower hepatic lobe as well as in the posterior left hepatic lobe.  Gallbladder distention of 5.4 cm diam probable wall thickening without

## 2025-05-06 NOTE — CONSENT
Informed Consent for Blood Component Transfusion Note    I have discussed with the patient the rationale for blood component transfusion; its benefits in treating or preventing fatigue, organ damage, or death; and its risk which includes mild transfusion reactions, rare risk of blood borne infection, or more serious but rare reactions. I have discussed the alternatives to transfusion, including the risk and consequences of not receiving transfusion. The patient had an opportunity to ask questions and had agreed to proceed with transfusion of blood components.    Electronically signed by Mary Grace Morales MD on 5/5/25 at 10:45 PM CDT

## 2025-05-06 NOTE — CARE COORDINATION
05/06/25 1106   Readmission Assessment   Number of Days since last admission? 8-30 days   Previous Disposition Home with Home Health   Who is being Interviewed Caregiver   What was the patient's/caregiver's perception as to why they think they needed to return back to the hospital? Did not realize care needs would be so extensive;Other (Comment)  (fall at home)   Did you visit your Primary Care Physician after you left the hospital, before you returned this time? Yes   Did you see a specialist, such as Cardiac, Pulmonary, Orthopedic Physician, etc. after you left the hospital? Yes   Who advised the patient to return to the hospital? Self-referral   Does the patient report anything that got in the way of taking their medications? No   In our efforts to provide the best possible care to you and others like you, can you think of anything that we could have done to help you after you left the hospital the first time, so that you might not have needed to return so soon? Arrange for more help when leaving the hospital     Pt may benefit from skilled therapy will watch for PT/OT notes.  Electronically signed by SUSAN SKELTON on 5/6/2025 at 11:07 AM

## 2025-05-06 NOTE — ED PROVIDER NOTES
[facility] ICU  eMERGENCY dEPARTMENT eNCOUnter      Pt Name: Ney Ann  MRN: [id number]  Birthdate 1955  Date of evaluation: 5/5/2025  Provider: Mary Grace Morales MD    CHIEF COMPLAINT       Chief Complaint   Patient presents with    Fall     Fall Friday evening, bruising to left side into back         HISTORY OF PRESENT ILLNESS   (Location/Symptom, Timing/Onset,Context/Setting, Quality, Duration, Modifying Factors, Severity)  Note limiting factors.   Ney Ann is a 70 y.o. male who presents to the emergency department with bruising to left side.  Patient was recently admitted to the hospital from 4/29/2025 to 5/3/2025 for GI bleeding.  Patient states that on 5/2/2025 he was in his room in the hospital and felt \"against the wall.\"  Patient had a bruise to his left side that was small.  Patient wished to leave the hospital soon so he did not mention it to staff.  Patient did mention it to his wife who states that the bruise was very small at the time.  After arriving home, the bruise area continued to expand anteriorly and posteriorly \"until it got to my back bone.\"  Patient has also been experiencing cough sharp chest pain and increased shortness of breath.  Patient is also been having worsening pedal edema.  Patient states that he continues to have blood in his stool from his \"varices and cirrhosis.  They are not sure where it is coming from.\"  Patient has undergone EGD and colonoscopy.  Patient denies lightheadedness, chest pain at rest, hematemesis, nausea or vomiting.    HPI    NursingNotes were reviewed.    REVIEW OF SYSTEMS    (2-9 systems for level 4, 10 or more for level 5)     Review of Systems   Constitutional:  Negative for chills and fever.   HENT:  Negative for congestion and rhinorrhea.    Respiratory:  Positive for cough and shortness of breath.    Cardiovascular:  Positive for chest pain and leg swelling. Negative for palpitations.   Gastrointestinal:  Positive for abdominal pain (Left sided

## 2025-05-06 NOTE — PROGRESS NOTES
Hospitalist Progress Note  [facility]     Patient: Ney Ann  : 1955  MRN: [id number]  Code Status: Full Code    Hospital Day: 1   Date of Service: 2025    Subjective:   Patient seen and examined.  No current complaints.    Past Medical History:   Diagnosis Date    Abnormal LFTs     Atrial fibrillation (HCC)     per pt; no current cardiologist    Cirrhosis (HCC)     sees [facility] and tyesha OROSCOID-19     2 yr ago    History of blood transfusion     Hyperbilirubinemia     Hypertension     Hypothyroidism     no meds at present    Insomnia     Leukopenia     Liver disease     Palliative care patient 01/10/2025    Palliative care patient 2025    Platelets decreased     chronic    Splenomegaly     Umbilical hernia     and inguinal per pt    Varices, esophageal (HCC)     hx of    Vitamin D deficiency        Past Surgical History:   Procedure Laterality Date    COLONOSCOPY      polyps per pt    COLONOSCOPY N/A 2023    Dr Hastings, TA x 3 (-)dysplasia, HP, TVA (-)Dysplasia, Mod diverticulosis left colon, int hem Gr 2 wo bleeding, 4-6 weeks    COLONOSCOPY  07/10/2023    Dr Dawit Tapia, splenic flexor 8mm ulcer wo bleeding, 4mm ulcer sigmoid colon wo bleeding, 8mm recatal ulcer wo bleeding, scatted sm & lg mouthed diverticula in sig colon, splenic flexure and transverse colon,    COLONOSCOPY N/A 2025    Dr Reyes-Mild left-sided diverticulosis, moderate internal hemorrhoids, diminutive descending colon polyp, not removed due to severe thrombocytopenia, no blood noted and no source of bleeding identified, 1-2 yr recall    HERNIA REPAIR      TOTAL HIP ARTHROPLASTY Left 2022    LEFT HIP TOTAL ARTHROPLASTY performed by Jose Obrien MD at [facility] OR    UPPER GASTROINTESTINAL ENDOSCOPY  07/10/2023    Dr Dawit Tapia, Gr 2 medium sized varcies in Esophagus, Moderate portal hypertensive gastropathy in entire stomach,    UPPER GASTROINTESTINAL ENDOSCOPY N/A 2024

## 2025-05-06 NOTE — ED NOTES
Pt states that he had an unwitnessed fall while he was admitted to the hospital last week. Pt states that the night prior to him being discharged at approximately 2000 he got up unassisted to use the bathroom and \"fell into the corner of the wall\". Pt denies hitting his head and denies LOC. Pt states that he hit his left side and hit left elbow. Pt states that he did not report the fall to staff because he was \"sick of being here\". Pt's wife stated that she noticed some bruising on his left flank area while patient was getting dressed to go home after being discharged.

## 2025-05-06 NOTE — CARE COORDINATION
Case Management Assessment  Initial Evaluation    Date/Time of Evaluation: 5/6/2025 2:39 PM  Assessment Completed by: SUSAN SKELTON    If patient is discharged prior to next notation, then this note serves as note for discharge by case management.    Patient Name: Ney Leblanc                   YOB: 1955  Diagnosis: Rhinovirus infection [B34.8]  Traumatic rectus hematoma, initial encounter [S30.1XXA]  Chronic liver failure without hepatic coma (HCC) [K72.10]  Pneumonia of left lower lobe due to infectious organism [J18.9]  Pneumonia due to infectious organism [J18.9]                   Date / Time: 5/5/2025  6:09 PM    Patient Admission Status: Inpatient   Readmission Risk (Low < 19, Mod (19-27), High > 27): Readmission Risk Score: 26.5    Current PCP: Izabela Robles APRN - CNP  PCP verified by CM? Yes    Chart Reviewed: Yes      History Provided by: Patient  Patient Orientation: Alert and Oriented, Person, Place, Situation, Self    Patient Cognition: Alert    Hospitalization in the last 30 days (Readmission):  Yes    If yes, Readmission Assessment in CM Navigator will be completed.    Advance Directives:      Code Status: Full Code   Patient's Primary Decision Maker is: Legal Next of Kin    Primary Decision Maker: JAVAD LEBLANC - Spouse - [phone]    Secondary Decision Maker: Razia Shah - Brother/Sister - [phone]    Discharge Planning:    Patient lives with: Spouse/Significant Other Type of Home: House  Primary Care Giver: Self  Patient Support Systems include: Spouse/Significant Other   Current Financial resources: Medicare  Current community resources: None  Current services prior to admission: None            Current DME:              Type of Home Care services:  None, PT, OT    ADLS  Prior functional level: Independent in ADLs/IADLs  Current functional level: Independent in ADLs/IADLs    PT AM-PAC:   /24  OT AM-PAC:   /24    Family can provide assistance at DC: Yes  Would

## 2025-05-06 NOTE — PROGRESS NOTES
Pharmacy Renal Adjustment    Ney Ann is a 70 y.o. male. Pharmacy has renally adjusted medications per protocol.    Recent Labs     05/03/25  0240 05/05/25 2021   BUN 8 9       Recent Labs     05/03/25  0240 05/05/25 2021   CREATININE 0.7 0.8       Estimated Creatinine Clearance: 89 mL/min (based on SCr of 0.8 mg/dL).    Height:   Ht Readings from Last 1 Encounters:   04/29/25 1.727 m (5' 8\")     Weight:  Wt Readings from Last 1 Encounters:   05/05/25 83.5 kg (184 lb)       Plan: Adjust the following medications based on renal function:           Cefepime to 2000 mg IV every 12 hours extended infusion over 240 minutes     Electronically signed by SHANEL HOPPER RPH on 5/5/2025 at 11:16 PM

## 2025-05-06 NOTE — PROGRESS NOTES
4 Eyes Skin Assessment     NAME:  Ney Ann  YOB: 1955  MEDICAL RECORD NUMBER:  [id number]    The patient is being assessed for  Admission    I agree that at least one RN has performed a thorough Head to Toe Skin Assessment on the patient. ALL assessment sites listed below have been assessed.      Areas assessed by both nurses:    Sacrum. Buttock, Coccyx, Ischium        Does the Patient have a Wound? No noted wound(s)       Noah Prevention initiated by RN: Yes  Wound Care Orders initiated by RN: No    Pressure Injury (Stage 3,4, Unstageable, DTI, NWPT, and Complex wounds) if present, place Wound referral order by RN under : No    New Ostomies, if present place, Ostomy referral order under : No     Nurse 1 eSignature: Electronically signed by Archana Montano RN on 5/6/25 at 2:27 AM CDT    **SHARE this note so that the co-signing nurse can place an eSignature**    Nurse 2 eSignature: Electronically signed by Christie Mc RN on 5/6/25 at 2:28 AM CDT

## 2025-05-06 NOTE — H&P
Hospitalist: History and Physical    Date: 2025 Time: 11:13 PM    Name: Ney Ann : 1955 MRN: [id number]    Code Status: Full Code No additional code details    PCP: Izabela Robles, APRN - CNP    Patient's Chief Complaint Is: Generalized weakness  HPI: Patient is a 70 y.o. male with past medical history as documented below . Patient presented to [facility] ED due to worsening generalized weakness. He was discharged on Friday but fell on the wall hitting left side. Wife noticed that bruise has extended from Left lower front to the back of his body. Patient denies fever, chills, black stool, bloody stool, visual problem, dysphagia,  shortness of breath, chest pain, cough, bowel or bladder incontinence, abdominal pain, diarrhea or dysuria.   Upon evaluation in ED, he was found to have Hg of 6.3, a drop from 7.5, 2 day ago. INR 2.2 and PLT 01092. He was found to have a large bruise extending from front to the back. ED provider spoke with Vascular surgery in [place] who recommended to give FFP, PLTS and monitor Hg. Vascular Surgery in [facility] will see patient in the morning.  He has a history of esophageal varices, recurrent GI bleed, transfusions and black tarry stool. He underwent Colonoscopy on 5/3/25 with findings of left-sided diverticulosis, moderate internal hemorrhoids, diminutive descending colon polyp, not removed due to severe thrombocytopenia, but no blood noted and no source of bleeding identified.   Blood work further showed hypokalemia and hypomagnesemia    CT abdomen/Pelvis reported as :  1.  Vascular findings as discussed in the first paragraph of the report.  2.  Advanced hepatic cirrhosis.  There is a 1 cm low attenuation lesion in the lower right hepatic lobe and a similar sized low attenuation lesion in the posterior left hepatic lobe.  3.  Gallbladder is distended with a diameter of 5.4 cm.  Probable mild gallbladder wall thickening.  No visible gallstones or bile duct  fL    PLATELET SLIDE REVIEW Decreased     Neutrophils % 53.4 50.0 - 65.0 %    Lymphocytes % 24.7 20.0 - 40.0 %    Monocytes % 16.4 (H) 0.0 - 10.0 %    Eosinophils % 4.5 0.0 - 5.0 %    Basophils % 0.3 0.0 - 1.0 %    Neutrophils Absolute 1.6 1.5 - 7.5 K/uL    Immature Granulocytes # 0.0 K/uL    Lymphocytes Absolute 0.7 (L) 1.1 - 4.5 K/uL    Monocytes Absolute 0.50 0.00 - 0.90 K/uL    Eosinophils Absolute 0.10 0.00 - 0.60 K/uL    Basophils Absolute 0.00 0.00 - 0.20 K/uL    Anisocytosis 2+ (A)     Macrocytes 1+ (A)     Hypochromia 1+ (A)     [place] Cells Occasional (A)     Tear Drop Cells Occasional (A)    TYPE AND SCREEN    Collection Time: 05/05/25  8:15 PM   Result Value Ref Range    ABO/Rh A POS     Antibody Screen NEG    PREPARE RBC (CROSSMATCH), 1 Units    Collection Time: 05/05/25  8:15 PM   Result Value Ref Range    Product Code Blood Bank [id number]     Description Blood Bank Red Blood Cells, Leuko-reduced     Unit Number [id number]     Dispense Status Blood Bank issued    CBC with Auto Differential    Collection Time: 05/05/25  8:21 PM   Result Value Ref Range    WBC 4.2 (L) 4.8 - 10.8 K/uL    RBC 1.97 (L) 4.70 - 6.10 M/uL    Hemoglobin 6.3 (L) 14.0 - 18.0 g/dL    Hematocrit 20.2 (L) 42.0 - 52.0 %    .5 (H) 80.0 - 94.0 fL    MCH 32.0 (H) 27.0 - 31.0 pg    MCHC 31.2 (L) 33.0 - 37.0 g/dL    RDW 24.9 (H) 11.5 - 14.5 %    Platelets 43 (L) 130 - 400 K/uL    MPV 10.8 9.4 - 12.4 fL    Neutrophils % 59.5 50.0 - 65.0 %    Lymphocytes % 21.2 20.0 - 40.0 %    Monocytes % 13.4 (H) 0.0 - 10.0 %    Eosinophils % 3.8 0.0 - 5.0 %    Basophils % 0.2 0.0 - 1.0 %    Neutrophils Absolute 2.5 1.5 - 7.5 K/uL    Immature Granulocytes # 0.1 K/uL    Lymphocytes Absolute 0.9 (L) 1.1 - 4.5 K/uL    Monocytes Absolute 0.60 0.00 - 0.90 K/uL    Eosinophils Absolute 0.20 0.00 - 0.60 K/uL    Basophils Absolute 0.00 0.00 - 0.20 K/uL    Anisocytosis 2+ (A)     Macrocytes 1+ (A)     Polychromasia 1+ (A)     Hypochromia 1+ (A)     [place]

## 2025-05-06 NOTE — PLAN OF CARE
Problem: Discharge Planning  Goal: Discharge to home or other facility with appropriate resources  Outcome: Progressing     Problem: Safety - Adult  Goal: Free from fall injury  Outcome: Progressing     Problem: Skin/Tissue Integrity  Goal: Skin integrity remains intact  Description: 1.  Monitor for areas of redness and/or skin breakdown2.  Assess vascular access sites hourly3.  Every 4-6 hours minimum:  Change oxygen saturation probe site4.  Every 4-6 hours:  If on nasal continuous positive airway pressure, respiratory therapy assess nares and determine need for appliance change or resting period  Outcome: Progressing     Problem: ABCDS Injury Assessment  Goal: Absence of physical injury  Outcome: Progressing

## 2025-05-06 NOTE — PROGRESS NOTES
[facility]   Pharmacy Pharmacokinetic Monitoring Service - Vancomycin     Ney Ann is a 70 y.o. male starting on vancomycin therapy for PNA. Pharmacy consulted by Dr. Morales for monitoring and adjustment.    Target Concentration: Goal AUC/GREGORY 400-600 mg*hr/L    Additional Antimicrobials: Cefepime    Pertinent Laboratory Values:   Wt Readings from Last 1 Encounters:   05/05/25 83.5 kg (184 lb)     Temp Readings from Last 1 Encounters:   05/05/25 98.4 °F (36.9 °C)     Estimated Creatinine Clearance: 89 mL/min (based on SCr of 0.8 mg/dL).  Recent Labs     05/03/25  0240 05/05/25 2021   CREATININE 0.7 0.8   BUN 8 9   WBC 2.9* 4.2*     Procalcitonin: 0.07    Pertinent Cultures:  Culture Date Source Results   5/5/25 Respiratory panel Human Rhinovirus/Enterovirus    MRSA Nasal Swab: not ordered. Order placed by pharmacy.    Plan:  Dosing recommendations based on Bayesian software  Start vancomycin 2000 mg IV once, followed by 1000 mg IV q 12 hrs  Anticipated AUC of 495 and trough concentration of 15.1 at steady state  Renal labs as indicated   Vancomycin concentration ordered for 5/7 @ 1000   Pharmacy will continue to monitor patient and adjust therapy as indicated    Thank you for the consult,  SHANEL HOPPER RPH  5/5/2025 10:41 PM

## 2025-05-07 LAB
ANION GAP SERPL CALCULATED.3IONS-SCNC: 10 MMOL/L (ref 8–16)
ANISOCYTOSIS BLD QL SMEAR: ABNORMAL
BASOPHILS # BLD: 0 K/UL (ref 0–0.2)
BASOPHILS NFR BLD: 0.4 % (ref 0–1)
BUN SERPL-MCNC: 8 MG/DL (ref 8–23)
BURR CELLS BLD QL SMEAR: ABNORMAL
CALCIUM SERPL-MCNC: 7.9 MG/DL (ref 8.8–10.2)
CHLORIDE SERPL-SCNC: 109 MMOL/L (ref 98–107)
CO2 SERPL-SCNC: 19 MMOL/L (ref 22–29)
CREAT SERPL-MCNC: 0.6 MG/DL (ref 0.7–1.2)
DACRYOCYTES BLD QL SMEAR: ABNORMAL
EOSINOPHIL # BLD: 0.3 K/UL (ref 0–0.6)
EOSINOPHIL NFR BLD: 3.9 % (ref 0–5)
ERYTHROCYTE [DISTWIDTH] IN BLOOD BY AUTOMATED COUNT: 26.9 % (ref 11.5–14.5)
GLUCOSE SERPL-MCNC: 125 MG/DL (ref 70–99)
HCT VFR BLD AUTO: 24.3 % (ref 42–52)
HCT VFR BLD AUTO: 24.8 % (ref 42–52)
HCT VFR BLD AUTO: 26.5 % (ref 42–52)
HGB BLD-MCNC: 7.5 G/DL (ref 14–18)
HGB BLD-MCNC: 7.5 G/DL (ref 14–18)
HGB BLD-MCNC: 7.6 G/DL (ref 14–18)
HYPOCHROMIA BLD QL SMEAR: ABNORMAL
IMM GRANULOCYTES # BLD: 0.1 K/UL
INR PPP: 2.24 (ref 0.88–1.18)
LYMPHOCYTES # BLD: 1.1 K/UL (ref 1.1–4.5)
LYMPHOCYTES NFR BLD: 17 % (ref 20–40)
MACROCYTES BLD QL SMEAR: ABNORMAL
MAGNESIUM SERPL-MCNC: 1.8 MG/DL (ref 1.6–2.4)
MCH RBC QN AUTO: 32.8 PG (ref 27–31)
MCHC RBC AUTO-ENTMCNC: 30.9 G/DL (ref 33–37)
MCV RBC AUTO: 106.1 FL (ref 80–94)
MONOCYTES # BLD: 1 K/UL (ref 0–0.9)
MONOCYTES NFR BLD: 14.2 % (ref 0–10)
NEUTROPHILS # BLD: 4.2 K/UL (ref 1.5–7.5)
NEUTS SEG NFR BLD: 62.7 % (ref 50–65)
PLATELET # BLD AUTO: 47 K/UL (ref 130–400)
PMV BLD AUTO: 10.7 FL (ref 9.4–12.4)
POTASSIUM SERPL-SCNC: 3.6 MMOL/L (ref 3.5–5.1)
PROTHROMBIN TIME: 24.4 SEC (ref 12–14.6)
RBC # BLD AUTO: 2.29 M/UL (ref 4.7–6.1)
SODIUM SERPL-SCNC: 138 MMOL/L (ref 136–145)
WBC # BLD AUTO: 6.7 K/UL (ref 4.8–10.8)

## 2025-05-07 PROCEDURE — 2700000000 HC OXYGEN THERAPY PER DAY

## 2025-05-07 PROCEDURE — 94760 N-INVAS EAR/PLS OXIMETRY 1: CPT

## 2025-05-07 PROCEDURE — 99232 SBSQ HOSP IP/OBS MODERATE 35: CPT | Performed by: PHYSICIAN ASSISTANT

## 2025-05-07 PROCEDURE — 1200000000 HC SEMI PRIVATE

## 2025-05-07 PROCEDURE — 85610 PROTHROMBIN TIME: CPT

## 2025-05-07 PROCEDURE — 6360000002 HC RX W HCPCS: Performed by: STUDENT IN AN ORGANIZED HEALTH CARE EDUCATION/TRAINING PROGRAM

## 2025-05-07 PROCEDURE — 36415 COLL VENOUS BLD VENIPUNCTURE: CPT

## 2025-05-07 PROCEDURE — 94150 VITAL CAPACITY TEST: CPT

## 2025-05-07 PROCEDURE — 85025 COMPLETE CBC W/AUTO DIFF WBC: CPT

## 2025-05-07 PROCEDURE — 2580000003 HC RX 258: Performed by: STUDENT IN AN ORGANIZED HEALTH CARE EDUCATION/TRAINING PROGRAM

## 2025-05-07 PROCEDURE — 6370000000 HC RX 637 (ALT 250 FOR IP): Performed by: STUDENT IN AN ORGANIZED HEALTH CARE EDUCATION/TRAINING PROGRAM

## 2025-05-07 PROCEDURE — 83735 ASSAY OF MAGNESIUM: CPT

## 2025-05-07 PROCEDURE — 6370000000 HC RX 637 (ALT 250 FOR IP): Performed by: INTERNAL MEDICINE

## 2025-05-07 PROCEDURE — 85014 HEMATOCRIT: CPT

## 2025-05-07 PROCEDURE — 85018 HEMOGLOBIN: CPT

## 2025-05-07 PROCEDURE — 80048 BASIC METABOLIC PNL TOTAL CA: CPT

## 2025-05-07 PROCEDURE — 2500000003 HC RX 250 WO HCPCS: Performed by: STUDENT IN AN ORGANIZED HEALTH CARE EDUCATION/TRAINING PROGRAM

## 2025-05-07 RX ADMIN — SUCRALFATE 1 G: 1 TABLET ORAL at 16:09

## 2025-05-07 RX ADMIN — CEFEPIME 2000 MG: 2 INJECTION, POWDER, FOR SOLUTION INTRAVENOUS at 06:17

## 2025-05-07 RX ADMIN — ZOLPIDEM TARTRATE 10 MG: 5 TABLET ORAL at 21:49

## 2025-05-07 RX ADMIN — SUCRALFATE 1 G: 1 TABLET ORAL at 21:47

## 2025-05-07 RX ADMIN — SODIUM CHLORIDE, PRESERVATIVE FREE 40 MG: 5 INJECTION INTRAVENOUS at 21:47

## 2025-05-07 RX ADMIN — CEFEPIME 2000 MG: 2 INJECTION, POWDER, FOR SOLUTION INTRAVENOUS at 14:32

## 2025-05-07 RX ADMIN — SUCRALFATE 1 G: 1 TABLET ORAL at 14:27

## 2025-05-07 RX ADMIN — RIFAXIMIN 400 MG: 200 TABLET ORAL at 14:27

## 2025-05-07 RX ADMIN — SODIUM CHLORIDE, PRESERVATIVE FREE 40 MG: 5 INJECTION INTRAVENOUS at 11:00

## 2025-05-07 RX ADMIN — CEFEPIME 2000 MG: 2 INJECTION, POWDER, FOR SOLUTION INTRAVENOUS at 21:46

## 2025-05-07 RX ADMIN — Medication 100 MG: at 07:50

## 2025-05-07 RX ADMIN — SUCRALFATE 1 G: 1 TABLET ORAL at 07:50

## 2025-05-07 RX ADMIN — RIFAXIMIN 400 MG: 200 TABLET ORAL at 21:47

## 2025-05-07 RX ADMIN — LACTULOSE 10 G: 20 SOLUTION ORAL at 07:50

## 2025-05-07 RX ADMIN — LACTULOSE 10 G: 20 SOLUTION ORAL at 21:46

## 2025-05-07 RX ADMIN — SODIUM CHLORIDE: 0.9 INJECTION, SOLUTION INTRAVENOUS at 06:16

## 2025-05-07 RX ADMIN — Medication 13 MCG/MIN: at 01:21

## 2025-05-07 RX ADMIN — RIFAXIMIN 400 MG: 200 TABLET ORAL at 08:30

## 2025-05-07 ASSESSMENT — ENCOUNTER SYMPTOMS
SHORTNESS OF BREATH: 0
CONSTIPATION: 0
COLOR CHANGE: 0
VOICE CHANGE: 0
DIARRHEA: 0
BACK PAIN: 0
VOMITING: 0
NAUSEA: 0

## 2025-05-07 NOTE — PLAN OF CARE
Problem: Discharge Planning  Goal: Discharge to home or other facility with appropriate resources  5/6/2025 2203 by Stephy Hernandez RN  Outcome: Progressing  5/6/2025 2033 by Nela Sood RN  Outcome: Progressing     Problem: Safety - Adult  Goal: Free from fall injury  5/6/2025 2203 by Stephy Hernandez RN  Outcome: Progressing  5/6/2025 2033 by Nela Sood RN  Outcome: Progressing  Flowsheets (Taken 5/6/2025 2000 by Stephy Hernandez RN)  Free From Fall Injury: Instruct family/caregiver on patient safety     Problem: Skin/Tissue Integrity  Goal: Skin integrity remains intact  Description: 1.  Monitor for areas of redness and/or skin breakdown2.  Assess vascular access sites hourly3.  Every 4-6 hours minimum:  Change oxygen saturation probe site4.  Every 4-6 hours:  If on nasal continuous positive airway pressure, respiratory therapy assess nares and determine need for appliance change or resting period  5/6/2025 2203 by Stephy Hernandez RN  Outcome: Progressing  5/6/2025 2033 by Nela Sood RN  Outcome: Progressing  Flowsheets (Taken 5/6/2025 2000 by Stephy Hernandez RN)  Skin Integrity Remains Intact: Monitor for areas of redness and/or skin breakdown     Problem: ABCDS Injury Assessment  Goal: Absence of physical injury  5/6/2025 2203 by Stephy Hernandez RN  Outcome: Progressing  5/6/2025 2033 by Nela Sood RN  Outcome: Progressing  Flowsheets (Taken 5/6/2025 2000 by Stephy Hernandez RN)  Absence of Physical Injury: Implement safety measures based on patient assessment     Problem: Neurosensory - Adult  Goal: Achieves stable or improved neurological status  5/6/2025 2203 by Stephy Hernandez RN  Outcome: Progressing  5/6/2025 2033 by Nela Sood RN  Outcome: Progressing  Flowsheets (Taken 5/6/2025 2000 by Stephy Hernandez RN)  Achieves stable or improved neurological status: Assess for and report changes in neurological status     Problem: Respiratory - Adult  Goal: Achieves optimal ventilation and

## 2025-05-07 NOTE — PROGRESS NOTES
Hospitalist Progress Note    Patient:  Ney Ann  YOB: 1955  Date of Service: 5/7/2025  MRN: [id number]   Acct: [id number]   Primary Care Physician: Izabela Robles APRN - CNP  Advance Directive: Full Code  Admit Date: 5/5/2025       Hospital Day: 2  Referring Provider: Edwin Gamez DO    Patient Seen, Chart, Consults, Notes, Labs, Radiology studies reviewed.    Subjective:  Ney Ann is a 70 y.o. male  whom we are following for alcohol cirrhosis, chronic thrombocytopenia, esophageal varices, left flank hematoma.  He was admitted on 5/5 after having a fall and sustaining a left flank hematoma.  He had a drop in his hemoglobin.  His H&H is now stabilized.  Vascular surgery does not recommend any surgical intervention at this point.  Hemodynamics are stable.  He is stable for transfer out of ICU.    Allergies:  Watermelon flavoring agent (non-screening), Tomato, and Watermelon [citrullus vulgaris]    Medicines:  Current Facility-Administered Medications   Medication Dose Route Frequency Provider Last Rate Last Admin    zolpidem (AMBIEN) tablet 10 mg  10 mg Oral Nightly PRN Ankit Adams MD        thiamine mononitrate tablet 100 mg  100 mg Oral Daily Ankit Adams MD   100 mg at 05/07/25 0750    rifAXIMin (XIFAXAN) tablet 400 mg  400 mg Oral TID Ankit Adams MD   400 mg at 05/07/25 0830    norepinephrine (LEVOPHED) 16 mg in sodium chloride 0.9 % 250 mL infusion (premix)  1-100 mcg/min IntraVENous Continuous Ankit Adams MD   Stopped at 05/07/25 0834    ceFEPIme (MAXIPIME) 2,000 mg in sodium chloride 0.9 % 100 mL IVPB ([id number])  2,000 mg IntraVENous Q8H Ankit Adams MD 25 mL/hr at 05/07/25 0617 2,000 mg at 05/07/25 0617    lactulose (CHRONULAC) 10 GM/15ML solution 10 g  10 g Oral BID Bran Obrien MD   10 g at 05/07/25 0750    sucralfate (CARAFATE) tablet 1 g  1 g Oral 4x Daily Bran Obrien MD   1 g at 05/07/25 8740    sodium chloride flush 0.9 %  appears normal. Nonobstructive bowel gas pattern.  No evidence of active enterocolitis.  No prostate enlargement or free air. There are bilateral inguinal hernias with the right hernia distended with ascites. There is a small umbilical hernia which also contains ascites.  Mild gynecomastia.  The bones reveal moderate degenerative changes of the spine with mild scoliosis.  Previous left total hip arthroplasty.  Severe arthritis of the right hip and moderate arthritis of the sacroiliac joints. No acute fractures are seen. Lung bases demonstrate chronic interstitial changes possibly related to emphysema. Bilateral pleural effusions have developed or increased since prior study.  Moderate consolidation in the left lung base has developed, consider atelectasis or pneumonia. - - - - -    1.  Vascular findings as discussed in the first paragraph of the report. 2.  Advanced hepatic cirrhosis.  There is a 1 cm low attenuation lesion in the lower right hepatic lobe and a similar sized low attenuation lesion in the posterior left hepatic lobe. 3.  Gallbladder is distended with a diameter of 5.4 cm.  Probable mild gallbladder wall thickening.  No visible gallstones or bile duct dilatation. 4.  There is small-volume abdominal and pelvic ascites. Mesenteric edema is noted.  There is diffuse subcutaneous edema.  Suggestion of a left subcutaneous hematoma at the level of the flank measuring up to about 2 cm in thickness and extending the length of the abdomen, less likely conglomerate edema. No fractures are seen. 5.  Mild splenomegaly. 6.  There is a questionable 3.5 mm calcification in the distal right ureter situated at the vesicoureteral junction, axial image 274. No hydronephrosis. 7.  The appendix is seen and appears normal. Nonobstructive bowel gas pattern. 8.  There are bilateral inguinal hernias with the right hernia distended with ascites. 9.  Bilateral pleural effusions have developed or increased since prior study.

## 2025-05-07 NOTE — PROGRESS NOTES
Report to janiya on 3rd. Pt transporting per w/c to 308.  Has his lower dentures in. Has his phone and belongings packed in his red/white blue bag packed per wife. Wife aware pt transferring to 308 and will return shortly.

## 2025-05-07 NOTE — PROGRESS NOTES
Palliative Care Progress Note  5/7/2025 10:44 AM    Patient:  Ney Ann  YOB: 1955  Primary Care Physician: Izabela Robles, SHEILA - CNP  Advance Directive: Full Code  Admit Date: 5/5/2025       Hospital Day: 2  Portions of this note have been copied forward, however, changed to reflect the most current clinical status of this patient.    CHIEF COMPLAINT/REASON FOR CONSULTATION Goals of care, family support, Code status, symptom management     SUBJECTIVE:  Mr. Ann has had some loose stools this morning but has been weaned off Levophed. No new complaints voiced    HPI:  The patient is a 70 y.o. male with PMH alcoholic cirrhosis previously followed at [facility], atrial fibrillation, chronic back pain, esophageal varices, portal hypertensive gastropathy, pancytopenia, HTN, COPD, vitamin D deficiency, insomnia who was recently admitted to this facility on 4/29/2025 when he presented with acute blood loss anemia with hemoglobin 4.3 and ongoing GI bleeding.  He required transfusional support throughout that admission.  He underwent EGD on 5/1/2025 that reported grade 1 esophageal varices, moderate portal hypertensive gastropathy and no active bleeding.  He did have recurrent rectal bleeding that stay and underwent colonoscopy on 5/3/2025 with findings listed as mild left sided diverticulosis, moderate internal hemorrhoids, diminutive descending colon polyp not removed due to severe thrombocytopenia.  He was discharged home later that day with Anusol suppositories and recommendation to follow-up with his PCP, gastroenterology and primary hepatologist at [facility].  He returned to [facility] ED on 5/5/2025 with concern for dyspnea, chest discomfort, left flank and abdominal pain with ecchymosis and tenderness.  He reported to the ED provider that he had a fall on 5/2/2025 and his hospital room and did not reported to anyone.  He reported that once discharged home he noticed the bruising began to enlarge in addition

## 2025-05-07 NOTE — PLAN OF CARE
Problem: Discharge Planning  Goal: Discharge to home or other facility with appropriate resources  Outcome: Progressing     Problem: Safety - Adult  Goal: Free from fall injury  Outcome: Progressing     Problem: Skin/Tissue Integrity  Goal: Skin integrity remains intact  Outcome: Progressing     Problem: ABCDS Injury Assessment  Goal: Absence of physical injury  Outcome: Progressing     Problem: Neurosensory - Adult  Goal: Achieves stable or improved neurological status  Outcome: Progressing     Problem: Respiratory - Adult  Goal: Achieves optimal ventilation and oxygenation  Outcome: Progressing     Problem: Cardiovascular - Adult  Goal: Maintains optimal cardiac output and hemodynamic stability  Outcome: Progressing     Problem: Skin/Tissue Integrity - Adult  Goal: Skin integrity remains intact  Outcome: Progressing     Problem: Musculoskeletal - Adult  Goal: Return mobility to safest level of function  Outcome: Progressing     Problem: Gastrointestinal - Adult  Goal: Minimal or absence of nausea and vomiting  Outcome: Progressing     Problem: Genitourinary - Adult  Goal: Absence of urinary retention  Outcome: Progressing     Problem: Infection - Adult  Goal: Absence of infection at discharge  Outcome: Progressing     Problem: Metabolic/Fluid and Electrolytes - Adult  Goal: Electrolytes maintained within normal limits  Outcome: Progressing     Problem: Hematologic - Adult  Goal: Maintains hematologic stability  Outcome: Progressing

## 2025-05-07 NOTE — PROGRESS NOTES
Changed bp cuff from left arm to left leg. BP significantly higher in leg rather than left arm. Weaning Levophed.

## 2025-05-07 NOTE — PROGRESS NOTES
Spiritual Health History and Assessment/Progress Note  [facility]    (P) Rituals, Rites and Sacraments,  ,  ,      Name: Ney Ann MRN: [id number]    Age: 70 y.o.     Sex: male   Language: English   Christianity: Hoahaoism   Pneumonia due to infectious organism     Date: 5/7/2025            Total Time Calculated: (P) 8 min              Spiritual Assessment began in [facility] ICU        Referral/Consult From: (P) Rounding   Encounter Overview/Reason: (P) Rituals, Rites and Sacraments  Service Provided For: (P) Patient not available, Family (Wife)    Cleo, Belief, Meaning:   Patient   Family/Friends Other: Wife: Hoahaoism.      Importance and Influence:  Patient   Family/Friends None    Community:  Patient   Family/Friends feel well-supported. Support system includes: Other: Patient advocate    Assessment and Plan of Care:   Wife expressed - had talked with Pt Advocate, to whom concerns were reported; no spiritual concerns; relieved that pt was sleeping.     Patient Interventions include:   Family/Friends Interventions include: Facilitated expression of thoughts and feelings    Patient Plan of Care:   Family/Friends Plan of Care: No future visits per patient/family request    Electronically signed by TRIPP Smith on 5/7/2025 at 11:12 AM

## 2025-05-08 LAB
ALBUMIN SERPL-MCNC: 2.6 G/DL (ref 3.5–5.2)
ALP SERPL-CCNC: 78 U/L (ref 40–129)
ALT SERPL-CCNC: 18 U/L (ref 10–50)
ANION GAP SERPL CALCULATED.3IONS-SCNC: 8 MMOL/L (ref 8–16)
AST SERPL-CCNC: 28 U/L (ref 10–50)
BASOPHILS # BLD: 0 K/UL (ref 0–0.2)
BASOPHILS NFR BLD: 0.6 % (ref 0–1)
BILIRUB SERPL-MCNC: 4.2 MG/DL (ref 0.2–1.2)
BLOOD BANK DISPENSE STATUS: NORMAL
BLOOD BANK PRODUCT CODE: NORMAL
BPU ID: NORMAL
BUN SERPL-MCNC: 8 MG/DL (ref 8–23)
CALCIUM SERPL-MCNC: 8.1 MG/DL (ref 8.8–10.2)
CHLORIDE SERPL-SCNC: 111 MMOL/L (ref 98–107)
CO2 SERPL-SCNC: 19 MMOL/L (ref 22–29)
CREAT SERPL-MCNC: 0.6 MG/DL (ref 0.7–1.2)
DESCRIPTION BLOOD BANK: NORMAL
EOSINOPHIL # BLD: 0.1 K/UL (ref 0–0.6)
EOSINOPHIL NFR BLD: 3.9 % (ref 0–5)
ERYTHROCYTE [DISTWIDTH] IN BLOOD BY AUTOMATED COUNT: 27.8 % (ref 11.5–14.5)
GLUCOSE SERPL-MCNC: 148 MG/DL (ref 70–99)
HCT VFR BLD AUTO: 22 % (ref 42–52)
HCT VFR BLD AUTO: 22.4 % (ref 42–52)
HCT VFR BLD AUTO: 24.5 % (ref 42–52)
HGB BLD-MCNC: 6.6 G/DL (ref 14–18)
HGB BLD-MCNC: 6.9 G/DL (ref 14–18)
HGB BLD-MCNC: 7.4 G/DL (ref 14–18)
IMM GRANULOCYTES # BLD: 0 K/UL
LYMPHOCYTES # BLD: 0.7 K/UL (ref 1.1–4.5)
LYMPHOCYTES NFR BLD: 19.9 % (ref 20–40)
MAGNESIUM SERPL-MCNC: 1.8 MG/DL (ref 1.6–2.4)
MCH RBC QN AUTO: 32.4 PG (ref 27–31)
MCHC RBC AUTO-ENTMCNC: 30.8 G/DL (ref 33–37)
MCV RBC AUTO: 105.2 FL (ref 80–94)
MONOCYTES # BLD: 0.5 K/UL (ref 0–0.9)
MONOCYTES NFR BLD: 15.2 % (ref 0–10)
NEUTROPHILS # BLD: 2 K/UL (ref 1.5–7.5)
NEUTS SEG NFR BLD: 59.5 % (ref 50–65)
PHOSPHATE SERPL-MCNC: 1.9 MG/DL (ref 2.5–4.5)
PLATELET # BLD AUTO: 29 K/UL (ref 130–400)
PMV BLD AUTO: 10.6 FL (ref 9.4–12.4)
POTASSIUM SERPL-SCNC: 3.7 MMOL/L (ref 3.5–5.1)
PROT SERPL-MCNC: 4.3 G/DL (ref 6.4–8.3)
RBC # BLD AUTO: 2.13 M/UL (ref 4.7–6.1)
SODIUM SERPL-SCNC: 138 MMOL/L (ref 136–145)
WBC # BLD AUTO: 3.4 K/UL (ref 4.8–10.8)

## 2025-05-08 PROCEDURE — 6360000002 HC RX W HCPCS: Performed by: STUDENT IN AN ORGANIZED HEALTH CARE EDUCATION/TRAINING PROGRAM

## 2025-05-08 PROCEDURE — 2580000003 HC RX 258: Performed by: STUDENT IN AN ORGANIZED HEALTH CARE EDUCATION/TRAINING PROGRAM

## 2025-05-08 PROCEDURE — 2580000003 HC RX 258: Performed by: INTERNAL MEDICINE

## 2025-05-08 PROCEDURE — 85025 COMPLETE CBC W/AUTO DIFF WBC: CPT

## 2025-05-08 PROCEDURE — 2709999900 HC NON-CHARGEABLE SUPPLY

## 2025-05-08 PROCEDURE — 36415 COLL VENOUS BLD VENIPUNCTURE: CPT

## 2025-05-08 PROCEDURE — 94760 N-INVAS EAR/PLS OXIMETRY 1: CPT

## 2025-05-08 PROCEDURE — 80053 COMPREHEN METABOLIC PANEL: CPT

## 2025-05-08 PROCEDURE — 6360000002 HC RX W HCPCS: Performed by: INTERNAL MEDICINE

## 2025-05-08 PROCEDURE — 76937 US GUIDE VASCULAR ACCESS: CPT

## 2025-05-08 PROCEDURE — 6370000000 HC RX 637 (ALT 250 FOR IP): Performed by: INTERNAL MEDICINE

## 2025-05-08 PROCEDURE — 6370000000 HC RX 637 (ALT 250 FOR IP): Performed by: STUDENT IN AN ORGANIZED HEALTH CARE EDUCATION/TRAINING PROGRAM

## 2025-05-08 PROCEDURE — 85014 HEMATOCRIT: CPT

## 2025-05-08 PROCEDURE — 2700000000 HC OXYGEN THERAPY PER DAY

## 2025-05-08 PROCEDURE — 94150 VITAL CAPACITY TEST: CPT

## 2025-05-08 PROCEDURE — 84100 ASSAY OF PHOSPHORUS: CPT

## 2025-05-08 PROCEDURE — 36430 TRANSFUSION BLD/BLD COMPNT: CPT

## 2025-05-08 PROCEDURE — 36410 VNPNXR 3YR/> PHY/QHP DX/THER: CPT

## 2025-05-08 PROCEDURE — 85018 HEMOGLOBIN: CPT

## 2025-05-08 PROCEDURE — 83735 ASSAY OF MAGNESIUM: CPT

## 2025-05-08 PROCEDURE — 99232 SBSQ HOSP IP/OBS MODERATE 35: CPT | Performed by: PHYSICIAN ASSISTANT

## 2025-05-08 PROCEDURE — 1200000000 HC SEMI PRIVATE

## 2025-05-08 RX ORDER — BUMETANIDE 0.25 MG/ML
1 INJECTION, SOLUTION INTRAMUSCULAR; INTRAVENOUS ONCE
Status: COMPLETED | OUTPATIENT
Start: 2025-05-08 | End: 2025-05-08

## 2025-05-08 RX ORDER — SODIUM CHLORIDE 9 MG/ML
INJECTION, SOLUTION INTRAVENOUS PRN
Status: DISCONTINUED | OUTPATIENT
Start: 2025-05-08 | End: 2025-05-13 | Stop reason: HOSPADM

## 2025-05-08 RX ADMIN — RIFAXIMIN 400 MG: 200 TABLET ORAL at 17:28

## 2025-05-08 RX ADMIN — RIFAXIMIN 400 MG: 200 TABLET ORAL at 08:28

## 2025-05-08 RX ADMIN — BUMETANIDE 1 MG: 0.25 INJECTION INTRAMUSCULAR; INTRAVENOUS at 13:43

## 2025-05-08 RX ADMIN — SODIUM CHLORIDE, PRESERVATIVE FREE 40 MG: 5 INJECTION INTRAVENOUS at 13:38

## 2025-05-08 RX ADMIN — SODIUM CHLORIDE, PRESERVATIVE FREE 40 MG: 5 INJECTION INTRAVENOUS at 21:33

## 2025-05-08 RX ADMIN — CEFEPIME 2000 MG: 2 INJECTION, POWDER, FOR SOLUTION INTRAVENOUS at 05:47

## 2025-05-08 RX ADMIN — LACTULOSE 10 G: 20 SOLUTION ORAL at 08:28

## 2025-05-08 RX ADMIN — SUCRALFATE 1 G: 1 TABLET ORAL at 21:40

## 2025-05-08 RX ADMIN — RIFAXIMIN 400 MG: 200 TABLET ORAL at 21:39

## 2025-05-08 RX ADMIN — ZOLPIDEM TARTRATE 10 MG: 5 TABLET ORAL at 21:39

## 2025-05-08 RX ADMIN — CEFEPIME 2000 MG: 2 INJECTION, POWDER, FOR SOLUTION INTRAVENOUS at 17:27

## 2025-05-08 RX ADMIN — SUCRALFATE 1 G: 1 TABLET ORAL at 17:28

## 2025-05-08 RX ADMIN — Medication 100 MG: at 08:28

## 2025-05-08 RX ADMIN — SUCRALFATE 1 G: 1 TABLET ORAL at 08:28

## 2025-05-08 RX ADMIN — CEFEPIME 2000 MG: 2 INJECTION, POWDER, FOR SOLUTION INTRAVENOUS at 21:37

## 2025-05-08 ASSESSMENT — ENCOUNTER SYMPTOMS
VOMITING: 0
SHORTNESS OF BREATH: 0
NAUSEA: 0
VOICE CHANGE: 0
BACK PAIN: 0
CONSTIPATION: 0
COLOR CHANGE: 0
DIARRHEA: 0

## 2025-05-08 NOTE — PROGRESS NOTES
Patient continuing to have multiple stools 5 total.  Stools are increasing dark color (red) with blood noted.  Dr. Franco shirley served.

## 2025-05-08 NOTE — CONSULTS
[facility] Vascular Access Team:  PowerGlide Midline/Extended Dwell IV Catheter  Insertion Procedure Note      Patient: Ney Ann  YOB: 1955   MRN: [id number]  Room: [address] [place] [address]     Attending Physician - Edwin Gamez DO  Ordering Physician - Edwin Gamez DO    Diagnosis:   Rhinovirus infection [B34.8]  Traumatic rectus hematoma, initial encounter [S30.1XXA]  Chronic liver failure without hepatic coma (HCC) [K72.10]  Pneumonia of left lower lobe due to infectious organism [J18.9]  Pneumonia due to infectious organism [J18.9]       Procedure(s):   Insertion of a 20 gauge 8 cm Single Lumen PowerGlide Catheter    Indication(s):  Poor Venous Access    Date of Procedure: 5/8/2025     Performed by: Jaylen Romero RN    Complications: None      Findings:   1. Successful insertion of PowerGlide Catheter.  2. PowerGlide is ready to be used. Please change tubing prior to using the new line. Make sure to label, date and use alcohol caps on new tubing and alcohol caps on unused ports.   3. Patient may be changed to a unit draw for lab work.        Detailed Description of Procedure:   The Left Brachial vein was visualized using the ultrasound and deemed a suitable vessel. The area was prepped with Chlorhexidine and draped in sterile fashion using partial barrier draping. The vein was accessed using US guidance. The 20 gauge 8 cm Single Lumen PowerGlide catheter was advanced into the vein using ANTT. Approximately 0 cm exposed. All lumens had brisk blood return and was flushed with 10 cc of NS. The catheter was secured using a securement device. A 3M CHG Tegaderm was placed over the securement device and insertion site. Dressing was dated and initialed with external measurement marked. Patient did tolerate procedure well.    Electronically signed by Jaylen Romero RN on 5/8/2025 at 1:45 PM

## 2025-05-08 NOTE — PLAN OF CARE
Problem: Discharge Planning  Goal: Discharge to home or other facility with appropriate resources  Outcome: Progressing     Problem: Safety - Adult  Goal: Free from fall injury  Outcome: Progressing     Problem: Skin/Tissue Integrity  Goal: Skin integrity remains intact  Description: 1.  Monitor for areas of redness and/or skin breakdown2.  Assess vascular access sites hourly3.  Every 4-6 hours minimum:  Change oxygen saturation probe site4.  Every 4-6 hours:  If on nasal continuous positive airway pressure, respiratory therapy assess nares and determine need for appliance change or resting period  Outcome: Progressing     Problem: Infection - Adult  Goal: Absence of infection at discharge  Outcome: Progressing     Problem: Metabolic/Fluid and Electrolytes - Adult  Goal: Electrolytes maintained within normal limits  Outcome: Progressing

## 2025-05-08 NOTE — PROGRESS NOTES
Occupational Therapy  Will delay OT evaluation due to getting blood today. Electronically signed by Trina Hawkins OT on 5/8/2025 at 1:06 PM

## 2025-05-08 NOTE — PROGRESS NOTES
Palliative Care Progress Note  5/8/2025 11:12 AM    Patient:  Ney Ann  YOB: 1955  Primary Care Physician: Izabela Robles, SHEILA - CNP  Advance Directive: Full Code  Admit Date: 5/5/2025       Hospital Day: 3  Portions of this note have been copied forward, however, changed to reflect the most current clinical status of this patient.    CHIEF COMPLAINT/REASON FOR CONSULTATION Goals of care, family support, Code status, symptom management     SUBJECTIVE:  Mr. Ann has no new complaints this morning. Reports pain as controlled.     HPI:  The patient is a 70 y.o. male with PMH alcoholic cirrhosis previously followed at [facility], atrial fibrillation, chronic back pain, esophageal varices, portal hypertensive gastropathy, pancytopenia, HTN, COPD, vitamin D deficiency, insomnia who was recently admitted to this facility on 4/29/2025 when he presented with acute blood loss anemia with hemoglobin 4.3 and ongoing GI bleeding.  He required transfusional support throughout that admission.  He underwent EGD on 5/1/2025 that reported grade 1 esophageal varices, moderate portal hypertensive gastropathy and no active bleeding.  He did have recurrent rectal bleeding that stay and underwent colonoscopy on 5/3/2025 with findings listed as mild left sided diverticulosis, moderate internal hemorrhoids, diminutive descending colon polyp not removed due to severe thrombocytopenia.  He was discharged home later that day with Anusol suppositories and recommendation to follow-up with his PCP, gastroenterology and primary hepatologist at [facility].  He returned to [facility] ED on 5/5/2025 with concern for dyspnea, chest discomfort, left flank and abdominal pain with ecchymosis and tenderness.  He reported to the ED provider that he had a fall on 5/2/2025 and his hospital room and did not reported to anyone.  He reported that once discharged home he noticed the bruising began to enlarge in addition to worsening tenderness in that

## 2025-05-08 NOTE — PROGRESS NOTES
Hospitalist Progress Note    Patient:  Ney Ann  YOB: 1955  Date of Service: 5/8/2025  MRN: [id number]   Acct: [id number]   Primary Care Physician: Izabela Robles APRN - CNP  Advance Directive: Full Code  Admit Date: 5/5/2025       Hospital Day: 3  Referring Provider: Edwin Gamez DO    Patient Seen, Chart, Consults, Notes, Labs, Radiology studies reviewed.    Subjective:  Ney Ann is a 70 y.o. male  whom we are following for alcohol cirrhosis, chronic thrombocytopenia, esophageal varices, left flank hematoma.  He is feeling better today.  Hemoglobin has drifted down to 6.6 g.  We will transfuse 1 unit of packed cells.    Allergies:  Watermelon flavoring agent (non-screening), Tomato, and Watermelon [citrullus vulgaris]    Medicines:  Current Facility-Administered Medications   Medication Dose Route Frequency Provider Last Rate Last Admin    0.9 % sodium chloride infusion   IntraVENous PRN Edwin Gamez DO        bumetanide (BUMEX) injection 1 mg  1 mg IntraVENous Once Edwin Gamez DO        zolpidem (AMBIEN) tablet 10 mg  10 mg Oral Nightly PRN Ankit Adams MD   10 mg at 05/07/25 2149    thiamine mononitrate tablet 100 mg  100 mg Oral Daily Ankit Adams MD   100 mg at 05/08/25 0828    rifAXIMin (XIFAXAN) tablet 400 mg  400 mg Oral TID Ankit Adams MD   400 mg at 05/08/25 0828    ceFEPIme (MAXIPIME) 2,000 mg in sodium chloride 0.9 % 100 mL IVPB ([id number])  2,000 mg IntraVENous Q8H Ankit Adams MD   Stopped at 05/08/25 0830    lactulose (CHRONULAC) 10 GM/15ML solution 10 g  10 g Oral BID Bran Obrien MD   10 g at 05/08/25 0828    sucralfate (CARAFATE) tablet 1 g  1 g Oral 4x Daily Bran Obrien MD   1 g at 05/08/25 0828    sodium chloride flush 0.9 % injection 5-40 mL  5-40 mL IntraVENous PRN Ankit Adams MD        0.9 % sodium chloride infusion   IntraVENous PRN Ankit Adams MD 10 mL/hr at 05/07/25 0616 [facility] at

## 2025-05-08 NOTE — PROGRESS NOTES
Orders for blood in epic.  Current IV access bleeding.   Attempted to access new line, infiltrated.   Notified Max with vascular access team.      PRBC's ready for infusion.  Will order once access is available.

## 2025-05-08 NOTE — PLAN OF CARE
Care plan reviewed      Problem: Discharge Planning  Goal: Discharge to home or other facility with appropriate resources  Outcome: Progressing  Flowsheets (Taken 5/7/2025 2056)  Discharge to home or other facility with appropriate resources: Identify barriers to discharge with patient and caregiver     Problem: Safety - Adult  Goal: Free from fall injury  Outcome: Progressing     Problem: Skin/Tissue Integrity  Goal: Skin integrity remains intact  Description: 1.  Monitor for areas of redness and/or skin breakdown2.  Assess vascular access sites hourly3.  Every 4-6 hours minimum:  Change oxygen saturation probe site4.  Every 4-6 hours:  If on nasal continuous positive airway pressure, respiratory therapy assess nares and determine need for appliance change or resting period  Outcome: Progressing  Flowsheets (Taken 5/8/2025 0042)  Skin Integrity Remains Intact: Monitor for areas of redness and/or skin breakdown     Problem: ABCDS Injury Assessment  Goal: Absence of physical injury  Outcome: Progressing     Problem: Neurosensory - Adult  Goal: Achieves stable or improved neurological status  Outcome: Progressing  Flowsheets  Taken 5/7/2025 2056 by Keri Patton RN  Achieves stable or improved neurological status: Assess for and report changes in neurological status  Taken 5/7/2025 1200 by Мария Chery RN  Achieves stable or improved neurological status:   Assess for and report changes in neurological status   Initiate measures to prevent increased intracranial pressure   Maintain blood pressure and fluid volume within ordered parameters to optimize cerebral perfusion and minimize risk of hemorrhage   Monitor temperature, glucose, and sodium. Initiate appropriate interventions as ordered     Problem: Respiratory - Adult  Goal: Achieves optimal ventilation and oxygenation  Outcome: Progressing  Flowsheets (Taken 5/7/2025 1200 by Мария Chery RN)  Achieves optimal ventilation and oxygenation: Oxygen

## 2025-05-08 NOTE — PROGRESS NOTES
Physical Therapy  Name: Ney Ann  MRN:  [id number]  Date of service:  5/8/2025    Rosa Maria BELL, states pt going to be getting blood today. Will f/u at a later time.    Electronically signed by Mary Grace Don PT on 5/8/2025 at 12:52 PM

## 2025-05-09 ENCOUNTER — APPOINTMENT (OUTPATIENT)
Dept: CT IMAGING | Age: 70
DRG: 813 | End: 2025-05-09
Payer: MEDICARE

## 2025-05-09 LAB
ALBUMIN SERPL-MCNC: 2.7 G/DL (ref 3.5–5.2)
ALBUMIN SERPL-MCNC: 2.8 G/DL (ref 3.5–5.2)
ALP SERPL-CCNC: 71 U/L (ref 40–129)
ALP SERPL-CCNC: 72 U/L (ref 40–129)
ALT SERPL-CCNC: 16 U/L (ref 10–50)
ALT SERPL-CCNC: 17 U/L (ref 10–50)
ANION GAP SERPL CALCULATED.3IONS-SCNC: 12 MMOL/L (ref 8–16)
ANION GAP SERPL CALCULATED.3IONS-SCNC: 9 MMOL/L (ref 8–16)
ANISOCYTOSIS BLD QL SMEAR: ABNORMAL
ANISOCYTOSIS BLD QL SMEAR: ABNORMAL
AST SERPL-CCNC: 30 U/L (ref 10–50)
AST SERPL-CCNC: 33 U/L (ref 10–50)
BASOPHILS # BLD: 0 K/UL (ref 0–0.2)
BASOPHILS # BLD: 0 K/UL (ref 0–0.2)
BASOPHILS NFR BLD: 0.3 % (ref 0–1)
BASOPHILS NFR BLD: 0.5 % (ref 0–1)
BILIRUB SERPL-MCNC: 5.7 MG/DL (ref 0.2–1.2)
BILIRUB SERPL-MCNC: 6.6 MG/DL (ref 0.2–1.2)
BUN SERPL-MCNC: 9 MG/DL (ref 8–23)
BUN SERPL-MCNC: 9 MG/DL (ref 8–23)
BURR CELLS BLD QL SMEAR: ABNORMAL
BURR CELLS BLD QL SMEAR: ABNORMAL
CALCIUM SERPL-MCNC: 7.8 MG/DL (ref 8.8–10.2)
CALCIUM SERPL-MCNC: 7.8 MG/DL (ref 8.8–10.2)
CHLORIDE SERPL-SCNC: 110 MMOL/L (ref 98–107)
CHLORIDE SERPL-SCNC: 111 MMOL/L (ref 98–107)
CO2 SERPL-SCNC: 17 MMOL/L (ref 22–29)
CO2 SERPL-SCNC: 20 MMOL/L (ref 22–29)
CREAT SERPL-MCNC: 0.6 MG/DL (ref 0.7–1.2)
CREAT SERPL-MCNC: 0.6 MG/DL (ref 0.7–1.2)
DACRYOCYTES BLD QL SMEAR: ABNORMAL
DACRYOCYTES BLD QL SMEAR: ABNORMAL
EOSINOPHIL # BLD: 0.2 K/UL (ref 0–0.6)
EOSINOPHIL # BLD: 0.2 K/UL (ref 0–0.6)
EOSINOPHIL NFR BLD: 4.4 % (ref 0–5)
EOSINOPHIL NFR BLD: 4.6 % (ref 0–5)
ERYTHROCYTE [DISTWIDTH] IN BLOOD BY AUTOMATED COUNT: 27.3 % (ref 11.5–14.5)
ERYTHROCYTE [DISTWIDTH] IN BLOOD BY AUTOMATED COUNT: 27.4 % (ref 11.5–14.5)
GLUCOSE SERPL-MCNC: 116 MG/DL (ref 70–99)
GLUCOSE SERPL-MCNC: 160 MG/DL (ref 70–99)
HCT VFR BLD AUTO: 23.9 % (ref 42–52)
HCT VFR BLD AUTO: 25.3 % (ref 42–52)
HCT VFR BLD AUTO: 25.9 % (ref 42–52)
HEMOCCULT SP1 STL QL: ABNORMAL
HGB BLD-MCNC: 7.2 G/DL (ref 14–18)
HGB BLD-MCNC: 7.8 G/DL (ref 14–18)
HGB BLD-MCNC: 7.9 G/DL (ref 14–18)
HYPOCHROMIA BLD QL SMEAR: ABNORMAL
HYPOCHROMIA BLD QL SMEAR: ABNORMAL
IMM GRANULOCYTES # BLD: 0 K/UL
IMM GRANULOCYTES # BLD: 0 K/UL
LYMPHOCYTES # BLD: 0.7 K/UL (ref 1.1–4.5)
LYMPHOCYTES # BLD: 0.7 K/UL (ref 1.1–4.5)
LYMPHOCYTES NFR BLD: 19.5 % (ref 20–40)
LYMPHOCYTES NFR BLD: 19.7 % (ref 20–40)
MACROCYTES BLD QL SMEAR: ABNORMAL
MACROCYTES BLD QL SMEAR: ABNORMAL
MAGNESIUM SERPL-MCNC: 1.7 MG/DL (ref 1.6–2.4)
MCH RBC QN AUTO: 31.6 PG (ref 27–31)
MCH RBC QN AUTO: 32.5 PG (ref 27–31)
MCHC RBC AUTO-ENTMCNC: 30.1 G/DL (ref 33–37)
MCHC RBC AUTO-ENTMCNC: 30.5 G/DL (ref 33–37)
MCV RBC AUTO: 104.8 FL (ref 80–94)
MCV RBC AUTO: 106.6 FL (ref 80–94)
MONOCYTES # BLD: 0.5 K/UL (ref 0–0.9)
MONOCYTES # BLD: 0.5 K/UL (ref 0–0.9)
MONOCYTES NFR BLD: 12.6 % (ref 0–10)
MONOCYTES NFR BLD: 14.5 % (ref 0–10)
NEUTROPHILS # BLD: 2.1 K/UL (ref 1.5–7.5)
NEUTROPHILS # BLD: 2.3 K/UL (ref 1.5–7.5)
NEUTS SEG NFR BLD: 59.7 % (ref 50–65)
NEUTS SEG NFR BLD: 62.5 % (ref 50–65)
PHOSPHATE SERPL-MCNC: 1.9 MG/DL (ref 2.5–4.5)
PLATELET # BLD AUTO: 26 K/UL (ref 130–400)
PLATELET # BLD AUTO: 31 K/UL (ref 130–400)
PLATELET SLIDE REVIEW: ABNORMAL
PLATELET SLIDE REVIEW: ABNORMAL
PMV BLD AUTO: 10 FL (ref 9.4–12.4)
PMV BLD AUTO: 12.4 FL (ref 9.4–12.4)
POLYCHROMASIA BLD QL SMEAR: ABNORMAL
POTASSIUM SERPL-SCNC: 3.2 MMOL/L (ref 3.5–5.1)
POTASSIUM SERPL-SCNC: 3.6 MMOL/L (ref 3.5–5)
PROT SERPL-MCNC: 4.6 G/DL (ref 6.4–8.3)
PROT SERPL-MCNC: 4.8 G/DL (ref 6.4–8.3)
RBC # BLD AUTO: 2.28 M/UL (ref 4.7–6.1)
RBC # BLD AUTO: 2.43 M/UL (ref 4.7–6.1)
SODIUM SERPL-SCNC: 139 MMOL/L (ref 136–145)
SODIUM SERPL-SCNC: 140 MMOL/L (ref 136–145)
WBC # BLD AUTO: 3.5 K/UL (ref 4.8–10.8)
WBC # BLD AUTO: 3.7 K/UL (ref 4.8–10.8)

## 2025-05-09 PROCEDURE — 84100 ASSAY OF PHOSPHORUS: CPT

## 2025-05-09 PROCEDURE — 6360000004 HC RX CONTRAST MEDICATION: Performed by: SURGERY

## 2025-05-09 PROCEDURE — 74175 CTA ABDOMEN W/CONTRAST: CPT

## 2025-05-09 PROCEDURE — 6370000000 HC RX 637 (ALT 250 FOR IP): Performed by: INTERNAL MEDICINE

## 2025-05-09 PROCEDURE — 1200000000 HC SEMI PRIVATE

## 2025-05-09 PROCEDURE — 2580000003 HC RX 258: Performed by: INTERNAL MEDICINE

## 2025-05-09 PROCEDURE — 82270 OCCULT BLOOD FECES: CPT

## 2025-05-09 PROCEDURE — 94760 N-INVAS EAR/PLS OXIMETRY 1: CPT

## 2025-05-09 PROCEDURE — 85018 HEMOGLOBIN: CPT

## 2025-05-09 PROCEDURE — 94640 AIRWAY INHALATION TREATMENT: CPT

## 2025-05-09 PROCEDURE — 80053 COMPREHEN METABOLIC PANEL: CPT

## 2025-05-09 PROCEDURE — 85014 HEMATOCRIT: CPT

## 2025-05-09 PROCEDURE — 2700000000 HC OXYGEN THERAPY PER DAY

## 2025-05-09 PROCEDURE — 6360000002 HC RX W HCPCS: Performed by: INTERNAL MEDICINE

## 2025-05-09 PROCEDURE — 85025 COMPLETE CBC W/AUTO DIFF WBC: CPT

## 2025-05-09 PROCEDURE — 99232 SBSQ HOSP IP/OBS MODERATE 35: CPT | Performed by: PHYSICIAN ASSISTANT

## 2025-05-09 PROCEDURE — 6370000000 HC RX 637 (ALT 250 FOR IP): Performed by: PHYSICIAN ASSISTANT

## 2025-05-09 PROCEDURE — 83735 ASSAY OF MAGNESIUM: CPT

## 2025-05-09 PROCEDURE — 36415 COLL VENOUS BLD VENIPUNCTURE: CPT

## 2025-05-09 RX ORDER — IOPAMIDOL 755 MG/ML
70 INJECTION, SOLUTION INTRAVASCULAR
Status: COMPLETED | OUTPATIENT
Start: 2025-05-09 | End: 2025-05-09

## 2025-05-09 RX ORDER — PHYTONADIONE 5 MG/1
10 TABLET ORAL DAILY
Status: COMPLETED | OUTPATIENT
Start: 2025-05-09 | End: 2025-05-11

## 2025-05-09 RX ORDER — SPIRONOLACTONE 25 MG/1
25 TABLET ORAL DAILY
Status: DISCONTINUED | OUTPATIENT
Start: 2025-05-09 | End: 2025-05-13 | Stop reason: HOSPADM

## 2025-05-09 RX ORDER — FUROSEMIDE 20 MG/1
20 TABLET ORAL 2 TIMES DAILY
Status: DISCONTINUED | OUTPATIENT
Start: 2025-05-09 | End: 2025-05-13 | Stop reason: HOSPADM

## 2025-05-09 RX ORDER — NADOLOL 20 MG/1
20 TABLET ORAL DAILY
Status: DISCONTINUED | OUTPATIENT
Start: 2025-05-09 | End: 2025-05-13 | Stop reason: HOSPADM

## 2025-05-09 RX ADMIN — RIFAXIMIN 400 MG: 200 TABLET ORAL at 08:29

## 2025-05-09 RX ADMIN — SODIUM CHLORIDE, PRESERVATIVE FREE 40 MG: 5 INJECTION INTRAVENOUS at 21:08

## 2025-05-09 RX ADMIN — SPIRONOLACTONE 25 MG: 25 TABLET ORAL at 14:06

## 2025-05-09 RX ADMIN — Medication 100 MG: at 08:29

## 2025-05-09 RX ADMIN — IOPAMIDOL 70 ML: 755 INJECTION, SOLUTION INTRAVENOUS at 16:47

## 2025-05-09 RX ADMIN — CEFEPIME 2000 MG: 2 INJECTION, POWDER, FOR SOLUTION INTRAVENOUS at 14:05

## 2025-05-09 RX ADMIN — NADOLOL 20 MG: 20 TABLET ORAL at 14:05

## 2025-05-09 RX ADMIN — FUROSEMIDE 20 MG: 20 TABLET ORAL at 14:06

## 2025-05-09 RX ADMIN — Medication 500 MG: at 14:05

## 2025-05-09 RX ADMIN — CEFEPIME 2000 MG: 2 INJECTION, POWDER, FOR SOLUTION INTRAVENOUS at 06:02

## 2025-05-09 RX ADMIN — SODIUM CHLORIDE, PRESERVATIVE FREE 40 MG: 5 INJECTION INTRAVENOUS at 10:01

## 2025-05-09 RX ADMIN — CEFEPIME 2000 MG: 2 INJECTION, POWDER, FOR SOLUTION INTRAVENOUS at 21:06

## 2025-05-09 RX ADMIN — RIFAXIMIN 400 MG: 200 TABLET ORAL at 14:05

## 2025-05-09 RX ADMIN — SUCRALFATE 1 G: 1 TABLET ORAL at 17:18

## 2025-05-09 RX ADMIN — RIFAXIMIN 400 MG: 200 TABLET ORAL at 21:03

## 2025-05-09 RX ADMIN — Medication 500 MG: at 21:03

## 2025-05-09 RX ADMIN — SUCRALFATE 1 G: 1 TABLET ORAL at 08:29

## 2025-05-09 RX ADMIN — PHYTONADIONE 10 MG: 5 TABLET ORAL at 10:01

## 2025-05-09 RX ADMIN — SUCRALFATE 1 G: 1 TABLET ORAL at 14:06

## 2025-05-09 RX ADMIN — SUCRALFATE 1 G: 1 TABLET ORAL at 21:03

## 2025-05-09 RX ADMIN — ZOLPIDEM TARTRATE 10 MG: 5 TABLET ORAL at 21:03

## 2025-05-09 RX ADMIN — ALBUTEROL SULFATE 2.5 MG: 2.5 SOLUTION RESPIRATORY (INHALATION) at 18:35

## 2025-05-09 RX ADMIN — FUROSEMIDE 20 MG: 20 TABLET ORAL at 17:18

## 2025-05-09 ASSESSMENT — ENCOUNTER SYMPTOMS
VOICE CHANGE: 0
NAUSEA: 0
CONSTIPATION: 0
VOMITING: 0
COLOR CHANGE: 0
SHORTNESS OF BREATH: 0
DIARRHEA: 0
BACK PAIN: 0

## 2025-05-09 NOTE — PROGRESS NOTES
Palliative Care Progress Note  5/9/2025 12:25 PM    Patient:  Ney Ann  YOB: 1955  Primary Care Physician: Izabela Robles, APRN - CNP  Advance Directive: Full Code  Admit Date: 5/5/2025       Hospital Day: 4  Portions of this note have been copied forward, however, changed to reflect the most current clinical status of this patient.    CHIEF COMPLAINT/REASON FOR CONSULTATION Goals of care, family support, Code status, symptom management     SUBJECTIVE:  Mr. Ann is fatigued. Hematoma has expanded more since yesterday    HPI:  The patient is a 70 y.o. male with PMH alcoholic cirrhosis previously followed at [facility], atrial fibrillation, chronic back pain, esophageal varices, portal hypertensive gastropathy, pancytopenia, HTN, COPD, vitamin D deficiency, insomnia who was recently admitted to this facility on 4/29/2025 when he presented with acute blood loss anemia with hemoglobin 4.3 and ongoing GI bleeding.  He required transfusional support throughout that admission.  He underwent EGD on 5/1/2025 that reported grade 1 esophageal varices, moderate portal hypertensive gastropathy and no active bleeding.  He did have recurrent rectal bleeding that stay and underwent colonoscopy on 5/3/2025 with findings listed as mild left sided diverticulosis, moderate internal hemorrhoids, diminutive descending colon polyp not removed due to severe thrombocytopenia.  He was discharged home later that day with Anusol suppositories and recommendation to follow-up with his PCP, gastroenterology and primary hepatologist at [facility].  He returned to [facility] ED on 5/5/2025 with concern for dyspnea, chest discomfort, left flank and abdominal pain with ecchymosis and tenderness.  He reported to the ED provider that he had a fall on 5/2/2025 and his hospital room and did not reported to anyone.  He reported that once discharged home he noticed the bruising began to enlarge in addition to worsening tenderness in that area.

## 2025-05-09 NOTE — PROGRESS NOTES
Physical Therapy  Name: Ney Ann  MRN:  [id number]  Date of service:  5/9/2025    Pt sleeping soundly. Family member present. Will f/u at a later time.    Electronically signed by Mary Grace Don PT on 5/9/2025 at 10:49 AM

## 2025-05-09 NOTE — PROGRESS NOTES
Occupational Therapy  Name: Ney Ann  MRN:  [id number]  Date of service:  5/9/2025    Pt currently sleeping with family member present. Will follow up at a later time.    Electronically signed by Scarlett Burrell OT on 5/9/2025 at 10:49 AM

## 2025-05-09 NOTE — PROGRESS NOTES
Hospitalist Progress Note    Patient:  Ney Ann  YOB: 1955  Date of Service: 5/9/2025  MRN: [id number]   Acct: [id number]   Primary Care Physician: Izabela Robles APRN - CNP  Advance Directive: Full Code  Admit Date: 5/5/2025       Hospital Day: 4  Referring Provider: Edwin Gamez DO    Patient Seen, Chart, Consults, Notes, Labs, Radiology studies reviewed.    Subjective:  Ney Ann is a 70 y.o. male  whom we are following for alcohol cirrhosis, chronic thrombocytopenia, esophageal varices, left flank hematoma.  Overall feeling well.  His wife notes that the ecchymosis on his left flank is increasing.  He is scheduled for repeat CT angiogram to look for any potential additional bleeding.    Allergies:  Watermelon flavoring agent (non-screening), Tomato, and Watermelon [citrullus vulgaris]    Medicines:  Current Facility-Administered Medications   Medication Dose Route Frequency Provider Last Rate Last Admin    phytonadione (VITAMIN K) tablet 10 mg  10 mg Oral Daily Edwin Gamez DO   10 mg at 05/09/25 1001    furosemide (LASIX) tablet 20 mg  20 mg Oral BID Scarlett Juares PA-C   20 mg at 05/09/25 1406    nadolol (CORGARD) tablet 20 mg  20 mg Oral Daily Scarlett Juares PA-C   20 mg at 05/09/25 1405    potassium phosphate (monobasic) (K-PHOS) tablet 500 mg  500 mg Oral BID Scarlett Juares PA-C   500 mg at 05/09/25 1405    spironolactone (ALDACTONE) tablet 25 mg  25 mg Oral Daily Scarlett Juares PA-C   25 mg at 05/09/25 1406    0.9 % sodium chloride infusion   IntraVENous PRN Edwin Gamez DO        zolpidem (AMBIEN) tablet 10 mg  10 mg Oral Nightly PRN Edwin Gamez DO   10 mg at 05/08/25 2139    thiamine mononitrate tablet 100 mg  100 mg Oral Daily Edwin Gamez DO   100 mg at 05/09/25 0829    rifAXIMin (XIFAXAN) tablet 400 mg  400 mg Oral TID Edwin Gamez DO   400 mg at 05/09/25 1405    ceFEPIme (MAXIPIME) 2,000 mg in sodium

## 2025-05-09 NOTE — PROGRESS NOTES
Vascular Surgery -  WILVER PEREA MD M.D.   Daily Progress Note    Pt Name: Ney Ann  Medical Record Number: [id number]  Date of Birth 1955   Today's Date: 5/9/2025    Chief Complaint:  Chief Complaint   Patient presents with    Fall     Fall Friday evening, bruising to left side into back       SUBJECTIVE:     Patient was seen and examined.  No complaints. He doesn't have significant pain left flank, just sore    OBJECTIVE:                MEDS:     Scheduled Meds:   vitamin B-1  100 mg Oral Daily    rifAXIMin  400 mg Oral TID    cefepime  2,000 mg IntraVENous Q8H    lactulose  10 g Oral BID    sucralfate  1 g Oral 4x Daily    pantoprazole (PROTONIX) 40 mg in sodium chloride (PF) 0.9 % 10 mL injection  40 mg IntraVENous Q12H     Continuous Infusions:   sodium chloride      sodium chloride 10 mL/hr at 05/07/25 0616     PRN Meds:sodium chloride, , PRN  zolpidem, 10 mg, Nightly PRN  sodium chloride flush, 5-40 mL, PRN  sodium chloride, , PRN  ondansetron, 4 mg, Q8H PRN   Or  ondansetron, 4 mg, Q6H PRN  polyethylene glycol, 17 g, Daily PRN  acetaminophen, 650 mg, Q6H PRN   Or  acetaminophen, 650 mg, Q6H PRN  albuterol, 2.5 mg, Q6H PRN          PHYSICAL EXAM:     CONSTITUTIONAL: Alert and oriented times 3, no acute distress and cooperative to examination.  LUNGS: Chest expands equally bilaterally upon respiration, no accessory muscle used. ABDOMEN: soft, nontender, nondistended, no masses or organomegaly, left flank large ecchymosis, no focal hematoma, no pain with palpation, just sore all over      LABS:       CBC:   Recent Labs     05/08/25  0028 05/08/25  0750 05/08/25  1819 05/09/25  0005 05/09/25  0303   WBC 3.4*  --   --  3.7* 3.5*   RBC 2.13*  --   --  2.43* 2.28*   HGB 6.9*   < > 7.4* 7.9* 7.2*   HCT 22.4*   < > 24.5* 25.9* 23.9*   .2*  --   --  106.6* 104.8*   MCH 32.4*  --   --  32.5* 31.6*   MCHC 30.8*  --   --  30.5* 30.1*   RDW 27.8*  --   --  27.4* 27.3*   PLT 29*  --   --  31* 26*   MPV

## 2025-05-09 NOTE — PLAN OF CARE
Problem: Discharge Planning  Goal: Discharge to home or other facility with appropriate resources  5/9/2025 0120 by Brandie Cullen RN  Outcome: Progressing  5/8/2025 1648 by Rosa Maria Gloria RN  Outcome: Progressing     Problem: Safety - Adult  Goal: Free from fall injury  5/9/2025 0120 by Brandie Cullen RN  Outcome: Progressing  5/8/2025 1648 by Rosa Maria Gloria RN  Outcome: Progressing     Problem: Skin/Tissue Integrity  Goal: Skin integrity remains intact  Description: 1.  Monitor for areas of redness and/or skin breakdown2.  Assess vascular access sites hourly3.  Every 4-6 hours minimum:  Change oxygen saturation probe site4.  Every 4-6 hours:  If on nasal continuous positive airway pressure, respiratory therapy assess nares and determine need for appliance change or resting period  5/9/2025 0120 by Brandie Cullen RN  Outcome: Progressing  5/8/2025 1648 by Rosa Maria Gloria RN  Outcome: Progressing     Problem: ABCDS Injury Assessment  Goal: Absence of physical injury  5/9/2025 0120 by Brandie Cullen RN  Outcome: Progressing  5/8/2025 1648 by Rosa Maria Gloria RN  Outcome: Progressing     Problem: Neurosensory - Adult  Goal: Achieves stable or improved neurological status  5/9/2025 0120 by Brandie Culeln RN  Outcome: Progressing  5/8/2025 1648 by Rosa Maria Gloria RN  Outcome: Progressing  Flowsheets (Taken 5/8/2025 0828)  Achieves stable or improved neurological status: Assess for and report changes in neurological status     Problem: Respiratory - Adult  Goal: Achieves optimal ventilation and oxygenation  5/9/2025 0120 by Brandie Cullen RN  Outcome: Progressing  5/8/2025 1648 by Rosa Maria Gloria RN  Outcome: Progressing  Flowsheets (Taken 5/8/2025 0828)  Achieves optimal ventilation and oxygenation: Assess for changes in respiratory status     Problem: Cardiovascular - Adult  Goal: Maintains optimal cardiac output and hemodynamic stability  5/9/2025 0120 by Brandie Cullen RN  Outcome: Progressing  5/8/2025 1648  by Rosa Maria Gloria RN  Outcome: Progressing  Flowsheets (Taken 5/8/2025 0828)  Maintains optimal cardiac output and hemodynamic stability: Monitor blood pressure and heart rate     Problem: Skin/Tissue Integrity - Adult  Goal: Skin integrity remains intact  Description: 1.  Monitor for areas of redness and/or skin breakdown2.  Assess vascular access sites hourly3.  Every 4-6 hours minimum:  Change oxygen saturation probe site4.  Every 4-6 hours:  If on nasal continuous positive airway pressure, respiratory therapy assess nares and determine need for appliance change or resting period  5/9/2025 0120 by Brandie Cullen RN  Outcome: Progressing  5/8/2025 1648 by Rosa Maria Gloria RN  Outcome: Progressing     Problem: Musculoskeletal - Adult  Goal: Return mobility to safest level of function  5/9/2025 0120 by Brandie Cullen RN  Outcome: Progressing  5/8/2025 1648 by Rosa Maria Gloria RN  Outcome: Progressing  Flowsheets (Taken 5/8/2025 0828)  Return Mobility to Safest Level of Function: Assess patient stability and activity tolerance for standing, transferring and ambulating with or without assistive devices     Problem: Gastrointestinal - Adult  Goal: Minimal or absence of nausea and vomiting  5/9/2025 0120 by Brandie Cullen RN  Outcome: Progressing  5/8/2025 1648 by Rosa Maria Gloria RN  Outcome: Progressing  Flowsheets (Taken 5/8/2025 0828)  Minimal or absence of nausea and vomiting: Administer IV fluids as ordered to ensure adequate hydration     Problem: Genitourinary - Adult  Goal: Absence of urinary retention  5/9/2025 0120 by Brandie Cullen RN  Outcome: Progressing  5/8/2025 1648 by Rosa Maria Gloria RN  Outcome: Progressing  Flowsheets (Taken 5/8/2025 0828)  Absence of urinary retention: Assess patient’s ability to void and empty bladder     Problem: Infection - Adult  Goal: Absence of infection at discharge  5/9/2025 0120 by Brandie Cullen RN  Outcome: Progressing  5/8/2025 1648 by Rosa Maria Gloria RN  Outcome:

## 2025-05-09 NOTE — PLAN OF CARE
Problem: Discharge Planning  Goal: Discharge to home or other facility with appropriate resources  5/9/2025 0929 by Rosa Maria Gloria RN  Outcome: Progressing  5/9/2025 0120 by Brandie Cullen RN  Outcome: Progressing     Problem: Safety - Adult  Goal: Free from fall injury  5/9/2025 0929 by Rosa Maria Gloria RN  Outcome: Progressing  5/9/2025 0120 by Brandie Cullen RN  Outcome: Progressing     Problem: Skin/Tissue Integrity  Goal: Skin integrity remains intact  Description: 1.  Monitor for areas of redness and/or skin breakdown2.  Assess vascular access sites hourly3.  Every 4-6 hours minimum:  Change oxygen saturation probe site4.  Every 4-6 hours:  If on nasal continuous positive airway pressure, respiratory therapy assess nares and determine need for appliance change or resting period  5/9/2025 0929 by Rosa Maria Gloria RN  Outcome: Progressing  Flowsheets (Taken 5/9/2025 0829)  Skin Integrity Remains Intact: Monitor for areas of redness and/or skin breakdown  5/9/2025 0120 by Brandie Cullen RN  Outcome: Progressing     Problem: ABCDS Injury Assessment  Goal: Absence of physical injury  5/9/2025 0929 by Rosa Maria Gloria RN  Outcome: Progressing  5/9/2025 0120 by Brandie Cullen RN  Outcome: Progressing     Problem: Skin/Tissue Integrity - Adult  Goal: Skin integrity remains intact  Description: 1.  Monitor for areas of redness and/or skin breakdown2.  Assess vascular access sites hourly3.  Every 4-6 hours minimum:  Change oxygen saturation probe site4.  Every 4-6 hours:  If on nasal continuous positive airway pressure, respiratory therapy assess nares and determine need for appliance change or resting period  5/9/2025 0929 by Rosa Maria Gloria RN  Outcome: Progressing  Flowsheets (Taken 5/9/2025 0829)  Skin Integrity Remains Intact: Monitor for areas of redness and/or skin breakdown  5/9/2025 0120 by Brandie Cullen RN  Outcome: Progressing     Problem: Infection - Adult  Goal: Absence of infection at

## 2025-05-10 LAB
ALBUMIN SERPL-MCNC: 2.7 G/DL (ref 3.5–5.2)
ALP SERPL-CCNC: 80 U/L (ref 40–129)
ALT SERPL-CCNC: 16 U/L (ref 10–50)
ANION GAP SERPL CALCULATED.3IONS-SCNC: 9 MMOL/L (ref 8–16)
APTT PPP: 41.6 SEC (ref 26–36.2)
AST SERPL-CCNC: 29 U/L (ref 10–50)
BASOPHILS # BLD: 0 K/UL (ref 0–0.2)
BASOPHILS NFR BLD: 0.5 % (ref 0–1)
BILIRUB SERPL-MCNC: 4.6 MG/DL (ref 0.2–1.2)
BUN SERPL-MCNC: 14 MG/DL (ref 8–23)
CALCIUM SERPL-MCNC: 7.8 MG/DL (ref 8.8–10.2)
CHLORIDE SERPL-SCNC: 111 MMOL/L (ref 98–107)
CO2 SERPL-SCNC: 19 MMOL/L (ref 22–29)
CREAT SERPL-MCNC: 0.6 MG/DL (ref 0.7–1.2)
EOSINOPHIL # BLD: 0.2 K/UL (ref 0–0.6)
EOSINOPHIL NFR BLD: 5.1 % (ref 0–5)
ERYTHROCYTE [DISTWIDTH] IN BLOOD BY AUTOMATED COUNT: 28 % (ref 11.5–14.5)
FIBRINOGEN PPP-MCNC: 183 MG/DL (ref 238–505)
GLUCOSE SERPL-MCNC: 124 MG/DL (ref 70–99)
HCT VFR BLD AUTO: 23.3 % (ref 42–52)
HGB BLD-MCNC: 7.1 G/DL (ref 14–18)
IMM GRANULOCYTES # BLD: 0 K/UL
LYMPHOCYTES # BLD: 0.7 K/UL (ref 1.1–4.5)
LYMPHOCYTES NFR BLD: 16.8 % (ref 20–40)
MAGNESIUM SERPL-MCNC: 1.7 MG/DL (ref 1.6–2.4)
MCH RBC QN AUTO: 32.4 PG (ref 27–31)
MCHC RBC AUTO-ENTMCNC: 30.5 G/DL (ref 33–37)
MCV RBC AUTO: 106.4 FL (ref 80–94)
MONOCYTES # BLD: 0.5 K/UL (ref 0–0.9)
MONOCYTES NFR BLD: 11.5 % (ref 0–10)
NEUTROPHILS # BLD: 2.8 K/UL (ref 1.5–7.5)
NEUTS SEG NFR BLD: 65.4 % (ref 50–65)
PLATELET # BLD AUTO: 29 K/UL (ref 130–400)
PMV BLD AUTO: 10.9 FL (ref 9.4–12.4)
POTASSIUM SERPL-SCNC: 3.5 MMOL/L (ref 3.5–5)
PROT SERPL-MCNC: 4.6 G/DL (ref 6.4–8.3)
RBC # BLD AUTO: 2.19 M/UL (ref 4.7–6.1)
SODIUM SERPL-SCNC: 139 MMOL/L (ref 136–145)
WBC # BLD AUTO: 4.3 K/UL (ref 4.8–10.8)

## 2025-05-10 PROCEDURE — 6360000002 HC RX W HCPCS: Performed by: INTERNAL MEDICINE

## 2025-05-10 PROCEDURE — 94150 VITAL CAPACITY TEST: CPT

## 2025-05-10 PROCEDURE — 85384 FIBRINOGEN ACTIVITY: CPT

## 2025-05-10 PROCEDURE — 6370000000 HC RX 637 (ALT 250 FOR IP): Performed by: PHYSICIAN ASSISTANT

## 2025-05-10 PROCEDURE — 6370000000 HC RX 637 (ALT 250 FOR IP): Performed by: INTERNAL MEDICINE

## 2025-05-10 PROCEDURE — 94760 N-INVAS EAR/PLS OXIMETRY 1: CPT

## 2025-05-10 PROCEDURE — 85730 THROMBOPLASTIN TIME PARTIAL: CPT

## 2025-05-10 PROCEDURE — 94640 AIRWAY INHALATION TREATMENT: CPT

## 2025-05-10 PROCEDURE — 2580000003 HC RX 258: Performed by: INTERNAL MEDICINE

## 2025-05-10 PROCEDURE — 1200000000 HC SEMI PRIVATE

## 2025-05-10 PROCEDURE — 80053 COMPREHEN METABOLIC PANEL: CPT

## 2025-05-10 PROCEDURE — 83735 ASSAY OF MAGNESIUM: CPT

## 2025-05-10 PROCEDURE — 36415 COLL VENOUS BLD VENIPUNCTURE: CPT

## 2025-05-10 PROCEDURE — 99223 1ST HOSP IP/OBS HIGH 75: CPT | Performed by: INTERNAL MEDICINE

## 2025-05-10 PROCEDURE — 2700000000 HC OXYGEN THERAPY PER DAY

## 2025-05-10 PROCEDURE — 85025 COMPLETE CBC W/AUTO DIFF WBC: CPT

## 2025-05-10 RX ADMIN — SUCRALFATE 1 G: 1 TABLET ORAL at 20:48

## 2025-05-10 RX ADMIN — RIFAXIMIN 400 MG: 200 TABLET ORAL at 09:25

## 2025-05-10 RX ADMIN — Medication 500 MG: at 20:48

## 2025-05-10 RX ADMIN — SODIUM CHLORIDE, PRESERVATIVE FREE 40 MG: 5 INJECTION INTRAVENOUS at 11:32

## 2025-05-10 RX ADMIN — RIFAXIMIN 400 MG: 200 TABLET ORAL at 20:48

## 2025-05-10 RX ADMIN — CEFEPIME 2000 MG: 2 INJECTION, POWDER, FOR SOLUTION INTRAVENOUS at 20:50

## 2025-05-10 RX ADMIN — CEFEPIME 2000 MG: 2 INJECTION, POWDER, FOR SOLUTION INTRAVENOUS at 05:39

## 2025-05-10 RX ADMIN — NADOLOL 20 MG: 20 TABLET ORAL at 09:25

## 2025-05-10 RX ADMIN — SUCRALFATE 1 G: 1 TABLET ORAL at 11:32

## 2025-05-10 RX ADMIN — CEFEPIME 2000 MG: 2 INJECTION, POWDER, FOR SOLUTION INTRAVENOUS at 13:37

## 2025-05-10 RX ADMIN — EPOETIN ALFA-EPBX 10000 UNITS: 10000 INJECTION, SOLUTION INTRAVENOUS; SUBCUTANEOUS at 17:00

## 2025-05-10 RX ADMIN — SUCRALFATE 1 G: 1 TABLET ORAL at 16:59

## 2025-05-10 RX ADMIN — Medication 500 MG: at 09:25

## 2025-05-10 RX ADMIN — FUROSEMIDE 20 MG: 20 TABLET ORAL at 09:25

## 2025-05-10 RX ADMIN — Medication 100 MG: at 09:25

## 2025-05-10 RX ADMIN — SODIUM CHLORIDE 250 MG: 0.9 INJECTION, SOLUTION INTRAVENOUS at 11:32

## 2025-05-10 RX ADMIN — RIFAXIMIN 400 MG: 200 TABLET ORAL at 13:38

## 2025-05-10 RX ADMIN — PHYTONADIONE 10 MG: 5 TABLET ORAL at 09:25

## 2025-05-10 RX ADMIN — FUROSEMIDE 20 MG: 20 TABLET ORAL at 16:59

## 2025-05-10 RX ADMIN — SODIUM CHLORIDE, PRESERVATIVE FREE 40 MG: 5 INJECTION INTRAVENOUS at 20:48

## 2025-05-10 RX ADMIN — ZOLPIDEM TARTRATE 10 MG: 5 TABLET ORAL at 20:48

## 2025-05-10 RX ADMIN — SPIRONOLACTONE 25 MG: 25 TABLET ORAL at 09:25

## 2025-05-10 RX ADMIN — SUCRALFATE 1 G: 1 TABLET ORAL at 09:25

## 2025-05-10 RX ADMIN — ALBUTEROL SULFATE 2.5 MG: 2.5 SOLUTION RESPIRATORY (INHALATION) at 18:32

## 2025-05-10 ASSESSMENT — ENCOUNTER SYMPTOMS
CONSTIPATION: 0
VOMITING: 0
VOICE CHANGE: 0
COLOR CHANGE: 0
SHORTNESS OF BREATH: 0
ABDOMINAL DISTENTION: 1
BACK PAIN: 0
DIARRHEA: 0
NAUSEA: 0

## 2025-05-10 NOTE — PROGRESS NOTES
Hospitalist Progress Note    Patient:  Ney Ann  YOB: 1955  Date of Service: 5/10/2025  MRN: [id number]   Acct: [id number]   Primary Care Physician: Izabela Robles APRN - CNP  Advance Directive: Full Code  Admit Date: 5/5/2025       Hospital Day: 5  Referring Provider: Edwin Gamez DO    Patient Seen, Chart, Consults, Notes, Labs, Radiology studies reviewed.    Subjective:  Ney Ann is a 70 y.o. male  whom we are following for alcohol cirrhosis, chronic thrombocytopenia, esophageal varices, left flank hematoma.  Clinically about the same.  His CT did not show any active bleeding.  I suspect that he has having continual oozing in the soft tissues related to his underlying liver disease.  I did introduce the concept of hospice to he and his wife.  They are going to contemplate this option.    Allergies:  Watermelon flavoring agent (non-screening), Tomato, and Watermelon [citrullus vulgaris]    Medicines:  Current Facility-Administered Medications   Medication Dose Route Frequency Provider Last Rate Last Admin    epoetin radha-epbx (RETACRIT) injection 10,000 Units  10,000 Units SubCUTAneous Weekly Edwin Gamez DO        ferric gluconate (FERRLECIT) 250 mg in sodium chloride 0.9 % 250 mL IVPB  250 mg IntraVENous Daily Edwin Gamez DO        phytonadione (VITAMIN K) tablet 10 mg  10 mg Oral Daily Edwin Gamez DO   10 mg at 05/10/25 0925    furosemide (LASIX) tablet 20 mg  20 mg Oral BID Scarlett Juares PA-C   20 mg at 05/10/25 0925    nadolol (CORGARD) tablet 20 mg  20 mg Oral Daily Scarlett Juares PA-C   20 mg at 05/10/25 0925    potassium phosphate (monobasic) (K-PHOS) tablet 500 mg  500 mg Oral BID Scarlett Juares PA-C   500 mg at 05/10/25 0925    spironolactone (ALDACTONE) tablet 25 mg  25 mg Oral Daily Scarlett Juares PA-C   25 mg at 05/10/25 0925    0.9 % sodium chloride infusion   IntraVENous PRN Edwin Gamez DO        zolpidem

## 2025-05-10 NOTE — PLAN OF CARE
Problem: Discharge Planning  Goal: Discharge to home or other facility with appropriate resources  5/9/2025 2227 by Brandie Cullen RN  Outcome: Progressing  5/9/2025 0929 by Rosa Maria Gloria RN  Outcome: Progressing     Problem: Safety - Adult  Goal: Free from fall injury  5/9/2025 2227 by Brandie Cullen RN  Outcome: Progressing  5/9/2025 0929 by Rosa Maria Gloria RN  Outcome: Progressing     Problem: Skin/Tissue Integrity  Goal: Skin integrity remains intact  Description: 1.  Monitor for areas of redness and/or skin breakdown2.  Assess vascular access sites hourly3.  Every 4-6 hours minimum:  Change oxygen saturation probe site4.  Every 4-6 hours:  If on nasal continuous positive airway pressure, respiratory therapy assess nares and determine need for appliance change or resting period  5/9/2025 2227 by Brandie Cullen RN  Outcome: Progressing  5/9/2025 0929 by Rosa Maria Gloria RN  Outcome: Progressing  Flowsheets (Taken 5/9/2025 0829)  Skin Integrity Remains Intact: Monitor for areas of redness and/or skin breakdown     Problem: ABCDS Injury Assessment  Goal: Absence of physical injury  5/9/2025 2227 by Brandie Cullen RN  Outcome: Progressing  5/9/2025 0929 by Rosa Maria Gloria RN  Outcome: Progressing     Problem: Neurosensory - Adult  Goal: Achieves stable or improved neurological status  5/9/2025 2227 by Brandie Cullen RN  Outcome: Progressing  5/9/2025 0929 by Rosa Maria Gloria RN  Outcome: Progressing  Flowsheets (Taken 5/9/2025 0829)  Achieves stable or improved neurological status: Assess for and report changes in neurological status     Problem: Respiratory - Adult  Goal: Achieves optimal ventilation and oxygenation  5/9/2025 2227 by Brandie Cullen RN  Outcome: Progressing  5/9/2025 0929 by Rosa Maria Gloria RN  Outcome: Progressing  Flowsheets (Taken 5/9/2025 0829)  Achieves optimal ventilation and oxygenation:   Assess for changes in respiratory status   Oxygen supplementation based on oxygen saturation or

## 2025-05-10 NOTE — CONSULTS
Medication Dose Route Frequency Provider Last Rate Last Admin    phytonadione (VITAMIN K) tablet 10 mg  10 mg Oral Daily Edwin Gamez DO   10 mg at 05/09/25 1001    furosemide (LASIX) tablet 20 mg  20 mg Oral BID Scarlett Juares PA-C   20 mg at 05/09/25 1718    nadolol (CORGARD) tablet 20 mg  20 mg Oral Daily Scarlett Juares PA-C   20 mg at 05/09/25 1405    potassium phosphate (monobasic) (K-PHOS) tablet 500 mg  500 mg Oral BID Scarlett Juares PA-C   500 mg at 05/09/25 2103    spironolactone (ALDACTONE) tablet 25 mg  25 mg Oral Daily Scarlett Juares PA-C   25 mg at 05/09/25 1406    0.9 % sodium chloride infusion   IntraVENous PRN Edwin Gamez DO        zolpidem (AMBIEN) tablet 10 mg  10 mg Oral Nightly PRN Edwin Gamez DO   10 mg at 05/09/25 2103    thiamine mononitrate tablet 100 mg  100 mg Oral Daily Edwin Gamez DO   100 mg at 05/09/25 0829    rifAXIMin (XIFAXAN) tablet 400 mg  400 mg Oral TID Edwin Gamez DO   400 mg at 05/09/25 2103    ceFEPIme (MAXIPIME) 2,000 mg in sodium chloride 0.9 % 100 mL IVPB ([id number])  2,000 mg IntraVENous Q8H Edwin Gamez DO 25 mL/hr at 05/10/25 0539 2,000 mg at 05/10/25 0539    lactulose (CHRONULAC) 10 GM/15ML solution 10 g  10 g Oral BID Edwin Gamez DO   10 g at 05/08/25 0828    sucralfate (CARAFATE) tablet 1 g  1 g Oral 4x Daily Edwin Gamez DO   1 g at 05/09/25 2103    sodium chloride flush 0.9 % injection 5-40 mL  5-40 mL IntraVENous PRN Edwin Gamez DO        0.9 % sodium chloride infusion   IntraVENous PRN Edwin Gamez DO 10 mL/hr at 05/07/25 0616 New Bag at 05/07/25 0616    ondansetron (ZOFRAN-ODT) disintegrating tablet 4 mg  4 mg Oral Q8H PRN Edwin Gamez DO        Or    ondansetron (ZOFRAN) injection 4 mg  4 mg IntraVENous Q6H PRN Edwin Gamez DO        polyethylene glycol (GLYCOLAX) packet 17 g  17 g Oral Daily PRN Edwin Gamez DO         acetaminophen (TYLENOL) tablet 650 mg  650 mg Oral Q6H PRN Edwin Gamez DO        Or    acetaminophen (TYLENOL) suppository 650 mg  650 mg Rectal Q6H PRN Edwin Gamez DO        albuterol (PROVENTIL) (2.5 MG/3ML) 0.083% nebulizer solution 2.5 mg  2.5 mg Nebulization Q6H PRN Edwin Gamez DO   2.5 mg at 05/09/25 1835    pantoprazole (PROTONIX) 40 mg in sodium chloride (PF) 0.9 % 10 mL injection  40 mg IntraVENous Q12H Edwin Gamez DO   40 mg at 05/09/25 2108       Allergies:   Allergies   Allergen Reactions    Watermelon Flavoring Agent (Non-Screening)     Tomato Itching    Watermelon [Citrullus Vulgaris] Itching         Subjective   REVIEW OF SYSTEMS:   CONSTITUTIONAL: no fever, no night sweats,  fatigue; decreased activity  LUNGS: no cough, no hemoptysis, no wheeze,   shortness of breath;  CARDIOVASCULAR: no palpitation, no chest pain, shortness of breath;  GI: no abdominal pain, no nausea, no vomiting, no diarrhea, no constipation;.[email or web address]  JOSEPH: no dysuria, no hematuria, no frequency or urgency, no nephrolithiasis;  MUSCULOSKELETAL: no joint pain, no swelling, no stiffness;  ENDOCRINE: no polyuria, no polydipsia, no cold or heat intolerance;  HEMATOLOGY: easy bruising  DERMATOLOGY: Easy bruising  NEUROLOGY: no syncope, no seizures, no numbness or tingling of hands, no numbness or tingling of feet, no paresis;    Objective   BP (!) 102/54   Pulse (!) 106   Temp 98.2 °F (36.8 °C) (Oral)   Resp 16   Ht 1.727 m (5' 7.99\")   Wt 84.1 kg (185 lb 6.5 oz)   SpO2 92%   BMI 28.20 kg/m²   PHYSICAL EXAM:  CONSTITUTIONAL: Alert, appropriate, no acute distress, looksee appearing  EYES:  icteric, EOM intact, pupils equal round   ENT: Mucus membranes moist,external inspection of ears and nose are normal  NECK: Supple, no masses.  No palpable thyroid mass  CHEST/LUNGS: CTA bilaterally, normal respiratory effort   CARDIOVASCULAR: tachycardic, no murmurs.  No lower extremity

## 2025-05-10 NOTE — PROGRESS NOTES
Vascular Surgery -  WILVER PEREA MD M.D.   Daily Progress Note    Pt Name: Ney Ann  Medical Record Number: [id number]  Date of Birth 1955   Today's Date: 5/10/2025    Chief Complaint:  Chief Complaint   Patient presents with    Fall     Fall Friday evening, bruising to left side into back       SUBJECTIVE:     Patient was seen and examined.  No complaints.  He says that his flank is sore but not any worse than over the last few days.  He is not complaining of any significant pain.    OBJECTIVE:     Patient Vitals for the past 24 hrs:   BP Temp Temp src Pulse Resp SpO2   05/10/25 0804 -- -- -- 75 -- --   05/10/25 0731 (!) 102/54 98.2 °F (36.8 °C) Oral (!) 106 16 92 %   05/10/25 0705 -- -- -- -- -- 95 %   05/10/25 0514 (!) 101/54 97.9 °F (36.6 °C) -- 63 16 94 %   05/09/25 2004 (!) 113/58 97.7 °F (36.5 °C) Oral 60 16 98 %   05/09/25 1634 114/72 98.4 °F (36.9 °C) Oral 72 18 96 %   05/09/25 1426 -- -- -- -- -- 91 %         Intake/Output Summary (Last 24 hours) at 5/10/2025 1223  Last data filed at 5/10/2025 0310  Gross per 24 hour   Intake 240 ml   Output 200 ml   Net 40 ml       In: 360 [P.O.:360]  Out: 300 [Urine:300]    I/O last 3 completed shifts:  In: 360 [P.O.:360]  Out: 300 [Urine:300]     Date 05/10/25 0000 - 05/10/25 2359   Shift 3045-5746 2000-5824 3081-4182 24 Hour Total   INTAKE   Shift Total(mL/kg)       OUTPUT   Urine(mL/kg/hr) 100(0.1)   100   Shift Total(mL/kg) 100(1.2)   100(1.2)   Weight (kg) 84.1 84.1 84.1 84.1       Wt Readings from Last 3 Encounters:   05/06/25 84.1 kg (185 lb 6.5 oz)   04/30/25 83.5 kg (184 lb 1.4 oz)   04/11/25 83.9 kg (185 lb)        Body mass index is 28.2 kg/m².     Diet: ADULT DIET; Regular        MEDS:     Scheduled Meds:   epoetin radha-epbx  10,000 Units SubCUTAneous Weekly    ferric gluconate  250 mg IntraVENous Daily    phytonadione  10 mg Oral Daily    furosemide  20 mg Oral BID    nadolol  20 mg Oral Daily    potassium phosphate (monobasic)  500 mg Oral BID

## 2025-05-11 VITALS
BODY MASS INDEX: 28.1 KG/M2 | HEART RATE: 91 BPM | RESPIRATION RATE: 16 BRPM | WEIGHT: 185.41 LBS | OXYGEN SATURATION: 94 % | SYSTOLIC BLOOD PRESSURE: 95 MMHG | HEIGHT: 68 IN | DIASTOLIC BLOOD PRESSURE: 61 MMHG | TEMPERATURE: 97.3 F

## 2025-05-11 PROBLEM — Z71.89 COORDINATION OF COMPLEX CARE: Status: ACTIVE | Noted: 2025-05-11

## 2025-05-11 PROBLEM — D68.9 COAGULOPATHY: Status: ACTIVE | Noted: 2025-05-11

## 2025-05-11 PROBLEM — Z78.9 POOR PROGNOSIS: Status: ACTIVE | Noted: 2025-05-11

## 2025-05-11 PROBLEM — R69 LIFE THREATENING MEDICAL ILLNESS: Status: ACTIVE | Noted: 2025-05-11

## 2025-05-11 PROBLEM — Z78.9 MEDICALLY COMPLEX PATIENT: Status: ACTIVE | Noted: 2025-05-11

## 2025-05-11 LAB
ABO/RH: NORMAL
ALBUMIN SERPL-MCNC: 2.5 G/DL (ref 3.5–5.2)
ALP SERPL-CCNC: 69 U/L (ref 40–129)
ALT SERPL-CCNC: 16 U/L (ref 10–50)
ANION GAP SERPL CALCULATED.3IONS-SCNC: 8 MMOL/L (ref 8–16)
ANTIBODY SCREEN: NORMAL
AST SERPL-CCNC: 29 U/L (ref 10–50)
BACTERIA BLD CULT: NORMAL
BASOPHILS # BLD: 0 K/UL (ref 0–0.2)
BASOPHILS NFR BLD: 0.5 % (ref 0–1)
BILIRUB SERPL-MCNC: 4.4 MG/DL (ref 0.2–1.2)
BLOOD BANK DISPENSE STATUS: NORMAL
BLOOD BANK DISPENSE STATUS: NORMAL
BLOOD BANK PRODUCT CODE: NORMAL
BLOOD BANK PRODUCT CODE: NORMAL
BPU ID: NORMAL
BPU ID: NORMAL
BUN SERPL-MCNC: 11 MG/DL (ref 8–23)
CALCIUM SERPL-MCNC: 8 MG/DL (ref 8.8–10.2)
CHLORIDE SERPL-SCNC: 114 MMOL/L (ref 98–107)
CO2 SERPL-SCNC: 18 MMOL/L (ref 22–29)
CREAT SERPL-MCNC: 0.6 MG/DL (ref 0.7–1.2)
DESCRIPTION BLOOD BANK: NORMAL
DESCRIPTION BLOOD BANK: NORMAL
EOSINOPHIL # BLD: 0.2 K/UL (ref 0–0.6)
EOSINOPHIL NFR BLD: 5.2 % (ref 0–5)
ERYTHROCYTE [DISTWIDTH] IN BLOOD BY AUTOMATED COUNT: 27.8 % (ref 11.5–14.5)
GLUCOSE SERPL-MCNC: 91 MG/DL (ref 70–99)
HCT VFR BLD AUTO: 21.8 % (ref 42–52)
HCT VFR BLD AUTO: 26.6 % (ref 42–52)
HGB BLD-MCNC: 6.8 G/DL (ref 14–18)
HGB BLD-MCNC: 8.2 G/DL (ref 14–18)
IMM GRANULOCYTES # BLD: 0 K/UL
INR PPP: 2 (ref 0.88–1.18)
LYMPHOCYTES # BLD: 0.7 K/UL (ref 1.1–4.5)
LYMPHOCYTES NFR BLD: 19.3 % (ref 20–40)
MAGNESIUM SERPL-MCNC: 1.6 MG/DL (ref 1.6–2.4)
MAGNESIUM SERPL-MCNC: 1.8 MG/DL (ref 1.6–2.4)
MCH RBC QN AUTO: 33.7 PG (ref 27–31)
MCHC RBC AUTO-ENTMCNC: 31.2 G/DL (ref 33–37)
MCV RBC AUTO: 107.9 FL (ref 80–94)
MONOCYTES # BLD: 0.5 K/UL (ref 0–0.9)
MONOCYTES NFR BLD: 11.7 % (ref 0–10)
NEUTROPHILS # BLD: 2.4 K/UL (ref 1.5–7.5)
NEUTS SEG NFR BLD: 62.8 % (ref 50–65)
PLATELET # BLD AUTO: 28 K/UL (ref 130–400)
PLATELET # BLD AUTO: 44 K/UL (ref 130–400)
PMV BLD AUTO: 10.9 FL (ref 9.4–12.4)
POTASSIUM SERPL-SCNC: 3.3 MMOL/L (ref 3.5–5)
POTASSIUM SERPL-SCNC: 3.5 MMOL/L (ref 3.5–5.1)
PROT SERPL-MCNC: 4.3 G/DL (ref 6.4–8.3)
PROTHROMBIN TIME: 22.3 SEC (ref 12–14.6)
RBC # BLD AUTO: 2.02 M/UL (ref 4.7–6.1)
SODIUM SERPL-SCNC: 140 MMOL/L (ref 136–145)
WBC # BLD AUTO: 3.8 K/UL (ref 4.8–10.8)

## 2025-05-11 PROCEDURE — 2580000003 HC RX 258: Performed by: INTERNAL MEDICINE

## 2025-05-11 PROCEDURE — 80053 COMPREHEN METABOLIC PANEL: CPT

## 2025-05-11 PROCEDURE — 6360000002 HC RX W HCPCS: Performed by: INTERNAL MEDICINE

## 2025-05-11 PROCEDURE — 99233 SBSQ HOSP IP/OBS HIGH 50: CPT | Performed by: INTERNAL MEDICINE

## 2025-05-11 PROCEDURE — 6370000000 HC RX 637 (ALT 250 FOR IP): Performed by: PHYSICIAN ASSISTANT

## 2025-05-11 PROCEDURE — 6370000000 HC RX 637 (ALT 250 FOR IP): Performed by: HOSPITALIST

## 2025-05-11 PROCEDURE — 94150 VITAL CAPACITY TEST: CPT

## 2025-05-11 PROCEDURE — 6360000002 HC RX W HCPCS: Performed by: HOSPITALIST

## 2025-05-11 PROCEDURE — 6370000000 HC RX 637 (ALT 250 FOR IP): Performed by: INTERNAL MEDICINE

## 2025-05-11 PROCEDURE — 84132 ASSAY OF SERUM POTASSIUM: CPT

## 2025-05-11 PROCEDURE — 85025 COMPLETE CBC W/AUTO DIFF WBC: CPT

## 2025-05-11 PROCEDURE — 1200000000 HC SEMI PRIVATE

## 2025-05-11 PROCEDURE — 36415 COLL VENOUS BLD VENIPUNCTURE: CPT

## 2025-05-11 PROCEDURE — 36430 TRANSFUSION BLD/BLD COMPNT: CPT

## 2025-05-11 PROCEDURE — 86923 COMPATIBILITY TEST ELECTRIC: CPT

## 2025-05-11 PROCEDURE — 94760 N-INVAS EAR/PLS OXIMETRY 1: CPT

## 2025-05-11 PROCEDURE — 86850 RBC ANTIBODY SCREEN: CPT

## 2025-05-11 PROCEDURE — 2700000000 HC OXYGEN THERAPY PER DAY

## 2025-05-11 PROCEDURE — 86901 BLOOD TYPING SEROLOGIC RH(D): CPT

## 2025-05-11 PROCEDURE — 86900 BLOOD TYPING SEROLOGIC ABO: CPT

## 2025-05-11 PROCEDURE — 83735 ASSAY OF MAGNESIUM: CPT

## 2025-05-11 PROCEDURE — P9073 PLATELETS PHERESIS PATH REDU: HCPCS

## 2025-05-11 PROCEDURE — P9016 RBC LEUKOCYTES REDUCED: HCPCS

## 2025-05-11 PROCEDURE — 85018 HEMOGLOBIN: CPT

## 2025-05-11 PROCEDURE — 85014 HEMATOCRIT: CPT

## 2025-05-11 PROCEDURE — 85049 AUTOMATED PLATELET COUNT: CPT

## 2025-05-11 PROCEDURE — 85610 PROTHROMBIN TIME: CPT

## 2025-05-11 RX ORDER — MAGNESIUM SULFATE IN WATER 40 MG/ML
2000 INJECTION, SOLUTION INTRAVENOUS ONCE
Status: COMPLETED | OUTPATIENT
Start: 2025-05-11 | End: 2025-05-11

## 2025-05-11 RX ORDER — BUMETANIDE 0.25 MG/ML
1 INJECTION, SOLUTION INTRAMUSCULAR; INTRAVENOUS ONCE
Status: COMPLETED | OUTPATIENT
Start: 2025-05-11 | End: 2025-05-11

## 2025-05-11 RX ORDER — SODIUM CHLORIDE 9 MG/ML
INJECTION, SOLUTION INTRAVENOUS PRN
Status: DISCONTINUED | OUTPATIENT
Start: 2025-05-11 | End: 2025-05-13 | Stop reason: HOSPADM

## 2025-05-11 RX ORDER — POTASSIUM CHLORIDE 1500 MG/1
40 TABLET, EXTENDED RELEASE ORAL ONCE
Status: COMPLETED | OUTPATIENT
Start: 2025-05-11 | End: 2025-05-11

## 2025-05-11 RX ADMIN — Medication 500 MG: at 20:12

## 2025-05-11 RX ADMIN — FUROSEMIDE 20 MG: 20 TABLET ORAL at 18:00

## 2025-05-11 RX ADMIN — Medication 500 MG: at 08:57

## 2025-05-11 RX ADMIN — BUMETANIDE 1 MG: 0.25 INJECTION INTRAMUSCULAR; INTRAVENOUS at 14:50

## 2025-05-11 RX ADMIN — NADOLOL 20 MG: 20 TABLET ORAL at 12:25

## 2025-05-11 RX ADMIN — SUCRALFATE 1 G: 1 TABLET ORAL at 20:12

## 2025-05-11 RX ADMIN — PHYTONADIONE 10 MG: 5 TABLET ORAL at 08:57

## 2025-05-11 RX ADMIN — SUCRALFATE 1 G: 1 TABLET ORAL at 08:57

## 2025-05-11 RX ADMIN — POTASSIUM CHLORIDE 40 MEQ: 1500 TABLET, EXTENDED RELEASE ORAL at 05:25

## 2025-05-11 RX ADMIN — SODIUM CHLORIDE, PRESERVATIVE FREE 40 MG: 5 INJECTION INTRAVENOUS at 12:27

## 2025-05-11 RX ADMIN — SUCRALFATE 1 G: 1 TABLET ORAL at 12:23

## 2025-05-11 RX ADMIN — CEFEPIME 2000 MG: 2 INJECTION, POWDER, FOR SOLUTION INTRAVENOUS at 21:51

## 2025-05-11 RX ADMIN — CEFEPIME 2000 MG: 2 INJECTION, POWDER, FOR SOLUTION INTRAVENOUS at 04:33

## 2025-05-11 RX ADMIN — RIFAXIMIN 400 MG: 200 TABLET ORAL at 08:57

## 2025-05-11 RX ADMIN — RIFAXIMIN 400 MG: 200 TABLET ORAL at 20:12

## 2025-05-11 RX ADMIN — SODIUM CHLORIDE, PRESERVATIVE FREE 40 MG: 5 INJECTION INTRAVENOUS at 23:27

## 2025-05-11 RX ADMIN — FUROSEMIDE 20 MG: 20 TABLET ORAL at 12:24

## 2025-05-11 RX ADMIN — Medication 100 MG: at 08:57

## 2025-05-11 RX ADMIN — MAGNESIUM SULFATE HEPTAHYDRATE 2000 MG: 40 INJECTION, SOLUTION INTRAVENOUS at 08:11

## 2025-05-11 RX ADMIN — CEFEPIME 2000 MG: 2 INJECTION, POWDER, FOR SOLUTION INTRAVENOUS at 14:56

## 2025-05-11 RX ADMIN — SUCRALFATE 1 G: 1 TABLET ORAL at 18:00

## 2025-05-11 RX ADMIN — ZOLPIDEM TARTRATE 10 MG: 5 TABLET ORAL at 20:12

## 2025-05-11 RX ADMIN — RIFAXIMIN 400 MG: 200 TABLET ORAL at 14:49

## 2025-05-11 RX ADMIN — SPIRONOLACTONE 25 MG: 25 TABLET ORAL at 08:57

## 2025-05-11 NOTE — PLAN OF CARE
Problem: Discharge Planning  Goal: Discharge to home or other facility with appropriate resources  Outcome: Progressing     Problem: Safety - Adult  Goal: Free from fall injury  Outcome: Progressing     Problem: Skin/Tissue Integrity  Goal: Skin integrity remains intact  Description: 1.  Monitor for areas of redness and/or skin breakdown2.  Assess vascular access sites hourly3.  Every 4-6 hours minimum:  Change oxygen saturation probe site4.  Every 4-6 hours:  If on nasal continuous positive airway pressure, respiratory therapy assess nares and determine need for appliance change or resting period  Outcome: Progressing     Problem: ABCDS Injury Assessment  Goal: Absence of physical injury  Outcome: Progressing     Problem: Neurosensory - Adult  Goal: Achieves stable or improved neurological status  Outcome: Progressing     Problem: Respiratory - Adult  Goal: Achieves optimal ventilation and oxygenation  Outcome: Progressing     Problem: Cardiovascular - Adult  Goal: Maintains optimal cardiac output and hemodynamic stability  Outcome: Progressing     Problem: Skin/Tissue Integrity - Adult  Goal: Skin integrity remains intact  Description: 1.  Monitor for areas of redness and/or skin breakdown2.  Assess vascular access sites hourly3.  Every 4-6 hours minimum:  Change oxygen saturation probe site4.  Every 4-6 hours:  If on nasal continuous positive airway pressure, respiratory therapy assess nares and determine need for appliance change or resting period  Outcome: Progressing     Problem: Musculoskeletal - Adult  Goal: Return mobility to safest level of function  Outcome: Progressing     Problem: Gastrointestinal - Adult  Goal: Minimal or absence of nausea and vomiting  Outcome: Progressing     Problem: Genitourinary - Adult  Goal: Absence of urinary retention  Outcome: Progressing     Problem: Infection - Adult  Goal: Absence of infection at discharge  Outcome: Progressing     Problem: Metabolic/Fluid and

## 2025-05-11 NOTE — PLAN OF CARE
Problem: Discharge Planning  Goal: Discharge to home or other facility with appropriate resources  5/11/2025 1605 by Nadia Villatoro RN  Outcome: Progressing  5/11/2025 1605 by Nadia Villatoro RN  Outcome: Progressing     Problem: Safety - Adult  Goal: Free from fall injury  5/11/2025 1605 by Nadia Villatoro RN  Outcome: Progressing  5/11/2025 1605 by Nadia Villatoro RN  Outcome: Progressing     Problem: Skin/Tissue Integrity  Goal: Skin integrity remains intact  Description: 1.  Monitor for areas of redness and/or skin breakdown2.  Assess vascular access sites hourly3.  Every 4-6 hours minimum:  Change oxygen saturation probe site4.  Every 4-6 hours:  If on nasal continuous positive airway pressure, respiratory therapy assess nares and determine need for appliance change or resting period  5/11/2025 1605 by Nadia Villatoro RN  Outcome: Progressing  5/11/2025 1605 by Nadia Villatoro RN  Outcome: Progressing     Problem: ABCDS Injury Assessment  Goal: Absence of physical injury  5/11/2025 1605 by Nadia Villatoro RN  Outcome: Progressing  5/11/2025 1605 by Nadia Villatoro RN  Outcome: Progressing     Problem: Neurosensory - Adult  Goal: Achieves stable or improved neurological status  5/11/2025 1605 by Nadia Villatoro RN  Outcome: Progressing  5/11/2025 1605 by Nadia Villatoro RN  Outcome: Progressing     Problem: Respiratory - Adult  Goal: Achieves optimal ventilation and oxygenation  5/11/2025 1605 by Nadia Villatoro RN  Outcome: Progressing  5/11/2025 1605 by Nadia Villatoro RN  Outcome: Progressing     Problem: Cardiovascular - Adult  Goal: Maintains optimal cardiac output and hemodynamic stability  5/11/2025 1605 by Nadia Villatoro RN  Outcome: Progressing  5/11/2025 1605 by Nadia Villatoro RN  Outcome: Progressing     Problem: Skin/Tissue Integrity - Adult  Goal: Skin integrity remains intact  Description: 1.  Monitor for areas of redness and/or skin

## 2025-05-11 NOTE — PROGRESS NOTES
Hospitalist Progress Note    Patient:  Ney Ann  YOB: 1955  Date of Service: 5/11/2025  MRN: [id number]   Acct: [id number]   Primary Care Physician: Izabela Robles APRN - CNP  Advance Directive: Full Code  Admit Date: 5/5/2025       Hospital Day: 6  Referring Provider: Edwin Gamez DO    Patient Seen, Chart, Consults, Notes, Labs, Radiology studies reviewed.    Subjective:  Ney Ann is a 70 y.o. male  whom we are following for alcohol cirrhosis, chronic thrombocytopenia, esophageal varices, left flank hematoma.  No new events.  His hemoglobin has trended downward again.  He will receive another unit of packed red cells.  Appetite is stable.    Allergies:  Watermelon flavoring agent (non-screening), Tomato, and Watermelon [citrullus vulgaris]    Medicines:  Current Facility-Administered Medications   Medication Dose Route Frequency Provider Last Rate Last Admin    magnesium sulfate 2000 mg in 50 mL IVPB premix  2,000 mg IntraVENous Once Adelso Bermudez MD 25 mL/hr at 05/11/25 0811 2,000 mg at 05/11/25 0811    0.9 % sodium chloride infusion   IntraVENous PRN Jovani Ramirez MD        epoetin radha-epbx (RETACRIT) injection 10,000 Units  10,000 Units SubCUTAneous Weekly Edwin Gamez DO   10,000 Units at 05/10/25 1700    ferric gluconate (FERRLECIT) 250 mg in sodium chloride 0.9 % 250 mL IVPB  250 mg IntraVENous Daily Edwin Gamez DO   Stopped at 05/10/25 1332    furosemide (LASIX) tablet 20 mg  20 mg Oral BID Scarlett Juares PA-C   20 mg at 05/10/25 1659    nadolol (CORGARD) tablet 20 mg  20 mg Oral Daily Scarlett Juares PA-C   20 mg at 05/10/25 0925    potassium phosphate (monobasic) (K-PHOS) tablet 500 mg  500 mg Oral BID Scarlett Juares PA-C   500 mg at 05/11/25 0857    spironolactone (ALDACTONE) tablet 25 mg  25 mg Oral Daily Scarlett Juares PA-C   25 mg at 05/11/25 0857    0.9 % sodium chloride infusion   IntraVENous PRN Edwin Gamez, DO

## 2025-05-11 NOTE — PROGRESS NOTES
Gr 2 wo bleeding, 4-6 weeks    COLONOSCOPY  07/10/2023    Dr Dawit Tapia, splenic flexor 8mm ulcer wo bleeding, 4mm ulcer sigmoid colon wo bleeding, 8mm recatal ulcer wo bleeding, scatted sm & lg mouthed diverticula in sig colon, splenic flexure and transverse colon,    COLONOSCOPY N/A 05/03/2025    Dr Reyes-Mild left-sided diverticulosis, moderate internal hemorrhoids, diminutive descending colon polyp, not removed due to severe thrombocytopenia, no blood noted and no source of bleeding identified, 1-2 yr recall    HERNIA REPAIR      TOTAL HIP ARTHROPLASTY Left 08/26/2022    LEFT HIP TOTAL ARTHROPLASTY performed by Jose Obrien MD at [facility] OR    UPPER GASTROINTESTINAL ENDOSCOPY  07/10/2023    Dr Dawit Tapia, Gr 2 medium sized varcies in Esophagus, Moderate portal hypertensive gastropathy in entire stomach,    UPPER GASTROINTESTINAL ENDOSCOPY N/A 08/08/2024    Dr Hastings, (-) h pylor, (-) sprue, 4 columns of gr 2 varices were noted throughout esoph, diffuse portal hypertensive gastropathy was noted consistent w known hx of cirrhosis by no gastric varices, sugg chemical gastritis, multiple erosions w surrounding patchy erythema noted in duodenal bulb sugg of NSAIDs,    UPPER GASTROINTESTINAL ENDOSCOPY N/A 01/10/2025    Dr Rhoades-Portal hypertensive gastropathy w/contact friability and blood oozing, suspect cause for bleeding especially in the setting of coagulopathy and severe thrombocytopenia (decompensated liver disease), small distal esophageal varices which flattened w/insufflation, no treatment indicated    UPPER GASTROINTESTINAL ENDOSCOPY N/A 05/01/2025    Dr Reyes-Esophageal varices, grade 1, moderate portal hypertensive gastropathy       Social History:    Marital status:  Smoking status:  ETOH status:  Resides:    Family History:   Family History   Problem Relation Age of Onset    Diabetes Mother     Colon Polyps Neg Hx     Colon Cancer Neg Hx        Current Hospital Medications:    Current

## 2025-05-12 PROBLEM — S30.1XXA HEMATOMA OF RECTUS SHEATH: Status: ACTIVE | Noted: 2025-05-12

## 2025-05-12 LAB
ALBUMIN SERPL-MCNC: 2.7 G/DL (ref 3.5–5.2)
ALP SERPL-CCNC: 66 U/L (ref 40–129)
ALT SERPL-CCNC: 16 U/L (ref 10–50)
ANION GAP SERPL CALCULATED.3IONS-SCNC: 9 MMOL/L (ref 8–16)
AST SERPL-CCNC: 31 U/L (ref 10–50)
BASOPHILS # BLD: 0 K/UL (ref 0–0.2)
BASOPHILS NFR BLD: 0.6 % (ref 0–1)
BILIRUB SERPL-MCNC: 6.1 MG/DL (ref 0.2–1.2)
BUN SERPL-MCNC: 10 MG/DL (ref 8–23)
CALCIUM SERPL-MCNC: 8.1 MG/DL (ref 8.8–10.2)
CHLORIDE SERPL-SCNC: 110 MMOL/L (ref 98–107)
CO2 SERPL-SCNC: 20 MMOL/L (ref 22–29)
CREAT SERPL-MCNC: 0.6 MG/DL (ref 0.7–1.2)
EOSINOPHIL # BLD: 0.1 K/UL (ref 0–0.6)
EOSINOPHIL NFR BLD: 4.1 % (ref 0–5)
ERYTHROCYTE [DISTWIDTH] IN BLOOD BY AUTOMATED COUNT: 28.8 % (ref 11.5–14.5)
FIBRINOGEN PPP-MCNC: 171 MG/DL (ref 238–505)
GLUCOSE SERPL-MCNC: 107 MG/DL (ref 70–99)
HCT VFR BLD AUTO: 23 % (ref 42–52)
HGB BLD-MCNC: 7.3 G/DL (ref 14–18)
IMM GRANULOCYTES # BLD: 0 K/UL
LYMPHOCYTES # BLD: 0.7 K/UL (ref 1.1–4.5)
LYMPHOCYTES NFR BLD: 19.9 % (ref 20–40)
MAGNESIUM SERPL-MCNC: 1.5 MG/DL (ref 1.6–2.4)
MAGNESIUM SERPL-MCNC: 1.5 MG/DL (ref 1.6–2.4)
MCH RBC QN AUTO: 32.9 PG (ref 27–31)
MCHC RBC AUTO-ENTMCNC: 31.7 G/DL (ref 33–37)
MCV RBC AUTO: 103.6 FL (ref 80–94)
MONOCYTES # BLD: 0.4 K/UL (ref 0–0.9)
MONOCYTES NFR BLD: 12 % (ref 0–10)
NEUTROPHILS # BLD: 2.1 K/UL (ref 1.5–7.5)
NEUTS SEG NFR BLD: 62.5 % (ref 50–65)
PLATELET # BLD AUTO: 35 K/UL (ref 130–400)
PMV BLD AUTO: 11.5 FL (ref 9.4–12.4)
POTASSIUM SERPL-SCNC: 3.1 MMOL/L (ref 3.5–5)
POTASSIUM SERPL-SCNC: 3.3 MMOL/L (ref 3.5–5.1)
PROT SERPL-MCNC: 4.4 G/DL (ref 6.4–8.3)
RBC # BLD AUTO: 2.22 M/UL (ref 4.7–6.1)
SODIUM SERPL-SCNC: 139 MMOL/L (ref 136–145)
WBC # BLD AUTO: 3.4 K/UL (ref 4.8–10.8)

## 2025-05-12 PROCEDURE — 94150 VITAL CAPACITY TEST: CPT

## 2025-05-12 PROCEDURE — 94760 N-INVAS EAR/PLS OXIMETRY 1: CPT

## 2025-05-12 PROCEDURE — 85025 COMPLETE CBC W/AUTO DIFF WBC: CPT

## 2025-05-12 PROCEDURE — 97165 OT EVAL LOW COMPLEX 30 MIN: CPT

## 2025-05-12 PROCEDURE — 6370000000 HC RX 637 (ALT 250 FOR IP): Performed by: INTERNAL MEDICINE

## 2025-05-12 PROCEDURE — 99232 SBSQ HOSP IP/OBS MODERATE 35: CPT | Performed by: INTERNAL MEDICINE

## 2025-05-12 PROCEDURE — 84132 ASSAY OF SERUM POTASSIUM: CPT

## 2025-05-12 PROCEDURE — 99233 SBSQ HOSP IP/OBS HIGH 50: CPT | Performed by: PHYSICIAN ASSISTANT

## 2025-05-12 PROCEDURE — 6360000002 HC RX W HCPCS: Performed by: INTERNAL MEDICINE

## 2025-05-12 PROCEDURE — 6370000000 HC RX 637 (ALT 250 FOR IP): Performed by: PHYSICIAN ASSISTANT

## 2025-05-12 PROCEDURE — 36415 COLL VENOUS BLD VENIPUNCTURE: CPT

## 2025-05-12 PROCEDURE — 85384 FIBRINOGEN ACTIVITY: CPT

## 2025-05-12 PROCEDURE — 97116 GAIT TRAINING THERAPY: CPT

## 2025-05-12 PROCEDURE — 97161 PT EVAL LOW COMPLEX 20 MIN: CPT

## 2025-05-12 PROCEDURE — 6360000002 HC RX W HCPCS: Performed by: HOSPITALIST

## 2025-05-12 PROCEDURE — 2580000003 HC RX 258: Performed by: INTERNAL MEDICINE

## 2025-05-12 PROCEDURE — 97530 THERAPEUTIC ACTIVITIES: CPT

## 2025-05-12 PROCEDURE — 83735 ASSAY OF MAGNESIUM: CPT

## 2025-05-12 PROCEDURE — 2500000003 HC RX 250 WO HCPCS: Performed by: INTERNAL MEDICINE

## 2025-05-12 PROCEDURE — 1200000000 HC SEMI PRIVATE

## 2025-05-12 PROCEDURE — 6370000000 HC RX 637 (ALT 250 FOR IP): Performed by: HOSPITALIST

## 2025-05-12 PROCEDURE — 99222 1ST HOSP IP/OBS MODERATE 55: CPT | Performed by: INTERNAL MEDICINE

## 2025-05-12 PROCEDURE — 80053 COMPREHEN METABOLIC PANEL: CPT

## 2025-05-12 PROCEDURE — 2700000000 HC OXYGEN THERAPY PER DAY

## 2025-05-12 RX ORDER — POTASSIUM CHLORIDE 1500 MG/1
40 TABLET, EXTENDED RELEASE ORAL ONCE
Status: COMPLETED | OUTPATIENT
Start: 2025-05-12 | End: 2025-05-12

## 2025-05-12 RX ORDER — MAGNESIUM SULFATE IN WATER 40 MG/ML
2000 INJECTION, SOLUTION INTRAVENOUS PRN
Status: DISCONTINUED | OUTPATIENT
Start: 2025-05-12 | End: 2025-05-13 | Stop reason: HOSPADM

## 2025-05-12 RX ORDER — POTASSIUM CHLORIDE 1500 MG/1
40 TABLET, EXTENDED RELEASE ORAL PRN
Status: DISCONTINUED | OUTPATIENT
Start: 2025-05-12 | End: 2025-05-13 | Stop reason: HOSPADM

## 2025-05-12 RX ORDER — PANTOPRAZOLE SODIUM 40 MG/1
40 TABLET, DELAYED RELEASE ORAL
Status: DISCONTINUED | OUTPATIENT
Start: 2025-05-12 | End: 2025-05-13 | Stop reason: HOSPADM

## 2025-05-12 RX ORDER — POTASSIUM CHLORIDE 7.45 MG/ML
10 INJECTION INTRAVENOUS PRN
Status: DISCONTINUED | OUTPATIENT
Start: 2025-05-12 | End: 2025-05-13 | Stop reason: HOSPADM

## 2025-05-12 RX ADMIN — POTASSIUM CHLORIDE 40 MEQ: 1500 TABLET, EXTENDED RELEASE ORAL at 05:05

## 2025-05-12 RX ADMIN — POTASSIUM CHLORIDE 40 MEQ: 1500 TABLET, EXTENDED RELEASE ORAL at 18:28

## 2025-05-12 RX ADMIN — RIFAXIMIN 400 MG: 200 TABLET ORAL at 13:18

## 2025-05-12 RX ADMIN — SUCRALFATE 1 G: 1 TABLET ORAL at 09:12

## 2025-05-12 RX ADMIN — SODIUM CHLORIDE, PRESERVATIVE FREE 10 ML: 5 INJECTION INTRAVENOUS at 20:40

## 2025-05-12 RX ADMIN — ZOLPIDEM TARTRATE 10 MG: 5 TABLET ORAL at 20:40

## 2025-05-12 RX ADMIN — Medication 500 MG: at 09:12

## 2025-05-12 RX ADMIN — SODIUM CHLORIDE, PRESERVATIVE FREE 40 MG: 5 INJECTION INTRAVENOUS at 11:42

## 2025-05-12 RX ADMIN — SUCRALFATE 1 G: 1 TABLET ORAL at 20:40

## 2025-05-12 RX ADMIN — Medication 500 MG: at 20:40

## 2025-05-12 RX ADMIN — MAGNESIUM SULFATE HEPTAHYDRATE 2000 MG: 40 INJECTION, SOLUTION INTRAVENOUS at 11:33

## 2025-05-12 RX ADMIN — RIFAXIMIN 400 MG: 200 TABLET ORAL at 09:13

## 2025-05-12 RX ADMIN — SODIUM CHLORIDE 250 MG: 0.9 INJECTION, SOLUTION INTRAVENOUS at 09:11

## 2025-05-12 RX ADMIN — NADOLOL 20 MG: 20 TABLET ORAL at 09:12

## 2025-05-12 RX ADMIN — SPIRONOLACTONE 25 MG: 25 TABLET ORAL at 09:13

## 2025-05-12 RX ADMIN — LACTULOSE 10 G: 20 SOLUTION ORAL at 09:08

## 2025-05-12 RX ADMIN — SUCRALFATE 1 G: 1 TABLET ORAL at 16:30

## 2025-05-12 RX ADMIN — SUCRALFATE 1 G: 1 TABLET ORAL at 13:16

## 2025-05-12 RX ADMIN — PANTOPRAZOLE SODIUM 40 MG: 40 TABLET, DELAYED RELEASE ORAL at 16:30

## 2025-05-12 RX ADMIN — FUROSEMIDE 20 MG: 20 TABLET ORAL at 18:28

## 2025-05-12 RX ADMIN — RIFAXIMIN 400 MG: 200 TABLET ORAL at 20:40

## 2025-05-12 RX ADMIN — Medication 100 MG: at 09:13

## 2025-05-12 RX ADMIN — FUROSEMIDE 20 MG: 20 TABLET ORAL at 09:12

## 2025-05-12 ASSESSMENT — ENCOUNTER SYMPTOMS
VOICE CHANGE: 0
VOMITING: 0
BACK PAIN: 0
CONSTIPATION: 0
NAUSEA: 0
DIARRHEA: 0
SHORTNESS OF BREATH: 0
COLOR CHANGE: 0

## 2025-05-12 NOTE — CONSULTS
Consult Note            Date:5/12/2025        Patient Name:Ney Ann     YOB: 1955     Age:70 y.o.    Reason for consult: Melena in a patient with cirrhosis and coagulopathy.    History of Present Illness   This is a 70-year-old male with a history of alcohol cirrhosis, MELD 21 who presented May 5, 2025 with generalized weakness and was found to have a hemoglobin of 6.3 in the setting of a large left flank hematoma.  Upon further hospitalization there was mention of dark stools therefore we have been consulted for further evaluation.    Mr. Ann is known to our service.  He has had recurrent anemia presentations requiring endoscopic intervention.  Most recently, he underwent EGD May 1, 2025 which showed nonbleeding grade 1 esophageal varices and moderate portal hypertensive gastropathy.  Additionally, colonoscopy May 3, 2025 showed some internal hemorrhoids which were nonbleeding and a small polyp which was not removed given his thrombocytopenia.  The recommendation was to repeat the exam for removal in 1 year.    He denies any abdominal pain or nausea or vomiting.  His wife is at bedside and helps to provide some of the history.  Hemoglobin today 7.3, platelet 35K, WBC 3.4 INR is 2.0    Past Medical History     Past Medical History:   Diagnosis Date    Abnormal LFTs     Atrial fibrillation (HCC)     per pt; no current cardiologist    Cirrhosis (HCC)     sees [facility]    COVID-Juana     2 yr ago    History of blood transfusion     Hyperbilirubinemia     Hypertension     Hypothyroidism     no meds at present    Insomnia     Leukopenia     Liver disease     Palliative care patient 01/10/2025    Palliative care patient 05/01/2025    Platelets decreased     chronic    Splenomegaly     Umbilical hernia     and inguinal per pt    Varices, esophageal (HCC)     hx of    Vitamin D deficiency         Past Surgical History     Past Surgical History:   Procedure Laterality Date    COLONOSCOPY  2014

## 2025-05-12 NOTE — PROGRESS NOTES
Hospitalist Progress Note    Patient:  Ney Ann  YOB: 1955  Date of Service: 5/12/2025  MRN: [id number]   Acct: [id number]   Primary Care Physician: Izabela Robles APRN - CNP  Advance Directive: Full Code  Admit Date: 5/5/2025       Hospital Day: 7  Referring Provider: Edwin Gamze DO    Patient Seen, Chart, Consults, Notes, Labs, Radiology studies reviewed.    Subjective:  Ney Ann is a 70 y.o. male  whom we are following for alcohol cirrhosis, chronic thrombocytopenia, esophageal varices, left flank hematoma.  Overall feels well.  Appetite is much improved.  He had a good conversation with palliative care this morning.  He would like to return home tomorrow with home health.  He is not interested in hospice at this time, however, he is open to it in the future.    Allergies:  Watermelon flavoring agent (non-screening), Tomato, and Watermelon [citrullus vulgaris]    Medicines:  Current Facility-Administered Medications   Medication Dose Route Frequency Provider Last Rate Last Admin    potassium chloride (KLOR-CON M) extended release tablet 40 mEq  40 mEq Oral PRN Adelso Bermudez MD        Or    potassium bicarb-citric acid (EFFER-K) effervescent tablet 40 mEq  40 mEq Oral PRN Adelso Bermudez MD        Or    potassium chloride 10 mEq/100 mL IVPB (Peripheral Line)  10 mEq IntraVENous PRN Adelso Bermudez MD        magnesium sulfate 2000 mg in 50 mL IVPB premix  2,000 mg IntraVENous PRN Adelso Bermudez MD        0.9 % sodium chloride infusion   IntraVENous PRN Jovani Ramirez MD        0.9 % sodium chloride infusion   IntraVENous PRN Jovani Ramirez MD        epoetin radha-epbx (RETACRIT) injection 10,000 Units  10,000 Units SubCUTAneous Weekly Edwin Gamez DO   10,000 Units at 05/10/25 1700    ferric gluconate (FERRLECIT) 250 mg in sodium chloride 0.9 % 250 mL IVPB  250 mg IntraVENous Daily Edwin Gamez  mL/hr at 05/12/25 0911 250 mg at 05/12/25

## 2025-05-12 NOTE — PROGRESS NOTES
Occupational Therapy Initial Assessment  Date: 2025   Patient Name: Ney Ann  MRN: [id number]     : 1955    Date of Service: 2025    Discharge Recommendations:  Home with assist PRN       Assessment   Assessment: Evaluation completed and tx initiated.  All education completed this visit.  No barriers to stated discharge plan identified. Pt has no concerns about returning home. Pt does not require OT services at this time.  Treatment Diagnosis: PNA, alcohol cirrhosis, esophogeal varices, L flank hematoma  Prognosis: Good  Decision Making: Low Complexity  REQUIRES OT FOLLOW-UP: No  Activity Tolerance  Activity Tolerance: Patient Tolerated treatment well              Patient Diagnosis(es): The primary encounter diagnosis was Traumatic rectus hematoma, initial encounter. Diagnoses of Chronic liver failure without hepatic coma (HCC), Pneumonia of left lower lobe due to infectious organism, and Rhinovirus infection were also pertinent to this visit.    Past Medical History:   Past Medical History:   Diagnosis Date    Abnormal LFTs     Atrial fibrillation (HCC)     per pt; no current cardiologist    Cirrhosis (HCC)     sees [facility]    COVID-19     2 yr ago    History of blood transfusion     Hyperbilirubinemia     Hypertension     Hypothyroidism     no meds at present    Insomnia     Leukopenia     Liver disease     Palliative care patient 01/10/2025    Palliative care patient 2025    Platelets decreased     chronic    Splenomegaly     Umbilical hernia     and inguinal per pt    Varices, esophageal (HCC)     hx of    Vitamin D deficiency         Past Surgical History:   Past Surgical History:   Procedure Laterality Date    COLONOSCOPY      polyps per pt    COLONOSCOPY N/A 2023    Dr Hastings, TA x 3 (-)dysplasia, HP, TVA (-)Dysplasia, Mod diverticulosis left colon, int hem Gr 2 wo bleeding, 4-6 weeks    COLONOSCOPY  07/10/2023    Dr Dawit Tapia, splenic flexor 8mm ulcer

## 2025-05-12 NOTE — CARE COORDINATION
Pt is current with [facility]; please notify at d/c; Archana Monterroso aware of potential d/c tomorrow;   [facility]  [phone]  [phone]  Electronically signed by VJ Knapp on 5/12/2025 at 4:19 PM      05/12/25 1215  Inpatient consult to Home Care Needs  ONE TIME     Complete  Discontinue     Comments: Certification and medical necessity: I certify that, based on my findings, the following services are medically necessary home health services for Ney Ann for the following reasons: - Vital signs monitoring: Nurse to perform BP, HR, RR, Temp, and Weight with each visit and teach patient and caregivers on taking daily.  Nurse to call physician if pulse less than 50 or greater than 120, respiratory rate less than 12 or greater than 25, oral temperature greater than or equal to 101 oF, systolic BP less than 90 or greater than 170, diastolic BP less than 50 or greater than 100.  - Medication compliance and education for  safety concerns  - Consult Physical Therapy for  Evaluate and Treat  - Consult Medical Social Worker  assessment and counseling  - Consult Home Health Aid for  assistance with Activities of Daily Living  - Home laboratory draws (send results to provider noted above unless otherwise specified):  CBC weekly-report to PCP   Provider: (Not yet assigned)   Question Answer Comment   Reason for Consult? Home Care Orders    I certify that I, or a nurse practitioner or physician assistant working with me, had an in-person encounter with the patient and the reason for the home care services is documented in the clinical note on: 5/12/2025    Will the referring provider be the attending provider for home health? No    Name of attending provider for home health PCP    The patient is confined to home: Due to illness or injury that necessitates supportive device aid, use of special transportation, or assistance of another person to leave home, AND there is a normal inability to

## 2025-05-12 NOTE — PROGRESS NOTES
has a history of recurrent GI bleed for the last few months.  The patient reported melanotic stools.  Patient also had complaint of generalized fatigue, short of breath.  Patient was seen by outpatient GI.  Labs performed showed a CBC of hemoglobin 4.3.  Patient was sent to the emergency department.  Patient received 4 units PRBCs.     Laboratory studies hemoglobin 7.5 after 4 units PRBC, WBC 2.4/ANC 1.2 and platelet count 25,000 (previously 65,000 4/28/2025).  Recent anemia profile showed iron 34, TIBC 289, iron saturation 12%.        4/29/2025-CT abdomen pelvis with IV contrast, [facility]:   Small bilateral pleural effusions with mild associated atelectasis, left greater than right. Not present previously.   Chronic interstitial changes without consolidation.   Bilateral retroareolar soft tissue could be secondary to gynecomastia.   Cirrhotic nodular heterogeneous appearance. Stable cyst seen in the left lobe. Stable cyst seen in the inferior right lobe.   Spleen: Continued enlargement measuring 17.4 cm in the AP dimension.   A couple punctate stones, 1-2 mm, in the left kidney collecting system without hydronephrosis.   Mild ascites is again present. Ascites appears extending to the small umbilical hernia and the right scrotum, similar to the previous examination.   Incidental note of 2 left renal veins, 1 anterior to the aorta and 1 posterior to the aorta. Mild varices again present.   Metallic artifact from the left hip arthroplasty.   There is a new compression of the superior endplate of the L1 of the body approximately 30% without retropulsion.      The patient was admitted to the inpatient service with consultation to GI.  GI has been consulted.  Patient receiving transfusional support PRBCs.     5/1/2025-upper endoscopy by Dr. Reyes     Findings:      Esophagus: The esophageal mucosa in the lower one third noted to have mildly dilated chains of blood vessels.  This finding is consistent with esophageal varices,

## 2025-05-12 NOTE — PROGRESS NOTES
hobbies/housework  Occasional assistance  Normal/reduced intake  LOC full/confusion  [] 50% Mainly sit/lie  Extensive disease: Can't do any work  Considerable assistance  Normal/reduced intake  LOC full/confusion  [x] 40% Mainly in bed  Extensive disease  Mainly assistance  Normal/reduced intake  LOC full/confusion  [] 30% Bed Bound  Extensive disease  Total care  Reduced Intake  LOC full/confusion  [] 20% Bed Bound  Extensive disease  Total care  Minimal intake  Drowsy/coma  [] 10% Bed Bound  Extensive disease  Total care  Mouth care only  Drowsy/coma  [] 0% Death    Assessment/Plan   Principal Problem:    Pneumonia due to infectious organism  Active Problems:    Severe thrombocytopenia    Severe anemia    Palliative care patient    Alcoholic cirrhosis (HCC)    End stage liver disease (HCC)    Traumatic rectus hematoma    Life threatening medical illness    Poor prognosis    Coordination of complex care    Medically complex patient    Coagulopathy  Resolved Problems:    * No resolved hospital problems. *    Visit Summary:  Chart reviewed. Some concern for melena over the weekend. Patient voices he feels its resolved at present and states it occurs intermittently. He has no new complaints today and is sitting up comfortably in bed eating pizza. I had a visit with him and his spouse and we readdressed goals of care. We've had several conversations this stay regarding availability of hospice and prognosis. Hospice was also discussed by his attending over the weekend. Mr. Ann does endorse feeling tired of being in the hospital and is ready for home \"at the right time\". We discussed the nature of end stage liver disease to include severe thrombocytopenia, coagulopathy and need for transfusional support likely for the rest of his life. He voices understanding of this and is not yet ready to stop hospitalizations or transfusions. His spouse states he is not ready for hospice. They do want to have

## 2025-05-12 NOTE — PROGRESS NOTES
[facility] PH[facility] THERAPY EVALUATION      Ney nAn    : 1955  MRN: [id number]   PHYSICIAN:  Edwin Gamez DO  Primary Problem    Patient Active Problem List   Diagnosis    Closed left hip fracture, initial encounter (HCC)    New onset a-fib (HCC)    Liver disease    Hyperbilirubinemia    Abnormal LFTs    Severe thrombocytopenia    Leukopenia    Decompensated cirrhosis (HCC)    Umbilical hernia without obstruction and without gangrene    Pancytopenia (HCC)    Closed fracture of left hip (HCC)    History of ETOH abuse    Severe anemia    Lumbar pain    Weakness of both lower extremities    Esophageal varices without bleeding, unspecified esophageal varices type (HCC)    Abscess    Thigh ulcer, right, with fat layer exposed (HCC)    Smoker    Alcohol abuse    Palliative care patient    GIB (gastrointestinal bleeding)    Alcoholic cirrhosis (HCC)    Atrial fibrillation (HCC)    Suicidal ideations    End stage liver disease (HCC)    Bleeding esophageal varices (HCC)    Acute blood loss anemia    Hematochezia    Pneumonia due to infectious organism    Hematoma of left flank    Life threatening medical illness    Poor prognosis    Coordination of complex care    Medically complex patient    Coagulopathy       Rehabilitation Diagnosis:     Rhinovirus infection [B34.8]  Traumatic rectus hematoma, initial encounter [S30.1XXA]  Chronic liver failure without hepatic coma (HCC) [K72.10]  Pneumonia of left lower lobe due to infectious organism [J18.9]  Pneumonia due to infectious organism [J18.9]       SERVICE DATE: 2025        SUBJECTIVE: Patient agrees that they are safe with mobility and do not require physical therapy services in this setting.      Pain: No complaint of pain    OBJECTIVE:    Orientation is Within normal limits     O2 4.5L per n/c, pt opted to walk in room on RA      ACTIVE ROM:       All major lower extremity joints are within functional limits      STRENGTH     Both lower extremities are

## 2025-05-12 NOTE — PLAN OF CARE
Nutrition Problem #1: Inadequate protein intake  Intervention: Food and/or Nutrient Delivery: Continue Current Diet  Nutritional

## 2025-05-12 NOTE — PROGRESS NOTES
Comprehensive Nutrition Assessment    Type and Reason for Visit:  Initial, LOS    Nutrition Recommendations/Plan:   Follow for advancing diet, po intake     Malnutrition Assessment:  Malnutrition Status:  At risk for malnutrition (05/12/25 1325)    Context:  Acute Illness     Findings of the 6 clinical characteristics of malnutrition:  Energy Intake:  Mild decrease in energy intake  Weight Loss:  No weight loss     Body Fat Loss:  No body fat loss     Muscle Mass Loss:  No muscle mass loss    Fluid Accumulation:  No fluid accumulation Extremities   Strength:  Not Performed    Nutrition Assessment:    Following patient for LOS x 7 days Patient receivivng a Clear liquid diet at time of visit.  Intake is good at %.  Glucose     Nutrition Related Findings:    trace RLE edema   BM 5/11 Wound Type: None       Current Nutrition Intake & Therapies:    Average Meal Intake: %  Average Supplements Intake: None Ordered  ADULT DIET; Regular; Low Fat/Low Chol/High Fiber/CLAUDIA; Low Sodium (2 gm)    Anthropometric Measures:  Height: 172.7 cm (5' 7.99\")  Ideal Body Weight (IBW): 154 lbs (70 kg)    Admission Body Weight: 84.1 kg (185 lb 6.5 oz)  Current Body Weight: 84.1 kg (185 lb 6.5 oz), 120.4 % IBW.    Current BMI (kg/m2): 28.2  Usual Body Weight: 80.9 kg (178 lb 5.6 oz) (1/9/2025)  % Weight Change (Calculated): 4  Weight Adjustment For: No Adjustment  BMI Categories: Overweight (BMI 25.0-29.9)    Estimated Daily Nutrient Needs:  Energy Requirements Based On: Kcal/kg  Weight Used for Energy Requirements: Current  Energy (kcal/day): 0548-1064 kcals (20-25 kcals/kg)  Weight Used for Protein Requirements: Ideal  Protein (g/day): 91-140g  Method Used for Fluid Requirements: 1 ml/kcal  Fluid (ml/day): 6678-8366 ml    Nutrition Diagnosis:   Inadequate protein intake related to acute injury/trauma as evidenced by NPO or clear liquid status due to medical condition    Nutrition Interventions:   Food and/or Nutrient

## 2025-05-12 NOTE — PLAN OF CARE
Problem: Discharge Planning  Goal: Discharge to home or other facility with appropriate resources  5/12/2025 0132 by Zaira Montes LPN  Outcome: Progressing  Flowsheets (Taken 5/11/2025 2200)  Discharge to home or other facility with appropriate resources: Identify barriers to discharge with patient and caregiver  5/11/2025 1605 by Nadia Villatoro, RN  Outcome: Progressing     Problem: Safety - Adult  Goal: Free from fall injury  5/12/2025 0132 by Zaira Montes LPN  Outcome: Progressing  Flowsheets (Taken 5/12/2025 0130)  Free From Fall Injury: Instruct family/caregiver on patient safety  5/11/2025 1605 by Nadia Villatoro, RN  Outcome: Progressing     Problem: Skin/Tissue Integrity  Goal: Skin integrity remains intact  Description: 1.  Monitor for areas of redness and/or skin breakdown2.  Assess vascular access sites hourly3.  Every 4-6 hours minimum:  Change oxygen saturation probe site4.  Every 4-6 hours:  If on nasal continuous positive airway pressure, respiratory therapy assess nares and determine need for appliance change or resting period  5/12/2025 0132 by Zaira Montes LPN  Outcome: Progressing  Flowsheets (Taken 5/11/2025 2200)  Skin Integrity Remains Intact: Monitor for areas of redness and/or skin breakdown  5/11/2025 1605 by Nadia Villatoro, RN  Outcome: Progressing     Problem: ABCDS Injury Assessment  Goal: Absence of physical injury  5/12/2025 0132 by Zaira Montes LPN  Outcome: Progressing  Flowsheets (Taken 5/12/2025 0130)  Absence of Physical Injury: Implement safety measures based on patient assessment  5/11/2025 1605 by Nadia Villatoro, RN  Outcome: Progressing     Problem: Neurosensory - Adult  Goal: Achieves stable or improved neurological status  5/12/2025 0132 by Zaira Montes LPN  Outcome: Progressing  Flowsheets (Taken 5/11/2025 2200)  Achieves stable or improved neurological status: Assess for and report changes in neurological

## 2025-05-13 VITALS
HEIGHT: 68 IN | WEIGHT: 185.41 LBS | OXYGEN SATURATION: 91 % | SYSTOLIC BLOOD PRESSURE: 110 MMHG | TEMPERATURE: 98.6 F | HEART RATE: 63 BPM | DIASTOLIC BLOOD PRESSURE: 57 MMHG | RESPIRATION RATE: 16 BRPM | BODY MASS INDEX: 28.1 KG/M2

## 2025-05-13 LAB
ALBUMIN SERPL-MCNC: 2.8 G/DL (ref 3.5–5.2)
ALP SERPL-CCNC: 97 U/L (ref 40–129)
ALT SERPL-CCNC: 19 U/L (ref 10–50)
AMMONIA PLAS-SCNC: 52 UMOL/L (ref 16–60)
ANION GAP SERPL CALCULATED.3IONS-SCNC: 8 MMOL/L (ref 8–16)
AST SERPL-CCNC: 38 U/L (ref 10–50)
BASOPHILS # BLD: 0 K/UL (ref 0–0.2)
BASOPHILS NFR BLD: 0.2 % (ref 0–1)
BILIRUB SERPL-MCNC: 5.2 MG/DL (ref 0.2–1.2)
BUN SERPL-MCNC: 8 MG/DL (ref 8–23)
CALCIUM SERPL-MCNC: 8.3 MG/DL (ref 8.8–10.2)
CHLORIDE SERPL-SCNC: 110 MMOL/L (ref 98–107)
CO2 SERPL-SCNC: 20 MMOL/L (ref 22–29)
CREAT SERPL-MCNC: 0.7 MG/DL (ref 0.7–1.2)
EOSINOPHIL # BLD: 0.2 K/UL (ref 0–0.6)
EOSINOPHIL NFR BLD: 4.3 % (ref 0–5)
ERYTHROCYTE [DISTWIDTH] IN BLOOD BY AUTOMATED COUNT: 30.1 % (ref 11.5–14.5)
GLUCOSE SERPL-MCNC: 125 MG/DL (ref 70–99)
HCT VFR BLD AUTO: 26.2 % (ref 42–52)
HGB BLD-MCNC: 8 G/DL (ref 14–18)
IMM GRANULOCYTES # BLD: 0.1 K/UL
INR PPP: 2.17 (ref 0.88–1.18)
LYMPHOCYTES # BLD: 0.8 K/UL (ref 1.1–4.5)
LYMPHOCYTES NFR BLD: 17.1 % (ref 20–40)
MCH RBC QN AUTO: 33.5 PG (ref 27–31)
MCHC RBC AUTO-ENTMCNC: 30.5 G/DL (ref 33–37)
MCV RBC AUTO: 109.6 FL (ref 80–94)
MONOCYTES # BLD: 0.7 K/UL (ref 0–0.9)
MONOCYTES NFR BLD: 16 % (ref 0–10)
NEUTROPHILS # BLD: 2.7 K/UL (ref 1.5–7.5)
NEUTS SEG NFR BLD: 60.8 % (ref 50–65)
PLATELET # BLD AUTO: 49 K/UL (ref 130–400)
PMV BLD AUTO: 11.9 FL (ref 9.4–12.4)
POTASSIUM SERPL-SCNC: 3.6 MMOL/L (ref 3.5–5)
PROT SERPL-MCNC: 4.8 G/DL (ref 6.4–8.3)
PROTHROMBIN TIME: 23.7 SEC (ref 12–14.6)
RBC # BLD AUTO: 2.39 M/UL (ref 4.7–6.1)
SODIUM SERPL-SCNC: 138 MMOL/L (ref 136–145)
WBC # BLD AUTO: 4.5 K/UL (ref 4.8–10.8)

## 2025-05-13 PROCEDURE — 99232 SBSQ HOSP IP/OBS MODERATE 35: CPT | Performed by: INTERNAL MEDICINE

## 2025-05-13 PROCEDURE — 36415 COLL VENOUS BLD VENIPUNCTURE: CPT

## 2025-05-13 PROCEDURE — 2580000003 HC RX 258: Performed by: INTERNAL MEDICINE

## 2025-05-13 PROCEDURE — 85025 COMPLETE CBC W/AUTO DIFF WBC: CPT

## 2025-05-13 PROCEDURE — 6360000002 HC RX W HCPCS: Performed by: INTERNAL MEDICINE

## 2025-05-13 PROCEDURE — 2700000000 HC OXYGEN THERAPY PER DAY

## 2025-05-13 PROCEDURE — 80053 COMPREHEN METABOLIC PANEL: CPT

## 2025-05-13 PROCEDURE — 6370000000 HC RX 637 (ALT 250 FOR IP): Performed by: INTERNAL MEDICINE

## 2025-05-13 PROCEDURE — 94760 N-INVAS EAR/PLS OXIMETRY 1: CPT

## 2025-05-13 PROCEDURE — 94150 VITAL CAPACITY TEST: CPT

## 2025-05-13 PROCEDURE — 99232 SBSQ HOSP IP/OBS MODERATE 35: CPT | Performed by: PHYSICIAN ASSISTANT

## 2025-05-13 PROCEDURE — 2500000003 HC RX 250 WO HCPCS: Performed by: INTERNAL MEDICINE

## 2025-05-13 PROCEDURE — 85610 PROTHROMBIN TIME: CPT

## 2025-05-13 PROCEDURE — 6370000000 HC RX 637 (ALT 250 FOR IP): Performed by: PHYSICIAN ASSISTANT

## 2025-05-13 PROCEDURE — 82140 ASSAY OF AMMONIA: CPT

## 2025-05-13 RX ORDER — FOLIC ACID 1 MG/1
1 TABLET ORAL DAILY
Qty: 30 TABLET | Refills: 3 | Status: SHIPPED | OUTPATIENT
Start: 2025-05-13

## 2025-05-13 RX ORDER — M-VIT,TX,IRON,MINS/CALC/FOLIC 27MG-0.4MG
1 TABLET ORAL DAILY
Qty: 30 TABLET | Refills: 3 | Status: SHIPPED | OUTPATIENT
Start: 2025-05-13 | End: 2025-09-10

## 2025-05-13 RX ADMIN — SUCRALFATE 1 G: 1 TABLET ORAL at 09:04

## 2025-05-13 RX ADMIN — Medication 100 MG: at 09:04

## 2025-05-13 RX ADMIN — FUROSEMIDE 20 MG: 20 TABLET ORAL at 09:04

## 2025-05-13 RX ADMIN — RIFAXIMIN 400 MG: 200 TABLET ORAL at 09:04

## 2025-05-13 RX ADMIN — SPIRONOLACTONE 25 MG: 25 TABLET ORAL at 09:04

## 2025-05-13 RX ADMIN — NADOLOL 20 MG: 20 TABLET ORAL at 09:05

## 2025-05-13 RX ADMIN — SUCRALFATE 1 G: 1 TABLET ORAL at 13:54

## 2025-05-13 RX ADMIN — RIFAXIMIN 400 MG: 200 TABLET ORAL at 13:54

## 2025-05-13 RX ADMIN — Medication 500 MG: at 09:04

## 2025-05-13 RX ADMIN — SODIUM CHLORIDE 250 MG: 0.9 INJECTION, SOLUTION INTRAVENOUS at 09:13

## 2025-05-13 RX ADMIN — SODIUM CHLORIDE, PRESERVATIVE FREE 10 ML: 5 INJECTION INTRAVENOUS at 09:05

## 2025-05-13 RX ADMIN — PANTOPRAZOLE SODIUM 40 MG: 40 TABLET, DELAYED RELEASE ORAL at 04:09

## 2025-05-13 NOTE — CARE COORDINATION
05/13/25 1509   IMM Letter   IMM Letter given to Patient/Family/Significant other/Guardian/POA/by: 2nd letter explained to and signed by spouse. Francesca BELLCM   IMM Letter date given: 05/13/25   IMM Letter time given: 1500     2nd letter  Important Message from Medicare letter explained and provided to patient/spouse by Sera Cortez RN CM. All questions answered. Patient/spouse voiced understanding. Copy placed in soft chart.   Pt/spouse agreeable to dc plans and waived he 4 hour time requirements  Electronically signed by Sera Cortez RN on 5/13/2025 at 3:10 PM

## 2025-05-13 NOTE — PROGRESS NOTES
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  Status post 1 unit platelets 5/11/2025  Platelet count 49,000, 5/13/2025  Severe anemia likely due to prior GI bleeding/end-stage liver disease:  Macrocytic anemia.  No evidence of bone marrow suppression.  Component of iron deficiency, received IV iron  Hemoglobin 8.3  Total 2 units PRBC transfusion during this hospitalization  Transfusion threshold: hemoglobin <7 g/dL.  Monitor for ongoing bleeding or reaccumulation of ascites/pleural fluid as a sign of portal hypertension-related blood loss.  Ferric gluconate 1000 mg to be completed 5/13/2025  4. Soft tissue hematoma-(Left Flank Hematoma)  Likely secondary to coagulopathy in ESLD.  Monitor Hb/Ht  Avoid anticoagulation or antiplatelet therapy.  Maintain platelets >30,000  GI consultation-reviewed notes and recommendations  Transfusional support as per parameters above  Status post vitamin K 10 mg p.o. daily x 3 days completed 5/12/2025  5. Nutrition: Protein-Calorie Malnutrition  Begin nutritional optimization:  Recommend high-protein, low-sodium diet.  6. 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  Therapeutic thoracentesis only if symptomatic and coagulopathy corrected.  Disposition and Follow-Up:  GI to arrange outpatient follow-up  Monitor CBC, coagulation panel, and fibrinogen regularly.  Reinforce alcohol cessation and consider social work/addiction support as appropriate.  Care plan discussed with patient bedside RN      Jovani Ramirez MD    05/13/25  7:28 AM

## 2025-05-13 NOTE — DISCHARGE SUMMARY
Hospitalist Discharge Summary  [facility]    Patient: Ney Ann  : 1955  MRN: [id number]  Code Status: Full Code  PCP: Izabela Robles, APRN - CNP  Attending: Bran Obrien MD  Admission Date: 2025  Discharge Date: 2025    Discharge Medications:     Current Discharge Medication List             Details   folic acid (FOLVITE) 1 MG tablet Take 1 tablet by mouth daily  Qty: 30 tablet, Refills: 3      Multiple Vitamins-Minerals (THERAPEUTIC MULTIVITAMIN-MINERALS) tablet Take 1 tablet by mouth daily  Qty: 30 tablet, Refills: 3                Details   spironolactone (ALDACTONE) 25 MG tablet Take 1 tablet by mouth daily  Qty: 30 tablet, Refills: 0    Comments: Please send a replace/new response with 90-Day Supply if appropriate to maximize member benefit. Requesting 1 year supply.      hydrocortisone (ANUSOL-HC) 25 MG suppository Place 1 suppository rectally 2 times daily as needed for Hemorrhoids  Qty: 60 suppository, Refills: 0      magnesium oxide (MAG-OX) 400 (240 Mg) MG tablet TAKE 1 TABLET BY MOUTH DAILY  Qty: 30 tablet, Refills: 0      vitamin D (ERGOCALCIFEROL) 1.25 MG (30379 UT) CAPS capsule TAKE 1 CAPSULE BY MOUTH ONCE A WEEK  Qty: 4 capsule, Refills: 0      rifAXIMin (XIFAXAN) 550 MG tablet Take 1 tablet by mouth 2 times daily  Qty: 60 tablet, Refills: 11    Associated Diagnoses: Hepatic encephalopathy (HCC)      lactulose (CHRONULAC) 10 GM/15ML solution Take 15 mLs by mouth 2 times daily  Qty: 946 mL, Refills: 1      potassium phosphate, monobasic, (K-PHOS) 500 MG tablet Take 1 tablet by mouth 2 times daily  Qty: 60 tablet, Refills: 0      nadolol (CORGARD) 20 MG tablet Take 1 tablet by mouth daily  Qty: 30 tablet, Refills: 0      zolpidem (AMBIEN) 10 MG tablet Take 1 tablet by mouth nightly as needed for Sleep for up to 90 days. Max Daily Amount: 10 mg  Qty: 90 tablet, Refills: 0    Associated Diagnoses: Primary insomnia      pantoprazole (PROTONIX) 40 MG tablet

## 2025-05-13 NOTE — CARE COORDINATION
Patient is requesting a BSC.  His spouse, [place], does not have a preferred supplier and wants it delivered to their home.      BSC ordered from [employer] and will be delivered to patient's home.  [employer]   P [phone]  F [phone]  Electronically signed by Fartun Juan RN on 5/13/2025 at 12:06 PM

## 2025-05-13 NOTE — PLAN OF CARE
Problem: Discharge Planning  Goal: Discharge to home or other facility with appropriate resources  Outcome: Adequate for Discharge     Problem: Safety - Adult  Goal: Free from fall injury  Outcome: Adequate for Discharge     Problem: Skin/Tissue Integrity  Goal: Skin integrity remains intact  Description: 1.  Monitor for areas of redness and/or skin breakdown2.  Assess vascular access sites hourly3.  Every 4-6 hours minimum:  Change oxygen saturation probe site4.  Every 4-6 hours:  If on nasal continuous positive airway pressure, respiratory therapy assess nares and determine need for appliance change or resting period  Outcome: Adequate for Discharge     Problem: ABCDS Injury Assessment  Goal: Absence of physical injury  Outcome: Adequate for Discharge     Problem: Neurosensory - Adult  Goal: Achieves stable or improved neurological status  Outcome: Adequate for Discharge     Problem: Respiratory - Adult  Goal: Achieves optimal ventilation and oxygenation  Outcome: Adequate for Discharge     Problem: Cardiovascular - Adult  Goal: Maintains optimal cardiac output and hemodynamic stability  Outcome: Adequate for Discharge     Problem: Skin/Tissue Integrity - Adult  Goal: Skin integrity remains intact  Description: 1.  Monitor for areas of redness and/or skin breakdown2.  Assess vascular access sites hourly3.  Every 4-6 hours minimum:  Change oxygen saturation probe site4.  Every 4-6 hours:  If on nasal continuous positive airway pressure, respiratory therapy assess nares and determine need for appliance change or resting period  Outcome: Adequate for Discharge     Problem: Musculoskeletal - Adult  Goal: Return mobility to safest level of function  Outcome: Adequate for Discharge     Problem: Gastrointestinal - Adult  Goal: Minimal or absence of nausea and vomiting  Outcome: Adequate for Discharge     Problem: Genitourinary - Adult  Goal: Absence of urinary retention  Outcome: Adequate for Discharge     Problem:

## 2025-05-13 NOTE — PROGRESS NOTES
Spiritual Health History and Assessment/Progress Note  [facility]    (P) Rituals, Rites and Sacraments,  , Adjustment to illness,      Name: Ney Ann MRN: [id number]    Age: 70 y.o.     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  However, didn't state importance of receiving communion.  Perhaps, a follow up visit could invite him to articulate.    Family/Friends Other: None    Community:  Patient feels well-supported. Support system includes: Spouse/Partner and Children  Family/Friends Other: None reported.      Assessment and Plan of Care:   Patient appeared calm and ready to go home saying \"It's time.\"  He was conversational.  He showed evidence of no spiritual distress/concern.    Patient Interventions include: Facilitated expression of thoughts and feelings and Provided sacramental/Samaritan ritual  Family/Friends Interventions include: Other: None    Patient Plan of Care: No future visits per patient/family request  Family/Friends Plan of Care: No spiritual needs identified for follow-up    Electronically signed by TRIPP Smith on 5/13/2025 at 3:33 PM

## 2025-05-13 NOTE — PLAN OF CARE
Problem: Discharge Planning  Goal: Discharge to home or other facility with appropriate resources  5/12/2025 2327 by Brandie Cullen RN  Outcome: Progressing  5/12/2025 1710 by Heidy Arroyo RN  Outcome: Progressing     Problem: Safety - Adult  Goal: Free from fall injury  5/12/2025 2327 by Brandie Cullen RN  Outcome: Progressing  5/12/2025 1710 by Heidy Arroyo RN  Outcome: Progressing     Problem: Skin/Tissue Integrity  Goal: Skin integrity remains intact  Description: 1.  Monitor for areas of redness and/or skin breakdown2.  Assess vascular access sites hourly3.  Every 4-6 hours minimum:  Change oxygen saturation probe site4.  Every 4-6 hours:  If on nasal continuous positive airway pressure, respiratory therapy assess nares and determine need for appliance change or resting period  5/12/2025 2327 by Brandie Cullen RN  Outcome: Progressing  5/12/2025 1710 by Heidy Arroyo RN  Outcome: Progressing     Problem: ABCDS Injury Assessment  Goal: Absence of physical injury  5/12/2025 2327 by Brandie Cullen RN  Outcome: Progressing  5/12/2025 1710 by Heidy Arroyo RN  Outcome: Progressing     Problem: Neurosensory - Adult  Goal: Achieves stable or improved neurological status  5/12/2025 2327 by Brandie Cullen RN  Outcome: Progressing  5/12/2025 1710 by Heidy Arroyo RN  Outcome: Progressing     Problem: Respiratory - Adult  Goal: Achieves optimal ventilation and oxygenation  5/12/2025 2327 by Brandie Cullen RN  Outcome: Progressing  5/12/2025 1710 by Heidy Arroyo RN  Outcome: Progressing     Problem: Cardiovascular - Adult  Goal: Maintains optimal cardiac output and hemodynamic stability  5/12/2025 2327 by Brandie Cullen RN  Outcome: Progressing  5/12/2025 1710 by Heidy Arroyo RN  Outcome: Progressing     Problem: Skin/Tissue Integrity - Adult  Goal: Skin integrity remains intact  Description: 1.  Monitor for areas of redness and/or skin breakdown2.  Assess vascular access sites hourly3.  Every 4-6 hours minimum:  Change oxygen

## 2025-05-13 NOTE — PROGRESS NOTES
Palliative Care Progress Note  5/13/2025 11:45 AM    Patient:  Ney Ann  YOB: 1955  Primary Care Physician: Izabela Robles, SHEILA - CNP  Advance Directive: Full Code  Admit Date: 5/5/2025       Hospital Day: 8  Portions of this note have been copied forward, however, changed to reflect the most current clinical status of this patient.    CHIEF COMPLAINT/REASON FOR CONSULTATION Goals of care, family support, Code status, symptom management     SUBJECTIVE:  Mr. Ann has some mild to moderate anxiety but otherwise feels he is having a good day and is ready to go home. Denies additional dark stools.    HPI:  The patient is a 70 y.o. male with PMH alcoholic cirrhosis previously followed at [facility], atrial fibrillation, chronic back pain, esophageal varices, portal hypertensive gastropathy, pancytopenia, HTN, COPD, vitamin D deficiency, insomnia who was recently admitted to this facility on 4/29/2025 when he presented with acute blood loss anemia with hemoglobin 4.3 and ongoing GI bleeding.  He required transfusional support throughout that admission.  He underwent EGD on 5/1/2025 that reported grade 1 esophageal varices, moderate portal hypertensive gastropathy and no active bleeding.  He did have recurrent rectal bleeding that stay and underwent colonoscopy on 5/3/2025 with findings listed as mild left sided diverticulosis, moderate internal hemorrhoids, diminutive descending colon polyp not removed due to severe thrombocytopenia.  He was discharged home later that day with Anusol suppositories and recommendation to follow-up with his PCP, gastroenterology and primary hepatologist at [facility].  He returned to [facility] ED on 5/5/2025 with concern for dyspnea, chest discomfort, left flank and abdominal pain with ecchymosis and tenderness.  He reported to the ED provider that he had a fall on 5/2/2025 and his hospital room and did not reported to anyone.  He reported that once discharged home he noticed the

## 2025-05-14 ENCOUNTER — TELEPHONE (OUTPATIENT)
Dept: PRIMARY CARE CLINIC | Age: 70
End: 2025-05-14

## 2025-05-14 ENCOUNTER — CARE COORDINATION (OUTPATIENT)
Dept: CARE COORDINATION | Age: 70
End: 2025-05-14

## 2025-05-14 DIAGNOSIS — K70.30 ALCOHOLIC CIRRHOSIS, UNSPECIFIED WHETHER ASCITES PRESENT (HCC): ICD-10-CM

## 2025-05-14 DIAGNOSIS — S30.1XXA HEMATOMA OF RECTUS SHEATH, INITIAL ENCOUNTER: Primary | ICD-10-CM

## 2025-05-14 PROCEDURE — 1111F DSCHRG MED/CURRENT MED MERGE: CPT | Performed by: NURSE PRACTITIONER

## 2025-05-14 NOTE — TELEPHONE ENCOUNTER
Called and spoke with Ney's wife Georgia. She does not want to make an appointment for him at this time. She feels he is too weak and she does not want to expose him to any more germs. She states she will call if she decides she wants him seen in the future. Bernice PRICE

## 2025-05-14 NOTE — CARE COORDINATION
readmission: medical condition-COPD, Cirrhosis, Afib, HTN, physical debilitation and polypharmacy    Hospital follow up appointment: Patient does not have a follow up appointment scheduled at time of call.  Wife declined to schedule an appt due to patient's weakened physical state.      Care Summary Note: Wife reported patient is very fatigued and still weak from his hospital stay. Wife stated patient has multiple appointments to schedule and he is too fragile to go to that many appointments. Patient has HH visit and Palliative Care both scheduled tomorrow. Patient has all of his medications per review with wife. She reported he has oxygen at home, has a BP cuff and finger oximeter and she will check vitals daily. Patient has had some lightheadedness at times, wife stated she will place oxygen on and this seems to help. Educated on safety with mobility, to move and change position slowly and wait several seconds after getting up before ambulating. Wife gave teach back she is making pt use his cane for stability. Patient has a bedside commode and a urinal. Wife reported some BLE edema and is aware of patient's diuretic, plans to give that to him this afternoon. Suggested wife request if specialist providers will consider telemedicine appointments at this point with patient's recent trauma and level of weakness. ACM requested if PCP will consider telehealth follow up. Will continue to follow.    Offered patient enrollment in the Remote Patient Monitoring (RPM) program for in-home monitoring: Yes, but did not enroll at this time: already monitoring with home equipment.     Assessments Completed:   Care Coordination Interventions    Referral from Primary Care Provider: Yes  Suggested Interventions and Community Resources  Home Health Services: Completed  Palliative Care: Completed  Physical Therapy: In Process  Zone Management Tools: In Process (Comment: PCP ref. DC [facility] 5/13 after fall, traumatic hematoma. COPD, HTN,

## 2025-05-15 ENCOUNTER — OFFICE VISIT (OUTPATIENT)
Dept: GASTROENTEROLOGY | Age: 70
End: 2025-05-15
Payer: MEDICARE

## 2025-05-15 ENCOUNTER — OFFICE VISIT (OUTPATIENT)
Dept: PALLATIVE CARE | Age: 70
End: 2025-05-15
Payer: MEDICARE

## 2025-05-15 VITALS
OXYGEN SATURATION: 92 % | DIASTOLIC BLOOD PRESSURE: 60 MMHG | BODY MASS INDEX: 28.19 KG/M2 | HEIGHT: 68 IN | HEART RATE: 58 BPM | SYSTOLIC BLOOD PRESSURE: 120 MMHG | WEIGHT: 186 LBS

## 2025-05-15 DIAGNOSIS — Z71.89 GOALS OF CARE, COUNSELING/DISCUSSION: Primary | ICD-10-CM

## 2025-05-15 DIAGNOSIS — K92.2 GASTROINTESTINAL HEMORRHAGE, UNSPECIFIED GASTROINTESTINAL HEMORRHAGE TYPE: ICD-10-CM

## 2025-05-15 DIAGNOSIS — Z51.5 ENCOUNTER FOR PALLIATIVE CARE: ICD-10-CM

## 2025-05-15 DIAGNOSIS — Z55.8: ICD-10-CM

## 2025-05-15 DIAGNOSIS — K72.90 DECOMPENSATED CIRRHOSIS (HCC): Primary | ICD-10-CM

## 2025-05-15 DIAGNOSIS — D64.9 ANEMIA, UNSPECIFIED TYPE: ICD-10-CM

## 2025-05-15 DIAGNOSIS — Z09 HOSPITAL DISCHARGE FOLLOW-UP: ICD-10-CM

## 2025-05-15 DIAGNOSIS — K74.60 DECOMPENSATED CIRRHOSIS (HCC): Primary | ICD-10-CM

## 2025-05-15 DIAGNOSIS — K72.10 END STAGE LIVER DISEASE (HCC): ICD-10-CM

## 2025-05-15 DIAGNOSIS — D61.818 PANCYTOPENIA (HCC): ICD-10-CM

## 2025-05-15 DIAGNOSIS — K76.82 HEPATIC ENCEPHALOPATHY (HCC): ICD-10-CM

## 2025-05-15 DIAGNOSIS — Z78.9 POOR PROGNOSIS: ICD-10-CM

## 2025-05-15 PROCEDURE — G8417 CALC BMI ABV UP PARAM F/U: HCPCS

## 2025-05-15 PROCEDURE — 1036F TOBACCO NON-USER: CPT | Performed by: NURSE PRACTITIONER

## 2025-05-15 PROCEDURE — 1159F MED LIST DOCD IN RCRD: CPT | Performed by: NURSE PRACTITIONER

## 2025-05-15 PROCEDURE — 1111F DSCHRG MED/CURRENT MED MERGE: CPT | Performed by: NURSE PRACTITIONER

## 2025-05-15 PROCEDURE — 1123F ACP DISCUSS/DSCN MKR DOCD: CPT | Performed by: NURSE PRACTITIONER

## 2025-05-15 PROCEDURE — G8427 DOCREV CUR MEDS BY ELIG CLIN: HCPCS | Performed by: NURSE PRACTITIONER

## 2025-05-15 PROCEDURE — 99214 OFFICE O/P EST MOD 30 MIN: CPT | Performed by: NURSE PRACTITIONER

## 2025-05-15 PROCEDURE — G8417 CALC BMI ABV UP PARAM F/U: HCPCS | Performed by: NURSE PRACTITIONER

## 2025-05-15 PROCEDURE — 3017F COLORECTAL CA SCREEN DOC REV: CPT | Performed by: NURSE PRACTITIONER

## 2025-05-15 PROCEDURE — 99350 HOME/RES VST EST HIGH MDM 60: CPT

## 2025-05-15 PROCEDURE — 1123F ACP DISCUSS/DSCN MKR DOCD: CPT

## 2025-05-15 PROCEDURE — 1160F RVW MEDS BY RX/DR IN RCRD: CPT

## 2025-05-15 PROCEDURE — 1159F MED LIST DOCD IN RCRD: CPT

## 2025-05-15 PROCEDURE — 1036F TOBACCO NON-USER: CPT

## 2025-05-15 PROCEDURE — 3017F COLORECTAL CA SCREEN DOC REV: CPT

## 2025-05-15 SDOH — EDUCATIONAL SECURITY - EDUCATION ATTAINMENT: OTHER PROBLEMS RELATED TO EDUCATION AND LITERACY: Z55.8

## 2025-05-15 ASSESSMENT — ENCOUNTER SYMPTOMS
RECTAL PAIN: 0
CONSTIPATION: 0
DIARRHEA: 0
ANAL BLEEDING: 0
COUGH: 0
NAUSEA: 0
TROUBLE SWALLOWING: 0
ABDOMINAL PAIN: 0
CHOKING: 0
SHORTNESS OF BREATH: 1
ABDOMINAL DISTENTION: 0
VOMITING: 0
BLOOD IN STOOL: 0

## 2025-05-15 NOTE — PROGRESS NOTES
words or phrases may be inadvertently transcribed.  Efforts were made to edit the dictations, but occasionally errors still exist. Thank you.

## 2025-05-15 NOTE — PROGRESS NOTES
Subjective:     Patient ID: Ney Ann is a 70 y.o. male  PCP: Izabela Robles, SHEILA - CNP  Referring Provider: No ref. provider found    HPI  History of Present Illness  Patient seen in the office today for hospital follow up he is accompanied by his family   He was last in the hospital due to anemia with a H&H of   4.9/16.7 he has decompensated alcoholic liver cirrhosis    He experienced a fall during after his hospital stay, resulting in a significant hematoma. The hematoma initially presented as a typical bruise but has since expanded this is on his left flank area. Over the past few days, he has noticed an increase in swelling in his feet and legs. His mobility is limited, requiring the use of a walker for ambulation.   He is currently on spironolactone 25 mg once daily and furosemide 20 mg once daily. His urinary frequency has decreased compared to previous episodes when he was on these medications. He also reports shortness of breath, necessitating the use of oxygen 24/7.  We will increase his diuretics to aldactone 50 ng daily and Lasix 20 mg BID and we may have to increase to 40 mg BID     He has been experiencing bowel irregularities, with five bowel movements yesterday, initially black in color but transitioning to brown. He was given lactulose in the hospital, which resulted in frequent bowel movements, eventually leading to the presence of red blood in his stool. He did not experience any confusion during his hospital stay.  He is current taking lactulose daily and is having 2-3 bowel movements per day    He has received blood transfusions, along with platelet transfusions, magnesium, and iron supplements. Despite these interventions, he continues to experience anemia. He states \"I have been hospitalized five times since January and I am tired of it\".    He has decided to receive palliative care and home health services, states he does not want to be hospitalized any more he just wants to be kept

## 2025-05-19 ENCOUNTER — OFFICE VISIT (OUTPATIENT)
Dept: PALLATIVE CARE | Age: 70
End: 2025-05-19
Payer: MEDICARE

## 2025-05-19 DIAGNOSIS — D61.818 PANCYTOPENIA (HCC): ICD-10-CM

## 2025-05-19 DIAGNOSIS — R19.5 DARK STOOLS: ICD-10-CM

## 2025-05-19 DIAGNOSIS — Z51.5 ENCOUNTER FOR PALLIATIVE CARE: ICD-10-CM

## 2025-05-19 DIAGNOSIS — R19.5 DARK STOOLS: Primary | ICD-10-CM

## 2025-05-19 DIAGNOSIS — K72.10 END STAGE LIVER DISEASE (HCC): ICD-10-CM

## 2025-05-19 DIAGNOSIS — R53.82 CHRONIC FATIGUE: ICD-10-CM

## 2025-05-19 LAB
ACANTHOCYTES BLD QL SMEAR: ABNORMAL
BASOPHILS # BLD: 0 K/UL (ref 0–0.2)
BASOPHILS NFR BLD: 1.1 % (ref 0–1)
EOSINOPHIL # BLD: 0.1 K/UL (ref 0–0.6)
EOSINOPHIL NFR BLD: 4.1 % (ref 0–5)
HCT VFR BLD AUTO: 32 % (ref 42–52)
HGB BLD-MCNC: 9.8 G/DL (ref 14–18)
HYPOCHROMIA BLD QL SMEAR: ABNORMAL
IMM GRANULOCYTES # BLD: 0 K/UL
LYMPHOCYTES # BLD: 0.5 K/UL (ref 1.1–4.5)
LYMPHOCYTES NFR BLD: 17.8 % (ref 20–40)
MACROCYTES BLD QL SMEAR: ABNORMAL
MCH RBC QN AUTO: 35.1 PG (ref 27–31)
MCHC RBC AUTO-ENTMCNC: 30.6 G/DL (ref 33–37)
MCV RBC AUTO: 114.7 FL (ref 80–94)
MONOCYTES # BLD: 0.3 K/UL (ref 0–0.9)
MONOCYTES NFR BLD: 12.6 % (ref 0–10)
NEUTROPHILS # BLD: 1.7 K/UL (ref 1.5–7.5)
NEUTS SEG NFR BLD: 63.7 % (ref 50–65)
OVALOCYTES BLD QL SMEAR: ABNORMAL
PLATELET # BLD AUTO: 47 K/UL (ref 130–400)
PLATELET SLIDE REVIEW: ABNORMAL
PMV BLD AUTO: 11.3 FL (ref 9.4–12.4)
POIKILOCYTOSIS BLD QL SMEAR: ABNORMAL
POLYCHROMASIA BLD QL SMEAR: ABNORMAL
RBC # BLD AUTO: 2.79 M/UL (ref 4.7–6.1)
SCHISTOCYTES BLD QL SMEAR: ABNORMAL
WBC # BLD AUTO: 2.7 K/UL (ref 4.8–10.8)

## 2025-05-19 PROCEDURE — G8417 CALC BMI ABV UP PARAM F/U: HCPCS | Performed by: NURSE PRACTITIONER

## 2025-05-19 PROCEDURE — 1123F ACP DISCUSS/DSCN MKR DOCD: CPT | Performed by: NURSE PRACTITIONER

## 2025-05-19 PROCEDURE — 1036F TOBACCO NON-USER: CPT | Performed by: NURSE PRACTITIONER

## 2025-05-19 PROCEDURE — 99350 HOME/RES VST EST HIGH MDM 60: CPT | Performed by: NURSE PRACTITIONER

## 2025-05-19 PROCEDURE — 3017F COLORECTAL CA SCREEN DOC REV: CPT | Performed by: NURSE PRACTITIONER

## 2025-05-19 NOTE — PROGRESS NOTES
dictation was generated by voice recognition computer software.  Although all attempts are made to edit the dictation for accuracy, there may be errors in the transcription that are not intended.      Electronically signed by SHEILA Clay CNP on 5/19/2025 at 2:38 PM

## 2025-05-26 LAB
ACANTHOCYTES BLD QL SMEAR: ABNORMAL
BASO STIPL BLD QL SMEAR: ABNORMAL
BASOPHILS # BLD: 0 K/UL (ref 0–0.2)
BASOPHILS NFR BLD: 0.6 % (ref 0–1)
BURR CELLS BLD QL SMEAR: ABNORMAL
DACRYOCYTES BLD QL SMEAR: ABNORMAL
EOSINOPHIL # BLD: 0.2 K/UL (ref 0–0.6)
EOSINOPHIL NFR BLD: 6.6 % (ref 0–5)
HCT VFR BLD AUTO: 33.5 % (ref 42–52)
HGB BLD-MCNC: 10.6 G/DL (ref 14–18)
HYPOCHROMIA BLD QL SMEAR: ABNORMAL
IMM GRANULOCYTES # BLD: 0 K/UL
LYMPHOCYTES # BLD: 0.7 K/UL (ref 1.1–4.5)
LYMPHOCYTES NFR BLD: 21.8 % (ref 20–40)
MACROCYTES BLD QL SMEAR: ABNORMAL
MCH RBC QN AUTO: 36.4 PG (ref 27–31)
MCHC RBC AUTO-ENTMCNC: 31.6 G/DL (ref 33–37)
MCV RBC AUTO: 115.1 FL (ref 80–94)
MONOCYTES # BLD: 0.5 K/UL (ref 0–0.9)
MONOCYTES NFR BLD: 15.8 % (ref 0–10)
NEUTROPHILS # BLD: 1.7 K/UL (ref 1.5–7.5)
NEUTS SEG NFR BLD: 54.6 % (ref 50–65)
OVALOCYTES BLD QL SMEAR: ABNORMAL
PLATELET # BLD AUTO: 48 K/UL (ref 130–400)
PLATELET SLIDE REVIEW: ABNORMAL
PMV BLD AUTO: 11 FL (ref 9.4–12.4)
POIKILOCYTOSIS BLD QL SMEAR: ABNORMAL
POLYCHROMASIA BLD QL SMEAR: ABNORMAL
RBC # BLD AUTO: 2.91 M/UL (ref 4.7–6.1)
SCHISTOCYTES BLD QL SMEAR: ABNORMAL
WBC # BLD AUTO: 3.2 K/UL (ref 4.8–10.8)

## 2025-05-27 ENCOUNTER — PATIENT MESSAGE (OUTPATIENT)
Dept: PRIMARY CARE CLINIC | Age: 70
End: 2025-05-27

## 2025-05-27 ENCOUNTER — RESULTS FOLLOW-UP (OUTPATIENT)
Dept: PRIMARY CARE CLINIC | Age: 70
End: 2025-05-27

## 2025-05-29 ENCOUNTER — TELEPHONE (OUTPATIENT)
Dept: PRIMARY CARE CLINIC | Age: 70
End: 2025-05-29

## 2025-05-29 NOTE — TELEPHONE ENCOUNTER
Gretchen with [facility] called back stated patient was in less pain today and he could void more he did not want to go to the ED as directed or advised with symptoms.

## 2025-05-29 NOTE — TELEPHONE ENCOUNTER
[facility] gretchen stated patient stated he has a hernia and cannot void and id dribbling when he does she has a visit today wants to know what he should do said it is very painful, I advised he should go to ED since he would need stat imaging, we were advised by the patients Fawn bustamante he had  an abscess and needed to go to Urgent care if he could not get into the office to be seen. Gretchen will call back after her visit today.

## 2025-06-03 ENCOUNTER — CARE COORDINATION (OUTPATIENT)
Dept: CARE COORDINATION | Age: 70
End: 2025-06-03

## 2025-06-03 ENCOUNTER — PATIENT MESSAGE (OUTPATIENT)
Dept: PRIMARY CARE CLINIC | Age: 70
End: 2025-06-03

## 2025-06-03 LAB
BASOPHILS # BLD: 0 K/UL (ref 0–0.2)
BASOPHILS NFR BLD: 0.9 % (ref 0–1)
EOSINOPHIL # BLD: 0.2 K/UL (ref 0–0.6)
EOSINOPHIL NFR BLD: 4.8 % (ref 0–5)
ERYTHROCYTE [DISTWIDTH] IN BLOOD BY AUTOMATED COUNT: 23.8 % (ref 11.5–14.5)
HCT VFR BLD AUTO: 31 % (ref 42–52)
HGB BLD-MCNC: 10 G/DL (ref 14–18)
IMM GRANULOCYTES # BLD: 0 K/UL
LYMPHOCYTES # BLD: 0.7 K/UL (ref 1.1–4.5)
LYMPHOCYTES NFR BLD: 22.1 % (ref 20–40)
MCH RBC QN AUTO: 36.9 PG (ref 27–31)
MCHC RBC AUTO-ENTMCNC: 32.3 G/DL (ref 33–37)
MCV RBC AUTO: 114.4 FL (ref 80–94)
MONOCYTES # BLD: 0.5 K/UL (ref 0–0.9)
MONOCYTES NFR BLD: 16.1 % (ref 0–10)
NEUTROPHILS # BLD: 1.8 K/UL (ref 1.5–7.5)
NEUTS SEG NFR BLD: 55.8 % (ref 50–65)
PLATELET # BLD AUTO: 40 K/UL (ref 130–400)
PMV BLD AUTO: 10 FL (ref 9.4–12.4)
RBC # BLD AUTO: 2.71 M/UL (ref 4.7–6.1)
WBC # BLD AUTO: 3.3 K/UL (ref 4.8–10.8)

## 2025-06-03 RX ORDER — LANOLIN ALCOHOL/MO/W.PET/CERES
400 CREAM (GRAM) TOPICAL DAILY
Qty: 30 TABLET | Refills: 0 | Status: SHIPPED | OUTPATIENT
Start: 2025-06-03

## 2025-06-03 RX ORDER — ERGOCALCIFEROL 1.25 MG/1
50000 CAPSULE, LIQUID FILLED ORAL WEEKLY
Qty: 4 CAPSULE | Refills: 0 | Status: SHIPPED | OUTPATIENT
Start: 2025-06-03

## 2025-06-03 NOTE — TELEPHONE ENCOUNTER
Ney Ann called to request a refill on his medication.      Last office visit : 4/3/2025   Next office visit : 6/12/2025     Requested Prescriptions     Pending Prescriptions Disp Refills    vitamin D (ERGOCALCIFEROL) 1.25 MG (29811 UT) CAPS capsule [Pharmacy Med Name: VITAMIN D 1.25MG CAPSULE] 4 capsule 0     Sig: TAKE 1 CAPSULE BY MOUTH ONCE A WEEK    magnesium oxide (MAG-OX) 400 (240 Mg) MG tablet [Pharmacy Med Name: MAGNESIUM OXIDE 400MG TABLET] 30 tablet 0     Sig: TAKE 1 TABLET BY MOUTH DAILY            Kathy Dobson MA

## 2025-06-03 NOTE — TELEPHONE ENCOUNTER
Ney Ann called to request a refill on his medication.      Last office visit : 4/3/2025   Next office visit : 6/12/2025     Requested Prescriptions     Pending Prescriptions Disp Refills    potassium phosphate, monobasic, (K-PHOS) 500 MG tablet 60 tablet 0     Sig: Take 1 tablet by mouth 2 times daily    nadolol (CORGARD) 20 MG tablet 30 tablet 0     Sig: Take 1 tablet by mouth daily            Alexandra Cortez MA

## 2025-06-03 NOTE — CARE COORDINATION
Yes  Suggested Interventions and Community Resources  Disease Specific Clinic: Completed (Comment: Gastroenterology)  Home Health Services: Completed  Palliative Care: Completed  Physical Therapy: In Process  Zone Management Tools: In Process (Comment: PCP ref. DC [facility] 5/13 after fall, traumatic hematoma. COPD, HTN, Liver cirrhosis, Afib, Chronic pain)          Medications Reviewed:   Patient denies any changes with medications and reports taking all medications as prescribed.    Advance Care Planning:   Reviewed and current     Care Planning:    Goals Addressed                   This Visit's Progress     Conditions and Symptoms   Improving     I will schedule office visits, as directed by my provider.  I will keep my appointment or reschedule if I have to cancel.  I will notify my provider of any barriers to my plan of care.  I will follow my Zone Management tool to seek urgent or emergent care.  I will notify my provider of any symptoms that indicate a worsening of my condition.    Barriers: impairment:  physical: debilitated 2/2 fall/injury/co-morbidities and overwhelmed by complexity of regimen  Plan for overcoming my barriers:   Patient/wife demonstrate understanding of maintaining provider contact and relationship  Patient/wife demonstrate medication compliance, therapy compliance  Patient/wife demonstrate self-management and monitoring skills that enhance awareness of patient's condition and when to contact provider.  Confidence: 8/10  Anticipated Goal Completion Date: 8/14/25                 PCP/Specialist follow up:   Future Appointments         Provider Specialty Dept Phone    6/12/2025 10:30 AM Izabela Robles APRN - CNP Primary Care [phone]    8/15/2025 10:30 AM Lavon Keane APRN Gastroenterology [phone]            Follow Up:   Plan for next [facility] outreach in approximately 2 weeks to complete:  - disease specific assessments  - goal progression.   Patient  is agreeable to this plan.

## 2025-06-04 RX ORDER — POTASSIUM PHOSPHATE, MONOBASIC 500 MG/1
500 TABLET, SOLUBLE ORAL 2 TIMES DAILY
Qty: 60 TABLET | Refills: 0 | Status: SHIPPED | OUTPATIENT
Start: 2025-06-04

## 2025-06-04 RX ORDER — NADOLOL 20 MG/1
20 TABLET ORAL DAILY
Qty: 30 TABLET | Refills: 0 | Status: SHIPPED | OUTPATIENT
Start: 2025-06-04

## 2025-06-05 ENCOUNTER — RESULTS FOLLOW-UP (OUTPATIENT)
Dept: PRIMARY CARE CLINIC | Age: 70
End: 2025-06-05

## 2025-06-09 ENCOUNTER — OFFICE VISIT (OUTPATIENT)
Dept: PALLATIVE CARE | Age: 70
End: 2025-06-09
Payer: MEDICARE

## 2025-06-09 DIAGNOSIS — Z51.5 ENCOUNTER FOR PALLIATIVE CARE: ICD-10-CM

## 2025-06-09 DIAGNOSIS — R30.0 DIFFICULT OR PAINFUL URINATION: ICD-10-CM

## 2025-06-09 DIAGNOSIS — K72.10 END STAGE LIVER DISEASE (HCC): Primary | ICD-10-CM

## 2025-06-09 LAB
BASOPHILS # BLD: 0 K/UL (ref 0–0.2)
BASOPHILS NFR BLD: 0.8 % (ref 0–1)
EOSINOPHIL # BLD: 0.3 K/UL (ref 0–0.6)
EOSINOPHIL NFR BLD: 7 % (ref 0–5)
ERYTHROCYTE [DISTWIDTH] IN BLOOD BY AUTOMATED COUNT: 22.4 % (ref 11.5–14.5)
HCT VFR BLD AUTO: 27.2 % (ref 42–52)
HGB BLD-MCNC: 8.4 G/DL (ref 14–18)
IMM GRANULOCYTES # BLD: 0 K/UL
LYMPHOCYTES # BLD: 0.9 K/UL (ref 1.1–4.5)
LYMPHOCYTES NFR BLD: 23.4 % (ref 20–40)
MCH RBC QN AUTO: 37.2 PG (ref 27–31)
MCHC RBC AUTO-ENTMCNC: 30.9 G/DL (ref 33–37)
MCV RBC AUTO: 120.4 FL (ref 80–94)
MONOCYTES # BLD: 0.6 K/UL (ref 0–0.9)
MONOCYTES NFR BLD: 14.8 % (ref 0–10)
NEUTROPHILS # BLD: 2.1 K/UL (ref 1.5–7.5)
NEUTS SEG NFR BLD: 53.5 % (ref 50–65)
PLATELET # BLD AUTO: 48 K/UL (ref 130–400)
PMV BLD AUTO: 11 FL (ref 9.4–12.4)
RBC # BLD AUTO: 2.26 M/UL (ref 4.7–6.1)
WBC # BLD AUTO: 3.8 K/UL (ref 4.8–10.8)

## 2025-06-09 PROCEDURE — 3017F COLORECTAL CA SCREEN DOC REV: CPT

## 2025-06-09 PROCEDURE — G8417 CALC BMI ABV UP PARAM F/U: HCPCS

## 2025-06-09 PROCEDURE — 99350 HOME/RES VST EST HIGH MDM 60: CPT

## 2025-06-09 PROCEDURE — 1160F RVW MEDS BY RX/DR IN RCRD: CPT

## 2025-06-09 PROCEDURE — 1036F TOBACCO NON-USER: CPT

## 2025-06-09 PROCEDURE — 1123F ACP DISCUSS/DSCN MKR DOCD: CPT

## 2025-06-09 PROCEDURE — 1159F MED LIST DOCD IN RCRD: CPT

## 2025-06-09 NOTE — PROGRESS NOTES
ASSESSMENT:     Palliative Performance Scale:  60% Ambulation reduced; Significant disease; Can't do hobbies/housework; intake normal or reduced; occasional assist; LOC full/confusion    ECOG Performance Status: Not applicable to patient    Mobility: ambulates independently    Activities of Daily Living (ADLs):    He is independent in the following: All - ambulation, bathing and hygiene, feeding, continence, grooming, toileting, and dressing  He requires assistance with the following: None    Instrumental Activities of Daily Living (IADLs):    He is independent in the following: None  He requires assistance with the following: All - telephone, transportation, shopping, housework, cleaning, cooking, medications, finances, income    PSYCHOSOCIAL / SPIRITUAL SCREENING:     Any spiritual / Methodist concerns:  No spiritual distress identified     Caregiver Burnout: No    Anticipatory grief assessment: Normal    OBJECTIVE:   General appearance: alert and cooperative with exam, chronically ill-appearing, cooperative with exam  HEENT: normocephalic, atraumatic, sclera clear, conjunctiva pink, PERRLA, external ears normal, external nose normal, lips and oral mucosa dry and pale, no lesions  Neck: supple, trachea midline, no palpable adenopathy  Lungs: respirations even and unlabored, no dyspnea, no cough , wears oxygen at baseline 4.5 L  Heart: regular rate and rhythm, S1 S2 normal, no murmurs, rubs, or gallops  Abdomen: soft, bowel sounds active, softly distended, and hernia present  Genitourinary:  suspected right inguinal hernia palpated, no drainage, redness, rash  Skin: warm, dry, no cyanosis, no visible lesions, rashes, or wounds, scattered ecchymosis  Extremities:  no cyanosis or mottling, radial and pedal pulses palpable, trace peripheral LE edema bilaterally  Neurologic: alert, oriented to person, oriented to place, oriented to time, oriented to situation, follows commands, and speech clear  Psychiatric: calm and

## 2025-06-09 NOTE — TELEPHONE ENCOUNTER
Ney Ann called to request a refill on his medication.      Last office visit : 4/3/2025   Next office visit : 6/12/2025     Requested Prescriptions     Pending Prescriptions Disp Refills    Thiamine HCl (B-1) 100 MG TABS [Pharmacy Med Name: B-1 100MG TABLET] 100 tablet 0     Sig: TAKE 1 TABLET BY MOUTH DAILY            Alexandra Cortez MA

## 2025-06-10 RX ORDER — NADOLOL 20 MG/1
20 TABLET ORAL DAILY
Qty: 30 TABLET | Refills: 0 | OUTPATIENT
Start: 2025-06-10

## 2025-06-10 RX ORDER — METHION/INOS/CHOL BT/B COM/LIV 110MG-86MG
1 CAPSULE ORAL DAILY
Qty: 100 TABLET | Refills: 0 | Status: SHIPPED | OUTPATIENT
Start: 2025-06-10

## 2025-06-10 RX ORDER — POTASSIUM PHOSPHATE,MONOBASIC 500 MG
500 TABLET, SOLUBLE ORAL 2 TIMES DAILY
Qty: 60 TABLET | Refills: 0 | OUTPATIENT
Start: 2025-06-10

## 2025-06-11 ENCOUNTER — RESULTS FOLLOW-UP (OUTPATIENT)
Dept: PRIMARY CARE CLINIC | Age: 70
End: 2025-06-11

## 2025-06-12 ENCOUNTER — HOSPITAL ENCOUNTER (OUTPATIENT)
Dept: ULTRASOUND IMAGING | Age: 70
Discharge: HOME | End: 2025-06-12
Payer: MEDICARE

## 2025-06-12 ENCOUNTER — RESULTS FOLLOW-UP (OUTPATIENT)
Dept: PRIMARY CARE CLINIC | Age: 70
End: 2025-06-12

## 2025-06-12 ENCOUNTER — OFFICE VISIT (OUTPATIENT)
Dept: PRIMARY CARE CLINIC | Age: 70
End: 2025-06-12
Payer: MEDICARE

## 2025-06-12 VITALS
WEIGHT: 168.6 LBS | HEART RATE: 75 BPM | DIASTOLIC BLOOD PRESSURE: 58 MMHG | TEMPERATURE: 97.3 F | BODY MASS INDEX: 25.55 KG/M2 | OXYGEN SATURATION: 93 % | SYSTOLIC BLOOD PRESSURE: 116 MMHG | HEIGHT: 68 IN

## 2025-06-12 DIAGNOSIS — K40.90 UNILATERAL INGUINAL HERNIA WITHOUT OBSTRUCTION OR GANGRENE, RECURRENCE NOT SPECIFIED: Primary | ICD-10-CM

## 2025-06-12 DIAGNOSIS — N50.89 TESTICULAR SWELLING: ICD-10-CM

## 2025-06-12 DIAGNOSIS — N50.811 PAIN IN RIGHT TESTICLE: ICD-10-CM

## 2025-06-12 DIAGNOSIS — N50.811 PAIN IN RIGHT TESTICLE: Primary | ICD-10-CM

## 2025-06-12 PROCEDURE — 99215 OFFICE O/P EST HI 40 MIN: CPT | Performed by: NURSE PRACTITIONER

## 2025-06-12 PROCEDURE — 1111F DSCHRG MED/CURRENT MED MERGE: CPT | Performed by: NURSE PRACTITIONER

## 2025-06-12 PROCEDURE — 1036F TOBACCO NON-USER: CPT | Performed by: NURSE PRACTITIONER

## 2025-06-12 PROCEDURE — G8427 DOCREV CUR MEDS BY ELIG CLIN: HCPCS | Performed by: NURSE PRACTITIONER

## 2025-06-12 PROCEDURE — G2211 COMPLEX E/M VISIT ADD ON: HCPCS | Performed by: NURSE PRACTITIONER

## 2025-06-12 PROCEDURE — 1123F ACP DISCUSS/DSCN MKR DOCD: CPT | Performed by: NURSE PRACTITIONER

## 2025-06-12 PROCEDURE — 3017F COLORECTAL CA SCREEN DOC REV: CPT | Performed by: NURSE PRACTITIONER

## 2025-06-12 PROCEDURE — 76870 US EXAM SCROTUM: CPT

## 2025-06-12 PROCEDURE — G8417 CALC BMI ABV UP PARAM F/U: HCPCS | Performed by: NURSE PRACTITIONER

## 2025-06-12 NOTE — PROGRESS NOTES
Recent Hospital Course 05/05/2025-05/13/2025  \"70-year-old male with history of alcoholic cirrhosis, chronic thrombocytopenia, and esophageal varices initially admitted to critical care unit for left flank hematoma and acute blood loss anemia.     Vascular surgery recs conservative management  GI recs on board with no plans for endoscopic intervention  Hemoglobin stable and improving  Supportive care for rhinovirus infection since resolved  Currently on 4 L NC which is baseline  Counseled on cessation of alcohol  Counseled on medical compliance/outpatient follow-up  GI/heme-onc/vascular surgery state patient stable for discharge  Home health care ordered  Palliative care to peripherally follow outpatient per patient request\"    Palliative Care Initial Consult 05/15/2025    Ney Ann is a 70 y.o. male who was seen today at his home for an initial Palliative Care visit.  He is joined by his spouse, sister, and brother-in-law for family meeting today.  Upon my arrival he was noted to be awake, alert, oriented, in no acute distress. I introduced myself and the role of Supportive Care.      We reviewed patient's overall health as well as recent hospitalizations.  Patient is wearing 4.5 L oxygen.  He feels \"good today\".  We reviewed that he has expressed that he never wants to go back to the emergency room, under any circumstances.  His goals of care are to remain at home with comfort and support, and not to pursue any aggressive treatment measures.  We then discussed that patient wants to be a DNR CODE STATUS and would not want compressions, shock, CPR in the event of cardiopulmonary arrest.  Family verbalizes agreement with this decision.     Patient has ongoing multifactorial anemia requiring transfusional support.  He initially tells me that he would be willing to receive transfusions on an outpatient basis, however he really wants to minimize overall health care visits and he would never want to go to the

## 2025-06-13 ENCOUNTER — PATIENT MESSAGE (OUTPATIENT)
Dept: PRIMARY CARE CLINIC | Age: 70
End: 2025-06-13

## 2025-06-13 DIAGNOSIS — K74.60 CIRRHOSIS OF LIVER WITHOUT ASCITES, UNSPECIFIED HEPATIC CIRRHOSIS TYPE (HCC): Primary | ICD-10-CM

## 2025-06-13 DIAGNOSIS — D69.6 SEVERE THROMBOCYTOPENIA: ICD-10-CM

## 2025-06-13 DIAGNOSIS — K92.2 GASTROINTESTINAL HEMORRHAGE, UNSPECIFIED GASTROINTESTINAL HEMORRHAGE TYPE: ICD-10-CM

## 2025-06-13 DIAGNOSIS — K76.9 LIVER DISEASE: ICD-10-CM

## 2025-06-16 ENCOUNTER — HOSPITAL ENCOUNTER (INPATIENT)
Age: 70
LOS: 3 days | Discharge: HOSPICE | End: 2025-06-19
Attending: EMERGENCY MEDICINE | Admitting: HOSPITALIST
Payer: MEDICARE

## 2025-06-16 ENCOUNTER — CLINICAL DOCUMENTATION (OUTPATIENT)
Dept: PRIMARY CARE CLINIC | Age: 70
End: 2025-06-16

## 2025-06-16 ENCOUNTER — OFFICE VISIT (OUTPATIENT)
Dept: SURGERY | Age: 70
End: 2025-06-16
Payer: MEDICARE

## 2025-06-16 VITALS — BODY MASS INDEX: 25.46 KG/M2 | WEIGHT: 168 LBS | HEIGHT: 68 IN | OXYGEN SATURATION: 95 % | HEART RATE: 80 BPM

## 2025-06-16 DIAGNOSIS — K74.60 CIRRHOSIS OF LIVER WITH ASCITES, UNSPECIFIED HEPATIC CIRRHOSIS TYPE (HCC): ICD-10-CM

## 2025-06-16 DIAGNOSIS — F51.01 PRIMARY INSOMNIA: ICD-10-CM

## 2025-06-16 DIAGNOSIS — D64.9 ACUTE ON CHRONIC ANEMIA: Primary | ICD-10-CM

## 2025-06-16 DIAGNOSIS — K70.31 ALCOHOLIC CIRRHOSIS OF LIVER WITH ASCITES (HCC): ICD-10-CM

## 2025-06-16 DIAGNOSIS — K40.90 NON-RECURRENT UNILATERAL INGUINAL HERNIA WITHOUT OBSTRUCTION OR GANGRENE: Primary | ICD-10-CM

## 2025-06-16 DIAGNOSIS — R18.8 CIRRHOSIS OF LIVER WITH ASCITES, UNSPECIFIED HEPATIC CIRRHOSIS TYPE (HCC): ICD-10-CM

## 2025-06-16 DIAGNOSIS — D62 ACUTE BLOOD LOSS ANEMIA: ICD-10-CM

## 2025-06-16 LAB
ALBUMIN SERPL-MCNC: 2.7 G/DL (ref 3.5–5.2)
ALP SERPL-CCNC: 93 U/L (ref 40–129)
ALT SERPL-CCNC: 23 U/L (ref 10–50)
AMMONIA PLAS-SCNC: 33 UMOL/L (ref 16–60)
ANION GAP SERPL CALCULATED.3IONS-SCNC: 11 MMOL/L (ref 8–16)
AST SERPL-CCNC: 39 U/L (ref 10–50)
BASOPHILS # BLD: 0 K/UL (ref 0–0.2)
BASOPHILS NFR BLD: 0.3 % (ref 0–1)
BILIRUB SERPL-MCNC: 3.4 MG/DL (ref 0.2–1.2)
BUN SERPL-MCNC: 19 MG/DL (ref 8–23)
CALCIUM SERPL-MCNC: 8.3 MG/DL (ref 8.8–10.2)
CHLORIDE SERPL-SCNC: 103 MMOL/L (ref 98–107)
CO2 SERPL-SCNC: 18 MMOL/L (ref 22–29)
CREAT SERPL-MCNC: 0.9 MG/DL (ref 0.7–1.2)
EOSINOPHIL # BLD: 0.6 K/UL (ref 0–0.6)
EOSINOPHIL NFR BLD: 7.5 % (ref 0–5)
ERYTHROCYTE [DISTWIDTH] IN BLOOD BY AUTOMATED COUNT: 20.1 % (ref 11.5–14.5)
GLUCOSE SERPL-MCNC: 145 MG/DL (ref 70–99)
HCT VFR BLD AUTO: 18.4 % (ref 42–52)
HGB BLD-MCNC: 5.7 G/DL (ref 14–18)
IMM GRANULOCYTES # BLD: 0.1 K/UL
LACTATE BLDV-SCNC: 2.2 MMOL/L (ref 0.5–1.9)
LYMPHOCYTES # BLD: 1.4 K/UL (ref 1.1–4.5)
LYMPHOCYTES NFR BLD: 18.5 % (ref 20–40)
MAGNESIUM SERPL-MCNC: 1.8 MG/DL (ref 1.6–2.4)
MCH RBC QN AUTO: 36.8 PG (ref 27–31)
MCHC RBC AUTO-ENTMCNC: 31 G/DL (ref 33–37)
MCV RBC AUTO: 118.7 FL (ref 80–94)
MONOCYTES # BLD: 1.2 K/UL (ref 0–0.9)
MONOCYTES NFR BLD: 16.7 % (ref 0–10)
NEUTROPHILS # BLD: 4.2 K/UL (ref 1.5–7.5)
NEUTS SEG NFR BLD: 56.3 % (ref 50–65)
PHOSPHATE SERPL-MCNC: 3.4 MG/DL (ref 2.5–4.5)
PLATELET # BLD AUTO: 70 K/UL (ref 130–400)
PMV BLD AUTO: 11.1 FL (ref 9.4–12.4)
POTASSIUM SERPL-SCNC: 4.2 MMOL/L (ref 3.5–5.1)
PROT SERPL-MCNC: 4.9 G/DL (ref 6.4–8.3)
RBC # BLD AUTO: 1.55 M/UL (ref 4.7–6.1)
RETICS # AUTO: 0.13 M/UL (ref 0.03–0.12)
RETICS/RBC NFR: 8.32 % (ref 0.5–1.5)
SODIUM SERPL-SCNC: 132 MMOL/L (ref 136–145)
WBC # BLD AUTO: 7.4 K/UL (ref 4.8–10.8)

## 2025-06-16 PROCEDURE — 83540 ASSAY OF IRON: CPT

## 2025-06-16 PROCEDURE — 83010 ASSAY OF HAPTOGLOBIN QUANT: CPT

## 2025-06-16 PROCEDURE — P9016 RBC LEUKOCYTES REDUCED: HCPCS

## 2025-06-16 PROCEDURE — 99203 OFFICE O/P NEW LOW 30 MIN: CPT | Performed by: SURGERY

## 2025-06-16 PROCEDURE — 30233N1 TRANSFUSION OF NONAUTOLOGOUS RED BLOOD CELLS INTO PERIPHERAL VEIN, PERCUTANEOUS APPROACH: ICD-10-PCS | Performed by: INTERNAL MEDICINE

## 2025-06-16 PROCEDURE — 83615 LACTATE (LD) (LDH) ENZYME: CPT

## 2025-06-16 PROCEDURE — 83605 ASSAY OF LACTIC ACID: CPT

## 2025-06-16 PROCEDURE — 82728 ASSAY OF FERRITIN: CPT

## 2025-06-16 PROCEDURE — 36415 COLL VENOUS BLD VENIPUNCTURE: CPT

## 2025-06-16 PROCEDURE — 84100 ASSAY OF PHOSPHORUS: CPT

## 2025-06-16 PROCEDURE — G8417 CALC BMI ABV UP PARAM F/U: HCPCS | Performed by: SURGERY

## 2025-06-16 PROCEDURE — 6360000002 HC RX W HCPCS: Performed by: HOSPITALIST

## 2025-06-16 PROCEDURE — 86923 COMPATIBILITY TEST ELECTRIC: CPT

## 2025-06-16 PROCEDURE — G8427 DOCREV CUR MEDS BY ELIG CLIN: HCPCS | Performed by: SURGERY

## 2025-06-16 PROCEDURE — 1123F ACP DISCUSS/DSCN MKR DOCD: CPT | Performed by: SURGERY

## 2025-06-16 PROCEDURE — 1200000000 HC SEMI PRIVATE

## 2025-06-16 PROCEDURE — 80053 COMPREHEN METABOLIC PANEL: CPT

## 2025-06-16 PROCEDURE — 85025 COMPLETE CBC W/AUTO DIFF WBC: CPT

## 2025-06-16 PROCEDURE — 85045 AUTOMATED RETICULOCYTE COUNT: CPT

## 2025-06-16 PROCEDURE — 99285 EMERGENCY DEPT VISIT HI MDM: CPT

## 2025-06-16 PROCEDURE — 86900 BLOOD TYPING SEROLOGIC ABO: CPT

## 2025-06-16 PROCEDURE — 82746 ASSAY OF FOLIC ACID SERUM: CPT

## 2025-06-16 PROCEDURE — 83550 IRON BINDING TEST: CPT

## 2025-06-16 PROCEDURE — 86901 BLOOD TYPING SEROLOGIC RH(D): CPT

## 2025-06-16 PROCEDURE — 1159F MED LIST DOCD IN RCRD: CPT | Performed by: SURGERY

## 2025-06-16 PROCEDURE — 1036F TOBACCO NON-USER: CPT | Performed by: SURGERY

## 2025-06-16 PROCEDURE — 83735 ASSAY OF MAGNESIUM: CPT

## 2025-06-16 PROCEDURE — 82607 VITAMIN B-12: CPT

## 2025-06-16 PROCEDURE — 3017F COLORECTAL CA SCREEN DOC REV: CPT | Performed by: SURGERY

## 2025-06-16 PROCEDURE — 2580000003 HC RX 258: Performed by: HOSPITALIST

## 2025-06-16 PROCEDURE — 86850 RBC ANTIBODY SCREEN: CPT

## 2025-06-16 RX ORDER — PANTOPRAZOLE SODIUM 40 MG/10ML
80 INJECTION, POWDER, LYOPHILIZED, FOR SOLUTION INTRAVENOUS ONCE
Status: COMPLETED | OUTPATIENT
Start: 2025-06-16 | End: 2025-06-16

## 2025-06-16 RX ORDER — METRONIDAZOLE 500 MG/100ML
500 INJECTION, SOLUTION INTRAVENOUS EVERY 8 HOURS
Status: DISCONTINUED | OUTPATIENT
Start: 2025-06-16 | End: 2025-06-19 | Stop reason: HOSPADM

## 2025-06-16 RX ORDER — OCTREOTIDE ACETATE 50 UG/ML
100 INJECTION, SOLUTION INTRAVENOUS; SUBCUTANEOUS ONCE
Status: COMPLETED | OUTPATIENT
Start: 2025-06-16 | End: 2025-06-16

## 2025-06-16 RX ORDER — SODIUM CHLORIDE 9 MG/ML
INJECTION, SOLUTION INTRAVENOUS PRN
Status: DISCONTINUED | OUTPATIENT
Start: 2025-06-16 | End: 2025-06-17 | Stop reason: ALTCHOICE

## 2025-06-16 RX ORDER — CIPROFLOXACIN 2 MG/ML
400 INJECTION, SOLUTION INTRAVENOUS EVERY 12 HOURS
Status: DISCONTINUED | OUTPATIENT
Start: 2025-06-16 | End: 2025-06-19 | Stop reason: HOSPADM

## 2025-06-16 RX ADMIN — PANTOPRAZOLE SODIUM 8 MG/HR: 40 INJECTION, POWDER, FOR SOLUTION INTRAVENOUS at 23:44

## 2025-06-16 RX ADMIN — OCTREOTIDE ACETATE 50 MCG/HR: 500 INJECTION, SOLUTION INTRAVENOUS; SUBCUTANEOUS at 23:45

## 2025-06-16 RX ADMIN — PANTOPRAZOLE SODIUM 80 MG: 40 INJECTION, POWDER, FOR SOLUTION INTRAVENOUS at 23:21

## 2025-06-16 RX ADMIN — OCTREOTIDE ACETATE 100 MCG: 50 INJECTION, SOLUTION INTRAVENOUS; SUBCUTANEOUS at 23:21

## 2025-06-16 ASSESSMENT — ENCOUNTER SYMPTOMS
SHORTNESS OF BREATH: 1
ABDOMINAL PAIN: 1
EYES NEGATIVE: 1
ANAL BLEEDING: 1
BLOOD IN STOOL: 1

## 2025-06-16 NOTE — PROGRESS NOTES
[facility]  [facility] GENERAL SURGERY  [address]  [facility] [id number] [phone]     Patient: Ney Ann   YOB: 1955   Date: 6/16/2025         History of Present Illness  Chief Complaint   Patient presents with    New Patient       This is a 70 y.o. male presents today complaining of right inguinal hernia.  The patient states that at times his right inguinal hernia becomes very swollen because of his liver disease.  At times he has to lay down and the swelling goes away.    He does have a history of alcoholic cirrhosis with ascites, splenomegaly, portal hypertension, esophageal varices, thrombocytopenia with a platelet count of 48.  He is also on baseline oxygen at 4 L and secondary to COPD.  He does have the coagulopathy he his most recent INR was 2.1.    I have reviewed the patient's chart including past visits, office notes, hospital reports, procedures, tests, labs, medications, and other reports.     Past Medical History:   Diagnosis Date    Abnormal LFTs     Atrial fibrillation (HCC)     per pt; no current cardiologist    Cirrhosis (HCC)     sees [facility]    COVID-19     2 yr ago    History of blood transfusion     Hyperbilirubinemia     Hypertension     Hypothyroidism     no meds at present    Insomnia     Leukopenia     Liver disease     Palliative care patient 01/10/2025    Palliative care patient 05/01/2025    Platelets decreased     chronic    Splenomegaly     Umbilical hernia     and inguinal per pt    Varices, esophageal (HCC)     hx of    Vitamin D deficiency       Past Surgical History:   Procedure Laterality Date    COLONOSCOPY  2014    polyps per pt    COLONOSCOPY N/A 06/28/2023    Dr Hastings, TA x 3 (-)dysplasia, HP, TVA (-)Dysplasia, Mod diverticulosis left colon, int hem Gr 2 wo bleeding, 4-6 weeks    COLONOSCOPY  07/10/2023    Dr Dawit Tapia, splenic flexor 8mm ulcer wo bleeding, 4mm ulcer sigmoid colon wo bleeding,

## 2025-06-17 PROBLEM — Z51.5 ENCOUNTER FOR PALLIATIVE CARE: Status: ACTIVE | Noted: 2025-06-17

## 2025-06-17 PROBLEM — D64.9 ACUTE ON CHRONIC ANEMIA: Status: ACTIVE | Noted: 2025-06-17

## 2025-06-17 PROBLEM — K74.60 CIRRHOSIS OF LIVER WITH ASCITES (HCC): Status: ACTIVE | Noted: 2025-06-17

## 2025-06-17 PROBLEM — E43 SEVERE MALNUTRITION: Status: ACTIVE | Noted: 2025-06-17

## 2025-06-17 PROBLEM — R18.8 CIRRHOSIS OF LIVER WITH ASCITES (HCC): Status: ACTIVE | Noted: 2025-06-17

## 2025-06-17 LAB
ABO/RH: NORMAL
ANTIBODY SCREEN: NORMAL
APTT PPP: 40.3 SEC (ref 26–36.2)
BLOOD BANK DISPENSE STATUS: NORMAL
BLOOD BANK PRODUCT CODE: NORMAL
BPU ID: NORMAL
DESCRIPTION BLOOD BANK: NORMAL
ERYTHROCYTE [DISTWIDTH] IN BLOOD BY AUTOMATED COUNT: 24.1 % (ref 11.5–14.5)
FERRITIN SERPL-MCNC: 91.2 NG/ML (ref 30–400)
FOLATE SERPL-MCNC: 35.7 NG/ML (ref 4.5–32.2)
HAPTOGLOB SERPL-MCNC: <10 MG/DL (ref 30–200)
HCT VFR BLD AUTO: 19.7 % (ref 42–52)
HCT VFR BLD AUTO: 23.7 % (ref 42–52)
HCT VFR BLD AUTO: 24.2 % (ref 42–52)
HCT VFR BLD AUTO: 25.3 % (ref 42–52)
HGB BLD-MCNC: 6.4 G/DL (ref 14–18)
HGB BLD-MCNC: 7.7 G/DL (ref 14–18)
HGB BLD-MCNC: 7.8 G/DL (ref 14–18)
HGB BLD-MCNC: 8.2 G/DL (ref 14–18)
INR PPP: 2.59 (ref 0.88–1.18)
IRON SATN MFR SERPL: 13 % (ref 20–50)
IRON SERPL-MCNC: 36 UG/DL (ref 59–158)
LDH SERPL-CCNC: 259 U/L (ref 135–225)
MCH RBC QN AUTO: 34.2 PG (ref 27–31)
MCHC RBC AUTO-ENTMCNC: 32.9 G/DL (ref 33–37)
MCV RBC AUTO: 103.9 FL (ref 80–94)
PLATELET # BLD AUTO: 57 K/UL (ref 130–400)
PMV BLD AUTO: 11.2 FL (ref 9.4–12.4)
PROTHROMBIN TIME: 27.3 SEC (ref 12–14.6)
RBC # BLD AUTO: 2.28 M/UL (ref 4.7–6.1)
TIBC SERPL-MCNC: 270 UG/DL (ref 250–400)
VIT B12 SERPL-MCNC: 1216 PG/ML (ref 232–1245)
WBC # BLD AUTO: 6.8 K/UL (ref 4.8–10.8)

## 2025-06-17 PROCEDURE — 94760 N-INVAS EAR/PLS OXIMETRY 1: CPT

## 2025-06-17 PROCEDURE — B548ZZA ULTRASONOGRAPHY OF SUPERIOR VENA CAVA, GUIDANCE: ICD-10-PCS | Performed by: INTERNAL MEDICINE

## 2025-06-17 PROCEDURE — C1751 CATH, INF, PER/CENT/MIDLINE: HCPCS

## 2025-06-17 PROCEDURE — 6360000002 HC RX W HCPCS: Performed by: INTERNAL MEDICINE

## 2025-06-17 PROCEDURE — 97530 THERAPEUTIC ACTIVITIES: CPT

## 2025-06-17 PROCEDURE — 6360000002 HC RX W HCPCS: Performed by: HOSPITALIST

## 2025-06-17 PROCEDURE — 85018 HEMOGLOBIN: CPT

## 2025-06-17 PROCEDURE — 36569 INSJ PICC 5 YR+ W/O IMAGING: CPT

## 2025-06-17 PROCEDURE — 85014 HEMATOCRIT: CPT

## 2025-06-17 PROCEDURE — 85027 COMPLETE CBC AUTOMATED: CPT

## 2025-06-17 PROCEDURE — 97110 THERAPEUTIC EXERCISES: CPT

## 2025-06-17 PROCEDURE — 85610 PROTHROMBIN TIME: CPT

## 2025-06-17 PROCEDURE — 99223 1ST HOSP IP/OBS HIGH 75: CPT | Performed by: PHYSICIAN ASSISTANT

## 2025-06-17 PROCEDURE — 36415 COLL VENOUS BLD VENIPUNCTURE: CPT

## 2025-06-17 PROCEDURE — 02HV33Z INSERTION OF INFUSION DEVICE INTO SUPERIOR VENA CAVA, PERCUTANEOUS APPROACH: ICD-10-PCS | Performed by: INTERNAL MEDICINE

## 2025-06-17 PROCEDURE — 2500000003 HC RX 250 WO HCPCS: Performed by: HOSPITALIST

## 2025-06-17 PROCEDURE — 97162 PT EVAL MOD COMPLEX 30 MIN: CPT

## 2025-06-17 PROCEDURE — 36430 TRANSFUSION BLD/BLD COMPNT: CPT

## 2025-06-17 PROCEDURE — 2580000003 HC RX 258: Performed by: HOSPITALIST

## 2025-06-17 PROCEDURE — 6370000000 HC RX 637 (ALT 250 FOR IP): Performed by: HOSPITALIST

## 2025-06-17 PROCEDURE — 76937 US GUIDE VASCULAR ACCESS: CPT

## 2025-06-17 PROCEDURE — 85730 THROMBOPLASTIN TIME PARTIAL: CPT

## 2025-06-17 PROCEDURE — 82140 ASSAY OF AMMONIA: CPT

## 2025-06-17 PROCEDURE — 2000000000 HC ICU R&B

## 2025-06-17 PROCEDURE — 99223 1ST HOSP IP/OBS HIGH 75: CPT | Performed by: INTERNAL MEDICINE

## 2025-06-17 PROCEDURE — 2700000000 HC OXYGEN THERAPY PER DAY

## 2025-06-17 PROCEDURE — 2580000003 HC RX 258: Performed by: INTERNAL MEDICINE

## 2025-06-17 RX ORDER — SODIUM CHLORIDE 9 MG/ML
INJECTION, SOLUTION INTRAVENOUS PRN
Status: DISCONTINUED | OUTPATIENT
Start: 2025-06-17 | End: 2025-06-18 | Stop reason: ALTCHOICE

## 2025-06-17 RX ORDER — M-VIT,TX,IRON,MINS/CALC/FOLIC 27MG-0.4MG
1 TABLET ORAL DAILY
Status: DISCONTINUED | OUTPATIENT
Start: 2025-06-17 | End: 2025-06-19 | Stop reason: HOSPADM

## 2025-06-17 RX ORDER — LACTULOSE 10 G/15ML
10 SOLUTION ORAL 2 TIMES DAILY
Status: DISCONTINUED | OUTPATIENT
Start: 2025-06-17 | End: 2025-06-19 | Stop reason: HOSPADM

## 2025-06-17 RX ORDER — NOREPINEPHRINE BITARTRATE 0.06 MG/ML
1-100 INJECTION, SOLUTION INTRAVENOUS CONTINUOUS
Status: DISCONTINUED | OUTPATIENT
Start: 2025-06-17 | End: 2025-06-19

## 2025-06-17 RX ORDER — HYDROCORTISONE ACETATE 25 MG/1
25 SUPPOSITORY RECTAL 2 TIMES DAILY PRN
Status: DISCONTINUED | OUTPATIENT
Start: 2025-06-17 | End: 2025-06-19 | Stop reason: HOSPADM

## 2025-06-17 RX ORDER — HYDROMORPHONE HYDROCHLORIDE 1 MG/ML
0.25 INJECTION, SOLUTION INTRAMUSCULAR; INTRAVENOUS; SUBCUTANEOUS ONCE
Status: COMPLETED | OUTPATIENT
Start: 2025-06-17 | End: 2025-06-17

## 2025-06-17 RX ORDER — PHYTONADIONE 10 MG/ML
10 INJECTION, EMULSION INTRAMUSCULAR; INTRAVENOUS; SUBCUTANEOUS DAILY
Status: COMPLETED | OUTPATIENT
Start: 2025-06-17 | End: 2025-06-19

## 2025-06-17 RX ORDER — SODIUM CHLORIDE 0.9 % (FLUSH) 0.9 %
5-40 SYRINGE (ML) INJECTION EVERY 12 HOURS SCHEDULED
Status: DISCONTINUED | OUTPATIENT
Start: 2025-06-17 | End: 2025-06-17

## 2025-06-17 RX ORDER — FUROSEMIDE 40 MG/1
20 TABLET ORAL 2 TIMES DAILY
Status: DISCONTINUED | OUTPATIENT
Start: 2025-06-17 | End: 2025-06-19 | Stop reason: HOSPADM

## 2025-06-17 RX ORDER — ERGOCALCIFEROL 1.25 MG/1
50000 CAPSULE, LIQUID FILLED ORAL WEEKLY
Status: DISCONTINUED | OUTPATIENT
Start: 2025-06-23 | End: 2025-06-19 | Stop reason: HOSPADM

## 2025-06-17 RX ORDER — SODIUM CHLORIDE 9 MG/ML
INJECTION, SOLUTION INTRAVENOUS PRN
Status: DISCONTINUED | OUTPATIENT
Start: 2025-06-17 | End: 2025-06-19 | Stop reason: HOSPADM

## 2025-06-17 RX ORDER — MULTIVITAMIN WITH IRON
1 TABLET ORAL DAILY
Status: DISCONTINUED | OUTPATIENT
Start: 2025-06-17 | End: 2025-06-19 | Stop reason: HOSPADM

## 2025-06-17 RX ORDER — MECOBALAMIN 5000 MCG
5 TABLET,DISINTEGRATING ORAL NIGHTLY PRN
Status: DISCONTINUED | OUTPATIENT
Start: 2025-06-17 | End: 2025-06-17

## 2025-06-17 RX ORDER — NADOLOL 20 MG/1
20 TABLET ORAL DAILY
Status: DISCONTINUED | OUTPATIENT
Start: 2025-06-17 | End: 2025-06-18

## 2025-06-17 RX ORDER — DIPHENHYDRAMINE HCL 25 MG
25 TABLET ORAL ONCE
Status: COMPLETED | OUTPATIENT
Start: 2025-06-17 | End: 2025-06-17

## 2025-06-17 RX ORDER — SODIUM CHLORIDE 0.9 % (FLUSH) 0.9 %
5-40 SYRINGE (ML) INJECTION PRN
Status: DISCONTINUED | OUTPATIENT
Start: 2025-06-17 | End: 2025-06-19 | Stop reason: HOSPADM

## 2025-06-17 RX ORDER — ONDANSETRON 2 MG/ML
4 INJECTION INTRAMUSCULAR; INTRAVENOUS EVERY 6 HOURS PRN
Status: DISCONTINUED | OUTPATIENT
Start: 2025-06-17 | End: 2025-06-19 | Stop reason: HOSPADM

## 2025-06-17 RX ORDER — MECOBALAMIN 5000 MCG
10 TABLET,DISINTEGRATING ORAL NIGHTLY PRN
Status: DISCONTINUED | OUTPATIENT
Start: 2025-06-17 | End: 2025-06-19 | Stop reason: HOSPADM

## 2025-06-17 RX ORDER — ONDANSETRON 4 MG/1
4 TABLET, ORALLY DISINTEGRATING ORAL EVERY 8 HOURS PRN
Status: DISCONTINUED | OUTPATIENT
Start: 2025-06-17 | End: 2025-06-19 | Stop reason: HOSPADM

## 2025-06-17 RX ORDER — SUCRALFATE 1 G/1
1 TABLET ORAL 4 TIMES DAILY
Status: DISCONTINUED | OUTPATIENT
Start: 2025-06-17 | End: 2025-06-19 | Stop reason: HOSPADM

## 2025-06-17 RX ORDER — SPIRONOLACTONE 25 MG/1
50 TABLET ORAL DAILY
Status: DISCONTINUED | OUTPATIENT
Start: 2025-06-17 | End: 2025-06-19 | Stop reason: HOSPADM

## 2025-06-17 RX ORDER — FOLIC ACID 1 MG/1
1 TABLET ORAL DAILY
Status: DISCONTINUED | OUTPATIENT
Start: 2025-06-17 | End: 2025-06-19 | Stop reason: HOSPADM

## 2025-06-17 RX ORDER — SODIUM CHLORIDE, SODIUM LACTATE, POTASSIUM CHLORIDE, CALCIUM CHLORIDE 600; 310; 30; 20 MG/100ML; MG/100ML; MG/100ML; MG/100ML
INJECTION, SOLUTION INTRAVENOUS CONTINUOUS
Status: DISCONTINUED | OUTPATIENT
Start: 2025-06-17 | End: 2025-06-19

## 2025-06-17 RX ORDER — LANOLIN ALCOHOL/MO/W.PET/CERES
400 CREAM (GRAM) TOPICAL DAILY
Status: DISCONTINUED | OUTPATIENT
Start: 2025-06-17 | End: 2025-06-19 | Stop reason: HOSPADM

## 2025-06-17 RX ORDER — MECOBALAMIN 5000 MCG
5 TABLET,DISINTEGRATING ORAL NIGHTLY
Status: DISCONTINUED | OUTPATIENT
Start: 2025-06-17 | End: 2025-06-19 | Stop reason: HOSPADM

## 2025-06-17 RX ORDER — CALCIUM CARBONATE 500 MG/1
500 TABLET, CHEWABLE ORAL 3 TIMES DAILY PRN
Status: DISCONTINUED | OUTPATIENT
Start: 2025-06-17 | End: 2025-06-19 | Stop reason: HOSPADM

## 2025-06-17 RX ADMIN — CIPROFLOXACIN 400 MG: 400 INJECTION, SOLUTION INTRAVENOUS at 22:16

## 2025-06-17 RX ADMIN — METRONIDAZOLE 500 MG: 500 INJECTION, SOLUTION INTRAVENOUS at 22:18

## 2025-06-17 RX ADMIN — Medication 1 TABLET: at 09:43

## 2025-06-17 RX ADMIN — PHYTONADIONE 10 MG: 10 INJECTION, EMULSION INTRAMUSCULAR; INTRAVENOUS; SUBCUTANEOUS at 11:37

## 2025-06-17 RX ADMIN — SODIUM CHLORIDE 5 MCG/MIN: 0.9 INJECTION, SOLUTION INTRAVENOUS at 04:06

## 2025-06-17 RX ADMIN — RIFAXIMIN 400 MG: 200 TABLET ORAL at 14:42

## 2025-06-17 RX ADMIN — CIPROFLOXACIN 400 MG: 400 INJECTION, SOLUTION INTRAVENOUS at 03:33

## 2025-06-17 RX ADMIN — HYDROMORPHONE HYDROCHLORIDE 0.25 MG: 1 INJECTION, SOLUTION INTRAMUSCULAR; INTRAVENOUS; SUBCUTANEOUS at 14:41

## 2025-06-17 RX ADMIN — METRONIDAZOLE 500 MG: 500 INJECTION, SOLUTION INTRAVENOUS at 14:43

## 2025-06-17 RX ADMIN — METRONIDAZOLE 500 MG: 500 INJECTION, SOLUTION INTRAVENOUS at 07:13

## 2025-06-17 RX ADMIN — SUCRALFATE 1 G: 1 TABLET ORAL at 20:30

## 2025-06-17 RX ADMIN — RIFAXIMIN 400 MG: 200 TABLET ORAL at 20:30

## 2025-06-17 RX ADMIN — METRONIDAZOLE 500 MG: 500 INJECTION, SOLUTION INTRAVENOUS at 00:22

## 2025-06-17 RX ADMIN — SUCRALFATE 1 G: 1 TABLET ORAL at 09:43

## 2025-06-17 RX ADMIN — SODIUM CHLORIDE, SODIUM LACTATE, POTASSIUM CHLORIDE, AND CALCIUM CHLORIDE: .6; .31; .03; .02 INJECTION, SOLUTION INTRAVENOUS at 21:07

## 2025-06-17 RX ADMIN — Medication 500 MG: at 09:42

## 2025-06-17 RX ADMIN — Medication 10 MG: at 21:36

## 2025-06-17 RX ADMIN — OCTREOTIDE ACETATE 50 MCG/HR: 500 INJECTION, SOLUTION INTRAVENOUS; SUBCUTANEOUS at 07:13

## 2025-06-17 RX ADMIN — SODIUM CHLORIDE, SODIUM LACTATE, POTASSIUM CHLORIDE, AND CALCIUM CHLORIDE: .6; .31; .03; .02 INJECTION, SOLUTION INTRAVENOUS at 08:55

## 2025-06-17 RX ADMIN — Medication 400 MG: at 09:43

## 2025-06-17 RX ADMIN — PANTOPRAZOLE SODIUM 8 MG/HR: 40 INJECTION, POWDER, FOR SOLUTION INTRAVENOUS at 08:38

## 2025-06-17 RX ADMIN — SODIUM CHLORIDE 20 MCG/MIN: 0.9 INJECTION, SOLUTION INTRAVENOUS at 16:07

## 2025-06-17 RX ADMIN — LACTULOSE 10 G: 20 SOLUTION ORAL at 09:43

## 2025-06-17 RX ADMIN — Medication 5 MG: at 20:30

## 2025-06-17 RX ADMIN — FUROSEMIDE 20 MG: 40 TABLET ORAL at 13:10

## 2025-06-17 RX ADMIN — FOLIC ACID 1 MG: 1 TABLET ORAL at 09:43

## 2025-06-17 RX ADMIN — CIPROFLOXACIN 400 MG: 400 INJECTION, SOLUTION INTRAVENOUS at 09:57

## 2025-06-17 RX ADMIN — SUCRALFATE 1 G: 1 TABLET ORAL at 17:23

## 2025-06-17 RX ADMIN — SUCRALFATE 1 G: 1 TABLET ORAL at 13:10

## 2025-06-17 RX ADMIN — DIPHENHYDRAMINE HYDROCHLORIDE 25 MG: 25 TABLET ORAL at 04:52

## 2025-06-17 RX ADMIN — FUROSEMIDE 20 MG: 40 TABLET ORAL at 17:23

## 2025-06-17 RX ADMIN — RIFAXIMIN 400 MG: 200 TABLET ORAL at 09:43

## 2025-06-17 RX ADMIN — Medication 500 MG: at 20:30

## 2025-06-17 RX ADMIN — PANTOPRAZOLE SODIUM 8 MG/HR: 40 INJECTION, POWDER, FOR SOLUTION INTRAVENOUS at 18:45

## 2025-06-17 ASSESSMENT — PAIN SCALES - GENERAL
PAINLEVEL_OUTOF10: 8
PAINLEVEL_OUTOF10: 6
PAINLEVEL_OUTOF10: 8
PAINLEVEL_OUTOF10: 3
PAINLEVEL_OUTOF10: 6
PAINLEVEL_OUTOF10: 3
PAINLEVEL_OUTOF10: 6
PAINLEVEL_OUTOF10: 6

## 2025-06-17 ASSESSMENT — PAIN DESCRIPTION - ORIENTATION
ORIENTATION: MID;LOWER
ORIENTATION: UPPER;MID;LOWER

## 2025-06-17 ASSESSMENT — PAIN DESCRIPTION - PAIN TYPE: TYPE: CHRONIC PAIN

## 2025-06-17 ASSESSMENT — PAIN DESCRIPTION - LOCATION
LOCATION: BACK;FLANK
LOCATION: ABDOMEN
LOCATION: BACK

## 2025-06-17 ASSESSMENT — PAIN DESCRIPTION - DESCRIPTORS
DESCRIPTORS: ACHING
DESCRIPTORS: DULL;ACHING;NAGGING

## 2025-06-17 ASSESSMENT — PAIN DESCRIPTION - ONSET: ONSET: ON-GOING

## 2025-06-17 ASSESSMENT — PAIN DESCRIPTION - FREQUENCY: FREQUENCY: CONTINUOUS

## 2025-06-17 NOTE — CONSULTS
[id number] [place] [address]                              CONSULTATION      PATIENT NAME: KINA LEBLANC                : 1955  MED REC NO: [id number]                          ROOM: 0148  ACCOUNT NO: [id number]                       ADMIT DATE: 2025  PROVIDER: Grayson Gaming MD    GI CONSULTATION    CONSULT DATE: 2025      ASSESSMENT:    1. Gastrointestinal bleed manifested by melena and fallen hemoglobin from 10 g down to 5.7 g, necessitating transfusion of packed cells.  2. Alcoholic cirrhosis with decompensation.  3. Minor grade 1 varices without bleeding on prior recent endoscopies.  4. Portal hypertension with chronic ascites and splenomegaly.  5. Chronic coagulopathy with prolonged protime and thrombocytopenia, which are thought to be major contributing factors to the patient's ease of bleeding.    RECOMMENDATIONS:    1. Protonix IV drip.  2. Discontinue octreotide.  3. Transfuse to maintain a hemoglobin greater than 7 g.  4. Vitamin K 10 mg IV daily for 2 days.  5. Other diagnostic and therapeutic recommendations are contingent on test results.  6. Repeat EGD is deferred since the patient recently had an EGD on 2025, which only showed grade 1 esophageal varices and no esophagitis or ulcer disease and colonoscopy on 2025 reporting only uncomplicated diverticulosis.    HISTORY OF PRESENT ILLNESS:  This 70-year-old male is seen in GI consultation for recurrent problem of GI bleeding.  This present episode is manifested by dark stools and fallen hemoglobin from 10 down to 5.7 g.  The patient speaks English and is coherent with no suggestion of any hepatic encephalopathy.  He denies any heartburn, odynophagia, dysphagia, ulcer-like abdominal pain, or hematemesis.  He has experienced increased generalized weakness, fatigability, and no exacerbation of the COPD with increased shortness of breath.  He denies taking

## 2025-06-17 NOTE — ED NOTES
ED TO INPATIENT SBAR HANDOFF    Patient Name: Ney Ann   : 1955  70 y.o.   Family/Caregiver Present: Yes  Code Status Order: Prior    C-SSRS: Risk of Suicide: No Risk  Sitter No  Restraints:         Situation  Chief Complaint:   Chief Complaint   Patient presents with    Abnormal Lab     Hgb 6.1    Rectal Bleeding     Patient Diagnosis: GIB (gastrointestinal bleeding) [K92.2]     Brief Description of Patient's Condition: Pt is here with his wife who assists with history. Pt has had increased generalized weakness, fatigue and shortness of breath over past couple of days associated with mild confusional episodes per family. He relates that he has had black stools with occasional bright red blood per rectum. He had outpatient labs today and follow up with general surgery for right non incarcerated inguinal hernia deemed non operative surgical candidate. He had noted hemoglobin of 6.1 sent here for further evaluation. Patients most recent hemoglobin was 5.7 MD aware patient has two bilateral 20g Ivs   Mental Status: oriented, alert, coherent, logical, thought processes intact, and able to concentrate and follow conversation  Arrived from: home    Imaging:   No orders to display     COVID-19 Results:   Internal Administration   First Dose COVID-19, J&J, (age 18y+), IM, 0.5 mL  12/15/2020   Second Dose           Last COVID Lab SARS-CoV-2, PCR (no units)   Date Value   2025 Not Detected     SARS-CoV-2, NAAT (no units)   Date Value   2022 DETECTED (AA)           Abnormal labs:   Abnormal Labs Reviewed   COMPREHENSIVE METABOLIC PANEL - Abnormal; Notable for the following components:       Result Value    Sodium 132 (*)     CO2 18 (*)     Glucose 145 (*)     Calcium 8.3 (*)     Total Protein 4.9 (*)     Albumin 2.7 (*)     Total Bilirubin 3.4 (*)     All other components within normal limits   CBC WITH AUTO DIFFERENTIAL - Abnormal; Notable for the following components:    RBC 1.55 (*)     Hemoglobin

## 2025-06-17 NOTE — PROGRESS NOTES
Ney Ann arrived to room # 148.   Presented with: GI Bleed  Mental Status: Patient is oriented and alert.   Vitals:    06/17/25 0400   BP: (!) 95/44   Pulse: 62   Resp: 15   Temp: 97.9 °F (36.6 °C)   SpO2: 91%     Patient safety contract and falls prevention contract reviewed with patient Yes.  Oriented Patient to room.  Call light within reach. Yes.  Needs, issues or concerns expressed at this time: no.      Electronically signed by NAT GARCIA RN on 6/17/2025 at 4:21 AM

## 2025-06-17 NOTE — PLAN OF CARE
Problem: Discharge Planning  Goal: Discharge to home or other facility with appropriate resources  6/17/2025 1750 by Archana Montano RN  Outcome: Progressing  6/17/2025 0433 by Ricardo Alberto RN  Outcome: Progressing  Flowsheets (Taken 6/17/2025 0400)  Discharge to home or other facility with appropriate resources: Identify barriers to discharge with patient and caregiver     Problem: Safety - Adult  Goal: Free from fall injury  6/17/2025 1750 by Archana Montano RN  Outcome: Progressing  6/17/2025 0433 by Ricardo Alberto RN  Outcome: Progressing     Problem: Pain  Goal: Verbalizes/displays adequate comfort level or baseline comfort level  6/17/2025 1750 by Archana Montano RN  Outcome: Progressing  6/17/2025 0433 by Ricardo Alberto RN  Outcome: Progressing     Problem: Nutrition Deficit:  Goal: Optimize nutritional status  Outcome: Progressing

## 2025-06-17 NOTE — CONSULTS
Palliative Care Consult Note    6/17/2025 9:01 AM  Subjective:  Admit Date: 6/16/2025  PCP: Izabela Robles, SHEILA - CNP    Date of Service: 6/17/2025    Reason for Consultation:  Goals of Care, Code Status, Family Support     History Obtained From: EMR/Patient and their Family    History Of Present Illness:   The patient is a 70 y.o. male with PMH alcoholic cirrhosis previously followed at [facility], esophageal varices, coagulopathy, pancytopenia, portal hypertensive gastropathy, atrial fibrillation, chronic back pain, HTN, COPD, vitamin D deficiency, insomnia, inguinal hernia who presented to [facility] ED on 6/16/2025 when his hemoglobin returned at 6.1 in the outpatient setting and he had reported some bright red blood in his stool in addition to dark and tarry stools.  Hemoglobin in the ED 5.7.  INR 2.59, pro time 27.3, lactic acid 2.2.  He is admitted to hospitalist service for acute on chronic anemia with concern for gastrointestinal bleeding and was placed on Protonix and octreotide drips in addition to Cipro and Flagyl.  PRBC ordered. He had worsening hypotension in the ED and was placed in ICU with pressor support. Palliative care consulted to assist w/ goals of care.     Past Medical History:        Diagnosis Date    Abnormal LFTs     Atrial fibrillation (HCC)     per pt; no current cardiologist    Cirrhosis (HCC)     [facility]    COVID-19     2 yr ago    History of blood transfusion     Hyperbilirubinemia     Hypertension     Hypothyroidism     no meds at present    Insomnia     Leukopenia     Liver disease     Palliative care patient 01/10/2025    Palliative care patient 05/01/2025    Platelets decreased     chronic    Splenomegaly     Umbilical hernia     and inguinal per pt    Varices, esophageal (HCC)     hx of    Vitamin D deficiency        Past Surgical History:        Procedure Laterality Date    COLONOSCOPY  2014    polyps per pt    COLONOSCOPY N/A 06/28/2023    Dr Hastings, TA x 3

## 2025-06-17 NOTE — ACP (ADVANCE CARE PLANNING)
Advance Care Planning      Palliative Medicine Provider   Advance Care Planning (ACP) Conversation      Date of Conversation: 06/17/25  The patient and/or authorized decision maker consented to a voluntary Advance Care Planning conversation.   Individuals present for the conversation:   Patient with decision making capacity and Legal healthcare agent named below    Legal Healthcare Agent(s):    Primary Decision Maker: JAVAD LEBLANC - Spouse - [phone]    Secondary Decision Maker: ShahTonyRazia - Brother/Sister - [phone]    ACP documents available in EMR prior to discussion:  -Living Will    Primary Palliative Diagnosis(es):  End stage liver disease      Resuscitation Status:    Code Status: DNR     I spent 15 minutes providing ACP services during consultation with the patient and/or surrogate decision maker in a voluntary, in-person conversation discussing the patient's wishes and goals as detailed in the above note.       Scralett Juares PA-C

## 2025-06-17 NOTE — PLAN OF CARE
SUBJECTIVE:    Sent in by [facility] as per Hb of 6.1, has a Hx of Cirrhosis      OBJECTIVE:    BP (!) 109/48   Pulse 84   Temp 97.8 °F (36.6 °C)   Resp 18   Ht 1.727 m (5' 8\")   Wt 75.8 kg (167 lb)   SpO2 (!) 87%   BMI 25.39 kg/m²       ASSESSMENTS & PLANS:    Gastrointestinal Bleeding:  Acute on Chronic Anemia: Hb 6.1  Admit to medical [facility]  No NSAIDs - excepting baby ASA if a LONA stent or vascular bypass in last year, or a BMS in last 3 months  Gastroenterology Consult in the AM  Hemoglobin Q6h  CBC with Diff & BMP with Mag reflex daily from tomorrow  PT/INR already done, PTT -  added on to ED labs  Type & Screen -  already ordered by ED team along with PRB as per report  Ammonia and Lactic Acid levels and LDH - added on to ED labs if able else ordered STAT  Haptoglobin, Reticulocyte count, Ferritin, TIBC and Iron Saturation, & B12 and Folate levels -  added on to ED labs  Protonix 80mg SQ bolus with Infusion at 8mg/h following as per protocol  Octreotide 100mcg IV bolus followed by GGT at 50mcg/h as per protocol  Cipro 400mg IV BID, first now  Flagyl 500mg IV Q8h, first now  NPO except sips with meds and except ice chips for now, NPO except sips with meds from [place]  IVF with NaHCO3  at 50 cc/h for gentle hydration    Chronic Medical Problems:  Continue home regimen as Indicated    Supportive and Prophylactic TXx:  DVT PPx: SCDs only  GI (PUD) PPx: covered as per above  PT: indicated as per age      Case d/w EP  Chart reviewed   Orders entered by me with CPOE  Case to be d/w NP swing shift hospitlaist

## 2025-06-17 NOTE — CARE COORDINATION
Attempted to see patient. Patient was unavailable to speak at this time. Will follow up this afternoon.  Electronically signed by SUSAN SKELTON on 6/17/2025 at 10:30 AM

## 2025-06-17 NOTE — PROGRESS NOTES
Physician Progress Note      PATIENT:               KINA LEBLANC  CSN #:                  [id number]  :                       1955  ADMIT DATE:       2025 11:23 PM  DISCH DATE:  RESPONDING  PROVIDER #:        Bran Obrien MD          QUERY TEXT:    Please clarify the patient?s nutritional status:    The clinical indicators include:  -male age 70  -[address] [place]: \"Severe malnutrition (25 0809)  Context:  Chronic   Illness  Energy Intake:  Mild decrease in energy intake  Weight Loss:  Greater than 10% over 6 months  Body Fat Loss:  No body fat loss  Muscle Mass Loss:  No muscle mass loss  Fluid Accumulation:  Severe Genitals, Extremities\"  \"Intervention: Food and/or   Nutrient Delivery: Continue NPO\"  Options provided:  -- This patient has severe protein calorie malnutrition.  -- Other - I will add my own diagnosis  -- Disagree - Not applicable / Not valid  -- Disagree - Clinically unable to determine / Unknown  -- Refer to Clinical Documentation Reviewer    PROVIDER RESPONSE TEXT:    This patient has severe protein calorie malnutrition.    Query created by: Gerald Rausch on 2025 1:37 PM      Electronically signed by:  Bran Obrien MD 2025 2:12 PM

## 2025-06-17 NOTE — PLAN OF CARE
Nutrition Problem #1: Inadequate protein-energy intake, Severe malnutrition, in context of acute illness or injury  Intervention: Food and/or Nutrient Delivery: Continue NPO  Nutritional

## 2025-06-17 NOTE — PROGRESS NOTES
Comprehensive Nutrition Assessment    Type and Reason for Visit:  Initial, Positive nutrition screen    Nutrition Recommendations/Plan:   Follow for advancing diet, labs and weight     Malnutrition Assessment:  Malnutrition Status:  Severe malnutrition (06/17/25 0809)    Context:  Chronic Illness     Findings of the 6 clinical characteristics of malnutrition:  Energy Intake:  Mild decrease in energy intake  Weight Loss:  Greater than 10% over 6 months     Body Fat Loss:  No body fat loss     Muscle Mass Loss:  No muscle mass loss    Fluid Accumulation:  Severe Genitals, Extremities   Strength:  Not Performed    Nutrition Assessment:    +NS for decreased weight.  At present time pt is NPO.  Patient has lost approx 25# or 12.8% of UBW since 12/13/2024--this is a significant loss.  With this loss and current amount of edema pt is showing, pt meets criteria for severe malnutrition    Nutrition Related Findings:    +1 perineal edema., +2 pitting BLE edema    HX-Cirrhosis Wound Type:  (Jaundice)       Current Nutrition Intake & Therapies:    Average Meal Intake: NPO  Average Supplements Intake: NPO  Diet NPO Exceptions are: Sips of Water with Meds    Anthropometric Measures:  Height: 172.7 cm (5' 7.99\")  Ideal Body Weight (IBW): 154 lbs (70 kg)    Admission Body Weight: 75.8 kg (167 lb 1.7 oz)  Current Body Weight: 76.7 kg (169 lb 1.5 oz), 109.8 % IBW. Weight Source: Bed scale  Current BMI (kg/m2): 25.7  Usual Body Weight: 87.8 kg (193 lb 9 oz) (12/13/2024)  % Weight Change (Calculated): -12.6  Weight Adjustment For: No Adjustment  BMI Categories: Overweight (BMI 25.0-29.9)    Estimated Daily Nutrient Needs:  Energy Requirements Based On: Kcal/kg  Weight Used for Energy Requirements: Current  Energy (kcal/day): 4120-5040 kcals (20-25 kcals/kg)  Weight Used for Protein Requirements: Ideal  Protein (g/day): 84-140g  Method Used for Fluid Requirements: 1 ml/kcal  Fluid (ml/day): 7070-1488 ml    Nutrition Diagnosis:

## 2025-06-17 NOTE — PROGRESS NOTES
Physical Therapy  Facility/Department: [facility] ICU  Physical Therapy Initial Assessment    Name: Ney Ann  : 1955  MRN: [id number]  Date of Service: 2025    Discharge Recommendations:  Continue to assess pending progress, 24 hour supervision or assist, Patient would benefit from continued therapy after discharge          Patient Diagnosis(es): The primary encounter diagnosis was Acute on chronic anemia. Diagnoses of Acute blood loss anemia and Cirrhosis of liver with ascites, unspecified hepatic cirrhosis type (HCC) were also pertinent to this visit.  Past Medical History:  has a past medical history of Abnormal LFTs, Atrial fibrillation (HCC), Cirrhosis (HCC), COVID-19, History of blood transfusion, Hyperbilirubinemia, Hypertension, Hypothyroidism, Insomnia, Leukopenia, Liver disease, Palliative care patient, Palliative care patient, Platelets decreased, Splenomegaly, Umbilical hernia, Varices, esophageal (HCC), and Vitamin D deficiency.  Past Surgical History:  has a past surgical history that includes Total hip arthroplasty (Left, 2022); Colonoscopy (); Colonoscopy (N/A, 2023); Colonoscopy (07/10/2023); Upper gastrointestinal endoscopy (07/10/2023); Upper gastrointestinal endoscopy (N/A, 2024); hernia repair; Upper gastrointestinal endoscopy (N/A, 01/10/2025); Upper gastrointestinal endoscopy (N/A, 2025); and Colonoscopy (N/A, 2025).    Assessment  Body Structures, Functions, Activity Limitations Requiring Skilled Therapeutic Intervention: Decreased functional mobility ;Decreased ADL status;Decreased endurance;Decreased cognition;Decreased safe awareness;Decreased strength;Decreased balance;Increased pain;Decreased posture  Assessment: Pt. will benefit from cont. PT to decrease impairments. Pt. a fall risk and should not attempt mobility on his own. Pt. likely could tolerate sitting up to the chair. Pt's Hgb up to 8.2 after 2 blood transfusions. Would use caution with  Sit: Minimal Assistance  Bed to Chair: Minimal assistance  Comment: sit to stand to scoot to HOB, wanted to then use BSC for BM. Pt. had BM and needed A with clean up, donned brief upon pt request.  Ambulation  Surface: Level tile  Device: No Device  Assistance: Minimal assistance  Quality of Gait: a little unsteady  Gait Deviations: Slow Traci;Decreased step length;Decreased step height  Distance: 2' x 2  Comments: to BSC and then to bed     Balance  Posture: Fair  Sitting - Static: Good;-  Sitting - Dynamic: Fair;+  Standing - Static: Fair;-  Standing - Dynamic: Poor;+          OutComes Score                                                  AM-PAC - Mobility              Tinneti Score       Goals  Short Term Goals  Time Frame for Short Term Goals: 2 wks  Short Term Goal 1: supine to sit indep  Short Term Goal 2: sit to stand indep  Short Term Goal 3: amb. 50' with RW SBA  Short Term Goal 4: bed to chair SBA  Patient Goals   Patient Goals : go home       Education  Patient Education  Education Given To: Patient  Education Provided: Plan of Care;Role of Therapy;Transfer Training;Mobility Training;Family Education;Fall Prevention Strategies  Education Provided Comments: use of call light, staff A  Education Method: Demonstration;Verbal  Barriers to Learning: Cognition  Education Outcome: Continued education needed      Therapy Time   Individual Concurrent Group Co-treatment   Time In           Time Out           Minutes                   Mary Grace Don, PT     Electronically signed by Mary Grace Don PT on 6/17/2025 at 2:56 PM

## 2025-06-17 NOTE — CONSENT
Informed Consent for Blood Component Transfusion Note    I have discussed with the patient the rationale for blood component transfusion; its benefits in treating or preventing fatigue, organ damage, or death; and its risk which includes mild transfusion reactions, rare risk of blood borne infection, or more serious but rare reactions. I have discussed the alternatives to transfusion, including the risk and consequences of not receiving transfusion. The patient had an opportunity to ask questions and had agreed to proceed with transfusion of blood components.    Electronically signed by Anderson Jara Jr, MD on 6/16/25 at 11:15 PM CDT

## 2025-06-17 NOTE — PROGRESS NOTES
4 Eyes Skin Assessment     NAME:  Ney Ann  YOB: 1955  MEDICAL RECORD NUMBER:  [id number]    The patient is being assessed for  Admission    I agree that at least one RN has performed a thorough Head to Toe Skin Assessment on the patient. ALL assessment sites listed below have been assessed.      Areas assessed by both nurses:    Head, Face, Ears, Shoulders, Back, Chest, Arms, Elbows, Hands, Sacrum. Buttock, Coccyx, Ischium, Legs. Feet and Heels, and Under Medical Devices         Does the Patient have a Wound? No noted wound(s)       Noah Prevention initiated by RN: No  Wound Care Orders initiated by RN: No    Pressure Injury (Stage 3,4, Unstageable, DTI, NWPT, and Complex wounds) if present, place Wound referral order by RN under : No    New Ostomies, if present place, Ostomy referral order under : No     Nurse 1 eSignature: Electronically signed by NAT GARCIA RN on 6/17/25 at 4:19 AM CDT    **SHARE this note so that the co-signing nurse can place an eSignature**    Nurse 2 eSignature: Electronically signed by Sly Stephenson RN on 6/17/25 at 4:20 AM CDT

## 2025-06-17 NOTE — PROGRESS NOTES
Hospitalist Progress Note  [facility]     Patient: Ney Ann  : 1955  MRN: [id number]  Code Status: Full Code    Hospital Day: 1   Date of Service: 2025    Subjective:   Patient seen and examined.  No current complaints.    Past Medical History:   Diagnosis Date    Abnormal LFTs     Atrial fibrillation (HCC)     per pt; no current cardiologist    Cirrhosis (HCC)     [facility]es [facility] and [facility]ha OROSCOID-19     2 yr ago    History of blood transfusion     Hyperbilirubinemia     Hypertension     Hypothyroidism     no meds at present    Insomnia     Leukopenia     Liver disease     Palliative care patient 01/10/2025    Palliative care patient 2025    Platelets decreased     chronic    Splenomegaly     Umbilical hernia     and inguinal per pt    Varices, esophageal (HCC)     hx of    Vitamin D deficiency        Past Surgical History:   Procedure Laterality Date    COLONOSCOPY      polyps per pt    COLONOSCOPY N/A 2023    Dr Hastings, TA x 3 (-)dysplasia, HP, TVA (-)Dysplasia, Mod diverticulosis left colon, int hem Gr 2 wo bleeding, 4-6 weeks    COLONOSCOPY  07/10/2023    Dr Dawit Tapia, splenic flexor 8mm ulcer wo bleeding, 4mm ulcer sigmoid colon wo bleeding, 8mm recatal ulcer wo bleeding, scatted sm & lg mouthed diverticula in sig colon, splenic flexure and transverse colon,    COLONOSCOPY N/A 2025    Dr Reyes-Mild left-sided diverticulosis, moderate internal hemorrhoids, diminutive descending colon polyp, not removed due to severe thrombocytopenia, no blood noted and no source of bleeding identified, 1-2 yr recall    HERNIA REPAIR      TOTAL HIP ARTHROPLASTY Left 2022    LEFT HIP TOTAL ARTHROPLASTY performed by Jose Obrien MD at [facility] OR    UPPER GASTROINTESTINAL ENDOSCOPY  07/10/2023    Dr Dawit Tapia, Gr 2 medium sized varcies in Esophagus, Moderate portal hypertensive gastropathy in entire stomach,    UPPER GASTROINTESTINAL ENDOSCOPY N/A 2024

## 2025-06-17 NOTE — PLAN OF CARE
SUBJECTIVE:    Has had a [prolonged course in ED as ED RN team trying to get BP up. Bolused remaining PRBC and BP has further decreased.       OBJECTIVE:    2nd most recent VS:  BP (!) 104/38   Pulse 66   Temp 98.9 °F (37.2 °C) (Temporal)   Resp 18   Ht 1.727 m (5' 8\")   Wt 75.8 kg (167 lb)   SpO2 93%   BMI 25.39 kg/m²     Most recent:  /42 @ HR 64      ASSESSMENTS & PLANS:    Worsening BP / Hemorrhagic Shock  Transfer to ICU from ED  Tele  Repeat Hb & HCT at 1/h s/p, anticipate further transfusions  Levophed GGT

## 2025-06-17 NOTE — H&P
[facility]      Hospitalist - History & Physical      PCP: Izabela Robles, APRN - CNP    Date of Admission: 6/16/2025    Date of Service: 6/16/2025    Chief Complaint: Gi bleeding     History Of Present Illness:   The patient is a 70 y.o. male who presented to [facility] ED for evaluation of gi bleeding. Pt has history of cirrhosis, gi bleeding, thrombocytopenia, hypertension, hyperlipidemia  and atrial fibrillation.     Pt is here with his wife who assists with history. Pt has had increased generalized weakness, fatigue and shortness of breath over past couple of days associated with mild confusional episodes per family. He relates that he has had black stools with occasional bright red blood per rectum. He had outpatient labs today and follow up with general surgery for right non incarcerated inguinal hernia deemed non operative surgical candidate. He had noted hemoglobin of 6.1 sent here for further evaluation.     In ED, cbc, bmp, ammonia, pt/inr result pending. Pt is admitted inpatient to hospitalist.    Past Medical History:        Diagnosis Date    Abnormal LFTs     Atrial fibrillation (HCC)     per pt; no current cardiologist    Cirrhosis (HCC)     sees [facility]    COVID-19     2 yr ago    History of blood transfusion     Hyperbilirubinemia     Hypertension     Hypothyroidism     no meds at present    Insomnia     Leukopenia     Liver disease     Palliative care patient 01/10/2025    Palliative care patient 05/01/2025    Platelets decreased     chronic    Splenomegaly     Umbilical hernia     and inguinal per pt    Varices, esophageal (HCC)     hx of    Vitamin D deficiency        Past Surgical History:        Procedure Laterality Date    COLONOSCOPY  2014    polyps per pt    COLONOSCOPY N/A 06/28/2023    Dr Hastings, TA x 3 (-)dysplasia, HP, TVA (-)Dysplasia, Mod diverticulosis left colon, int hem Gr 2 wo bleeding, 4-6 weeks    COLONOSCOPY  07/10/2023    Dr Dawit Tapia, splenic  Pt. transferred from ED, able to voice concerns, pt. Is oriented x 4, shows no signs of distress, pt. was alert to room, cathetered bag changed, bed rails up x 2, personal items and call light in reach, fall risk band on, bed in lowest position, bed alarm on and in place for safety, will relay to oncoming nurse in charge of condition.     Problem Relation Age of Onset    Diabetes Mother     Colon Polyps Neg Hx     Colon Cancer Neg Hx        Review of Systems:   Review of Systems   Constitutional:  Positive for activity change and fatigue.   Gastrointestinal:  Positive for anal bleeding and blood in stool.   Neurological:  Positive for weakness.   Psychiatric/Behavioral:  Positive for confusion.    All other systems reviewed and are negative.       14 point review of systems is negative except as specifically addressed above.    Physical Examination:  BP (!) 109/48   Pulse 84   Temp 97.8 °F (36.6 °C)   Resp 18   Ht 1.727 m (5' 8\")   Wt 75.8 kg (167 lb)   SpO2 (!) 87%   BMI 25.39 kg/m²   Physical Exam  Vitals reviewed.   HENT:      Right Ear: External ear normal.      Left Ear: External ear normal.   Eyes:      Conjunctiva/sclera: Conjunctivae normal.   Cardiovascular:      Rate and Rhythm: Normal rate and regular rhythm.   Pulmonary:      Effort: Pulmonary effort is normal. No respiratory distress.   Abdominal:      Tenderness: There is no abdominal tenderness.   Musculoskeletal:      Right lower leg: Edema present.      Left lower leg: Edema present.      Comments: Trace edema bilateral lower extremities    Skin:     Coloration: Skin is jaundiced.   Neurological:      Mental Status: He is alert and oriented to person, place, and time.      Assessment/Plan:  Principal Problem:    GIB (gastrointestinal bleeding)  Resolved Problems:    * No resolved hospital problems. *     Principal Problem:    GIB  -gi consult  -cipro 400mg iv q12hrs  -flagyl 500mg iv q8hrs  -octreotide infusion  -protonix infusion  -ptt  -pt/inr  -type and screen  -no nsaids  -telemetry  -npo after midnight  -scds for vte prophylaxis  -hgb/hct q6hrs  -transfuse as warranted  -ensure adequate iv access  -avoid offending agents  -monitor hemodynamics  -I's and O's  -daily weight      History of cirrhosis   -ammonia   Resolved Problems:    * No resolved hospital problems.

## 2025-06-17 NOTE — PLAN OF CARE
Problem: Discharge Planning  Goal: Discharge to home or other facility with appropriate resources  Outcome: Progressing  Flowsheets (Taken 6/17/2025 0400)  Discharge to home or other facility with appropriate resources: Identify barriers to discharge with patient and caregiver     Problem: Safety - Adult  Goal: Free from fall injury  Outcome: Progressing     Problem: Pain  Goal: Verbalizes/displays adequate comfort level or baseline comfort level  Outcome: Progressing

## 2025-06-17 NOTE — CONSULTS
[facility] Vascular Access Team:  PICC Line Insertion Procedure Note      Patient: Ney Ann  YOB: 1955   MRN: [id number]  Room: [address] [place] [address]     Attending Physician - Bran Obrien MD  Ordering Physician - Bran Obrien MD    Diagnosis:   Acute blood loss anemia [D62]  GIB (gastrointestinal bleeding) [K92.2]  Cirrhosis of liver with ascites, unspecified hepatic cirrhosis type (HCC) [K74.60, R18.8]  Acute on chronic anemia [D64.9]       Procedure(s):   Insertion of a 6 Persian Triple Lumen PICC    Indication(s):   Vesicant IV Medication(s)    Date of Procedure: 6/17/2025   Start Time: 1020  End Time: 1155    Performed by: Jaylen Romero RN    Anesthesia: Local Injection 3 mL 1% Lidocaine without Epinephrine    Estimated Blood Loss (mL): None    Complications: None    PICC 06/17/25 Right Basilic (Active)   Central Line Being Utilized Yes 06/17/25 1050   Criteria for Appropriate Use Irritant/vesicant medication 06/17/25 1050   Site Assessment Clean, dry & intact 06/17/25 1050   Phlebitis Assessment No symptoms 06/17/25 1050   Infiltration Assessment 0 06/17/25 1050   Extremity Circumference (cm) 27 cm 06/17/25 1050   External Catheter Length (cm) 4 cm 06/17/25 1050   Lumen #1 Color/Status White;Flushed;Brisk blood return;Normal saline locked;Alcohol cap applied 06/17/25 1050   Lumen #2 Color/Status Blue;Flushed;Brisk blood return;Normal saline locked;Alcohol cap applied 06/17/25 1050   Lumen #3 Color/Status Pink;Flushed;Brisk blood return;Normal saline locked;Alcohol cap applied 06/17/25 1050   Alcohol Cap Used Yes 06/17/25 1050   Date of Last Dressing Change 06/17/25 06/17/25 1050   Dressing Type Securing device;Gauze;Transparent 06/17/25 1050   Dressing Status Clean, dry & intact;New dressing applied 06/17/25 1050   Dressing Intervention New 06/17/25 1050         Findings:   1. Successful insertion of PICC line.  2. PICC Line is ready to be used.   3. Please change tubing prior to using new PICC

## 2025-06-17 NOTE — ED PROVIDER NOTES
metroNIDAZOLE (FLAGYL) 500 mg in 0.9% NaCl 100 mL IVPB premix (has no administration in time range)   0.9 % sodium chloride infusion (has no administration in time range)   pantoprazole (PROTONIX) injection 80 mg (80 mg IntraVENous Given 6/16/25 2321)   octreotide (SANDOSTATIN) injection 100 mcg (100 mcg SubCUTAneous Given 6/16/25 2321)       EMERGENCY DEPARTMENT COURSE and DIFFERENTIALDIAGNOSIS/MDM:   Vitals:    Vitals:    06/16/25 2218 06/16/25 2315 06/16/25 2330 06/16/25 2350   BP: (!) 109/48 (!) 81/45 (!) 94/41 (!) 97/42   Pulse: 84 78 81 68   Resp: 18 16 17    Temp: 97.8 °F (36.6 °C)      SpO2: (!) 87% 94% 97% 98%   Weight: 75.8 kg (167 lb)      Height: 1.727 m (5' 8\")            PROCEDURES:  Unless otherwise notedbelow, none  Procedures    EKG: All EKG's are interpreted by the Emergency Department Physician who either signs or Co-signs this chart in the absence of a cardiologist.      MDM      ED Course as of 06/17/25 0003 Mon Jun 16, 2025 2316 Hemoglobin Quant(!!): 5.7 [EDGARDO]   2316 Hematocrit(!!): 18.4 [EDGARDO]      ED Course User Index  [EDGARDO] Anderson Jara Jr., MD     Based on the evaluation and work-up here patient is felt to require further monitoring, work-up, or treatment that is available in the emergency department.  Case was discussed with hospitalist who agrees for observation or admission for further management.  Treatment and stabilization as necessary were provided in the emergency department prior to transfer of care to the medicine service.      CONSULTS:  IP CONSULT TO GI    CRITICAL CARE TIME   Total Critical Care time was 45 minutes, excluding separately reportable procedures.  There was a high probability of clinically significant/life threatening deterioration in the patient's condition which required my urgent intervention.        FINAL IMPRESSION     1. Acute on chronic anemia    2. Acute blood loss anemia    3. Cirrhosis of liver with ascites, unspecified hepatic cirrhosis type (HCC)           DISPOSITION/PLAN   DISPOSITION Admitted 06/16/2025 10:48:32 PM               No notes of EC Admission Criteria type on file.    PATIENT REFERRED TO:  No follow-up provider specified.    DISCHARGE MEDICATIONS:  New Prescriptions    No medications on file          (Please note that portions of this note were completed with a voice recognition program.  Efforts were made to edit the dictations butoccasionally words are mis-transcribed.)    Anderson Jara Jr, MD (electronically signed)  AttendingEmergency Physician          Anderson Jara Jr., MD  06/17/25 0003

## 2025-06-17 NOTE — CARE COORDINATION
Case Management Assessment  Initial Evaluation    Date/Time of Evaluation: 6/17/2025 12:43 PM  Assessment Completed by: SUSAN SKELTON    If patient is discharged prior to next notation, then this note serves as note for discharge by case management.    Patient Name: Ney Leblanc                   YOB: 1955  Diagnosis: Acute blood loss anemia [D62]  GIB (gastrointestinal bleeding) [K92.2]  Cirrhosis of liver with ascites, unspecified hepatic cirrhosis type (HCC) [K74.60, R18.8]  Acute on chronic anemia [D64.9]                   Date / Time: 6/16/2025 10:23 PM    Patient Admission Status: Inpatient   Readmission Risk (Low < 19, Mod (19-27), High > 27): Readmission Risk Score: 28.2    Current PCP: Izabela Robles APRN - CNP  PCP verified by CM? (P) Yes    Chart Reviewed: Yes      History Provided by: (P) Patient  Patient Orientation: (P) Alert and Oriented, Person, Place, Situation, Self    Patient Cognition: (P) Alert    Hospitalization in the last 30 days (Readmission):  No    If yes, Readmission Assessment in CM Navigator will be completed.    Advance Directives:      Code Status: Full Code   Patient's Primary Decision Maker is: (P) Named in Scanned ACP Document    Primary Decision Maker: JAVAD LEBLANC - Spouse - [phone]    Secondary Decision Maker: Razia Shah - Brother/Sister - [phone]    Discharge Planning:    Patient lives with: (P) Spouse/Significant Other Type of Home: (P) House  Primary Care Giver: (P) Self  Patient Support Systems include: (P) Spouse/Significant Other   Current Financial resources: (P) Medicare  Current community resources: (P) None  Current services prior to admission: (P) None            Current DME:              Type of Home Care services:  (P) OT, PT    ADLS  Prior functional level: (P) Independent in ADLs/IADLs  Current functional level: (P) Independent in ADLs/IADLs    PT AM-PAC:   /24  OT AM-PAC:   /24    Family can provide assistance at DC: (P)

## 2025-06-18 LAB
ALBUMIN SERPL-MCNC: 2.5 G/DL (ref 3.5–5.2)
ALP SERPL-CCNC: 72 U/L (ref 40–129)
ALT SERPL-CCNC: 21 U/L (ref 10–50)
ANION GAP SERPL CALCULATED.3IONS-SCNC: 9 MMOL/L (ref 8–16)
ANISOCYTOSIS BLD QL SMEAR: ABNORMAL
AST SERPL-CCNC: 29 U/L (ref 10–50)
BASOPHILS # BLD: 0 K/UL (ref 0–0.2)
BASOPHILS NFR BLD: 0.4 % (ref 0–1)
BILIRUB SERPL-MCNC: 4.9 MG/DL (ref 0.2–1.2)
BUN SERPL-MCNC: 16 MG/DL (ref 8–23)
BURR CELLS BLD QL SMEAR: ABNORMAL
CALCIUM SERPL-MCNC: 7.4 MG/DL (ref 8.8–10.2)
CHLORIDE SERPL-SCNC: 102 MMOL/L (ref 98–107)
CO2 SERPL-SCNC: 22 MMOL/L (ref 22–29)
CREAT SERPL-MCNC: 0.9 MG/DL (ref 0.7–1.2)
DACRYOCYTES BLD QL SMEAR: ABNORMAL
EOSINOPHIL # BLD: 0.3 K/UL (ref 0–0.6)
EOSINOPHIL NFR BLD: 5.3 % (ref 0–5)
ERYTHROCYTE [DISTWIDTH] IN BLOOD BY AUTOMATED COUNT: 23.7 % (ref 11.5–14.5)
ERYTHROCYTE [DISTWIDTH] IN BLOOD BY AUTOMATED COUNT: 23.9 % (ref 11.5–14.5)
ERYTHROCYTE [DISTWIDTH] IN BLOOD BY AUTOMATED COUNT: 23.9 % (ref 11.5–14.5)
ERYTHROCYTE [DISTWIDTH] IN BLOOD BY AUTOMATED COUNT: 24 % (ref 11.5–14.5)
GLUCOSE SERPL-MCNC: 116 MG/DL (ref 70–99)
HCT VFR BLD AUTO: 20.7 % (ref 42–52)
HCT VFR BLD AUTO: 20.8 % (ref 42–52)
HCT VFR BLD AUTO: 21.3 % (ref 42–52)
HCT VFR BLD AUTO: 21.8 % (ref 42–52)
HEMOCCULT STL QL: NORMAL
HGB BLD-MCNC: 6.9 G/DL (ref 14–18)
HGB BLD-MCNC: 7 G/DL (ref 14–18)
HGB BLD-MCNC: 7.1 G/DL (ref 14–18)
HGB BLD-MCNC: 7.2 G/DL (ref 14–18)
IMM GRANULOCYTES # BLD: 0.1 K/UL
LYMPHOCYTES # BLD: 0.6 K/UL (ref 1.1–4.5)
LYMPHOCYTES NFR BLD: 12.5 % (ref 20–40)
MACROCYTES BLD QL SMEAR: ABNORMAL
MAGNESIUM SERPL-MCNC: 1.4 MG/DL (ref 1.6–2.4)
MCH RBC QN AUTO: 34.1 PG (ref 27–31)
MCH RBC QN AUTO: 34.8 PG (ref 27–31)
MCH RBC QN AUTO: 35 PG (ref 27–31)
MCH RBC QN AUTO: 35.4 PG (ref 27–31)
MCHC RBC AUTO-ENTMCNC: 33 G/DL (ref 33–37)
MCHC RBC AUTO-ENTMCNC: 33.3 G/DL (ref 33–37)
MCHC RBC AUTO-ENTMCNC: 33.3 G/DL (ref 33–37)
MCHC RBC AUTO-ENTMCNC: 33.7 G/DL (ref 33–37)
MCV RBC AUTO: 103.3 FL (ref 80–94)
MCV RBC AUTO: 104.4 FL (ref 80–94)
MCV RBC AUTO: 105.1 FL (ref 80–94)
MCV RBC AUTO: 105.1 FL (ref 80–94)
MONOCYTES # BLD: 1 K/UL (ref 0–0.9)
MONOCYTES NFR BLD: 19.2 % (ref 0–10)
NEUTROPHILS # BLD: 3.1 K/UL (ref 1.5–7.5)
NEUTS SEG NFR BLD: 61.6 % (ref 50–65)
OVALOCYTES BLD QL SMEAR: ABNORMAL
PLATELET # BLD AUTO: 30 K/UL (ref 130–400)
PLATELET # BLD AUTO: 35 K/UL (ref 130–400)
PLATELET # BLD AUTO: 36 K/UL (ref 130–400)
PLATELET # BLD AUTO: 41 K/UL (ref 130–400)
PLATELET SLIDE REVIEW: ABNORMAL
PLATELET SLIDE REVIEW: ABNORMAL
PMV BLD AUTO: 11.6 FL (ref 9.4–12.4)
PMV BLD AUTO: 11.7 FL (ref 9.4–12.4)
PMV BLD AUTO: 9.7 FL (ref 9.4–12.4)
PMV BLD AUTO: 9.7 FL (ref 9.4–12.4)
POIKILOCYTOSIS BLD QL SMEAR: ABNORMAL
POLYCHROMASIA BLD QL SMEAR: ABNORMAL
POTASSIUM SERPL-SCNC: 3.4 MMOL/L (ref 3.5–5.1)
PROT SERPL-MCNC: 4.4 G/DL (ref 6.4–8.3)
RBC # BLD AUTO: 1.97 M/UL (ref 4.7–6.1)
RBC # BLD AUTO: 1.98 M/UL (ref 4.7–6.1)
RBC # BLD AUTO: 2.04 M/UL (ref 4.7–6.1)
RBC # BLD AUTO: 2.11 M/UL (ref 4.7–6.1)
SODIUM SERPL-SCNC: 133 MMOL/L (ref 136–145)
STOMATOCYTES BLD QL SMEAR: ABNORMAL
WBC # BLD AUTO: 4.4 K/UL (ref 4.8–10.8)
WBC # BLD AUTO: 4.5 K/UL (ref 4.8–10.8)
WBC # BLD AUTO: 5 K/UL (ref 4.8–10.8)
WBC # BLD AUTO: 5.3 K/UL (ref 4.8–10.8)

## 2025-06-18 PROCEDURE — 82272 OCCULT BLD FECES 1-3 TESTS: CPT

## 2025-06-18 PROCEDURE — 6370000000 HC RX 637 (ALT 250 FOR IP): Performed by: HOSPITALIST

## 2025-06-18 PROCEDURE — 2700000000 HC OXYGEN THERAPY PER DAY

## 2025-06-18 PROCEDURE — 83735 ASSAY OF MAGNESIUM: CPT

## 2025-06-18 PROCEDURE — 85025 COMPLETE CBC W/AUTO DIFF WBC: CPT

## 2025-06-18 PROCEDURE — 6360000002 HC RX W HCPCS: Performed by: INTERNAL MEDICINE

## 2025-06-18 PROCEDURE — 80053 COMPREHEN METABOLIC PANEL: CPT

## 2025-06-18 PROCEDURE — 99233 SBSQ HOSP IP/OBS HIGH 50: CPT | Performed by: PHYSICIAN ASSISTANT

## 2025-06-18 PROCEDURE — 6370000000 HC RX 637 (ALT 250 FOR IP): Performed by: INTERNAL MEDICINE

## 2025-06-18 PROCEDURE — 99232 SBSQ HOSP IP/OBS MODERATE 35: CPT | Performed by: INTERNAL MEDICINE

## 2025-06-18 PROCEDURE — 85027 COMPLETE CBC AUTOMATED: CPT

## 2025-06-18 PROCEDURE — 36415 COLL VENOUS BLD VENIPUNCTURE: CPT

## 2025-06-18 PROCEDURE — 6360000002 HC RX W HCPCS: Performed by: HOSPITALIST

## 2025-06-18 PROCEDURE — 94760 N-INVAS EAR/PLS OXIMETRY 1: CPT

## 2025-06-18 PROCEDURE — 6370000000 HC RX 637 (ALT 250 FOR IP): Performed by: PHYSICIAN ASSISTANT

## 2025-06-18 PROCEDURE — 2000000000 HC ICU R&B

## 2025-06-18 PROCEDURE — 2580000003 HC RX 258: Performed by: HOSPITALIST

## 2025-06-18 RX ORDER — PSEUDOEPHEDRINE HCL 30 MG/1
30 TABLET, FILM COATED ORAL EVERY 8 HOURS
Status: DISCONTINUED | OUTPATIENT
Start: 2025-06-18 | End: 2025-06-19 | Stop reason: HOSPADM

## 2025-06-18 RX ORDER — MAGNESIUM SULFATE IN WATER 40 MG/ML
2000 INJECTION, SOLUTION INTRAVENOUS PRN
Status: DISCONTINUED | OUTPATIENT
Start: 2025-06-18 | End: 2025-06-19 | Stop reason: HOSPADM

## 2025-06-18 RX ORDER — LORAZEPAM 2 MG/ML
0.5 CONCENTRATE ORAL EVERY 4 HOURS PRN
Status: DISCONTINUED | OUTPATIENT
Start: 2025-06-18 | End: 2025-06-19 | Stop reason: HOSPADM

## 2025-06-18 RX ORDER — NADOLOL 20 MG/1
10 TABLET ORAL DAILY
Status: DISCONTINUED | OUTPATIENT
Start: 2025-06-19 | End: 2025-06-19 | Stop reason: HOSPADM

## 2025-06-18 RX ORDER — POTASSIUM CHLORIDE 7.45 MG/ML
10 INJECTION INTRAVENOUS PRN
Status: DISCONTINUED | OUTPATIENT
Start: 2025-06-18 | End: 2025-06-19 | Stop reason: HOSPADM

## 2025-06-18 RX ORDER — MORPHINE SULFATE 20 MG/ML
5 SOLUTION ORAL EVERY 4 HOURS PRN
Refills: 0 | Status: DISCONTINUED | OUTPATIENT
Start: 2025-06-18 | End: 2025-06-19 | Stop reason: HOSPADM

## 2025-06-18 RX ORDER — MIDODRINE HYDROCHLORIDE 10 MG/1
10 TABLET ORAL 4 TIMES DAILY
Status: DISCONTINUED | OUTPATIENT
Start: 2025-06-18 | End: 2025-06-19 | Stop reason: HOSPADM

## 2025-06-18 RX ORDER — POTASSIUM CHLORIDE 1500 MG/1
40 TABLET, EXTENDED RELEASE ORAL PRN
Status: DISCONTINUED | OUTPATIENT
Start: 2025-06-18 | End: 2025-06-19 | Stop reason: HOSPADM

## 2025-06-18 RX ADMIN — FUROSEMIDE 20 MG: 40 TABLET ORAL at 07:36

## 2025-06-18 RX ADMIN — PANTOPRAZOLE SODIUM 8 MG/HR: 40 INJECTION, POWDER, FOR SOLUTION INTRAVENOUS at 04:53

## 2025-06-18 RX ADMIN — METRONIDAZOLE 500 MG: 500 INJECTION, SOLUTION INTRAVENOUS at 14:29

## 2025-06-18 RX ADMIN — MIDODRINE HYDROCHLORIDE 10 MG: 10 TABLET ORAL at 16:47

## 2025-06-18 RX ADMIN — MIDODRINE HYDROCHLORIDE 10 MG: 10 TABLET ORAL at 14:28

## 2025-06-18 RX ADMIN — NADOLOL 20 MG: 20 TABLET ORAL at 07:36

## 2025-06-18 RX ADMIN — FOLIC ACID 1 MG: 1 TABLET ORAL at 07:36

## 2025-06-18 RX ADMIN — RIFAXIMIN 400 MG: 200 TABLET ORAL at 20:47

## 2025-06-18 RX ADMIN — RIFAXIMIN 400 MG: 200 TABLET ORAL at 07:36

## 2025-06-18 RX ADMIN — CIPROFLOXACIN 400 MG: 400 INJECTION, SOLUTION INTRAVENOUS at 09:46

## 2025-06-18 RX ADMIN — Medication 5 MG: at 18:30

## 2025-06-18 RX ADMIN — Medication 400 MG: at 07:36

## 2025-06-18 RX ADMIN — POTASSIUM CHLORIDE 40 MEQ: 1500 TABLET, EXTENDED RELEASE ORAL at 02:23

## 2025-06-18 RX ADMIN — Medication 500 MG: at 20:46

## 2025-06-18 RX ADMIN — Medication 1 TABLET: at 07:36

## 2025-06-18 RX ADMIN — METRONIDAZOLE 500 MG: 500 INJECTION, SOLUTION INTRAVENOUS at 06:06

## 2025-06-18 RX ADMIN — Medication 0.5 MG: at 22:09

## 2025-06-18 RX ADMIN — PSEUDOEPHEDRINE HCL 30 MG: 30 TABLET, FILM COATED ORAL at 14:28

## 2025-06-18 RX ADMIN — MIDODRINE HYDROCHLORIDE 10 MG: 10 TABLET ORAL at 20:47

## 2025-06-18 RX ADMIN — PANTOPRAZOLE SODIUM 8 MG/HR: 40 INJECTION, POWDER, FOR SOLUTION INTRAVENOUS at 16:28

## 2025-06-18 RX ADMIN — CIPROFLOXACIN 400 MG: 400 INJECTION, SOLUTION INTRAVENOUS at 22:33

## 2025-06-18 RX ADMIN — MAGNESIUM SULFATE HEPTAHYDRATE 2000 MG: 40 INJECTION, SOLUTION INTRAVENOUS at 02:18

## 2025-06-18 RX ADMIN — PHYTONADIONE 10 MG: 10 INJECTION, EMULSION INTRAMUSCULAR; INTRAVENOUS; SUBCUTANEOUS at 07:35

## 2025-06-18 RX ADMIN — SUCRALFATE 1 G: 1 TABLET ORAL at 07:36

## 2025-06-18 RX ADMIN — Medication 5 MG: at 20:47

## 2025-06-18 RX ADMIN — SPIRONOLACTONE 50 MG: 25 TABLET ORAL at 07:36

## 2025-06-18 RX ADMIN — SUCRALFATE 1 G: 1 TABLET ORAL at 16:47

## 2025-06-18 RX ADMIN — RIFAXIMIN 400 MG: 200 TABLET ORAL at 14:28

## 2025-06-18 RX ADMIN — Medication 500 MG: at 07:36

## 2025-06-18 RX ADMIN — SUCRALFATE 1 G: 1 TABLET ORAL at 14:28

## 2025-06-18 RX ADMIN — Medication 0.5 MG: at 15:51

## 2025-06-18 RX ADMIN — METRONIDAZOLE 500 MG: 500 INJECTION, SOLUTION INTRAVENOUS at 22:32

## 2025-06-18 RX ADMIN — PSEUDOEPHEDRINE HCL 30 MG: 30 TABLET, FILM COATED ORAL at 20:47

## 2025-06-18 ASSESSMENT — PAIN SCALES - GENERAL
PAINLEVEL_OUTOF10: 3
PAINLEVEL_OUTOF10: 9

## 2025-06-18 ASSESSMENT — ENCOUNTER SYMPTOMS
DIARRHEA: 0
VOICE CHANGE: 0
CONSTIPATION: 0
NAUSEA: 0
BLOOD IN STOOL: 1
COLOR CHANGE: 0
BACK PAIN: 0
VOMITING: 0
SHORTNESS OF BREATH: 0

## 2025-06-18 ASSESSMENT — PAIN DESCRIPTION - ORIENTATION: ORIENTATION: MID

## 2025-06-18 ASSESSMENT — PAIN DESCRIPTION - LOCATION: LOCATION: BACK

## 2025-06-18 ASSESSMENT — PAIN DESCRIPTION - DESCRIPTORS: DESCRIPTORS: ACHING

## 2025-06-18 NOTE — PROGRESS NOTES
Hospitalist Progress Note    Patient:  Ney Ann  YOB: 1955  Date of Service: 6/18/2025  MRN: [id number]   Acct: [id number]   Primary Care Physician: Izabela Robles APRN - CNP  Advance Directive: DNR  Admit Date: 6/16/2025       Hospital Day: 2  Referring Provider: Edwin Gamez DO    Patient Seen, Chart, Consults, Notes, Labs, Radiology studies reviewed.    Subjective:  Ney Ann is a 70 y.o. male  whom we are following for alcoholic cirrhosis, chronic GI blood loss, pancytopenia.  He required admission and blood transfusion due to a low hemoglobin.  Unfortunately, this has been an ongoing chronic problem for him.  Palliative care has been following, and the plan is to transition to home hospice upon discharge.  He is feeling better.  He is tolerating a regular diet.    Allergies:  Watermelon flavoring agent (non-screening), Tomato, and Watermelon [citrullus vulgaris]    Medicines:  Current Facility-Administered Medications   Medication Dose Route Frequency Provider Last Rate Last Admin    potassium chloride (KLOR-CON M) extended release tablet 40 mEq  40 mEq Oral PRN Adelso Bermudez MD   40 mEq at 06/18/25 0223    Or    potassium bicarb-citric acid (EFFER-K) effervescent tablet 40 mEq  40 mEq Oral PRN Adelso Bermudez MD        Or    potassium chloride 10 mEq/100 mL IVPB (Peripheral Line)  10 mEq IntraVENous PRN Adelso Bermudez MD        magnesium sulfate 2000 mg in 50 mL IVPB premix  2,000 mg IntraVENous PRN Adelso Bermudez MD   Stopped at 06/18/25 0428    sodium phosphate 15 mmol in sodium chloride 0.9 % 250 mL IVPB  15 mmol IntraVENous PRN Adelso Bermudez MD        folic acid (FOLVITE) tablet 1 mg  1 mg Oral Daily Adelso Bermudez MD   1 mg at 06/18/25 0736    hydrocortisone (ANUSOL-HC) suppository 25 mg  25 mg Rectal BID PRN Adelso Bermudez MD        lactulose (CHRONULAC) 10 GM/15ML solution 10 g  10 g Oral BID Adelso Bermudez MD   10 g at 06/17/25 0943    magnesium oxide

## 2025-06-18 NOTE — PLAN OF CARE
Problem: Discharge Planning  Goal: Discharge to home or other facility with appropriate resources  6/18/2025 0006 by Ricardo Alberto RN  Outcome: Progressing  Flowsheets (Taken 6/17/2025 2000)  Discharge to home or other facility with appropriate resources: Identify barriers to discharge with patient and caregiver  6/17/2025 1750 by Archana Montano RN  Outcome: Progressing     Problem: Safety - Adult  Goal: Free from fall injury  6/18/2025 0006 by Ricardo Alberto RN  Outcome: Progressing  6/17/2025 1750 by Archana Montano RN  Outcome: Progressing     Problem: Pain  Goal: Verbalizes/displays adequate comfort level or baseline comfort level  6/18/2025 0006 by Ricardo Alberto RN  Outcome: Progressing  6/17/2025 1750 by Archana Montano RN  Outcome: Progressing     Problem: Nutrition Deficit:  Goal: Optimize nutritional status  6/18/2025 0006 by Ricardo Alberto RN  Outcome: Progressing  6/17/2025 1750 by Archana Montano RN  Outcome: Progressing

## 2025-06-18 NOTE — PROGRESS NOTES
Upon am assessment, patient was attempting to remove vital sign equipment. This nurse began to attempt to therapeutically communicate. Patient refused assessment and instructed the nurse to leave the room immediately. Patient educated to use the call button if they needed anything, and promptly left the room.

## 2025-06-18 NOTE — CARE COORDINATION
[facility]    [phone]   Online [address] [place] [id number]       Awaiting approval/ denial   Electronically signed by SUSAN SKELTON on 6/18/2025 at 12:42 PM

## 2025-06-18 NOTE — PROGRESS NOTES
Physical Therapy     06/18/25 1400   Subjective   Subjective pt stated he plans to DC home with hospice and requested PT to stop tx.   Recommendation   Requires PT Follow-Up No     Electronically signed by Eh Paulino PTA on 6/18/2025 at 2:30 PM

## 2025-06-18 NOTE — PROGRESS NOTES
Recurrence of GI bleeding with fall in Hgb to 7.1 gm, BP requiring pressor support, patient comfortable without abdominal pain, abdominal ascites unchanged, patient and spouse inform me that he does not want any means of heroic life support and wants Hospice.

## 2025-06-19 VITALS
TEMPERATURE: 97.5 F | BODY MASS INDEX: 26.07 KG/M2 | DIASTOLIC BLOOD PRESSURE: 65 MMHG | HEIGHT: 68 IN | WEIGHT: 172 LBS | RESPIRATION RATE: 14 BRPM | HEART RATE: 87 BPM | SYSTOLIC BLOOD PRESSURE: 109 MMHG | OXYGEN SATURATION: 93 %

## 2025-06-19 PROBLEM — K74.60 CIRRHOSIS OF LIVER WITH ASCITES (HCC): Status: RESOLVED | Noted: 2025-06-17 | Resolved: 2025-06-19

## 2025-06-19 PROBLEM — I85.01 BLEEDING ESOPHAGEAL VARICES (HCC): Status: RESOLVED | Noted: 2025-05-02 | Resolved: 2025-06-19

## 2025-06-19 PROBLEM — R18.8 CIRRHOSIS OF LIVER WITH ASCITES (HCC): Status: RESOLVED | Noted: 2025-06-17 | Resolved: 2025-06-19

## 2025-06-19 LAB
BLOOD BANK DISPENSE STATUS: NORMAL
BLOOD BANK DISPENSE STATUS: NORMAL
BLOOD BANK PRODUCT CODE: NORMAL
BLOOD BANK PRODUCT CODE: NORMAL
BPU ID: NORMAL
BPU ID: NORMAL
DESCRIPTION BLOOD BANK: NORMAL
DESCRIPTION BLOOD BANK: NORMAL
ERYTHROCYTE [DISTWIDTH] IN BLOOD BY AUTOMATED COUNT: 23.6 % (ref 11.5–14.5)
ERYTHROCYTE [DISTWIDTH] IN BLOOD BY AUTOMATED COUNT: 23.8 % (ref 11.5–14.5)
ERYTHROCYTE [DISTWIDTH] IN BLOOD BY AUTOMATED COUNT: 24.3 % (ref 11.5–14.5)
HCT VFR BLD AUTO: 18.5 % (ref 42–52)
HCT VFR BLD AUTO: 22.5 % (ref 42–52)
HCT VFR BLD AUTO: 23.7 % (ref 42–52)
HGB BLD-MCNC: 6.1 G/DL (ref 14–18)
HGB BLD-MCNC: 7.4 G/DL (ref 14–18)
HGB BLD-MCNC: 7.9 G/DL (ref 14–18)
MCH RBC QN AUTO: 34.6 PG (ref 27–31)
MCH RBC QN AUTO: 34.8 PG (ref 27–31)
MCH RBC QN AUTO: 35.1 PG (ref 27–31)
MCHC RBC AUTO-ENTMCNC: 32.9 G/DL (ref 33–37)
MCHC RBC AUTO-ENTMCNC: 33 G/DL (ref 33–37)
MCHC RBC AUTO-ENTMCNC: 33.3 G/DL (ref 33–37)
MCV RBC AUTO: 104.4 FL (ref 80–94)
MCV RBC AUTO: 105.1 FL (ref 80–94)
MCV RBC AUTO: 106.3 FL (ref 80–94)
PLATELET # BLD AUTO: 27 K/UL (ref 130–400)
PLATELET # BLD AUTO: 27 K/UL (ref 130–400)
PLATELET # BLD AUTO: 29 K/UL (ref 130–400)
PMV BLD AUTO: 10.1 FL (ref 9.4–12.4)
PMV BLD AUTO: 10.3 FL (ref 9.4–12.4)
PMV BLD AUTO: 11.2 FL (ref 9.4–12.4)
RBC # BLD AUTO: 1.74 M/UL (ref 4.7–6.1)
RBC # BLD AUTO: 2.14 M/UL (ref 4.7–6.1)
RBC # BLD AUTO: 2.27 M/UL (ref 4.7–6.1)
WBC # BLD AUTO: 3.5 K/UL (ref 4.8–10.8)
WBC # BLD AUTO: 3.7 K/UL (ref 4.8–10.8)
WBC # BLD AUTO: 3.8 K/UL (ref 4.8–10.8)

## 2025-06-19 PROCEDURE — 94760 N-INVAS EAR/PLS OXIMETRY 1: CPT

## 2025-06-19 PROCEDURE — 2580000003 HC RX 258: Performed by: INTERNAL MEDICINE

## 2025-06-19 PROCEDURE — 36415 COLL VENOUS BLD VENIPUNCTURE: CPT

## 2025-06-19 PROCEDURE — 36430 TRANSFUSION BLD/BLD COMPNT: CPT

## 2025-06-19 PROCEDURE — 6370000000 HC RX 637 (ALT 250 FOR IP): Performed by: HOSPITALIST

## 2025-06-19 PROCEDURE — 6360000002 HC RX W HCPCS: Performed by: INTERNAL MEDICINE

## 2025-06-19 PROCEDURE — 6360000002 HC RX W HCPCS: Performed by: HOSPITALIST

## 2025-06-19 PROCEDURE — 6370000000 HC RX 637 (ALT 250 FOR IP): Performed by: PHYSICIAN ASSISTANT

## 2025-06-19 PROCEDURE — 99232 SBSQ HOSP IP/OBS MODERATE 35: CPT | Performed by: PHYSICIAN ASSISTANT

## 2025-06-19 PROCEDURE — 2580000003 HC RX 258: Performed by: HOSPITALIST

## 2025-06-19 PROCEDURE — 85027 COMPLETE CBC AUTOMATED: CPT

## 2025-06-19 PROCEDURE — 6370000000 HC RX 637 (ALT 250 FOR IP): Performed by: INTERNAL MEDICINE

## 2025-06-19 PROCEDURE — 2700000000 HC OXYGEN THERAPY PER DAY

## 2025-06-19 RX ORDER — SODIUM CHLORIDE 9 MG/ML
INJECTION, SOLUTION INTRAVENOUS PRN
Status: DISCONTINUED | OUTPATIENT
Start: 2025-06-19 | End: 2025-06-19 | Stop reason: HOSPADM

## 2025-06-19 RX ORDER — LORAZEPAM 2 MG/ML
0.5 CONCENTRATE ORAL EVERY 4 HOURS PRN
Qty: 30 ML | Refills: 0 | Status: SHIPPED | OUTPATIENT
Start: 2025-06-19 | End: 2025-06-19 | Stop reason: HOSPADM

## 2025-06-19 RX ORDER — ONDANSETRON 4 MG/1
4 TABLET, ORALLY DISINTEGRATING ORAL EVERY 6 HOURS PRN
Qty: 30 TABLET | Refills: 0 | Status: ON HOLD | OUTPATIENT
Start: 2025-06-19

## 2025-06-19 RX ORDER — LORAZEPAM 1 MG/1
1 TABLET ORAL EVERY 8 HOURS PRN
Qty: 9 TABLET | Refills: 0 | Status: SHIPPED | OUTPATIENT
Start: 2025-06-19 | End: 2025-06-22

## 2025-06-19 RX ORDER — MIDODRINE HYDROCHLORIDE 10 MG/1
10 TABLET ORAL 4 TIMES DAILY
Qty: 30 TABLET | Refills: 0 | Status: ON HOLD | OUTPATIENT
Start: 2025-06-19

## 2025-06-19 RX ORDER — PSEUDOEPHEDRINE HCL 30 MG/1
30 TABLET, FILM COATED ORAL EVERY 8 HOURS
Qty: 21 TABLET | Refills: 1 | Status: ON HOLD | OUTPATIENT
Start: 2025-06-19 | End: 2025-07-03

## 2025-06-19 RX ORDER — MORPHINE SULFATE 20 MG/ML
5 SOLUTION ORAL EVERY 4 HOURS PRN
Qty: 30 ML | Refills: 0 | Status: ON HOLD | OUTPATIENT
Start: 2025-06-19 | End: 2025-07-09

## 2025-06-19 RX ADMIN — RIFAXIMIN 400 MG: 200 TABLET ORAL at 07:57

## 2025-06-19 RX ADMIN — Medication 500 MG: at 07:57

## 2025-06-19 RX ADMIN — Medication 1 TABLET: at 07:57

## 2025-06-19 RX ADMIN — MIDODRINE HYDROCHLORIDE 10 MG: 10 TABLET ORAL at 12:27

## 2025-06-19 RX ADMIN — Medication 5 MG: at 07:58

## 2025-06-19 RX ADMIN — PSEUDOEPHEDRINE HCL 30 MG: 30 TABLET, FILM COATED ORAL at 05:50

## 2025-06-19 RX ADMIN — Medication 0.5 MG: at 16:42

## 2025-06-19 RX ADMIN — Medication 400 MG: at 07:57

## 2025-06-19 RX ADMIN — PSEUDOEPHEDRINE HCL 30 MG: 30 TABLET, FILM COATED ORAL at 14:43

## 2025-06-19 RX ADMIN — SUCRALFATE 1 G: 1 TABLET ORAL at 12:27

## 2025-06-19 RX ADMIN — Medication 5 MG: at 02:20

## 2025-06-19 RX ADMIN — PHYTONADIONE 10 MG: 10 INJECTION, EMULSION INTRAMUSCULAR; INTRAVENOUS; SUBCUTANEOUS at 07:56

## 2025-06-19 RX ADMIN — CIPROFLOXACIN 400 MG: 400 INJECTION, SOLUTION INTRAVENOUS at 12:26

## 2025-06-19 RX ADMIN — SPIRONOLACTONE 50 MG: 25 TABLET ORAL at 07:57

## 2025-06-19 RX ADMIN — RIFAXIMIN 400 MG: 200 TABLET ORAL at 14:43

## 2025-06-19 RX ADMIN — Medication 5 MG: at 14:52

## 2025-06-19 RX ADMIN — Medication 0.5 MG: at 11:28

## 2025-06-19 RX ADMIN — MIDODRINE HYDROCHLORIDE 10 MG: 10 TABLET ORAL at 07:57

## 2025-06-19 RX ADMIN — FOLIC ACID 1 MG: 1 TABLET ORAL at 07:57

## 2025-06-19 RX ADMIN — METRONIDAZOLE 500 MG: 500 INJECTION, SOLUTION INTRAVENOUS at 05:52

## 2025-06-19 RX ADMIN — SODIUM CHLORIDE, SODIUM LACTATE, POTASSIUM CHLORIDE, AND CALCIUM CHLORIDE: .6; .31; .03; .02 INJECTION, SOLUTION INTRAVENOUS at 08:56

## 2025-06-19 RX ADMIN — METRONIDAZOLE 500 MG: 500 INJECTION, SOLUTION INTRAVENOUS at 14:43

## 2025-06-19 RX ADMIN — PANTOPRAZOLE SODIUM 8 MG/HR: 40 INJECTION, POWDER, FOR SOLUTION INTRAVENOUS at 02:09

## 2025-06-19 RX ADMIN — SUCRALFATE 1 G: 1 TABLET ORAL at 07:57

## 2025-06-19 ASSESSMENT — ENCOUNTER SYMPTOMS
COLOR CHANGE: 0
VOMITING: 0
CONSTIPATION: 0
SHORTNESS OF BREATH: 0
DIARRHEA: 0
VOICE CHANGE: 0
NAUSEA: 0
BACK PAIN: 0

## 2025-06-19 ASSESSMENT — PAIN DESCRIPTION - ORIENTATION
ORIENTATION: MID
ORIENTATION: MID;LOWER

## 2025-06-19 ASSESSMENT — PAIN DESCRIPTION - LOCATION
LOCATION: CHEST
LOCATION: BACK

## 2025-06-19 ASSESSMENT — PAIN SCALES - GENERAL
PAINLEVEL_OUTOF10: 6
PAINLEVEL_OUTOF10: 2
PAINLEVEL_OUTOF10: 3
PAINLEVEL_OUTOF10: 7

## 2025-06-19 ASSESSMENT — PAIN DESCRIPTION - DESCRIPTORS
DESCRIPTORS: SHARP
DESCRIPTORS: ACHING

## 2025-06-19 NOTE — PLAN OF CARE
Problem: Discharge Planning  Goal: Discharge to home or other facility with appropriate resources  6/19/2025 1416 by Kacy Brady RN  Outcome: Progressing  6/19/2025 0105 by Ricardo Alberto RN  Outcome: Progressing  Flowsheets (Taken 6/18/2025 2000)  Discharge to home or other facility with appropriate resources: Identify barriers to discharge with patient and caregiver     Problem: Safety - Adult  Goal: Free from fall injury  6/19/2025 1416 by Kacy Brady RN  Outcome: Progressing  6/19/2025 0105 by Ricardo Alberto RN  Outcome: Progressing     Problem: Pain  Goal: Verbalizes/displays adequate comfort level or baseline comfort level  6/19/2025 1416 by Kacy Brady RN  Outcome: Progressing  6/19/2025 0105 by Ricardo Alberto RN  Outcome: Progressing     Problem: Nutrition Deficit:  Goal: Optimize nutritional status  6/19/2025 1416 by Kacy Brady RN  Outcome: Progressing  6/19/2025 0105 by Ricardo Alberto RN  Outcome: Progressing

## 2025-06-19 NOTE — DISCHARGE SUMMARY
Discharge Summary    Ney Ann  :  1955  MRN:  [id number]    Admit date:  2025  Discharge date: 2025     Admitting Physician:  No admitting provider for patient encounter.    Advance Directive: DNR    Consults: GI, palliative care, and hospice    Primary Care Physician:  Izabela Robles, APRN - CNP    Discharge Diagnoses:  Principal Problem:    GIB (gastrointestinal bleeding)  Active Problems:    Palliative care patient    Alcoholic cirrhosis (HCC)    End stage liver disease (HCC)    Coagulopathy    Severe malnutrition    Encounter for palliative care    Acute on chronic anemia  Resolved Problems:    Bleeding esophageal varices (HCC)    Cirrhosis of liver with ascites (HCC)      Significant Diagnostic Studies:   No results found.    CBC:   Recent Labs     25  0122 25  0618 25  1232   WBC 3.7* 3.5* 3.8*   HGB 6.1* 7.4* 7.9*   PLT 27* 27* 29*     BMP:    Recent Labs     25  2256 25  0115   * 133*   K 4.2 3.4*    102   CO2 18* 22   BUN 19 16   CREATININE 0.9 0.9   GLUCOSE 145* 116*     INR:   Recent Labs     25  2328   INR 2.59*     Lipids: No results for input(s): \"CHOL\", \"HDL\" in the last 72 hours.    Invalid input(s): \"LDLCALCU\"  ABGs:No results for input(s): \"PHART\", \"FQM6LTT\", \"PO2ART\", \"TDC1POK\", \"BEART\", \"HGBAE\", \"G3KEFYDR\", \"CARBOXHGBART\", \"02THERAPY\" in the last 72 hours.  HgBA1c:  No results for input(s): \"LABA1C\" in the last 72 hours.    Procedures: None  Hospital Course: Mr. Ann was admitted on  with recurrent GI bleed.  Unfortunately he suffers from end-stage liver disease secondary to alcoholic cirrhosis.  Over the last 6 months, he has had multiple admissions for recurrent GI bleeds.  GI saw in consultation and had nothing further to offer.  He was monitored in ICU and transfused according to his H&H.  The decision was made to transition to home hospice.  He will be transferred to home on the afternoon of .    Physical  [Educated Patient & Family about Diagnosis] : educated the patient and family about the diagnosis [FreeTextEntry1] : Glen is a 6 month old well appearing, well nourished infant with intermittent distress symptoms throughout the day, reflux symptoms, and volume limited feedings.  I reviewed with mother the likelihood of these issues being multifactorial.  I don't suspect active allergic disease at this time.  Focus of treatment plan will be to further optimize reflux management and recognize some infants express discomfort to non-painful stimuli secondary to gut sensitivity and neurologic immaturity.  An endoscopy is not indicated at this time.\par \par Plan\par - Nexium 5 mg BID\par - Discontinue omeprazole\par - Continue current milk source - Nutramigen and Goat milk 50/50\par - Change daytime feeding strategy to give food before bottles\par - Agree with plan to start BioGaia Probiotic\par - Will consider thickened feedings with rice cereal as another option in future

## 2025-06-19 NOTE — PROGRESS NOTES
Hospitalist Progress Note    Patient:  Ney Ann  YOB: 1955  Date of Service: 6/19/2025  MRN: [id number]   Acct: [id number]   Primary Care Physician: Izabela Robles APRN - CNP  Advance Directive: DNR  Admit Date: 6/16/2025       Hospital Day: 3  Referring Provider: Edwin Gamez DO    Patient Seen, Chart, Consults, Notes, Labs, Radiology studies reviewed.    Subjective:  Ney Ann is a 70 y.o. male  whom we are following for alcoholic cirrhosis, chronic GI blood loss, pancytopenia.  No new events.  He required another transfusion this morning.  The plan is to transition to home hospice today when equipment has been delivered.    Allergies:  Watermelon flavoring agent (non-screening), Tomato, and Watermelon [citrullus vulgaris]    Medicines:  Current Facility-Administered Medications   Medication Dose Route Frequency Provider Last Rate Last Admin   • 0.9 % sodium chloride infusion   IntraVENous PRN Adelso Bermudez MD       • potassium chloride (KLOR-CON M) extended release tablet 40 mEq  40 mEq Oral PRN Adelso Bermudez MD   40 mEq at 06/18/25 0223    Or   • potassium bicarb-citric acid (EFFER-K) effervescent tablet 40 mEq  40 mEq Oral PRN Adelso Bermudez MD        Or   • potassium chloride 10 mEq/100 mL IVPB (Peripheral Line)  10 mEq IntraVENous PRN Adelso Bermudez MD       • magnesium sulfate 2000 mg in 50 mL IVPB premix  2,000 mg IntraVENous PRN Adelso Bermudez MD   Stopped at 06/18/25 0428   • sodium phosphate 15 mmol in sodium chloride 0.9 % 250 mL IVPB  15 mmol IntraVENous PRN Adelso Bermudez MD       • nadolol (CORGARD) tablet 10 mg  10 mg Oral Daily Edwin Gamez DO       • midodrine (PROAMATINE) tablet 10 mg  10 mg Oral 4x Daily Edwin Gamez DO   10 mg at 06/19/25 1227   • pseudoephedrine (SUDAFED) tablet 30 mg  30 mg Oral Q8H Edwin Gamez DO   30 mg at 06/19/25 0550   • LORazepam (ATIVAN) 2 MG/ML concentrated solution 0.5 mg  0.5 mg Oral Q4H PRN

## 2025-06-19 NOTE — PLAN OF CARE
Problem: Discharge Planning  Goal: Discharge to home or other facility with appropriate resources  6/19/2025 0105 by Ricardo Alberto RN  Outcome: Progressing  Flowsheets (Taken 6/18/2025 2000)  Discharge to home or other facility with appropriate resources: Identify barriers to discharge with patient and caregiver  6/18/2025 1529 by Kacy Brady RN  Outcome: Progressing     Problem: Safety - Adult  Goal: Free from fall injury  6/19/2025 0105 by Ricardo Alberto RN  Outcome: Progressing  6/18/2025 1529 by Kacy Brady RN  Outcome: Progressing     Problem: Pain  Goal: Verbalizes/displays adequate comfort level or baseline comfort level  6/19/2025 0105 by Ricardo Alberto RN  Outcome: Progressing  6/18/2025 1529 by Kacy Brady RN  Outcome: Progressing     Problem: Nutrition Deficit:  Goal: Optimize nutritional status  6/19/2025 0105 by Ricardo Alberto RN  Outcome: Progressing  6/18/2025 1529 by Kacy Brady RN  Outcome: Progressing

## 2025-06-19 NOTE — PLAN OF CARE
Problem: Discharge Planning  Goal: Discharge to home or other facility with appropriate resources  6/19/2025 1712 by Kacy Brady RN  Outcome: Completed  6/19/2025 1711 by Kacy Brady RN  Outcome: Progressing  6/19/2025 1416 by Kacy Brady RN  Outcome: Progressing  6/19/2025 1416 by Kacy Brady RN  Outcome: Progressing     Problem: Safety - Adult  Goal: Free from fall injury  6/19/2025 1712 by Kacy Brady RN  Outcome: Completed  6/19/2025 1711 by Kacy Brady RN  Outcome: Progressing  6/19/2025 1416 by Kacy Brady RN  Outcome: Progressing  6/19/2025 1416 by Kacy Brady RN  Outcome: Progressing     Problem: Pain  Goal: Verbalizes/displays adequate comfort level or baseline comfort level  6/19/2025 1712 by Kacy Brady RN  Outcome: Completed  6/19/2025 1711 by Kacy Brady RN  Outcome: Progressing  6/19/2025 1416 by Kacy Brady RN  Outcome: Progressing  6/19/2025 1416 by Kacy Brady RN  Outcome: Progressing     Problem: Nutrition Deficit:  Goal: Optimize nutritional status  6/19/2025 1712 by Kacy Brady RN  Outcome: Completed  6/19/2025 1711 by Kacy Brady RN  Outcome: Progressing  6/19/2025 1416 by Kacy Brady RN  Outcome: Progressing  6/19/2025 1416 by Kacy Brady RN  Outcome: Progressing

## 2025-06-19 NOTE — CARE COORDINATION
Patient accepted to [facility].  All DME has been ordered to be delivered to home.  Hospital bed  O2  Bedside table  Wheelchair   Shower chair.  [facility]  P:  [phone]  F:  [phone]  Electronically signed by Radha Cali RN on 6/19/2025 at 10:29 AM

## 2025-06-19 NOTE — PROGRESS NOTES
Delivered Rx (Sudogest 30mg tabs, Lorazepam 1mg tabs, Midodrine Hcl 5mg tabs, Morphine sulfate 20mg/ml concentrate) to patients family. Copay paid with cash.

## 2025-06-19 NOTE — PROGRESS NOTES
All patient belongings sent home with spouse Georgia Ann, patient going home on hospice via EMS. Here to transport patient now.

## 2025-06-19 NOTE — PROGRESS NOTES
Palliative Care Progress Note  6/19/2025 11:54 AM    Patient:  Ney Ann  YOB: 1955  Primary Care Physician: Izabela Robles, APRN - CNP  Advance Directive: DNR  Admit Date: 6/16/2025       Hospital Day: 3  Portions of this note have been copied forward, however, changed to reflect the most current clinical status of this patient.    CHIEF COMPLAINT/REASON FOR CONSULTATION Goals of care, family support, Code status, symptom management     SUBJECTIVE:  Mr. Ann is awake and interactive. Denies new or worsening back pain.Has received Ativan as well as Morphine today. Will need a 3 day supply of these upon discharge.    History Of Present Illness:   The patient is a 70 y.o. male with PMH alcoholic cirrhosis previously followed at [facility], esophageal varices, coagulopathy, pancytopenia, portal hypertensive gastropathy, atrial fibrillation, chronic back pain, HTN, COPD, vitamin D deficiency, insomnia, inguinal hernia who presented to [facility] ED on 6/16/2025 when his hemoglobin returned at 6.1 in the outpatient setting and he had reported some bright red blood in his stool in addition to dark and tarry stools.  Hemoglobin in the ED 5.7.  INR 2.59, pro time 27.3, lactic acid 2.2.  He is admitted to hospitalist service for acute on chronic anemia with concern for gastrointestinal bleeding and was placed on Protonix and octreotide drips in addition to Cipro and Flagyl.  PRBC ordered. He had worsening hypotension in the ED and was placed in ICU with pressor support. Palliative care consulted to assist w/ goals of care.     Review of Systems:   14 point review of systems is negative except as specifically addressed above.    Objective:   VITALS:  BP 99/60   Pulse 80   Temp 97.6 °F (36.4 °C) (Temporal)   Resp 12   Ht 1.727 m (5' 7.99\")   Wt 78 kg (172 lb)   SpO2 93%   BMI 26.16 kg/m²   24HR INTAKE/OUTPUT:    Intake/Output Summary (Last 24 hours) at 6/19/2025 1155  Last data filed at 6/19/2025

## 2025-06-20 ENCOUNTER — CARE COORDINATION (OUTPATIENT)
Dept: CARE COORDINATION | Age: 70
End: 2025-06-20

## 2025-06-20 ASSESSMENT — ENCOUNTER SYMPTOMS
DIARRHEA: 0
SORE THROAT: 0
COUGH: 0
VOMITING: 0
CHEST TIGHTNESS: 0
COLOR CHANGE: 0
NAUSEA: 0
SHORTNESS OF BREATH: 0
ABDOMINAL PAIN: 0

## 2025-06-23 ENCOUNTER — CARE COORDINATION (OUTPATIENT)
Dept: CARE COORDINATION | Age: 70
End: 2025-06-23

## 2025-06-23 NOTE — CARE COORDINATION
Ambulatory Care Coordination Note     2025 4:50 PM     Patient Current Location:  Home: [facility] [address]   [place] [address]     ACM contacted the patient by telephone. Verified name and  with patient and spouse/partner as identifiers.     Patient closed (entered hospice) from the High Risk Care Management program on 2025.  No further Ambulatory Care Manager follow up scheduled.      ACM: Brielle Licona RN     Challenges to be reviewed by the provider   Additional needs identified to be addressed with provider No               Method of communication with provider: none.      Care Summary Note: Spoke to patient and wife this afternoon. Wife confirmed patient was discharged home last Thursday with home hospice. Wife reported patient has all needed DME and has his medications. ACM encouraged wife to call if needed. Wife voiced understanding.      PCP/Specialist follow up:   Future Appointments         Provider Specialty Dept Phone    2025 8:30 AM Christine Rucker APRN - CNP Palliative Care [phone]    8/15/2025 10:30 AM Lavon Keane, APRN Gastroenterology [phone]    2025 12:30 PM Izabela Robles, APRN - CNP Primary Care [phone]            Follow Up:   No further Ambulatory Care Management follow-up scheduled at this time.  Caregiver has Ambulatory Care Manager's contact information for any further questions, concerns or needs.

## 2025-06-23 NOTE — TELEPHONE ENCOUNTER
Ney Ann called to request a refill on his medication.      Last office visit : 6/12/2025   Next office visit : 9/12/2025     Last UDS:   Benzodiazepine Screen, Urine   Date Value Ref Range Status   01/15/2025 neg  Final     Buprenorphine Urine   Date Value Ref Range Status   01/15/2025 neg  Final     Cocaine Metabolite Screen, Urine   Date Value Ref Range Status   01/15/2025 neg  Final     Gabapentin Screen, Urine   Date Value Ref Range Status   01/15/2025 neg  Final     Oxycodone Screen, Ur   Date Value Ref Range Status   01/15/2025 neg  Final     Propoxyphene Screen, Urine   Date Value Ref Range Status   01/15/2025 neg  Final     THC Screen, Urine   Date Value Ref Range Status   01/15/2025 neg  Final     Tricyclic Antidepressants, Urine   Date Value Ref Range Status   01/15/2025 neg  Final       Last Stoney: 9/26/24  Medication Contract: 1/15/25   Last Fill: 3/12/25    Requested Prescriptions     Pending Prescriptions Disp Refills    zolpidem (AMBIEN) 10 MG tablet [Pharmacy Med Name: ZOLPIDEM TARTRATE 10MG TABLET] 90 tablet 0     Sig: Take 1 tablet by mouth nightly as needed for Sleep for up to 90 days. Max Daily Amount: 10 mg         Please approve or refuse this medication.   Nell Rodas MA

## 2025-06-24 RX ORDER — ZOLPIDEM TARTRATE 10 MG/1
10 TABLET ORAL NIGHTLY PRN
Qty: 90 TABLET | Refills: 0 | Status: ON HOLD | OUTPATIENT
Start: 2025-06-24 | End: 2025-09-22

## 2025-06-26 ENCOUNTER — HOSPITAL ENCOUNTER (INPATIENT)
Age: 70
LOS: 5 days | Discharge: HOSPICE | DRG: 951 | End: 2025-07-01
Attending: INTERNAL MEDICINE | Admitting: INTERNAL MEDICINE
Payer: MEDICARE

## 2025-06-26 PROBLEM — K70.31 ALCOHOLIC CIRRHOSIS OF LIVER WITH ASCITES (HCC): Status: ACTIVE | Noted: 2025-06-26

## 2025-06-26 PROBLEM — K70.30 CIRRHOSIS WITH ALCOHOLISM (HCC): Status: ACTIVE | Noted: 2025-06-26

## 2025-06-26 PROCEDURE — 6370000000 HC RX 637 (ALT 250 FOR IP): Performed by: INTERNAL MEDICINE

## 2025-06-26 PROCEDURE — 2700000000 HC OXYGEN THERAPY PER DAY

## 2025-06-26 PROCEDURE — 6560000002 HC HOSPICE GENERAL INPATIENT

## 2025-06-26 PROCEDURE — 6550000002 HC HOSPICE INPATIENT RESPITE

## 2025-06-26 RX ORDER — ZOLPIDEM TARTRATE 5 MG/1
10 TABLET ORAL NIGHTLY
Status: DISCONTINUED | OUTPATIENT
Start: 2025-06-26 | End: 2025-07-01 | Stop reason: HOSPADM

## 2025-06-26 RX ORDER — LANOLIN ALCOHOL/MO/W.PET/CERES
400 CREAM (GRAM) TOPICAL DAILY
Status: DISCONTINUED | OUTPATIENT
Start: 2025-06-27 | End: 2025-07-01 | Stop reason: HOSPADM

## 2025-06-26 RX ORDER — NEOMYCIN SULFATE 500 MG/1
500 TABLET ORAL 3 TIMES DAILY
Status: DISCONTINUED | OUTPATIENT
Start: 2025-06-26 | End: 2025-07-01 | Stop reason: HOSPADM

## 2025-06-26 RX ORDER — MORPHINE SULFATE 20 MG/ML
5 SOLUTION ORAL EVERY 4 HOURS PRN
Refills: 0 | Status: DISCONTINUED | OUTPATIENT
Start: 2025-06-26 | End: 2025-07-01 | Stop reason: HOSPADM

## 2025-06-26 RX ORDER — HYOSCYAMINE SULFATE 0.12 MG/1
0.12 TABLET SUBLINGUAL EVERY 4 HOURS PRN
Status: DISCONTINUED | OUTPATIENT
Start: 2025-06-26 | End: 2025-07-01 | Stop reason: HOSPADM

## 2025-06-26 RX ORDER — NICOTINE 21 MG/24HR
1 PATCH, TRANSDERMAL 24 HOURS TRANSDERMAL DAILY
Status: DISCONTINUED | OUTPATIENT
Start: 2025-06-26 | End: 2025-07-01 | Stop reason: HOSPADM

## 2025-06-26 RX ORDER — LACTULOSE 10 G/15ML
10 SOLUTION ORAL 2 TIMES DAILY
Status: DISCONTINUED | OUTPATIENT
Start: 2025-06-26 | End: 2025-07-01 | Stop reason: HOSPADM

## 2025-06-26 RX ORDER — HALOPERIDOL 0.5 MG/1
0.5 TABLET ORAL EVERY 6 HOURS PRN
Status: DISCONTINUED | OUTPATIENT
Start: 2025-06-26 | End: 2025-07-01 | Stop reason: HOSPADM

## 2025-06-26 RX ORDER — FUROSEMIDE 20 MG/1
20 TABLET ORAL 2 TIMES DAILY
Status: DISCONTINUED | OUTPATIENT
Start: 2025-06-26 | End: 2025-07-01 | Stop reason: HOSPADM

## 2025-06-26 RX ORDER — OXYCODONE HYDROCHLORIDE 5 MG/1
5 TABLET ORAL EVERY 4 HOURS PRN
Refills: 0 | Status: DISCONTINUED | OUTPATIENT
Start: 2025-06-26 | End: 2025-07-01 | Stop reason: HOSPADM

## 2025-06-26 RX ORDER — SUCRALFATE 1 G/1
1 TABLET ORAL
Status: DISCONTINUED | OUTPATIENT
Start: 2025-06-26 | End: 2025-07-01 | Stop reason: HOSPADM

## 2025-06-26 RX ORDER — MIDODRINE HYDROCHLORIDE 10 MG/1
10 TABLET ORAL 4 TIMES DAILY
Status: DISCONTINUED | OUTPATIENT
Start: 2025-06-26 | End: 2025-07-01 | Stop reason: HOSPADM

## 2025-06-26 RX ORDER — PANTOPRAZOLE SODIUM 40 MG/1
40 TABLET, DELAYED RELEASE ORAL
Status: DISCONTINUED | OUTPATIENT
Start: 2025-06-26 | End: 2025-07-01 | Stop reason: HOSPADM

## 2025-06-26 RX ORDER — ACETAMINOPHEN 650 MG/1
650 SUPPOSITORY RECTAL EVERY 6 HOURS PRN
Status: DISCONTINUED | OUTPATIENT
Start: 2025-06-26 | End: 2025-07-01 | Stop reason: HOSPADM

## 2025-06-26 RX ORDER — BISACODYL 10 MG
10 SUPPOSITORY, RECTAL RECTAL DAILY PRN
Status: DISCONTINUED | OUTPATIENT
Start: 2025-06-26 | End: 2025-07-01 | Stop reason: HOSPADM

## 2025-06-26 RX ORDER — FUROSEMIDE 20 MG/1
20 TABLET ORAL 2 TIMES DAILY
Status: DISCONTINUED | OUTPATIENT
Start: 2025-06-26 | End: 2025-06-26

## 2025-06-26 RX ORDER — LORAZEPAM 0.5 MG/1
1 TABLET ORAL EVERY 4 HOURS PRN
Status: DISCONTINUED | OUTPATIENT
Start: 2025-06-26 | End: 2025-07-01 | Stop reason: HOSPADM

## 2025-06-26 RX ORDER — ONDANSETRON 8 MG/1
4 TABLET, ORALLY DISINTEGRATING ORAL EVERY 6 HOURS PRN
Status: DISCONTINUED | OUTPATIENT
Start: 2025-06-26 | End: 2025-07-01 | Stop reason: HOSPADM

## 2025-06-26 RX ORDER — SPIRONOLACTONE 25 MG/1
50 TABLET ORAL DAILY
Status: DISCONTINUED | OUTPATIENT
Start: 2025-06-27 | End: 2025-07-01 | Stop reason: HOSPADM

## 2025-06-26 RX ADMIN — ZOLPIDEM TARTRATE 10 MG: 5 TABLET ORAL at 20:21

## 2025-06-26 RX ADMIN — MIDODRINE HYDROCHLORIDE 10 MG: 10 TABLET ORAL at 20:21

## 2025-06-26 RX ADMIN — LACTULOSE 10 G: 20 SOLUTION ORAL at 20:22

## 2025-06-26 RX ADMIN — LACTULOSE 10 G: 20 SOLUTION ORAL at 13:53

## 2025-06-26 RX ADMIN — NEOMYCIN SULFATE 500 MG: 500 TABLET ORAL at 20:21

## 2025-06-26 RX ADMIN — MIDODRINE HYDROCHLORIDE 10 MG: 10 TABLET ORAL at 13:53

## 2025-06-26 RX ADMIN — FUROSEMIDE 20 MG: 20 TABLET ORAL at 20:23

## 2025-06-26 RX ADMIN — Medication 500 MG: at 20:21

## 2025-06-26 RX ADMIN — LORAZEPAM 1 MG: 0.5 TABLET ORAL at 20:21

## 2025-06-26 RX ADMIN — OXYCODONE 5 MG: 5 TABLET ORAL at 20:21

## 2025-06-26 RX ADMIN — NEOMYCIN SULFATE 500 MG: 500 TABLET ORAL at 14:30

## 2025-06-26 RX ADMIN — SUCRALFATE 1 G: 1 TABLET ORAL at 20:21

## 2025-06-26 RX ADMIN — Medication 5 MG: at 20:21

## 2025-06-26 ASSESSMENT — PAIN DESCRIPTION - LOCATION: LOCATION: OTHER (COMMENT)

## 2025-06-26 ASSESSMENT — PAIN SCALES - GENERAL: PAINLEVEL_OUTOF10: 10

## 2025-06-26 ASSESSMENT — PAIN DESCRIPTION - ORIENTATION: ORIENTATION: OTHER (COMMENT)

## 2025-06-26 NOTE — PROGRESS NOTES
Patient arrived by [facility] EMS stretcher. Moved to bed uneventfully. Patient's oxygen connected at 10LPM vi NC. Family arrived as well.

## 2025-06-27 PROCEDURE — 2700000000 HC OXYGEN THERAPY PER DAY

## 2025-06-27 PROCEDURE — 6370000000 HC RX 637 (ALT 250 FOR IP): Performed by: INTERNAL MEDICINE

## 2025-06-27 PROCEDURE — 6550000002 HC HOSPICE INPATIENT RESPITE

## 2025-06-27 RX ADMIN — ZOLPIDEM TARTRATE 10 MG: 5 TABLET ORAL at 20:15

## 2025-06-27 RX ADMIN — SUCRALFATE 1 G: 1 TABLET ORAL at 20:16

## 2025-06-27 RX ADMIN — FUROSEMIDE 20 MG: 20 TABLET ORAL at 18:07

## 2025-06-27 RX ADMIN — MIDODRINE HYDROCHLORIDE 10 MG: 10 TABLET ORAL at 09:22

## 2025-06-27 RX ADMIN — LORAZEPAM 1 MG: 0.5 TABLET ORAL at 20:15

## 2025-06-27 RX ADMIN — LORAZEPAM 1 MG: 0.5 TABLET ORAL at 10:09

## 2025-06-27 RX ADMIN — Medication 500 MG: at 20:16

## 2025-06-27 RX ADMIN — HYOSCYAMINE SULFATE 0.12 MG: 0.12 TABLET SUBLINGUAL at 10:09

## 2025-06-27 RX ADMIN — Medication 500 MG: at 09:23

## 2025-06-27 RX ADMIN — FUROSEMIDE 20 MG: 20 TABLET ORAL at 09:23

## 2025-06-27 RX ADMIN — Medication 5 MG: at 10:09

## 2025-06-27 RX ADMIN — MIDODRINE HYDROCHLORIDE 10 MG: 10 TABLET ORAL at 20:16

## 2025-06-27 RX ADMIN — SPIRONOLACTONE 50 MG: 25 TABLET ORAL at 09:23

## 2025-06-27 RX ADMIN — NEOMYCIN SULFATE 500 MG: 500 TABLET ORAL at 09:22

## 2025-06-27 RX ADMIN — OXYCODONE 5 MG: 5 TABLET ORAL at 20:16

## 2025-06-27 RX ADMIN — Medication 400 MG: at 09:23

## 2025-06-27 RX ADMIN — SUCRALFATE 1 G: 1 TABLET ORAL at 18:07

## 2025-06-27 RX ADMIN — NEOMYCIN SULFATE 500 MG: 500 TABLET ORAL at 20:16

## 2025-06-27 RX ADMIN — SUCRALFATE 1 G: 1 TABLET ORAL at 09:22

## 2025-06-27 RX ADMIN — SUCRALFATE 1 G: 1 TABLET ORAL at 11:30

## 2025-06-27 NOTE — PROGRESS NOTES
Dr. Purdy making rounds in [facility]. Patient remains respite level of care. Discussed patient's lactulose and multiple watery loose bowel movements with incontinence- Notified will hold lactulose today.    Yes

## 2025-06-27 NOTE — PROGRESS NOTES
Ney Ann was admitted to the [facility] under Respite LOC in [address].  He is sleeping and no family is present.  I called his wife Georgia and she confirmed that he is here for a respite stay and will return home on Tuesday via non-emergent EMS.  I did request that she bring the EMS DNR to the nurses' over the weekend when she visits, which she stated she planned to do.  His sister assists with care and she had to go out of town, which led to the respite stay.

## 2025-06-27 NOTE — PROGRESS NOTES
Patient resting in bed, awake during change of shift with offgoing staff. Patient denies complaints at this time- respirations even and unlabored, bed locked in lowest position.

## 2025-06-27 NOTE — PROGRESS NOTES
Patient c/o pain with urination with rose cather in place. Placement checked at this time, secured with new stat-lock so pull is reduced. Patient requested ativan and morphine- administered with hyoscyamine to help with bladder spasms.

## 2025-06-28 PROCEDURE — 6550000002 HC HOSPICE INPATIENT RESPITE

## 2025-06-28 PROCEDURE — 6370000000 HC RX 637 (ALT 250 FOR IP): Performed by: INTERNAL MEDICINE

## 2025-06-28 PROCEDURE — 2700000000 HC OXYGEN THERAPY PER DAY

## 2025-06-28 RX ADMIN — SUCRALFATE 1 G: 1 TABLET ORAL at 12:10

## 2025-06-28 RX ADMIN — Medication 500 MG: at 20:18

## 2025-06-28 RX ADMIN — MIDODRINE HYDROCHLORIDE 10 MG: 10 TABLET ORAL at 01:46

## 2025-06-28 RX ADMIN — MIDODRINE HYDROCHLORIDE 10 MG: 10 TABLET ORAL at 08:33

## 2025-06-28 RX ADMIN — PANTOPRAZOLE SODIUM 40 MG: 40 TABLET, DELAYED RELEASE ORAL at 16:18

## 2025-06-28 RX ADMIN — Medication 5 MG: at 20:18

## 2025-06-28 RX ADMIN — NEOMYCIN SULFATE 500 MG: 500 TABLET ORAL at 08:33

## 2025-06-28 RX ADMIN — FUROSEMIDE 20 MG: 20 TABLET ORAL at 16:18

## 2025-06-28 RX ADMIN — HYOSCYAMINE SULFATE 0.12 MG: 0.12 TABLET SUBLINGUAL at 09:40

## 2025-06-28 RX ADMIN — ZOLPIDEM TARTRATE 10 MG: 5 TABLET ORAL at 20:18

## 2025-06-28 RX ADMIN — SUCRALFATE 1 G: 1 TABLET ORAL at 08:33

## 2025-06-28 RX ADMIN — OXYCODONE 5 MG: 5 TABLET ORAL at 21:16

## 2025-06-28 RX ADMIN — LORAZEPAM 1 MG: 0.5 TABLET ORAL at 12:10

## 2025-06-28 RX ADMIN — HYOSCYAMINE SULFATE 0.12 MG: 0.12 TABLET SUBLINGUAL at 13:56

## 2025-06-28 RX ADMIN — Medication 400 MG: at 08:33

## 2025-06-28 RX ADMIN — SUCRALFATE 1 G: 1 TABLET ORAL at 16:18

## 2025-06-28 RX ADMIN — SPIRONOLACTONE 50 MG: 25 TABLET ORAL at 08:33

## 2025-06-28 RX ADMIN — NEOMYCIN SULFATE 500 MG: 500 TABLET ORAL at 20:18

## 2025-06-28 RX ADMIN — PANTOPRAZOLE SODIUM 40 MG: 40 TABLET, DELAYED RELEASE ORAL at 08:33

## 2025-06-28 RX ADMIN — LORAZEPAM 1 MG: 0.5 TABLET ORAL at 00:34

## 2025-06-28 RX ADMIN — Medication 500 MG: at 08:33

## 2025-06-28 RX ADMIN — Medication 5 MG: at 00:33

## 2025-06-28 RX ADMIN — FUROSEMIDE 20 MG: 20 TABLET ORAL at 08:33

## 2025-06-28 RX ADMIN — MIDODRINE HYDROCHLORIDE 10 MG: 10 TABLET ORAL at 13:57

## 2025-06-28 RX ADMIN — OXYCODONE 5 MG: 5 TABLET ORAL at 16:18

## 2025-06-28 RX ADMIN — LACTULOSE 10 G: 20 SOLUTION ORAL at 08:33

## 2025-06-28 RX ADMIN — SUCRALFATE 1 G: 1 TABLET ORAL at 20:18

## 2025-06-28 RX ADMIN — Medication 5 MG: at 12:09

## 2025-06-28 RX ADMIN — MIDODRINE HYDROCHLORIDE 10 MG: 10 TABLET ORAL at 20:18

## 2025-06-28 RX ADMIN — NEOMYCIN SULFATE 500 MG: 500 TABLET ORAL at 13:56

## 2025-06-28 ASSESSMENT — PAIN SCALES - GENERAL
PAINLEVEL_OUTOF10: 8
PAINLEVEL_OUTOF10: 7

## 2025-06-28 ASSESSMENT — PAIN DESCRIPTION - LOCATION
LOCATION: BACK
LOCATION: BACK

## 2025-06-28 NOTE — PROGRESS NOTES
Rounding with change of shift report from [facility].  Patient resting in bed and spouse at bedside.  Patient alert and pleasant.  Safety precautions in place of bed in low position, locked.  Upper side rails x 2 raised.  Call light within reach.

## 2025-06-28 NOTE — PROGRESS NOTES
Follow up PRN oxycodone and PRN lorazepam, patient quietly watching TV at this time. Denies needs. Bed in lowest position and locked. Call bell within reach. Upper rails up x 2.

## 2025-06-28 NOTE — PROGRESS NOTES
Rounded with night shift nurse. Monique. Patient is awake and asking about breakfast. He is supine in his bed. No family present. Safety measures in place.

## 2025-06-28 NOTE — PROGRESS NOTES
Patient requesting PRN oxycodone and PRN lorazepam for pain and anxiety along with night time medications. Patient took all without difficulty whole with water. Refused lactulose as he said he had \" messy diarrhea \" earlier today. Denies other needs. Bed in lowest  position and locked. Call bell within reach. Upper rails up x 2.

## 2025-06-28 NOTE — PROGRESS NOTES
rounded in [facility]. Patient is respite level of care for PCG break. No needs at this time.   LACERATION

## 2025-06-28 NOTE — PROGRESS NOTES
Patient up to bathroom for bowel movement and back to bed.  Wears incontinent briefs.  Patient with shortness of air with activity.  Patient alert and oriented.  Rates pain to back as 7/10.  PRN ROXANOL 5 MG AND PRN ATIVAN 1 MG GIVEN.   Patient also states he wants something that will help him sleep.  Will continue to monitor.     Follow up at 0115, patient resting in bed watching TV.  Has not been to sleep but respirations are improved and unlabored.  FLACC 0.      At 0145, took bp and is 114/71 to RUE.  Scheduled medication given.

## 2025-06-28 NOTE — PROGRESS NOTES
Patient up to bathroom with assist of 1 with rolling walker.  Patient tolerated better this time.  Not as short of air.  Patient back to bed.

## 2025-06-28 NOTE — PROGRESS NOTES
Patient resting well at this time with eyes closed, respirations unlabored.  FLACC 0.  Lying on right side.  Patient turns himself.  Will continue to monitor.

## 2025-06-29 PROCEDURE — 6370000000 HC RX 637 (ALT 250 FOR IP): Performed by: INTERNAL MEDICINE

## 2025-06-29 PROCEDURE — 6550000002 HC HOSPICE INPATIENT RESPITE

## 2025-06-29 PROCEDURE — 2700000000 HC OXYGEN THERAPY PER DAY

## 2025-06-29 RX ADMIN — OXYCODONE 5 MG: 5 TABLET ORAL at 03:05

## 2025-06-29 RX ADMIN — SPIRONOLACTONE 50 MG: 25 TABLET ORAL at 08:24

## 2025-06-29 RX ADMIN — LORAZEPAM 1 MG: 0.5 TABLET ORAL at 20:18

## 2025-06-29 RX ADMIN — Medication 5 MG: at 19:45

## 2025-06-29 RX ADMIN — Medication 400 MG: at 08:24

## 2025-06-29 RX ADMIN — NEOMYCIN SULFATE 500 MG: 500 TABLET ORAL at 20:19

## 2025-06-29 RX ADMIN — MIDODRINE HYDROCHLORIDE 10 MG: 10 TABLET ORAL at 01:42

## 2025-06-29 RX ADMIN — PANTOPRAZOLE SODIUM 40 MG: 40 TABLET, DELAYED RELEASE ORAL at 06:07

## 2025-06-29 RX ADMIN — LACTULOSE 10 G: 20 SOLUTION ORAL at 08:24

## 2025-06-29 RX ADMIN — SUCRALFATE 1 G: 1 TABLET ORAL at 06:07

## 2025-06-29 RX ADMIN — LORAZEPAM 1 MG: 0.5 TABLET ORAL at 03:05

## 2025-06-29 RX ADMIN — FUROSEMIDE 20 MG: 20 TABLET ORAL at 17:47

## 2025-06-29 RX ADMIN — OXYCODONE 5 MG: 5 TABLET ORAL at 20:18

## 2025-06-29 RX ADMIN — PANTOPRAZOLE SODIUM 40 MG: 40 TABLET, DELAYED RELEASE ORAL at 16:31

## 2025-06-29 RX ADMIN — SUCRALFATE 1 G: 1 TABLET ORAL at 16:31

## 2025-06-29 RX ADMIN — NEOMYCIN SULFATE 500 MG: 500 TABLET ORAL at 08:24

## 2025-06-29 RX ADMIN — Medication 500 MG: at 08:24

## 2025-06-29 RX ADMIN — HYOSCYAMINE SULFATE 0.12 MG: 0.12 TABLET SUBLINGUAL at 11:52

## 2025-06-29 RX ADMIN — MIDODRINE HYDROCHLORIDE 10 MG: 10 TABLET ORAL at 08:24

## 2025-06-29 RX ADMIN — MIDODRINE HYDROCHLORIDE 10 MG: 10 TABLET ORAL at 16:31

## 2025-06-29 RX ADMIN — SUCRALFATE 1 G: 1 TABLET ORAL at 20:19

## 2025-06-29 RX ADMIN — OXYCODONE 5 MG: 5 TABLET ORAL at 11:52

## 2025-06-29 RX ADMIN — NEOMYCIN SULFATE 500 MG: 500 TABLET ORAL at 16:31

## 2025-06-29 RX ADMIN — Medication 500 MG: at 20:19

## 2025-06-29 RX ADMIN — FUROSEMIDE 20 MG: 20 TABLET ORAL at 08:24

## 2025-06-29 RX ADMIN — ZOLPIDEM TARTRATE 10 MG: 5 TABLET ORAL at 20:18

## 2025-06-29 RX ADMIN — SUCRALFATE 1 G: 1 TABLET ORAL at 11:52

## 2025-06-29 RX ADMIN — MIDODRINE HYDROCHLORIDE 10 MG: 10 TABLET ORAL at 20:19

## 2025-06-29 ASSESSMENT — PAIN SCALES - GENERAL
PAINLEVEL_OUTOF10: 5
PAINLEVEL_OUTOF10: 6
PAINLEVEL_OUTOF10: 8

## 2025-06-29 ASSESSMENT — PAIN DESCRIPTION - LOCATION
LOCATION: BACK
LOCATION: PENIS
LOCATION: PENIS

## 2025-06-29 NOTE — PROGRESS NOTES
Pt is resting in bed, no c/o pain or discomfort at this time. No needs voiced per patient. Safety precautions in place, bed locked in lowest position, side rails x 2 in place and call light within reach.

## 2025-06-29 NOTE — PROGRESS NOTES
Rounding and report with offgoing nurses. Care dIscussed from their shift. No family at bedside. FLACC 0/10. No needs assessed at this time. All questions answered, safety precautions in place, bed locked in lowest position, side rails x 2 in place and call light within reach.

## 2025-06-29 NOTE — PROGRESS NOTES
Oxy and Ativan given @ 0305. Pt complains of back pain and is slightly impulsive, jumped up to use bsc no bm had. Pt is back in bed awake and resting quietly. Safety measures in place. Will continue to monitor.     show

## 2025-06-29 NOTE — PROGRESS NOTES
Follow up both Oxy and ativan effective no complaints of pain at this time. Pt is in bed and quiet. Safety measure in place. Will continue to monitor.

## 2025-06-29 NOTE — PROGRESS NOTES
Follow up on Morphine and Oxy both effective. Pt in bed resting comfortable with safety measure in place. Will continue to monitor.

## 2025-06-29 NOTE — PROGRESS NOTES
Patient is awake in bed night meds given. Pt complained of back pain morphine given with night time meds @ 2018. Pt refused lactulose says \"I just had a real good bm.\"     Still having pain Oxy given @2116.    Pt is now awake in bed watching TV. Safety measure in place.

## 2025-06-29 NOTE — PROGRESS NOTES
1152--Pt c/o bladder pain, SN administered PRN oxycodone and levsin for comfort. Pt's spouse is at bedside. No further needs voiced at this time.  Safety precautons are in place, bed is locked in lowest position, call light within reach, and side rails x 2 are up.   1230--Pt is now resting quietly in bed, eyes closed, respirations are even and unlabored. No further s/sx of pain or discomfort at this time. Spouse and dog Evan remain at bedside. All questions answered,safety precautions in place, bed locked in lowest position, side rails x 2 in place and call light within reach.

## 2025-06-30 PROCEDURE — 6370000000 HC RX 637 (ALT 250 FOR IP): Performed by: INTERNAL MEDICINE

## 2025-06-30 PROCEDURE — 2700000000 HC OXYGEN THERAPY PER DAY

## 2025-06-30 PROCEDURE — 6550000002 HC HOSPICE INPATIENT RESPITE

## 2025-06-30 RX ORDER — MECOBALAMIN 5000 MCG
20 TABLET,DISINTEGRATING ORAL NIGHTLY PRN
Status: DISCONTINUED | OUTPATIENT
Start: 2025-06-30 | End: 2025-07-01 | Stop reason: HOSPADM

## 2025-06-30 RX ADMIN — NEOMYCIN SULFATE 500 MG: 500 TABLET ORAL at 20:23

## 2025-06-30 RX ADMIN — Medication 5 MG: at 16:40

## 2025-06-30 RX ADMIN — LACTULOSE 10 G: 20 SOLUTION ORAL at 20:23

## 2025-06-30 RX ADMIN — MIDODRINE HYDROCHLORIDE 10 MG: 10 TABLET ORAL at 02:18

## 2025-06-30 RX ADMIN — SPIRONOLACTONE 50 MG: 25 TABLET ORAL at 08:02

## 2025-06-30 RX ADMIN — NEOMYCIN SULFATE 500 MG: 500 TABLET ORAL at 13:33

## 2025-06-30 RX ADMIN — Medication 400 MG: at 08:02

## 2025-06-30 RX ADMIN — FUROSEMIDE 20 MG: 20 TABLET ORAL at 08:03

## 2025-06-30 RX ADMIN — Medication 500 MG: at 08:06

## 2025-06-30 RX ADMIN — SUCRALFATE 1 G: 1 TABLET ORAL at 16:40

## 2025-06-30 RX ADMIN — Medication 5 MG: at 08:01

## 2025-06-30 RX ADMIN — PANTOPRAZOLE SODIUM 40 MG: 40 TABLET, DELAYED RELEASE ORAL at 16:40

## 2025-06-30 RX ADMIN — SUCRALFATE 1 G: 1 TABLET ORAL at 08:02

## 2025-06-30 RX ADMIN — MIDODRINE HYDROCHLORIDE 10 MG: 10 TABLET ORAL at 08:02

## 2025-06-30 RX ADMIN — SUCRALFATE 1 G: 1 TABLET ORAL at 20:23

## 2025-06-30 RX ADMIN — NEOMYCIN SULFATE 500 MG: 500 TABLET ORAL at 08:02

## 2025-06-30 RX ADMIN — ZOLPIDEM TARTRATE 10 MG: 5 TABLET ORAL at 20:23

## 2025-06-30 RX ADMIN — MIDODRINE HYDROCHLORIDE 10 MG: 10 TABLET ORAL at 20:23

## 2025-06-30 RX ADMIN — OXYCODONE 5 MG: 5 TABLET ORAL at 13:32

## 2025-06-30 RX ADMIN — FUROSEMIDE 20 MG: 20 TABLET ORAL at 16:40

## 2025-06-30 RX ADMIN — HYOSCYAMINE SULFATE 0.12 MG: 0.12 TABLET SUBLINGUAL at 13:33

## 2025-06-30 RX ADMIN — PANTOPRAZOLE SODIUM 40 MG: 40 TABLET, DELAYED RELEASE ORAL at 08:02

## 2025-06-30 RX ADMIN — MIDODRINE HYDROCHLORIDE 10 MG: 10 TABLET ORAL at 13:33

## 2025-06-30 RX ADMIN — Medication 5 MG: at 20:22

## 2025-06-30 RX ADMIN — Medication 500 MG: at 20:23

## 2025-06-30 ASSESSMENT — PAIN SCALES - GENERAL: PAINLEVEL_OUTOF10: 6

## 2025-06-30 NOTE — PROGRESS NOTES
Follow up PRN morphine- some improvement reported but continued pain. Patient requested and received PRN oxycodone 5 mg PO and PRN lorazepam 1 mg PO with night  time meds. Refused lactulose r/t diarrhea

## 2025-06-30 NOTE — PROGRESS NOTES
Dr.Long atwood in [facility]. Patient remains respite level of care. Safety measures in place.

## 2025-06-30 NOTE — PROGRESS NOTES
Ney Ann remains respite LOC in [address] at the [facility] ED.  He is eagerly awaiting his discharge home tomorrow.  I called his wife Georgia to verify the plan of return home tomorrow via ems.  She was not particular about the time so I requested Non-emergent EMS from [facility] EMS for 11am.    I notified [employer] of this as well and requested they notify us who to have [facility] ED nursing call report to.  Awaiting a reply.

## 2025-06-30 NOTE — PROGRESS NOTES
Change of shift report and rounds. Resting quietly in bed watching TV. No needs at this time. Bed in lowest position and locked. Call bell within reach. Upper rails up x 2.

## 2025-06-30 NOTE — PROGRESS NOTES
Follow up PRN oxycodone and lorazepam- patient is resting quietly in bed with eyes closed. Bed in lowest position and locked. Call bell within reach.

## 2025-06-30 NOTE — PROGRESS NOTES
Rounded with night shift nurse Monique. Patient is on the BSC having a BM, PCA with patient. No needs at this time.

## 2025-07-01 VITALS
OXYGEN SATURATION: 92 % | HEART RATE: 106 BPM | TEMPERATURE: 97.8 F | DIASTOLIC BLOOD PRESSURE: 63 MMHG | SYSTOLIC BLOOD PRESSURE: 112 MMHG | RESPIRATION RATE: 20 BRPM

## 2025-07-01 PROCEDURE — 6370000000 HC RX 637 (ALT 250 FOR IP): Performed by: INTERNAL MEDICINE

## 2025-07-01 RX ADMIN — SPIRONOLACTONE 50 MG: 25 TABLET ORAL at 07:57

## 2025-07-01 RX ADMIN — SUCRALFATE 1 G: 1 TABLET ORAL at 05:41

## 2025-07-01 RX ADMIN — MIDODRINE HYDROCHLORIDE 10 MG: 10 TABLET ORAL at 01:21

## 2025-07-01 RX ADMIN — SUCRALFATE 1 G: 1 TABLET ORAL at 10:34

## 2025-07-01 RX ADMIN — NEOMYCIN SULFATE 500 MG: 500 TABLET ORAL at 08:37

## 2025-07-01 RX ADMIN — Medication 5 MG: at 07:10

## 2025-07-01 RX ADMIN — Medication 5 MG: at 11:20

## 2025-07-01 RX ADMIN — MIDODRINE HYDROCHLORIDE 10 MG: 10 TABLET ORAL at 07:55

## 2025-07-01 RX ADMIN — Medication 500 MG: at 07:57

## 2025-07-01 RX ADMIN — Medication 400 MG: at 07:55

## 2025-07-01 RX ADMIN — PANTOPRAZOLE SODIUM 40 MG: 40 TABLET, DELAYED RELEASE ORAL at 05:41

## 2025-07-01 RX ADMIN — LORAZEPAM 1 MG: 0.5 TABLET ORAL at 09:35

## 2025-07-01 RX ADMIN — Medication 20 MG: at 01:21

## 2025-07-01 RX ADMIN — FUROSEMIDE 20 MG: 20 TABLET ORAL at 07:54

## 2025-07-01 RX ADMIN — Medication 5 MG: at 01:21

## 2025-07-01 RX ADMIN — LACTULOSE 10 G: 20 SOLUTION ORAL at 07:54

## 2025-07-01 ASSESSMENT — PAIN - FUNCTIONAL ASSESSMENT: PAIN_FUNCTIONAL_ASSESSMENT: FACE, LEGS, ACTIVITY, CRY, AND CONSOLABILITY (FLACC)

## 2025-07-01 ASSESSMENT — PAIN DESCRIPTION - LOCATION: LOCATION: PERINEUM

## 2025-07-01 ASSESSMENT — PAIN SCALES - GENERAL: PAINLEVEL_OUTOF10: 6

## 2025-07-01 NOTE — PROGRESS NOTES
Patient was up to the BSC.  Had a small bowel movement.  After returning to bed, he stated he was really hurting all over after all the movement.  Requested some morphine.  Roxanol 5 mg sublilngual given at this time for generalized pain he is rating a 5/10.  Will monitor for effectiveness.

## 2025-07-01 NOTE — PROGRESS NOTES
Patient was assisted to transfer to [facility] for a bowel movement. He was able to sit on commode for 10 minutes with no production. He was cleansed in perineum and pull up was placed back on him for occasional incontinence. He was transferred with two staff back to bed and repositioned up higher to see best and for comfort to see television. He was able to take his morning medications without difficulty. Call light available and bed controls next to him. Denied pain at this time. FLACC 0/10.

## 2025-07-01 NOTE — PROGRESS NOTES
PAIN: Patient was given Melatonin 20mg orally and Roxanol 5mg oral concentration for pain at 6/10 from his rose catheter. He requested his peach tea and fresh ice from the refrigerator. His request was honored.    At FOLLOWUP at 02:00am: Patient was resting on right side with eyes closed, breathing rhythmically and calmly. FLACC 0/10.

## 2025-07-01 NOTE — PROGRESS NOTES
Cath securement device changed per family req due to pulling on rose. Also Morphine 5 mg given SL for pain management r/t impending ambulance trip home. [facility] ambulance here for transport. Family at bedside and took personal belongings. Pt discharged home per ambulance in stable cond at time of discharge.

## 2025-07-01 NOTE — PROGRESS NOTES
PAIN: Patient complained of pain from his rose catheter rated 6/10. He requested for Morphine 5mg oral concentration. His request was honored with medication administered with other scheduled medications. He states he takes melatonin 25mg at night normally at home. RN called his wife Georgia to request the proper dose and that patient does take the medication. She called back to notify RN that patient does take 20-25mg of Melatonin around 1am during the night since he cannot sleep all night. Patient states he had bottles in the room, but they were not located. Wife states he had 3-4 tabs at the bedside in dollar store bottles, but they were not found. Order for home medication was continued and planned for PRN dose this evening.     At FOLLOWUP at 20:48pm: Discussed plans with patient at bedside to administer PRN Melatonin in the night when he needed medication for sleep. He states pain was better and he was able to rest.

## (undated) DEVICE — COVER POS PERINL POST NS 082501

## (undated) DEVICE — GAUZE,SPONGE,4"X4",8PLY,STRL,LF,10/TRAY: Brand: MEDLINE

## (undated) DEVICE — CHLORAPREP 26ML ORANGE

## (undated) DEVICE — SUTURE ABSRB BRAID COAT UD CP NO 2 27IN VCRL J195H

## (undated) DEVICE — PACK ANT HIP CDS

## (undated) DEVICE — LIGHT SUCT UNTETHERED SCINTILLANT

## (undated) DEVICE — GLOVE SURG SZ 75 CRM LTX FREE POLYISOPRENE POLYMER BEAD ANTI

## (undated) DEVICE — GLOVE SURG SZ 8 L12IN FNGR THK79MIL GRN LTX FREE

## (undated) DEVICE — MASK VENTILATOR MED AD SUPERNOVA ET

## (undated) DEVICE — 6.3MM ROUND FAST CUTTING BUR

## (undated) DEVICE — DUAL CUT SAGITTAL BLADE

## (undated) DEVICE — SUTURE VCRL SZ 3-0 L27IN ABSRB UD L26MM SH 1/2 CIR J416H

## (undated) DEVICE — NON-WOVEN ADHESIVE WOUND DRESSING: Brand: PRIMAPORE ADHESIVE DRESSING 30*10CM

## (undated) DEVICE — ENDO KIT,LOURDES HOSPITAL: Brand: MEDLINE INDUSTRIES, INC.

## (undated) DEVICE — GLOVE SURG SZ 85 L12IN FNGR THK79MIL GRN LTX FREE

## (undated) DEVICE — UNDERGLOVE SURG SZ 8 FNGR THK0.21MIL GRN LTX BEAD CUF

## (undated) DEVICE — GLOVE SURG SZ 85 CRM LTX FREE POLYISOPRENE POLYMER BEAD ANTI

## (undated) DEVICE — TOTAL TRAY, 16FR 10ML SIL FOLEY, URN: Brand: MEDLINE

## (undated) DEVICE — FAN SPRAY KIT: Brand: PULSAVAC®

## (undated) DEVICE — GLOVE SURG SZ 85 L12IN FNGR ORTHO 126MIL CRM LTX FREE

## (undated) DEVICE — SUTURE VCRL SZ 0 L27IN ABSRB UD L36MM CT-1 1/2 CIR J260H

## (undated) DEVICE — FORCEPS BX L240CM JAW DIA2.4MM ORNG L CAP W/ NDL DISP RAD

## (undated) DEVICE — SYSTEM VENT MED AD NASAL PAP SUPERNO2VA

## (undated) DEVICE — SYSTEM SKIN CLSR 22CM DERMBND PRINEO

## (undated) DEVICE — AGENT HEMSTAT 3GM PURIFIED PLNT STARCH PWD ABSRB ARISTA AH

## (undated) DEVICE — SINGLE PORT MANIFOLD: Brand: NEPTUNE 2

## (undated) DEVICE — SUTURE VCRL SZ 2-0 L36IN ABSRB UD L36MM CT-1 1/2 CIR J945H

## (undated) DEVICE — BIPOLAR SEALER 23-112-1 AQM 6.0: Brand: AQUAMANTYS ®

## (undated) DEVICE — CANNULA NSL AD L7FT DIV O2 CO2 W/ M LUERLOCK TRMPT CONN